# Patient Record
Sex: FEMALE | Race: WHITE | NOT HISPANIC OR LATINO | Employment: OTHER | URBAN - METROPOLITAN AREA
[De-identification: names, ages, dates, MRNs, and addresses within clinical notes are randomized per-mention and may not be internally consistent; named-entity substitution may affect disease eponyms.]

---

## 2017-12-04 ENCOUNTER — HOSPITAL ENCOUNTER (OUTPATIENT)
Dept: RADIOLOGY | Facility: HOSPITAL | Age: 72
Discharge: HOME | End: 2017-12-04
Attending: ORTHOPAEDIC SURGERY
Payer: MEDICARE

## 2017-12-04 DIAGNOSIS — T84.038A: ICD-10-CM

## 2017-12-04 DIAGNOSIS — Z96.659: ICD-10-CM

## 2017-12-04 PROCEDURE — 78805 NM INFLAMMATORY LOCALIZATION LTD CERETEC: CPT | Mod: TC

## 2017-12-04 PROCEDURE — 78805 NM INFLAMMATORY LOCALIZATION LTD CERETEC: CPT | Mod: 26,,, | Performed by: RADIOLOGY

## 2017-12-05 ENCOUNTER — HOSPITAL ENCOUNTER (OUTPATIENT)
Dept: RADIOLOGY | Facility: HOSPITAL | Age: 72
Discharge: HOME | End: 2017-12-05
Attending: RADIOLOGY
Payer: MEDICARE

## 2017-12-05 DIAGNOSIS — M25.561 RIGHT KNEE PAIN: ICD-10-CM

## 2017-12-05 DIAGNOSIS — M25.562 LEFT KNEE PAIN: ICD-10-CM

## 2017-12-05 PROCEDURE — 73560 X-RAY EXAM OF KNEE 1 OR 2: CPT | Mod: 26,50,, | Performed by: RADIOLOGY

## 2017-12-05 PROCEDURE — 73560 X-RAY EXAM OF KNEE 1 OR 2: CPT | Mod: 50,TC

## 2018-01-08 ENCOUNTER — OFFICE VISIT (OUTPATIENT)
Dept: DERMATOLOGY | Facility: CLINIC | Age: 73
End: 2018-01-08
Payer: MEDICARE

## 2018-01-08 VITALS — BODY MASS INDEX: 33.33 KG/M2 | HEIGHT: 69 IN | WEIGHT: 225 LBS

## 2018-01-08 DIAGNOSIS — L98.9 DISEASE OF SKIN AND SUBCUTANEOUS TISSUE: Primary | ICD-10-CM

## 2018-01-08 PROCEDURE — 99201 PR OFFICE/OUTPT VISIT,NEW,LEVL I: CPT | Mod: S$GLB,,, | Performed by: DERMATOLOGY

## 2018-01-08 PROCEDURE — 99999 PR PBB SHADOW E&M-EST. PATIENT-LVL II: CPT | Mod: PBBFAC,,, | Performed by: DERMATOLOGY

## 2018-01-08 RX ORDER — LEVOTHYROXINE SODIUM 100 UG/1
100 TABLET ORAL DAILY
COMMUNITY
End: 2018-07-02 | Stop reason: SDUPTHER

## 2018-01-08 RX ORDER — ATORVASTATIN CALCIUM 20 MG/1
20 TABLET, FILM COATED ORAL DAILY
COMMUNITY
End: 2018-08-15 | Stop reason: SDUPTHER

## 2018-01-08 RX ORDER — METOPROLOL SUCCINATE 50 MG/1
50 TABLET, EXTENDED RELEASE ORAL
COMMUNITY
Start: 2017-11-24 | End: 2018-07-02 | Stop reason: SDUPTHER

## 2018-01-08 RX ORDER — DULOXETIN HYDROCHLORIDE 30 MG/1
30 CAPSULE, DELAYED RELEASE ORAL DAILY
COMMUNITY
End: 2018-08-06

## 2018-01-08 RX ORDER — GABAPENTIN 800 MG/1
800 TABLET ORAL 2 TIMES DAILY
COMMUNITY
End: 2020-10-27 | Stop reason: SDUPTHER

## 2018-01-08 RX ORDER — IRBESARTAN 300 MG/1
300 TABLET ORAL DAILY
COMMUNITY
Start: 2017-10-31 | End: 2018-01-13 | Stop reason: SDUPTHER

## 2018-01-08 NOTE — PROGRESS NOTES
Subjective:       Patient ID:  Marisela Eagle is a 72 y.o. female who presents for   Chief Complaint   Patient presents with    Spot     Scalp     Initial visit  C/o sweating on scalp and sore on head, over one year  Saw dermatologist, received steroid injections and PO steroids, told it was inflammation of hair follicle, uses clobetasol solution PRN sor  H/o hypothyroidism, on synthroid, states thyroid function stabilized, PCP Dr Badillo  Lab work Q6 months, most recent wnl    Current Outpatient Prescriptions:     atorvastatin (LIPITOR) 20 MG tablet, Take 20 mg by mouth once daily., Disp: , Rfl:     DULoxetine (CYMBALTA) 30 MG capsule, Take 30 mg by mouth once daily., Disp: , Rfl:     ergocalciferol (VITAMIN D) 50,000 unit capsule, Take 50,000 Units by mouth.  , Disp: , Rfl:     estradiol (ESTRACE) 0.5 MG tablet, Take 1 tablet (0.5 mg total) by mouth once daily., Disp: 30 tablet, Rfl: 11    fish oil-omega-3 fatty acids 300-1,000 mg capsule, Take 2 g by mouth once daily.  , Disp: , Rfl:     gabapentin (NEURONTIN) 800 MG tablet, Take 800 mg by mouth 3 (three) times daily., Disp: , Rfl:     irbesartan (AVAPRO) 300 MG tablet, Take 300 mg by mouth once daily., Disp: , Rfl:     levmefolate-B6 phos-methyl-B12 (METANX) 3-35-2 mg Tab, Every day, Disp: , Rfl:     levothyroxine (SYNTHROID) 100 MCG tablet, Take 100 mcg by mouth once daily., Disp: , Rfl:     lorazepam (ATIVAN) 1 MG tablet, Take 1 tablet (1 mg total) by mouth every 8 (eight) hours as needed., Disp: 60 tablet, Rfl: 0    magnesium oxide-Mg AA chelate (MAGNESIUM, AMINO ACID CHELATE,) 133 mg Tab, Take 250 mg by mouth.  , Disp: , Rfl:     metoprolol succinate (TOPROL-XL) 50 MG 24 hr tablet, Take 50 mg by mouth., Disp: , Rfl:     multivitamin (MULTIVITAMIN) per tablet, Take 1 tablet by mouth once daily.  , Disp: , Rfl:     vitamin A-vitamin C-vit E-min (ICAPS) TbSR, Take by mouth. 2 Tablet Sustained Release Oral Every day, Disp: , Rfl:      conjugated estrogens (PREMARIN) vaginal cream, Place vaginally every 7 days., Disp: 45 g, Rfl: 3    Current Facility-Administered Medications:     triamcinolone acetonide injection 80 mg, 80 mg, Intra-articular, 1 time in Clinic/HOD, Taiwo Gusman MD    triamcinolone acetonide injection 80 mg, 80 mg, Intra-articular, 1 time in Clinic/HOD, Taiwo Gusman MD        Spot  - Initial  Affected locations: scalp  Duration: 2 years  Signs and Symptoms: sweating.  Severity: mild  Timing: constant  Aggravated by: sweating  Treatments tried: Clobetasol scalp solution.        Review of Systems   Constitutional: Negative for fever, chills, weight loss, weight gain and fatigue.   Skin: Positive for daily sunscreen use, activity-related sunscreen use and wears hat.   Hematologic/Lymphatic: Bruises/bleeds easily.        Objective:    Physical Exam   Skin:   Areas Examined (abnormalities noted in diagram):   Scalp / Hair Palpated and Inspected              Diagram Legend     Erythematous scaling macule/papule c/w actinic keratosis       Vascular papule c/w angioma      Pigmented verrucoid papule/plaque c/w seborrheic keratosis      Yellow umbilicated papule c/w sebaceous hyperplasia      Irregularly shaped tan macule c/w lentigo     1-2 mm smooth white papules consistent with Milia      Movable subcutaneous cyst with punctum c/w epidermal inclusion cyst      Subcutaneous movable cyst c/w pilar cyst      Firm pink to brown papule c/w dermatofibroma      Pedunculated fleshy papule(s) c/w skin tag(s)      Evenly pigmented macule c/w junctional nevus     Mildly variegated pigmented, slightly irregular-bordered macule c/w mildly atypical nevus      Flesh colored to evenly pigmented papule c/w intradermal nevus       Pink pearly papule/plaque c/w basal cell carcinoma      Erythematous hyperkeratotic cursted plaque c/w SCC      Surgical scar with no sign of skin cancer recurrence      Open and closed comedones      Inflammatory  papules and pustules      Verrucoid papule consistent consistent with wart     Erythematous eczematous patches and plaques     Dystrophic onycholytic nail with subungual debris c/w onychomycosis     Umbilicated papule    Erythematous-base heme-crusted tan verrucoid plaque consistent with inflamed seborrheic keratosis     Erythematous Silvery Scaling Plaque c/w Psoriasis     See annotation      Assessment / Plan:        Disease of skin and subcutaneous tissue    no clear pathology identified, healthy appearing scalp, not hyperhidrotic today  Tenderness not explained by exam  Some telangiectasias in area of previous [place] steroid (Dr Weil)    Trial clobetasol solution QHS x 1 month    On gabapentin 800 TID           Return if symptoms worsen or fail to improve.

## 2018-06-25 ENCOUNTER — OFFICE VISIT (OUTPATIENT)
Dept: DERMATOLOGY | Facility: CLINIC | Age: 73
End: 2018-06-25
Payer: MEDICARE

## 2018-06-25 VITALS — HEIGHT: 69 IN | WEIGHT: 225 LBS | BODY MASS INDEX: 33.33 KG/M2

## 2018-06-25 DIAGNOSIS — L98.9 DISEASE OF SKIN AND SUBCUTANEOUS TISSUE: ICD-10-CM

## 2018-06-25 DIAGNOSIS — R61 HYPERHIDROSIS: Primary | ICD-10-CM

## 2018-06-25 PROCEDURE — 99214 OFFICE O/P EST MOD 30 MIN: CPT | Mod: S$GLB,,, | Performed by: DERMATOLOGY

## 2018-06-25 PROCEDURE — 99999 PR PBB SHADOW E&M-EST. PATIENT-LVL II: CPT | Mod: PBBFAC,,, | Performed by: DERMATOLOGY

## 2018-06-25 RX ORDER — GLYCOPYRROLATE 1 MG/1
1 TABLET ORAL DAILY
Qty: 30 TABLET | Refills: 0 | Status: SHIPPED | OUTPATIENT
Start: 2018-06-25 | End: 2018-07-25

## 2018-06-25 RX ORDER — CLOBETASOL PROPIONATE 0.46 MG/ML
SOLUTION TOPICAL 2 TIMES DAILY
Qty: 50 ML | Refills: 1 | Status: SHIPPED | OUTPATIENT
Start: 2018-06-25 | End: 2018-06-25 | Stop reason: SDUPTHER

## 2018-06-25 RX ORDER — CLOBETASOL PROPIONATE 0.46 MG/ML
SOLUTION TOPICAL 2 TIMES DAILY
Qty: 50 ML | Refills: 1 | Status: SHIPPED | OUTPATIENT
Start: 2018-06-25 | End: 2019-05-02

## 2018-06-25 NOTE — PROGRESS NOTES
Subjective:       Patient ID:  Marisela Eagle is a 73 y.o. female who presents for   Chief Complaint   Patient presents with    Lesion     Last seen by Dr. Elmore on 1-8-2018.   72 yo F presents for follow up visit c/o excessive sweating and bumps on scalp for 3 years.  She sweats mostly on her scalp and trunk even while indoors.  When the bumps on her scalp are present, they are described as itchy.  She does not wear a hat or bandana on her head regularly, but only occasionally while outdoors.  Today, however there are no bumps present and she seems to not be sweating excessively.  She has treated with oral steroids and ILK in the past.  She is currently using clobetasol solution PRN without much improvement.        Unsure if she started any new meds prior to sweating. On Estrace x at least 10 years; no obvious hormonal changes prior to the excess sweating      Past Medical History:   Diagnosis Date    Bilateral knee pain July 2012    left worse, right knee painful even after partial replacement.    Bruises easily     Clot 1990's?    Umbilical clot after hysterectomy    Colon polyps     adenomatous    Complication of anesthesia     very low pain tolerance    Cystocele     Degenerative disc disease     neck,back, muscle spasms    Diverticulitis     Edema of left lower extremity     ankles    GERD (gastroesophageal reflux disease)     Hypertension     Neuropathy     peripheral    Thyroid disease     hypothyroid, has nodules    Varicose veins      Review of Systems   Skin: Positive for itching, rash (not present today) and activity-related sunscreen use. Negative for daily sunscreen use and recent sunburn.   Hematologic/Lymphatic: Bruises/bleeds easily.        Objective:    Physical Exam   Constitutional: She appears well-developed and well-nourished.   Neurological: She is alert and oriented to person, place, and time.   Psychiatric: She has a normal mood and affect.   Skin:   Areas Examined  (abnormalities noted in diagram):   Scalp / Hair Palpated and Inspected  Head / Face Inspection Performed  Neck Inspection Performed  Chest / Axilla Inspection Performed  RUE Inspected  LUE Inspection Performed              Diagram Legend     Erythematous scaling macule/papule c/w actinic keratosis       Vascular papule c/w angioma      Pigmented verrucoid papule/plaque c/w seborrheic keratosis      Yellow umbilicated papule c/w sebaceous hyperplasia      Irregularly shaped tan macule c/w lentigo     1-2 mm smooth white papules consistent with Milia      Movable subcutaneous cyst with punctum c/w epidermal inclusion cyst      Subcutaneous movable cyst c/w pilar cyst      Firm pink to brown papule c/w dermatofibroma      Pedunculated fleshy papule(s) c/w skin tag(s)      Evenly pigmented macule c/w junctional nevus     Mildly variegated pigmented, slightly irregular-bordered macule c/w mildly atypical nevus      Flesh colored to evenly pigmented papule c/w intradermal nevus       Pink pearly papule/plaque c/w basal cell carcinoma      Erythematous hyperkeratotic cursted plaque c/w SCC      Surgical scar with no sign of skin cancer recurrence      Open and closed comedones      Inflammatory papules and pustules      Verrucoid papule consistent consistent with wart     Erythematous eczematous patches and plaques     Dystrophic onycholytic nail with subungual debris c/w onychomycosis     Umbilicated papule    Erythematous-base heme-crusted tan verrucoid plaque consistent with inflamed seborrheic keratosis     Erythematous Silvery Scaling Plaque c/w Psoriasis     See annotation    Last CXR in [facility] 2012 WNL  TSH 1/12/18 normal  Glucose 98; HbA1c 5.4 1/12/18  Last CBC in [facility] 2012 was WNL   Assessment / Plan:        Hyperhidrosis-x 3 years  Encouraged pt to follow up with her PCP for TSH testing as this is due in June 2018 (her PCP is not within [facility] system)  Some medications have been shown to induce hyperhidrosis  including some beta blockers (metoprolol, nadolol), physostigmine, pilocarpine, tricyclic antidepressants, and serotonin reuptake inhibitors (pt is on Cymbalta)  Complete work up includes urinary catecholamines may reveal a possible pheochromocytoma and PPD test to screen for tuberculosis.  Will have pt discuss the medications with her PCP (he is outside of [facility] system) since common things are common.  Will start with this.  In the meantime to help her control her Sx, will start with a very low dose of robinul.  Discussed the potential SEs with the patient    -     glycopyrrolate (ROBINUL) 1 mg Tab; Take 1 tablet (1 mg total) by mouth once daily.  Dispense: 30 tablet; Refill: 0  discussed side effects of dry eyes, dry mouth, dry mucosa, headaches, overheating , and abdominal pain.  Do not exercise in excessive heat.    Disease of skin and subcutaneous tissue  No obvious rash/papules on exam today  Possibly folliculitis induced by hyperhidrosis?  -     clobetasol (TEMOVATE) 0.05 % external solution; Apply topically 2 (two) times daily prn itch.  Dispense: 50 mL; Refill: 1             Follow-up in about 1 month (around 7/25/2018).

## 2018-06-27 ENCOUNTER — TELEPHONE (OUTPATIENT)
Dept: DERMATOLOGY | Facility: CLINIC | Age: 73
End: 2018-06-27

## 2018-06-27 NOTE — TELEPHONE ENCOUNTER
----- Message from Allison Cedeño MD sent at 6/25/2018  6:13 PM CDT -----  Pt was seen today for excess sweating on her scalp.  I did a little extra research and some medications have been shown to induce hyperhidrosis including beta blockers (pt on metoprolol) and serotonin reuptake inhibitors (pt is on Cymbalta). Will you ask her to talk to her PCP about possibly changing these medications to see if this helps with her sweating?  Her PCP is not within the [facility] system.  Thanks    MARYJANE

## 2018-06-28 ENCOUNTER — TELEPHONE (OUTPATIENT)
Dept: DERMATOLOGY | Facility: CLINIC | Age: 73
End: 2018-06-28

## 2018-06-28 NOTE — TELEPHONE ENCOUNTER
Left msg to contact office concerning the following .         RE: ----- Message from Allison Cedeño MD sent at 6/25/2018  6:13 PM CDT -----  Pt was seen today for excess sweating on her scalp.  I did a little extra research and some medications have been shown to induce hyperhidrosis including beta blockers (pt on metoprolol) and serotonin reuptake inhibitors (pt is on Cymbalta). Will you ask her to talk to her PCP about possibly changing these medications to see if this helps with her sweating?  Her PCP is not within the [facility] system.  Thanks    MARYJANE

## 2018-08-06 ENCOUNTER — OFFICE VISIT (OUTPATIENT)
Dept: DERMATOLOGY | Facility: CLINIC | Age: 73
End: 2018-08-06
Payer: MEDICARE

## 2018-08-06 VITALS — WEIGHT: 225 LBS | BODY MASS INDEX: 33.33 KG/M2 | HEIGHT: 69 IN

## 2018-08-06 DIAGNOSIS — L98.9 DISEASE OF SKIN AND SUBCUTANEOUS TISSUE: ICD-10-CM

## 2018-08-06 DIAGNOSIS — R61 HYPERHIDROSIS: Primary | ICD-10-CM

## 2018-08-06 PROCEDURE — 99999 PR PBB SHADOW E&M-EST. PATIENT-LVL II: CPT | Mod: PBBFAC,,, | Performed by: DERMATOLOGY

## 2018-08-06 PROCEDURE — 99213 OFFICE O/P EST LOW 20 MIN: CPT | Mod: S$GLB,,, | Performed by: DERMATOLOGY

## 2018-08-06 RX ORDER — GLYCOPYRROLATE 1 MG/1
1 TABLET ORAL 2 TIMES DAILY
Qty: 60 TABLET | Refills: 0 | Status: SHIPPED | OUTPATIENT
Start: 2018-08-06 | End: 2018-09-05

## 2018-08-06 RX ORDER — LORAZEPAM 1 MG/1
TABLET ORAL
COMMUNITY
End: 2019-09-17 | Stop reason: DRUGHIGH

## 2018-08-06 NOTE — PROGRESS NOTES
Subjective:       Patient ID:  Marisela Eagle is a 73 y.o. female who presents for   Chief Complaint   Patient presents with    Follow-up     Pt presents today for hyperhidrosis follow up. Last seen by me on 6-  Since last OV, stopped Cymbalta (duloxetine) which is known to cause hyperhidrosis.    She was started on a very low dose of Robinul (1 mg daily) but discontinued after 1 month because she did not see any improvement.     Possibly folliculitis induced by hyperhidrosis? She has only used the clobetasol (TEMOVATE) 0.05 % external solution a few times, helps little     Recent workup and labs done by her PCP Pawel Howell pt states everything is WNL  Unsure if she started any new meds prior to sweating. On Estrace x at least 10 years; no obvious hormonal changes prior to the excess sweating     Past Medical History:  Diagnosis Date   Bilateral knee pain July 2012    left worse, right knee painful even after partial replacement.   Bruises easily     Clot 1990's?    Umbilical clot after hysterectomy   Colon polyps      adenomatous   Complication of anesthesia      very low pain tolerance   Cystocele     Degenerative disc disease      neck,back, muscle spasms   Diverticulitis     Edema of left lower extremity      ankles   GERD (gastroesophageal reflux disease)     Hypertension     Neuropathy      peripheral   Thyroid disease      hypothyroid, has nodules   Varicose veins                 Review of Systems   Skin: Positive for activity-related sunscreen use. Negative for itching and recent sunburn.        Objective:    Physical Exam   Constitutional: She appears well-developed and well-nourished.   Neurological: She is alert and oriented to person, place, and time.   Psychiatric: She has a normal mood and affect.   Skin:   Areas Examined (abnormalities noted in diagram):   Scalp / Hair Palpated and Inspected  Head / Face Inspection Performed  Neck Inspection Performed  RUE  Inspected  LUE Inspection Performed              Diagram Legend     Erythematous scaling macule/papule c/w actinic keratosis       Vascular papule c/w angioma      Pigmented verrucoid papule/plaque c/w seborrheic keratosis      Yellow umbilicated papule c/w sebaceous hyperplasia      Irregularly shaped tan macule c/w lentigo     1-2 mm smooth white papules consistent with Milia      Movable subcutaneous cyst with punctum c/w epidermal inclusion cyst      Subcutaneous movable cyst c/w pilar cyst      Firm pink to brown papule c/w dermatofibroma      Pedunculated fleshy papule(s) c/w skin tag(s)      Evenly pigmented macule c/w junctional nevus     Mildly variegated pigmented, slightly irregular-bordered macule c/w mildly atypical nevus      Flesh colored to evenly pigmented papule c/w intradermal nevus       Pink pearly papule/plaque c/w basal cell carcinoma      Erythematous hyperkeratotic cursted plaque c/w SCC      Surgical scar with no sign of skin cancer recurrence      Open and closed comedones      Inflammatory papules and pustules      Verrucoid papule consistent consistent with wart     Erythematous eczematous patches and plaques     Dystrophic onycholytic nail with subungual debris c/w onychomycosis     Umbilicated papule    Erythematous-base heme-crusted tan verrucoid plaque consistent with inflamed seborrheic keratosis     Erythematous Silvery Scaling Plaque c/w Psoriasis     See annotation    Last CXR in [facility] 2012 WNL  TSH 2.85-->1.45 on 6/28/18 so synthroid increased from 50--> 100 mcg  Glucose 102; HbA1c 5.4 6/28/18  Last CBC in [facility] 2012 was WNL     Assessment / Plan:        Hyperhidrosis-x approx 3 years  Will increase dose of Robinul slightly to help with symptom relief  -     glycopyrrolate (ROBINUL) 1 mg Tab; Take 1 tablet (1 mg total) by mouth 2 (two) times daily.  Dispense: 60 tablet; Refill: 0  Pt stopped Cymbalta in late June (known to have hyperhidrotic effects)   Some medications have been  shown to induce hyperhidrosis including some beta blockers (nadolol, metoprolol), physostigmine, pilocarpine, tricyclic antidepressants, and serotonin reuptake inhibitors (pt is now off of Cymbalta)  Complete work up includes urine catecholamines may reveal a possible pheochromocytoma and PPD test to screen for tuberculosis, CBC, CXR     TSH 2.85-->1.45 on 6/28/18 so synthroid increased from 50--> 100 mcg    Disease of skin and subcutaneous tissue  No obvious rash/papules on exam today  Possibly folliculitis induced by hyperhidrosis?  Continue clobetasol (TEMOVATE) 0.05 % external solution bid prn itch           Follow-up in about 4 weeks (around 9/3/2018).

## 2018-08-13 ENCOUNTER — TELEPHONE (OUTPATIENT)
Dept: RHEUMATOLOGY | Facility: CLINIC | Age: 73
End: 2018-08-13

## 2018-08-13 NOTE — TELEPHONE ENCOUNTER
----- Message from Janene Kellogg sent at 8/13/2018  1:58 PM CDT -----  Call [phone] / asking to see a rheumatology Dr ( preferably  Before 11/22) next babs scheduled with Dr Michaels is 12/12 / has arthritis from previous surgeries     Patient asking for cancellation with [facility] as she has earlier appoint,ent with [facility]. CG

## 2018-08-20 ENCOUNTER — OFFICE VISIT (OUTPATIENT)
Dept: ENDOCRINOLOGY | Facility: CLINIC | Age: 73
End: 2018-08-20
Payer: MEDICARE

## 2018-08-20 ENCOUNTER — LAB VISIT (OUTPATIENT)
Dept: LAB | Facility: HOSPITAL | Age: 73
End: 2018-08-20
Attending: INTERNAL MEDICINE
Payer: MEDICARE

## 2018-08-20 VITALS
RESPIRATION RATE: 18 BRPM | WEIGHT: 247.38 LBS | SYSTOLIC BLOOD PRESSURE: 124 MMHG | DIASTOLIC BLOOD PRESSURE: 77 MMHG | BODY MASS INDEX: 36.64 KG/M2 | HEART RATE: 64 BPM | TEMPERATURE: 98 F | HEIGHT: 69 IN

## 2018-08-20 DIAGNOSIS — R79.89 ABNORMAL THYROID BLOOD TEST: ICD-10-CM

## 2018-08-20 DIAGNOSIS — E07.9 THYROID DISEASE: ICD-10-CM

## 2018-08-20 DIAGNOSIS — E04.9 GOITER: ICD-10-CM

## 2018-08-20 DIAGNOSIS — E78.5 HYPERLIPIDEMIA, UNSPECIFIED HYPERLIPIDEMIA TYPE: ICD-10-CM

## 2018-08-20 DIAGNOSIS — I10 HYPERTENSION, UNSPECIFIED TYPE: ICD-10-CM

## 2018-08-20 DIAGNOSIS — K21.9 GASTROESOPHAGEAL REFLUX DISEASE, ESOPHAGITIS PRESENCE NOT SPECIFIED: ICD-10-CM

## 2018-08-20 DIAGNOSIS — E04.1 NODULAR THYROID DISEASE: ICD-10-CM

## 2018-08-20 DIAGNOSIS — E06.9 THYROIDITIS: ICD-10-CM

## 2018-08-20 DIAGNOSIS — E55.9 HYPOVITAMINOSIS D: ICD-10-CM

## 2018-08-20 DIAGNOSIS — E03.9 HYPOTHYROIDISM, UNSPECIFIED TYPE: Primary | ICD-10-CM

## 2018-08-20 DIAGNOSIS — G62.9 NEUROPATHY: ICD-10-CM

## 2018-08-20 DIAGNOSIS — E78.00 HYPERCHOLESTEROLEMIA: ICD-10-CM

## 2018-08-20 DIAGNOSIS — E88.810 DYSMETABOLIC SYNDROME: ICD-10-CM

## 2018-08-20 DIAGNOSIS — E66.9 OBESITY (BMI 30-39.9): ICD-10-CM

## 2018-08-20 DIAGNOSIS — Z78.0 POSTMENOPAUSAL: ICD-10-CM

## 2018-08-20 LAB
25(OH)D3+25(OH)D2 SERPL-MCNC: 59 NG/ML
ALBUMIN SERPL BCP-MCNC: 4.2 G/DL
ALP SERPL-CCNC: 55 U/L
ALT SERPL W/O P-5'-P-CCNC: 34 U/L
ANION GAP SERPL CALC-SCNC: 10 MMOL/L
AST SERPL-CCNC: 25 U/L
BILIRUB SERPL-MCNC: 0.6 MG/DL
BUN SERPL-MCNC: 17 MG/DL
CA-I BLDV-SCNC: 1.21 MMOL/L
CALCIUM SERPL-MCNC: 9.9 MG/DL
CHLORIDE SERPL-SCNC: 101 MMOL/L
CHOLEST SERPL-MCNC: 169 MG/DL
CHOLEST/HDLC SERPL: 2.4 {RATIO}
CO2 SERPL-SCNC: 28 MMOL/L
CREAT SERPL-MCNC: 0.7 MG/DL
EST. GFR  (AFRICAN AMERICAN): >60 ML/MIN/1.73 M^2
EST. GFR  (NON AFRICAN AMERICAN): >60 ML/MIN/1.73 M^2
GLUCOSE SERPL-MCNC: 95 MG/DL
HDLC SERPL-MCNC: 69 MG/DL
HDLC SERPL: 40.8 %
LDLC SERPL CALC-MCNC: 82.6 MG/DL
NONHDLC SERPL-MCNC: 100 MG/DL
POTASSIUM SERPL-SCNC: 3.8 MMOL/L
PROT SERPL-MCNC: 7.2 G/DL
SODIUM SERPL-SCNC: 139 MMOL/L
T3 SERPL-MCNC: 86 NG/DL
T4 FREE SERPL-MCNC: 0.95 NG/DL
THYROGLOB AB SERPL IA-ACNC: <4 IU/ML
THYROPEROXIDASE IGG SERPL-ACNC: <6 IU/ML
TRIGL SERPL-MCNC: 87 MG/DL
TSH SERPL DL<=0.005 MIU/L-ACNC: 1.42 UIU/ML
URATE SERPL-MCNC: 7.7 MG/DL

## 2018-08-20 PROCEDURE — 86800 THYROGLOBULIN ANTIBODY: CPT

## 2018-08-20 PROCEDURE — 82308 ASSAY OF CALCITONIN: CPT

## 2018-08-20 PROCEDURE — 84443 ASSAY THYROID STIM HORMONE: CPT

## 2018-08-20 PROCEDURE — 82330 ASSAY OF CALCIUM: CPT

## 2018-08-20 PROCEDURE — 83018 HEAVY METAL QUAN EACH NES: CPT

## 2018-08-20 PROCEDURE — 84550 ASSAY OF BLOOD/URIC ACID: CPT

## 2018-08-20 PROCEDURE — 86316 IMMUNOASSAY TUMOR OTHER: CPT

## 2018-08-20 PROCEDURE — 3078F DIAST BP <80 MM HG: CPT | Mod: CPTII,S$GLB,, | Performed by: INTERNAL MEDICINE

## 2018-08-20 PROCEDURE — 99999 PR PBB SHADOW E&M-EST. PATIENT-LVL V: CPT | Mod: PBBFAC,,, | Performed by: INTERNAL MEDICINE

## 2018-08-20 PROCEDURE — 84432 ASSAY OF THYROGLOBULIN: CPT

## 2018-08-20 PROCEDURE — 99499 UNLISTED E&M SERVICE: CPT | Mod: S$PBB,,, | Performed by: INTERNAL MEDICINE

## 2018-08-20 PROCEDURE — 84480 ASSAY TRIIODOTHYRONINE (T3): CPT

## 2018-08-20 PROCEDURE — 82306 VITAMIN D 25 HYDROXY: CPT

## 2018-08-20 PROCEDURE — 80053 COMPREHEN METABOLIC PANEL: CPT

## 2018-08-20 PROCEDURE — 3074F SYST BP LT 130 MM HG: CPT | Mod: CPTII,S$GLB,, | Performed by: INTERNAL MEDICINE

## 2018-08-20 PROCEDURE — 84439 ASSAY OF FREE THYROXINE: CPT

## 2018-08-20 PROCEDURE — 80061 LIPID PANEL: CPT

## 2018-08-20 PROCEDURE — 86376 MICROSOMAL ANTIBODY EACH: CPT

## 2018-08-20 PROCEDURE — 99204 OFFICE O/P NEW MOD 45 MIN: CPT | Mod: S$GLB,,, | Performed by: INTERNAL MEDICINE

## 2018-08-20 NOTE — PROGRESS NOTES
Patient, Marisela Eagle (MRN #[id number]), presented with a recorded BMI of 36.53 kg/m^2 and a documented comorbidity(s):  - Hypertension  - Hyperlipidemia  to which the severe obesity is a contributing factor. This is consistent with the definition of severe obesity (BMI 35.0-35.9) with comorbidity (ICD-10 E66.01, Z68.35). The patient's severe obesity was monitored, evaluated, addressed and/or treated. This addendum to the medical record is made on 08/20/2018.

## 2018-08-20 NOTE — PROGRESS NOTES
Subjective:      Patient ID: Marisela Eagle is a 73 y.o. female.    Chief Complaint:   73 yr old postmenopausal lady seen for initial visit today on account of hypothyroidism on thyroid hormone repletion.    History of Present Illness    Patient is a 73 yr old lady with hypothyroidism and thyroid nodular disease seen for initial care visit today. She used to see Dr Bentley but last saw him in 2012. She is on LT4 100mcg QD.  Her last thyroid USS was from 07/12 and showed sub cm multinodular thyroid disease.  Her associated background comorbidities are as detailed below;    1. Hypothyroidism, unspecified type     2. Hypertension, unspecified type     3. Hyperlipidemia, unspecified hyperlipidemia type     4. Hypercholesterolemia     5. Dysmetabolic syndrome     6. Obesity (BMI 30-39.9)     7. Neuropathy     8. Gastroesophageal reflux disease, esophagitis presence not specified     9. Hypovitaminosis D     10. Thyroiditis     11. Goiter     12. Abnormal thyroid blood test     13. Thyroid disease     14. Postmenopausal     15. Nodular thyroid disease       Patients baseline epworth score is 4.  Patient has been hypothyroid since ~ 10 yrs ago.  Patients daughter has a history of thyroid cancer.  She has recently been having scalp hyperhidrosis. She has no dysphagia, no odynopahgia and has no significant dysphonia and no local  Voice changes.  Patient is s/p bilateral knee replacements and has had right hip replacement.  Her most recent DEXA was from 01/17 and was in normal range.        Review of Systems   Constitutional: Negative for activity change, appetite change, chills, diaphoresis, fatigue and unexpected weight change.   HENT: Negative for facial swelling, hearing loss, sore throat, trouble swallowing and voice change.    Eyes: Negative for photophobia and visual disturbance.   Respiratory: Negative for cough and shortness of breath.    Cardiovascular: Negative for chest pain, palpitations and leg swelling.  "  Gastrointestinal: Negative for abdominal distention, abdominal pain, constipation, diarrhea and nausea.   Endocrine: Negative for cold intolerance, heat intolerance, polydipsia, polyphagia and polyuria.   Genitourinary: Negative for difficulty urinating, dysuria, frequency, menstrual problem and urgency.   Musculoskeletal: Positive for arthralgias (Chronic especially in both knees and left hip), gait problem (on account of knee and hip arthralgias; uses rolling walker) and neck stiffness (chronic). Negative for back pain, joint swelling, myalgias and neck pain.   Skin: Negative for color change, pallor and rash.   Neurological: Negative for dizziness, tremors, seizures, syncope, weakness, light-headedness, numbness and headaches.   Hematological: Does not bruise/bleed easily.   Psychiatric/Behavioral: Negative for agitation, confusion, dysphoric mood, hallucinations and sleep disturbance. The patient is not nervous/anxious.        Objective:   /77 (BP Location: Right arm, Patient Position: Sitting, BP Method: Large (Automatic))   Pulse 64   Temp 98.3 °F (36.8 °C) (Oral)   Resp 18   Ht 5' 9" (1.753 m)   Wt 112.2 kg (247 lb 5.7 oz)   BMI 36.53 kg/m²  neck circ; 16.25" waist circ; 42.25" hip circ; 51.5" waist to hip ratio; 0.82       Physical Exam   Constitutional: She is oriented to person, place, and time. Vital signs are normal. She appears well-developed and well-nourished. She does not appear ill. No distress.   Not pale, anicteric, afebrile. Well hydrated. Not in any acute distress.  Does have chronic arthralgia of weight bearing joints and ambulates with aid of rolling walker.  Her Left ankle and foot is a weight ofloading Unna boot.   HENT:   Head: Normocephalic and atraumatic. Not macrocephalic. Head is without right periorbital erythema and without left periorbital erythema. Hair is normal.   Nose: Nose normal.   Mouth/Throat: Oropharynx is clear and moist. Mucous membranes are not pale and not " dry. No oropharyngeal exudate.   Mallampati grade 2 fauces. No nuchal AN   Eyes: Conjunctivae, EOM and lids are normal. Pupils are equal, round, and reactive to light. Right eye exhibits no discharge. Left eye exhibits no discharge. No scleral icterus.   Neck: Trachea normal and phonation normal. Normal carotid pulses and no JVD present. Carotid bruit is not present. Neck rigidity (chronic. Has limited range of neck rotation) present. Decreased range of motion present. No tracheal deviation present. No thyroid mass and no thyromegaly present.   Cardiovascular: Normal rate, regular rhythm, S1 normal, S2 normal, normal heart sounds, intact distal pulses and normal pulses. PMI is not displaced. Exam reveals no gallop.   No murmur heard.  Pulmonary/Chest: Effort normal and breath sounds normal. No respiratory distress. She has no wheezes. She has no rales.   Abdominal: Soft. Bowel sounds are normal. She exhibits no distension and no mass. There is no hepatosplenomegaly, splenomegaly or hepatomegaly. There is no tenderness. There is no rebound, no guarding and no CVA tenderness. No hernia. Hernia confirmed negative in the ventral area.   Obese anterior abdominal wall.   Musculoskeletal: She exhibits no edema or tenderness.        Right shoulder: She exhibits normal range of motion, no bony tenderness, no crepitus, no deformity, no pain, no spasm, normal pulse and normal strength.        Legs:  Bilateral knee replacement surgical scars as shown.  Left ankle and foot in Unna boot.   Lymphadenopathy:     She has no cervical adenopathy.        Right: No inguinal adenopathy present.        Left: No inguinal adenopathy present.   Neurological: She is alert and oriented to person, place, and time. She has normal strength and normal reflexes. She displays no atrophy, no tremor and normal reflexes. No cranial nerve deficit or sensory deficit. She exhibits normal muscle tone. Coordination and gait normal.   Skin: Skin is warm, dry  and intact. No bruising, no ecchymosis, no petechiae and no rash noted. She is not diaphoretic. No cyanosis or erythema. No pallor. Nails show no clubbing.   Psychiatric: She has a normal mood and affect. Her speech is normal and behavior is normal. Judgment and thought content normal. Cognition and memory are normal.   Nursing note and vitals reviewed.      Lab Review:     Results for [facility]OR[facility] (MRN [id number]) as of 8/20/2018 09:03   Ref. Range 1/12/2018 09:24 6/28/2018 10:38   Sodium Latest Ref Range: 135 - 146 mmol/L 141 144   Potassium Latest Ref Range: 3.5 - 5.3 mmol/L 4.8 4.4   Chloride Latest Ref Range: 98 - 110 mmol/L 103 101   CO2 Latest Ref Range: 20 - 31 mmol/L 33 (H) 35 (H)   BUN, Bld Latest Ref Range: 7 - 25 mg/dL 15 14   Creatinine Latest Ref Range: 0.60 - 0.93 mg/dL 0.65 0.70   BUN/Creatinine Ratio Latest Ref Range: 6 - 22 (calc) NOT APPLICABLE NOT APPLICABLE   eGFR if non African American Latest Ref Range: > OR = 60 mL/min/1.73m2 87 85   eGFR if African American Latest Ref Range: > OR = 60 mL/min/1.73m2 101 98   Glucose Latest Ref Range: 65 - 99 mg/dL 98 102 (H)   Calcium Latest Ref Range: 8.6 - 10.4 mg/dL 9.9 10.0   Alkaline Phosphatase Latest Ref Range: 33 - 130 U/L 57 55   Total Protein Latest Ref Range: 6.1 - 8.1 g/dL 6.9 6.7   Albumin Latest Ref Range: 3.6 - 5.1 g/dL 4.5 4.4   Albumin/Globulin Ratio Latest Ref Range: 1.0 - 2.5 (calc) 1.9 1.9   Total Bilirubin Latest Ref Range: 0.2 - 1.2 mg/dL 0.5 0.6   AST Latest Ref Range: 10 - 35 U/L 16 19   ALT Latest Ref Range: 6 - 29 U/L 21 23   Triglycerides Latest Ref Range: <150 mg/dL 70 121   Globulin, Total Latest Ref Range: 1.9 - 3.7 g/dL (calc) 2.4 2.3   Cholesterol Latest Ref Range: <200 mg/dL 180 185   HDL Latest Ref Range: >50 mg/dL 80 78   LDL Cholesterol Latest Units: mg/dL (calc) 84 85   Non HDL Chol. (LDL+VLDL) Latest Ref Range: <130 mg/dL (calc) 100 107   A1c Latest Ref Range: <5.7 % of total Hgb 5.4    HDL/Chol Ratio Latest Ref  Range: <5.0 (calc) 2.3 2.4   TSH w/reflex to FT4 Latest Ref Range: 0.40 - 4.50 mIU/L 2.85 1.45       Assessment:     1. Hypothyroidism, unspecified type     2. Hypertension, unspecified type  Comprehensive metabolic panel    Microalbumin/creatinine urine ratio    Urinalysis   3. Hyperlipidemia, unspecified hyperlipidemia type     4. Hypercholesterolemia     5. Dysmetabolic syndrome     6. Obesity (BMI 30-39.9)     7. Neuropathy     8. Gastroesophageal reflux disease, esophagitis presence not specified     9. Hypovitaminosis D  Vitamin D    Calcium, ionized    Comprehensive metabolic panel   10. Thyroiditis  T4, free    T3    Thyroid peroxidase antibody    Anti-thyroglobulin antibody    TSH    Iodine, Serum    Uric acid   11. Goiter  Calcitonin    Iodine, Serum    Uric acid   12. Abnormal thyroid blood test  T4, free    T3   13. Thyroid disease  T4, free    T3    Lipid panel    TSH    Uric acid   14. Postmenopausal     15. Nodular thyroid disease  Thyroglobulin    Calcitonin    Chromogranin A      Regarding hypothyroidism; appears clinically euthyroid; to recheck TFTs today and based on results may adjust dose as required.  Regarding thyroid nodular disease; to recheck thyroid USS.  Regarding hyperlipidemia ; to continue antilipidemics as before.  Regarding hypertension;  Well controlled BP today. No change to present anti hypertensives at the moment.  Regarding GERD; symptomatically stable. To continue present PPI therapy.  Regarding bone health; most recent DEXA normal; for FFup DEXA 01/19.  Regarding hypovitaminosis D; to continue Vit D repletion therapy as before.            Plan:       FFup in ~ 4mths

## 2018-08-21 LAB
CALCIT SERPL-MCNC: <5 PG/ML
THRYOGLOBULIN INTERPRETATION: ABNORMAL
THYROGLOB AB SERPL-ACNC: <1.8 IU/ML
THYROGLOB SERPL-MCNC: 3.6 NG/ML

## 2018-08-22 LAB — CGA SERPL-MCNC: 738 NG/ML (ref 0–95)

## 2018-08-23 LAB — IODINE SERPL-MCNC: 74 NG/ML (ref 40–92)

## 2018-08-24 ENCOUNTER — TELEPHONE (OUTPATIENT)
Dept: ENDOCRINOLOGY | Facility: CLINIC | Age: 73
End: 2018-08-24

## 2018-08-27 ENCOUNTER — HOSPITAL ENCOUNTER (OUTPATIENT)
Dept: RADIOLOGY | Facility: CLINIC | Age: 73
Discharge: HOME | End: 2018-08-27
Attending: INTERNAL MEDICINE
Payer: MEDICARE

## 2018-08-27 DIAGNOSIS — E04.9 GOITER: ICD-10-CM

## 2018-08-27 DIAGNOSIS — E04.1 NODULAR THYROID DISEASE: ICD-10-CM

## 2018-08-27 PROCEDURE — 76536 US EXAM OF HEAD AND NECK: CPT | Mod: TC,PO

## 2018-08-27 PROCEDURE — 76536 US EXAM OF HEAD AND NECK: CPT | Mod: 26,,, | Performed by: RADIOLOGY

## 2018-09-06 ENCOUNTER — TELEPHONE (OUTPATIENT)
Dept: DERMATOLOGY | Facility: CLINIC | Age: 73
End: 2018-09-06

## 2018-09-06 NOTE — TELEPHONE ENCOUNTER
Left msg .    ----- Message from Thelma Cruz MA sent at 9/5/2018  4:21 PM CDT -----  Contact: Self      ----- Message -----  From: Bailey Connelly  Sent: 9/4/2018  10:12 AM  To: [facility] Staff    Patient need to speak to the nurse about medication glycopyrrolate (ROBINUL) 1 mg Tab    Please call back [phone] (home)

## 2018-10-23 ENCOUNTER — OFFICE VISIT (OUTPATIENT)
Dept: DERMATOLOGY | Facility: CLINIC | Age: 73
End: 2018-10-23
Payer: MEDICARE

## 2018-10-23 VITALS — HEIGHT: 69 IN | BODY MASS INDEX: 36.64 KG/M2 | WEIGHT: 247.38 LBS

## 2018-10-23 DIAGNOSIS — L98.9 DISEASE OF SKIN AND SUBCUTANEOUS TISSUE: ICD-10-CM

## 2018-10-23 DIAGNOSIS — R61 HYPERHIDROSIS: Primary | ICD-10-CM

## 2018-10-23 PROCEDURE — 99212 OFFICE O/P EST SF 10 MIN: CPT | Mod: PBBFAC,PO | Performed by: DERMATOLOGY

## 2018-10-23 PROCEDURE — 3288F FALL RISK ASSESSMENT DOCD: CPT | Mod: CPTII,,, | Performed by: DERMATOLOGY

## 2018-10-23 PROCEDURE — 99213 OFFICE O/P EST LOW 20 MIN: CPT | Mod: S$PBB,,, | Performed by: DERMATOLOGY

## 2018-10-23 PROCEDURE — 99999 PR PBB SHADOW E&M-EST. PATIENT-LVL II: CPT | Mod: PBBFAC,,, | Performed by: DERMATOLOGY

## 2018-10-23 PROCEDURE — 1100F PTFALLS ASSESS-DOCD GE2>/YR: CPT | Mod: CPTII,,, | Performed by: DERMATOLOGY

## 2018-10-23 RX ORDER — GLYCOPYRROLATE 1 MG/1
1 TABLET ORAL DAILY
Qty: 30 TABLET | Refills: 0 | Status: SHIPPED | OUTPATIENT
Start: 2018-10-23 | End: 2018-11-19 | Stop reason: SDUPTHER

## 2018-10-23 NOTE — PROGRESS NOTES
Subjective:       Patient ID:  Marisela Eagle is a 73 y.o. female who presents for   Chief Complaint   Patient presents with    Excessive Sweating     f/u     Pt presents today for hyperhidrosis follow up. Last seen by me on 8/2018  At last OV, she had stopped Cymbalta (duloxetine) which is known to cause hyperhidrosis.    She was started on a very low dose of Robinul (1 mg daily) but discontinued after 1 month because she did not see any improvement. She increased to 2 mg for a little while but was still sweating.  At her rehab facility after hip surgery, she was on 1 mg daily and was not sweating (was also on ASA, Celebrex, Lyrica at this time).  Unsure if there were any other changes in her medications other than keeping the temperature lower on thermostat    Past Medical History:  Diagnosis Date   Bilateral knee pain July 2012    left worse, right knee painful even after partial replacement.   Bruises easily     Clot 1990's?    Umbilical clot after hysterectomy   Colon polyps      adenomatous   Complication of anesthesia      very low pain tolerance   Cystocele     Degenerative disc disease      neck,back, muscle spasms   Diverticulitis     Edema of left lower extremity      ankles   GERD (gastroesophageal reflux disease)     Hypertension     Neuropathy      peripheral   Thyroid disease      hypothyroid, has nodules   Varicose veins                 Review of Systems   Skin: Positive for rash (not present today) and activity-related sunscreen use. Negative for itching, daily sunscreen use and recent sunburn.   Hematologic/Lymphatic: Bruises/bleeds easily.        Objective:    Physical Exam   Constitutional: She appears well-developed and well-nourished.   Neurological: She is alert and oriented to person, place, and time.   Psychiatric: She has a normal mood and affect.   Skin:   Areas Examined (abnormalities noted in diagram):   Scalp / Hair Palpated and Inspected  Head / Face Inspection  Performed  Neck Inspection Performed  RUE Inspected  LUE Inspection Performed              Diagram Legend     Erythematous scaling macule/papule c/w actinic keratosis       Vascular papule c/w angioma      Pigmented verrucoid papule/plaque c/w seborrheic keratosis      Yellow umbilicated papule c/w sebaceous hyperplasia      Irregularly shaped tan macule c/w lentigo     1-2 mm smooth white papules consistent with Milia      Movable subcutaneous cyst with punctum c/w epidermal inclusion cyst      Subcutaneous movable cyst c/w pilar cyst      Firm pink to brown papule c/w dermatofibroma      Pedunculated fleshy papule(s) c/w skin tag(s)      Evenly pigmented macule c/w junctional nevus     Mildly variegated pigmented, slightly irregular-bordered macule c/w mildly atypical nevus      Flesh colored to evenly pigmented papule c/w intradermal nevus       Pink pearly papule/plaque c/w basal cell carcinoma      Erythematous hyperkeratotic cursted plaque c/w SCC      Surgical scar with no sign of skin cancer recurrence      Open and closed comedones      Inflammatory papules and pustules      Verrucoid papule consistent consistent with wart     Erythematous eczematous patches and plaques     Dystrophic onycholytic nail with subungual debris c/w onychomycosis     Umbilicated papule    Erythematous-base heme-crusted tan verrucoid plaque consistent with inflamed seborrheic keratosis     Erythematous Silvery Scaling Plaque c/w Psoriasis     See annotation    Last CXR in EPIC 2012 WNL  TSH 2.85-->1.42   Glucose 95; HbA1c 5.4   Last CBC in EPIC 2012 was WNL     Assessment / Plan:        Hyperhidrosis-x approx 3 years (scalp, inframammary)  Will increase dose of Robinul slightly to help with symptom relief  -     glycopyrrolate (ROBINUL) 1 mg Tab; Take 1 tablet (1 mg total) by mouth once daily.  Dispense: 30 tablet; Refill: 0  Pt stopped Cymbalta in late June (known to have hyperhidrotic effects)   Encouraged pt to call insurance  re: Botox coverage for hyperhidrosis if she thinks she can handle the injections    Discussed with Dr Harvey (Endocrinology): The chromogranin a level elevation is consistent with the patients thyroid nodular disease and concurrent PPI use.   Pheo as a cause of sweating is very unlikely in her especially with her normal BPs.  It is more likely that these sweating spells may be related to a second wave of vasomotor menopausal symptoms.     Disease of skin and subcutaneous tissue  No obvious rash/papules on exam today  Possibly folliculitis induced by hyperhidrosis?  Continue clobetasol (TEMOVATE) 0.05 % external solution bid prn itch             Follow-up in about 4 weeks (around 11/20/2018).

## 2018-11-19 NOTE — TELEPHONE ENCOUNTER
----- Message from Ruth Mancuso sent at 11/19/2018 10:33 AM CST -----  Type:  Pharmacy Calling to Clarify an RX    Name of Caller: pt  Pharmacy Name:   [id number] - [place] - [address]  [address] [place] [id number]  Phone: [phone] Fax: [phone]    Prescription Name:  Robinul 1 mg    Best Call Back Number: [phone]

## 2018-11-20 RX ORDER — GLYCOPYRROLATE 1 MG/1
1 TABLET ORAL DAILY
Qty: 30 TABLET | Refills: 0 | Status: SHIPPED | OUTPATIENT
Start: 2018-11-20 | End: 2018-12-20

## 2018-12-18 PROBLEM — H66.90 OTITIS MEDIA: Status: ACTIVE | Noted: 2018-12-18

## 2018-12-18 PROBLEM — R30.0 DYSURIA: Status: ACTIVE | Noted: 2018-12-18

## 2018-12-18 PROBLEM — J32.9 SINUSITIS: Status: ACTIVE | Noted: 2018-12-18

## 2019-01-28 ENCOUNTER — OFFICE VISIT (OUTPATIENT)
Dept: ENDOCRINOLOGY | Facility: CLINIC | Age: 74
End: 2019-01-28
Payer: MEDICARE

## 2019-01-28 VITALS
HEART RATE: 68 BPM | RESPIRATION RATE: 18 BRPM | HEIGHT: 69 IN | TEMPERATURE: 98 F | DIASTOLIC BLOOD PRESSURE: 67 MMHG | BODY MASS INDEX: 36.7 KG/M2 | WEIGHT: 247.81 LBS | SYSTOLIC BLOOD PRESSURE: 119 MMHG

## 2019-01-28 DIAGNOSIS — E66.9 OBESITY (BMI 30-39.9): ICD-10-CM

## 2019-01-28 DIAGNOSIS — E03.9 HYPOTHYROIDISM, UNSPECIFIED TYPE: Primary | ICD-10-CM

## 2019-01-28 DIAGNOSIS — I10 HYPERTENSION, UNSPECIFIED TYPE: ICD-10-CM

## 2019-01-28 DIAGNOSIS — Z78.0 POSTMENOPAUSAL: ICD-10-CM

## 2019-01-28 DIAGNOSIS — E78.5 HYPERLIPIDEMIA, UNSPECIFIED HYPERLIPIDEMIA TYPE: ICD-10-CM

## 2019-01-28 DIAGNOSIS — E04.9 GOITER: ICD-10-CM

## 2019-01-28 PROCEDURE — 99999 PR PBB SHADOW E&M-EST. PATIENT-LVL III: ICD-10-PCS | Mod: PBBFAC,,, | Performed by: PHYSICIAN ASSISTANT

## 2019-01-28 PROCEDURE — 3074F PR MOST RECENT SYSTOLIC BLOOD PRESSURE < 130 MM HG: ICD-10-PCS | Mod: CPTII,S$GLB,, | Performed by: PHYSICIAN ASSISTANT

## 2019-01-28 PROCEDURE — 3078F PR MOST RECENT DIASTOLIC BLOOD PRESSURE < 80 MM HG: ICD-10-PCS | Mod: CPTII,S$GLB,, | Performed by: PHYSICIAN ASSISTANT

## 2019-01-28 PROCEDURE — 99213 PR OFFICE/OUTPT VISIT, EST, LEVL III, 20-29 MIN: ICD-10-PCS | Mod: S$GLB,,, | Performed by: PHYSICIAN ASSISTANT

## 2019-01-28 PROCEDURE — 99213 OFFICE O/P EST LOW 20 MIN: CPT | Mod: S$GLB,,, | Performed by: PHYSICIAN ASSISTANT

## 2019-01-28 PROCEDURE — 3078F DIAST BP <80 MM HG: CPT | Mod: CPTII,S$GLB,, | Performed by: PHYSICIAN ASSISTANT

## 2019-01-28 PROCEDURE — 3074F SYST BP LT 130 MM HG: CPT | Mod: CPTII,S$GLB,, | Performed by: PHYSICIAN ASSISTANT

## 2019-01-28 PROCEDURE — 99999 PR PBB SHADOW E&M-EST. PATIENT-LVL III: CPT | Mod: PBBFAC,,, | Performed by: PHYSICIAN ASSISTANT

## 2019-01-28 PROCEDURE — 1101F PT FALLS ASSESS-DOCD LE1/YR: CPT | Mod: CPTII,S$GLB,, | Performed by: PHYSICIAN ASSISTANT

## 2019-01-28 PROCEDURE — 1101F PR PT FALLS ASSESS DOC 0-1 FALLS W/OUT INJ PAST YR: ICD-10-PCS | Mod: CPTII,S$GLB,, | Performed by: PHYSICIAN ASSISTANT

## 2019-01-28 NOTE — PROGRESS NOTES
"CC: Hypothyroidism    HPI: Marisela Eagle is a 73 y.o. female here for hypothyroidism along with pending conditions listed in the Visit Diagnosis. Diagnosed several years ago. Her daughter had thyroid cancer. They think this was due to radiation on her face for acne. Her cousin and grandmother had T2DM. She is taking biotin.    Taking 100 mcg of LT4 daily. She takes it ~6 am and waits an hour before eating.    +heat intolerance, wt gain, sweating    No d/c, palpitations, tremors, hair loss or changes in nails.    Sweating has improved since the weather has been cooler.    Thyroid u/s showed 2 subcentimeter nodules that have not changed in size since 2012. Repeat in 3 years. Reports SOB (seeing cardiology). No dysphagia or voice changes.     DEXA 1/17: wnl     PMHx, PSHx: reviewed in epic.  Social Hx: Occasionally has mixed 2-3 drinks. She smoked 0.5 ppd for 53 years.     ROS:   Constitutional: feels tired, wt gain  Eyes: No recent visual changes  Cardiovascular: + occasional cp and palpitations  Respiratory: + SOB, no cough  Gastrointestinal: Denies recent bowel disturbances  GenitoUrinary - No dysuria  Skin: No new skin rash  Musc: + back pain, knee pain  Neurologic: + numbness and tingling in feet  Endocrine: no polyphagia, polydipsia or polyuria  Remainder ROS negative     /67 (BP Location: Left arm, Patient Position: Sitting, BP Method: Large (Automatic))   Pulse 68   Temp 97.8 °F (36.6 °C) (Oral)   Resp 18   Ht 5' 9" (1.753 m)   Wt 112.4 kg (247 lb 12.8 oz)   BMI 36.59 kg/m²      Personally reviewed labs below:    Lab Results   Component Value Date    TSH 1.424 08/20/2018    M9SSMHN 86 08/20/2018    FREET4 0.95 08/20/2018        Chemistry        Component Value Date/Time     08/20/2018 1008    K 3.8 08/20/2018 1008     08/20/2018 1008    CO2 28 08/20/2018 1008    BUN 17 08/20/2018 1008    CREATININE 0.7 08/20/2018 1008    GLU 95 08/20/2018 1008        Component Value Date/Time    " CALCIUM 9.9 08/20/2018 1008    ALKPHOS 53 01/11/2019 1011    AST 26 01/11/2019 1011    ALT 34 08/20/2018 1008    BILITOT 0.8 01/11/2019 1011    ESTGFRAFRICA >60.0 08/20/2018 1008    EGFRNONAA >60.0 08/20/2018 1008         Lab Results   Component Value Date    HGBA1C 5.5 09/02/2011    HGBA1C 4.8 09/28/2004      PE:  GENERAL: middle aged female, well developed, well nourished  NECK: Supple neck, normal thyroid. No bruit  LYMPHATIC: No cervical or supraclavicular lymphadenopathy  CARDIOVASCULAR: Normal heart sounds, no pedal edema  RESPIRATORY: Normal effort, clear to auscultation  MUSC: ambulates with a four pronged cane  ABDOMEN: soft, non-tender, non-distended.  FEET: appropriate footwear.     Assessment/Plan:   1. Hypothyroidism, unspecified type     2. Goiter  Renal function panel    TSH    T4, free    T3    Thyroglobulin    Calcitonin   3. Hypertension, unspecified type     4. Hyperlipidemia, unspecified hyperlipidemia type     5. Postmenopausal  PTH, intact    Calcium, ionized    Magnesium    Phosphorus    DXA Bone Density Spine And Hip   6. Obesity (BMI 30-39.9)         Hypothyroidism-TFTs in one week. She will stop biotin prior to labs. Continue 100 mcg dose of LT4.  Goiter-repeat thyroid u/s in 8/21  [id number] meds  [id number]-[id number] statin  Postmenopausal-DEXA scan  Obesity-Body mass index is 36.59 kg/m². Encouraged to increase exercise. Declines MNT.    F/u in 6 mths

## 2019-02-05 ENCOUNTER — HOSPITAL ENCOUNTER (OUTPATIENT)
Dept: RADIOLOGY | Facility: CLINIC | Age: 74
Discharge: HOME | End: 2019-02-05
Attending: PHYSICIAN ASSISTANT
Payer: MEDICARE

## 2019-02-05 DIAGNOSIS — Z78.0 POSTMENOPAUSAL: ICD-10-CM

## 2019-02-05 PROCEDURE — 77080 DXA BONE DENSITY AXIAL: CPT | Mod: 26,59,, | Performed by: RADIOLOGY

## 2019-02-05 PROCEDURE — 77081 DEXA BONE DENSITY APPENDICULAR SKELETON: ICD-10-PCS | Mod: 26,,, | Performed by: RADIOLOGY

## 2019-02-05 PROCEDURE — 77080 DXA BONE DENSITY AXIAL: CPT | Mod: TC,PO,59

## 2019-02-05 PROCEDURE — 77081 DXA BONE DENSITY APPENDICULR: CPT | Mod: TC,PO

## 2019-02-05 PROCEDURE — 77081 DXA BONE DENSITY APPENDICULR: CPT | Mod: 26,,, | Performed by: RADIOLOGY

## 2019-02-05 PROCEDURE — 77080 DEXA BONE DENSITY SPINE HIP: ICD-10-PCS | Mod: 26,59,, | Performed by: RADIOLOGY

## 2019-06-18 ENCOUNTER — TELEPHONE (OUTPATIENT)
Dept: CARDIOTHORACIC SURGERY | Facility: CLINIC | Age: 74
End: 2019-06-18

## 2019-06-18 NOTE — TELEPHONE ENCOUNTER
Patient called to schedule Consultation with Dr. Franco.  Patient scheduled for 07/05/2019 per her request.  Reminder mailed.

## 2019-06-26 DIAGNOSIS — L74.9 SWEATING ABNORMALITY: ICD-10-CM

## 2019-06-26 DIAGNOSIS — E03.9 HYPOTHYROIDISM, UNSPECIFIED TYPE: Primary | ICD-10-CM

## 2019-06-26 DIAGNOSIS — E55.9 HYPOVITAMINOSIS D: ICD-10-CM

## 2019-06-26 DIAGNOSIS — R73.9 HYPERGLYCEMIA: ICD-10-CM

## 2019-06-26 DIAGNOSIS — E88.810 DYSMETABOLIC SYNDROME: ICD-10-CM

## 2019-06-26 DIAGNOSIS — E78.5 HYPERLIPIDEMIA, UNSPECIFIED HYPERLIPIDEMIA TYPE: ICD-10-CM

## 2019-07-03 ENCOUNTER — LAB VISIT (OUTPATIENT)
Dept: LAB | Facility: HOSPITAL | Age: 74
End: 2019-07-03
Attending: PHYSICIAN ASSISTANT
Payer: MEDICARE

## 2019-07-03 DIAGNOSIS — E55.9 HYPOVITAMINOSIS D: ICD-10-CM

## 2019-07-03 DIAGNOSIS — E03.9 HYPOTHYROIDISM, UNSPECIFIED TYPE: ICD-10-CM

## 2019-07-03 DIAGNOSIS — E78.5 HYPERLIPIDEMIA, UNSPECIFIED HYPERLIPIDEMIA TYPE: ICD-10-CM

## 2019-07-03 DIAGNOSIS — L74.9 SWEATING ABNORMALITY: ICD-10-CM

## 2019-07-03 DIAGNOSIS — R73.9 HYPERGLYCEMIA: ICD-10-CM

## 2019-07-03 LAB
25(OH)D3+25(OH)D2 SERPL-MCNC: 23 NG/ML (ref 30–96)
ALBUMIN SERPL BCP-MCNC: 3.9 G/DL (ref 3.5–5.2)
ALP SERPL-CCNC: 54 U/L (ref 55–135)
ALT SERPL W/O P-5'-P-CCNC: 28 U/L (ref 10–44)
ANION GAP SERPL CALC-SCNC: 10 MMOL/L (ref 8–16)
AST SERPL-CCNC: 23 U/L (ref 10–40)
BILIRUB SERPL-MCNC: 0.5 MG/DL (ref 0.1–1)
BUN SERPL-MCNC: 13 MG/DL (ref 8–23)
CALCIUM SERPL-MCNC: 9.9 MG/DL (ref 8.7–10.5)
CHLORIDE SERPL-SCNC: 102 MMOL/L (ref 95–110)
CHOLEST SERPL-MCNC: 178 MG/DL (ref 120–199)
CHOLEST/HDLC SERPL: 2.5 {RATIO} (ref 2–5)
CO2 SERPL-SCNC: 27 MMOL/L (ref 23–29)
CORTIS SERPL-MCNC: 7.1 UG/DL (ref 4.3–22.4)
CREAT SERPL-MCNC: 0.7 MG/DL (ref 0.5–1.4)
EST. GFR  (AFRICAN AMERICAN): >60 ML/MIN/1.73 M^2
EST. GFR  (NON AFRICAN AMERICAN): >60 ML/MIN/1.73 M^2
ESTIMATED AVG GLUCOSE: 108 MG/DL (ref 68–131)
GLUCOSE SERPL-MCNC: 92 MG/DL (ref 70–110)
HBA1C MFR BLD HPLC: 5.4 % (ref 4–5.6)
HDLC SERPL-MCNC: 71 MG/DL (ref 40–75)
HDLC SERPL: 39.9 % (ref 20–50)
LDLC SERPL CALC-MCNC: 87 MG/DL (ref 63–159)
NONHDLC SERPL-MCNC: 107 MG/DL
POTASSIUM SERPL-SCNC: 4.2 MMOL/L (ref 3.5–5.1)
PROT SERPL-MCNC: 6.8 G/DL (ref 6–8.4)
SODIUM SERPL-SCNC: 139 MMOL/L (ref 136–145)
T3 SERPL-MCNC: 99 NG/DL (ref 60–180)
T4 FREE SERPL-MCNC: 0.97 NG/DL (ref 0.71–1.51)
TRIGL SERPL-MCNC: 100 MG/DL (ref 30–150)
TSH SERPL DL<=0.005 MIU/L-ACNC: 1.83 UIU/ML (ref 0.4–4)

## 2019-07-03 PROCEDURE — 80061 LIPID PANEL: CPT

## 2019-07-03 PROCEDURE — 84439 ASSAY OF FREE THYROXINE: CPT

## 2019-07-03 PROCEDURE — 80053 COMPREHEN METABOLIC PANEL: CPT

## 2019-07-03 PROCEDURE — 82306 VITAMIN D 25 HYDROXY: CPT

## 2019-07-03 PROCEDURE — 82533 TOTAL CORTISOL: CPT

## 2019-07-03 PROCEDURE — 84443 ASSAY THYROID STIM HORMONE: CPT

## 2019-07-03 PROCEDURE — 82024 ASSAY OF ACTH: CPT

## 2019-07-03 PROCEDURE — 36415 COLL VENOUS BLD VENIPUNCTURE: CPT | Mod: PO

## 2019-07-03 PROCEDURE — 84480 ASSAY TRIIODOTHYRONINE (T3): CPT

## 2019-07-03 PROCEDURE — 83036 HEMOGLOBIN GLYCOSYLATED A1C: CPT

## 2019-07-05 ENCOUNTER — OFFICE VISIT (OUTPATIENT)
Dept: CARDIOTHORACIC SURGERY | Facility: CLINIC | Age: 74
End: 2019-07-05
Payer: MEDICARE

## 2019-07-05 VITALS
HEIGHT: 69 IN | OXYGEN SATURATION: 97 % | SYSTOLIC BLOOD PRESSURE: 132 MMHG | WEIGHT: 240.31 LBS | BODY MASS INDEX: 35.59 KG/M2 | TEMPERATURE: 99 F | HEART RATE: 78 BPM | RESPIRATION RATE: 18 BRPM | DIASTOLIC BLOOD PRESSURE: 81 MMHG

## 2019-07-05 DIAGNOSIS — L74.519 EXCESSIVE SWEATING, LOCAL: Primary | ICD-10-CM

## 2019-07-05 LAB — ACTH PLAS-MCNC: 10 PG/ML (ref 0–46)

## 2019-07-05 PROCEDURE — 99999 PR PBB SHADOW E&M-EST. PATIENT-LVL III: ICD-10-PCS | Mod: PBBFAC,,, | Performed by: THORACIC SURGERY (CARDIOTHORACIC VASCULAR SURGERY)

## 2019-07-05 PROCEDURE — 3075F PR MOST RECENT SYSTOLIC BLOOD PRESS GE 130-139MM HG: ICD-10-PCS | Mod: CPTII,S$GLB,, | Performed by: THORACIC SURGERY (CARDIOTHORACIC VASCULAR SURGERY)

## 2019-07-05 PROCEDURE — 1101F PT FALLS ASSESS-DOCD LE1/YR: CPT | Mod: CPTII,S$GLB,, | Performed by: THORACIC SURGERY (CARDIOTHORACIC VASCULAR SURGERY)

## 2019-07-05 PROCEDURE — 99999 PR PBB SHADOW E&M-EST. PATIENT-LVL III: CPT | Mod: PBBFAC,,, | Performed by: THORACIC SURGERY (CARDIOTHORACIC VASCULAR SURGERY)

## 2019-07-05 PROCEDURE — 3079F DIAST BP 80-89 MM HG: CPT | Mod: CPTII,S$GLB,, | Performed by: THORACIC SURGERY (CARDIOTHORACIC VASCULAR SURGERY)

## 2019-07-05 PROCEDURE — 99204 PR OFFICE/OUTPT VISIT, NEW, LEVL IV, 45-59 MIN: ICD-10-PCS | Mod: S$GLB,,, | Performed by: THORACIC SURGERY (CARDIOTHORACIC VASCULAR SURGERY)

## 2019-07-05 PROCEDURE — 3075F SYST BP GE 130 - 139MM HG: CPT | Mod: CPTII,S$GLB,, | Performed by: THORACIC SURGERY (CARDIOTHORACIC VASCULAR SURGERY)

## 2019-07-05 PROCEDURE — 1101F PR PT FALLS ASSESS DOC 0-1 FALLS W/OUT INJ PAST YR: ICD-10-PCS | Mod: CPTII,S$GLB,, | Performed by: THORACIC SURGERY (CARDIOTHORACIC VASCULAR SURGERY)

## 2019-07-05 PROCEDURE — 3079F PR MOST RECENT DIASTOLIC BLOOD PRESSURE 80-89 MM HG: ICD-10-PCS | Mod: CPTII,S$GLB,, | Performed by: THORACIC SURGERY (CARDIOTHORACIC VASCULAR SURGERY)

## 2019-07-05 PROCEDURE — 99204 OFFICE O/P NEW MOD 45 MIN: CPT | Mod: S$GLB,,, | Performed by: THORACIC SURGERY (CARDIOTHORACIC VASCULAR SURGERY)

## 2019-07-05 RX ORDER — AMITRIPTYLINE HYDROCHLORIDE 25 MG/1
25 TABLET, FILM COATED ORAL NIGHTLY PRN
COMMUNITY
End: 2019-09-17 | Stop reason: ALTCHOICE

## 2019-07-05 RX ORDER — MELOXICAM 7.5 MG/1
7.5 TABLET ORAL DAILY
COMMUNITY
End: 2019-09-17

## 2019-07-05 NOTE — PROGRESS NOTES
History & Physical    SUBJECTIVE:     History of Present Illness:  Patient is a 74 y.o. female former smoker, presents with HTN, GERD, HLD, chronic neck and back pain, peripheral neuropathy and hypothyroidism presenting for evaluation of hyperhidrosis of scalp ongoing over the last 3 years. She has followed with dermatology, neurology, endocrinology. She has a difficult time deciphering precipitating factors. However, upon further questioning she believes most of the times she notices sweating to left face, scalp is in the morning after drinking morning coffee while she is fixing her hair and makeup. Previously tried Robinul. Her medication list has been screened. Endocrine labs normal. Denies any trauma. No illness prior to onset of hyperhidrosis. Denies fever, chills, CP, SOB, nausea/vomiting, appetite or weight changes.     Former smoker. Quit 7 years ago. Apx 50 pack years.   Tonsillectomy, hysterectomy with BSO, right knee replacement   Retired .     Chief Complaint   Patient presents with    Consult     scalp sweating        Review of patient's allergies indicates:   Allergen Reactions    Morphine Other (See Comments)     Other reaction(s): Syncope  fainted       Current Outpatient Medications   Medication Sig Dispense Refill    amitriptyline (ELAVIL) 25 MG tablet Take 25 mg by mouth nightly as needed for Insomnia.      atorvastatin (LIPITOR) 20 MG tablet TAKE 1 TABLET EVERY DAY 90 tablet 1    BIOTIN ORAL Take by mouth.      ergocalciferol (VITAMIN D) 50,000 unit capsule Take 50,000 Units by mouth.        estradiol (ESTRACE) 1 MG tablet TAKE 1 TABLET EVERY DAY 90 tablet 1    gabapentin (NEURONTIN) 800 MG tablet Take 800 mg by mouth 3 (three) times daily.      levothyroxine (SYNTHROID) 100 MCG tablet TAKE 1 TABLET EVERY DAY 90 tablet 1    losartan-hydrochlorothiazide 100-12.5 mg (HYZAAR) 100-12.5 mg Tab       magnesium oxide-Mg AA chelate (MAGNESIUM, AMINO ACID CHELATE,) 133 mg Tab  Take 250 mg by mouth.        meloxicam (MOBIC) 7.5 MG tablet Take 7.5 mg by mouth once daily.      metoprolol succinate (TOPROL-XL) 50 MG 24 hr tablet TAKE 1 TABLET EVERY DAY 90 tablet 1    multivitamin (MULTIVITAMIN) per tablet Take 1 tablet by mouth once daily.        omeprazole (PRILOSEC) 40 MG capsule TAKE 1 CAPSULE EVERY DAY 90 capsule 1    TURMERIC ORAL Take by mouth.      conjugated estrogens (PREMARIN) vaginal cream Place vaginally every 7 days. 45 g 3    levmefolate-B6 phos-methyl-B12 (METANX) 3-35-2 mg Tab Every day      LORazepam (ATIVAN) 1 MG tablet lorazepam 1 mg tablet   Take 1 tablet 3 times a day by oral route.      vit C/E/Zn/coppr/lutein/zeaxan (PRESERVISION AREDS-2 ORAL) Take by mouth.      vitamin A-vitamin C-vit E-min (ICAPS) TbSR Take by mouth. 2 Tablet Sustained Release Oral Every day       Current Facility-Administered Medications   Medication Dose Route Frequency Provider Last Rate Last Dose    triamcinolone acetonide injection 80 mg  80 mg Intra-articular 1 time in Clinic/HOD Taiwo Gusman MD        triamcinolone acetonide injection 80 mg  80 mg Intra-articular 1 time in Clinic/HOD Taiwo Gusman MD           Past Medical History:   Diagnosis Date    Bilateral knee pain July 2012    left worse, right knee painful even after partial replacement.    Bruises easily     Clot 1990's?    Umbilical clot after hysterectomy    Colon polyps     adenomatous    Complication of anesthesia     very low pain tolerance    Cystocele     Degenerative disc disease     neck,back, muscle spasms    Diverticulitis     Edema of left lower extremity     ankles    GERD (gastroesophageal reflux disease)     Hypertension     Neuropathy     peripheral    Thyroid disease     hypothyroid, has nodules    Varicose veins      Past Surgical History:   Procedure Laterality Date    ADENOIDECTOMY      APPENDECTOMY      ARTHROSCOPY, KNEE Left 7/27/2012    Performed by Taiwo Gusman MD at [facility] OR  "   BILATERAL SALPINGOOPHORECTOMY       SECTION      COLONOSCOPY  12    HYSTERECTOMY      with BSO    JOINT REPLACEMENT  2012    rt unilateral knee arthroplasty. Took Coumadin x 6 weeks    KNEE ARTHROSCOPY  2010    right    TONSILLECTOMY       Family History   Problem Relation Age of Onset    Neuropathy Mother     Hypertension Mother     Stroke Mother     Neuropathy Father     Diabetes Maternal Grandmother     Cancer Daughter     Melanoma Neg Hx     Psoriasis Neg Hx     Lupus Neg Hx     Eczema Neg Hx      Social History     Tobacco Use    Smoking status: Former Smoker     Last attempt to quit: 3/23/2012     Years since quittin.2    Smokeless tobacco: Never Used   Substance Use Topics    Alcohol use: Yes     Comment: occasional    Drug use: No        Review of Systems:  Review of Systems   Constitutional: Positive for diaphoresis (left face and scalp, see HPI). Negative for activity change and appetite change.   HENT: Negative for congestion.    Respiratory: Negative for apnea, cough and shortness of breath.    Cardiovascular: Negative for chest pain and palpitations.   Gastrointestinal: Negative for abdominal pain, nausea and vomiting.   Musculoskeletal: Positive for back pain and neck pain.   Neurological: Negative for dizziness and syncope.   Psychiatric/Behavioral: Negative for agitation. The patient is not nervous/anxious.        OBJECTIVE:     Vital Signs (Most Recent)  Temp: 98.8 °F (37.1 °C) (19 1046)  Pulse: 78 (19 1046)  Resp: 18 (19 1046)  BP: 132/81 (19 1046)  SpO2: 97 % (19 1046)  5' 9" (1.753 m)  109 kg (240 lb 4.8 oz)     Physical Exam:  Physical Exam   Constitutional: She is oriented to person, place, and time. She appears well-developed and well-nourished.   HENT:   Head: Normocephalic and atraumatic.   No hyperhidrosis identified    Eyes: EOM are normal.   Neck: Neck supple.   Cardiovascular: Normal rate and regular rhythm. "   Pulmonary/Chest: Effort normal.   Abdominal: Soft.   Musculoskeletal:   Ambulating with cane    Neurological: She is alert and oriented to person, place, and time.   Skin: Skin is warm and dry.   Psychiatric: She has a normal mood and affect. Thought content normal.   Vitals reviewed.        ASSESSMENT/PLAN:     Patient is a 74 y.o. female former smoker, presents with HTN, GERD, HLD, chronic neck and back pain, peripheral neuropathy and hypothyroidism presenting for evaluation of hyperhidrosis of scalp ongoing over the last 3 years.     PLAN:Plan     Medications have been reviewed by previous providers and medications with sweating as side effect have been changed or discontinued. She has previously seen a dermatologist who offered botox injections for treatment however she seems to have misunderstood the utilization of botox for hyperhidrosis. The only consistent time she notes the scalp hidrosis is in the morning after coffee while fixing hair and makeup in the bathroom. She states that she experiences these episodes twice daily and not even every day.   I recommended that she considers behavior modifications including trial of decaffeinated coffee and different location for hair style and make up application to see if she notes improvement in sweating.  She styles her hair and applies makeup in the bathroom following a shower.  It is usually humid and warm, especially following a warm or hot shower.   If there is no improvement following behavior modifications she may benefit from repeat discussion regarding botox injections for focal hyperhidrosis. Currently the subjective hyperhidrosis does not appear to be limiting lifestyle making thoracoscopic sympathectomy risk greater than perceived benefits. RTC prn.

## 2019-07-08 ENCOUNTER — OFFICE VISIT (OUTPATIENT)
Dept: ENDOCRINOLOGY | Facility: CLINIC | Age: 74
End: 2019-07-08
Payer: MEDICARE

## 2019-07-08 VITALS
WEIGHT: 244.81 LBS | HEART RATE: 66 BPM | SYSTOLIC BLOOD PRESSURE: 122 MMHG | BODY MASS INDEX: 36.26 KG/M2 | TEMPERATURE: 98 F | HEIGHT: 69 IN | DIASTOLIC BLOOD PRESSURE: 68 MMHG

## 2019-07-08 DIAGNOSIS — L74.9 SWEATING ABNORMALITY: ICD-10-CM

## 2019-07-08 DIAGNOSIS — I10 HYPERTENSION, UNSPECIFIED TYPE: ICD-10-CM

## 2019-07-08 DIAGNOSIS — E78.5 HYPERLIPIDEMIA, UNSPECIFIED HYPERLIPIDEMIA TYPE: ICD-10-CM

## 2019-07-08 DIAGNOSIS — E04.9 GOITER: ICD-10-CM

## 2019-07-08 DIAGNOSIS — E66.9 OBESITY (BMI 30-39.9): ICD-10-CM

## 2019-07-08 DIAGNOSIS — E55.9 HYPOVITAMINOSIS D: ICD-10-CM

## 2019-07-08 DIAGNOSIS — Z78.0 POSTMENOPAUSAL: ICD-10-CM

## 2019-07-08 DIAGNOSIS — E03.9 HYPOTHYROIDISM, UNSPECIFIED TYPE: Primary | ICD-10-CM

## 2019-07-08 PROCEDURE — 1101F PR PT FALLS ASSESS DOC 0-1 FALLS W/OUT INJ PAST YR: ICD-10-PCS | Mod: CPTII,S$GLB,, | Performed by: PHYSICIAN ASSISTANT

## 2019-07-08 PROCEDURE — 3074F PR MOST RECENT SYSTOLIC BLOOD PRESSURE < 130 MM HG: ICD-10-PCS | Mod: CPTII,S$GLB,, | Performed by: PHYSICIAN ASSISTANT

## 2019-07-08 PROCEDURE — 99213 PR OFFICE/OUTPT VISIT, EST, LEVL III, 20-29 MIN: ICD-10-PCS | Mod: S$GLB,,, | Performed by: PHYSICIAN ASSISTANT

## 2019-07-08 PROCEDURE — 99999 PR PBB SHADOW E&M-EST. PATIENT-LVL IV: CPT | Mod: PBBFAC,,, | Performed by: PHYSICIAN ASSISTANT

## 2019-07-08 PROCEDURE — 1101F PT FALLS ASSESS-DOCD LE1/YR: CPT | Mod: CPTII,S$GLB,, | Performed by: PHYSICIAN ASSISTANT

## 2019-07-08 PROCEDURE — 99999 PR PBB SHADOW E&M-EST. PATIENT-LVL IV: ICD-10-PCS | Mod: PBBFAC,,, | Performed by: PHYSICIAN ASSISTANT

## 2019-07-08 PROCEDURE — 99213 OFFICE O/P EST LOW 20 MIN: CPT | Mod: S$GLB,,, | Performed by: PHYSICIAN ASSISTANT

## 2019-07-08 PROCEDURE — 3078F DIAST BP <80 MM HG: CPT | Mod: CPTII,S$GLB,, | Performed by: PHYSICIAN ASSISTANT

## 2019-07-08 PROCEDURE — 3078F PR MOST RECENT DIASTOLIC BLOOD PRESSURE < 80 MM HG: ICD-10-PCS | Mod: CPTII,S$GLB,, | Performed by: PHYSICIAN ASSISTANT

## 2019-07-08 PROCEDURE — 3074F SYST BP LT 130 MM HG: CPT | Mod: CPTII,S$GLB,, | Performed by: PHYSICIAN ASSISTANT

## 2019-07-08 RX ORDER — CLOBETASOL PROPIONATE 0.46 MG/ML
SOLUTION TOPICAL
COMMUNITY
Start: 2019-05-21 | End: 2019-09-17 | Stop reason: ALTCHOICE

## 2019-07-08 NOTE — PROGRESS NOTES
"CC: Hypothyroidism    HPI: Marisela Eagle is a 74 y.o. female here for hypothyroidism along with pending conditions listed in the Visit Diagnosis. Diagnosed several years ago. Her daughter had thyroid cancer. They think this was due to radiation on her face for acne. Her cousin and grandmother had T2DM. She is taking biotin.    Taking 100 mcg of LT4 daily. She takes it ~6 am and waits an hour before eating.    +heat intolerance, wt gain, sweating (on left side, taking elavil--states this helps)    No d/c, palpitations, tremors, hair loss or changes in nails.    Sweating has improved since the weather has been cooler.    Thyroid u/s showed 2 subcentimeter nodules that have not changed in size since 2012. Repeat in 3 years. Reports SOB (seeing cardiology). No dysphagia or voice changes.     DEXA 2/19: wnl. No falls or fractures.     PMHx, PSHx: reviewed in epic.  Social Hx: Occasionally has mixed 2-3 drinks. She smoked 0.5 ppd for 53 years.     ROS:   Constitutional: feels tired, wt gain  Eyes: No recent visual changes  Cardiovascular: + occasional cp and palpitations  Respiratory: + SOB, no cough  Gastrointestinal: Denies recent bowel disturbances  GenitoUrinary - No dysuria  Skin: No new skin rash  Musc: + back pain, knee pain  Neurologic: + numbness and tingling in feet  Endocrine: no polyphagia, polydipsia or polyuria  Remainder ROS negative     /68 (BP Location: Left arm, Patient Position: Sitting, BP Method: Medium (Manual))   Pulse 66   Temp 98.3 °F (36.8 °C) (Oral)   Ht 5' 9" (1.753 m)   Wt 111 kg (244 lb 13.1 oz)   BMI 36.15 kg/m²      Personally reviewed labs below:    Lab Results   Component Value Date    TSH 1.829 07/03/2019    R5HJADD 99 07/03/2019    FREET4 0.97 07/03/2019        Chemistry        Component Value Date/Time     07/03/2019 1200    K 4.2 07/03/2019 1200     07/03/2019 1200    CO2 27 07/03/2019 1200    BUN 13 07/03/2019 1200    CREATININE 0.7 07/03/2019 1200    " GLU 92 07/03/2019 1200        Component Value Date/Time    CALCIUM 9.9 07/03/2019 1200    ALKPHOS 54 (L) 07/03/2019 1200    AST 23 07/03/2019 1200    ALT 28 07/03/2019 1200    BILITOT 0.5 07/03/2019 1200    ESTGFRAFRICA >60.0 07/03/2019 1200    EGFRNONAA >60.0 07/03/2019 1200         Lab Results   Component Value Date    HGBA1C 5.4 07/03/2019    HGBA1C 5.5 09/02/2011    HGBA1C 4.8 09/28/2004      PE:  GENERAL: middle aged female, well developed, well nourished  NECK: Supple neck, normal thyroid. No bruit  LYMPHATIC: No cervical or supraclavicular lymphadenopathy  CARDIOVASCULAR: Normal heart sounds, no pedal edema  RESPIRATORY: Normal effort, clear to auscultation  MUSC: ambulates with a four pronged cane  ABDOMEN: soft, non-tender, non-distended.  FEET: appropriate footwear.     Assessment/Plan:   1. Hypothyroidism, unspecified type  T3    T4, free    Thyroglobulin    TSH    Renal function panel   2. Goiter     3. Hypertension, unspecified type     4. Hyperlipidemia, unspecified hyperlipidemia type     5. Postmenopausal  PTH, intact    Calcium, ionized    Magnesium   6. Obesity (BMI 30-39.9)     7. Hypovitaminosis D  Vitamin D   8. Sweating abnormality  Metanephrines, Plasma Free    Catecholamines, fractionated, plasma    Cortisol    ACTH    Aldosterone    Renin       Hypothyroidism-TFTs stable. Continue 100 mcg dose of LT4.  Goiter-repeat thyroid u/s in 8/21  FUN-upwsfq-kffvxijs meds  WON-skzrob-bznyplxb statin  Postmenopausal-DEXA scan 2/21  Obesity-Body mass index is 36.15 kg/m². Encouraged to increase exercise. Declines MNT.  Hypovitaminosis d-start taking 2000 IU of vd daily  Sweating-check metanephrines, catecholamines. Dex suppression test was negative in the past.    F/u in 6 mths

## 2019-09-17 ENCOUNTER — HOSPITAL ENCOUNTER (INPATIENT)
Facility: HOSPITAL | Age: 74
LOS: 2 days | Discharge: HOME | DRG: 690 | End: 2019-09-20
Attending: EMERGENCY MEDICINE | Admitting: FAMILY MEDICINE
Payer: MEDICARE

## 2019-09-17 DIAGNOSIS — N12 PYELONEPHRITIS: ICD-10-CM

## 2019-09-17 DIAGNOSIS — R53.81 MALAISE: ICD-10-CM

## 2019-09-17 DIAGNOSIS — R79.89 TROPONIN LEVEL ELEVATED: ICD-10-CM

## 2019-09-17 DIAGNOSIS — R79.89 ELEVATED TROPONIN: ICD-10-CM

## 2019-09-17 DIAGNOSIS — R00.0 TACHYCARDIA: ICD-10-CM

## 2019-09-17 DIAGNOSIS — N12 PYELONEPHRITIS: Primary | ICD-10-CM

## 2019-09-17 LAB
ALBUMIN SERPL BCP-MCNC: 3.8 G/DL (ref 3.5–5.2)
ALP SERPL-CCNC: 46 U/L (ref 55–135)
ALT SERPL W/O P-5'-P-CCNC: 27 U/L (ref 10–44)
ANION GAP SERPL CALC-SCNC: 9 MMOL/L (ref 8–16)
APTT PPP: 23 SEC (ref 26.2–34.7)
AST SERPL-CCNC: 21 U/L (ref 10–40)
BACTERIA #/AREA URNS HPF: ABNORMAL /HPF
BASOPHILS # BLD AUTO: 0.03 K/UL (ref 0–0.2)
BASOPHILS NFR BLD: 0.3 % (ref 0–1.9)
BILIRUB SERPL-MCNC: 1.1 MG/DL (ref 0.1–1)
BILIRUB UR QL STRIP: NEGATIVE
BUN SERPL-MCNC: 12 MG/DL (ref 8–23)
CALCIUM SERPL-MCNC: 8.3 MG/DL (ref 8.7–10.5)
CHLORIDE SERPL-SCNC: 93 MMOL/L (ref 95–110)
CK SERPL-CCNC: 146 U/L (ref 20–180)
CLARITY UR: ABNORMAL
CO2 SERPL-SCNC: 30 MMOL/L (ref 23–29)
COLOR UR: YELLOW
CREAT SERPL-MCNC: 0.8 MG/DL (ref 0.5–1.4)
DIFFERENTIAL METHOD: ABNORMAL
EOSINOPHIL # BLD AUTO: 0 K/UL (ref 0–0.5)
EOSINOPHIL NFR BLD: 0.1 % (ref 0–8)
ERYTHROCYTE [DISTWIDTH] IN BLOOD BY AUTOMATED COUNT: 13 % (ref 11.5–14.5)
EST. GFR  (AFRICAN AMERICAN): >60 ML/MIN/1.73 M^2
EST. GFR  (NON AFRICAN AMERICAN): >60 ML/MIN/1.73 M^2
GLUCOSE SERPL-MCNC: 134 MG/DL (ref 70–110)
GLUCOSE UR QL STRIP: NEGATIVE
HCT VFR BLD AUTO: 37.9 % (ref 37–48.5)
HGB BLD-MCNC: 13.2 G/DL (ref 12–16)
HGB UR QL STRIP: ABNORMAL
HYALINE CASTS #/AREA URNS LPF: 28 /LPF
IMM GRANULOCYTES # BLD AUTO: 0.05 K/UL (ref 0–0.04)
IMM GRANULOCYTES NFR BLD AUTO: 0.5 % (ref 0–0.5)
INFLUENZA A, MOLECULAR: NEGATIVE
INFLUENZA B, MOLECULAR: NEGATIVE
INR PPP: 1.1
KETONES UR QL STRIP: NEGATIVE
LACTATE SERPL-SCNC: 1.2 MMOL/L (ref 0.5–1.9)
LEUKOCYTE ESTERASE UR QL STRIP: ABNORMAL
LYMPHOCYTES # BLD AUTO: 1 K/UL (ref 1–4.8)
LYMPHOCYTES NFR BLD: 9.3 % (ref 18–48)
MAGNESIUM SERPL-MCNC: 1.8 MG/DL (ref 1.6–2.6)
MCH RBC QN AUTO: 32.3 PG (ref 27–31)
MCHC RBC AUTO-ENTMCNC: 34.8 G/DL (ref 32–36)
MCV RBC AUTO: 93 FL (ref 82–98)
MICROSCOPIC COMMENT: ABNORMAL
MONOCYTES # BLD AUTO: 1.3 K/UL (ref 0.3–1)
MONOCYTES NFR BLD: 12.1 % (ref 4–15)
NEUTROPHILS # BLD AUTO: 8.5 K/UL (ref 1.8–7.7)
NEUTROPHILS NFR BLD: 77.7 % (ref 38–73)
NITRITE UR QL STRIP: NEGATIVE
NRBC BLD-RTO: 0 /100 WBC
PH UR STRIP: 7 [PH] (ref 5–8)
PLATELET # BLD AUTO: 197 K/UL (ref 150–350)
PMV BLD AUTO: 11.6 FL (ref 9.2–12.9)
POTASSIUM SERPL-SCNC: 2.6 MMOL/L (ref 3.5–5.1)
PROT SERPL-MCNC: 6.8 G/DL (ref 6–8.4)
PROT UR QL STRIP: ABNORMAL
PROTHROMBIN TIME: 13.8 SEC (ref 11.7–14)
RBC # BLD AUTO: 4.09 M/UL (ref 4–5.4)
RBC #/AREA URNS HPF: 14 /HPF (ref 0–4)
SODIUM SERPL-SCNC: 132 MMOL/L (ref 136–145)
SP GR UR STRIP: 1.01 (ref 1–1.03)
SPECIMEN SOURCE: NORMAL
SQUAMOUS #/AREA URNS HPF: 2 /HPF
TROPONIN I SERPL DL<=0.01 NG/ML-MCNC: 0.14 NG/ML (ref 0.02–0.04)
URN SPEC COLLECT METH UR: ABNORMAL
UROBILINOGEN UR STRIP-ACNC: NEGATIVE EU/DL
WBC # BLD AUTO: 10.86 K/UL (ref 3.9–12.7)
WBC #/AREA URNS HPF: >100 /HPF (ref 0–5)

## 2019-09-17 PROCEDURE — 82550 ASSAY OF CK (CPK): CPT

## 2019-09-17 PROCEDURE — 85025 COMPLETE CBC W/AUTO DIFF WBC: CPT

## 2019-09-17 PROCEDURE — 99285 EMERGENCY DEPT VISIT HI MDM: CPT | Mod: 25

## 2019-09-17 PROCEDURE — 51701 INSERT BLADDER CATHETER: CPT

## 2019-09-17 PROCEDURE — 81001 URINALYSIS AUTO W/SCOPE: CPT

## 2019-09-17 PROCEDURE — 25000003 PHARM REV CODE 250: Performed by: PHYSICIAN ASSISTANT

## 2019-09-17 PROCEDURE — 93005 ELECTROCARDIOGRAM TRACING: CPT

## 2019-09-17 PROCEDURE — 84484 ASSAY OF TROPONIN QUANT: CPT

## 2019-09-17 PROCEDURE — 87040 BLOOD CULTURE FOR BACTERIA: CPT | Mod: 59

## 2019-09-17 PROCEDURE — 87077 CULTURE AEROBIC IDENTIFY: CPT

## 2019-09-17 PROCEDURE — 96365 THER/PROPH/DIAG IV INF INIT: CPT

## 2019-09-17 PROCEDURE — 80053 COMPREHEN METABOLIC PANEL: CPT

## 2019-09-17 PROCEDURE — 83735 ASSAY OF MAGNESIUM: CPT

## 2019-09-17 PROCEDURE — 85610 PROTHROMBIN TIME: CPT

## 2019-09-17 PROCEDURE — 36415 COLL VENOUS BLD VENIPUNCTURE: CPT

## 2019-09-17 PROCEDURE — 87502 INFLUENZA DNA AMP PROBE: CPT

## 2019-09-17 PROCEDURE — 63600175 PHARM REV CODE 636 W HCPCS: Performed by: PHYSICIAN ASSISTANT

## 2019-09-17 PROCEDURE — 63600175 PHARM REV CODE 636 W HCPCS: Performed by: EMERGENCY MEDICINE

## 2019-09-17 PROCEDURE — 96361 HYDRATE IV INFUSION ADD-ON: CPT

## 2019-09-17 PROCEDURE — 83605 ASSAY OF LACTIC ACID: CPT

## 2019-09-17 PROCEDURE — 85730 THROMBOPLASTIN TIME PARTIAL: CPT

## 2019-09-17 PROCEDURE — 87086 URINE CULTURE/COLONY COUNT: CPT

## 2019-09-17 PROCEDURE — 87186 SC STD MICRODIL/AGAR DIL: CPT

## 2019-09-17 RX ORDER — LEVOTHYROXINE SODIUM 100 UG/1
100 TABLET ORAL DAILY
COMMUNITY
End: 2020-02-10

## 2019-09-17 RX ORDER — LORAZEPAM 1 MG/1
1 TABLET ORAL NIGHTLY PRN
COMMUNITY
End: 2019-10-22

## 2019-09-17 RX ORDER — CHOLECALCIFEROL (VITAMIN D3) 125 MCG
5000 CAPSULE ORAL DAILY
COMMUNITY

## 2019-09-17 RX ORDER — DICLOFENAC SODIUM 10 MG/G
2 GEL TOPICAL DAILY PRN
COMMUNITY
End: 2022-07-21

## 2019-09-17 RX ORDER — SULFAMETHOXAZOLE AND TRIMETHOPRIM 800; 160 MG/1; MG/1
1 TABLET ORAL 2 TIMES DAILY
Status: ON HOLD | COMMUNITY
Start: 2019-09-17 | End: 2019-09-20 | Stop reason: HOSPADM

## 2019-09-17 RX ORDER — TURMERIC 400 MG
400 CAPSULE ORAL DAILY
COMMUNITY

## 2019-09-17 RX ORDER — LANOLIN ALCOHOL/MO/W.PET/CERES
400 CREAM (GRAM) TOPICAL 2 TIMES DAILY
COMMUNITY
End: 2022-07-21

## 2019-09-17 RX ORDER — METOPROLOL SUCCINATE 50 MG/1
50 TABLET, EXTENDED RELEASE ORAL NIGHTLY
COMMUNITY
End: 2020-10-27 | Stop reason: SDUPTHER

## 2019-09-17 RX ORDER — OMEPRAZOLE 40 MG/1
40 CAPSULE, DELAYED RELEASE ORAL DAILY
COMMUNITY
End: 2020-10-27 | Stop reason: SDUPTHER

## 2019-09-17 RX ORDER — ATORVASTATIN CALCIUM 20 MG/1
20 TABLET, FILM COATED ORAL NIGHTLY
COMMUNITY
End: 2021-08-23 | Stop reason: SDUPTHER

## 2019-09-17 RX ORDER — LANOLIN ALCOHOL/MO/W.PET/CERES
100 CREAM (GRAM) TOPICAL DAILY
COMMUNITY
End: 2022-07-21

## 2019-09-17 RX ORDER — ACETAMINOPHEN 500 MG
1000 TABLET ORAL
Status: COMPLETED | OUTPATIENT
Start: 2019-09-17 | End: 2019-09-17

## 2019-09-17 RX ORDER — ESTRADIOL 1 MG/1
1 TABLET ORAL DAILY
COMMUNITY
End: 2019-11-06 | Stop reason: SDUPTHER

## 2019-09-17 RX ADMIN — CEFTRIAXONE SODIUM 2 G: 2 INJECTION, SOLUTION INTRAVENOUS at 09:09

## 2019-09-17 RX ADMIN — SODIUM CHLORIDE 3333 ML: 0.9 INJECTION, SOLUTION INTRAVENOUS at 09:09

## 2019-09-17 RX ADMIN — ACETAMINOPHEN 1000 MG: 500 TABLET, FILM COATED ORAL at 07:09

## 2019-09-18 PROBLEM — E87.6 HYPOKALEMIA: Status: ACTIVE | Noted: 2019-09-18

## 2019-09-18 PROBLEM — R79.89 ELEVATED TROPONIN I LEVEL: Status: ACTIVE | Noted: 2019-09-18

## 2019-09-18 PROBLEM — N12 PYELONEPHRITIS: Status: ACTIVE | Noted: 2019-09-18

## 2019-09-18 LAB
ALBUMIN SERPL BCP-MCNC: 4 G/DL (ref 3.5–5.2)
ALP SERPL-CCNC: 50 U/L (ref 55–135)
ALT SERPL W/O P-5'-P-CCNC: 26 U/L (ref 10–44)
ANION GAP SERPL CALC-SCNC: 12 MMOL/L (ref 8–16)
AST SERPL-CCNC: 23 U/L (ref 10–40)
BASOPHILS # BLD AUTO: 0.03 K/UL (ref 0–0.2)
BASOPHILS NFR BLD: 0.3 % (ref 0–1.9)
BILIRUB SERPL-MCNC: 1 MG/DL (ref 0.1–1)
BUN SERPL-MCNC: 8 MG/DL (ref 8–23)
CALCIUM SERPL-MCNC: 8.4 MG/DL (ref 8.7–10.5)
CHLORIDE SERPL-SCNC: 97 MMOL/L (ref 95–110)
CO2 SERPL-SCNC: 31 MMOL/L (ref 23–29)
CREAT SERPL-MCNC: 0.7 MG/DL (ref 0.5–1.4)
DIFFERENTIAL METHOD: ABNORMAL
EOSINOPHIL # BLD AUTO: 0 K/UL (ref 0–0.5)
EOSINOPHIL NFR BLD: 0.2 % (ref 0–8)
ERYTHROCYTE [DISTWIDTH] IN BLOOD BY AUTOMATED COUNT: 13.2 % (ref 11.5–14.5)
EST. GFR  (AFRICAN AMERICAN): >60 ML/MIN/1.73 M^2
EST. GFR  (NON AFRICAN AMERICAN): >60 ML/MIN/1.73 M^2
GLUCOSE SERPL-MCNC: 133 MG/DL (ref 70–110)
HCT VFR BLD AUTO: 40.4 % (ref 37–48.5)
HGB BLD-MCNC: 13.7 G/DL (ref 12–16)
IMM GRANULOCYTES # BLD AUTO: 0.06 K/UL (ref 0–0.04)
IMM GRANULOCYTES NFR BLD AUTO: 0.5 % (ref 0–0.5)
LACTATE SERPL-SCNC: 0.8 MMOL/L (ref 0.5–1.9)
LYMPHOCYTES # BLD AUTO: 1.7 K/UL (ref 1–4.8)
LYMPHOCYTES NFR BLD: 14.8 % (ref 18–48)
MCH RBC QN AUTO: 32.2 PG (ref 27–31)
MCHC RBC AUTO-ENTMCNC: 33.9 G/DL (ref 32–36)
MCV RBC AUTO: 95 FL (ref 82–98)
MONOCYTES # BLD AUTO: 1.4 K/UL (ref 0.3–1)
MONOCYTES NFR BLD: 12.7 % (ref 4–15)
NEUTROPHILS # BLD AUTO: 8.1 K/UL (ref 1.8–7.7)
NEUTROPHILS NFR BLD: 71.5 % (ref 38–73)
NRBC BLD-RTO: 0 /100 WBC
PLATELET # BLD AUTO: 222 K/UL (ref 150–350)
PMV BLD AUTO: 11.3 FL (ref 9.2–12.9)
POTASSIUM SERPL-SCNC: 3.1 MMOL/L (ref 3.5–5.1)
PROT SERPL-MCNC: 7.4 G/DL (ref 6–8.4)
RBC # BLD AUTO: 4.26 M/UL (ref 4–5.4)
SODIUM SERPL-SCNC: 140 MMOL/L (ref 136–145)
TROPONIN I SERPL DL<=0.01 NG/ML-MCNC: 0.08 NG/ML (ref 0.02–0.04)
TROPONIN I SERPL DL<=0.01 NG/ML-MCNC: 0.09 NG/ML (ref 0.02–0.04)
WBC # BLD AUTO: 11.29 K/UL (ref 3.9–12.7)

## 2019-09-18 PROCEDURE — 85025 COMPLETE CBC W/AUTO DIFF WBC: CPT

## 2019-09-18 PROCEDURE — 96361 HYDRATE IV INFUSION ADD-ON: CPT

## 2019-09-18 PROCEDURE — 94761 N-INVAS EAR/PLS OXIMETRY MLT: CPT

## 2019-09-18 PROCEDURE — 63600175 PHARM REV CODE 636 W HCPCS: Performed by: NURSE PRACTITIONER

## 2019-09-18 PROCEDURE — 25000003 PHARM REV CODE 250: Performed by: NURSE PRACTITIONER

## 2019-09-18 PROCEDURE — 25000003 PHARM REV CODE 250: Performed by: EMERGENCY MEDICINE

## 2019-09-18 PROCEDURE — 83605 ASSAY OF LACTIC ACID: CPT

## 2019-09-18 PROCEDURE — 80053 COMPREHEN METABOLIC PANEL: CPT

## 2019-09-18 PROCEDURE — 36415 COLL VENOUS BLD VENIPUNCTURE: CPT

## 2019-09-18 PROCEDURE — 84484 ASSAY OF TROPONIN QUANT: CPT | Mod: 91

## 2019-09-18 PROCEDURE — 21400001 HC TELEMETRY ROOM

## 2019-09-18 RX ORDER — HYDROCHLOROTHIAZIDE 12.5 MG/1
12.5 TABLET ORAL DAILY
Status: DISCONTINUED | OUTPATIENT
Start: 2019-09-18 | End: 2019-09-20 | Stop reason: HOSPADM

## 2019-09-18 RX ORDER — LANOLIN ALCOHOL/MO/W.PET/CERES
400 CREAM (GRAM) TOPICAL 2 TIMES DAILY
Status: DISCONTINUED | OUTPATIENT
Start: 2019-09-18 | End: 2019-09-20 | Stop reason: HOSPADM

## 2019-09-18 RX ORDER — LANOLIN ALCOHOL/MO/W.PET/CERES
400 CREAM (GRAM) TOPICAL EVERY 4 HOURS PRN
Status: DISCONTINUED | OUTPATIENT
Start: 2019-09-18 | End: 2019-09-20 | Stop reason: HOSPADM

## 2019-09-18 RX ORDER — BISACODYL 10 MG
10 SUPPOSITORY, RECTAL RECTAL DAILY PRN
Status: DISCONTINUED | OUTPATIENT
Start: 2019-09-18 | End: 2019-09-20 | Stop reason: HOSPADM

## 2019-09-18 RX ORDER — SODIUM CHLORIDE AND POTASSIUM CHLORIDE 150; 900 MG/100ML; MG/100ML
INJECTION, SOLUTION INTRAVENOUS CONTINUOUS
Status: DISCONTINUED | OUTPATIENT
Start: 2019-09-18 | End: 2019-09-20 | Stop reason: HOSPADM

## 2019-09-18 RX ORDER — ONDANSETRON 2 MG/ML
4 INJECTION INTRAMUSCULAR; INTRAVENOUS EVERY 8 HOURS PRN
Status: DISCONTINUED | OUTPATIENT
Start: 2019-09-18 | End: 2019-09-20 | Stop reason: HOSPADM

## 2019-09-18 RX ORDER — POTASSIUM CHLORIDE 20 MEQ/1
20 TABLET, EXTENDED RELEASE ORAL
Status: DISCONTINUED | OUTPATIENT
Start: 2019-09-18 | End: 2019-09-20 | Stop reason: HOSPADM

## 2019-09-18 RX ORDER — METOPROLOL SUCCINATE 25 MG/1
50 TABLET, EXTENDED RELEASE ORAL NIGHTLY
Status: DISCONTINUED | OUTPATIENT
Start: 2019-09-18 | End: 2019-09-20 | Stop reason: HOSPADM

## 2019-09-18 RX ORDER — LANOLIN ALCOHOL/MO/W.PET/CERES
800 CREAM (GRAM) TOPICAL EVERY 4 HOURS PRN
Status: DISCONTINUED | OUTPATIENT
Start: 2019-09-18 | End: 2019-09-20 | Stop reason: HOSPADM

## 2019-09-18 RX ORDER — SODIUM CHLORIDE 0.9 % (FLUSH) 0.9 %
10 SYRINGE (ML) INJECTION
Status: DISCONTINUED | OUTPATIENT
Start: 2019-09-18 | End: 2019-09-20 | Stop reason: HOSPADM

## 2019-09-18 RX ORDER — LORAZEPAM 1 MG/1
1 TABLET ORAL NIGHTLY PRN
Status: DISCONTINUED | OUTPATIENT
Start: 2019-09-18 | End: 2019-09-20 | Stop reason: HOSPADM

## 2019-09-18 RX ORDER — SODIUM,POTASSIUM PHOSPHATES 280-250MG
1 POWDER IN PACKET (EA) ORAL EVERY 4 HOURS PRN
Status: DISCONTINUED | OUTPATIENT
Start: 2019-09-18 | End: 2019-09-20 | Stop reason: HOSPADM

## 2019-09-18 RX ORDER — LOSARTAN POTASSIUM AND HYDROCHLOROTHIAZIDE 12.5; 1 MG/1; MG/1
1 TABLET ORAL DAILY
Status: DISCONTINUED | OUTPATIENT
Start: 2019-09-18 | End: 2019-09-18

## 2019-09-18 RX ORDER — SODIUM,POTASSIUM PHOSPHATES 280-250MG
2 POWDER IN PACKET (EA) ORAL EVERY 4 HOURS PRN
Status: DISCONTINUED | OUTPATIENT
Start: 2019-09-18 | End: 2019-09-20 | Stop reason: HOSPADM

## 2019-09-18 RX ORDER — LEVOTHYROXINE SODIUM 100 UG/1
100 TABLET ORAL DAILY
Status: DISCONTINUED | OUTPATIENT
Start: 2019-09-18 | End: 2019-09-20 | Stop reason: HOSPADM

## 2019-09-18 RX ORDER — ESTRADIOL 1 MG/1
1 TABLET ORAL DAILY
Status: DISCONTINUED | OUTPATIENT
Start: 2019-09-18 | End: 2019-09-20 | Stop reason: HOSPADM

## 2019-09-18 RX ORDER — ENOXAPARIN SODIUM 100 MG/ML
40 INJECTION SUBCUTANEOUS EVERY 24 HOURS
Status: DISCONTINUED | OUTPATIENT
Start: 2019-09-18 | End: 2019-09-20 | Stop reason: HOSPADM

## 2019-09-18 RX ORDER — FAMOTIDINE 20 MG/1
20 TABLET, FILM COATED ORAL 2 TIMES DAILY
Status: DISCONTINUED | OUTPATIENT
Start: 2019-09-18 | End: 2019-09-20 | Stop reason: HOSPADM

## 2019-09-18 RX ORDER — LOSARTAN POTASSIUM 50 MG/1
100 TABLET ORAL DAILY
Status: DISCONTINUED | OUTPATIENT
Start: 2019-09-18 | End: 2019-09-20 | Stop reason: HOSPADM

## 2019-09-18 RX ORDER — ACETAMINOPHEN 325 MG/1
650 TABLET ORAL EVERY 8 HOURS PRN
Status: DISCONTINUED | OUTPATIENT
Start: 2019-09-18 | End: 2019-09-20 | Stop reason: HOSPADM

## 2019-09-18 RX ADMIN — LOSARTAN POTASSIUM 100 MG: 50 TABLET, FILM COATED ORAL at 10:09

## 2019-09-18 RX ADMIN — ENOXAPARIN SODIUM 40 MG: 100 INJECTION SUBCUTANEOUS at 05:09

## 2019-09-18 RX ADMIN — ESTRADIOL 1 MG: 1 TABLET ORAL at 10:09

## 2019-09-18 RX ADMIN — ACETAMINOPHEN 650 MG: 325 TABLET ORAL at 08:09

## 2019-09-18 RX ADMIN — ACETAMINOPHEN 650 MG: 325 TABLET ORAL at 11:09

## 2019-09-18 RX ADMIN — LORAZEPAM 1 MG: 1 TABLET ORAL at 09:09

## 2019-09-18 RX ADMIN — SODIUM CHLORIDE AND POTASSIUM CHLORIDE: 9; 1.49 INJECTION, SOLUTION INTRAVENOUS at 05:09

## 2019-09-18 RX ADMIN — POTASSIUM BICARBONATE 50 MEQ: 25 TABLET, EFFERVESCENT ORAL at 01:09

## 2019-09-18 RX ADMIN — FAMOTIDINE 20 MG: 20 TABLET ORAL at 10:09

## 2019-09-18 RX ADMIN — SODIUM CHLORIDE AND POTASSIUM CHLORIDE: 9; 1.49 INJECTION, SOLUTION INTRAVENOUS at 08:09

## 2019-09-18 RX ADMIN — CEFTRIAXONE 2 G: 2 INJECTION, SOLUTION INTRAVENOUS at 08:09

## 2019-09-18 RX ADMIN — MAGNESIUM OXIDE 400 MG: 400 TABLET ORAL at 08:09

## 2019-09-18 RX ADMIN — METOPROLOL SUCCINATE 50 MG: 25 TABLET, FILM COATED, EXTENDED RELEASE ORAL at 08:09

## 2019-09-18 RX ADMIN — HYDROCHLOROTHIAZIDE 12.5 MG: 12.5 TABLET ORAL at 10:09

## 2019-09-18 RX ADMIN — FAMOTIDINE 20 MG: 20 TABLET ORAL at 08:09

## 2019-09-18 RX ADMIN — MAGNESIUM OXIDE 400 MG: 400 TABLET ORAL at 10:09

## 2019-09-18 RX ADMIN — LEVOTHYROXINE SODIUM 100 MCG: 100 TABLET ORAL at 06:09

## 2019-09-18 RX ADMIN — METOPROLOL SUCCINATE 50 MG: 25 TABLET, FILM COATED, EXTENDED RELEASE ORAL at 06:09

## 2019-09-18 NOTE — SUBJECTIVE & OBJECTIVE
Past Medical History:   Diagnosis Date    Bilateral knee pain 2012    left worse, right knee painful even after partial replacement.    Bruises easily     Clot ?    Umbilical clot after hysterectomy    Colon polyps     adenomatous    Complication of anesthesia     very low pain tolerance    Cystocele     Degenerative disc disease     neck,back, muscle spasms    Diverticulitis     Edema of left lower extremity     ankles    GERD (gastroesophageal reflux disease)     Hypertension     Neuropathy     peripheral    Thyroid disease     hypothyroid, has nodules    Varicose veins        Past Surgical History:   Procedure Laterality Date    ADENOIDECTOMY      APPENDECTOMY      ARTHROSCOPY, KNEE Left 2012    [facility]    BILATERAL SALPINGOOPHORECTOMY       SECTION      COLONOSCOPY  12    HYSTERECTOMY      with BSO    JOINT REPLACEMENT  2012    rt unilateral knee arthroplasty. Took Coumadin x 6 weeks    KNEE ARTHROSCOPY  2010    right    TONSILLECTOMY         Review of patient's allergies indicates:   Allergen Reactions    Duloxetine      Does not remember    Morphine Other (See Comments)     Other reaction(s): Syncope  fainted       Current Facility-Administered Medications on File Prior to Encounter   Medication    triamcinolone acetonide injection 80 mg    triamcinolone acetonide injection 80 mg     Current Outpatient Medications on File Prior to Encounter   Medication Sig    atorvastatin (LIPITOR) 20 MG tablet Take 20 mg by mouth nightly.    cholecalciferol, vitamin D3, 5,000 unit capsule Take 5,000 Units by mouth once daily.    cyanocobalamin (VITAMIN B-12) 1000 MCG tablet Take 100 mcg by mouth once daily.    estradiol (ESTRACE) 1 MG tablet Take 1 mg by mouth once daily.    gabapentin (NEURONTIN) 800 MG tablet Take 800 mg by mouth 2 (two) times daily.     levothyroxine (SYNTHROID) 100 MCG tablet Take 100 mcg by mouth  once daily.    losartan-hydrochlorothiazide 100-12.5 mg (HYZAAR) 100-12.5 mg Tab Take 1 tablet by mouth 2 (two) times daily.     magnesium oxide (MAG-OX) 400 mg (241.3 mg magnesium) tablet Take 400 mg by mouth 2 (two) times daily.    mecobal/levomefolat Ca/B6 phos (METANX ORAL) Take 1 capsule by mouth 2 (two) times daily.    metoprolol succinate (TOPROL-XL) 50 MG 24 hr tablet Take 50 mg by mouth nightly.    omeprazole (PRILOSEC) 40 MG capsule Take 40 mg by mouth once daily.    peg 400-propylene glycol (SYSTANE, PROPYLENE GLYCOL,) 0.4-0.3 % Drop Place 1 drop into both eyes once daily.    sulfamethoxazole-trimethoprim 800-160mg (BACTRIM DS) 800-160 mg Tab Take 1 tablet by mouth 2 (two) times daily.    turmeric 400 mg Cap Take 400 mg by mouth 2 (two) times daily.    vit C/E/Zn/coppr/lutein/zeaxan (PRESERVISION AREDS-2 ORAL) Take 1 capsule by mouth 2 (two) times daily.     diclofenac sodium (VOLTAREN) 1 % Gel Apply 2 g topically daily as needed.    LORazepam (ATIVAN) 1 MG tablet Take 1 mg by mouth nightly as needed for Anxiety.     Family History     Problem Relation (Age of Onset)    Cancer Daughter    Diabetes Maternal Grandmother    Hypertension Mother    Neuropathy Mother, Father    Stroke Mother        Tobacco Use    Smoking status: Former Smoker     Last attempt to quit: 3/23/2012     Years since quittin.4    Smokeless tobacco: Never Used   Substance and Sexual Activity    Alcohol use: Yes     Comment: occasional    Drug use: No    Sexual activity: Not Currently     Review of Systems   Constitutional: Positive for chills, fatigue and fever. Negative for activity change.   HENT: Negative for congestion, drooling, ear pain, hearing loss, nosebleeds, rhinorrhea, sinus pressure, sore throat and trouble swallowing.    Eyes: Negative for pain and visual disturbance.   Respiratory: Negative for apnea, cough, choking, chest tightness, shortness of breath and wheezing.    Cardiovascular: Negative for  chest pain, palpitations and leg swelling.   Gastrointestinal: Positive for nausea. Negative for abdominal distention, abdominal pain, blood in stool, constipation, diarrhea and vomiting.   Endocrine: Negative for polydipsia, polyphagia and polyuria.   Genitourinary: Positive for difficulty urinating, dysuria and flank pain. Negative for decreased urine volume, frequency and hematuria.   Musculoskeletal: Negative for back pain, gait problem, joint swelling, myalgias, neck pain and neck stiffness.   Skin: Negative for rash and wound.   Neurological: Positive for weakness. Negative for dizziness, tremors, seizures, syncope, facial asymmetry, speech difficulty, light-headedness, numbness and headaches.   Psychiatric/Behavioral: Negative for behavioral problems, confusion, hallucinations and sleep disturbance. The patient is not nervous/anxious.      Objective:     Vital Signs (Most Recent):  Temp: 99.8 °F (37.7 °C) (09/17/19 2133)  Pulse: 78 (09/18/19 0231)  Resp: (!) 22 (09/18/19 0231)  BP: (!) 123/57 (09/18/19 0231)  SpO2: 96 % (09/18/19 0231) Vital Signs (24h Range):  Temp:  [99.8 °F (37.7 °C)-103.6 °F (39.8 °C)] 99.8 °F (37.7 °C)  Pulse:  [] 78  Resp:  [16-28] 22  SpO2:  [90 %-96 %] 96 %  BP: (113-147)/(55-80) 123/57     Weight: 111.1 kg (245 lb)  Body mass index is 36.18 kg/m².    Physical Exam   Constitutional: She is oriented to person, place, and time. She appears well-developed.   HENT:   Head: Normocephalic and atraumatic.   Eyes: Pupils are equal, round, and reactive to light. EOM are normal.   Neck: Normal range of motion. Neck supple.   Cardiovascular: Normal rate, regular rhythm, normal heart sounds and intact distal pulses.   Pulmonary/Chest: Effort normal and breath sounds normal.   Abdominal: Soft. Bowel sounds are normal.   Musculoskeletal: Normal range of motion.   Neurological: She is alert and oriented to person, place, and time.   Skin: Skin is warm and dry. Capillary refill takes less than  2 seconds.   Psychiatric: She has a normal mood and affect. Her behavior is normal.         CRANIAL NERVES     CN III, IV, VI   Pupils are equal, round, and reactive to light.  Extraocular motions are normal.     CN V   Facial sensation intact.   Right facial sensation deficit: none  Left facial sensation deficit: none    CN VII   Facial expression full, symmetric.     CN VIII   CN VIII normal.   Hearing: intact    CN IX, X   CN IX normal.   CN X normal.   Palate: symmetric  Right gag reflex: normal  Left gag reflex: normal    CN XI   CN XI normal.   Right sternocleidomastoid strength: normal  Left sternocleidomastoid strength: normal    CN XII   CN XII normal.   Tongue: not atrophic       Significant Labs:   CBC:   Recent Labs   Lab 09/17/19 2105   WBC 10.86   HGB 13.2   HCT 37.9        CMP:   Recent Labs   Lab 09/17/19 2105   *   K 2.6*   CL 93*   CO2 30*   *   BUN 12   CREATININE 0.8   CALCIUM 8.3*   PROT 6.8   ALBUMIN 3.8   BILITOT 1.1*   ALKPHOS 46*   AST 21   ALT 27   ANIONGAP 9   EGFRNONAA >60.0     Cardiac Markers: No results for input(s): CKMB, MYOGLOBIN, BNP, TROPISTAT in the last 48 hours.  Coagulation:   Recent Labs   Lab 09/17/19 2105   INR 1.1   APTT 23.0*     Lipid Panel: No results for input(s): CHOL, HDL, LDLCALC, TRIG, CHOLHDL in the last 48 hours.  Magnesium:   Recent Labs   Lab 09/17/19 2105   MG 1.8     Troponin:   Recent Labs   Lab 09/17/19 2105   TROPONINI 0.142*     TSH:   Recent Labs   Lab 07/03/19  1200   TSH 1.829     Urine Culture: No results for input(s): LABURIN in the last 48 hours.  Urine Studies:   Recent Labs   Lab 09/17/19 2105   COLORU Yellow   APPEARANCEUA Cloudy*   PHUR 7.0   SPECGRAV 1.010   PROTEINUA 2+*   GLUCUA Negative   KETONESU Negative   BILIRUBINUA Negative   OCCULTUA 2+*   NITRITE Negative   UROBILINOGEN Negative   LEUKOCYTESUR 3+*   RBCUA 14*   WBCUA >100*   BACTERIA Rare   SQUAMEPITHEL 2   HYALINECASTS 28*     All pertinent labs within the  past 24 hours have been reviewed.    Significant Imaging: I have reviewed all pertinent imaging results/findings within the past 24 hours.   No results found.

## 2019-09-18 NOTE — PROGRESS NOTES
Pharmacist Renal Dose Adjustment Note    Marisela Eagle is a 74 y.o. female being treated with the medication enoxaparin    Patient Data:    Vital Signs (Most Recent):  Temp: 98.5 °F (36.9 °C) (09/18/19 0451)  Pulse: 105 (09/18/19 0451)  Resp: 20 (09/18/19 0451)  BP: (!) 149/80 (09/18/19 0451)  SpO2: (!) 93 % (09/18/19 0451)   Vital Signs (72h Range):  Temp:  [98.5 °F (36.9 °C)-103.6 °F (39.8 °C)]   Pulse:  []   Resp:  [16-28]   BP: (113-149)/(55-80)   SpO2:  [90 %-97 %]      Recent Labs   Lab 09/17/19 2105   CREATININE 0.8     Serum creatinine: 0.8 mg/dL 09/17/19 2105  Estimated creatinine clearance: 79 mL/min    Medication:enoxaparin dose: 40mg frequency q12h will be changed to medication:enoxaparin dose:40mg frequency:q24h per BMI < 40 (36.18) and CrCl > 30 (79.5 ml/min)    Pharmacist's Name: Chin Perdomo  Pharmacist's Extension: [phone]

## 2019-09-18 NOTE — CARE UPDATE
09/18/19 0745   Patient Assessment/Suction   Level of Consciousness (AVPU) alert   Respiratory Effort Normal;Unlabored   Expansion/Accessory Muscles/Retractions no use of accessory muscles   PRE-TX-O2   O2 Device (Oxygen Therapy) room air   SpO2 (!) 93 %   $ Pulse Oximetry - Multiple Charge Pulse Oximetry - Multiple   Pulse 88   Resp 18

## 2019-09-18 NOTE — CONSULTS
[facility]  Cardiology  Consult Note    Patient Name: Marisela Eagle  MRN: [id number]  Admission Date: 9/17/2019  Hospital Length of Stay: 0 days  Code Status: Full Code   Attending Provider: Rambo Reese MD   Consulting Provider: Sabrina Mccauley NP  Primary Care Physician: Pawel Badillo III, MD  Principal Problem:Pyelonephritis    Patient information was obtained from patient, past medical records and ER records.     Inpatient consult to Cardiology  Consult performed by: Kate Maguire MD  Consult ordered by: Emi Swann NP        Subjective:     REASON FOR CONSULT:  Elevated troponin     HPI:    Ms. Eagle is a 74 year old female with past medical history significant for HTN, hyperlipidemia, hypothyroidism, and arthritis. She states that on Monday she started with symptoms of dysuria. Tuesday morning she woke up with a temperature of 102 degrees F. Tuesday evening she states that the temperature went up to 104 degrees and her daughter persuaded her to seek emergency care. She reports poor oral intake over the last 3 days. Denies cough, nausea, vomiting, or diarrhea. Workup revealed a UTI. Lactic acid is normal. Q waves in the septal leads, other wise unchanged from previous. Her potassium was very low at 2.6. Troponin is elevated to 0.14. ECG with She was treated with IV fluids, IV antibiotics and potassium replacement. She denies any history of chest pain, arm, neck, or jaw pain. She does report history of shortness of breath with exertion which has been ongoing for the past 6-8 months. Her activity is mostly limited by her arthritis as she has had bilateral knee replacements and hip replacement. She denies dizziness, lightheadedness, syncope, palpitations, leg swelling. Last echo in January of 2019 revealed EF of >55% and diastolic dysfunction.     Past Medical History:   Diagnosis Date    Bilateral knee pain July 2012    left worse, right knee painful even after partial  replacement.    Bruises easily     Clot ?    Umbilical clot after hysterectomy    Colon polyps     adenomatous    Complication of anesthesia     very low pain tolerance    Cystocele     Degenerative disc disease     neck,back, muscle spasms    Diverticulitis     Edema of left lower extremity     ankles    GERD (gastroesophageal reflux disease)     Hypertension     Neuropathy     peripheral    Thyroid disease     hypothyroid, has nodules    Varicose veins        Past Surgical History:   Procedure Laterality Date    ADENOIDECTOMY      APPENDECTOMY      ARTHROSCOPY, KNEE Left 2012    [facility]    BILATERAL SALPINGOOPHORECTOMY       SECTION      COLONOSCOPY  12    HYSTERECTOMY      with BSO    JOINT REPLACEMENT  2012    rt unilateral knee arthroplasty. Took Coumadin x 6 weeks    KNEE ARTHROSCOPY  2010    right    TONSILLECTOMY         Review of patient's allergies indicates:   Allergen Reactions    Duloxetine      Does not remember    Morphine Other (See Comments)     Other reaction(s): Syncope  fainted       No current facility-administered medications on file prior to encounter.      Current Outpatient Medications on File Prior to Encounter   Medication Sig    atorvastatin (LIPITOR) 20 MG tablet Take 20 mg by mouth nightly.    cholecalciferol, vitamin D3, 5,000 unit capsule Take 5,000 Units by mouth once daily.    cyanocobalamin (VITAMIN B-12) 1000 MCG tablet Take 100 mcg by mouth once daily.    estradiol (ESTRACE) 1 MG tablet Take 1 mg by mouth once daily.    gabapentin (NEURONTIN) 800 MG tablet Take 800 mg by mouth 2 (two) times daily.     levothyroxine (SYNTHROID) 100 MCG tablet Take 100 mcg by mouth once daily.    losartan-hydrochlorothiazide 100-12.5 mg (HYZAAR) 100-12.5 mg Tab Take 1 tablet by mouth 2 (two) times daily.     magnesium oxide (MAG-OX) 400 mg (241.3 mg magnesium) tablet Take 400 mg by mouth 2 (two) times  daily.    mecobal/levomefolat Ca/B6 phos (METANX ORAL) Take 1 capsule by mouth 2 (two) times daily.    metoprolol succinate (TOPROL-XL) 50 MG 24 hr tablet Take 50 mg by mouth nightly.    omeprazole (PRILOSEC) 40 MG capsule Take 40 mg by mouth once daily.    peg 400-propylene glycol (SYSTANE, PROPYLENE GLYCOL,) 0.4-0.3 % Drop Place 1 drop into both eyes once daily.    sulfamethoxazole-trimethoprim 800-160mg (BACTRIM DS) 800-160 mg Tab Take 1 tablet by mouth 2 (two) times daily.    turmeric 400 mg Cap Take 400 mg by mouth 2 (two) times daily.    vit C/E/Zn/coppr/lutein/zeaxan (PRESERVISION AREDS-2 ORAL) Take 1 capsule by mouth 2 (two) times daily.     diclofenac sodium (VOLTAREN) 1 % Gel Apply 2 g topically daily as needed.    LORazepam (ATIVAN) 1 MG tablet Take 1 mg by mouth nightly as needed for Anxiety.       Scheduled Meds:   cefTRIAXone (ROCEPHIN) IVPB  2 g Intravenous Q24H    enoxparin  40 mg Subcutaneous Daily    estradiol  1 mg Oral Daily    famotidine  20 mg Oral BID    losartan  100 mg Oral Daily    And    hydroCHLOROthiazide  12.5 mg Oral Daily    levothyroxine  100 mcg Oral Daily    magnesium oxide  400 mg Oral BID    metoprolol succinate  50 mg Oral Nightly     Continuous Infusions:   0/9% NACL & POTASSIUM CHLORIDE 20 MEQ/L 75 mL/hr at 19 0518     PRN Meds:.acetaminophen, bisacodyl, ibuprofen, influenza, LORazepam, ondansetron, sodium chloride 0.9%    Family History     Problem Relation (Age of Onset)    Cancer Daughter    Diabetes Maternal Grandmother    Hypertension Mother    Neuropathy Mother, Father    Stroke Mother          Tobacco Use    Smoking status: Former Smoker     Last attempt to quit: 3/23/2012     Years since quittin.4    Smokeless tobacco: Never Used   Substance and Sexual Activity    Alcohol use: Yes     Comment: occasional    Drug use: No    Sexual activity: Not Currently       ROS     Denies any significant headaches, sore throat, or runny nose.    Denies history of any recent changes in vision.  Denies any dysphagia or odynophagia.  Denies any cough or hemoptysis. denies any wheezing.  Denies any chest pain, palpitations, lightheadedness, dizziness or syncopal episodes. Reports shortness of breath on exertion.   Denies any abdominal pain, nausea, vomiting, diarrhea, constipation  Reports dysuria, polyuria.  Denies any focal weakness, numbness or paresthesias.  Denies any recent significant weight changes. Reports fevers.           Objective:     Vital Signs (Most Recent):  Temp: 100.3 °F (37.9 °C) (09/18/19 0705)  Pulse: 88 (09/18/19 0745)  Resp: 18 (09/18/19 0745)  BP: (!) 150/70 (09/18/19 0705)  SpO2: (!) 93 % (09/18/19 0745) Vital Signs (24h Range):  Temp:  [98.5 °F (36.9 °C)-103.6 °F (39.8 °C)] 100.3 °F (37.9 °C)  Pulse:  [] 88  Resp:  [16-28] 18  SpO2:  [90 %-97 %] 93 %  BP: (113-150)/(55-80) 150/70     Weight: 109.8 kg (242 lb 1 oz)  Body mass index is 35.75 kg/m².    SpO2: (!) 93 %  O2 Device (Oxygen Therapy): room air      Intake/Output Summary (Last 24 hours) at 9/18/2019 0911  Last data filed at 9/18/2019 0518  Gross per 24 hour   Intake 3503 ml   Output --   Net 3503 ml       Lines/Drains/Airways     Airway                 Oral Airway Guerrero 90 2608 days          Peripheral Intravenous Line                 Peripheral IV - Single Lumen 09/18/19 0042 20 G Right Antecubital less than 1 day                Physical Exam  HEENT: Normocephalic, atraumatic, PERRL, Conjunctiva pink, no scleral icterus.   CVS: S1S2+, RRR, no murmurs, rubs or gallops, JVP: Normal.  LUNGS: Clear  ABDOMEN: Soft, NT, BS+  EXTREMITIES: No cyanosis, clubbing or edema  NEURO: AAO X 3.       Significant Labs:   Blood Culture:   Recent Labs   Lab 09/17/19 2050 09/17/19 2134   LABBLOO No Growth to date No Growth to date   , BMP:   Recent Labs   Lab 09/17/19 2105 09/18/19  0515   * 133*   * 140   K 2.6* 3.1*   CL 93* 97   CO2 30* 31*   BUN 12 8   CREATININE 0.8  0.7   CALCIUM 8.3* 8.4*   MG 1.8  --    , CMP   Recent Labs   Lab 09/17/19 2105 09/18/19  0515   * 140   K 2.6* 3.1*   CL 93* 97   CO2 30* 31*   * 133*   BUN 12 8   CREATININE 0.8 0.7   CALCIUM 8.3* 8.4*   PROT 6.8 7.4   ALBUMIN 3.8 4.0   BILITOT 1.1* 1.0   ALKPHOS 46* 50*   AST 21 23   ALT 27 26   ANIONGAP 9 12   ESTGFRAFRICA >60.0 >60.0   EGFRNONAA >60.0 >60.0   , CBC   Recent Labs   Lab 09/17/19 2105 09/18/19  0515   WBC 10.86 11.29   HGB 13.2 13.7   HCT 37.9 40.4    222   , INR   Recent Labs   Lab 09/17/19 2105   INR 1.1   , Lipid Panel No results for input(s): CHOL, HDL, LDLCALC, TRIG, CHOLHDL in the last 48 hours.,   Pathology Results  (Last 10 years)    None      , Troponin   Recent Labs   Lab 09/17/19 2105 09/18/19  0515   TROPONINI 0.142* 0.084*    and All pertinent lab results from the last 24 hours have been reviewed.    Significant Imaging: X-Ray: CXR: X-Ray Chest 1 View (CXR): No results found for this visit on 09/17/19.    FINDINGS:    Cardiac and mediastinal contours are normal. There is no infiltrate. Mild scattered scarring/atelectasis of the lower lungs is stable. There is no pleural effusion or pneumothorax. Bones are unremarkable.      IMPRESSION:    No acute findings. Stable.    Electronically Signed by Donna Livingston on 9/18/2019 6:37 AM  Assessment and Plan:     IMPRESSION:    UTI. Febrile. Cultures pending.  Elevated troponin. Chest pain free. Likely demand from sepsis.   Shortness of breath on exertion. Chronic.   Hypokalemia.   Hypertension  Hyperlipidemia   Peripheral neuropathy  Hypothyroidism    RECOMMENDATIONS:    1. Okay to release NPO.  2. Repeat troponin in AM.   3. ECG in AM.  4. Further decisions to be based upon the hospital course.   5. Check echo.     Thank you for your consult. I will follow-up with patient. Please contact us if you have any additional questions.    Sabrina Mccauley NP  Cardiology   [facility]    I have personally seen and  examined the patient. I reviewed the notes, assessments, and/or procedures performed by Ms Sabrina Mccauley, I concur with her documentation of Marisela Eagle.

## 2019-09-18 NOTE — ASSESSMENT & PLAN NOTE
- pt with H/O E.Coli bacteriamia  - now started on Rocephin (2 grams)  - Blood and urine culture sent in  ED  - + fever

## 2019-09-18 NOTE — ED PROVIDER NOTES
Encounter Date: 9/17/2019       History     Chief Complaint   Patient presents with    Fever     This is a 74-year-old female who presents with body aches fever and chills up to 102.  Last week the patient started having urinary frequency urgency and suprapubic pain.  She started taking a friend's amoxicillin--1 amoxicillin daily.  She thought that her symptoms improved.  She was also taking azo which also helped her symptoms. Today however and yesterday she started feeling worse with body aches and chills with continued urinary frequency urgency and dysuria.  She denies flank pain.  She has had nausea without vomiting. She denies diarrhea.  She denies headache neck pain or stiffness. She denies chest pain shortness of breath coughing or upper respiratory symptoms. She denies abdominal pain.  She denies gastrointestinal bleeding.  She reports fatigue nonfocal generalized weakness. She denies syncope or near syncope.  Symptoms have been moderate in nature.  There are no exacerbating or alleviating factors.  She denies any other problems or complaints.        Review of patient's allergies indicates:   Allergen Reactions    Duloxetine      Does not remember    Morphine Other (See Comments)     Other reaction(s): Syncope  fainted     Past Medical History:   Diagnosis Date    Bilateral knee pain July 2012    left worse, right knee painful even after partial replacement.    Bruises easily     Clot 1990's?    Umbilical clot after hysterectomy    Colon polyps     adenomatous    Complication of anesthesia     very low pain tolerance    Cystocele     Degenerative disc disease     neck,back, muscle spasms    Diverticulitis     Edema of left lower extremity     ankles    GERD (gastroesophageal reflux disease)     Hypertension     Neuropathy     peripheral    Thyroid disease     hypothyroid, has nodules    Varicose veins      Past Surgical History:   Procedure Laterality Date    ADENOIDECTOMY      APPENDECTOMY       ARTHROSCOPY, KNEE Left 2012    [facility]    BILATERAL SALPINGOOPHORECTOMY       SECTION      COLONOSCOPY  12    HYSTERECTOMY      with BSO    JOINT REPLACEMENT  2012    rt unilateral knee arthroplasty. Took Coumadin x 6 weeks    KNEE ARTHROSCOPY  2010    right    TONSILLECTOMY       Family History   Problem Relation Age of Onset    Neuropathy Mother     Hypertension Mother     Stroke Mother     Neuropathy Father     Diabetes Maternal Grandmother     Cancer Daughter     Melanoma Neg Hx     Psoriasis Neg Hx     Lupus Neg Hx     Eczema Neg Hx      Social History     Tobacco Use    Smoking status: Former Smoker     Last attempt to quit: 3/23/2012     Years since quittin.4    Smokeless tobacco: Never Used   Substance Use Topics    Alcohol use: Yes     Comment: occasional    Drug use: No     Review of Systems   Constitutional: Positive for activity change, appetite change, chills, fatigue and fever.   HENT: Negative.  Negative for congestion, dental problem, ear pain, rhinorrhea, sinus pressure, sinus pain, sore throat and trouble swallowing.    Eyes: Negative.  Negative for photophobia, pain, redness and visual disturbance.   Respiratory: Negative.  Negative for cough, chest tightness, shortness of breath and wheezing.    Cardiovascular: Negative.  Negative for chest pain, palpitations and leg swelling.   Gastrointestinal: Positive for nausea. Negative for abdominal distention, abdominal pain, anal bleeding, blood in stool, constipation, diarrhea and vomiting.   Genitourinary: Positive for dysuria, frequency, pelvic pain and urgency. Negative for decreased urine volume, difficulty urinating, flank pain and hematuria.   Musculoskeletal: Positive for myalgias. Negative for arthralgias, back pain, gait problem, joint swelling, neck pain and neck stiffness.   Skin: Negative.  Negative for pallor and rash.   Neurological: Negative.   Negative for dizziness, tremors, seizures, speech difficulty, weakness, light-headedness, numbness and headaches.   Hematological: Negative.  Does not bruise/bleed easily.   Psychiatric/Behavioral: Negative.  Negative for confusion.       Physical Exam     Initial Vitals [09/17/19 1919]   BP Pulse Resp Temp SpO2   (!) 147/74 108 16 (!) 103.6 °F (39.8 °C) (!) 94 %      MAP       --         Physical Exam    Nursing note and vitals reviewed.  Constitutional: She is cooperative.  Non-toxic appearance. She does not appear ill.   HENT:   Head: Normocephalic.   Nose: Nose normal.   Mouth/Throat: Oropharynx is clear and moist.   Eyes: Conjunctivae, EOM and lids are normal. Pupils are equal, round, and reactive to light. No scleral icterus.   Neck: Trachea normal, normal range of motion, full passive range of motion without pain and phonation normal. Neck supple. No thyroid mass present. No spinous process tenderness present. Normal range of motion present. No neck rigidity. No JVD present.   Cardiovascular: Normal rate, regular rhythm, normal heart sounds, intact distal pulses and normal pulses.   No murmur heard.  Pulmonary/Chest: Effort normal and breath sounds normal. No accessory muscle usage. No tachypnea. No respiratory distress.   Abdominal: Soft. Normal appearance and bowel sounds are normal. She exhibits no distension, no pulsatile midline mass and no mass. There is no tenderness. There is no rigidity, no guarding and no CVA tenderness.   Musculoskeletal:        Thoracic back: Normal. She exhibits normal range of motion, no tenderness and no bony tenderness.        Lumbar back: Normal. She exhibits normal range of motion, no tenderness, no bony tenderness, no swelling and no pain.   Extremities are nontender throughout with full range of motion.  Pulses are 2+ throughout, cap refill is less than 2 sec throughout.   Neurological: She is alert and oriented to person, place, and time. She has normal strength. No  cranial nerve deficit or sensory deficit.   Skin: Skin is warm, dry and intact. Capillary refill takes less than 2 seconds. No ecchymosis and no rash noted. No erythema. No pallor.   Psychiatric: She has a normal mood and affect. Her speech is normal and behavior is normal. Thought content normal. Cognition and memory are normal.         ED Course   Procedures  Labs Reviewed   CBC W/ AUTO DIFFERENTIAL - Abnormal; Notable for the following components:       Result Value    Mean Corpuscular Hemoglobin 32.3 (*)     Gran # (ANC) 8.5 (*)     Immature Grans (Abs) 0.05 (*)     Mono # 1.3 (*)     Gran% 77.7 (*)     Lymph% 9.3 (*)     All other components within normal limits   COMPREHENSIVE METABOLIC PANEL - Abnormal; Notable for the following components:    Sodium 132 (*)     Potassium 2.6 (*)     Chloride 93 (*)     CO2 30 (*)     Glucose 134 (*)     Calcium 8.3 (*)     Total Bilirubin 1.1 (*)     Alkaline Phosphatase 46 (*)     All other components within normal limits    Narrative:       Potassium and Troponin critical result(s) called and verbal   readback obtained from Pedro Kellogg Rn/ER, 09/17/2019 22:13   URINALYSIS, REFLEX TO URINE CULTURE - Abnormal; Notable for the following components:    Appearance, UA Cloudy (*)     Protein, UA 2+ (*)     Occult Blood UA 2+ (*)     Leukocytes, UA 3+ (*)     All other components within normal limits    Narrative:     Preferred Collection Type->Urine, Catheterized  Specimen Source->Urine   APTT - Abnormal; Notable for the following components:    aPTT 23.0 (*)     All other components within normal limits   TROPONIN I - Abnormal; Notable for the following components:    Troponin I 0.142 (*)     All other components within normal limits    Narrative:       Potassium and Troponin critical result(s) called and verbal   readback obtained from Pedro Kellogg Rn/ER, 09/17/2019 22:13   URINALYSIS MICROSCOPIC - Abnormal; Notable for the following components:    RBC, UA 14 (*)     WBC, UA  >100 (*)     Hyaline Casts, UA 28 (*)     All other components within normal limits    Narrative:     Preferred Collection Type->Urine, Catheterized  Specimen Source->Urine   CULTURE, BLOOD   CULTURE, BLOOD   CULTURE, URINE   LACTIC ACID, PLASMA   PROTIME-INR   MAGNESIUM   INFLUENZA A AND B ANTIGEN    Narrative:     Specimen Source->Nasopharyngeal Swab   CK   CK   LACTIC ACID, PLASMA          Imaging Results          X-Ray Chest AP Portable (In process)                  Medical Decision Making:   ED Management:  The patient has improved.  She feels much better.  Lactic acid is within normal limits.  IV fluids have been given.  Blood cultures have been obtained.  IV Rocephin has been given.  I have discussed the case with the hospitalist provider and the patient will be admitted for further care.  Abdominal exam is completely benign.  She has no CVA tenderness. As patient did have a significantly high fever, there is a concern for bacteremia.  Patient will be admitted for comprehensive care/ treatment /evaluation.  She is current hemodynamically stable and in no acute distress.                      Clinical Impression:       ICD-10-CM ICD-9-CM   1. Pyelonephritis N12 590.80   2. Tachycardia R00.0 785.0   3. Malaise R53.81 780.79                                Hattie Clarke MD  09/18/19 5074

## 2019-09-18 NOTE — ED NOTES
Pt to ED for fever with nausea. Pt was seen by home PCP and started on Bactrim DS for UTI. Fever has continued and came to ED for evaluation.

## 2019-09-18 NOTE — PLAN OF CARE
Problem: Infection  Goal: Infection Symptom Resolution    Intervention: Prevent or Manage Infection  Ongoing       Comments: Ongoing

## 2019-09-18 NOTE — ASSESSMENT & PLAN NOTE
- Pt chest pain free  -will trend  troponins And repeat EKG  - Consult Cards  - 2 D echo complete in not current

## 2019-09-18 NOTE — H&P
[facility] hospital Medicine  History & Physical    Patient Name: Marisela Eagle  MRN: [id number]  Admission Date: 9/17/2019  Attending Physician: Rambo Reese MD   Primary Care Provider: Pawel Badillo III, MD         Patient information was obtained from patient and ER records.     Subjective:     Principal Problem:Pyelonephritis    Chief Complaint:   Chief Complaint   Patient presents with    Fever        HPI: This is a 74-year-old female who presents with body aches fever and chills up to 102.  Last week the patient started having urinary frequency urgency and suprapubic pain.  She started taking a friend's amoxicillin--1 amoxicillin daily.  She thought that her symptoms improved.  She was also taking azo which also helped her symptoms. Today however and yesterday she started feeling worse with body aches and chills with continued urinary frequency urgency and dysuria.  She denies flank pain.  She has had nausea without vomiting. She denies diarrhea.  She denies headache neck pain or stiffness. She denies chest pain shortness of breath coughing or upper respiratory symptoms. She denies abdominal pain.  She denies gastrointestinal bleeding.  She reports fatigue nonfocal generalized weakness. She denies syncope or near syncope.  Symptoms have been moderate in nature.  There are no exacerbating or alleviating factors.  She denies any other problems or complaints.    Past Medical History:   Diagnosis Date    Bilateral knee pain July 2012    left worse, right knee painful even after partial replacement.    Bruises easily     Clot 1990's?    Umbilical clot after hysterectomy    Colon polyps     adenomatous    Complication of anesthesia     very low pain tolerance    Cystocele     Degenerative disc disease     neck,back, muscle spasms    Diverticulitis     Edema of left lower extremity     ankles    GERD (gastroesophageal reflux disease)     Hypertension     Neuropathy      peripheral    Thyroid disease     hypothyroid, has nodules    Varicose veins        Past Surgical History:   Procedure Laterality Date    ADENOIDECTOMY      APPENDECTOMY      ARTHROSCOPY, KNEE Left 2012    Performed by Taiwo Gusman MD at [facility] OR    BILATERAL SALPINGOOPHORECTOMY       SECTION      COLONOSCOPY  12    HYSTERECTOMY      with BSO    JOINT REPLACEMENT  2012    rt unilateral knee arthroplasty. Took Coumadin x 6 weeks    KNEE ARTHROSCOPY  2010    right    TONSILLECTOMY         Review of patient's allergies indicates:   Allergen Reactions    Duloxetine      Does not remember    Morphine Other (See Comments)     Other reaction(s): Syncope  fainted       Current Facility-Administered Medications on File Prior to Encounter   Medication    triamcinolone acetonide injection 80 mg    triamcinolone acetonide injection 80 mg     Current Outpatient Medications on File Prior to Encounter   Medication Sig    atorvastatin (LIPITOR) 20 MG tablet Take 20 mg by mouth nightly.    cholecalciferol, vitamin D3, 5,000 unit capsule Take 5,000 Units by mouth once daily.    cyanocobalamin (VITAMIN B-12) 1000 MCG tablet Take 100 mcg by mouth once daily.    estradiol (ESTRACE) 1 MG tablet Take 1 mg by mouth once daily.    gabapentin (NEURONTIN) 800 MG tablet Take 800 mg by mouth 2 (two) times daily.     levothyroxine (SYNTHROID) 100 MCG tablet Take 100 mcg by mouth once daily.    losartan-hydrochlorothiazide 100-12.5 mg (HYZAAR) 100-12.5 mg Tab Take 1 tablet by mouth 2 (two) times daily.     magnesium oxide (MAG-OX) 400 mg (241.3 mg magnesium) tablet Take 400 mg by mouth 2 (two) times daily.    mecobal/levomefolat Ca/B6 phos (METANX ORAL) Take 1 capsule by mouth 2 (two) times daily.    metoprolol succinate (TOPROL-XL) 50 MG 24 hr tablet Take 50 mg by mouth nightly.    omeprazole (PRILOSEC) 40 MG capsule Take 40 mg by mouth once daily.    peg 400-propylene glycol (SYSTANE,  PROPYLENE GLYCOL,) 0.4-0.3 % Drop Place 1 drop into both eyes once daily.    sulfamethoxazole-trimethoprim 800-160mg (BACTRIM DS) 800-160 mg Tab Take 1 tablet by mouth 2 (two) times daily.    turmeric 400 mg Cap Take 400 mg by mouth 2 (two) times daily.    vit C/E/Zn/coppr/lutein/zeaxan (PRESERVISION AREDS-2 ORAL) Take 1 capsule by mouth 2 (two) times daily.     diclofenac sodium (VOLTAREN) 1 % Gel Apply 2 g topically daily as needed.    LORazepam (ATIVAN) 1 MG tablet Take 1 mg by mouth nightly as needed for Anxiety.     Family History     Problem Relation (Age of Onset)    Cancer Daughter    Diabetes Maternal Grandmother    Hypertension Mother    Neuropathy Mother, Father    Stroke Mother        Tobacco Use    Smoking status: Former Smoker     Last attempt to quit: 3/23/2012     Years since quittin.4    Smokeless tobacco: Never Used   Substance and Sexual Activity    Alcohol use: Yes     Comment: occasional    Drug use: No    Sexual activity: Not Currently     Review of Systems   Constitutional: Positive for chills, fatigue and fever. Negative for activity change.   HENT: Negative for congestion, drooling, ear pain, hearing loss, nosebleeds, rhinorrhea, sinus pressure, sore throat and trouble swallowing.    Eyes: Negative for pain and visual disturbance.   Respiratory: Negative for apnea, cough, choking, chest tightness, shortness of breath and wheezing.    Cardiovascular: Negative for chest pain, palpitations and leg swelling.   Gastrointestinal: Positive for nausea. Negative for abdominal distention, abdominal pain, blood in stool, constipation, diarrhea and vomiting.   Endocrine: Negative for polydipsia, polyphagia and polyuria.   Genitourinary: Positive for difficulty urinating, dysuria and flank pain. Negative for decreased urine volume, frequency and hematuria.   Musculoskeletal: Negative for back pain, gait problem, joint swelling, myalgias, neck pain and neck stiffness.   Skin: Negative for  rash and wound.   Neurological: Positive for weakness. Negative for dizziness, tremors, seizures, syncope, facial asymmetry, speech difficulty, light-headedness, numbness and headaches.   Psychiatric/Behavioral: Negative for behavioral problems, confusion, hallucinations and sleep disturbance. The patient is not nervous/anxious.      Objective:     Vital Signs (Most Recent):  Temp: 99.8 °F (37.7 °C) (09/17/19 2133)  Pulse: 78 (09/18/19 0231)  Resp: (!) 22 (09/18/19 0231)  BP: (!) 123/57 (09/18/19 0231)  SpO2: 96 % (09/18/19 0231) Vital Signs (24h Range):  Temp:  [99.8 °F (37.7 °C)-103.6 °F (39.8 °C)] 99.8 °F (37.7 °C)  Pulse:  [] 78  Resp:  [16-28] 22  SpO2:  [90 %-96 %] 96 %  BP: (113-147)/(55-80) 123/57     Weight: 111.1 kg (245 lb)  Body mass index is 36.18 kg/m².    Physical Exam   Constitutional: She is oriented to person, place, and time. She appears well-developed.   HENT:   Head: Normocephalic and atraumatic.   Eyes: Pupils are equal, round, and reactive to light. EOM are normal.   Neck: Normal range of motion. Neck supple.   Cardiovascular: Normal rate, regular rhythm, normal heart sounds and intact distal pulses.   Pulmonary/Chest: Effort normal and breath sounds normal.   Abdominal: Soft. Bowel sounds are normal.   Musculoskeletal: Normal range of motion.   Neurological: She is alert and oriented to person, place, and time.   Skin: Skin is warm and dry. Capillary refill takes less than 2 seconds.   Psychiatric: She has a normal mood and affect. Her behavior is normal.         CRANIAL NERVES     CN III, IV, VI   Pupils are equal, round, and reactive to light.  Extraocular motions are normal.     CN V   Facial sensation intact.   Right facial sensation deficit: none  Left facial sensation deficit: none    CN VII   Facial expression full, symmetric.     CN VIII   CN VIII normal.   Hearing: intact    CN IX, X   CN IX normal.   CN X normal.   Palate: symmetric  Right gag reflex: normal  Left gag reflex:  normal    CN XI   CN XI normal.   Right sternocleidomastoid strength: normal  Left sternocleidomastoid strength: normal    CN XII   CN XII normal.   Tongue: not atrophic       Significant Labs:   CBC:   Recent Labs   Lab 09/17/19 2105   WBC 10.86   HGB 13.2   HCT 37.9        CMP:   Recent Labs   Lab 09/17/19 2105   *   K 2.6*   CL 93*   CO2 30*   *   BUN 12   CREATININE 0.8   CALCIUM 8.3*   PROT 6.8   ALBUMIN 3.8   BILITOT 1.1*   ALKPHOS 46*   AST 21   ALT 27   ANIONGAP 9   EGFRNONAA >60.0     Cardiac Markers: No results for input(s): CKMB, MYOGLOBIN, BNP, TROPISTAT in the last 48 hours.  Coagulation:   Recent Labs   Lab 09/17/19 2105   INR 1.1   APTT 23.0*     Lipid Panel: No results for input(s): CHOL, HDL, LDLCALC, TRIG, CHOLHDL in the last 48 hours.  Magnesium:   Recent Labs   Lab 09/17/19 2105   MG 1.8     Troponin:   Recent Labs   Lab 09/17/19 2105   TROPONINI 0.142*     TSH:   Recent Labs   Lab 07/03/19  1200   TSH 1.829     Urine Culture: No results for input(s): LABURIN in the last 48 hours.  Urine Studies:   Recent Labs   Lab 09/17/19 2105   COLORU Yellow   APPEARANCEUA Cloudy*   PHUR 7.0   SPECGRAV 1.010   PROTEINUA 2+*   GLUCUA Negative   KETONESU Negative   BILIRUBINUA Negative   OCCULTUA 2+*   NITRITE Negative   UROBILINOGEN Negative   LEUKOCYTESUR 3+*   RBCUA 14*   WBCUA >100*   BACTERIA Rare   SQUAMEPITHEL 2   HYALINECASTS 28*     All pertinent labs within the past 24 hours have been reviewed.    Significant Imaging: I have reviewed all pertinent imaging results/findings within the past 24 hours.   No results found.      Assessment/Plan:     * Pyelonephritis  - pt with H/O E.Coli bacteriamia  - now started on Rocephin (2 grams)  - Blood and urine culture sent in  ED  - + fever      Hypokalemia  - correction given in ED  - will recheck in am   - continue cardiac monitor      Elevated troponin I level  - Pt chest pain free  -will trend  troponins And repeat EKG  - Consult  Cards  - 2 D echo complete in not current      Hyperlipidemia  - continue statin   - LFT Stable      Hypertension  - continue current medication      Hypothyroid  Continues current Synthroid dosing        VTE Risk Mitigation (From admission, onward)        Ordered     enoxaparin injection 40 mg  Daily      09/18/19 0321     IP VTE HIGH RISK PATIENT  Once      09/18/19 0321             Emi Swann, NP  Department of Hospital Medicine   [facility]

## 2019-09-18 NOTE — PROGRESS NOTES
Cardiac Rehab     Marisela Eagle   [id number]   9/18/2019         Cardiac Rehab Phase Taught: Phase 1    Teaching Method: Verbal    Handouts: None    Educational Videos: None    Understanding:  Learning indicated by feedback and Verbalize understanding    Comments: pt in bed.     Total time spent: 30mins            Ignacia Mcdaniels RN

## 2019-09-18 NOTE — HPI
This is a 74-year-old female who presents with body aches fever and chills up to 102.  Last week the patient started having urinary frequency urgency and suprapubic pain.  She started taking a friend's amoxicillin--1 amoxicillin daily.  She thought that her symptoms improved.  She was also taking azo which also helped her symptoms. Today however and yesterday she started feeling worse with body aches and chills with continued urinary frequency urgency and dysuria.  She denies flank pain.  She has had nausea without vomiting. She denies diarrhea.  She denies headache neck pain or stiffness. She denies chest pain shortness of breath coughing or upper respiratory symptoms. She denies abdominal pain.  She denies gastrointestinal bleeding.  She reports fatigue nonfocal generalized weakness. She denies syncope or near syncope.  Symptoms have been moderate in nature.  There are no exacerbating or alleviating factors.  She denies any other problems or complaints.

## 2019-09-19 ENCOUNTER — CLINICAL SUPPORT (OUTPATIENT)
Dept: CARDIOLOGY | Facility: HOSPITAL | Age: 74
DRG: 690 | End: 2019-09-19
Attending: INTERNAL MEDICINE
Payer: MEDICARE

## 2019-09-19 VITALS — WEIGHT: 243 LBS | BODY MASS INDEX: 35.99 KG/M2 | HEIGHT: 69 IN

## 2019-09-19 PROBLEM — R05.8 DRY COUGH: Status: ACTIVE | Noted: 2019-09-19

## 2019-09-19 LAB
ALBUMIN SERPL BCP-MCNC: 3.2 G/DL (ref 3.5–5.2)
ALP SERPL-CCNC: 45 U/L (ref 55–135)
ALT SERPL W/O P-5'-P-CCNC: 24 U/L (ref 10–44)
ANION GAP SERPL CALC-SCNC: 9 MMOL/L (ref 8–16)
AORTIC ROOT ANNULUS: 3.32 CM
AORTIC VALVE CUSP SEPERATION: 2 CM
AST SERPL-CCNC: 21 U/L (ref 10–40)
AV INDEX (PROSTH): 0.76
AV MEAN GRADIENT: 4 MMHG
AV PEAK GRADIENT: 7 MMHG
AV VALVE AREA: 2.4 CM2
AV VELOCITY RATIO: 62.8
BASOPHILS # BLD AUTO: 0.04 K/UL (ref 0–0.2)
BASOPHILS NFR BLD: 0.5 % (ref 0–1.9)
BILIRUB SERPL-MCNC: 0.9 MG/DL (ref 0.1–1)
BSA FOR ECHO PROCEDURE: 2.32 M2
BUN SERPL-MCNC: 7 MG/DL (ref 8–23)
CALCIUM SERPL-MCNC: 8.3 MG/DL (ref 8.7–10.5)
CHLORIDE SERPL-SCNC: 98 MMOL/L (ref 95–110)
CK MB SERPL-MCNC: 1.9 NG/ML (ref 0.1–6.5)
CK SERPL-CCNC: 170 U/L (ref 20–180)
CO2 SERPL-SCNC: 31 MMOL/L (ref 23–29)
CREAT SERPL-MCNC: 0.6 MG/DL (ref 0.5–1.4)
CV ECHO LV RWT: 0.41 CM
DIFFERENTIAL METHOD: ABNORMAL
DOP CALC AO PEAK VEL: 1.3 M/S
DOP CALC AO VTI: 24.03 CM
DOP CALC LVOT AREA: 3.2 CM2
DOP CALC LVOT DIAMETER: 2.01 CM
DOP CALC LVOT PEAK VEL: 81.64 M/S
DOP CALC LVOT STROKE VOLUME: 57.63 CM3
DOP CALCLVOT PEAK VEL VTI: 18.17 CM
E WAVE DECELERATION TIME: 251.97 MSEC
E/A RATIO: 1.28
E/E' RATIO: 12.8 M/S
ECHO LV POSTERIOR WALL: 1.03 CM (ref 0.6–1.1)
EOSINOPHIL # BLD AUTO: 0.1 K/UL (ref 0–0.5)
EOSINOPHIL NFR BLD: 0.6 % (ref 0–8)
ERYTHROCYTE [DISTWIDTH] IN BLOOD BY AUTOMATED COUNT: 13.2 % (ref 11.5–14.5)
EST. GFR  (AFRICAN AMERICAN): >60 ML/MIN/1.73 M^2
EST. GFR  (NON AFRICAN AMERICAN): >60 ML/MIN/1.73 M^2
FRACTIONAL SHORTENING: 39 % (ref 28–44)
GLUCOSE SERPL-MCNC: 114 MG/DL (ref 70–110)
HCT VFR BLD AUTO: 36.4 % (ref 37–48.5)
HGB BLD-MCNC: 12.1 G/DL (ref 12–16)
IMM GRANULOCYTES # BLD AUTO: 0.04 K/UL (ref 0–0.04)
IMM GRANULOCYTES NFR BLD AUTO: 0.5 % (ref 0–0.5)
INTERVENTRICULAR SEPTUM: 1.13 CM (ref 0.6–1.1)
IVRT: 96.69 MSEC
LEFT ATRIUM SIZE: 3.16 CM
LEFT INTERNAL DIMENSION IN SYSTOLE: 3.04 CM (ref 2.1–4)
LEFT VENTRICLE DIASTOLIC VOLUME INDEX: 24.91 ML/M2
LEFT VENTRICLE DIASTOLIC VOLUME: 55.9 ML
LEFT VENTRICLE MASS INDEX: 90 G/M2
LEFT VENTRICLE SYSTOLIC VOLUME INDEX: 8.7 ML/M2
LEFT VENTRICLE SYSTOLIC VOLUME: 19.6 ML
LEFT VENTRICULAR INTERNAL DIMENSION IN DIASTOLE: 4.99 CM (ref 3.5–6)
LEFT VENTRICULAR MASS: 201.34 G
LV LATERAL E/E' RATIO: 13.71 M/S
LV SEPTAL E/E' RATIO: 12 M/S
LYMPHOCYTES # BLD AUTO: 1.5 K/UL (ref 1–4.8)
LYMPHOCYTES NFR BLD: 18.5 % (ref 18–48)
MAGNESIUM SERPL-MCNC: 2 MG/DL (ref 1.6–2.6)
MCH RBC QN AUTO: 31.6 PG (ref 27–31)
MCHC RBC AUTO-ENTMCNC: 33.2 G/DL (ref 32–36)
MCV RBC AUTO: 95 FL (ref 82–98)
MONOCYTES # BLD AUTO: 1.1 K/UL (ref 0.3–1)
MONOCYTES NFR BLD: 14 % (ref 4–15)
MV PEAK A VEL: 0.75 M/S
MV PEAK E VEL: 0.96 M/S
NEUTROPHILS # BLD AUTO: 5.2 K/UL (ref 1.8–7.7)
NEUTROPHILS NFR BLD: 65.9 % (ref 38–73)
NRBC BLD-RTO: 0 /100 WBC
PISA TR MAX VEL: 2.37 M/S
PLATELET # BLD AUTO: 208 K/UL (ref 150–350)
PMV BLD AUTO: 11.4 FL (ref 9.2–12.9)
POTASSIUM SERPL-SCNC: 2.8 MMOL/L (ref 3.5–5.1)
POTASSIUM SERPL-SCNC: 3.4 MMOL/L (ref 3.5–5.1)
PROT SERPL-MCNC: 6.5 G/DL (ref 6–8.4)
PULM VEIN S/D RATIO: 0.83
PV PEAK D VEL: 44.59 M/S
PV PEAK S VEL: 37.16 M/S
PV PEAK VELOCITY: 67.05 CM/S
RA PRESSURE: 3 MMHG
RBC # BLD AUTO: 3.83 M/UL (ref 4–5.4)
RIGHT VENTRICULAR END-DIASTOLIC DIMENSION: 394 CM
SODIUM SERPL-SCNC: 138 MMOL/L (ref 136–145)
TDI LATERAL: 0.07 M/S
TDI SEPTAL: 0.08 M/S
TDI: 0.08 M/S
TR MAX PG: 22 MMHG
TROPONIN I SERPL DL<=0.01 NG/ML-MCNC: 0.05 NG/ML (ref 0.02–0.04)
TV REST PULMONARY ARTERY PRESSURE: 25 MMHG
WBC # BLD AUTO: 7.85 K/UL (ref 3.9–12.7)

## 2019-09-19 PROCEDURE — 83735 ASSAY OF MAGNESIUM: CPT

## 2019-09-19 PROCEDURE — 84132 ASSAY OF SERUM POTASSIUM: CPT

## 2019-09-19 PROCEDURE — 82553 CREATINE MB FRACTION: CPT

## 2019-09-19 PROCEDURE — 93306 TTE W/DOPPLER COMPLETE: CPT

## 2019-09-19 PROCEDURE — 36415 COLL VENOUS BLD VENIPUNCTURE: CPT

## 2019-09-19 PROCEDURE — 21400001 HC TELEMETRY ROOM

## 2019-09-19 PROCEDURE — 94640 AIRWAY INHALATION TREATMENT: CPT

## 2019-09-19 PROCEDURE — 94761 N-INVAS EAR/PLS OXIMETRY MLT: CPT

## 2019-09-19 PROCEDURE — 93005 ELECTROCARDIOGRAM TRACING: CPT

## 2019-09-19 PROCEDURE — 25000003 PHARM REV CODE 250: Performed by: INTERNAL MEDICINE

## 2019-09-19 PROCEDURE — 82550 ASSAY OF CK (CPK): CPT

## 2019-09-19 PROCEDURE — 25000003 PHARM REV CODE 250: Performed by: FAMILY MEDICINE

## 2019-09-19 PROCEDURE — 25000003 PHARM REV CODE 250: Performed by: NURSE PRACTITIONER

## 2019-09-19 PROCEDURE — 80053 COMPREHEN METABOLIC PANEL: CPT

## 2019-09-19 PROCEDURE — 84484 ASSAY OF TROPONIN QUANT: CPT

## 2019-09-19 PROCEDURE — 25000242 PHARM REV CODE 250 ALT 637 W/ HCPCS: Performed by: NURSE PRACTITIONER

## 2019-09-19 PROCEDURE — 63600175 PHARM REV CODE 636 W HCPCS: Performed by: NURSE PRACTITIONER

## 2019-09-19 PROCEDURE — 85025 COMPLETE CBC W/AUTO DIFF WBC: CPT

## 2019-09-19 RX ORDER — GUAIFENESIN/DEXTROMETHORPHAN 100-10MG/5
5 SYRUP ORAL EVERY 4 HOURS PRN
Status: DISCONTINUED | OUTPATIENT
Start: 2019-09-19 | End: 2019-09-20 | Stop reason: HOSPADM

## 2019-09-19 RX ORDER — BENZONATATE 100 MG/1
100 CAPSULE ORAL 3 TIMES DAILY PRN
Status: DISCONTINUED | OUTPATIENT
Start: 2019-09-19 | End: 2019-09-20 | Stop reason: HOSPADM

## 2019-09-19 RX ORDER — LEVALBUTEROL INHALATION SOLUTION 0.63 MG/3ML
0.63 SOLUTION RESPIRATORY (INHALATION)
Status: DISCONTINUED | OUTPATIENT
Start: 2019-09-19 | End: 2019-09-20 | Stop reason: HOSPADM

## 2019-09-19 RX ORDER — POTASSIUM CHLORIDE 20 MEQ/15ML
40 SOLUTION ORAL ONCE
Status: COMPLETED | OUTPATIENT
Start: 2019-09-19 | End: 2019-09-19

## 2019-09-19 RX ORDER — HYDROCODONE POLISTIREX AND CHLORPHENIRAMINE POLISTIREX 10; 8 MG/5ML; MG/5ML
5 SUSPENSION, EXTENDED RELEASE ORAL EVERY 12 HOURS PRN
Status: DISCONTINUED | OUTPATIENT
Start: 2019-09-19 | End: 2019-09-20 | Stop reason: HOSPADM

## 2019-09-19 RX ADMIN — FAMOTIDINE 20 MG: 20 TABLET ORAL at 10:09

## 2019-09-19 RX ADMIN — ACETAMINOPHEN 650 MG: 325 TABLET ORAL at 11:09

## 2019-09-19 RX ADMIN — POTASSIUM CHLORIDE 20 MEQ: 20 TABLET, EXTENDED RELEASE ORAL at 09:09

## 2019-09-19 RX ADMIN — MAGNESIUM OXIDE 400 MG: 400 TABLET ORAL at 10:09

## 2019-09-19 RX ADMIN — ACETAMINOPHEN 650 MG: 325 TABLET ORAL at 04:09

## 2019-09-19 RX ADMIN — GUAIFENESIN AND DEXTROMETHORPHAN 5 ML: 100; 10 SYRUP ORAL at 06:09

## 2019-09-19 RX ADMIN — HYDROCHLOROTHIAZIDE 12.5 MG: 12.5 TABLET ORAL at 10:09

## 2019-09-19 RX ADMIN — LEVOTHYROXINE SODIUM 100 MCG: 100 TABLET ORAL at 05:09

## 2019-09-19 RX ADMIN — GABAPENTIN 800 MG: 300 CAPSULE ORAL at 09:09

## 2019-09-19 RX ADMIN — SODIUM CHLORIDE AND POTASSIUM CHLORIDE: 9; 1.49 INJECTION, SOLUTION INTRAVENOUS at 11:09

## 2019-09-19 RX ADMIN — ENOXAPARIN SODIUM 40 MG: 100 INJECTION SUBCUTANEOUS at 05:09

## 2019-09-19 RX ADMIN — SODIUM CHLORIDE AND POTASSIUM CHLORIDE: 9; 1.49 INJECTION, SOLUTION INTRAVENOUS at 06:09

## 2019-09-19 RX ADMIN — ACETAMINOPHEN 650 MG: 325 TABLET ORAL at 03:09

## 2019-09-19 RX ADMIN — CEFTRIAXONE 2 G: 2 INJECTION, SOLUTION INTRAVENOUS at 09:09

## 2019-09-19 RX ADMIN — LORAZEPAM 1 MG: 1 TABLET ORAL at 09:09

## 2019-09-19 RX ADMIN — GUAIFENESIN AND DEXTROMETHORPHAN 5 ML: 100; 10 SYRUP ORAL at 01:09

## 2019-09-19 RX ADMIN — LOSARTAN POTASSIUM 100 MG: 50 TABLET, FILM COATED ORAL at 10:09

## 2019-09-19 RX ADMIN — POTASSIUM CHLORIDE 40 MEQ: 20 SOLUTION ORAL at 06:09

## 2019-09-19 RX ADMIN — FAMOTIDINE 20 MG: 20 TABLET ORAL at 09:09

## 2019-09-19 RX ADMIN — METOPROLOL SUCCINATE 50 MG: 25 TABLET, FILM COATED, EXTENDED RELEASE ORAL at 09:09

## 2019-09-19 RX ADMIN — LEVALBUTEROL HYDROCHLORIDE 0.63 MG: 0.63 SOLUTION RESPIRATORY (INHALATION) at 01:09

## 2019-09-19 RX ADMIN — ESTRADIOL 1 MG: 1 TABLET ORAL at 10:09

## 2019-09-19 RX ADMIN — GABAPENTIN 800 MG: 300 CAPSULE ORAL at 11:09

## 2019-09-19 RX ADMIN — SODIUM CHLORIDE AND POTASSIUM CHLORIDE: 9; 1.49 INJECTION, SOLUTION INTRAVENOUS at 01:09

## 2019-09-19 RX ADMIN — MAGNESIUM OXIDE 400 MG: 400 TABLET ORAL at 09:09

## 2019-09-19 RX ADMIN — LEVALBUTEROL HYDROCHLORIDE 0.63 MG: 0.63 SOLUTION RESPIRATORY (INHALATION) at 08:09

## 2019-09-19 NOTE — ASSESSMENT & PLAN NOTE
pt with H/O E.Coli bacteriamia  On Rocephin (2 grams) IV  24 hrs  Blood and urine culture sent in  ED, blood cultures negative urine culture showing gram-negative stacey lactose  greater than 100,000 CFU mL, identification and susceptibility pending  Patient again had + fever spike of 101.9 today, ordered repeat blood cultures   Urinary symptoms improving

## 2019-09-19 NOTE — PROGRESS NOTES
[facility] Medicine  Progress Note    Patient Name: Marisela Eagle  MRN: [id number]  Patient Class: IP- Inpatient   Admission Date: 9/17/2019  Length of Stay: 0 days  Attending Physician: Jackson Gupta MD  Primary Care Provider: Pawel Baidllo III, MD        Subjective:     Principal Problem:Pyelonephritis        HPI:  This is a 74-year-old female who presents with body aches fever and chills up to 102.  Last week the patient started having urinary frequency urgency and suprapubic pain.  She started taking a friend's amoxicillin--1 amoxicillin daily.  She thought that her symptoms improved.  She was also taking azo which also helped her symptoms. Today however and yesterday she started feeling worse with body aches and chills with continued urinary frequency urgency and dysuria.  She denies flank pain.  She has had nausea without vomiting. She denies diarrhea.  She denies headache neck pain or stiffness. She denies chest pain shortness of breath coughing or upper respiratory symptoms. She denies abdominal pain.  She denies gastrointestinal bleeding.  She reports fatigue nonfocal generalized weakness. She denies syncope or near syncope.  Symptoms have been moderate in nature.  There are no exacerbating or alleviating factors.  She denies any other problems or complaints.    Overview/Hospital Course:  No notes on file    Interval History: Pt seen and examined, sitting in bed, reports feeling much better today, Reports 1 spike of 100.6 fever which resolved with tylenol. Reports her nausea has resolved and has been tolerating food as well. Inquired when can she go home  Discussed her elevated troponin level and eval by card.  Pt denies CP, SOB, Abdominal pain     Review of Systems   Constitutional: Positive for fatigue and fever (one spike of 100.6 today).   HENT: Negative for congestion and ear discharge.    Eyes: Negative.    Respiratory: Negative for shortness of breath.    Cardiovascular:  Negative for chest pain and leg swelling.   Gastrointestinal: Negative for diarrhea, nausea and vomiting.   Endocrine: Negative.    Genitourinary: Positive for difficulty urinating (now improving) and urgency (improving ). Negative for flank pain.   Musculoskeletal: Positive for arthralgias (chronic).   Neurological: Negative.    Psychiatric/Behavioral: Negative.      Objective:     Vital Signs (Most Recent):  Temp: (!) 100.6 °F (38.1 °C) (09/18/19 1901)  Pulse: 89 (09/18/19 1901)  Resp: 18 (09/18/19 1901)  BP: (!) 156/67 (09/18/19 1901)  SpO2: 96 % (09/18/19 1901) Vital Signs (24h Range):  Temp:  [98.5 °F (36.9 °C)-100.6 °F (38.1 °C)] 100.6 °F (38.1 °C)  Pulse:  [] 89  Resp:  [16-24] 18  SpO2:  [90 %-98 %] 96 %  BP: (108-156)/(55-80) 156/67     Weight: 109.8 kg (242 lb 1 oz)  Body mass index is 35.75 kg/m².    Intake/Output Summary (Last 24 hours) at 9/18/2019 2139  Last data filed at 9/18/2019 1800  Gross per 24 hour   Intake 4343 ml   Output --   Net 4343 ml      Physical Exam   Constitutional: She is oriented to person, place, and time. She appears well-developed and well-nourished. No distress.   HENT:   Head: Normocephalic and atraumatic.   Eyes: Pupils are equal, round, and reactive to light. EOM are normal. No scleral icterus.   Neck: Normal range of motion. Neck supple.   Cardiovascular: Normal rate, regular rhythm, normal heart sounds and intact distal pulses.   No murmur heard.  Pulmonary/Chest: Effort normal and breath sounds normal.   Abdominal: Soft. Bowel sounds are normal.   No CVA tenderness   Musculoskeletal: Normal range of motion. She exhibits no edema.   Neurological: She is alert and oriented to person, place, and time.   Skin: Skin is warm and dry. No rash noted. She is not diaphoretic.   Psychiatric: She has a normal mood and affect. Her behavior is normal. Judgment and thought content normal.   Nursing note and vitals reviewed.      Significant Labs:   Blood Culture:   Recent Labs    Lab 09/17/19 2050 09/17/19  2134   LABBLOO No Growth to date No Growth to date     CBC:   Recent Labs   Lab 09/17/19 2105 09/18/19  0515   WBC 10.86 11.29   HGB 13.2 13.7   HCT 37.9 40.4    222     CMP:   Recent Labs   Lab 09/17/19 2105 09/18/19  0515   * 140   K 2.6* 3.1*   CL 93* 97   CO2 30* 31*   * 133*   BUN 12 8   CREATININE 0.8 0.7   CALCIUM 8.3* 8.4*   PROT 6.8 7.4   ALBUMIN 3.8 4.0   BILITOT 1.1* 1.0   ALKPHOS 46* 50*   AST 21 23   ALT 27 26   ANIONGAP 9 12   EGFRNONAA >60.0 >60.0     Troponin:   Recent Labs   Lab 09/17/19 2105 09/18/19  0515 09/18/19  0909   TROPONINI 0.142* 0.084* 0.085*     Urine Culture:   Recent Labs   Lab 09/17/19 2105   LABURIN No growth to date       Significant Imaging: I have reviewed all pertinent imaging results/findings within the past 24 hours.      Assessment/Plan:      * Pyelonephritis/ UTI  pt with H/O E.Coli bacteriamia  On Rocephin (2 grams) IV  24 hrs  Blood and urine culture sent in  ED, so far negative   + fever of 100.6  Urinary symptoms improving       Hypokalemia  correction given in ED  will recheck in am   Started electrolyte replacement by sliding scale       Elevated troponin I level  Pt chest pain free  troponins trended and Cards consulted , appreciate the input  2 D echo complete ordered, report pending       Hyperlipidemia  Chronic medical condition   Continue statin   LFT Stable      Hypothyroid  Chronic medical condition   Continues current Synthroid dosing      Hypertension  Chronic medical condition   continue current medication        Patient Active Problem List   Diagnosis    Hypertension    Hypothyroid    Neuropathy    De Quervain's tenosynovitis    S/P R uni knee replacement-4/4/12    Degenerative arthritis of knee    Medial meniscus tear    Plica syndrome    Derangement of posterior horn of medial meniscus    Asymptomatic varicose veins    GERD (gastroesophageal reflux disease)    Hyperthyroidism    S/P  arthroscopy of knee    Status post arthroscopy of left knee    Cystocele    Hyperlipidemia    Hypercholesterolemia    Dysmetabolic syndrome    Obesity (BMI 30-39.9)    Hypovitaminosis D    Thyroiditis    Goiter    Abnormal thyroid blood test    Thyroid disease    Postmenopausal    Nodular thyroid disease    Sinusitis    Otitis media    Dysuria    Pyelonephritis/ UTI    Elevated troponin I level    Hypokalemia       VTE Risk Mitigation (From admission, onward)        Ordered     enoxaparin injection 40 mg  Daily      09/18/19 0321     IP VTE HIGH RISK PATIENT  Once      09/18/19 0321                Jackson Gupta MD  Department of Hospital Medicine   [facility]

## 2019-09-19 NOTE — ASSESSMENT & PLAN NOTE
pt with H/O E.Coli bacteriamia  On Rocephin (2 grams) IV  24 hrs  Blood and urine culture sent in  ED, so far negative   + fever of 100.6  Urinary symptoms improving

## 2019-09-19 NOTE — ASSESSMENT & PLAN NOTE
Pt chest pain free  troponins trended and Cards consulted , appreciate the input  2 D echo complete ordered, report pending

## 2019-09-19 NOTE — PLAN OF CARE
09/19/19 3991   Patient Assessment/Suction   Level of Consciousness (AVPU) alert   Respiratory Effort Unlabored;Normal   Expansion/Accessory Muscles/Retractions no use of accessory muscles;no retractions   All Lung Fields Breath Sounds clear   Rhythm/Pattern, Respiratory unlabored   Cough Frequency infrequent   Cough Type good   PRE-TX-O2   O2 Device (Oxygen Therapy) room air   SpO2 97 %   Pulse Oximetry Type Intermittent   $ Pulse Oximetry - Multiple Charge Pulse Oximetry - Multiple   Pulse 78   Resp 18   Aerosol Therapy   $ Aerosol Therapy Charges Aerosol Treatment   Daily Review of Necessity (SVN) completed   Respiratory Treatment Status (SVN) given   Treatment Route (SVN) mask   Patient Position (SVN) semi-Pickett's   Post Treatment Assessment (SVN) increased aeration   Signs of Intolerance (SVN) none   Breath Sounds Post-Respiratory Treatment   Throughout All Fields Post-Treatment All Fields   Throughout All Fields Post-Treatment aeration increased   Post-treatment Heart Rate (beats/min) 78   Post-treatment Resp Rate (breaths/min) 17

## 2019-09-19 NOTE — PROGRESS NOTES
Replaced by [facility]  Progress Note    Patient Name: Marisela Eagle  MRN: [id number]  Patient Class: IP- Inpatient   Admission Date: 9/17/2019  Length of Stay: 1 days  Attending Physician: Jackson Gupta MD  Primary Care Provider: Pawel Badillo III, MD        Subjective:     Principal Problem:Pyelonephritis        HPI:  This is a 74-year-old female who presents with body aches fever and chills up to 102.  Last week the patient started having urinary frequency urgency and suprapubic pain.  She started taking a friend's amoxicillin--1 amoxicillin daily.  She thought that her symptoms improved.  She was also taking azo which also helped her symptoms. Today however and yesterday she started feeling worse with body aches and chills with continued urinary frequency urgency and dysuria.  She denies flank pain.  She has had nausea without vomiting. She denies diarrhea.  She denies headache neck pain or stiffness. She denies chest pain shortness of breath coughing or upper respiratory symptoms. She denies abdominal pain.  She denies gastrointestinal bleeding.  She reports fatigue nonfocal generalized weakness. She denies syncope or near syncope.  Symptoms have been moderate in nature.  There are no exacerbating or alleviating factors.  She denies any other problems or complaints.    Overview/Hospital Course:  No notes on file    Interval History: Pt seen and examined, sitting in bed, reports she is okay, much bothered by the dry cough she has.  Patient recently had another fever spike of 101.9 at around 3:00 p.m. did discussed with the patient that urine culture is showing infection but susceptibility and identification will be reported tomorrow.    Discussed cardiac evaluation and that echo is pending reporting.  Did discuss that Cardiology to think that her troponin bump is due to her infection causing demand ischemia as there was no EKG changes and most probable patient will have to follow up  with Dr. Carbajal as outpatient after hospital discharge. Patient verbalized understanding  Pt denies CP, SOB, Abdominal pain, reports she was wheezing earlier and got the breathing treatment which helped.  Patient did mention that she does get short of breath upon exertion.  Did discuss weight loss    Review of Systems   Constitutional: Positive for fatigue and fever (one spike of 101.9).   HENT: Negative for congestion and ear discharge.    Eyes: Negative.    Respiratory: Negative for shortness of breath.    Cardiovascular: Negative for chest pain and leg swelling.   Gastrointestinal: Negative for diarrhea, nausea and vomiting.   Endocrine: Negative.    Genitourinary: Positive for difficulty urinating (now improving) and urgency (improving ). Negative for flank pain.   Musculoskeletal: Positive for arthralgias (chronic).   Neurological: Negative.    Psychiatric/Behavioral: Negative.      Objective:     Vital Signs (Most Recent):  Temp: (!) 101.9 °F (38.8 °C) (09/19/19 1515)  Pulse: 88 (09/19/19 1515)  Resp: 19 (09/19/19 1515)  BP: (!) 158/72 (09/19/19 1515)  SpO2: 95 % (09/19/19 1515) Vital Signs (24h Range):  Temp:  [99.1 °F (37.3 °C)-101.9 °F (38.8 °C)] 101.9 °F (38.8 °C)  Pulse:  [73-89] 88  Resp:  [18-20] 19  SpO2:  [91 %-97 %] 95 %  BP: (128-158)/(60-73) 158/72     Weight: 110.5 kg (243 lb 9.7 oz)  Body mass index is 35.97 kg/m².    Intake/Output Summary (Last 24 hours) at 9/19/2019 1648  Last data filed at 9/19/2019 0800  Gross per 24 hour   Intake 1430 ml   Output 800 ml   Net 630 ml      Physical Exam   Constitutional: She is oriented to person, place, and time. She appears well-developed and well-nourished. No distress.   HENT:   Head: Normocephalic and atraumatic.   Eyes: Pupils are equal, round, and reactive to light. EOM are normal. No scleral icterus.   Neck: Normal range of motion. Neck supple.   Cardiovascular: Normal rate, regular rhythm, normal heart sounds and intact distal pulses.   No murmur  heard.  Pulmonary/Chest: Effort normal and breath sounds normal.   Abdominal: Soft. Bowel sounds are normal.   No CVA tenderness   Musculoskeletal: Normal range of motion. She exhibits no edema.   Neurological: She is alert and oriented to person, place, and time.   Skin: Skin is warm and dry. No rash noted. She is not diaphoretic.   Psychiatric: She has a normal mood and affect. Her behavior is normal. Judgment and thought content normal.   Nursing note and vitals reviewed.      Significant Labs:   Blood Culture:   Recent Labs   Lab 09/17/19 2050 09/17/19 2134   LABBLOO No Growth to date  No Growth to date No Growth to date  No Growth to date     CBC:   Recent Labs   Lab 09/17/19 2105 09/18/19 0515 09/19/19 0442   WBC 10.86 11.29 7.85   HGB 13.2 13.7 12.1   HCT 37.9 40.4 36.4*    222 208     CMP:   Recent Labs   Lab 09/17/19 2105 09/18/19 0515 09/19/19 0441 09/19/19  1427   * 140 138  --    K 2.6* 3.1* 2.8* 3.4*   CL 93* 97 98  --    CO2 30* 31* 31*  --    * 133* 114*  --    BUN 12 8 7*  --    CREATININE 0.8 0.7 0.6  --    CALCIUM 8.3* 8.4* 8.3*  --    PROT 6.8 7.4 6.5  --    ALBUMIN 3.8 4.0 3.2*  --    BILITOT 1.1* 1.0 0.9  --    ALKPHOS 46* 50* 45*  --    AST 21 23 21  --    ALT 27 26 24  --    ANIONGAP 9 12 9  --    EGFRNONAA >60.0 >60.0 >60.0  --      Troponin:   Recent Labs   Lab 09/18/19 0515 09/18/19  0909 09/19/19 0441   TROPONINI 0.084* 0.085* 0.053*     Urine Culture:   Recent Labs   Lab 09/17/19 2105   LABURIN GRAM NEGATIVE NORBERTO, LACTOSE   >100,000 cfu/ml  Identification and susceptibility pending  *       Significant Imaging: I have reviewed all pertinent imaging results/findings within the past 24 hours.      Assessment/Plan:      * Pyelonephritis/ UTI  pt with H/O E.Coli bacteriamia  On Rocephin (2 grams) IV  24 hrs  Blood and urine culture sent in  ED, blood cultures negative urine culture showing gram-negative norberto lactose  greater than 100,000 CFU  mL, identification and susceptibility pending  Patient again had + fever spike of 101.9 today, ordered repeat blood cultures   Urinary symptoms improving       Dry cough  tussionex 5 mL Q 12 hourly      Hypokalemia  correction given in ED  Started electrolyte replacement by sliding scale       Elevated troponin I level  Pt chest pain free  troponins trended and Cards consulted , appreciate the input  2 D echo complete ordered, report pending         Hyperlipidemia  Chronic medical condition   Continue statin   LFT Stable      Hypothyroid  Chronic medical condition   Continues current Synthroid dosing      Hypertension  Chronic medical condition   Slightly elevated today  continue current medication        Patient Active Problem List   Diagnosis    Hypertension    Hypothyroid    Neuropathy    De Quervain's tenosynovitis    S/P R uni knee replacement-4/4/12    Degenerative arthritis of knee    Medial meniscus tear    Plica syndrome    Derangement of posterior horn of medial meniscus    Asymptomatic varicose veins    GERD (gastroesophageal reflux disease)    Hyperthyroidism    S/P arthroscopy of knee    Status post arthroscopy of left knee    Cystocele    Hyperlipidemia    Hypercholesterolemia    Dysmetabolic syndrome    Obesity (BMI 30-39.9)    Hypovitaminosis D    Thyroiditis    Goiter    Abnormal thyroid blood test    Thyroid disease    Postmenopausal    Nodular thyroid disease    Sinusitis    Otitis media    Dysuria    Pyelonephritis/ UTI    Elevated troponin I level    Hypokalemia    Dry cough     VTE Risk Mitigation (From admission, onward)        Ordered     enoxaparin injection 40 mg  Daily      09/18/19 0321     IP VTE HIGH RISK PATIENT  Once      09/18/19 0321                Jackson Gupta MD  Department of Hospital Medicine   [facility]

## 2019-09-19 NOTE — PROGRESS NOTES
Cardiac Rehab     Marisela Eagle   [id number]   9/19/2019         Cardiac Rehab Phase Taught: Phase 1    Teaching Method: Verbal    Handouts: None    Educational Videos: None    Understanding:  Verbalize understanding    Comments: late entry from 09/182019. Oral temp 101.5, informed bedside RN            Ignacia Mcdaniels RN

## 2019-09-19 NOTE — SUBJECTIVE & OBJECTIVE
Interval History: Pt seen and examined, sitting in bed, reports she is okay, much bothered by the dry cough she has.  Patient recently had another fever spike of 101.9 at around 3:00 p.m. did discussed with the patient that urine culture is showing infection but susceptibility and identification will be reported tomorrow.    Discussed cardiac evaluation and that echo is pending reporting.  Did discuss that Cardiology to think that her troponin bump is due to her infection causing demand ischemia as there was no EKG changes and most probable patient will have to follow up with Dr. Carbajal as outpatient after hospital discharge. Patient verbalized understanding  Pt denies CP, SOB, Abdominal pain, reports she was wheezing earlier and got the breathing treatment which helped.  Patient did mention that she does get short of breath upon exertion.  Did discuss weight loss    Review of Systems   Constitutional: Positive for fatigue and fever (one spike of 101.9).   HENT: Negative for congestion and ear discharge.    Eyes: Negative.    Respiratory: Negative for shortness of breath.    Cardiovascular: Negative for chest pain and leg swelling.   Gastrointestinal: Negative for diarrhea, nausea and vomiting.   Endocrine: Negative.    Genitourinary: Positive for difficulty urinating (now improving) and urgency (improving ). Negative for flank pain.   Musculoskeletal: Positive for arthralgias (chronic).   Neurological: Negative.    Psychiatric/Behavioral: Negative.      Objective:     Vital Signs (Most Recent):  Temp: (!) 101.9 °F (38.8 °C) (09/19/19 1515)  Pulse: 88 (09/19/19 1515)  Resp: 19 (09/19/19 1515)  BP: (!) 158/72 (09/19/19 1515)  SpO2: 95 % (09/19/19 1515) Vital Signs (24h Range):  Temp:  [99.1 °F (37.3 °C)-101.9 °F (38.8 °C)] 101.9 °F (38.8 °C)  Pulse:  [73-89] 88  Resp:  [18-20] 19  SpO2:  [91 %-97 %] 95 %  BP: (128-158)/(60-73) 158/72     Weight: 110.5 kg (243 lb 9.7 oz)  Body mass index is 35.97  kg/m².    Intake/Output Summary (Last 24 hours) at 9/19/2019 1648  Last data filed at 9/19/2019 0800  Gross per 24 hour   Intake 1430 ml   Output 800 ml   Net 630 ml      Physical Exam   Constitutional: She is oriented to person, place, and time. She appears well-developed and well-nourished. No distress.   HENT:   Head: Normocephalic and atraumatic.   Eyes: Pupils are equal, round, and reactive to light. EOM are normal. No scleral icterus.   Neck: Normal range of motion. Neck supple.   Cardiovascular: Normal rate, regular rhythm, normal heart sounds and intact distal pulses.   No murmur heard.  Pulmonary/Chest: Effort normal and breath sounds normal.   Abdominal: Soft. Bowel sounds are normal.   No CVA tenderness   Musculoskeletal: Normal range of motion. She exhibits no edema.   Neurological: She is alert and oriented to person, place, and time.   Skin: Skin is warm and dry. No rash noted. She is not diaphoretic.   Psychiatric: She has a normal mood and affect. Her behavior is normal. Judgment and thought content normal.   Nursing note and vitals reviewed.      Significant Labs:   Blood Culture:   Recent Labs   Lab 09/17/19 2050 09/17/19  2134   LABBLOO No Growth to date  No Growth to date No Growth to date  No Growth to date     CBC:   Recent Labs   Lab 09/17/19 2105 09/18/19  0515 09/19/19  0442   WBC 10.86 11.29 7.85   HGB 13.2 13.7 12.1   HCT 37.9 40.4 36.4*    222 208     CMP:   Recent Labs   Lab 09/17/19 2105 09/18/19  0515 09/19/19  0441 09/19/19  1427   * 140 138  --    K 2.6* 3.1* 2.8* 3.4*   CL 93* 97 98  --    CO2 30* 31* 31*  --    * 133* 114*  --    BUN 12 8 7*  --    CREATININE 0.8 0.7 0.6  --    CALCIUM 8.3* 8.4* 8.3*  --    PROT 6.8 7.4 6.5  --    ALBUMIN 3.8 4.0 3.2*  --    BILITOT 1.1* 1.0 0.9  --    ALKPHOS 46* 50* 45*  --    AST 21 23 21  --    ALT 27 26 24  --    ANIONGAP 9 12 9  --    EGFRNONAA >60.0 >60.0 >60.0  --      Troponin:   Recent Labs   Lab 09/18/19  0515  09/18/19  0909 09/19/19  0441   TROPONINI 0.084* 0.085* 0.053*     Urine Culture:   Recent Labs   Lab 09/17/19  2105   LABURIN GRAM NEGATIVE NORBERTO, LACTOSE   >100,000 cfu/ml  Identification and susceptibility pending  *       Significant Imaging: I have reviewed all pertinent imaging results/findings within the past 24 hours.

## 2019-09-19 NOTE — SUBJECTIVE & OBJECTIVE
Interval History: Pt seen and examined, sitting in bed, reports feeling much better today, Reports 1 spike of 100.6 fever which resolved with tylenol. Reports her nausea has resolved and has been tolerating food as well. Inquired when can she go home  Discussed her elevated troponin level and eval by card.  Pt denies CP, SOB, Abdominal pain     Review of Systems   Constitutional: Positive for fatigue and fever (one spike of 100.6 today).   HENT: Negative for congestion and ear discharge.    Eyes: Negative.    Respiratory: Negative for shortness of breath.    Cardiovascular: Negative for chest pain and leg swelling.   Gastrointestinal: Negative for diarrhea, nausea and vomiting.   Endocrine: Negative.    Genitourinary: Positive for difficulty urinating (now improving) and urgency (improving ). Negative for flank pain.   Musculoskeletal: Positive for arthralgias (chronic).   Neurological: Negative.    Psychiatric/Behavioral: Negative.      Objective:     Vital Signs (Most Recent):  Temp: (!) 100.6 °F (38.1 °C) (09/18/19 1901)  Pulse: 89 (09/18/19 1901)  Resp: 18 (09/18/19 1901)  BP: (!) 156/67 (09/18/19 1901)  SpO2: 96 % (09/18/19 1901) Vital Signs (24h Range):  Temp:  [98.5 °F (36.9 °C)-100.6 °F (38.1 °C)] 100.6 °F (38.1 °C)  Pulse:  [] 89  Resp:  [16-24] 18  SpO2:  [90 %-98 %] 96 %  BP: (108-156)/(55-80) 156/67     Weight: 109.8 kg (242 lb 1 oz)  Body mass index is 35.75 kg/m².    Intake/Output Summary (Last 24 hours) at 9/18/2019 2139  Last data filed at 9/18/2019 1800  Gross per 24 hour   Intake 4343 ml   Output --   Net 4343 ml      Physical Exam   Constitutional: She is oriented to person, place, and time. She appears well-developed and well-nourished. No distress.   HENT:   Head: Normocephalic and atraumatic.   Eyes: Pupils are equal, round, and reactive to light. EOM are normal. No scleral icterus.   Neck: Normal range of motion. Neck supple.   Cardiovascular: Normal rate, regular rhythm, normal heart  sounds and intact distal pulses.   No murmur heard.  Pulmonary/Chest: Effort normal and breath sounds normal.   Abdominal: Soft. Bowel sounds are normal.   No CVA tenderness   Musculoskeletal: Normal range of motion. She exhibits no edema.   Neurological: She is alert and oriented to person, place, and time.   Skin: Skin is warm and dry. No rash noted. She is not diaphoretic.   Psychiatric: She has a normal mood and affect. Her behavior is normal. Judgment and thought content normal.   Nursing note and vitals reviewed.      Significant Labs:   Blood Culture:   Recent Labs   Lab 09/17/19 2050 09/17/19 2134   LABBLOO No Growth to date No Growth to date     CBC:   Recent Labs   Lab 09/17/19 2105 09/18/19  0515   WBC 10.86 11.29   HGB 13.2 13.7   HCT 37.9 40.4    222     CMP:   Recent Labs   Lab 09/17/19 2105 09/18/19  0515   * 140   K 2.6* 3.1*   CL 93* 97   CO2 30* 31*   * 133*   BUN 12 8   CREATININE 0.8 0.7   CALCIUM 8.3* 8.4*   PROT 6.8 7.4   ALBUMIN 3.8 4.0   BILITOT 1.1* 1.0   ALKPHOS 46* 50*   AST 21 23   ALT 27 26   ANIONGAP 9 12   EGFRNONAA >60.0 >60.0     Troponin:   Recent Labs   Lab 09/17/19 2105 09/18/19  0515 09/18/19  0909   TROPONINI 0.142* 0.084* 0.085*     Urine Culture:   Recent Labs   Lab 09/17/19 2105   LABURIN No growth to date       Significant Imaging: I have reviewed all pertinent imaging results/findings within the past 24 hours.

## 2019-09-19 NOTE — PROGRESS NOTES
[facility]  Cardiology  Progress Note    Patient Name: Marisela Eagle  MRN: [id number]  Admission Date: 9/17/2019  Hospital Length of Stay: 1 days  Code Status: Full Code   Attending Physician: Jackson Gupta MD   Primary Care Physician: Pawel Badillo III, MD  Expected Discharge Date:   Principal Problem:Pyelonephritis    Subjective:       Interval History:    Patient denies chest pain. She does complain of cough which is nonproductive. She is wheezing. Continues to run low grade temps.    ROS     No significant headaches or sore throat or runny nose.   No recent changes in vision.   No recent changes in hearing.  No dysphagia or odynophagia.  Reports shortness of breath at baseline.   Denies any hemoptysis.   Denies any abdominal pain, nausea, vomiting, diarrhea or constipation.   Denies any dysuria or polyuria.   Denies any fevers or chills.   Denies any recent significant weight changes.   Denies bleeding diathesis    Objective:     Vital Signs (Most Recent):  Temp: 99.9 °F (37.7 °C) (09/19/19 1215)  Pulse: 82 (09/19/19 1143)  Resp: 18 (09/19/19 1143)  BP: (!) 146/73 (09/19/19 1143)  SpO2: 96 % (09/19/19 1143) Vital Signs (24h Range):  Temp:  [99.1 °F (37.3 °C)-100.6 °F (38.1 °C)] 99.9 °F (37.7 °C)  Pulse:  [73-89] 82  Resp:  [18-20] 18  SpO2:  [91 %-96 %] 96 %  BP: (108-156)/(55-73) 146/73     Weight: 110.5 kg (243 lb 9.7 oz)  Body mass index is 35.97 kg/m².    SpO2: 96 %  O2 Device (Oxygen Therapy): room air      Intake/Output Summary (Last 24 hours) at 9/19/2019 1227  Last data filed at 9/19/2019 0800  Gross per 24 hour   Intake 1670 ml   Output 800 ml   Net 870 ml       Lines/Drains/Airways     Airway                 Oral Airway Guerrero 90 2610 days          Peripheral Intravenous Line                 Peripheral IV - Single Lumen 09/19/19 0600 20 G Anterior;Left Forearm less than 1 day                Scheduled Meds:   cefTRIAXone (ROCEPHIN) IVPB  2 g Intravenous Q24H    enoxparin  40 mg  Subcutaneous Daily    estradiol  1 mg Oral Daily    famotidine  20 mg Oral BID    gabapentin  800 mg Oral BID    losartan  100 mg Oral Daily    And    hydroCHLOROthiazide  12.5 mg Oral Daily    levalbuterol  0.63 mg Nebulization TID WAKE    levothyroxine  100 mcg Oral Daily    magnesium oxide  400 mg Oral BID    metoprolol succinate  50 mg Oral Nightly     Continuous Infusions:   0/9% NACL & POTASSIUM CHLORIDE 20 MEQ/L 75 mL/hr at 09/19/19 0610     PRN Meds:.acetaminophen, benzonatate, bisacodyl, dextromethorphan-guaifenesin  mg/5 ml, ibuprofen, influenza, LORazepam, magnesium oxide, magnesium oxide, magnesium oxide, ondansetron, potassium chloride, potassium chloride, potassium chloride, potassium, sodium phosphates, potassium, sodium phosphates, sodium chloride 0.9%     Physical Exam     HEENT: Normocephalic, atraumatic, PERRL, Conjunctiva pink, no scleral icterus.   CVS: S1S2+, RRR, no murmurs, rubs or gallops, JVP: Normal.  LUNGS: Expiratory wheezes in the bases.   ABDOMEN: Soft, NT, BS+  EXTREMITIES: No cyanosis, clubbing or edema  NEURO: AAO X 3.         Significant Labs:   BMP:   Recent Labs   Lab 09/17/19 2105 09/18/19  0515 09/19/19  0441   * 133* 114*   * 140 138   K 2.6* 3.1* 2.8*   CL 93* 97 98   CO2 30* 31* 31*   BUN 12 8 7*   CREATININE 0.8 0.7 0.6   CALCIUM 8.3* 8.4* 8.3*   MG 1.8  --   --    , CMP   Recent Labs   Lab 09/17/19 2105 09/18/19  0515 09/19/19  0441   * 140 138   K 2.6* 3.1* 2.8*   CL 93* 97 98   CO2 30* 31* 31*   * 133* 114*   BUN 12 8 7*   CREATININE 0.8 0.7 0.6   CALCIUM 8.3* 8.4* 8.3*   PROT 6.8 7.4 6.5   ALBUMIN 3.8 4.0 3.2*   BILITOT 1.1* 1.0 0.9   ALKPHOS 46* 50* 45*   AST 21 23 21   ALT 27 26 24   ANIONGAP 9 12 9   ESTGFRAFRICA >60.0 >60.0 >60.0   EGFRNONAA >60.0 >60.0 >60.0   , CBC   Recent Labs   Lab 09/17/19  2105 09/18/19  0515 09/19/19  0442   WBC 10.86 11.29 7.85   HGB 13.2 13.7 12.1   HCT 37.9 40.4 36.4*    222 208   , INR    Recent Labs   Lab 09/17/19  2105   INR 1.1   , Lipid Panel No results for input(s): CHOL, HDL, LDLCALC, TRIG, CHOLHDL in the last 48 hours.,   Pathology Results  (Last 10 years)    None      , Troponin   Recent Labs   Lab 09/18/19  0515 09/18/19  0909 09/19/19  0441   TROPONINI 0.084* 0.085* 0.053*    and All pertinent lab results from the last 24 hours have been reviewed.    Significant Imaging: X-Ray: CXR: X-Ray Chest 1 View (CXR): No results found for this visit on 09/17/19.  I have reviewed the imaging all significant imaging for the last 24 hours.    Assessment and Plan:     IMPRESSION:       UTI. Febrile. Cultures pending.  Elevated troponin. Chest pain free. Likely demand from sepsis. Troponin trended down.   Cough. Non productive.   Shortness of breath on exertion. Chronic.   Hypokalemia.   Hypertension  Hyperlipidemia   Peripheral neuropathy  Hypothyroidism    PLAN:      1. Repeat potassium, magnesium today at 1400.  2. CXR in AM.  3. Tessalon pearls as needed for cough.     Sabrina Mccauley, ASHLIE  Cardiology  [facility]    I have personally seen and examined the patient. I reviewed the notes, assessments, and/or procedures performed by Ms Sabrina Mccauley, I concur with her documentation of Marisela Eagle.

## 2019-09-20 VITALS
HEART RATE: 76 BPM | BODY MASS INDEX: 36.44 KG/M2 | OXYGEN SATURATION: 100 % | SYSTOLIC BLOOD PRESSURE: 143 MMHG | WEIGHT: 246.06 LBS | TEMPERATURE: 98 F | DIASTOLIC BLOOD PRESSURE: 67 MMHG | RESPIRATION RATE: 16 BRPM | HEIGHT: 69 IN

## 2019-09-20 PROBLEM — R79.89 ELEVATED TROPONIN I LEVEL: Status: RESOLVED | Noted: 2019-09-18 | Resolved: 2019-09-20

## 2019-09-20 LAB
ALBUMIN SERPL BCP-MCNC: 3.2 G/DL (ref 3.5–5.2)
ALP SERPL-CCNC: 42 U/L (ref 55–135)
ALT SERPL W/O P-5'-P-CCNC: 30 U/L (ref 10–44)
ANION GAP SERPL CALC-SCNC: 9 MMOL/L (ref 8–16)
AST SERPL-CCNC: 21 U/L (ref 10–40)
BACTERIA UR CULT: ABNORMAL
BASOPHILS # BLD AUTO: 0.03 K/UL (ref 0–0.2)
BASOPHILS NFR BLD: 0.5 % (ref 0–1.9)
BILIRUB SERPL-MCNC: 0.6 MG/DL (ref 0.1–1)
BUN SERPL-MCNC: 9 MG/DL (ref 8–23)
CALCIUM SERPL-MCNC: 8.1 MG/DL (ref 8.7–10.5)
CHLORIDE SERPL-SCNC: 99 MMOL/L (ref 95–110)
CO2 SERPL-SCNC: 29 MMOL/L (ref 23–29)
CREAT SERPL-MCNC: 0.6 MG/DL (ref 0.5–1.4)
DIFFERENTIAL METHOD: ABNORMAL
EOSINOPHIL # BLD AUTO: 0.1 K/UL (ref 0–0.5)
EOSINOPHIL NFR BLD: 2.1 % (ref 0–8)
ERYTHROCYTE [DISTWIDTH] IN BLOOD BY AUTOMATED COUNT: 13 % (ref 11.5–14.5)
EST. GFR  (AFRICAN AMERICAN): >60 ML/MIN/1.73 M^2
EST. GFR  (NON AFRICAN AMERICAN): >60 ML/MIN/1.73 M^2
GLUCOSE SERPL-MCNC: 107 MG/DL (ref 70–110)
HCT VFR BLD AUTO: 34.6 % (ref 37–48.5)
HGB BLD-MCNC: 11.7 G/DL (ref 12–16)
IMM GRANULOCYTES # BLD AUTO: 0.03 K/UL (ref 0–0.04)
IMM GRANULOCYTES NFR BLD AUTO: 0.5 % (ref 0–0.5)
LYMPHOCYTES # BLD AUTO: 1.7 K/UL (ref 1–4.8)
LYMPHOCYTES NFR BLD: 27.4 % (ref 18–48)
MCH RBC QN AUTO: 32.1 PG (ref 27–31)
MCHC RBC AUTO-ENTMCNC: 33.8 G/DL (ref 32–36)
MCV RBC AUTO: 95 FL (ref 82–98)
MONOCYTES # BLD AUTO: 1 K/UL (ref 0.3–1)
MONOCYTES NFR BLD: 15.3 % (ref 4–15)
NEUTROPHILS # BLD AUTO: 3.4 K/UL (ref 1.8–7.7)
NEUTROPHILS NFR BLD: 54.2 % (ref 38–73)
NRBC BLD-RTO: 0 /100 WBC
PLATELET # BLD AUTO: 223 K/UL (ref 150–350)
PMV BLD AUTO: 11.1 FL (ref 9.2–12.9)
POTASSIUM SERPL-SCNC: 3 MMOL/L (ref 3.5–5.1)
PROT SERPL-MCNC: 6.3 G/DL (ref 6–8.4)
RBC # BLD AUTO: 3.64 M/UL (ref 4–5.4)
SODIUM SERPL-SCNC: 137 MMOL/L (ref 136–145)
WBC # BLD AUTO: 6.21 K/UL (ref 3.9–12.7)

## 2019-09-20 PROCEDURE — 87040 BLOOD CULTURE FOR BACTERIA: CPT

## 2019-09-20 PROCEDURE — 25000003 PHARM REV CODE 250: Performed by: NURSE PRACTITIONER

## 2019-09-20 PROCEDURE — 85025 COMPLETE CBC W/AUTO DIFF WBC: CPT

## 2019-09-20 PROCEDURE — 99900035 HC TECH TIME PER 15 MIN (STAT)

## 2019-09-20 PROCEDURE — 80053 COMPREHEN METABOLIC PANEL: CPT

## 2019-09-20 PROCEDURE — 63600175 PHARM REV CODE 636 W HCPCS: Performed by: INTERNAL MEDICINE

## 2019-09-20 PROCEDURE — 25000242 PHARM REV CODE 250 ALT 637 W/ HCPCS: Performed by: NURSE PRACTITIONER

## 2019-09-20 PROCEDURE — 36415 COLL VENOUS BLD VENIPUNCTURE: CPT

## 2019-09-20 PROCEDURE — 25000003 PHARM REV CODE 250: Performed by: FAMILY MEDICINE

## 2019-09-20 PROCEDURE — 25000003 PHARM REV CODE 250: Performed by: INTERNAL MEDICINE

## 2019-09-20 PROCEDURE — 94761 N-INVAS EAR/PLS OXIMETRY MLT: CPT

## 2019-09-20 PROCEDURE — 63600175 PHARM REV CODE 636 W HCPCS: Performed by: NURSE PRACTITIONER

## 2019-09-20 PROCEDURE — 94640 AIRWAY INHALATION TREATMENT: CPT

## 2019-09-20 RX ORDER — LEVOFLOXACIN 5 MG/ML
500 INJECTION, SOLUTION INTRAVENOUS
Status: DISCONTINUED | OUTPATIENT
Start: 2019-09-20 | End: 2019-09-20

## 2019-09-20 RX ORDER — LEVOFLOXACIN 500 MG/1
500 TABLET, FILM COATED ORAL DAILY
Status: DISCONTINUED | OUTPATIENT
Start: 2019-09-21 | End: 2019-09-20

## 2019-09-20 RX ORDER — CIPROFLOXACIN 500 MG/1
500 TABLET ORAL EVERY 12 HOURS
Qty: 14 TABLET | Refills: 0 | Status: SHIPPED | OUTPATIENT
Start: 2019-09-20 | End: 2019-09-27

## 2019-09-20 RX ORDER — LEVOFLOXACIN 500 MG/1
500 TABLET, FILM COATED ORAL DAILY
Status: DISCONTINUED | OUTPATIENT
Start: 2019-09-21 | End: 2019-09-20 | Stop reason: HOSPADM

## 2019-09-20 RX ORDER — CIPROFLOXACIN 500 MG/1
500 TABLET ORAL EVERY 12 HOURS
Status: DISCONTINUED | OUTPATIENT
Start: 2019-09-20 | End: 2019-09-20

## 2019-09-20 RX ORDER — BENZONATATE 100 MG/1
100 CAPSULE ORAL 3 TIMES DAILY PRN
Qty: 30 CAPSULE | Refills: 0 | Status: SHIPPED | OUTPATIENT
Start: 2019-09-20 | End: 2019-09-30

## 2019-09-20 RX ORDER — CIPROFLOXACIN 2 MG/ML
400 INJECTION, SOLUTION INTRAVENOUS
Status: DISCONTINUED | OUTPATIENT
Start: 2019-09-20 | End: 2019-09-20

## 2019-09-20 RX ORDER — POTASSIUM CHLORIDE 20 MEQ/1
20 TABLET, EXTENDED RELEASE ORAL DAILY
Qty: 5 TABLET | Refills: 0 | Status: SHIPPED | OUTPATIENT
Start: 2019-09-20 | End: 2019-09-25

## 2019-09-20 RX ADMIN — POTASSIUM CHLORIDE 20 MEQ: 20 TABLET, EXTENDED RELEASE ORAL at 06:09

## 2019-09-20 RX ADMIN — HYDROCHLOROTHIAZIDE 12.5 MG: 12.5 TABLET ORAL at 09:09

## 2019-09-20 RX ADMIN — POTASSIUM CHLORIDE 20 MEQ: 20 TABLET, EXTENDED RELEASE ORAL at 09:09

## 2019-09-20 RX ADMIN — ACETAMINOPHEN 650 MG: 325 TABLET ORAL at 11:09

## 2019-09-20 RX ADMIN — LEVALBUTEROL HYDROCHLORIDE 0.63 MG: 0.63 SOLUTION RESPIRATORY (INHALATION) at 07:09

## 2019-09-20 RX ADMIN — LEVOFLOXACIN 500 MG: 500 INJECTION, SOLUTION INTRAVENOUS at 02:09

## 2019-09-20 RX ADMIN — GUAIFENESIN AND DEXTROMETHORPHAN 5 ML: 100; 10 SYRUP ORAL at 11:09

## 2019-09-20 RX ADMIN — FAMOTIDINE 20 MG: 20 TABLET ORAL at 09:09

## 2019-09-20 RX ADMIN — LOSARTAN POTASSIUM 100 MG: 50 TABLET, FILM COATED ORAL at 09:09

## 2019-09-20 RX ADMIN — ESTRADIOL 1 MG: 1 TABLET ORAL at 09:09

## 2019-09-20 RX ADMIN — GABAPENTIN 800 MG: 300 CAPSULE ORAL at 09:09

## 2019-09-20 RX ADMIN — SODIUM CHLORIDE AND POTASSIUM CHLORIDE: 9; 1.49 INJECTION, SOLUTION INTRAVENOUS at 11:09

## 2019-09-20 RX ADMIN — MAGNESIUM OXIDE 400 MG: 400 TABLET ORAL at 09:09

## 2019-09-20 RX ADMIN — HYDROCODONE POLISTIREX AND CHLORPHENIRAMINE POLISITREX 5 ML: 10; 8 SUSPENSION, EXTENDED RELEASE ORAL at 09:09

## 2019-09-20 RX ADMIN — LEVOTHYROXINE SODIUM 100 MCG: 100 TABLET ORAL at 05:09

## 2019-09-20 NOTE — PLAN OF CARE
Problem: Adult Inpatient Plan of Care  Goal: Plan of Care Review  Outcome: Ongoing (interventions implemented as appropriate)     09/20/19 0100   Plan of Care Review   Plan of Care Reviewed With patient   Progress improving   Outcome Summary cardiac, labs, vital signs monitoring, fall precautions in place, IV NS 20 KCL, no complaints of pain, nausea or vomitting

## 2019-09-20 NOTE — DISCHARGE SUMMARY
[facility] Medicine  Discharge Summary      Patient Name: Marisela Eagle  MRN: [id number]  Admission Date: 9/17/2019  Hospital Length of Stay: 2 days  Discharge Date and Time:  09/20/2019 5:11 PM  Attending Physician: Jackson Gupta MD   Discharging Provider: Jackson Gupta MD  Primary Care Provider: Pawel Badillo III, MD      HPI:   This is a 74-year-old female who presents with body aches fever and chills up to 102.  Last week the patient started having urinary frequency urgency and suprapubic pain.  She started taking a friend's amoxicillin--1 amoxicillin daily.  She thought that her symptoms improved.  She was also taking azo which also helped her symptoms. Today however and yesterday she started feeling worse with body aches and chills with continued urinary frequency urgency and dysuria.  She denies flank pain.  She has had nausea without vomiting. She denies diarrhea.  She denies headache neck pain or stiffness. She denies chest pain shortness of breath coughing or upper respiratory symptoms. She denies abdominal pain.  She denies gastrointestinal bleeding.  She reports fatigue nonfocal generalized weakness. She denies syncope or near syncope.  Symptoms have been moderate in nature.  There are no exacerbating or alleviating factors.  She denies any other problems or complaints.    * No surgery found *      Hospital Course:   74-year-old white female admitted 09/17/2019 with fever, body ache and chills patient was diagnosed with UTI with suspected pyelonephritis.  Patient was started on ceftriaxone and levofloxacin.  Patient was also found to have slightly elevated troponin so Cardiology was consulted, no EKG changes were and patient was chest pain-free, so probably the troponin leak was from early sepsis/infection  Patient spiked fever the 1st 2 days of her admission, on 09/19/2019 her urine culture shows lactose  and on 09/20/2019 diagnosed with E coli UTI which was  sensitive to Cipro  Patient has been afebrile for the last 24 hr  Patient feels much better wants to go home  Will discharge her home on oral Cipro 500 mg b.i.d. for 7 days  Discussed personal hygiene and wiping front to back  Patient to follow up with her primary care physician  Patient to follow up with her cardiologist Dr. Carbajal  Patient was also counseled in depth regarding low calorie diet as patient reports she does not exercise much.  Discuss need to cut back on calories if she is not going to burn them, need to lose weight.  Patient verbalized understanding     Consults:   Consults (From admission, onward)        Status Ordering Provider     Inpatient consult to Cardiology  Once     Provider:  Kate Maguire MD    Completed PANDA LAND     Inpatient consult to Hospitalist  Once     Provider:  (Not yet assigned)    FEMI Bangura          * Pyelonephritis/ UTI  pt with H/O E.Coli bacteriamia  On Rocephin (2 grams) IV  24 hrs  Blood and urine culture sent in  ED, blood cultures negative urine culture showing gram-negative stacey lactose  greater than 100,000 CFU mL, identification and susceptibility pending  Patient again had + fever spike of 101.9 today, ordered repeat blood cultures   Urinary symptoms improving   09/20/2019:  Urine culture shows E coli greater than 100,000 CFU per mL sensitive to Cipro.  Will change to oral Cipro 500 mg p.o. b.i.d. for 7 days and will be discharged home on medications        Final Active Diagnoses:    Diagnosis Date Noted POA    PRINCIPAL PROBLEM:  Pyelonephritis/ UTI [N12] 09/18/2019 Yes    Dry cough [R05] 09/19/2019 Yes    Hypokalemia [E87.6] 09/18/2019 Yes    Hyperlipidemia [E78.5] 08/20/2018 Yes    Hypertension [I10] 03/27/2012 Yes    Hypothyroid [E03.9] 03/27/2012 Yes      Problems Resolved During this Admission:    Diagnosis Date Noted Date Resolved POA    Elevated troponin I level [R74.8] 09/18/2019 09/20/2019 Yes        Discharged Condition: good    Disposition: Home or Self Care    Follow Up:  Follow-up Information     Pawel Badillo III, MD In 2 weeks.    Specialty:  Family Medicine  Why:  post hospital D/C for uti  Contact information:  [address]  [address]  [place] [address]  [phone]             Montana Carbajal MD In 1 week.    Specialty:  Cardiology  Why:  Elevated Troponins  Contact information:  [address]  [address]  [facility]  [place] [address]  [phone]                 Patient Instructions:      Diet Cardiac   Order Comments: And  Low carb diet     Notify your health care provider if you experience any of the following:  temperature >100.4     Notify your health care provider if you experience any of the following:  persistent dizziness, light-headedness, or visual disturbances     Notify your health care provider if you experience any of the following:  increased confusion or weakness     Activity as tolerated       Significant Diagnostic Studies: Labs:   BMP:   Recent Labs   Lab 09/19/19 0441 09/19/19 1427 09/20/19 0447   *  --  107     --  137   K 2.8* 3.4* 3.0*   CL 98  --  99   CO2 31*  --  29   BUN 7*  --  9   CREATININE 0.6  --  0.6   CALCIUM 8.3*  --  8.1*   MG  --  2.0  --    , CMP   Recent Labs   Lab 09/19/19 0441 09/19/19 1427 09/20/19 0447     --  137   K 2.8* 3.4* 3.0*   CL 98  --  99   CO2 31*  --  29   *  --  107   BUN 7*  --  9   CREATININE 0.6  --  0.6   CALCIUM 8.3*  --  8.1*   PROT 6.5  --  6.3   ALBUMIN 3.2*  --  3.2*   BILITOT 0.9  --  0.6   ALKPHOS 45*  --  42*   AST 21  --  21   ALT 24  --  30   ANIONGAP 9  --  9   ESTGFRAFRICA >60.0  --  >60.0   EGFRNONAA >60.0  --  >60.0    and Troponin   Recent Labs   Lab 09/19/19 0441   TROPONINI 0.053*     Microbiology:   Urine Culture    Lab Results   Component Value Date    LABURIN ESCHERICHIA COLI  >100,000 cfu/ml   (A) 09/17/2019       Pending Diagnostic Studies:     None          Medications:  Reconciled Home Medications:      Medication List      START taking these medications    benzonatate 100 MG capsule  Commonly known as:  TESSALON  Take 1 capsule (100 mg total) by mouth 3 (three) times daily as needed for Cough.     potassium chloride SA 20 MEQ tablet  Commonly known as:  K-DUR,KLOR-CON  Take 1 tablet (20 mEq total) by mouth once daily. for 5 days        CONTINUE taking these medications    atorvastatin 20 MG tablet  Commonly known as:  LIPITOR  Take 20 mg by mouth nightly.     cholecalciferol (vitamin D3) 5,000 unit capsule  Take 5,000 Units by mouth once daily.     cyanocobalamin 1000 MCG tablet  Commonly known as:  VITAMIN B-12  Take 100 mcg by mouth once daily.     estradiol 1 MG tablet  Commonly known as:  ESTRACE  Take 1 mg by mouth once daily.     gabapentin 800 MG tablet  Commonly known as:  NEURONTIN  Take 800 mg by mouth 2 (two) times daily.     levothyroxine 100 MCG tablet  Commonly known as:  SYNTHROID  Take 100 mcg by mouth once daily.     LORazepam 1 MG tablet  Commonly known as:  ATIVAN  Take 1 mg by mouth nightly as needed for Anxiety.     losartan-hydrochlorothiazide 100-12.5 mg 100-12.5 mg Tab  Commonly known as:  HYZAAR  Take 1 tablet by mouth 2 (two) times daily.     magnesium oxide 400 mg (241.3 mg magnesium) tablet  Commonly known as:  MAG-OX  Take 400 mg by mouth 2 (two) times daily.     METANX ORAL  Take 1 capsule by mouth 2 (two) times daily.     metoprolol succinate 50 MG 24 hr tablet  Commonly known as:  TOPROL-XL  Take 50 mg by mouth nightly.     omeprazole 40 MG capsule  Commonly known as:  PRILOSEC  Take 40 mg by mouth once daily.     PRESERVISION AREDS-2 ORAL  Take 1 capsule by mouth 2 (two) times daily.     SYSTANE (PROPYLENE GLYCOL) 0.4-0.3 % Drop  Generic drug:  peg 400-propylene glycol  Place 1 drop into both eyes once daily.     turmeric 400 mg Cap  Take 400 mg by mouth 2 (two) times daily.     VOLTAREN 1 % Gel  Generic drug:  diclofenac  sodium  Apply 2 g topically daily as needed.        STOP taking these medications    sulfamethoxazole-trimethoprim 800-160mg 800-160 mg Tab  Commonly known as:  BACTRIM DS            Indwelling Lines/Drains at time of discharge:   Lines/Drains/Airways          None          Time spent on the discharge of patient: 34 minutes  Patient was seen and examined on the date of discharge and determined to be suitable for discharge.         Jackson Gupta MD  Department of Hospital Medicine  [facility]

## 2019-09-20 NOTE — DISCHARGE SUMMARY
[facility] Medicine  Discharge Summary      Patient Name: Marisela Eagle  MRN: [id number]  Admission Date: 9/17/2019  Hospital Length of Stay: 2 days  Discharge Date and Time:  09/20/2019 5:14 PM  Attending Physician: Jackson Gupta MD   Discharging Provider: Jackson Gupta MD  Primary Care Provider: Pawel Badillo III, MD      HPI:   This is a 74-year-old female who presents with body aches fever and chills up to 102.  Last week the patient started having urinary frequency urgency and suprapubic pain.  She started taking a friend's amoxicillin--1 amoxicillin daily.  She thought that her symptoms improved.  She was also taking azo which also helped her symptoms. Today however and yesterday she started feeling worse with body aches and chills with continued urinary frequency urgency and dysuria.  She denies flank pain.  She has had nausea without vomiting. She denies diarrhea.  She denies headache neck pain or stiffness. She denies chest pain shortness of breath coughing or upper respiratory symptoms. She denies abdominal pain.  She denies gastrointestinal bleeding.  She reports fatigue nonfocal generalized weakness. She denies syncope or near syncope.  Symptoms have been moderate in nature.  There are no exacerbating or alleviating factors.  She denies any other problems or complaints.    * No surgery found *      Hospital Course:   74-year-old white female admitted 09/17/2019 with fever, body ache and chills patient was diagnosed with UTI with suspected pyelonephritis.  Patient was started on ceftriaxone and levofloxacin.  Patient was also found to have slightly elevated troponin so Cardiology was consulted, no EKG changes were and patient was chest pain-free, so probably the troponin leak was from early sepsis/infection  Patient spiked fever the 1st 2 days of her admission, on 09/19/2019 her urine culture shows lactose  and on 09/20/2019 diagnosed with E coli UTI which was  sensitive to Cipro  Patient has been afebrile for the last 24 hr  Patient feels much better wants to go home  Will discharge her home on oral Cipro 500 mg b.i.d. for 7 days  Discussed personal hygiene and wiping front to back  Patient to follow up with her primary care physician  Patient to follow up with her cardiologist Dr. Carbajal  Patient was also counseled in depth regarding low calorie diet as patient reports she does not exercise much.  Discuss need to cut back on calories if she is not going to burn them, need to lose weight.  Patient verbalized understanding     Consults:   Consults (From admission, onward)        Status Ordering Provider     Inpatient consult to Cardiology  Once     Provider:  Kate Maguire MD    Completed PANDA LAND     Inpatient consult to Hospitalist  Once     Provider:  (Not yet assigned)    FEMI Bangura          No new Assessment & Plan notes have been filed under this hospital service since the last note was generated.  Service: Hospital Medicine    Final Active Diagnoses:    Diagnosis Date Noted POA    PRINCIPAL PROBLEM:  Pyelonephritis/ UTI [N12] 09/18/2019 Yes    Dry cough [R05] 09/19/2019 Yes    Hypokalemia [E87.6] 09/18/2019 Yes    Hyperlipidemia [E78.5] 08/20/2018 Yes    Hypertension [I10] 03/27/2012 Yes    Hypothyroid [E03.9] 03/27/2012 Yes      Problems Resolved During this Admission:    Diagnosis Date Noted Date Resolved POA    Elevated troponin I level [R74.8] 09/18/2019 09/20/2019 Yes       Discharged Condition: good    Disposition: Home or Self Care    Follow Up:  Follow-up Information     Pawel Badillo III, MD In 2 weeks.    Specialty:  Family Medicine  Why:  post hospital D/C for uti  Contact information:  [address] [place] [address]  [address]  [facility] [address]  [phone]             Montana Carbajal MD In 1 week.    Specialty:  Cardiology  Why:  Elevated Troponins  Contact information:  [address] [place] [address]  [address]  [facility]ARDIOLOGY  [facility] [address]  [phone]                 Patient Instructions:      Diet Cardiac   Order Comments: And  Low carb diet     Notify your health care provider if you experience any of the following:  temperature >100.4     Notify your health care provider if you experience any of the following:  persistent dizziness, light-headedness, or visual disturbances     Notify your health care provider if you experience any of the following:  increased confusion or weakness     Activity as tolerated       Significant Diagnostic Studies: Labs:   CMP   Recent Labs   Lab 09/19/19  0441 09/19/19  1427 09/20/19  0447     --  137   K 2.8* 3.4* 3.0*   CL 98  --  99   CO2 31*  --  29   *  --  107   BUN 7*  --  9   CREATININE 0.6  --  0.6   CALCIUM 8.3*  --  8.1*   PROT 6.5  --  6.3   ALBUMIN 3.2*  --  3.2*   BILITOT 0.9  --  0.6   ALKPHOS 45*  --  42*   AST 21  --  21   ALT 24  --  30   ANIONGAP 9  --  9   ESTGFRAFRICA >60.0  --  >60.0   EGFRNONAA >60.0  --  >60.0   , CBC   Recent Labs   Lab 09/19/19  0442 09/20/19  0448   WBC 7.85 6.21   HGB 12.1 11.7*   HCT 36.4* 34.6*    223    and Troponin   Recent Labs   Lab 09/19/19  0441   TROPONINI 0.053*     Microbiology:   Blood Culture   Lab Results   Component Value Date    LABBLOO No Growth to date 09/20/2019    and Urine Culture    Lab Results   Component Value Date    LABURIN ESCHERICHIA COLI  >100,000 cfu/ml   (A) 09/17/2019       Pending Diagnostic Studies:     None         Medications:  Reconciled Home Medications:      Medication List      START taking these medications    benzonatate 100 MG capsule  Commonly known as:  TESSALON  Take 1 capsule (100 mg total) by mouth 3 (three) times daily as needed for Cough.     ciprofloxacin HCl 500 MG tablet  Commonly known as:  CIPRO  Take 1 tablet (500 mg total) by mouth every 12 (twelve) hours. for 7 days     potassium chloride SA 20 MEQ tablet  Commonly known as:  K-DUR,KLOR-CON  Take 1 tablet (20 mEq  total) by mouth once daily. for 5 days        CONTINUE taking these medications    atorvastatin 20 MG tablet  Commonly known as:  LIPITOR  Take 20 mg by mouth nightly.     cholecalciferol (vitamin D3) 5,000 unit capsule  Take 5,000 Units by mouth once daily.     cyanocobalamin 1000 MCG tablet  Commonly known as:  VITAMIN B-12  Take 100 mcg by mouth once daily.     estradiol 1 MG tablet  Commonly known as:  ESTRACE  Take 1 mg by mouth once daily.     gabapentin 800 MG tablet  Commonly known as:  NEURONTIN  Take 800 mg by mouth 2 (two) times daily.     levothyroxine 100 MCG tablet  Commonly known as:  SYNTHROID  Take 100 mcg by mouth once daily.     LORazepam 1 MG tablet  Commonly known as:  ATIVAN  Take 1 mg by mouth nightly as needed for Anxiety.     losartan-hydrochlorothiazide 100-12.5 mg 100-12.5 mg Tab  Commonly known as:  HYZAAR  Take 1 tablet by mouth 2 (two) times daily.     magnesium oxide 400 mg (241.3 mg magnesium) tablet  Commonly known as:  MAG-OX  Take 400 mg by mouth 2 (two) times daily.     METANX ORAL  Take 1 capsule by mouth 2 (two) times daily.     metoprolol succinate 50 MG 24 hr tablet  Commonly known as:  TOPROL-XL  Take 50 mg by mouth nightly.     omeprazole 40 MG capsule  Commonly known as:  PRILOSEC  Take 40 mg by mouth once daily.     PRESERVISION AREDS-2 ORAL  Take 1 capsule by mouth 2 (two) times daily.     SYSTANE (PROPYLENE GLYCOL) 0.4-0.3 % Drop  Generic drug:  peg 400-propylene glycol  Place 1 drop into both eyes once daily.     turmeric 400 mg Cap  Take 400 mg by mouth 2 (two) times daily.     VOLTAREN 1 % Gel  Generic drug:  diclofenac sodium  Apply 2 g topically daily as needed.        STOP taking these medications    sulfamethoxazole-trimethoprim 800-160mg 800-160 mg Tab  Commonly known as:  BACTRIM DS            Indwelling Lines/Drains at time of discharge:   Lines/Drains/Airways          None          Time spent on the discharge of patient: 35 minutes  Patient was seen and  examined on the date of discharge and determined to be suitable for discharge.         Jackson Gupta MD  Department of Hospital Medicine  [facility]

## 2019-09-20 NOTE — NURSING
D/c instructions provided to pt. Pt verbalized understanding of information provided. Pt transported downstairs via wheelchair to private vehicle without incident

## 2019-09-20 NOTE — HOSPITAL COURSE
74-year-old white female admitted 09/17/2019 with fever, body ache and chills patient was diagnosed with UTI with suspected pyelonephritis.  Patient was started on ceftriaxone and levofloxacin.  Patient was also found to have slightly elevated troponin so Cardiology was consulted, no EKG changes were and patient was chest pain-free, so probably the troponin leak was from early sepsis/infection  Patient spiked fever the 1st 2 days of her admission, on 09/19/2019 her urine culture shows lactose  and on 09/20/2019 diagnosed with E coli UTI which was sensitive to Cipro  Patient has been afebrile for the last 24 hr  Patient feels much better wants to go home  Will discharge her home on oral Cipro 500 mg b.i.d. for 7 days  Discussed personal hygiene and wiping front to back  Patient to follow up with her primary care physician  Patient to follow up with her cardiologist Dr. Carbajal  Patient was also counseled in depth regarding low calorie diet as patient reports she does not exercise much.  Discuss need to cut back on calories if she is not going to burn them, need to lose weight.  Patient verbalized understanding

## 2019-09-20 NOTE — ASSESSMENT & PLAN NOTE
pt with H/O E.Coli bacteriamia  On Rocephin (2 grams) IV  24 hrs  Blood and urine culture sent in  ED, blood cultures negative urine culture showing gram-negative stacey lactose  greater than 100,000 CFU mL, identification and susceptibility pending  Patient again had + fever spike of 101.9 today, ordered repeat blood cultures   Urinary symptoms improving   09/20/2019:  Urine culture shows E coli greater than 100,000 CFU per mL sensitive to Cipro.  Will change to oral Cipro 500 mg p.o. b.i.d. for 7 days and will be discharged home on medications

## 2019-09-20 NOTE — CARE UPDATE
09/19/19 2017   Patient Assessment/Suction   Level of Consciousness (AVPU) alert   All Lung Fields Breath Sounds clear   PRE-TX-O2   O2 Device (Oxygen Therapy) room air   SpO2 98 %   Pulse Oximetry Type Intermittent   $ Pulse Oximetry - Multiple Charge Pulse Oximetry - Multiple   Pulse 81   Resp 16   Aerosol Therapy   $ Aerosol Therapy Charges Aerosol Treatment   Daily Review of Necessity (SVN) completed   Respiratory Treatment Status (SVN) given   Treatment Route (SVN) mask   Patient Position (SVN) semi-Pickett's   Post Treatment Assessment (SVN) increased aeration   Signs of Intolerance (SVN) none   Breath Sounds Post-Respiratory Treatment   Throughout All Fields Post-Treatment All Fields   Throughout All Fields Post-Treatment aeration increased   Post-treatment Heart Rate (beats/min) 68   Post-treatment Resp Rate (breaths/min) 16

## 2019-09-20 NOTE — CARE UPDATE
09/20/19 0732   Patient Assessment/Suction   Level of Consciousness (AVPU) alert   Respiratory Effort Normal   All Lung Fields Breath Sounds clear   Rhythm/Pattern, Respiratory depth regular;pattern regular;unlabored   PRE-TX-O2   O2 Device (Oxygen Therapy) room air   SpO2 100 %   Pulse Oximetry Type Intermittent   $ Pulse Oximetry - Multiple Charge Pulse Oximetry - Multiple   Pulse 78   Resp 16   Aerosol Therapy   $ Aerosol Therapy Charges Aerosol Treatment   Daily Review of Necessity (SVN) completed   Respiratory Treatment Status (SVN) given   Treatment Route (SVN) mask;oxygen   Patient Position (SVN) HOB elevated   Post Treatment Assessment (SVN) breath sounds unchanged   Signs of Intolerance (SVN) none   Breath Sounds Post-Respiratory Treatment   Throughout All Fields Post-Treatment All Fields   Post-treatment Heart Rate (beats/min) 76   Post-treatment Resp Rate (breaths/min) 16

## 2019-09-22 LAB
BACTERIA BLD CULT: NORMAL
BACTERIA BLD CULT: NORMAL

## 2019-09-25 LAB — BACTERIA BLD CULT: NORMAL

## 2019-10-05 PROBLEM — M51.9 LUMBAR DISC DISEASE: Status: ACTIVE | Noted: 2019-10-05

## 2019-10-21 RX ORDER — HYDROCODONE BITARTRATE AND ACETAMINOPHEN 5; 325 MG/1; MG/1
1 TABLET ORAL EVERY 6 HOURS PRN
COMMUNITY
Start: 2019-10-06 | End: 2021-07-20

## 2019-10-21 RX ORDER — MUPIROCIN 20 MG/G
OINTMENT TOPICAL
COMMUNITY
Start: 2019-10-04 | End: 2021-01-31

## 2019-10-22 ENCOUNTER — HOSPITAL ENCOUNTER (OUTPATIENT)
Dept: RADIOLOGY | Facility: CLINIC | Age: 74
Discharge: HOME | End: 2019-10-22
Attending: PODIATRIST
Payer: MEDICARE

## 2019-10-22 ENCOUNTER — OFFICE VISIT (OUTPATIENT)
Dept: PODIATRY | Facility: CLINIC | Age: 74
End: 2019-10-22
Payer: MEDICARE

## 2019-10-22 VITALS
BODY MASS INDEX: 35.99 KG/M2 | DIASTOLIC BLOOD PRESSURE: 73 MMHG | HEIGHT: 69 IN | WEIGHT: 243 LBS | HEART RATE: 78 BPM | SYSTOLIC BLOOD PRESSURE: 109 MMHG

## 2019-10-22 DIAGNOSIS — M20.41 HAMMER TOES OF BOTH FEET: ICD-10-CM

## 2019-10-22 DIAGNOSIS — M79.671 FOOT PAIN, BILATERAL: ICD-10-CM

## 2019-10-22 DIAGNOSIS — M79.672 FOOT PAIN, BILATERAL: ICD-10-CM

## 2019-10-22 DIAGNOSIS — M20.42 HAMMER TOES OF BOTH FEET: ICD-10-CM

## 2019-10-22 DIAGNOSIS — M21.619 BUNION: Primary | ICD-10-CM

## 2019-10-22 DIAGNOSIS — G62.9 NEUROPATHY: ICD-10-CM

## 2019-10-22 PROCEDURE — 1101F PR PT FALLS ASSESS DOC 0-1 FALLS W/OUT INJ PAST YR: ICD-10-PCS | Mod: S$GLB,,, | Performed by: PODIATRIST

## 2019-10-22 PROCEDURE — 99213 OFFICE O/P EST LOW 20 MIN: CPT | Mod: 25,S$GLB,, | Performed by: PODIATRIST

## 2019-10-22 PROCEDURE — 73630 X-RAY EXAM OF FOOT: CPT | Mod: 50,S$GLB,, | Performed by: PODIATRIST

## 2019-10-22 PROCEDURE — 73630 XR FOOT COMPLETE 3 VIEW BILATERAL: ICD-10-PCS | Mod: 50,S$GLB,, | Performed by: PODIATRIST

## 2019-10-22 PROCEDURE — 3074F PR MOST RECENT SYSTOLIC BLOOD PRESSURE < 130 MM HG: ICD-10-PCS | Mod: S$GLB,,, | Performed by: PODIATRIST

## 2019-10-22 PROCEDURE — 99213 PR OFFICE/OUTPT VISIT, EST, LEVL III, 20-29 MIN: ICD-10-PCS | Mod: 25,S$GLB,, | Performed by: PODIATRIST

## 2019-10-22 PROCEDURE — 1101F PT FALLS ASSESS-DOCD LE1/YR: CPT | Mod: S$GLB,,, | Performed by: PODIATRIST

## 2019-10-22 PROCEDURE — 3078F PR MOST RECENT DIASTOLIC BLOOD PRESSURE < 80 MM HG: ICD-10-PCS | Mod: S$GLB,,, | Performed by: PODIATRIST

## 2019-10-22 PROCEDURE — 3074F SYST BP LT 130 MM HG: CPT | Mod: S$GLB,,, | Performed by: PODIATRIST

## 2019-10-22 PROCEDURE — 3078F DIAST BP <80 MM HG: CPT | Mod: S$GLB,,, | Performed by: PODIATRIST

## 2019-10-22 RX ORDER — AMITRIPTYLINE HYDROCHLORIDE 25 MG/1
25 TABLET, FILM COATED ORAL NIGHTLY PRN
COMMUNITY
End: 2020-10-27

## 2019-10-22 NOTE — PROGRESS NOTES
[address] [facility] [address]  [place] [address]  Phone: [phone]   [phone]    Patient's PCP:Kenneth Maurice DO  Referring Provider: No ref. provider found    Subjective:      Chief Complaint:: Hammer Toe (bilateral) and Toe Pain (1st toe turning inward)    HPI  Marisela Eagle is a 74 y.o. female who presents with a complaint of hammertoes and great toes turning inward. Current symptoms include painful when pressure applied.  Aggravating factors are closed toe shoes. Treatment to date have included closed toe shoes. Patients rates pain 10/10 on pain scale.    Vitals:    10/22/19 1435   BP: 109/73   Pulse: 78     Shoe Size:  10 / 10.5    Past Surgical History:   Procedure Laterality Date    ADENOIDECTOMY      APPENDECTOMY      BILATERAL SALPINGOOPHORECTOMY       SECTION      COLONOSCOPY  12    HYSTERECTOMY      with BSO    JOINT REPLACEMENT  2012    rt unilateral knee arthroplasty. Took Coumadin x 6 weeks    KNEE ARTHROSCOPY  2010    right    TONSILLECTOMY       Past Medical History:   Diagnosis Date    Bilateral knee pain 2012    left worse, right knee painful even after partial replacement.    Bruises easily     Clot ?    Umbilical clot after hysterectomy    Colon polyps     adenomatous    Complication of anesthesia     very low pain tolerance    Cystocele     Degenerative disc disease     neck,back, muscle spasms    Diverticulitis     Edema of left lower extremity     ankles    GERD (gastroesophageal reflux disease)     Hypertension     Neuropathy     peripheral    Thyroid disease     hypothyroid, has nodules    Varicose veins      Family History   Problem Relation Age of Onset    Neuropathy Mother     Hypertension Mother     Stroke Mother     Neuropathy Father     Diabetes Maternal Grandmother     Cancer Daughter     Melanoma Neg Hx     Psoriasis Neg Hx     Lupus Neg Hx     Eczema Neg Hx         Social History:   Marital Status:    Alcohol History:  reports that she drinks alcohol.  Tobacco History:  reports that she quit smoking about 7 years ago. She has never used smokeless tobacco.  Drug History:  reports that she does not use drugs.    Review of patient's allergies indicates:   Allergen Reactions    Duloxetine      Does not remember    Morphine Other (See Comments)     Other reaction(s): Syncope  fainted    Tramadol        Current Outpatient Medications   Medication Sig Dispense Refill    amitriptyline (ELAVIL) 25 MG tablet Take 25 mg by mouth nightly as needed for Insomnia.      atorvastatin (LIPITOR) 20 MG tablet Take 20 mg by mouth nightly.      cholecalciferol, vitamin D3, 5,000 unit capsule Take 5,000 Units by mouth once daily.      cyanocobalamin (VITAMIN B-12) 1000 MCG tablet Take 100 mcg by mouth once daily.      diclofenac sodium (VOLTAREN) 1 % Gel Apply 2 g topically daily as needed.      estradiol (ESTRACE) 1 MG tablet Take 1 mg by mouth once daily.      gabapentin (NEURONTIN) 800 MG tablet Take 800 mg by mouth 2 (two) times daily.       levothyroxine (SYNTHROID) 100 MCG tablet Take 100 mcg by mouth once daily.      losartan-hydrochlorothiazide 100-12.5 mg (HYZAAR) 100-12.5 mg Tab Take 1 tablet by mouth 2 (two) times daily.       magnesium oxide (MAG-OX) 400 mg (241.3 mg magnesium) tablet Take 400 mg by mouth 2 (two) times daily.      mecobal/levomefolat Ca/B6 phos (METANX ORAL) Take 1 capsule by mouth 2 (two) times daily.      mupirocin (BACTROBAN) 2 % ointment       omeprazole (PRILOSEC) 40 MG capsule Take 40 mg by mouth once daily.      peg 400-propylene glycol (SYSTANE, PROPYLENE GLYCOL,) 0.4-0.3 % Drop Place 1 drop into both eyes once daily.      turmeric 400 mg Cap Take 400 mg by mouth 2 (two) times daily.      vit C/E/Zn/coppr/lutein/zeaxan (PRESERVISION AREDS-2 ORAL) Take 1 capsule by mouth 2 (two) times daily.       HYDROcodone-acetaminophen (NORCO) 5-325 mg per tablet       metoprolol  succinate (TOPROL-XL) 50 MG 24 hr tablet Take 50 mg by mouth nightly.       No current facility-administered medications for this visit.        Review of Systems      Objective:        Physical Exam:   Foot Exam    General  General Appearance: appears stated age and healthy   Orientation: alert and oriented to person, place, and time   Affect: appropriate   Gait: antalgic   Assistance: cane use       Right Foot/Ankle     Inspection and Palpation  Ecchymosis: none  Tenderness: great toe metatarsophalangeal joint   Swelling: none   Hammertoes: second toe, third toe, fourth toe and fifth toe  Hallux valgus: yes  Skin Exam: callus;     Neurovascular  Dorsalis pedis: 2+  Posterior tibial: 2+  Saphenous nerve sensation: diminished  Tibial nerve sensation: diminished  Superficial peroneal nerve sensation: diminished  Deep peroneal nerve sensation: diminished  Sural nerve sensation: diminished    Muscle Strength  Ankle dorsiflexion: 5  Ankle plantar flexion: 5  Ankle inversion: 5  Ankle eversion: 5  Great toe extension: 5  Great toe flexion: 5    Range of Motion    Normal right ankle ROM      Left Foot/Ankle      Inspection and Palpation  Ecchymosis: none  Tenderness: great toe metatarsophalangeal joint   Swelling: none   Hammertoes: second toe, third toe, fourth toe and fifth toe  Hallux valgus: yes  Skin Exam: callus;     Neurovascular  Dorsalis pedis: 2+  Posterior tibial: 2+  Saphenous nerve sensation: diminished  Tibial nerve sensation: diminished  Superficial peroneal nerve sensation: diminished  Deep peroneal nerve sensation: diminished  Sural nerve sensation: diminished    Muscle Strength  Ankle dorsiflexion: 5  Ankle plantar flexion: 5  Ankle inversion: 5  Ankle eversion: 5  Great toe extension: 5  Great toe flexion: 5    Range of Motion    Normal left ankle ROM          Physical Exam   Cardiovascular:   Pulses:       Dorsalis pedis pulses are 2+ on the right side, and 2+ on the left side.        Posterior tibial  pulses are 2+ on the right side, and 2+ on the left side.   Feet:   Right Foot:   Skin Integrity: Positive for callus.   Left Foot:   Skin Integrity: Positive for callus.       Imaging: X-ray 3 views of the left foot were taken AP, lateral, and oblique, weight bearing showing:  No fractures or dislocations noted. Mild bunion deformity is present.  Very mild hammering of the lesser digits. No bone tumors or soft tissue masses.      Electronically Signed by: Jose Martin DPM    X-ray 3 views of the right foot were taken AP, lateral, and oblique, weight bearing showing:  No fractures or dislocations noted. Mild bunion deformity is present.  Very mild hammering of the lesser digits. No bone tumors or soft tissue masses.      Electronically Signed by: Jose Martin DPM             Assessment:       1. Bunion    2. Hammer toes of both feet    3. Neuropathy    4. Foot pain, bilateral      Plan:   Bunion    Hammer toes of both feet  -     X-Ray Foot Complete Bilateral    Neuropathy    Foot pain, bilateral  -     X-Ray Foot Complete Bilateral      Follow up if symptoms worsen or fail to improve.    Procedures - None    Discussed with the patient that there are no conservative measures to stop formation of bunions and hammertoes.  I explained that the hammertoe formation is likely due to the neuropathy in her feet.  I explained the correlation between neuropathy and hammertoe formation.  I did explain that if her neuropathy can be addressed that she may see a slowing or potential stopping progression of the hammertoe deformity.    For the calluses on her bilateral 1st metatarsal heads are recommend pumice stone and skin softening creams daily    Counseling:     I provided patient education verbally regarding:   Patient diagnosis, treatment options, as well as alternatives, risks, and benefits.     I provided patient education regarding: Bunion deformity and causes. I discussed conservative treatment with shoe modification,  pads, treating symptoms with OTC NSAIDs, pads between toes, inserts and pads over the bunion. I explained that conservative treatment is non-curative but may help with pain and slow down the progression of the deformity.     I discussed hammertoe deformity and conservative treatment of deep, wider shoes, padding of the sore areas, OTC NSAID, skin softners, palliative care.  I discussed surgical procedures of fusion of the PIPJ of the toes with wires or implants, the follow up and the possible complications.    Patient  was made aware of inspecting their feet.  Patient was told to be aware of any breaks in the skin or redness.  With neuropathy, these areas are not recognized early due to the numbness.  I discussed the lab test and NCV EMG test and also the role of the neurologist in evaluating the patient.  I discussed Diabetes, lower back issues, metabolic disorders, systemic causes, chemotherapy, viatmain defiencey, heavy metal exposure, as some of the causes.  I also explained that as much as 40% of the time we can not find a cause.  I discussed different treatments available to control the symptoms but whcih may not cure the problem.      This note was created using Dragon voice recognition software that occasionally misinterpreted phrases or words.

## 2019-10-22 NOTE — PATIENT INSTRUCTIONS
Treating Mallet, Hammer, and Claw Toes  Definitions  A hammer toe has an abnormal bend in the middle joint of your toe (toe is bent upward at joint).  Mallet toe affects the joint nearest your toenail (toe is bent downward at joint).  Claw toe affects the joint at the ball of your foot (toe is bent upward at joint), as well as both toe joints (toe is bent downward at both joints).  Hammer toe and mallet toe are most likely to occur in the toe next to your big toe.  Causes  The most common cause for all 3 deformities is poorly fitting shoes and tight shoes, especially high heels for women. Trauma and nerve damage from various diseases like diabetes may also cause these deformities.  Treatment  Buying shoes with more room in the toes, filing down corns and calluses, and padding, taping, or strapping the toe most often relieve the pain. Toe stretching and exercises may also be helpful. If these steps dont work, you may need surgery to straighten your toes.  Shoes  Buy low-heeled shoes with plenty of room in the front. This keeps your toes from being jammed against the end of your shoe. It also keeps your shoe from rubbing the tops of your toes.  Corns and calluses    To file down a corn or callus, soak your foot in warm water. This softens the hard skin. Dry your foot. Then gently rub the corn or callus with a pumice stone or nail file.  Pads and splints  If you still have pain, you may need to put a pad or splint on your toe. This helps take pressure off the painful corn or callus.  · For a mallet toe, you can put a gel pad on the toe. This keeps the tip of the toe from rubbing against the bottom of the shoe.  · For a hammer or claw toe, you can put a felt or foam pad over the bent joint. This keeps the toe from rubbing on the top of the shoe.  · For a hammer or claw toe that is still flexible, you can put a splint on the toe. This keeps it straight so it doesn't rub on the top of the shoe.    Date Last Reviewed:  9/29/2015 © 2000-2017 [employer]. [address] [place] [address], [place] [address]. All rights reserved. This information is not intended as a substitute for professional medical care. Always follow your healthcare professional's instructions.          Treating Peripheral Neuropathy  Peripheral neuropathy is a disease of the nerves. It most often starts in your feet and may also eventually affect the arms. It may cause pain or may make you unable to sense pain. Sometimes, weakness occurs as well. Lack of pain and weakness makes you more likely to injure yourself without knowing it.  Learn ways to protect your feet. Check your feet daily for wounds you may not have felt. Avoid burns by testing bath water with your elbow before stepping in. Also, always wear shoes to prevent injury.    Regular foot care  If you have foot numbness, you may not notice cutting yourself while trimming your nails. To prevent problems, your doctor may ask you to visit for nail and callus trimming. See your doctor for foot care as often as suggested.  Check your feet daily  Catch problems early by checking your feet every day for changes. Look at the top and bottom of your feet, your heels, and between your toes. It may help to use a mirror. If this is hard, ask someone to check for you. Call your doctor if you notice a wound, ulceration, ingrown nail, or any changes in your feet. This includes increased heat, swelling, and redness.  Wear proper footwear  Always wear shoes and socks, even indoors. Ask your doctor how to choose the right shoe. After buying shoes, bring them to your doctor to be checked for fit. Take new shoes off every hour or so to check for red pressure areas on your feet. Each time you put on your shoes, use your fingers first to feel inside for foreign objects.  Common causes of peripheral neuropathy  Some common causes of peripheral neuropathy include:  · Diabetes or other endocrine disorders  · Toxins (such as  alcohol)  · Nutritional deficiencies (such as Vitamin B-12)  · Kidney disease  · Injury  · Repetitive stress (such as carpal tunnel syndrome)  · Autoimmune disease  · Cancer and tumors  · Infection  Diagnosis and treatment  Diagnosis of peripheral neuropathy includes a complete history and physical exam.  Lab tests including blood work and imaging often help determine the cause.  Special nerve tests are often helpful including nerve conduction velocity studies (NCV), and electromyelography (EMG).  Treatment focuses on treating the underlying disorder and treating symptoms through the use of medicines, injections, TENS (transcutaneous electrical nerve stimulation), acupuncture, massage, and other methods.  Date Last Reviewed: 10/19/2015  © 0690-8915 [employer]. [address] [place] [address], [place] [address]. All rights reserved. This information is not intended as a substitute for professional medical care. Always follow your healthcare professional's instructions.           spouse

## 2019-11-12 DIAGNOSIS — M54.17 RADICULOPATHY, LUMBOSACRAL REGION: Primary | ICD-10-CM

## 2019-11-26 ENCOUNTER — HOSPITAL ENCOUNTER (OUTPATIENT)
Dept: RADIOLOGY | Facility: HOSPITAL | Age: 74
Discharge: HOME | End: 2019-11-26
Attending: NURSE PRACTITIONER
Payer: MEDICARE

## 2019-11-26 DIAGNOSIS — M54.17 RADICULOPATHY, LUMBOSACRAL REGION: ICD-10-CM

## 2019-11-26 PROCEDURE — 72148 MRI LUMBAR SPINE W/O DYE: CPT | Mod: TC,PO

## 2020-01-08 ENCOUNTER — OFFICE VISIT (OUTPATIENT)
Dept: ENDOCRINOLOGY | Facility: CLINIC | Age: 75
End: 2020-01-08
Payer: MEDICARE

## 2020-01-08 VITALS
WEIGHT: 261.94 LBS | DIASTOLIC BLOOD PRESSURE: 78 MMHG | SYSTOLIC BLOOD PRESSURE: 110 MMHG | TEMPERATURE: 98 F | BODY MASS INDEX: 38.8 KG/M2 | HEIGHT: 69 IN | HEART RATE: 78 BPM

## 2020-01-08 DIAGNOSIS — Z78.0 POSTMENOPAUSAL: ICD-10-CM

## 2020-01-08 DIAGNOSIS — E55.9 HYPOVITAMINOSIS D: ICD-10-CM

## 2020-01-08 DIAGNOSIS — E04.9 GOITER: ICD-10-CM

## 2020-01-08 DIAGNOSIS — E03.9 HYPOTHYROIDISM, UNSPECIFIED TYPE: Primary | ICD-10-CM

## 2020-01-08 DIAGNOSIS — E78.5 HYPERLIPIDEMIA, UNSPECIFIED HYPERLIPIDEMIA TYPE: ICD-10-CM

## 2020-01-08 DIAGNOSIS — I10 HYPERTENSION, UNSPECIFIED TYPE: ICD-10-CM

## 2020-01-08 DIAGNOSIS — E66.9 OBESITY (BMI 30-39.9): ICD-10-CM

## 2020-01-08 PROCEDURE — 3078F DIAST BP <80 MM HG: CPT | Mod: CPTII,S$GLB,, | Performed by: PHYSICIAN ASSISTANT

## 2020-01-08 PROCEDURE — 99999 PR PBB SHADOW E&M-EST. PATIENT-LVL III: ICD-10-PCS | Mod: PBBFAC,,, | Performed by: PHYSICIAN ASSISTANT

## 2020-01-08 PROCEDURE — 1101F PR PT FALLS ASSESS DOC 0-1 FALLS W/OUT INJ PAST YR: ICD-10-PCS | Mod: CPTII,S$GLB,, | Performed by: PHYSICIAN ASSISTANT

## 2020-01-08 PROCEDURE — 3074F PR MOST RECENT SYSTOLIC BLOOD PRESSURE < 130 MM HG: ICD-10-PCS | Mod: CPTII,S$GLB,, | Performed by: PHYSICIAN ASSISTANT

## 2020-01-08 PROCEDURE — 1125F PR PAIN SEVERITY QUANTIFIED, PAIN PRESENT: ICD-10-PCS | Mod: S$GLB,,, | Performed by: PHYSICIAN ASSISTANT

## 2020-01-08 PROCEDURE — 1159F PR MEDICATION LIST DOCUMENTED IN MEDICAL RECORD: ICD-10-PCS | Mod: S$GLB,,, | Performed by: PHYSICIAN ASSISTANT

## 2020-01-08 PROCEDURE — 1159F MED LIST DOCD IN RCRD: CPT | Mod: S$GLB,,, | Performed by: PHYSICIAN ASSISTANT

## 2020-01-08 PROCEDURE — 3078F PR MOST RECENT DIASTOLIC BLOOD PRESSURE < 80 MM HG: ICD-10-PCS | Mod: CPTII,S$GLB,, | Performed by: PHYSICIAN ASSISTANT

## 2020-01-08 PROCEDURE — 99214 OFFICE O/P EST MOD 30 MIN: CPT | Mod: S$GLB,,, | Performed by: PHYSICIAN ASSISTANT

## 2020-01-08 PROCEDURE — 99214 PR OFFICE/OUTPT VISIT, EST, LEVL IV, 30-39 MIN: ICD-10-PCS | Mod: S$GLB,,, | Performed by: PHYSICIAN ASSISTANT

## 2020-01-08 PROCEDURE — 99999 PR PBB SHADOW E&M-EST. PATIENT-LVL III: CPT | Mod: PBBFAC,,, | Performed by: PHYSICIAN ASSISTANT

## 2020-01-08 PROCEDURE — 1125F AMNT PAIN NOTED PAIN PRSNT: CPT | Mod: S$GLB,,, | Performed by: PHYSICIAN ASSISTANT

## 2020-01-08 PROCEDURE — 1101F PT FALLS ASSESS-DOCD LE1/YR: CPT | Mod: CPTII,S$GLB,, | Performed by: PHYSICIAN ASSISTANT

## 2020-01-08 PROCEDURE — 3074F SYST BP LT 130 MM HG: CPT | Mod: CPTII,S$GLB,, | Performed by: PHYSICIAN ASSISTANT

## 2020-01-08 NOTE — PROGRESS NOTES
"CC: Hypothyroidism    HPI: Marisela Eagle is a 74 y.o. female here for hypothyroidism along with pending conditions listed in the Visit Diagnosis. Diagnosed several years ago. Her daughter had thyroid cancer. They think this was due to radiation on her face for acne. Her cousin and grandmother had T2DM. She is taking biotin.    Taking 100 mcg of LT4 daily. She takes it ~6 am and waits an hour before eating.    +heat intolerance, wt gain, sweating (on left side, taking elavil--states this helps)    Sweating improved since she changed HRT to a compound cream.     No d/c, palpitations, tremors, hair loss or changes in nails.    Thyroid u/s showed 2 subcentimeter nodules that have not changed in size since 2012. Repeat in 3 years. Reports SOB (seeing cardiology). No dysphagia or voice changes.     DEXA 2/19: wnl. No falls or fractures.     PMHx, PSHx: reviewed in epic.  Social Hx: Occasionally has mixed 2-3 drinks. She smoked 0.5 ppd for 53 years.     ROS:   Constitutional: feels tired, wt gain  Eyes: No recent visual changes  Cardiovascular: + occasional cp and palpitations  Respiratory: + SOB, no cough  Gastrointestinal: Denies recent bowel disturbances  GenitoUrinary - No dysuria  Skin: No new skin rash  Musc: + back pain, knee pain  Neurologic: + numbness and tingling in feet  Endocrine: no polyphagia, polydipsia or polyuria  Remainder ROS negative     /78 (BP Location: Right arm, Patient Position: Sitting, BP Method: Small (Manual))   Pulse 78   Temp 97.7 °F (36.5 °C) (Oral)   Ht 5' 9" (1.753 m)   Wt 118.8 kg (261 lb 14.5 oz)   BMI 38.68 kg/m²      Personally reviewed labs below:    1/6/20  BMP wnl  A1c 5.5%  TSH 3.9      FT4 1.1  FT3 2.9  Vitamin D 39  Cholesterol 148  HDL 53  Trig 109  LDL 76    Lab Results   Component Value Date    TSH 1.829 07/03/2019    R4SJOZA 99 07/03/2019    FREET4 0.97 07/03/2019        Chemistry        Component Value Date/Time     10/03/2019 1244    K 4.1 10/03/2019 " 1244     10/03/2019 1244    CO2 31 10/03/2019 1244    BUN 14 10/03/2019 1244    CREATININE 0.67 10/03/2019 1244    GLU 84 10/03/2019 1244        Component Value Date/Time    CALCIUM 9.7 10/03/2019 1244    ALKPHOS 54 10/03/2019 1244    AST 17 10/03/2019 1244    ALT 18 10/03/2019 1244    BILITOT 0.4 10/03/2019 1244    ESTGFRAFRICA 99 10/03/2019 1244    EGFRNONAA 85 10/03/2019 1244         Lab Results   Component Value Date    HGBA1C 5.4 07/03/2019    HGBA1C 5.5 09/02/2011    HGBA1C 4.8 09/28/2004      PE:  GENERAL: middle aged female, well developed, well nourished  NECK: Supple neck, normal thyroid. No bruit  LYMPHATIC: No cervical or supraclavicular lymphadenopathy  CARDIOVASCULAR: Normal heart sounds, no pedal edema  RESPIRATORY: Normal effort, clear to auscultation bl  MUSC: ambulates with a four pronged cane  ABDOMEN: soft, non-tender, non-distended.  PSYCH: no anxiety or depression  FEET: appropriate footwear.     Assessment/Plan:   1. Hypothyroidism, unspecified type  T4, free    TSH   2. Goiter  T4, free    TSH    Comprehensive metabolic panel   3. Hypertension, unspecified type     4. Hyperlipidemia, unspecified hyperlipidemia type     5. Postmenopausal     6. Obesity (BMI 30-39.9)     7. Hypovitaminosis D  Vitamin D       Hypothyroidism-TSH is on the high side of normal. Start taking levothyroxine 100 mcg Monday-Saturday and 150 mcg on Sundays. Repeat thyroid labs in 4 weeks.   Goiter-repeat thyroid u/s in 8/21  RJP-gqgkjq-djimhhou meds  JER-eetjcw-txqdycje statin  Postmenopausal-DEXA scan 2/21  Obesity-Body mass index is 38.68 kg/m². Encouraged to increase exercise. Declines MNT.  Hypovitaminosis e-usffzn-izgmduyy vd intake  Sweating-check metanephrines. Dex suppression test was negative in the past.     F/u in 6 mths

## 2020-01-28 ENCOUNTER — TELEPHONE (OUTPATIENT)
Dept: ENDOCRINOLOGY | Facility: CLINIC | Age: 75
End: 2020-01-28

## 2020-01-28 NOTE — TELEPHONE ENCOUNTER
----- Message from Princess ALEXEI Ball sent at 1/28/2020 10:39 AM CST -----  Contact: Patient  Type: Needs Medical Advice    Who Called: Patient  Best Call Back Number:   Additional Information: Requesting a call in regards t patient want to know if she needs to be seen after her has her labs on 02/06/20. Please tamiko to advise

## 2020-02-07 ENCOUNTER — LAB VISIT (OUTPATIENT)
Dept: LAB | Facility: HOSPITAL | Age: 75
End: 2020-02-07
Attending: PHYSICIAN ASSISTANT
Payer: MEDICARE

## 2020-02-07 DIAGNOSIS — L74.9 SWEATING ABNORMALITY: ICD-10-CM

## 2020-02-07 DIAGNOSIS — E04.9 GOITER: ICD-10-CM

## 2020-02-07 LAB
T4 FREE SERPL-MCNC: 0.95 NG/DL (ref 0.71–1.51)
TSH SERPL DL<=0.005 MIU/L-ACNC: 5.61 UIU/ML (ref 0.4–4)

## 2020-02-07 PROCEDURE — 36415 COLL VENOUS BLD VENIPUNCTURE: CPT | Mod: PO

## 2020-02-07 PROCEDURE — 84439 ASSAY OF FREE THYROXINE: CPT

## 2020-02-07 PROCEDURE — 84443 ASSAY THYROID STIM HORMONE: CPT

## 2020-02-07 PROCEDURE — 83835 ASSAY OF METANEPHRINES: CPT

## 2020-02-10 DIAGNOSIS — E03.9 HYPOTHYROIDISM, UNSPECIFIED TYPE: Primary | ICD-10-CM

## 2020-02-10 RX ORDER — LEVOTHYROXINE SODIUM 112 UG/1
112 TABLET ORAL
Qty: 30 TABLET | Refills: 11 | Status: SHIPPED | OUTPATIENT
Start: 2020-02-10 | End: 2020-06-25

## 2020-02-13 ENCOUNTER — OFFICE VISIT (OUTPATIENT)
Dept: DERMATOLOGY | Facility: CLINIC | Age: 75
End: 2020-02-13
Payer: MEDICARE

## 2020-02-13 VITALS — WEIGHT: 261 LBS | BODY MASS INDEX: 38.66 KG/M2 | HEIGHT: 69 IN

## 2020-02-13 DIAGNOSIS — L73.9 FOLLICULITIS: Primary | ICD-10-CM

## 2020-02-13 DIAGNOSIS — L29.9 ITCH: ICD-10-CM

## 2020-02-13 LAB
METANEPH FREE SERPL-MCNC: <25 PG/ML
METANEPHS SERPL-MCNC: 82 PG/ML
NORMETANEPH FREE SERPL-MCNC: 82 PG/ML

## 2020-02-13 PROCEDURE — 99999 PR PBB SHADOW E&M-EST. PATIENT-LVL IV: CPT | Mod: PBBFAC,,, | Performed by: DERMATOLOGY

## 2020-02-13 PROCEDURE — 1101F PR PT FALLS ASSESS DOC 0-1 FALLS W/OUT INJ PAST YR: ICD-10-PCS | Mod: CPTII,S$GLB,, | Performed by: DERMATOLOGY

## 2020-02-13 PROCEDURE — 1101F PT FALLS ASSESS-DOCD LE1/YR: CPT | Mod: CPTII,S$GLB,, | Performed by: DERMATOLOGY

## 2020-02-13 PROCEDURE — 1126F AMNT PAIN NOTED NONE PRSNT: CPT | Mod: S$GLB,,, | Performed by: DERMATOLOGY

## 2020-02-13 PROCEDURE — 99999 PR PBB SHADOW E&M-EST. PATIENT-LVL IV: ICD-10-PCS | Mod: PBBFAC,,, | Performed by: DERMATOLOGY

## 2020-02-13 PROCEDURE — 99213 PR OFFICE/OUTPT VISIT, EST, LEVL III, 20-29 MIN: ICD-10-PCS | Mod: S$GLB,,, | Performed by: DERMATOLOGY

## 2020-02-13 PROCEDURE — 99213 OFFICE O/P EST LOW 20 MIN: CPT | Mod: S$GLB,,, | Performed by: DERMATOLOGY

## 2020-02-13 PROCEDURE — 1126F PR PAIN SEVERITY QUANTIFIED, NO PAIN PRESENT: ICD-10-PCS | Mod: S$GLB,,, | Performed by: DERMATOLOGY

## 2020-02-13 PROCEDURE — 1159F MED LIST DOCD IN RCRD: CPT | Mod: S$GLB,,, | Performed by: DERMATOLOGY

## 2020-02-13 PROCEDURE — 1159F PR MEDICATION LIST DOCUMENTED IN MEDICAL RECORD: ICD-10-PCS | Mod: S$GLB,,, | Performed by: DERMATOLOGY

## 2020-02-13 NOTE — PATIENT INSTRUCTIONS
1. OTC Sulfur bar soap ([employer] or [employer])   Lather on the scalp 3-5 minutes then rinse off. Ongoing maintenance for control. Use every other day then 2 times weekly.    2. Can continue clobetasol solution for itching.          Call us after 10 days if not better with soap usage.

## 2020-02-13 NOTE — PROGRESS NOTES
Subjective:       Patient ID:  Marisela Eagle is a 74 y.o. female who presents for   Chief Complaint   Patient presents with    Rash     scalp    Spot     scalp     HPI    New patient   Last o/v with Dr Cedeño 10/2018    Patient presents today for rashy red spots on the scalp, x 1 week, slight itching with some tenderness, using rx clobetasol solution.    Review of Systems   Constitutional: Negative for fever, chills and fatigue.   Gastrointestinal: Negative for nausea, vomiting and diarrhea.   Musculoskeletal: Negative for joint swelling and arthralgias.   Skin: Positive for itching and rash.   Hematologic/Lymphatic: Does not bruise/bleed easily.        Objective:    Physical Exam   Constitutional: She appears well-developed and well-nourished. No distress.   HENT:   Mouth/Throat: Lips normal.    Eyes: No conjunctival no injection.   Neurological: She is alert and oriented to person, place, and time. She is not disoriented.   Psychiatric: She has a normal mood and affect.   Skin:   Areas Examined (abnormalities noted in diagram):   Scalp / Hair Palpated and Inspected  Head / Face Inspection Performed  Neck Inspection Performed              Diagram Legend     Erythematous scaling macule/papule c/w actinic keratosis       Vascular papule c/w angioma      Pigmented verrucoid papule/plaque c/w seborrheic keratosis      Yellow umbilicated papule c/w sebaceous hyperplasia      Irregularly shaped tan macule c/w lentigo     1-2 mm smooth white papules consistent with Milia      Movable subcutaneous cyst with punctum c/w epidermal inclusion cyst      Subcutaneous movable cyst c/w pilar cyst      Firm pink to brown papule c/w dermatofibroma      Pedunculated fleshy papule(s) c/w skin tag(s)      Evenly pigmented macule c/w junctional nevus     Mildly variegated pigmented, slightly irregular-bordered macule c/w mildly atypical nevus      Flesh colored to evenly pigmented papule c/w intradermal nevus       Pink pearly  papule/plaque c/w basal cell carcinoma      Erythematous hyperkeratotic cursted plaque c/w SCC      Surgical scar with no sign of skin cancer recurrence      Open and closed comedones      Inflammatory papules and pustules      Verrucoid papule consistent consistent with wart     Erythematous eczematous patches and plaques     Dystrophic onycholytic nail with subungual debris c/w onychomycosis     Umbilicated papule    Erythematous-base heme-crusted tan verrucoid plaque consistent with inflamed seborrheic keratosis     Erythematous Silvery Scaling Plaque c/w Psoriasis     See annotation      Assessment / Plan:        Folliculitis  Discussed with patient the etiology and pathogenesis of the disease or skin lesion(s) and possible treatments and aggravators.    Reviewed with patient different treatment options and associated risks.  Proper application of medications and or care for affected area(s) and condition(s) reviewed.  Chronic nature of this condition discussed with patient.  Related to increased sweating that is related to menopause.  Patient to start using sulfur bar soap for the face 1-2 x day.  For scalp usage lather from the soap to be applied to the roots of the scalp and allow to sit for 3-5 minutes regularly.  Patient and or guardian to monitor this area/lesion or these areas/lesions for changes or worsening.  Patient and or guardian to contact us if any changes are noted for such.    Itch   Discussed with patient the etiology and pathogenesis of the disease or skin lesion(s) and possible treatments and aggravators.    Reviewed with patient different treatment options and associated risks.  Pt's steroid sol bid prn focally.  Proper application of medications and or care for affected area(s) and condition(s) reviewed.             Follow up if symptoms worsen or fail to improve.

## 2020-03-11 ENCOUNTER — OFFICE VISIT (OUTPATIENT)
Dept: DERMATOLOGY | Facility: CLINIC | Age: 75
End: 2020-03-11
Payer: MEDICARE

## 2020-03-11 VITALS — WEIGHT: 261 LBS | HEIGHT: 69 IN | BODY MASS INDEX: 38.66 KG/M2

## 2020-03-11 DIAGNOSIS — T36.95XA ANTIBIOTIC-INDUCED YEAST INFECTION: ICD-10-CM

## 2020-03-11 DIAGNOSIS — L25.9 CONTACT DERMATITIS, UNSPECIFIED CONTACT DERMATITIS TYPE, UNSPECIFIED TRIGGER: Primary | ICD-10-CM

## 2020-03-11 DIAGNOSIS — B37.9 ANTIBIOTIC-INDUCED YEAST INFECTION: ICD-10-CM

## 2020-03-11 DIAGNOSIS — L72.0 EIC (EPIDERMAL INCLUSION CYST): ICD-10-CM

## 2020-03-11 DIAGNOSIS — L73.9 FOLLICULITIS: ICD-10-CM

## 2020-03-11 PROCEDURE — 99999 PR PBB SHADOW E&M-EST. PATIENT-LVL III: CPT | Mod: PBBFAC,,, | Performed by: DERMATOLOGY

## 2020-03-11 PROCEDURE — 99999 PR PBB SHADOW E&M-EST. PATIENT-LVL III: ICD-10-PCS | Mod: PBBFAC,,, | Performed by: DERMATOLOGY

## 2020-03-11 PROCEDURE — 1159F PR MEDICATION LIST DOCUMENTED IN MEDICAL RECORD: ICD-10-PCS | Mod: S$GLB,,, | Performed by: DERMATOLOGY

## 2020-03-11 PROCEDURE — 1126F PR PAIN SEVERITY QUANTIFIED, NO PAIN PRESENT: ICD-10-PCS | Mod: S$GLB,,, | Performed by: DERMATOLOGY

## 2020-03-11 PROCEDURE — 1101F PR PT FALLS ASSESS DOC 0-1 FALLS W/OUT INJ PAST YR: ICD-10-PCS | Mod: CPTII,S$GLB,, | Performed by: DERMATOLOGY

## 2020-03-11 PROCEDURE — 1101F PT FALLS ASSESS-DOCD LE1/YR: CPT | Mod: CPTII,S$GLB,, | Performed by: DERMATOLOGY

## 2020-03-11 PROCEDURE — 99214 PR OFFICE/OUTPT VISIT, EST, LEVL IV, 30-39 MIN: ICD-10-PCS | Mod: S$GLB,,, | Performed by: DERMATOLOGY

## 2020-03-11 PROCEDURE — 1159F MED LIST DOCD IN RCRD: CPT | Mod: S$GLB,,, | Performed by: DERMATOLOGY

## 2020-03-11 PROCEDURE — 99214 OFFICE O/P EST MOD 30 MIN: CPT | Mod: S$GLB,,, | Performed by: DERMATOLOGY

## 2020-03-11 PROCEDURE — 1126F AMNT PAIN NOTED NONE PRSNT: CPT | Mod: S$GLB,,, | Performed by: DERMATOLOGY

## 2020-03-11 RX ORDER — DOXYCYCLINE 100 MG/1
100 CAPSULE ORAL 2 TIMES DAILY
Qty: 60 CAPSULE | Refills: 0 | Status: SHIPPED | OUTPATIENT
Start: 2020-03-11 | End: 2020-05-26 | Stop reason: SDUPTHER

## 2020-03-11 RX ORDER — FLUCONAZOLE 150 MG/1
150 TABLET ORAL DAILY
Qty: 5 TABLET | Refills: 0 | Status: SHIPPED | OUTPATIENT
Start: 2020-03-11 | End: 2020-03-12

## 2020-03-11 NOTE — PATIENT INSTRUCTIONS
1. Rx oral antibiotics: Doxycyline 2 times daily with food. No dairy, calcium, or magnesium an hour before or an hour after taking.     2. Try to avoid hair coloring and no other products in the scalp.     3. Continue sulfur bar soap and clobetasol solution ONLY when itching        Follow up 3 weeks.

## 2020-04-23 ENCOUNTER — PATIENT MESSAGE (OUTPATIENT)
Dept: DERMATOLOGY | Facility: CLINIC | Age: 75
End: 2020-04-23

## 2020-05-14 ENCOUNTER — TELEPHONE (OUTPATIENT)
Dept: DERMATOLOGY | Facility: CLINIC | Age: 75
End: 2020-05-14

## 2020-05-14 NOTE — TELEPHONE ENCOUNTER
----- Message from Truong Watts MA sent at 5/14/2020 10:38 AM CDT -----  Contact: Patient   Type: Needs Medical Advice    Who Called:  Patient   Patient would like a sooner appt due to the sores in her head returning. Patient is scheduled for 6-, but would like to be seen as soon as there is an opening.   Best Call Back Number: [phone]

## 2020-05-26 ENCOUNTER — OFFICE VISIT (OUTPATIENT)
Dept: DERMATOLOGY | Facility: CLINIC | Age: 75
End: 2020-05-26
Payer: MEDICARE

## 2020-05-26 VITALS — HEIGHT: 69 IN | BODY MASS INDEX: 38.66 KG/M2 | WEIGHT: 261 LBS

## 2020-05-26 DIAGNOSIS — L73.9 FOLLICULITIS: Primary | ICD-10-CM

## 2020-05-26 DIAGNOSIS — L25.9 CONTACT DERMATITIS, UNSPECIFIED CONTACT DERMATITIS TYPE, UNSPECIFIED TRIGGER: ICD-10-CM

## 2020-05-26 DIAGNOSIS — L29.9 ITCH: ICD-10-CM

## 2020-05-26 PROCEDURE — 99213 OFFICE O/P EST LOW 20 MIN: CPT | Mod: 25,S$GLB,, | Performed by: DERMATOLOGY

## 2020-05-26 PROCEDURE — 1126F AMNT PAIN NOTED NONE PRSNT: CPT | Mod: S$GLB,,, | Performed by: DERMATOLOGY

## 2020-05-26 PROCEDURE — 1126F PR PAIN SEVERITY QUANTIFIED, NO PAIN PRESENT: ICD-10-PCS | Mod: S$GLB,,, | Performed by: DERMATOLOGY

## 2020-05-26 PROCEDURE — 99999 PR PBB SHADOW E&M-EST. PATIENT-LVL IV: CPT | Mod: PBBFAC,,, | Performed by: DERMATOLOGY

## 2020-05-26 PROCEDURE — 95044 PATCH/APPLICATION TESTS: CPT | Mod: S$GLB,,, | Performed by: DERMATOLOGY

## 2020-05-26 PROCEDURE — 1101F PR PT FALLS ASSESS DOC 0-1 FALLS W/OUT INJ PAST YR: ICD-10-PCS | Mod: CPTII,S$GLB,, | Performed by: DERMATOLOGY

## 2020-05-26 PROCEDURE — 1159F MED LIST DOCD IN RCRD: CPT | Mod: S$GLB,,, | Performed by: DERMATOLOGY

## 2020-05-26 PROCEDURE — 95044 PR ALLERGY PATCH TESTS: ICD-10-PCS | Mod: S$GLB,,, | Performed by: DERMATOLOGY

## 2020-05-26 PROCEDURE — 1101F PT FALLS ASSESS-DOCD LE1/YR: CPT | Mod: CPTII,S$GLB,, | Performed by: DERMATOLOGY

## 2020-05-26 PROCEDURE — 1159F PR MEDICATION LIST DOCUMENTED IN MEDICAL RECORD: ICD-10-PCS | Mod: S$GLB,,, | Performed by: DERMATOLOGY

## 2020-05-26 PROCEDURE — 99213 PR OFFICE/OUTPT VISIT, EST, LEVL III, 20-29 MIN: ICD-10-PCS | Mod: 25,S$GLB,, | Performed by: DERMATOLOGY

## 2020-05-26 PROCEDURE — 99999 PR PBB SHADOW E&M-EST. PATIENT-LVL IV: ICD-10-PCS | Mod: PBBFAC,,, | Performed by: DERMATOLOGY

## 2020-05-26 RX ORDER — DOXYCYCLINE 100 MG/1
100 CAPSULE ORAL DAILY
Qty: 30 CAPSULE | Refills: 0 | Status: SHIPPED | OUTPATIENT
Start: 2020-05-26 | End: 2020-10-27

## 2020-05-26 NOTE — PROGRESS NOTES
Subjective:       Patient ID:  Marisela Eagle is a 75 y.o. female who presents for   Chief Complaint   Patient presents with    Dermatitis     HPI    Last o/v 3/11/2020  Contact dermatitis, unspecified contact dermatitis type, unspecified trigger  Told patient to stop all products and make up.  Discussed that less is more right now.  Patient to wash with glycerin bar soap and avoid hot water.  Avoid fragrances.  Change laundry detergent to free and clear.  Patient may need patch testing if index of suspicion is high and patient continues to flare with rashes.  Shampoo and conditioner okay.  Reviewed with patient different treatment options and associated risks.  Discussed with patient the etiology and pathogenesis of the disease or skin lesion(s) and possible treatments and aggravators.       Folliculitis  -     doxycycline (MONODOX) 100 MG capsule; Take 1 capsule (100 mg total) by mouth 2 (two) times daily.  Dispense: 60 capsule; Refill: 0  Still breaking out.  Discussed with patient the etiology and pathogenesis of the disease or skin lesion(s) and possible treatments and aggravators.    Reviewed with patient different treatment options and associated risks.  Discussed benefits and risks of doxycyline therapy including but not limited to GI discomfort, esophageal irritation/ulceration, and increased sun sensitivity. Patient was counseled to take medicine with meals and at least 1 hour before lying down.   Patient to start using sulfur bar soap for the face 1-2 x day.  For scalp usage lather from the soap to be applied to the roots of the scalp and allow to sit for 3-5 minutes regularly.  Same instructions for other affected areas.  Proper application of medications and or care for affected area(s) and condition(s) reviewed.     EIC (epidermal inclusion cyst)  Discussed with patient the likelihood that this lesion is an epidermal cyst caused by an involuted lining of skin with dead skin trapped inside.  Cysts  do not have a malignant potential but can become infected and turn into abscesses with possible cellulitis.  Discussed the option of warm compresses if infected with oral antibiotics.  Discussed the option of surgical excision with scar, possible recurrence, hematoma, and infection.  Patient to consider these options.  Discussed with patient the importance of not picking at the skin due to risks of infection, non healing, and scarring.       Antibiotic-induced yeast infection  -     fluconazole (DIFLUCAN) 150 MG Tab; Take 1 tablet (150 mg total) by mouth once daily. for 1 day  Dispense: 5 tablet; Refill: 0  Reviewed with patient different treatment options and associated risks.     Itch  Cont pt's steroid sol focally bid prn.  Reviewed with patient different treatment options and associated risks.       Patient presents today for follow up.  Finished antibiotics, using OTC shampoo, tired sulfa bar soap didn't notice improvement.  Bumpy with some itch.    Review of Systems   Constitutional: Negative for fever, chills and fatigue.   HENT: Negative for congestion, sore throat, mouth sores and headaches.    Gastrointestinal: Negative for nausea, vomiting, abdominal pain, diarrhea and indigestion.   Musculoskeletal: Negative for joint swelling and arthralgias.   Skin: Positive for itching and dry skin.   Neurological: Negative for headaches.   Hematologic/Lymphatic: Does not bruise/bleed easily.        Objective:    Physical Exam   Constitutional: She appears well-developed and well-nourished. No distress.   Eyes: No conjunctival no injection.   Neurological: She is alert and oriented to person, place, and time. She is not disoriented.   Psychiatric: She has a normal mood and affect.   Skin:   Areas Examined (abnormalities noted in diagram):   Scalp / Hair Palpated and Inspected              Diagram Legend     Erythematous scaling macule/papule c/w actinic keratosis       Vascular papule c/w angioma      Pigmented  verrucoid papule/plaque c/w seborrheic keratosis      Yellow umbilicated papule c/w sebaceous hyperplasia      Irregularly shaped tan macule c/w lentigo     1-2 mm smooth white papules consistent with Milia      Movable subcutaneous cyst with punctum c/w epidermal inclusion cyst      Subcutaneous movable cyst c/w pilar cyst      Firm pink to brown papule c/w dermatofibroma      Pedunculated fleshy papule(s) c/w skin tag(s)      Evenly pigmented macule c/w junctional nevus     Mildly variegated pigmented, slightly irregular-bordered macule c/w mildly atypical nevus      Flesh colored to evenly pigmented papule c/w intradermal nevus       Pink pearly papule/plaque c/w basal cell carcinoma      Erythematous hyperkeratotic cursted plaque c/w SCC      Surgical scar with no sign of skin cancer recurrence      Open and closed comedones      Inflammatory papules and pustules      Verrucoid papule consistent consistent with wart     Erythematous eczematous patches and plaques     Dystrophic onycholytic nail with subungual debris c/w onychomycosis     Umbilicated papule    Erythematous-base heme-crusted tan verrucoid plaque consistent with inflamed seborrheic keratosis     Erythematous Silvery Scaling Plaque c/w Psoriasis     See annotation      Assessment / Plan:        Folliculitis  -     doxycycline (MONODOX) 100 MG capsule; Take 1 capsule (100 mg total) by mouth once daily.  Dispense: 30 capsule; Refill: 0  Discussed with patient the etiology and pathogenesis of the disease or skin lesion(s) and possible treatments and aggravators.    Chronic nature of this condition discussed with patient.  Pt didn't think sulfur soap helped, so stop.  hibiclens qd x 3-5 min soaks.  Discussed benefits and risks of doxycyline therapy including but not limited to GI discomfort, esophageal irritation/ulceration, and increased sun sensitivity. Patient was counseled to take medicine with meals and at least 1 hour before lying down.      Itch  Discussed with patient the etiology and pathogenesis of the disease or skin lesion(s) and possible treatments and aggravators.    Hold clob sol for now.    Contact dermatitis, unspecified contact dermatitis type, unspecified trigger  Suspected.  Discussed with patient the etiology and pathogenesis of the disease or skin lesion(s) and possible treatments and aggravators.    Told patient to stop all products and make up.  Discussed that less is more right now.  Patient to wash with glycerin bar soap and avoid hot water.  Avoid fragrances.  Change laundry detergent to free and clear.  Patient may need patch testing if index of suspicion is high and patient continues to flare with rashes.  Hold hair coloring!  Pt only waited 6 weeks before redoing.  Discussed today with the patient the option of patch testing to alleviate the risk of allergens.  36 allergens applied to the back today.  Patient instructed to avoid getting the back wet or sweaty and to avoid antihistamines.  Also advised that the testing may not divulge any useful information.             Follow up in about 2 days (around 5/28/2020).

## 2020-05-26 NOTE — PATIENT INSTRUCTIONS
1. Stop using rx solution.    2. OTC Hibiclens, let sit on the scalp for a few minutes then rinse off. (5 minutes) (sold at [facility] or [facility])    3. Restart antibiotics, once daily.    4. Consider stopping hair colorings. (for about 6 months)

## 2020-05-27 ENCOUNTER — TELEPHONE (OUTPATIENT)
Dept: RHEUMATOLOGY | Facility: CLINIC | Age: 75
End: 2020-05-27

## 2020-05-27 NOTE — TELEPHONE ENCOUNTER
----- Message from Robert Hogan sent at 5/27/2020 10:44 AM CDT -----  Contact: pt  Type: Needs Medical Advice    Who Called:  pt    Best Call Back Number: [phone]  Additional Information: pt would like to schedule a new pt appointment. Please call to advise.

## 2020-05-28 ENCOUNTER — CLINICAL SUPPORT (OUTPATIENT)
Dept: DERMATOLOGY | Facility: CLINIC | Age: 75
End: 2020-05-28
Payer: MEDICARE

## 2020-05-28 DIAGNOSIS — L25.9 CONTACT DERMATITIS, UNSPECIFIED CONTACT DERMATITIS TYPE, UNSPECIFIED TRIGGER: Primary | ICD-10-CM

## 2020-05-28 NOTE — PROGRESS NOTES
Patient here to have patch test removed and first reading.  I re-marked the edges and notches prior to removing the patches.  No reaction was seen in any of the testing areas.  Advised patient that she can shower but do not scrub her back.  Set home with patch test reading papers and advised to have a friend or family member apply the readers and write down the name under any area that has a reactionif there are any.  Continue with recommended treatment and avoid hair color per Dr Thornton.

## 2020-06-01 ENCOUNTER — TELEPHONE (OUTPATIENT)
Dept: DERMATOLOGY | Facility: CLINIC | Age: 75
End: 2020-06-01

## 2020-06-01 NOTE — TELEPHONE ENCOUNTER
Patient states that nickel and #33 showed up with a reaction.  She states she knows she has a problem with nickel because she cannot wear costume jewlry and nickel free earrings are ok on her.

## 2020-06-01 NOTE — TELEPHONE ENCOUNTER
----- Message from Maria Luisa Edmond sent at 6/1/2020 10:08 AM CDT -----  Contact: self  Type:  Test Results    Who Called:  patient  Name of Test (Lab/Mammo/Etc):  Allergy test Shyanne did the patch on 5/28  Date of Test:  5/26  Ordering Provider:  Dr Thornton  Where the test was performed:  [facility]  Best Call Back Number:  [phone] (home)   Additional Information:  na

## 2020-06-17 ENCOUNTER — LAB VISIT (OUTPATIENT)
Dept: LAB | Facility: HOSPITAL | Age: 75
End: 2020-06-17
Attending: PHYSICIAN ASSISTANT
Payer: MEDICARE

## 2020-06-17 DIAGNOSIS — E04.9 GOITER: ICD-10-CM

## 2020-06-17 DIAGNOSIS — E03.9 HYPOTHYROIDISM, UNSPECIFIED TYPE: ICD-10-CM

## 2020-06-17 DIAGNOSIS — E55.9 HYPOVITAMINOSIS D: ICD-10-CM

## 2020-06-17 PROCEDURE — 82306 VITAMIN D 25 HYDROXY: CPT

## 2020-06-17 PROCEDURE — 36415 COLL VENOUS BLD VENIPUNCTURE: CPT | Mod: PO

## 2020-06-17 PROCEDURE — 84443 ASSAY THYROID STIM HORMONE: CPT

## 2020-06-17 PROCEDURE — 80053 COMPREHEN METABOLIC PANEL: CPT

## 2020-06-17 PROCEDURE — 84439 ASSAY OF FREE THYROXINE: CPT

## 2020-06-18 LAB
25(OH)D3+25(OH)D2 SERPL-MCNC: 32 NG/ML (ref 30–96)
ALBUMIN SERPL BCP-MCNC: 4.1 G/DL (ref 3.5–5.2)
ALP SERPL-CCNC: 55 U/L (ref 55–135)
ALT SERPL W/O P-5'-P-CCNC: 21 U/L (ref 10–44)
ANION GAP SERPL CALC-SCNC: 11 MMOL/L (ref 8–16)
AST SERPL-CCNC: 19 U/L (ref 10–40)
BILIRUB SERPL-MCNC: 0.4 MG/DL (ref 0.1–1)
BUN SERPL-MCNC: 13 MG/DL (ref 8–23)
CALCIUM SERPL-MCNC: 9.4 MG/DL (ref 8.7–10.5)
CHLORIDE SERPL-SCNC: 98 MMOL/L (ref 95–110)
CO2 SERPL-SCNC: 30 MMOL/L (ref 23–29)
CREAT SERPL-MCNC: 0.8 MG/DL (ref 0.5–1.4)
EST. GFR  (AFRICAN AMERICAN): >60 ML/MIN/1.73 M^2
EST. GFR  (NON AFRICAN AMERICAN): >60 ML/MIN/1.73 M^2
GLUCOSE SERPL-MCNC: 86 MG/DL (ref 70–110)
POTASSIUM SERPL-SCNC: 4.3 MMOL/L (ref 3.5–5.1)
PROT SERPL-MCNC: 7 G/DL (ref 6–8.4)
SODIUM SERPL-SCNC: 139 MMOL/L (ref 136–145)
T4 FREE SERPL-MCNC: 1.06 NG/DL (ref 0.71–1.51)
TSH SERPL DL<=0.005 MIU/L-ACNC: 3.11 UIU/ML (ref 0.4–4)

## 2020-06-25 ENCOUNTER — OFFICE VISIT (OUTPATIENT)
Dept: ENDOCRINOLOGY | Facility: CLINIC | Age: 75
End: 2020-06-25
Payer: MEDICARE

## 2020-06-25 VITALS
BODY MASS INDEX: 35.33 KG/M2 | HEART RATE: 76 BPM | DIASTOLIC BLOOD PRESSURE: 70 MMHG | TEMPERATURE: 98 F | SYSTOLIC BLOOD PRESSURE: 108 MMHG | WEIGHT: 238.56 LBS | HEIGHT: 69 IN

## 2020-06-25 DIAGNOSIS — I10 HYPERTENSION, UNSPECIFIED TYPE: ICD-10-CM

## 2020-06-25 DIAGNOSIS — E66.9 OBESITY (BMI 30-39.9): ICD-10-CM

## 2020-06-25 DIAGNOSIS — E03.9 HYPOTHYROIDISM, UNSPECIFIED TYPE: Primary | ICD-10-CM

## 2020-06-25 DIAGNOSIS — E78.5 HYPERLIPIDEMIA, UNSPECIFIED HYPERLIPIDEMIA TYPE: ICD-10-CM

## 2020-06-25 DIAGNOSIS — E04.9 GOITER: ICD-10-CM

## 2020-06-25 DIAGNOSIS — Z78.0 POSTMENOPAUSAL: ICD-10-CM

## 2020-06-25 DIAGNOSIS — E55.9 HYPOVITAMINOSIS D: ICD-10-CM

## 2020-06-25 PROCEDURE — 3074F SYST BP LT 130 MM HG: CPT | Mod: CPTII,S$GLB,, | Performed by: PHYSICIAN ASSISTANT

## 2020-06-25 PROCEDURE — 1159F PR MEDICATION LIST DOCUMENTED IN MEDICAL RECORD: ICD-10-PCS | Mod: S$GLB,,, | Performed by: PHYSICIAN ASSISTANT

## 2020-06-25 PROCEDURE — 1159F MED LIST DOCD IN RCRD: CPT | Mod: S$GLB,,, | Performed by: PHYSICIAN ASSISTANT

## 2020-06-25 PROCEDURE — 1101F PR PT FALLS ASSESS DOC 0-1 FALLS W/OUT INJ PAST YR: ICD-10-PCS | Mod: CPTII,S$GLB,, | Performed by: PHYSICIAN ASSISTANT

## 2020-06-25 PROCEDURE — 99999 PR PBB SHADOW E&M-EST. PATIENT-LVL IV: ICD-10-PCS | Mod: PBBFAC,,, | Performed by: PHYSICIAN ASSISTANT

## 2020-06-25 PROCEDURE — 3078F DIAST BP <80 MM HG: CPT | Mod: CPTII,S$GLB,, | Performed by: PHYSICIAN ASSISTANT

## 2020-06-25 PROCEDURE — 3074F PR MOST RECENT SYSTOLIC BLOOD PRESSURE < 130 MM HG: ICD-10-PCS | Mod: CPTII,S$GLB,, | Performed by: PHYSICIAN ASSISTANT

## 2020-06-25 PROCEDURE — 99214 PR OFFICE/OUTPT VISIT, EST, LEVL IV, 30-39 MIN: ICD-10-PCS | Mod: S$GLB,,, | Performed by: PHYSICIAN ASSISTANT

## 2020-06-25 PROCEDURE — 99999 PR PBB SHADOW E&M-EST. PATIENT-LVL IV: CPT | Mod: PBBFAC,,, | Performed by: PHYSICIAN ASSISTANT

## 2020-06-25 PROCEDURE — 1126F PR PAIN SEVERITY QUANTIFIED, NO PAIN PRESENT: ICD-10-PCS | Mod: S$GLB,,, | Performed by: PHYSICIAN ASSISTANT

## 2020-06-25 PROCEDURE — 3078F PR MOST RECENT DIASTOLIC BLOOD PRESSURE < 80 MM HG: ICD-10-PCS | Mod: CPTII,S$GLB,, | Performed by: PHYSICIAN ASSISTANT

## 2020-06-25 PROCEDURE — 99214 OFFICE O/P EST MOD 30 MIN: CPT | Mod: S$GLB,,, | Performed by: PHYSICIAN ASSISTANT

## 2020-06-25 PROCEDURE — 1126F AMNT PAIN NOTED NONE PRSNT: CPT | Mod: S$GLB,,, | Performed by: PHYSICIAN ASSISTANT

## 2020-06-25 PROCEDURE — 1101F PT FALLS ASSESS-DOCD LE1/YR: CPT | Mod: CPTII,S$GLB,, | Performed by: PHYSICIAN ASSISTANT

## 2020-06-25 RX ORDER — LEVOTHYROXINE SODIUM 125 UG/1
125 TABLET ORAL
Qty: 30 TABLET | Refills: 11 | Status: SHIPPED | OUTPATIENT
Start: 2020-06-25 | End: 2020-08-11 | Stop reason: SDUPTHER

## 2020-06-25 NOTE — PROGRESS NOTES
"CC: Hypothyroidism    HPI: Marisela Eagle is a 75 y.o. female here for hypothyroidism along with pending conditions listed in the Visit Diagnosis. Diagnosed several years ago. Her daughter had thyroid cancer. They think this was due to radiation on her face for acne. Her cousin and grandmother had T2DM. She is taking biotin.    Taking 112 mcg daily. She takes it ~6 am and waits an hour before eating.    +heat intolerance, wt gain, sweating (on left side, taking elavil--states this helps)    Sweating improved since she changed HRT to a compound cream.     No d/c, palpitations, tremors, hair loss or changes in nails.    Thyroid u/s 8/18 showed 2 subcentimeter nodules that have not changed in size since 2012. Repeat in 3 years. Reports SOB (seeing cardiology). No dysphagia or voice changes.     DEXA 2/19: wnl. No falls or fractures. Taking 10,000 IU daily.    PMHx, PSHx: reviewed in epic.  Social Hx: Occasionally has mixed 2-3 drinks. She smoked 0.5 ppd for 53 years.     ROS:   Constitutional: feels tired, wt stable  Eyes: No recent visual changes  Cardiovascular: + occasional cp and palpitations  Respiratory: + SOB, no cough  Gastrointestinal: Denies recent bowel disturbances  GenitoUrinary - No dysuria  Skin: No new skin rash  Musc: + back pain, knee pain  Neurologic: + numbness and tingling in feet  Endocrine: no polyphagia, polydipsia or polyuria  Remainder ROS negative     /70 (BP Location: Right arm, Patient Position: Sitting, BP Method: Medium (Manual))   Pulse 76   Temp 97.6 °F (36.4 °C) (Temporal)   Ht 5' 9" (1.753 m)   Wt 108.2 kg (238 lb 8.6 oz)   BMI 35.23 kg/m²      Personally reviewed labs below:    5/20  CMP wnl  A1c 5.5%  TSH 5.83  FT4 1.2  CBC wnl  Cholesterol 232  HDL 66  Trig 109    Large LDL p 2153 H  (<935)  Small LDL P 535 H (<467)    1/6/20  BMP wnl  A1c 5.5%  TSH 3.9      FT4 1.1  FT3 2.9  Vitamin D 39  Cholesterol 148  HDL 53  Trig 109  LDL 76    Lab Results   Component " Value Date    TSH 3.109 06/17/2020    P4YQKHL 99 07/03/2019    FREET4 1.06 06/17/2020        Chemistry        Component Value Date/Time     06/17/2020 1407    K 4.3 06/17/2020 1407    CL 98 06/17/2020 1407    CO2 30 (H) 06/17/2020 1407    BUN 13 06/17/2020 1407    CREATININE 0.8 06/17/2020 1407    GLU 86 06/17/2020 1407        Component Value Date/Time    CALCIUM 9.4 06/17/2020 1407    ALKPHOS 55 06/17/2020 1407    AST 19 06/17/2020 1407    ALT 21 06/17/2020 1407    BILITOT 0.4 06/17/2020 1407    ESTGFRAFRICA >60.0 06/17/2020 1407    EGFRNONAA >60.0 06/17/2020 1407         Lab Results   Component Value Date    HGBA1C 5.4 07/03/2019    HGBA1C 5.5 09/02/2011    HGBA1C 4.8 09/28/2004      PE:  GENERAL: middle aged female, well developed, well nourished  NECK: Supple neck, normal thyroid. No bruit  LYMPHATIC: No cervical or supraclavicular lymphadenopathy  CARDIOVASCULAR: Normal heart sounds, no pedal edema  RESPIRATORY: Normal effort, clear to auscultation bl  MUSC: ambulates with a four pronged cane  ABDOMEN: soft, non-tender, non-distended.  PSYCH: no anxiety or depression  FEET: appropriate footwear.     Assessment/Plan:   1. Hypothyroidism, unspecified type  TSH    T4, free    Renal function panel    Lipid Panel    CBC auto differential    T4, free    TSH    levothyroxine (SYNTHROID) 125 MCG tablet   2. Goiter     3. Hypertension, unspecified type     4. Hyperlipidemia, unspecified hyperlipidemia type     5. Postmenopausal     6. Obesity (BMI 30-39.9)     7. Hypovitaminosis D  Vitamin D       Hypothyroidism-TSH is on the high side of normal. Increase Levothyroxine to 125 mcg daily. Repeat thyroid labs in 4 weeks.   Goiter-repeat thyroid u/s in 8/21  ROE-dshxkn-lfbcowub meds  PFW-ptpquy-ulvyypgx statin  Postmenopausal-DEXA scan 2/21  Obesity-Body mass index is 35.23 kg/m². Encouraged to increase exercise. Declines MNT.  Hypovitaminosis r-ajbpan-hujunsgi vd intake    F/u in 6 mths

## 2020-06-29 ENCOUNTER — TELEPHONE (OUTPATIENT)
Dept: RHEUMATOLOGY | Facility: CLINIC | Age: 75
End: 2020-06-29

## 2020-06-29 NOTE — TELEPHONE ENCOUNTER
Returned patient call regarding new patient appointment. Patient was being referred by Dr Gusman. Scheduled patient with Dr Cedeño. Patient aware of date and time of appointment.

## 2020-06-29 NOTE — TELEPHONE ENCOUNTER
----- Message from Amanda SOTO Sánchez sent at 6/29/2020 12:12 PM CDT -----  Regarding: appointment  Type:  Sooner Apoointment Request    Caller is requesting a sooner appointment.  Caller declined first available appointment listed below.  Caller will not accept being placed on the waitlist and is requesting a message be sent to doctor.    Name of Caller:  self  When is the first available appointment?    Symptoms:  hands are swollen  Best Call Back Number:  [phone] (home)   Additional Information:  Patient states she sent a message in 3 weeks ago. Patient would like to get an appointment as soon as. Patient states Dr Love referred her. Please call patient. Thanks!

## 2020-07-15 ENCOUNTER — TELEPHONE (OUTPATIENT)
Dept: DERMATOLOGY | Facility: CLINIC | Age: 75
End: 2020-07-15

## 2020-08-05 NOTE — PROGRESS NOTES
RHEUMATOLOGY CLINIC INITIAL VISIT    Reason for referral:-  Referred for evaluation of +ANEUDY/RF     Chief complaints:- multiple joint pain       HPI:-  Marisela Hayes a 75 y.o. pleasant female with PMH of hypothyroidism (following endocrine), DJD (with multiple joint replacement), GERD, and chronic LE neuropathy (no hx of DM) comes in for an initial visit with above chief complaints.     Patient had bilateral hand, knee, feet, and lower back pain for a while. Patient had seen multiple specialists and still continues to have pain.  Patient states that she had multiple joint replacement in the past all for her arthralgia.  Multiple trigger fingers in the past - all alleviated with joint injections (refused surgical intervention).      Bilateral LE (feet) neuropathy that has been ongoing for a while as well after vein graft/bypass?  Pain is alleviated with gabapentin and Elavil     Outside lab +ANEUDY 1:1280 centromere.  +RF with negative CCP     Fhx: Parents both have arthritis     Tobacco (1ppd x 53 years) Quit 5 years ago, EtOH - social, denies recreational/illicit drugs    Occupation: retired ()     Hx of clot (1 incisional clot after hysterectomy)/miscarriages  (1 miscarriage - 6 weeks gestation)    ROS: +reflux, neuropathy of the bilateral feet , joint swelling,  Denies any alopecia, Raynaud's, sclerodactyly, skin tightening, vision changes, mouth ulcers, rash, abdominal pain, N/V, fever/chills, sicca symptoms     Review of Systems   Constitutional: Negative for chills, diaphoresis, fever, malaise/fatigue and weight loss.   HENT: Negative for congestion, ear discharge, ear pain, hearing loss, nosebleeds, sinus pain and tinnitus.    Eyes: Negative for photophobia, pain, discharge and redness.   Respiratory: Negative for cough, hemoptysis, sputum production, shortness of breath, wheezing and stridor.    Cardiovascular: Negative for chest pain, palpitations, orthopnea, claudication, leg swelling  and PND.   Gastrointestinal: Negative for abdominal pain, constipation, diarrhea, heartburn, nausea and vomiting.   Genitourinary: Negative for dysuria, frequency, hematuria and urgency.   Musculoskeletal: Positive for back pain and joint pain. Negative for myalgias and neck pain.   Skin: Negative for rash.   Neurological: Positive for tingling and weakness. Negative for dizziness, tremors and headaches.   Endo/Heme/Allergies: Does not bruise/bleed easily.   Psychiatric/Behavioral: Negative for depression, hallucinations and suicidal ideas. The patient is not nervous/anxious and does not have insomnia.        Past Medical History:   Diagnosis Date    Bilateral knee pain 2012    left worse, right knee painful even after partial replacement.    Bruises easily     Clot ?    Umbilical clot after hysterectomy    Colon polyps     adenomatous    Complication of anesthesia     very low pain tolerance    Cystocele     Degenerative disc disease     neck,back, muscle spasms    Diverticulitis     Edema of left lower extremity     ankles    GERD (gastroesophageal reflux disease)     Hypertension     Neuropathy     peripheral    Thyroid disease     hypothyroid, has nodules    Varicose veins        Past Surgical History:   Procedure Laterality Date    ADENOIDECTOMY      APPENDECTOMY      BILATERAL SALPINGOOPHORECTOMY       SECTION      COLONOSCOPY  12    HYSTERECTOMY      with BSO    JOINT REPLACEMENT  2012    rt unilateral knee arthroplasty. Took Coumadin x 6 weeks    KNEE ARTHROSCOPY      right    TONSILLECTOMY          Social History     Tobacco Use    Smoking status: Former Smoker     Quit date: 3/23/2012     Years since quittin.3    Smokeless tobacco: Never Used   Substance Use Topics    Alcohol use: Yes     Comment: occasional    Drug use: No       Family History   Problem Relation Age of Onset    Neuropathy Mother     Hypertension Mother     Stroke Mother   "   Neuropathy Father     Diabetes Maternal Grandmother     Cancer Daughter     Melanoma Neg Hx     Psoriasis Neg Hx     Lupus Neg Hx     Eczema Neg Hx        Review of patient's allergies indicates:   Allergen Reactions    Duloxetine      Does not remember    Morphine Other (See Comments)     Other reaction(s): Syncope  fainted    Tramadol        Vitals:    08/06/20 1109   Weight: 107.9 kg (237 lb 14 oz)   Height: 5' 9" (1.753 m)   PainSc:   7       Physical Exam   Constitutional: She is oriented to person, place, and time and well-developed, well-nourished, and in no distress.   obese   HENT:   Head: Normocephalic and atraumatic.   Eyes: Pupils are equal, round, and reactive to light. EOM are normal.   Neck: Normal range of motion. Neck supple.   Cardiovascular: Normal rate, regular rhythm and normal heart sounds.   Pulmonary/Chest: Effort normal and breath sounds normal.   Abdominal: Soft. Bowel sounds are normal.   Musculoskeletal: Normal range of motion.      Comments: Limited ROM of bilateral knees - secondary to pain   No signs of synovitis   Inability to form tight  secondary to pain  Mild ulnar deviation bilaterally   R 3rd MCP trigger finger    Neurological: She is alert and oriented to person, place, and time. Gait normal. GCS score is 15.   Skin: Skin is warm and dry. No rash noted. No erythema.   No rash   No skin tightening  + telangiectasia of the upper back   Bluish/purplish discoloration of both feet (to the ankle) - cold extremities  No digital ulcer   Psychiatric: Mood, memory, affect and judgment normal.       Labs:-  Results for TU DORANTES (MRN [id number]) as of 8/5/2020 13:03   Ref. Range 9/28/2004 11:01 8/5/2005 11:38 1/16/2006 09:49 8/10/2011 08:15 9/2/2011 13:20   ANEUDY Latest Ref Range: Neg <1:160  Negative  Negative Negative Negative   Protein, Serum Latest Ref Range: 6.0 - 8.4 g/dL     7.6   Albumin grams/dl Latest Ref Range: 3.50 - 5.5 gm/dl     4.79   Alpha-1 grams/dl " Latest Ref Range: 0.11 - 0.32 gm/dl     0.21   Alpha-2 grams/dl Latest Ref Range: 0.50 - 0.95 gm/dl     0.87   Beta grams/dl Latest Ref Range: 0.58 - 1.1 gm/dl     0.95   Gamma grams/dl Latest Ref Range: 0.50 - 1.50 gm/dl     0.78   Immunofix Interp. Unknown See comment for interpretation See comment for interpretation      LUCINA 24, Ur Unknown     No monoclonal peaks detected.   CCP Antibodies Latest Ref Range: <5.0 U/mL    <1.0    Rheumatoid Factor Latest Ref Range: 0 - 15 IU/ml    Negative    Sensory Neuropathy Profile Unknown  text        Results for TU DORANTES (MRN [id number]) as of 8/5/2020 13:03   Ref. Range 9/20/2019 04:47 9/20/2019 04:48 10/3/2019 12:44 2/7/2020 11:00 6/17/2020 14:07   WBC Latest Ref Range: 3.8 - 10.8 Thousand/uL  6.21 7.7     RBC Latest Ref Range: 3.80 - 5.10 Million/uL  3.64 (L) 4.18     Hemoglobin Latest Ref Range: 11.7 - 15.5 g/dL  11.7 (L) 13.3     Hematocrit Latest Ref Range: 35.0 - 45.0 %  34.6 (L) 39.7     MCV Latest Ref Range: 80.0 - 100.0 fL  95 95.0     MCH Latest Ref Range: 27.0 - 33.0 pg  32.1 (H) 31.8     MCHC Latest Ref Range: 32.0 - 36.0 g/dL  33.8 33.5     RDW Latest Ref Range: 11.0 - 15.0 %  13.0 12.7     Platelets Latest Ref Range: 140 - 400 Thousand/uL  223 326     MPV Latest Ref Range: 7.5 - 12.5 fL  11.1 11.5     Gran% Latest Ref Range: 38.0 - 73.0 %  54.2      Gran # (ANC) Latest Ref Range: 1.8 - 7.7 K/uL  3.4      Lymph% Latest Units: %  27.4 25.4     Lymph # Latest Ref Range: 850 - 3,900 cells/uL  1.7 1,956     Mono% Latest Units: %  15.3 (H) 7.9     Mono # Latest Ref Range: 200 - 950 cells/uL  1.0 608     Eosinophil% Latest Units: %  2.1 0.3     Eos # Latest Ref Range: 15 - 500 cells/uL  0.1 23     Basophil% Latest Units: %  0.5 0.4     Baso # Latest Ref Range: 0 - 200 cells/uL  0.03 31     nRBC Latest Ref Range: 0 /100 WBC  0      Neutrophils Relative Latest Units: %   66     Neutrophils Absolute Latest Ref Range: 1,500 - 7,800 cells/uL   5,082     Differential  Method Unknown  Automated      Immature Grans (Abs) Latest Ref Range: 0.00 - 0.04 K/uL  0.03      Immature Granulocytes Latest Ref Range: 0.0 - 0.5 %  0.5      Sodium Latest Ref Range: 136 - 145 mmol/L 137  141  139   Potassium Latest Ref Range: 3.5 - 5.1 mmol/L 3.0 (L)  4.1  4.3   Chloride Latest Ref Range: 95 - 110 mmol/L 99  100  98   CO2 Latest Ref Range: 23 - 29 mmol/L 29  31  30 (H)   Anion Gap Latest Ref Range: 8 - 16 mmol/L 9    11   BUN, Bld Latest Ref Range: 8 - 23 mg/dL 9  14  13   Creatinine Latest Ref Range: 0.5 - 1.4 mg/dL 0.6  0.67  0.8   BUN/Creatinine Ratio Latest Ref Range: 6 - 22 (calc)   NOT APPLICABLE     eGFR if non African American Latest Ref Range: >60 mL/min/1.73 m^2 >60.0  85  >60.0   eGFR if African American Latest Ref Range: >60 mL/min/1.73 m^2 >60.0  99  >60.0   Glucose Latest Ref Range: 70 - 110 mg/dL 107  84  86   Calcium Latest Ref Range: 8.7 - 10.5 mg/dL 8.1 (L)  9.7  9.4   Alkaline Phosphatase Latest Ref Range: 55 - 135 U/L 42 (L)  54  55   PROTEIN TOTAL Latest Ref Range: 6.0 - 8.4 g/dL 6.3  7.0  7.0   Albumin Latest Ref Range: 3.5 - 5.2 g/dL 3.2 (L)  4.3  4.1   Albumin/Globulin Ratio Latest Ref Range: 1.0 - 2.5 (calc)   1.6     BILIRUBIN TOTAL Latest Ref Range: 0.1 - 1.0 mg/dL 0.6  0.4  0.4   AST Latest Ref Range: 10 - 40 U/L 21  17  19   ALT Latest Ref Range: 10 - 44 U/L 30  18  21   Globulin, Total Latest Ref Range: 1.9 - 3.7 g/dL (calc)   2.7     Vit D, 25-Hydroxy Latest Ref Range: 30 - 96 ng/mL     32   TSH Latest Ref Range: 0.400 - 4.000 uIU/mL    5.612 (H) 3.109   Free T4 Latest Ref Range: 0.71 - 1.51 ng/dL    0.95 1.06   Metanephrine, Free Latest Ref Range: < OR = 57 pg/mL    <25    Normetanephrine, Free Latest Ref Range: < OR = 148 pg/mL    82    Metanephrine, Total, Plasma Latest Ref Range: < OR = 205 pg/mL    82    CULTURE, BLOOD Unknown Rpt       CULTURE, URINE Unknown   Rpt       Radiographs:-  Independent visualization of images done.  Nov 2019 - MRI lumbar spine -  multifacet DJD.  Foraminal narrowing in multiple levels.   Feb 2019 - DEXA scan - normal BMD    Old and Outside medical records :-  Reviewed old and all outside medical records available in Care Everywhere.  May 2020 - R knee Xray.  Lateral joint space narrowing  S/p recent joint injection      Medication List with Changes/Refills   Current Medications    AMITRIPTYLINE (ELAVIL) 25 MG TABLET    Take 25 mg by mouth nightly as needed for Insomnia.    ATORVASTATIN (LIPITOR) 20 MG TABLET    Take 20 mg by mouth nightly.    CHOLECALCIFEROL, VITAMIN D3, 5,000 UNIT CAPSULE    Take 5,000 Units by mouth once daily.    CYANOCOBALAMIN (VITAMIN B-12) 1000 MCG TABLET    Take 100 mcg by mouth once daily.    DICLOFENAC SODIUM (VOLTAREN) 1 % GEL    Apply 2 g topically daily as needed.    DOXYCYCLINE (MONODOX) 100 MG CAPSULE    Take 1 capsule (100 mg total) by mouth once daily.    FUROSEMIDE (LASIX) 40 MG TABLET        GABAPENTIN (NEURONTIN) 800 MG TABLET    Take 800 mg by mouth 2 (two) times daily.     HYDROCODONE-ACETAMINOPHEN (NORCO) 5-325 MG PER TABLET        LEVOTHYROXINE (SYNTHROID) 125 MCG TABLET    Take 1 tablet (125 mcg total) by mouth before breakfast.    LOSARTAN-HYDROCHLOROTHIAZIDE 100-12.5 MG (HYZAAR) 100-12.5 MG TAB    Take 1 tablet by mouth 2 (two) times daily.     MAGNESIUM OXIDE (MAG-OX) 400 MG (241.3 MG MAGNESIUM) TABLET    Take 400 mg by mouth 2 (two) times daily.    MECOBAL/LEVOMEFOLAT CA/B6 PHOS (METANX ORAL)    Take 1 capsule by mouth 2 (two) times daily.    METOPROLOL SUCCINATE (TOPROL-XL) 50 MG 24 HR TABLET    Take 50 mg by mouth nightly.    MUPIROCIN (BACTROBAN) 2 % OINTMENT        OMEPRAZOLE (PRILOSEC) 40 MG CAPSULE    Take 40 mg by mouth once daily.    OXYCODONE-ACETAMINOPHEN (PERCOCET)  MG PER TABLET        -PROPYLENE GLYCOL (SYSTANE, PROPYLENE GLYCOL,) 0.4-0.3 % DROP    Place 1 drop into both eyes once daily.    TURMERIC 400 MG CAP    Take 400 mg by mouth 2 (two) times daily.    VIT  C/E/ZN/COPPR/LUTEIN/ZEAXAN (PRESERVISION AREDS-2 ORAL)    Take 1 capsule by mouth 2 (two) times daily.        Assessment/Plans:-  75-year-old female with past medical history of hypothyroidism, degenerative joint disease of a comp all (E with multiple joint replacements, GERD and chronic neuropathy was referred to the rheumatology clinic for evaluation of positive ANEUDY and positive rheumatoid factor.    Primary care physician has order labs which shows a positive ANEUDY [id number] centromere pattern and a positive rheumatoid factor with negative CCP.    Patient has been having chronic arthralgia in multiple joints resulting in many joint replacement over the years.  States that she has some improvement with joint injections.  Still continues to have diffuse arthralgias.  Patient complained that she is also having chronic bilateral lower extremity neuropathy that has been ongoing for many years.  Has multiple other specialists evaluate and was unable to find out the causation.  Per patient workup included EMG - uncertain of the results.     Arthralgia - most likely secondary to OA (had multiple joint replacements in the past).  Will rule out inflammatory arthritis    Neuropathy with Raynaud's - occurred after vein grafts/bypass?  Concern about autoimmune process.    Give the +ANEUDY 1:1280 centromere pattern with Raynaud's and GERD symptoms - Concern about SSc, CREST, MCTD, vs Overlap.  Will need more labs for further investigation.     Plan  - Arthritis survey  - ANEUDY, SSA, Scleroderma panel  - ESR, CRP, UA, Up/c  - ANCA, pr3, mpo  - CBC and CMP  - continue current management of arthralgia as previously prescribed  - recommendation to keep extremities and core body temperature warm to prevent digital ischemia and ulcer formation       Follow up in about 2 weeks (around 8/20/2020).    Thank you for allowing me to participate in the care Ty PITTMAN Eagle.    Disclaimer: This note was prepared using voice recognition system and  is likely to have sound alike errors and is not proof read.  Please call me with any questions.

## 2020-08-06 ENCOUNTER — OFFICE VISIT (OUTPATIENT)
Dept: RHEUMATOLOGY | Facility: CLINIC | Age: 75
End: 2020-08-06
Payer: MEDICARE

## 2020-08-06 ENCOUNTER — HOSPITAL ENCOUNTER (OUTPATIENT)
Dept: RADIOLOGY | Facility: HOSPITAL | Age: 75
Discharge: HOME | End: 2020-08-06
Attending: STUDENT IN AN ORGANIZED HEALTH CARE EDUCATION/TRAINING PROGRAM
Payer: MEDICARE

## 2020-08-06 VITALS — WEIGHT: 237.88 LBS | HEIGHT: 69 IN | BODY MASS INDEX: 35.23 KG/M2

## 2020-08-06 DIAGNOSIS — M25.50 ARTHRALGIA, UNSPECIFIED JOINT: Primary | ICD-10-CM

## 2020-08-06 DIAGNOSIS — G62.9 NEUROPATHY: ICD-10-CM

## 2020-08-06 DIAGNOSIS — M25.50 ARTHRALGIA, UNSPECIFIED JOINT: ICD-10-CM

## 2020-08-06 PROCEDURE — 77077 JOINT SURVEY SINGLE VIEW: CPT | Mod: 26,,, | Performed by: RADIOLOGY

## 2020-08-06 PROCEDURE — 1101F PT FALLS ASSESS-DOCD LE1/YR: CPT | Mod: CPTII,S$GLB,, | Performed by: STUDENT IN AN ORGANIZED HEALTH CARE EDUCATION/TRAINING PROGRAM

## 2020-08-06 PROCEDURE — 1125F PR PAIN SEVERITY QUANTIFIED, PAIN PRESENT: ICD-10-PCS | Mod: S$GLB,,, | Performed by: STUDENT IN AN ORGANIZED HEALTH CARE EDUCATION/TRAINING PROGRAM

## 2020-08-06 PROCEDURE — 99999 PR PBB SHADOW E&M-EST. PATIENT-LVL IV: CPT | Mod: PBBFAC,,, | Performed by: STUDENT IN AN ORGANIZED HEALTH CARE EDUCATION/TRAINING PROGRAM

## 2020-08-06 PROCEDURE — 99204 PR OFFICE/OUTPT VISIT, NEW, LEVL IV, 45-59 MIN: ICD-10-PCS | Mod: S$GLB,,, | Performed by: STUDENT IN AN ORGANIZED HEALTH CARE EDUCATION/TRAINING PROGRAM

## 2020-08-06 PROCEDURE — 99999 PR PBB SHADOW E&M-EST. PATIENT-LVL IV: ICD-10-PCS | Mod: PBBFAC,,, | Performed by: STUDENT IN AN ORGANIZED HEALTH CARE EDUCATION/TRAINING PROGRAM

## 2020-08-06 PROCEDURE — 99204 OFFICE O/P NEW MOD 45 MIN: CPT | Mod: S$GLB,,, | Performed by: STUDENT IN AN ORGANIZED HEALTH CARE EDUCATION/TRAINING PROGRAM

## 2020-08-06 PROCEDURE — 1101F PR PT FALLS ASSESS DOC 0-1 FALLS W/OUT INJ PAST YR: ICD-10-PCS | Mod: CPTII,S$GLB,, | Performed by: STUDENT IN AN ORGANIZED HEALTH CARE EDUCATION/TRAINING PROGRAM

## 2020-08-06 PROCEDURE — 77077 JOINT SURVEY SINGLE VIEW: CPT | Mod: TC

## 2020-08-06 PROCEDURE — 1159F MED LIST DOCD IN RCRD: CPT | Mod: S$GLB,,, | Performed by: STUDENT IN AN ORGANIZED HEALTH CARE EDUCATION/TRAINING PROGRAM

## 2020-08-06 PROCEDURE — 77077 XR ARTHRITIS SURVEY: ICD-10-PCS | Mod: 26,,, | Performed by: RADIOLOGY

## 2020-08-06 PROCEDURE — 1125F AMNT PAIN NOTED PAIN PRSNT: CPT | Mod: S$GLB,,, | Performed by: STUDENT IN AN ORGANIZED HEALTH CARE EDUCATION/TRAINING PROGRAM

## 2020-08-06 PROCEDURE — 1159F PR MEDICATION LIST DOCUMENTED IN MEDICAL RECORD: ICD-10-PCS | Mod: S$GLB,,, | Performed by: STUDENT IN AN ORGANIZED HEALTH CARE EDUCATION/TRAINING PROGRAM

## 2020-08-06 RX ORDER — OXYCODONE AND ACETAMINOPHEN 10; 325 MG/1; MG/1
1 TABLET ORAL EVERY 6 HOURS PRN
COMMUNITY
Start: 2020-07-30

## 2020-08-06 RX ORDER — FUROSEMIDE 40 MG/1
40 TABLET ORAL DAILY
COMMUNITY
Start: 2020-06-03 | End: 2020-10-27 | Stop reason: SDUPTHER

## 2020-08-06 NOTE — LETTER
August 6, 2020      Taiwo Gusman MD  [address]  [facility] [address]           [facility]  [address]  [facility] [address]  Phone: [phone]  Fax: [phone]          Patient: Marisela Eagle   MR Number: [id number]   YOB: 1945   Date of Visit: 8/6/2020       Dear Dr. Taiwo Gusman:    Thank you for referring Marisela Eagle to me for evaluation. Attached you will find relevant portions of my assessment and plan of care.    If you have questions, please do not hesitate to call me. I look forward to following Marisela Eagle along with you.    Sincerely,    Amisha Maki MD    Enclosure  CC:  No Recipients    If you would like to receive this communication electronically, please contact [email or web address] or [phone] to request more information on [employer] access.    For providers and/or their staff who would like to refer a patient to [facility], please contact us through our one-stop-shop provider referral line, [facility], at [phone].    If you feel you have received this communication in error or would no longer like to receive these types of communications, please e-mail [email or web address]

## 2020-08-11 DIAGNOSIS — E03.9 HYPOTHYROIDISM, UNSPECIFIED TYPE: ICD-10-CM

## 2020-08-11 RX ORDER — LEVOTHYROXINE SODIUM 125 UG/1
125 TABLET ORAL
Qty: 30 TABLET | Refills: 11 | Status: SHIPPED | OUTPATIENT
Start: 2020-08-11 | End: 2020-08-20 | Stop reason: SDUPTHER

## 2020-08-20 DIAGNOSIS — E03.9 HYPOTHYROIDISM, UNSPECIFIED TYPE: ICD-10-CM

## 2020-08-20 RX ORDER — LEVOTHYROXINE SODIUM 125 UG/1
125 TABLET ORAL
Qty: 30 TABLET | Refills: 11 | Status: SHIPPED | OUTPATIENT
Start: 2020-08-20 | End: 2020-10-27 | Stop reason: SDUPTHER

## 2020-09-08 NOTE — PROGRESS NOTES
RHEUMATOLOGY CLINIC FOLLOW UP VISIT      Chief complaints:- multiple joint pain       HPI:-  Marisela Hayes a 75 y.o. pleasant female with PMH of hypothyroidism (following endocrine), DJD (with multiple joint replacement), GERD, and chronic LE neuropathy (no hx of DM) comes in for an initial visit with above chief complaints.     Patient had bilateral hand, knee, feet, and lower back pain for a while. Patient had seen multiple specialists and still continues to have pain.  Patient states that she had multiple joint replacement in the past all for her arthralgia.  Multiple trigger fingers in the past - all alleviated with joint injections (refused surgical intervention).      Bilateral LE (feet) neuropathy that has been ongoing for a while as well after vein graft/bypass?  Pain is alleviated with gabapentin and Elavil     Outside lab +ANEUDY 1:1280 centromere.  +RF with negative CCP     Fhx: Parents both have arthritis     Tobacco (1ppd x 53 years) Quit 5 years ago, EtOH - social, denies recreational/illicit drugs    Occupation: retired ()     Hx of clot (1 incisional clot after hysterectomy)/miscarriages  (1 miscarriage - 6 weeks gestation)    ROS: +reflux, neuropathy of the bilateral feet , joint swelling,  Denies any alopecia, Raynaud's, sclerodactyly, skin tightening, vision changes, mouth ulcers, rash, abdominal pain, N/V, fever/chills, sicca symptoms     Interval History   Patient had steroid injection of her hands and knee which alleviated her pain symptoms.  Still have some joint swelling and pain.  Neuropathy of bilateral LE had been unchanged.    Review of Systems   Constitutional: Negative for chills, diaphoresis, fever, malaise/fatigue and weight loss.   HENT: Negative for congestion, ear discharge, ear pain, hearing loss, nosebleeds, sinus pain and tinnitus.    Eyes: Negative for photophobia, pain, discharge and redness.   Respiratory: Negative for cough, hemoptysis, sputum  production, shortness of breath, wheezing and stridor.    Cardiovascular: Negative for chest pain, palpitations, orthopnea, claudication, leg swelling and PND.   Gastrointestinal: Negative for abdominal pain, constipation, diarrhea, heartburn, nausea and vomiting.   Genitourinary: Negative for dysuria, frequency, hematuria and urgency.   Musculoskeletal: Positive for back pain and joint pain. Negative for myalgias and neck pain.   Skin: Negative for rash.   Neurological: Positive for tingling and weakness. Negative for dizziness, tremors and headaches.   Endo/Heme/Allergies: Does not bruise/bleed easily.   Psychiatric/Behavioral: Negative for depression, hallucinations and suicidal ideas. The patient is not nervous/anxious and does not have insomnia.        Past Medical History:   Diagnosis Date    Bilateral knee pain 2012    left worse, right knee painful even after partial replacement.    Bruises easily     Clot ?    Umbilical clot after hysterectomy    Colon polyps     adenomatous    Complication of anesthesia     very low pain tolerance    Cystocele     Degenerative disc disease     neck,back, muscle spasms    Diverticulitis     Edema of left lower extremity     ankles    GERD (gastroesophageal reflux disease)     Hypertension     Neuropathy     peripheral    Thyroid disease     hypothyroid, has nodules    Varicose veins        Past Surgical History:   Procedure Laterality Date    ADENOIDECTOMY      APPENDECTOMY      BILATERAL SALPINGOOPHORECTOMY       SECTION      COLONOSCOPY  12    HYSTERECTOMY      with BSO    JOINT REPLACEMENT  2012    rt unilateral knee arthroplasty. Took Coumadin x 6 weeks    KNEE ARTHROSCOPY  2010    right    TONSILLECTOMY          Social History     Tobacco Use    Smoking status: Former Smoker     Quit date: 3/23/2012     Years since quittin.4    Smokeless tobacco: Never Used   Substance Use Topics    Alcohol use: Yes      "Comment: occasional    Drug use: No       Family History   Problem Relation Age of Onset    Neuropathy Mother     Hypertension Mother     Stroke Mother     Neuropathy Father     Diabetes Maternal Grandmother     Cancer Daughter     Melanoma Neg Hx     Psoriasis Neg Hx     Lupus Neg Hx     Eczema Neg Hx        Review of patient's allergies indicates:   Allergen Reactions    Duloxetine      Does not remember    Morphine Other (See Comments)     Other reaction(s): Syncope  fainted    Tramadol        Vitals:    09/10/20 1124   BP: 120/73   Pulse: 73   Weight: 107.6 kg (237 lb 5.2 oz)   Height: 5' 9" (1.753 m)   PainSc:   5       Physical Exam   Constitutional: She is oriented to person, place, and time and well-developed, well-nourished, and in no distress.   obese   HENT:   Head: Normocephalic and atraumatic.   Eyes: Pupils are equal, round, and reactive to light. EOM are normal.   Neck: Normal range of motion. Neck supple.   Cardiovascular: Normal rate, regular rhythm and normal heart sounds.   Pulmonary/Chest: Effort normal and breath sounds normal.   Abdominal: Soft. Bowel sounds are normal.   Musculoskeletal: Normal range of motion.      Comments: Limited ROM of bilateral knees - secondary to pain   No signs of synovitis   Inability to form tight  secondary to pain  Mild ulnar deviation bilaterally   R 3rd MCP trigger finger    Neurological: She is alert and oriented to person, place, and time. Gait normal. GCS score is 15.   Skin: Skin is warm and dry. No rash noted. No erythema.   No rash   No skin tightening  + telangiectasia of the upper back   Bluish/purplish discoloration of both feet (to the ankle) - cold extremities  No digital ulcer   Psychiatric: Mood, memory, affect and judgment normal.       Labs:-  Results for TU DORANTES (MRN [id number]) as of 9/8/2020 09:06   Ref. Range 6/17/2020 14:07 8/6/2020 12:13 8/6/2020 12:15 8/6/2020 12:15 8/6/2020 12:45   WBC Latest Ref Range: 3.90 - " 12.70 K/uL   5.83     RBC Latest Ref Range: 4.00 - 5.40 M/uL   4.69     Hemoglobin Latest Ref Range: 12.0 - 16.0 g/dL   14.1     Hematocrit Latest Ref Range: 37.0 - 48.5 %   44.1     MCV Latest Ref Range: 82 - 98 fL   94     MCH Latest Ref Range: 27.0 - 31.0 pg   30.1     MCHC Latest Ref Range: 32.0 - 36.0 g/dL   32.0     RDW Latest Ref Range: 11.5 - 14.5 %   14.8 (H)     Platelets Latest Ref Range: 150 - 350 K/uL   248     MPV Latest Ref Range: 9.2 - 12.9 fL   11.8     Gran% Latest Ref Range: 38.0 - 73.0 %   41.1     Gran # (ANC) Latest Ref Range: 1.8 - 7.7 K/uL   2.4     Lymph% Latest Ref Range: 18.0 - 48.0 %   41.9     Lymph # Latest Ref Range: 1.0 - 4.8 K/uL   2.4     Mono% Latest Ref Range: 4.0 - 15.0 %   13.0     Mono # Latest Ref Range: 0.3 - 1.0 K/uL   0.8     Eosinophil% Latest Ref Range: 0.0 - 8.0 %   3.3     Eos # Latest Ref Range: 0.0 - 0.5 K/uL   0.2     Basophil% Latest Ref Range: 0.0 - 1.9 %   0.5     Baso # Latest Ref Range: 0.00 - 0.20 K/uL   0.03     nRBC Latest Ref Range: 0 /100 WBC   0     Differential Method Unknown   Automated     Immature Grans (Abs) Latest Ref Range: 0.00 - 0.04 K/uL   0.01     Immature Granulocytes Latest Ref Range: 0.0 - 0.5 %   0.2     Sed Rate Latest Ref Range: 0 - 20 mm/Hr   1     Sodium Latest Ref Range: 136 - 145 mmol/L 139  139     Potassium Latest Ref Range: 3.5 - 5.1 mmol/L 4.3  4.2     Chloride Latest Ref Range: 95 - 110 mmol/L 98  101     CO2 Latest Ref Range: 23 - 29 mmol/L 30 (H)  32 (H)     Anion Gap Latest Ref Range: 8 - 16 mmol/L 11  6 (L)     BUN, Bld Latest Ref Range: 8 - 23 mg/dL 13  12     Creatinine Latest Ref Range: 0.5 - 1.4 mg/dL 0.8  0.7     eGFR if non African American Latest Ref Range: >60 mL/min/1.73 m^2 >60.0  >60     eGFR if African American Latest Ref Range: >60 mL/min/1.73 m^2 >60.0  >60     Glucose Latest Ref Range: 70 - 110 mg/dL 86  78     Calcium Latest Ref Range: 8.7 - 10.5 mg/dL 9.4  9.7     Alkaline Phosphatase Latest Ref Range: 55 -  135 U/L 55  58     PROTEIN TOTAL Latest Ref Range: 6.0 - 8.4 g/dL 7.0  7.0     Albumin Latest Ref Range: 3.5 - 5.2 g/dL 4.1  4.1     BILIRUBIN TOTAL Latest Ref Range: 0.1 - 1.0 mg/dL 0.4  0.3     AST Latest Ref Range: 10 - 40 U/L 19  21     ALT Latest Ref Range: 10 - 44 U/L 21  31     CRP Latest Ref Range: 0.0 - 8.2 mg/L   2.3     Vit D, 25-Hydroxy Latest Ref Range: 30 - 96 ng/mL 32       TSH Latest Ref Range: 0.400 - 4.000 uIU/mL 3.109  1.902     Free T4 Latest Ref Range: 0.71 - 1.51 ng/dL 1.06  1.01     ANEUDY Screen Latest Ref Range: Negative <1:80    Positive (A)     ANEUDY Titer 1 Unknown   >2560     ANEUDY PATTERN 1 Unknown   Centromere     ds DNA Ab Latest Ref Range: Negative 1:10     Negative 1:10    Anti-SSA Antibody Latest Ref Range: 0.00 - 0.99 Ratio   0.05 0.05    Anti-SSA Interpretation Latest Ref Range: Negative    Negative Negative    Anti-SSB Antibody Latest Ref Range: 0.00 - 0.99 Ratio    0.07    Anti-SSB Interpretation Latest Ref Range: Negative     Negative    Anti Sm Antibody Latest Ref Range: 0.00 - 0.99 Ratio    0.06    Anti-Sm Interpretation Latest Ref Range: Negative     Negative    Anti Sm/RNP Antibody Latest Ref Range: 0.00 - 0.99 Ratio   0.08 0.08    Anti-Sm/RNP Interpretation Latest Ref Range: Negative    Negative Negative    ANCA Proteinase 3 Latest Ref Range: <0.4 (Negative) U   <0.2     Cytoplasmic Neutrophilic Ab Latest Ref Range: <1:20 Titer   <1:20     MPO Latest Ref Range: <=20 UNITS   2     Perinuclear (P-ANCA) Latest Ref Range: <1:20 Titer   <1:20     SCL-70 Antibody Latest Ref Range: <1.0 (Negative) U   <0.2     Scleroderma SCL- Latest Ref Range: <20 UNITS   2     Anti-Anna-1 Antibody Latest Ref Range: <20 Units   <20     Anti-PM/Scl Ab Latest Ref Range: <20 Units   <20     Anti-SS-A 52 kD Ab, IgG Latest Ref Range: <20 Units   146 (H)     Anti-U1-RNP  Ab Latest Ref Range: <20 Units   <20     EJ Latest Ref Range: Negative    Negative     Fibrillarin (U3 RNP) Latest Ref Range: Negative     Negative     Ku Latest Ref Range: Negative    Negative     MDA-5 (P140) (CADM-140) Latest Ref Range: <20 Units   <20     MI-2 Latest Ref Range: Negative    Negative     NXP-2 (P140) Latest Ref Range: <20 Units   <20     OJ Latest Ref Range: Negative    Negative     PL-12 Latest Ref Range: Negative    Negative     PL-7 Latest Ref Range: Negative    Negative     SRP Latest Ref Range: Negative    Negative     Th/To Latest Ref Range: Negative    Negative     TIF1 GAMMA (P155/140) Latest Ref Range: <20 Units   <20     U2 snRNP Latest Ref Range: Negative    Negative     Specimen UA Unknown  Urine, Clean Catch      Color, UA Latest Ref Range: Yellow, Straw, Isabella   Yellow      Appearance, UA Latest Ref Range: Clear   Clear      Specific [place], UA Latest Ref Range: 1.005 - 1.030   1.010      pH, UA Latest Ref Range: 5.0 - 8.0   7.0      Protein, UA Latest Ref Range: Negative   Negative      Glucose, UA Latest Ref Range: Negative   Negative      Ketones, UA Latest Ref Range: Negative   Negative      Occult Blood UA Latest Ref Range: Negative   Negative      NITRITE UA Latest Ref Range: Negative   Negative      UROBILINOGEN UA Latest Ref Range: <2.0 EU/dL  Negative      Bilirubin (UA) Latest Ref Range: Negative   Negative      Leukocytes, UA Latest Ref Range: Negative   Negative      Prot/Creat Ratio, Ur Latest Ref Range: 0.00 - 0.20   0.14      Protein, Urine Random Latest Ref Range: 0 - 15 mg/dL  11      Creatinine, Random Ur Latest Ref Range: 15.0 - 325.0 mg/dL  76.3      RNA Polymerase III Antibodies, IgG, Serum Latest Ref Range: <20 Units   <20     XR ARTHRITIS SURVEY Unknown     Rpt     Radiographs:-  Independent visualization of images done.  Nov 2019 - MRI lumbar spine - multifacet DJD.  Foraminal narrowing in multiple levels.   Feb 2019 - DEXA scan - normal BMD  August 2020 - arthritis survey - degenerative changes in the cervical spine.  Status post bilateral knee arthroplasty.  Osteoarthritis changes of hands and  feet    Old and Outside medical records :-  Reviewed old and all outside medical records available in Care Everywhere.  May 2020 - R knee Xray.  Lateral joint space narrowing  S/p recent joint injection      Medication List with Changes/Refills   Current Medications    AMITRIPTYLINE (ELAVIL) 25 MG TABLET    Take 25 mg by mouth nightly as needed for Insomnia.    ATORVASTATIN (LIPITOR) 20 MG TABLET    Take 20 mg by mouth nightly.    CHOLECALCIFEROL, VITAMIN D3, 5,000 UNIT CAPSULE    Take 5,000 Units by mouth once daily.    CYANOCOBALAMIN (VITAMIN B-12) 1000 MCG TABLET    Take 100 mcg by mouth once daily.    DICLOFENAC SODIUM (VOLTAREN) 1 % GEL    Apply 2 g topically daily as needed.    DOXYCYCLINE (MONODOX) 100 MG CAPSULE    Take 1 capsule (100 mg total) by mouth once daily.    FUROSEMIDE (LASIX) 40 MG TABLET        GABAPENTIN (NEURONTIN) 800 MG TABLET    Take 800 mg by mouth 2 (two) times daily.     HYDROCODONE-ACETAMINOPHEN (NORCO) 5-325 MG PER TABLET        LEVOTHYROXINE (SYNTHROID) 125 MCG TABLET    Take 1 tablet (125 mcg total) by mouth before breakfast.    LOSARTAN-HYDROCHLOROTHIAZIDE 100-12.5 MG (HYZAAR) 100-12.5 MG TAB    Take 1 tablet by mouth 2 (two) times daily.     MAGNESIUM OXIDE (MAG-OX) 400 MG (241.3 MG MAGNESIUM) TABLET    Take 400 mg by mouth 2 (two) times daily.    MECOBAL/LEVOMEFOLAT CA/B6 PHOS (METANX ORAL)    Take 1 capsule by mouth 2 (two) times daily.    METOPROLOL SUCCINATE (TOPROL-XL) 50 MG 24 HR TABLET    Take 50 mg by mouth nightly.    MUPIROCIN (BACTROBAN) 2 % OINTMENT        OMEPRAZOLE (PRILOSEC) 40 MG CAPSULE    Take 40 mg by mouth once daily.    OXYCODONE-ACETAMINOPHEN (PERCOCET)  MG PER TABLET        -PROPYLENE GLYCOL (SYSTANE, PROPYLENE GLYCOL,) 0.4-0.3 % DROP    Place 1 drop into both eyes once daily.    TURMERIC 400 MG CAP    Take 400 mg by mouth 2 (two) times daily.    VIT C/E/ZN/COPPR/LUTEIN/ZEAXAN (PRESERVISION AREDS-2 ORAL)    Take 1 capsule by mouth 2 (two) times  daily.        Assessment/Plans:-  75 y.o. pleasant female with PMH of hypothyroidism (following endocrine), DJD (with multiple joint replacement), GERD, and chronic LE neuropathy (no hx of DM) was referred to the rheumatology clinic for evaluation of positive ANEUDY and positive rheumatoid factor.    Primary care physician has order labs which shows a positive ANEUDY [id number] centromere pattern and a positive rheumatoid factor with negative CCP.    Patient has been having chronic arthralgia in multiple joints resulting in many joint replacement over the years.  States that she has some improvement with joint injections.  Still continues to have diffuse arthralgias.  Patient complained that she is also having chronic bilateral lower extremity neuropathy that has been ongoing for many years.  Has multiple other specialists evaluate and was unable to find out the causation.  Per patient workup included EMG - uncertain of the results.     Arthralgia - most likely secondary to OA (had multiple joint replacements in the past). Arthritis survey show OA in cervical spine, hands and feet.     Neuropathy with Raynaud's in bilateral LE - occurred after vein grafts/bypass?      Give the +ANEUDY 1:1280 centromere pattern with Raynaud's and GERD symptoms -  + SSA (52kDa).  +dried mouth (secondary to medication usage)    Concern about Overlap.  ANCA associated vasculitis labs are normal    Plan  - ECHO (last echo done 09/2019) - normal  Per patient - had another ECHO performed this year from cardiology - will obtain records  - CT chest (high res) for evaluation of ILD   - C3/C4  - PreDMARDs   - continue to keep peripheral digits warm - prevent ulcer formation   - follow up with cardiology as scheduled  - Recommendation for tumeric for anti inflammatory  - NSAIDs/Acetaminophen alternating for pain alleviation   - recommendation for paraffin for hand pain     Follow up in about 4 weeks (around 10/8/2020).      Disclaimer: This note was  prepared using voice recognition system and is likely to have sound alike errors and is not proof read.  Please call me with any questions.

## 2020-09-10 ENCOUNTER — HOSPITAL ENCOUNTER (OUTPATIENT)
Dept: RADIOLOGY | Facility: HOSPITAL | Age: 75
Discharge: HOME | End: 2020-09-10
Attending: STUDENT IN AN ORGANIZED HEALTH CARE EDUCATION/TRAINING PROGRAM
Payer: MEDICARE

## 2020-09-10 ENCOUNTER — OFFICE VISIT (OUTPATIENT)
Dept: RHEUMATOLOGY | Facility: CLINIC | Age: 75
End: 2020-09-10
Payer: MEDICARE

## 2020-09-10 VITALS
WEIGHT: 237.31 LBS | BODY MASS INDEX: 35.15 KG/M2 | SYSTOLIC BLOOD PRESSURE: 120 MMHG | HEIGHT: 69 IN | HEART RATE: 73 BPM | DIASTOLIC BLOOD PRESSURE: 73 MMHG

## 2020-09-10 DIAGNOSIS — M19.90 OSTEOARTHRITIS, UNSPECIFIED OSTEOARTHRITIS TYPE, UNSPECIFIED SITE: ICD-10-CM

## 2020-09-10 DIAGNOSIS — M34.9 SYSTEMIC SCLEROSIS, UNSPECIFIED: ICD-10-CM

## 2020-09-10 DIAGNOSIS — M17.0 OSTEOARTHRITIS OF BOTH KNEES, UNSPECIFIED OSTEOARTHRITIS TYPE: ICD-10-CM

## 2020-09-10 DIAGNOSIS — G62.9 NEUROPATHY: ICD-10-CM

## 2020-09-10 DIAGNOSIS — E66.9 OBESITY (BMI 30-39.9): ICD-10-CM

## 2020-09-10 DIAGNOSIS — K21.9 GASTROESOPHAGEAL REFLUX DISEASE, ESOPHAGITIS PRESENCE NOT SPECIFIED: Primary | ICD-10-CM

## 2020-09-10 PROCEDURE — 1125F AMNT PAIN NOTED PAIN PRSNT: CPT | Mod: S$GLB,,, | Performed by: STUDENT IN AN ORGANIZED HEALTH CARE EDUCATION/TRAINING PROGRAM

## 2020-09-10 PROCEDURE — 71260 CT THORAX DX C+: CPT | Mod: 26,,, | Performed by: RADIOLOGY

## 2020-09-10 PROCEDURE — 3074F PR MOST RECENT SYSTOLIC BLOOD PRESSURE < 130 MM HG: ICD-10-PCS | Mod: CPTII,S$GLB,, | Performed by: STUDENT IN AN ORGANIZED HEALTH CARE EDUCATION/TRAINING PROGRAM

## 2020-09-10 PROCEDURE — 25500020 PHARM REV CODE 255

## 2020-09-10 PROCEDURE — 3078F DIAST BP <80 MM HG: CPT | Mod: CPTII,S$GLB,, | Performed by: STUDENT IN AN ORGANIZED HEALTH CARE EDUCATION/TRAINING PROGRAM

## 2020-09-10 PROCEDURE — 1159F MED LIST DOCD IN RCRD: CPT | Mod: S$GLB,,, | Performed by: STUDENT IN AN ORGANIZED HEALTH CARE EDUCATION/TRAINING PROGRAM

## 2020-09-10 PROCEDURE — 99214 PR OFFICE/OUTPT VISIT, EST, LEVL IV, 30-39 MIN: ICD-10-PCS | Mod: S$GLB,,, | Performed by: STUDENT IN AN ORGANIZED HEALTH CARE EDUCATION/TRAINING PROGRAM

## 2020-09-10 PROCEDURE — 99999 PR PBB SHADOW E&M-EST. PATIENT-LVL IV: ICD-10-PCS | Mod: PBBFAC,,, | Performed by: STUDENT IN AN ORGANIZED HEALTH CARE EDUCATION/TRAINING PROGRAM

## 2020-09-10 PROCEDURE — 99999 PR PBB SHADOW E&M-EST. PATIENT-LVL IV: CPT | Mod: PBBFAC,,, | Performed by: STUDENT IN AN ORGANIZED HEALTH CARE EDUCATION/TRAINING PROGRAM

## 2020-09-10 PROCEDURE — 71260 CT CHEST WITH CONTRAST: ICD-10-PCS | Mod: 26,,, | Performed by: RADIOLOGY

## 2020-09-10 PROCEDURE — 1101F PR PT FALLS ASSESS DOC 0-1 FALLS W/OUT INJ PAST YR: ICD-10-PCS | Mod: CPTII,S$GLB,, | Performed by: STUDENT IN AN ORGANIZED HEALTH CARE EDUCATION/TRAINING PROGRAM

## 2020-09-10 PROCEDURE — 1125F PR PAIN SEVERITY QUANTIFIED, PAIN PRESENT: ICD-10-PCS | Mod: S$GLB,,, | Performed by: STUDENT IN AN ORGANIZED HEALTH CARE EDUCATION/TRAINING PROGRAM

## 2020-09-10 PROCEDURE — 3078F PR MOST RECENT DIASTOLIC BLOOD PRESSURE < 80 MM HG: ICD-10-PCS | Mod: CPTII,S$GLB,, | Performed by: STUDENT IN AN ORGANIZED HEALTH CARE EDUCATION/TRAINING PROGRAM

## 2020-09-10 PROCEDURE — 3074F SYST BP LT 130 MM HG: CPT | Mod: CPTII,S$GLB,, | Performed by: STUDENT IN AN ORGANIZED HEALTH CARE EDUCATION/TRAINING PROGRAM

## 2020-09-10 PROCEDURE — 71260 CT THORAX DX C+: CPT | Mod: TC

## 2020-09-10 PROCEDURE — 1159F PR MEDICATION LIST DOCUMENTED IN MEDICAL RECORD: ICD-10-PCS | Mod: S$GLB,,, | Performed by: STUDENT IN AN ORGANIZED HEALTH CARE EDUCATION/TRAINING PROGRAM

## 2020-09-10 PROCEDURE — 1101F PT FALLS ASSESS-DOCD LE1/YR: CPT | Mod: CPTII,S$GLB,, | Performed by: STUDENT IN AN ORGANIZED HEALTH CARE EDUCATION/TRAINING PROGRAM

## 2020-09-10 PROCEDURE — 99214 OFFICE O/P EST MOD 30 MIN: CPT | Mod: S$GLB,,, | Performed by: STUDENT IN AN ORGANIZED HEALTH CARE EDUCATION/TRAINING PROGRAM

## 2020-09-10 RX ADMIN — IOHEXOL 75 ML: 350 INJECTION, SOLUTION INTRAVENOUS at 03:09

## 2020-09-10 ASSESSMENT — ROUTINE ASSESSMENT OF PATIENT INDEX DATA (RAPID3)
AM STIFFNESS SCORE: 1, YES
PATIENT GLOBAL ASSESSMENT SCORE: 3
WHEN YOU AWAKENED IN THE MORNING OVER THE LAST WEEK, PLEASE INDICATE THE AMOUNT OF TIME IT TAKES UNTIL YOU ARE AS LIMBER AS YOU WILL BE FOR THE DAY: 1 HOUR
PSYCHOLOGICAL DISTRESS SCORE: 3.3
MDHAQ FUNCTION SCORE: 0.8
TOTAL RAPID3 SCORE: 4.05
PAIN SCORE: 6.5
FATIGUE SCORE: 4

## 2020-09-11 ENCOUNTER — TELEPHONE (OUTPATIENT)
Dept: RHEUMATOLOGY | Facility: CLINIC | Age: 75
End: 2020-09-11

## 2020-09-11 DIAGNOSIS — B19.20 HEPATITIS C VIRUS INFECTION WITHOUT HEPATIC COMA, UNSPECIFIED CHRONICITY: Primary | ICD-10-CM

## 2020-09-11 NOTE — TELEPHONE ENCOUNTER
----- Message from Keyonna Davies sent at 9/11/2020 12:39 PM CDT -----  Type:  Patient Returning Call    Who Called: pt   Who Left Message for Patient: pt   Does the patient know what this is regarding?: pt had a missed call   Would the patient rather a call back or a response via [facility]?  Call   Best Call Back Number: [phone]  Additional Information:  missed call

## 2020-09-11 NOTE — TELEPHONE ENCOUNTER
Attempted to reach patient regarding need for hep c panel and referral to hepatology but got no answer.

## 2020-09-14 ENCOUNTER — LAB VISIT (OUTPATIENT)
Dept: LAB | Facility: HOSPITAL | Age: 75
End: 2020-09-14
Attending: STUDENT IN AN ORGANIZED HEALTH CARE EDUCATION/TRAINING PROGRAM
Payer: MEDICARE

## 2020-09-14 DIAGNOSIS — B19.20 HEPATITIS C VIRUS INFECTION WITHOUT HEPATIC COMA, UNSPECIFIED CHRONICITY: ICD-10-CM

## 2020-09-14 DIAGNOSIS — M34.9 SYSTEMIC SCLEROSIS, UNSPECIFIED: ICD-10-CM

## 2020-09-14 LAB
C3 SERPL-MCNC: 127 MG/DL (ref 50–180)
C4 SERPL-MCNC: 25 MG/DL (ref 11–44)

## 2020-09-14 PROCEDURE — 86160 COMPLEMENT ANTIGEN: CPT

## 2020-09-14 PROCEDURE — 86160 COMPLEMENT ANTIGEN: CPT | Mod: 59

## 2020-09-14 PROCEDURE — 87522 HEPATITIS C REVRS TRNSCRPJ: CPT

## 2020-09-18 LAB
HCV RNA SERPL NAA+PROBE-LOG IU: <1.08 LOG (10) IU/ML
HCV RNA SERPL QL NAA+PROBE: NOT DETECTED IU/ML
HCV RNA SPEC NAA+PROBE-ACNC: <12 IU/ML

## 2020-09-23 ENCOUNTER — CLINICAL SUPPORT (OUTPATIENT)
Dept: CARDIOLOGY | Facility: HOSPITAL | Age: 75
End: 2020-09-23
Attending: STUDENT IN AN ORGANIZED HEALTH CARE EDUCATION/TRAINING PROGRAM
Payer: MEDICARE

## 2020-09-23 DIAGNOSIS — M34.9 SYSTEMIC SCLEROSIS, UNSPECIFIED: ICD-10-CM

## 2020-09-23 PROCEDURE — 93306 TTE W/DOPPLER COMPLETE: CPT | Mod: 26,,, | Performed by: INTERNAL MEDICINE

## 2020-09-23 PROCEDURE — 93306 ECHO (CUPID ONLY): ICD-10-PCS | Mod: 26,,, | Performed by: INTERNAL MEDICINE

## 2020-09-23 PROCEDURE — 93306 TTE W/DOPPLER COMPLETE: CPT

## 2020-09-25 LAB
AORTIC ROOT ANNULUS: 2.98 CM
AORTIC VALVE CUSP SEPERATION: 1.59 CM
AV INDEX (PROSTH): 0.82
AV MEAN GRADIENT: 3 MMHG
AV PEAK GRADIENT: 5 MMHG
AV VALVE AREA: 2.63 CM2
AV VELOCITY RATIO: 67.75
CV ECHO LV RWT: 0.33 CM
DOP CALC AO PEAK VEL: 1.14 M/S
DOP CALC AO VTI: 21.59 CM
DOP CALC LVOT AREA: 3.2 CM2
DOP CALC LVOT DIAMETER: 2.02 CM
DOP CALC LVOT PEAK VEL: 77.24 M/S
DOP CALC LVOT STROKE VOLUME: 56.7 CM3
DOP CALCLVOT PEAK VEL VTI: 17.7 CM
E WAVE DECELERATION TIME: 216.4 MSEC
E/A RATIO: 0.68
E/E' RATIO: 10.22 M/S
ECHO LV POSTERIOR WALL: 0.87 CM (ref 0.6–1.1)
FRACTIONAL SHORTENING: 31 % (ref 28–44)
INTERVENTRICULAR SEPTUM: 0.87 CM (ref 0.6–1.1)
LEFT ATRIUM SIZE: 2.87 CM
LEFT INTERNAL DIMENSION IN SYSTOLE: 3.66 CM (ref 2.1–4)
LEFT VENTRICLE DIASTOLIC VOLUME: 55.41 ML
LEFT VENTRICLE SYSTOLIC VOLUME: 21.62 ML
LEFT VENTRICULAR INTERNAL DIMENSION IN DIASTOLE: 5.29 CM (ref 3.5–6)
LEFT VENTRICULAR MASS: 166.5 G
LV LATERAL E/E' RATIO: 11.5 M/S
LV SEPTAL E/E' RATIO: 9.2 M/S
MV PEAK A VEL: 0.68 M/S
MV PEAK E VEL: 0.46 M/S
PISA TR MAX VEL: 2.33 M/S
RA PRESSURE: 3 MMHG
TDI LATERAL: 0.04 M/S
TDI SEPTAL: 0.05 M/S
TDI: 0.05 M/S
TR MAX PG: 22 MMHG
TV REST PULMONARY ARTERY PRESSURE: 25 MMHG

## 2020-09-27 ENCOUNTER — DOCUMENTATION ONLY (OUTPATIENT)
Dept: TRANSPLANT | Facility: CLINIC | Age: 75
End: 2020-09-27

## 2020-09-27 NOTE — LETTER
September 27, 2020    Marisela Eagle  [address] [place] [id number]      Dear Marisela Eagle:    Your doctor has referred you to the [facility]. We are sending this letter to remind you to make an appointment with us to complete the referral process.     Please call us at [phone] to schedule an appointment. We look forward to seeing you soon.     If you received a call and have been scheduled, please disregard this letter.       Sincerely,        [facility]   [address] [place] [address] [phone]

## 2020-09-28 NOTE — NURSING
Pt records reviewed.   Pt will be referred to Hepatitis C clinic.  Pt referred for Hep C Ab+ with HCV RNA neg. Labs normal.     Initial referral received  from the [facility].   Referring Provider/diagnosis    Amisha Maki MD     Referral letter sent to  patient.

## 2020-09-29 ENCOUNTER — TELEPHONE (OUTPATIENT)
Dept: DERMATOLOGY | Facility: CLINIC | Age: 75
End: 2020-09-29

## 2020-09-29 ENCOUNTER — OFFICE VISIT (OUTPATIENT)
Dept: CARDIOLOGY | Facility: CLINIC | Age: 75
End: 2020-09-29
Payer: MEDICARE

## 2020-09-29 ENCOUNTER — TELEPHONE (OUTPATIENT)
Dept: HEPATOLOGY | Facility: CLINIC | Age: 75
End: 2020-09-29

## 2020-09-29 VITALS
WEIGHT: 237 LBS | OXYGEN SATURATION: 99 % | SYSTOLIC BLOOD PRESSURE: 126 MMHG | HEIGHT: 69 IN | DIASTOLIC BLOOD PRESSURE: 74 MMHG | HEART RATE: 71 BPM | BODY MASS INDEX: 35.1 KG/M2 | RESPIRATION RATE: 16 BRPM

## 2020-09-29 DIAGNOSIS — R60.0 BILATERAL LEG EDEMA: ICD-10-CM

## 2020-09-29 DIAGNOSIS — E78.2 MIXED HYPERLIPIDEMIA: ICD-10-CM

## 2020-09-29 DIAGNOSIS — R76.8 HCV ANTIBODY POSITIVE: ICD-10-CM

## 2020-09-29 DIAGNOSIS — R76.8 HEPATITIS C ANTIBODY TEST POSITIVE: Primary | ICD-10-CM

## 2020-09-29 DIAGNOSIS — I10 ESSENTIAL HYPERTENSION: Primary | ICD-10-CM

## 2020-09-29 DIAGNOSIS — I51.9 ASYMPTOMATIC LV DYSFUNCTION: ICD-10-CM

## 2020-09-29 PROCEDURE — 1101F PT FALLS ASSESS-DOCD LE1/YR: CPT | Mod: CPTII,S$GLB,, | Performed by: INTERNAL MEDICINE

## 2020-09-29 PROCEDURE — 3078F PR MOST RECENT DIASTOLIC BLOOD PRESSURE < 80 MM HG: ICD-10-PCS | Mod: CPTII,S$GLB,, | Performed by: INTERNAL MEDICINE

## 2020-09-29 PROCEDURE — 99213 OFFICE O/P EST LOW 20 MIN: CPT | Mod: S$GLB,,, | Performed by: INTERNAL MEDICINE

## 2020-09-29 PROCEDURE — 99213 PR OFFICE/OUTPT VISIT, EST, LEVL III, 20-29 MIN: ICD-10-PCS | Mod: S$GLB,,, | Performed by: INTERNAL MEDICINE

## 2020-09-29 PROCEDURE — 3074F PR MOST RECENT SYSTOLIC BLOOD PRESSURE < 130 MM HG: ICD-10-PCS | Mod: CPTII,S$GLB,, | Performed by: INTERNAL MEDICINE

## 2020-09-29 PROCEDURE — 3078F DIAST BP <80 MM HG: CPT | Mod: CPTII,S$GLB,, | Performed by: INTERNAL MEDICINE

## 2020-09-29 PROCEDURE — 1159F MED LIST DOCD IN RCRD: CPT | Mod: S$GLB,,, | Performed by: INTERNAL MEDICINE

## 2020-09-29 PROCEDURE — 1101F PR PT FALLS ASSESS DOC 0-1 FALLS W/OUT INJ PAST YR: ICD-10-PCS | Mod: CPTII,S$GLB,, | Performed by: INTERNAL MEDICINE

## 2020-09-29 PROCEDURE — 3074F SYST BP LT 130 MM HG: CPT | Mod: CPTII,S$GLB,, | Performed by: INTERNAL MEDICINE

## 2020-09-29 PROCEDURE — 1159F PR MEDICATION LIST DOCUMENTED IN MEDICAL RECORD: ICD-10-PCS | Mod: S$GLB,,, | Performed by: INTERNAL MEDICINE

## 2020-09-29 RX ORDER — FAMOTIDINE 20 MG/1
20 TABLET, FILM COATED ORAL 2 TIMES DAILY
COMMUNITY
End: 2020-10-27 | Stop reason: SDUPTHER

## 2020-09-29 NOTE — TELEPHONE ENCOUNTER
----- Message from Vivi Cisneros sent at 9/29/2020  2:26 PM CDT -----  Contact: pt  Type: Needs Medical Advice    Who Called:  PT  Best Call Back Number: [phone]  Additional Information: Requesting a call back regarding  if pt will need a follow up   Please Advise ---Thank you

## 2020-09-29 NOTE — TELEPHONE ENCOUNTER
CHART REVIEWED  HCV referral received  Normal LFT      Please call patient to review the following:  (Please mail as well)  Labs show she has previously been exposed to Hepatitis C (HCV). HCV is an infection usually spread through the blood (blood transfusion before 1992, tattoos, IV drugs, snorting drugs) and occasionally spread through sex.    Approximately 15-40% of people who are infected will then get rid of the virus on their own and do not need treatment.  Right now there is NO evidence of the virus in the blood, so I suspect this is what happened.  Right now an appointment in the HCV clinic is NOT needed, but we will repeat the blood test to check for the virus one more time in a few months.  This will not protect against a repeat infection. If there is exposure again, a new infection could occur.    Schedule repeat HCV RNA in 3 months.   If that is negative, no further eval would be needed.

## 2020-09-29 NOTE — TELEPHONE ENCOUNTER
----- Message from Della Gamez RN sent at 9/28/2020  1:36 PM CDT -----  Quant negative.  Please advise.  ----- Message -----  From: Rody Meyer  Sent: 9/27/2020   7:41 PM CDT  To: [facility] Hepatitis C Staff    Pt records reviewed.   Pt will be referred to Hepatitis C clinic.  Pt referred for Hep C Ab+ with HCV RNA neg. Labs normal.     [facility]   Referring Provider/diagnosis    Amisha Maki MD     Referral letter sent to  patient.

## 2020-09-30 NOTE — TELEPHONE ENCOUNTER
I spoke with patient and msg from PA Scheuermann relayed and mailed to her.  Lab draw scheduled 12/28/20; appt notice mailed.

## 2020-09-30 NOTE — PROGRESS NOTES
Subjective:    Patient ID:  Marisela Eagle is a 75 y.o. female who presents for   Follow-up (labs in chart by arthritis provider whom also ordered ct scan of chest ,and a echo that showed ejection fraction went from 60 to 40 )    HPI her complains mainly consisted of her joint pains and pain in the right foot which was diagnosed was secondary to Mcrae's neuroma.  She specifically denies orthopnea PND cough wheezing and leg edema.  There is no exertional chest pain.    Review of patient's allergies indicates:   Allergen Reactions    Duloxetine      Does not remember    Morphine Other (See Comments)     Other reaction(s): Syncope  fainted    Tramadol        Past Medical History:   Diagnosis Date    Bilateral knee pain 2012    left worse, right knee painful even after partial replacement.    Bruises easily     Clot ?    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 Took Coumadin x 6 weeks    KNEE ARTHROSCOPY  2010    right    TONSILLECTOMY       Social History     Tobacco Use    Smoking status: Former Smoker     Quit date: 3/23/2012     Years since quittin.5    Smokeless tobacco: Never Used   Substance Use Topics    Alcohol use: Yes     Comment:  occasional    Drug use: No        Review of Systems     Review of Systems   Constitution: Negative for weight gain and weight loss.   HENT: Negative for congestion, nosebleeds and sore throat.    Eyes: Negative for visual disturbance.   Cardiovascular: Negative for chest pain, dyspnea on exertion, palpitations and syncope.   Respiratory: Negative for cough, shortness of breath and wheezing.    Skin: Negative for rash.   Musculoskeletal: Negative for back pain, gout, joint pain and myalgias.   Gastrointestinal: Negative for heartburn, hematochezia and nausea.   Genitourinary: Negative for frequency, hematuria and nocturia.   Neurological: Negative for dizziness and tremors.   Psychiatric/Behavioral: Negative for memory loss.   Allergic/Immunologic: Negative for environmental allergies (Seasonal Allergies).           Objective:        Vitals:    09/29/20 1211   BP: 126/74   Pulse: 71   Resp: 16       Lab Results   Component Value Date    WBC 5.83 08/06/2020    HGB 14.1 08/06/2020    HCT 44.1 08/06/2020     08/06/2020    CHOL 178 07/03/2019    TRIG 100 07/03/2019    HDL 71 07/03/2019    ALT 31 08/06/2020    AST 21 08/06/2020     08/06/2020    K 4.2 08/06/2020     08/06/2020    CREATININE 0.8 09/10/2020    BUN 12 08/06/2020    CO2 32 (H) 08/06/2020    TSH 1.902 08/06/2020    INR 1.1 09/17/2019    HGBA1C 5.4 07/03/2019        ECHOCARDIOGRAM RESULTS  Results for orders placed in visit on 09/23/20   Echo Color Flow Doppler? Yes    Narrative · There is left ventricular concentric remodeling.  · The estimated PA systolic pressure is 25 mmHg.  · The left ventricle is normal in size with mildly decreased systolic   function. The estimated ejection fraction is 45%.  · Grade I diastolic dysfunction.  · Mild tricuspid regurgitation.  · There is left ventricular global hypokinesis.  · Normal right ventricular systolic function.  · Mild left atrial enlargement.  · Mild right atrial enlargement.  · Mild aortic  regurgitation.  · Normal central venous pressure (3 mmHg).          CURRENT/PREVIOUS VISIT EKG  Results for orders placed or performed during the hospital encounter of 09/17/19   EKG 12-lead    Collection Time: 09/19/19  5:40 AM    Narrative    Test Reason : R74.8,    Vent. Rate : 080 BPM     Atrial Rate : 080 BPM     P-R Int : 202 ms          QRS Dur : 102 ms      QT Int : 422 ms       P-R-T Axes : 037 -12 012 degrees     QTc Int : 486 ms    Normal sinus rhythm  Leftward axis  When compared with ECG of 17-SEP-2019 22:33,  Incomplete right bundle branch block Present  Criteria for Septal infarct are no longer Present  Confirmed by Avni Patton MD ([id number]) on 9/21/2019 5:42:33 PM    Referred By: MIRELLA   SELF           Confirmed By:Avni Patton MD     Results for orders placed in visit on 09/23/20   Echo Color Flow Doppler? Yes    Narrative · There is left ventricular concentric remodeling.  · The estimated PA systolic pressure is 25 mmHg.  · The left ventricle is normal in size with mildly decreased systolic   function. The estimated ejection fraction is 45%.  · Grade I diastolic dysfunction.  · Mild tricuspid regurgitation.  · There is left ventricular global hypokinesis.  · Normal right ventricular systolic function.  · Mild left atrial enlargement.  · Mild right atrial enlargement.  · Mild aortic regurgitation.  · Normal central venous pressure (3 mmHg).        No results found for this or any previous visit.    PHYSICAL EXAM    Physical Exam   Constitutional: She appears well-developed and well-nourished.   Cardiovascular: Normal rate, regular rhythm and normal heart sounds. Exam reveals no gallop and no S3.   Her toes have a bluish discoloration but she has normal dorsalis pedis and posterior tibial pulses   Pulmonary/Chest: Effort normal and breath sounds normal.        Medication List with Changes/Refills   Current Medications    AMITRIPTYLINE (ELAVIL) 25 MG TABLET    Take 25 mg by mouth nightly as needed  for Insomnia.    ATORVASTATIN (LIPITOR) 20 MG TABLET    Take 20 mg by mouth nightly.    CHOLECALCIFEROL, VITAMIN D3, 5,000 UNIT CAPSULE    Take 5,000 Units by mouth once daily.    CYANOCOBALAMIN (VITAMIN B-12) 1000 MCG TABLET    Take 100 mcg by mouth once daily.    DICLOFENAC SODIUM (VOLTAREN) 1 % GEL    Apply 2 g topically daily as needed.    DOXYCYCLINE (MONODOX) 100 MG CAPSULE    Take 1 capsule (100 mg total) by mouth once daily.    FAMOTIDINE (PEPCID) 20 MG TABLET    Take 20 mg by mouth 2 (two) times daily.    FUROSEMIDE (LASIX) 40 MG TABLET        GABAPENTIN (NEURONTIN) 800 MG TABLET    Take 800 mg by mouth 2 (two) times daily.     HYDROCODONE-ACETAMINOPHEN (NORCO) 5-325 MG PER TABLET        LEVOTHYROXINE (SYNTHROID) 125 MCG TABLET    Take 1 tablet (125 mcg total) by mouth before breakfast.    LOSARTAN-HYDROCHLOROTHIAZIDE 100-12.5 MG (HYZAAR) 100-12.5 MG TAB    Take 1 tablet by mouth 2 (two) times daily.     MAGNESIUM OXIDE (MAG-OX) 400 MG (241.3 MG MAGNESIUM) TABLET    Take 400 mg by mouth 2 (two) times daily.    MECOBAL/LEVOMEFOLAT CA/B6 PHOS (METANX ORAL)    Take 1 capsule by mouth 2 (two) times daily.    METOPROLOL SUCCINATE (TOPROL-XL) 50 MG 24 HR TABLET    Take 50 mg by mouth nightly.    MUPIROCIN (BACTROBAN) 2 % OINTMENT        OMEPRAZOLE (PRILOSEC) 40 MG CAPSULE    Take 40 mg by mouth once daily.    OXYCODONE-ACETAMINOPHEN (PERCOCET)  MG PER TABLET        -PROPYLENE GLYCOL (SYSTANE, PROPYLENE GLYCOL,) 0.4-0.3 % DROP    Place 1 drop into both eyes once daily.    TURMERIC 400 MG CAP    Take 400 mg by mouth 2 (two) times daily.    VIT C/E/ZN/COPPR/LUTEIN/ZEAXAN (PRESERVISION AREDS-2 ORAL)    Take 1 capsule by mouth 2 (two) times daily.            Assessment:       1. Essential hypertension    2. Bilateral leg edema    3. Mixed hyperlipidemia    4. Asymptomatic LV dysfunction         Plan:  I told her clinically again I do not see any signs an symptoms of congestive heart failure.  We will  check a BNP.  After personally reviewing echocardiographic images if the ejection fraction has decreased I will go ahead and obtain a nuclear stress test in view of findings of significant coronary artery calcium on CT scan of her chest.  I also note she does not have exertional angina.       Problem List Items Addressed This Visit        Cardiac/Vascular    Hypertension - Primary    Hyperlipidemia      Other Visit Diagnoses     Bilateral leg edema        Asymptomatic LV dysfunction        Relevant Orders    Brain Natriuretic Peptide           Follow up in about 4 weeks (around 10/27/2020).

## 2020-10-01 ENCOUNTER — HOSPITAL ENCOUNTER (OUTPATIENT)
Dept: RADIOLOGY | Facility: CLINIC | Age: 75
Discharge: HOME | End: 2020-10-01
Attending: PODIATRIST
Payer: MEDICARE

## 2020-10-01 ENCOUNTER — OFFICE VISIT (OUTPATIENT)
Dept: PODIATRY | Facility: CLINIC | Age: 75
End: 2020-10-01
Payer: MEDICARE

## 2020-10-01 VITALS
WEIGHT: 237 LBS | HEART RATE: 71 BPM | SYSTOLIC BLOOD PRESSURE: 126 MMHG | BODY MASS INDEX: 35.1 KG/M2 | HEIGHT: 69 IN | DIASTOLIC BLOOD PRESSURE: 74 MMHG | TEMPERATURE: 99 F | RESPIRATION RATE: 16 BRPM

## 2020-10-01 DIAGNOSIS — I73.00 RAYNAUD'S DISEASE WITHOUT GANGRENE: ICD-10-CM

## 2020-10-01 DIAGNOSIS — M79.671 RIGHT FOOT PAIN: ICD-10-CM

## 2020-10-01 DIAGNOSIS — G62.9 NEUROPATHY: Primary | ICD-10-CM

## 2020-10-01 PROCEDURE — 1125F AMNT PAIN NOTED PAIN PRSNT: CPT | Mod: S$GLB,,, | Performed by: PODIATRIST

## 2020-10-01 PROCEDURE — 3288F PR FALLS RISK ASSESSMENT DOCUMENTED: ICD-10-PCS | Mod: S$GLB,,, | Performed by: PODIATRIST

## 2020-10-01 PROCEDURE — 3078F PR MOST RECENT DIASTOLIC BLOOD PRESSURE < 80 MM HG: ICD-10-PCS | Mod: S$GLB,,, | Performed by: PODIATRIST

## 2020-10-01 PROCEDURE — 1159F PR MEDICATION LIST DOCUMENTED IN MEDICAL RECORD: ICD-10-PCS | Mod: S$GLB,,, | Performed by: PODIATRIST

## 2020-10-01 PROCEDURE — 99213 OFFICE O/P EST LOW 20 MIN: CPT | Mod: S$GLB,,, | Performed by: PODIATRIST

## 2020-10-01 PROCEDURE — 1125F PR PAIN SEVERITY QUANTIFIED, PAIN PRESENT: ICD-10-PCS | Mod: S$GLB,,, | Performed by: PODIATRIST

## 2020-10-01 PROCEDURE — 99213 PR OFFICE/OUTPT VISIT, EST, LEVL III, 20-29 MIN: ICD-10-PCS | Mod: S$GLB,,, | Performed by: PODIATRIST

## 2020-10-01 PROCEDURE — 3074F SYST BP LT 130 MM HG: CPT | Mod: S$GLB,,, | Performed by: PODIATRIST

## 2020-10-01 PROCEDURE — 1100F PTFALLS ASSESS-DOCD GE2>/YR: CPT | Mod: S$GLB,,, | Performed by: PODIATRIST

## 2020-10-01 PROCEDURE — 1159F MED LIST DOCD IN RCRD: CPT | Mod: S$GLB,,, | Performed by: PODIATRIST

## 2020-10-01 PROCEDURE — 3078F DIAST BP <80 MM HG: CPT | Mod: S$GLB,,, | Performed by: PODIATRIST

## 2020-10-01 PROCEDURE — 73630 XR FOOT COMPLETE 3 VIEW RIGHT: ICD-10-PCS | Mod: RT,S$GLB,, | Performed by: PODIATRIST

## 2020-10-01 PROCEDURE — 73630 X-RAY EXAM OF FOOT: CPT | Mod: RT,S$GLB,, | Performed by: PODIATRIST

## 2020-10-01 PROCEDURE — 3074F PR MOST RECENT SYSTOLIC BLOOD PRESSURE < 130 MM HG: ICD-10-PCS | Mod: S$GLB,,, | Performed by: PODIATRIST

## 2020-10-01 PROCEDURE — 3288F FALL RISK ASSESSMENT DOCD: CPT | Mod: S$GLB,,, | Performed by: PODIATRIST

## 2020-10-01 PROCEDURE — 1100F PR PT FALLS ASSESS DOC 2+ FALLS/FALL W/INJURY/YR: ICD-10-PCS | Mod: S$GLB,,, | Performed by: PODIATRIST

## 2020-10-01 RX ORDER — EZETIMIBE 10 MG/1
10 TABLET ORAL DAILY
COMMUNITY
Start: 2020-09-17 | End: 2021-06-18

## 2020-10-01 NOTE — PROGRESS NOTES
[address]  [place] [address]  Phone: [phone]   [phone]    Patient's PCP:Kenneth Maurice DO  Referring Provider: No ref. provider found    Subjective:      Chief Complaint:: Foot Pain (right foot neuroma)    HPI  Marisela Eagle is a 75 y.o. female who presents with a complaint of right foot pain lasting for sometime. Onset of the symptoms was sharp and burning pain.  Current symptoms include sharp to burning pain, swelling round first toe.  Aggravating factors are none. Symptoms have waxed and weaned.Treatment to date have included diagnosis of right foot neuroma and left foot plantar fibroma, and injections Patients rates pain 8/10 on pain scale.    Vitals:    10/01/20 1436   BP: 126/74   Pulse: 71   Resp: 16   Temp: 99.2 °F (37.3 °C)     Shoe Size:     Past Surgical History:   Procedure Laterality Date    ADENOIDECTOMY      APPENDECTOMY      BILATERAL SALPINGOOPHORECTOMY       SECTION      COLONOSCOPY  12    HYSTERECTOMY      with BSO    JOINT REPLACEMENT  2012    rt unilateral knee arthroplasty. Took Coumadin x 6 weeks    KNEE ARTHROSCOPY  2010    right    TONSILLECTOMY       Past Medical History:   Diagnosis Date    Bilateral knee pain 2012    left worse, right knee painful even after partial replacement.    Bruises easily     Clot ?    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  Tobacco History:  reports that she quit smoking about 8 years ago. She has never used smokeless tobacco.  Drug History:  reports no history of drug use.    Review of patient's allergies indicates:   Allergen Reactions    Duloxetine      Does not remember    Morphine Other (See Comments)     Other reaction(s): Syncope  fainted    Tramadol        Current Outpatient Medications   Medication Sig Dispense Refill    amitriptyline (ELAVIL) 25 MG tablet Take 25 mg by mouth nightly as needed for Insomnia.      atorvastatin (LIPITOR) 20 MG tablet Take 20 mg by mouth nightly.      cholecalciferol, vitamin D3, 5,000 unit capsule Take 5,000 Units by mouth once daily.      cyanocobalamin (VITAMIN B-12) 1000 MCG tablet Take 100 mcg by mouth once daily.      diclofenac sodium (VOLTAREN) 1 % Gel Apply 2 g topically daily as needed.      doxycycline (MONODOX) 100 MG capsule Take 1 capsule (100 mg total) by mouth once daily. 30 capsule 0    ezetimibe (ZETIA) 10 mg tablet       famotidine (PEPCID) 20 MG tablet Take 20 mg by mouth 2 (two) times daily.      furosemide (LASIX) 40 MG tablet       gabapentin (NEURONTIN) 800 MG tablet Take 800 mg by mouth 2 (two) times daily.       HYDROcodone-acetaminophen (NORCO) 5-325 mg per tablet       levothyroxine (SYNTHROID) 125 MCG tablet Take 1 tablet (125 mcg total) by mouth before breakfast. 30 tablet 11    losartan-hydrochlorothiazide 100-12.5 mg (HYZAAR) 100-12.5 mg Tab Take 1 tablet by mouth 2 (two) times daily.       magnesium oxide (MAG-OX) 400 mg (241.3 mg magnesium) tablet Take 400 mg by mouth 2 (two) times daily.      mecobal/levomefolat Ca/B6 phos (METANX ORAL) Take 1 capsule by  mouth 2 (two) times daily.      metoprolol succinate (TOPROL-XL) 50 MG 24 hr tablet Take 50 mg by mouth nightly.      mupirocin (BACTROBAN) 2 % ointment       omeprazole (PRILOSEC) 40 MG capsule Take 40 mg by mouth once daily.      oxyCODONE-acetaminophen (PERCOCET)  mg per tablet       peg 400-propylene glycol (SYSTANE, PROPYLENE GLYCOL,) 0.4-0.3 % Drop Place 1 drop into both eyes once daily.      turmeric 400 mg Cap Take 400 mg by mouth 2 (two) times daily.      vit C/E/Zn/coppr/lutein/zeaxan (PRESERVISION AREDS-2 ORAL) Take 1 capsule by mouth 2 (two) times daily.        No current facility-administered medications for this visit.        Review of Systems      Objective:        Physical Exam:   Foot Exam    General  General Appearance: appears stated age and healthy   Orientation: alert and oriented to person, place, and time   Affect: appropriate   Gait: antalgic   Assistance: cane use       Right Foot/Ankle     Inspection and Palpation  Ecchymosis: none  Tenderness: great toe metatarsophalangeal joint   Swelling: none   Hammertoes: second toe, third toe, fourth toe and fifth toe  Hallux valgus: yes  Skin Exam: callus and abnormal color; no ulcer     Neurovascular  Dorsalis pedis: 2+  Posterior tibial: 2+  Saphenous nerve sensation: diminished  Tibial nerve sensation: diminished  Superficial peroneal nerve sensation: diminished  Deep peroneal nerve sensation: diminished  Sural nerve sensation: diminished    Muscle Strength  Ankle dorsiflexion: 5  Ankle plantar flexion: 5  Ankle inversion: 5  Ankle eversion: 5  Great toe extension: 5  Great toe flexion: 5    Range of Motion    Normal right ankle ROM      Left Foot/Ankle      Inspection and Palpation  Ecchymosis: none  Tenderness: great toe metatarsophalangeal joint   Swelling: none   Hammertoes: second toe, third toe, fourth toe and fifth toe  Hallux valgus: yes  Skin Exam: callus;     Neurovascular  Dorsalis pedis: 2+  Posterior tibial: 2+  Saphenous  nerve sensation: diminished  Tibial nerve sensation: diminished  Superficial peroneal nerve sensation: diminished  Deep peroneal nerve sensation: diminished  Sural nerve sensation: diminished    Muscle Strength  Ankle dorsiflexion: 5  Ankle plantar flexion: 5  Ankle inversion: 5  Ankle eversion: 5  Great toe extension: 5  Great toe flexion: 5    Range of Motion    Normal left ankle ROM    Comments  No soft tissue mass is palpable on the plantar arch    Physical Exam   Cardiovascular:   Pulses:       Dorsalis pedis pulses are 2+ on the right side and 2+ on the left side.        Posterior tibial pulses are 2+ on the right side and 2+ on the left side.   Musculoskeletal:      Right foot: Bunion present.      Left foot: Bunion present.   Feet:   Right Foot:   Skin Integrity: Positive for callus. Negative for ulcer.   Left Foot:   Skin Integrity: Positive for callus.       Imaging: X-ray 3 views of the right foot were taken AP, lateral, and oblique, weight bearing showing:  No fractures or dislocations noted. Mild bunion deformity is present.  Very mild hammering of the lesser digits. No bone tumors or soft tissue masses.        Electronically Signed by: Jose Martin DPM           Assessment:       1. Neuropathy    2. Raynaud's disease without gangrene    3. Right foot pain      Plan:   Neuropathy    Raynaud's disease without gangrene    Right foot pain  -     X-Ray Foot Complete Right      Follow up if symptoms worsen or fail to improve.    Procedures - None    I discussed with the patient that I believe her symptoms are due to a combination of her previously diagnosed neuropathy as well as her Raynaud's disease.  I explained that neuromas can happen in the 1st intermetatarsal space however this is very uncommon.  I recommend she discuss renal disease with her rheumatologist to she is scheduled to follow up with soon.    Counseling:     I provided patient education verbally regarding:   Patient diagnosis, treatment  options, as well as alternatives, risks, and benefits.     This note was created using Dragon voice recognition software that occasionally misinterpreted phrases or words.

## 2020-10-01 NOTE — PATIENT INSTRUCTIONS
Raynaud Disease  Your healthcare provider has told you that you have Raynaud disease. It is also called Raynaud phenomenon or Raynaud syndrome. There is no cure for Raynaud disease, but you can manage it to help prevent attacks.    What are the symptoms of Raynaud disease?  A Raynaud disease attack is often triggered by cold or stress. During an attack, blood vessels suddenly narrow (called vasospasm).  This most often happens in fingers and toes. In rare cases, the nose, ears, or even tongue are affected. Narrowed blood vessels reduce the blood supply to the area. The area then turns white, then blue. The area may feel numb or painful. As the attack passes, the blood vessels open. The affected area may turn bright red as it warms up, then returns to normal color.  What is the cause of Raynaud disease?  With Raynaud disease, it is believed that blood vessels in the affected areas overrespond to certain triggers, such as cold. This makes them narrow (called vasospasm) much more than in people without the disease. What causes the blood vessels to react so strongly to certain triggers is unknown. In between attacks, the blood vessels are normal and healthy. Attacks dont permanently damage the blood vessels, but may thicken the artery walls.   In some cases, Raynaud disease happens along with another disease or condition. This is often a connective tissue disorder, such as lupus, scleroderma, or rheumatoid arthritis. This is called secondary Raynaud disease (as opposed to primary Raynaud disease discussed above) and may be more severe. If this is the case for you, you and your healthcare provider can discuss treatment for the underlying condition.  What are the risk factors?  Risk factors for Raynaud disease include:  · Women are more likely to get Raynaud disease than men.  · Younger individuals are at higher risk, usually ages 15 to 30.  · Living in colder climates increases risk.  · Having a family member with  Raynaud disease increases one's risk.  · Underlying rheumatoid conditions may increase one's risk.   What are possible triggers?  Triggers for Raynaud disease include:   · Cold  · Stress  · Caffeine  · Smoking  · Repetitive movements  · Certain medicines, such as beta-blockers, migraine medicine, birth control pills and others  · Injury  How is Raynaud disease diagnosed?  Your description of your symptoms, a health history, and a physical exam are often enough for a diagnosis. Blood tests and other tests may be done to see if any underlying conditions are present and rule out other problems.  How is Raynaud disease treated?  There is no cure for Raynaud disease. But you can control symptoms and reduce the number and severity of attacks. For most people, avoiding triggers is enough to limit attacks. Your healthcare provider may suggest the following:  · Take precautions to help prevent your hands and feet from losing circulation. This includes:  ¨ Dressing warmly in cold weather.  ¨ Wear gloves or mittens when your hands may become cold, such as when you use the refrigerator or freezer.  ¨ Avoid stress and caffeine.  ¨ Exercise regularly. This may reduce the number and severity of attacks.   ¨ If you smoke, quitting may improve the condition. This is because smoking causes your blood vessels to narrow and reduces blood flow.  · Soak your hands or feet in warm (not hot) water. Do this at the first sign of attack. Keep soaking until your skin color returns to normal.  In some people, symptoms are persistent or troubling. For these cases, other treatments are a choice. Your healthcare provider can tell you more about the following:  · Prescription medicines that relax and widen blood vessels, such as calcium channel blockers. These may help relieve symptoms.  · Nerve surgery for severe cases that dont respond to other treatments. Surgery removes the nerves that surround the blood vessels in the hands and feet. Without  nerve stimulation, the blood vessels stay more relaxed. They are less likely to become very narrow due to stimulus. Nerves may be blocked using injections in some cases.  Most cases of Raynaud disease are not cause for concern. The disease doesnt get worse and isnt likely to cause any permanent damage. If attacks are severe, very prolonged, or very often, skin damage may result. Controlling attacks can help prevent this.  When to seek medical care  The following problems happen rarely, but they can be serious. Call your healthcare provider right away if you notice any of the following:  · Infection or sores on the skin  · A finger or toe turns black  · The skin breaks open on its own  · A rash develops  · A finger or toe joint becomes painful or swollen   Date Last Reviewed: 1/27/2016  © 2466-8506 [employer]. [address] [place] [address], [place] [address]. All rights reserved. This information is not intended as a substitute for professional medical care. Always follow your healthcare professional's instructions.

## 2020-10-06 ENCOUNTER — TELEPHONE (OUTPATIENT)
Dept: CARDIOLOGY | Facility: CLINIC | Age: 75
End: 2020-10-06

## 2020-10-06 NOTE — TELEPHONE ENCOUNTER
----- Message from Barrington Santoyo sent at 10/5/2020  2:03 PM CDT -----  Regarding: results  Pt said she was suppose to get a call about results to see if heart is deteriorating      Test and Labs last Thursday     Results     [phone]

## 2020-10-07 ENCOUNTER — OFFICE VISIT (OUTPATIENT)
Dept: RHEUMATOLOGY | Facility: CLINIC | Age: 75
End: 2020-10-07
Payer: MEDICARE

## 2020-10-07 ENCOUNTER — TELEPHONE (OUTPATIENT)
Dept: CARDIOLOGY | Facility: CLINIC | Age: 75
End: 2020-10-07

## 2020-10-07 VITALS
BODY MASS INDEX: 34.94 KG/M2 | WEIGHT: 235.88 LBS | HEIGHT: 69 IN | HEART RATE: 75 BPM | SYSTOLIC BLOOD PRESSURE: 118 MMHG | DIASTOLIC BLOOD PRESSURE: 69 MMHG

## 2020-10-07 DIAGNOSIS — M17.0 OSTEOARTHRITIS OF BOTH KNEES, UNSPECIFIED OSTEOARTHRITIS TYPE: ICD-10-CM

## 2020-10-07 DIAGNOSIS — E55.9 HYPOVITAMINOSIS D: ICD-10-CM

## 2020-10-07 DIAGNOSIS — R94.30 EJECTION FRACTION < 50%: ICD-10-CM

## 2020-10-07 DIAGNOSIS — I10 HYPERTENSION, UNSPECIFIED TYPE: ICD-10-CM

## 2020-10-07 DIAGNOSIS — E66.9 OBESITY (BMI 30-39.9): ICD-10-CM

## 2020-10-07 DIAGNOSIS — M35.9 UNDIFFERENTIATED CONNECTIVE TISSUE DISEASE: ICD-10-CM

## 2020-10-07 DIAGNOSIS — R76.8 HCV ANTIBODY POSITIVE: Primary | ICD-10-CM

## 2020-10-07 DIAGNOSIS — R91.8 PULMONARY NODULES: ICD-10-CM

## 2020-10-07 PROCEDURE — 1100F PTFALLS ASSESS-DOCD GE2>/YR: CPT | Mod: CPTII,S$GLB,, | Performed by: STUDENT IN AN ORGANIZED HEALTH CARE EDUCATION/TRAINING PROGRAM

## 2020-10-07 PROCEDURE — 99214 PR OFFICE/OUTPT VISIT, EST, LEVL IV, 30-39 MIN: ICD-10-PCS | Mod: S$GLB,,, | Performed by: STUDENT IN AN ORGANIZED HEALTH CARE EDUCATION/TRAINING PROGRAM

## 2020-10-07 PROCEDURE — 1100F PR PT FALLS ASSESS DOC 2+ FALLS/FALL W/INJURY/YR: ICD-10-PCS | Mod: CPTII,S$GLB,, | Performed by: STUDENT IN AN ORGANIZED HEALTH CARE EDUCATION/TRAINING PROGRAM

## 2020-10-07 PROCEDURE — 99999 PR PBB SHADOW E&M-EST. PATIENT-LVL IV: ICD-10-PCS | Mod: PBBFAC,,, | Performed by: STUDENT IN AN ORGANIZED HEALTH CARE EDUCATION/TRAINING PROGRAM

## 2020-10-07 PROCEDURE — 3288F PR FALLS RISK ASSESSMENT DOCUMENTED: ICD-10-PCS | Mod: CPTII,S$GLB,, | Performed by: STUDENT IN AN ORGANIZED HEALTH CARE EDUCATION/TRAINING PROGRAM

## 2020-10-07 PROCEDURE — 3074F PR MOST RECENT SYSTOLIC BLOOD PRESSURE < 130 MM HG: ICD-10-PCS | Mod: CPTII,S$GLB,, | Performed by: STUDENT IN AN ORGANIZED HEALTH CARE EDUCATION/TRAINING PROGRAM

## 2020-10-07 PROCEDURE — 1159F PR MEDICATION LIST DOCUMENTED IN MEDICAL RECORD: ICD-10-PCS | Mod: S$GLB,,, | Performed by: STUDENT IN AN ORGANIZED HEALTH CARE EDUCATION/TRAINING PROGRAM

## 2020-10-07 PROCEDURE — 3074F SYST BP LT 130 MM HG: CPT | Mod: CPTII,S$GLB,, | Performed by: STUDENT IN AN ORGANIZED HEALTH CARE EDUCATION/TRAINING PROGRAM

## 2020-10-07 PROCEDURE — 1159F MED LIST DOCD IN RCRD: CPT | Mod: S$GLB,,, | Performed by: STUDENT IN AN ORGANIZED HEALTH CARE EDUCATION/TRAINING PROGRAM

## 2020-10-07 PROCEDURE — 3078F PR MOST RECENT DIASTOLIC BLOOD PRESSURE < 80 MM HG: ICD-10-PCS | Mod: CPTII,S$GLB,, | Performed by: STUDENT IN AN ORGANIZED HEALTH CARE EDUCATION/TRAINING PROGRAM

## 2020-10-07 PROCEDURE — 99214 OFFICE O/P EST MOD 30 MIN: CPT | Mod: S$GLB,,, | Performed by: STUDENT IN AN ORGANIZED HEALTH CARE EDUCATION/TRAINING PROGRAM

## 2020-10-07 PROCEDURE — 3288F FALL RISK ASSESSMENT DOCD: CPT | Mod: CPTII,S$GLB,, | Performed by: STUDENT IN AN ORGANIZED HEALTH CARE EDUCATION/TRAINING PROGRAM

## 2020-10-07 PROCEDURE — 3078F DIAST BP <80 MM HG: CPT | Mod: CPTII,S$GLB,, | Performed by: STUDENT IN AN ORGANIZED HEALTH CARE EDUCATION/TRAINING PROGRAM

## 2020-10-07 PROCEDURE — 99999 PR PBB SHADOW E&M-EST. PATIENT-LVL IV: CPT | Mod: PBBFAC,,, | Performed by: STUDENT IN AN ORGANIZED HEALTH CARE EDUCATION/TRAINING PROGRAM

## 2020-10-07 RX ORDER — HYDROMORPHONE HYDROCHLORIDE 4 MG/1
TABLET ORAL
COMMUNITY
Start: 2020-10-05 | End: 2021-01-31

## 2020-10-07 ASSESSMENT — ROUTINE ASSESSMENT OF PATIENT INDEX DATA (RAPID3)
AM STIFFNESS SCORE: 1, YES
PAIN SCORE: 9
FATIGUE SCORE: 6
PATIENT GLOBAL ASSESSMENT SCORE: 7
MDHAQ FUNCTION SCORE: 0.9
PSYCHOLOGICAL DISTRESS SCORE: 1.1
TOTAL RAPID3 SCORE: 6.33

## 2020-10-07 NOTE — PROGRESS NOTES
RHEUMATOLOGY CLINIC FOLLOW UP VISIT      Chief complaints:- multiple joint pain       HPI:-  Marisela Hayes a 75 y.o. pleasant female with PMH of hypothyroidism (following endocrine), DJD (with multiple joint replacement), GERD, and chronic LE neuropathy (no hx of DM) comes in for an initial visit with above chief complaints.     Patient had bilateral hand, knee, feet, and lower back pain for a while. Patient had seen multiple specialists and still continues to have pain.  Patient states that she had multiple joint replacement in the past all for her arthralgia.  Multiple trigger fingers in the past - all alleviated with joint injections (refused surgical intervention).      Bilateral LE (feet) neuropathy that has been ongoing for a while as well after vein graft/bypass?  Pain is alleviated with gabapentin and Elavil     Outside lab +ANEUDY 1:1280 centromere.  +RF with negative CCP     Fhx: Parents both have arthritis     Tobacco (1ppd x 53 years) Quit 5 years ago, EtOH - social, denies recreational/illicit drugs    Occupation: retired ()     Hx of clot (1 incisional clot after hysterectomy)/miscarriages  (1 miscarriage - 6 weeks gestation)    ROS: +reflux, neuropathy of the bilateral feet , joint swelling,  Denies any alopecia, Raynaud's, sclerodactyly, skin tightening, vision changes, mouth ulcers, rash, abdominal pain, N/V, fever/chills, sicca symptoms     Interval History   Patient had steroid injection of her hands and knee which alleviated her pain symptoms from the last visit.  Had steroid injection in the back just yesterday.  States that pain is controlled with injection and opioid medication.  Continues to have neuropathy of bilateral LE that had been unchanged.     Recent ECHO showed decreased in EF from 60% to 45%.  Following with cardiology for further evaluation .     Had discontinued usage of NSAID due to gastritis.  Not taking Tylenol.      Review of Systems   Constitutional:  Negative for chills, diaphoresis, fever, malaise/fatigue and weight loss.   HENT: Negative for congestion, ear discharge, ear pain, hearing loss, nosebleeds, sinus pain and tinnitus.    Eyes: Negative for photophobia, pain, discharge and redness.   Respiratory: Negative for cough, hemoptysis, sputum production, shortness of breath, wheezing and stridor.    Cardiovascular: Negative for chest pain, palpitations, orthopnea, claudication, leg swelling and PND.   Gastrointestinal: Negative for abdominal pain, constipation, diarrhea, heartburn, nausea and vomiting.   Genitourinary: Negative for dysuria, frequency, hematuria and urgency.   Musculoskeletal: Positive for back pain and joint pain. Negative for myalgias and neck pain.   Skin: Negative for rash.   Neurological: Positive for tingling and weakness. Negative for dizziness, tremors and headaches.   Endo/Heme/Allergies: Does not bruise/bleed easily.   Psychiatric/Behavioral: Negative for depression, hallucinations and suicidal ideas. The patient is not nervous/anxious and does not have insomnia.        Past Medical History:   Diagnosis Date    Bilateral knee pain 2012    left worse, right knee painful even after partial replacement.    Bruises easily     Clot ?    Umbilical clot after hysterectomy    Colon polyps     adenomatous    Complication of anesthesia     very low pain tolerance    Cystocele     Degenerative disc disease     neck,back, muscle spasms    Diverticulitis     Edema of left lower extremity     ankles    GERD (gastroesophageal reflux disease)     HCV antibody (+) but neg HCV RNA (likely cleared virus on her own)     no treatment needed    Hypertension     Neuropathy     peripheral    Thyroid disease     hypothyroid, has nodules    Varicose veins        Past Surgical History:   Procedure Laterality Date    ADENOIDECTOMY      APPENDECTOMY      BILATERAL SALPINGOOPHORECTOMY       SECTION      COLONOSCOPY  12  "   HYSTERECTOMY      with BSO    JOINT REPLACEMENT  2012    rt unilateral knee arthroplasty. Took Coumadin x 6 weeks    KNEE ARTHROSCOPY  2010    right    TONSILLECTOMY          Social History     Tobacco Use    Smoking status: Former Smoker     Quit date: 3/23/2012     Years since quittin.5    Smokeless tobacco: Never Used   Substance Use Topics    Alcohol use: Yes     Comment: occasional    Drug use: No       Family History   Problem Relation Age of Onset    Neuropathy Mother     Hypertension Mother     Stroke Mother     Neuropathy Father     Diabetes Maternal Grandmother     Cancer Daughter     Melanoma Neg Hx     Psoriasis Neg Hx     Lupus Neg Hx     Eczema Neg Hx        Review of patient's allergies indicates:   Allergen Reactions    Duloxetine      Does not remember    Morphine Other (See Comments)     Other reaction(s): Syncope  fainted    Tramadol        Vitals:    10/07/20 1127   BP: 118/69   Pulse: 75   Weight: 107 kg (235 lb 14.3 oz)   Height: 5' 9" (1.753 m)       Physical Exam   Constitutional: She is oriented to person, place, and time and well-developed, well-nourished, and in no distress.   obese   HENT:   Head: Normocephalic and atraumatic.   Eyes: Pupils are equal, round, and reactive to light. EOM are normal.   Neck: Normal range of motion. Neck supple.   Cardiovascular: Normal rate, regular rhythm and normal heart sounds.   Pulmonary/Chest: Effort normal and breath sounds normal.   Abdominal: Soft. Bowel sounds are normal.   Musculoskeletal: Normal range of motion.      Comments: Limited ROM of bilateral knees - secondary to pain   No signs of synovitis   Inability to form tight  secondary to pain  Mild ulnar deviation bilaterally   R 3rd MCP trigger finger    Neurological: She is alert and oriented to person, place, and time. Gait normal. GCS score is 15.   Skin: Skin is warm and dry. No rash noted. No erythema.   No rash   No skin tightening  + telangiectasia " of the upper back   Bluish/purplish discoloration of both feet (to the ankle) - cold extremities  No digital ulcer   Psychiatric: Mood, memory, affect and judgment normal.       Labs:-  Results for TU DORANTES (MRN [id number]) as of 10/7/2020 13:53   Ref. Range 9/10/2020 15:53 9/14/2020 10:42 9/23/2020 13:27 10/1/2020 13:23 10/1/2020 15:29   BNP Latest Ref Range: 0 - 99 pg/mL    262 (H)    Complement (C-3) Latest Ref Range: 50 - 180 mg/dL  127      Complement (C-4) Latest Ref Range: 11 - 44 mg/dL  25      HCV Log Latest Ref Range: <1.08 Log (10) IU/mL  <1.08      HCV RNA Quant PCR LOG Latest Ref Range: <12 IU/mL  <12      HCV, Qualitative Latest Ref Range: Not detected IU/mL  Not detected      Mitogen - Nil Latest Ref Range: See text IU/mL  >10.000      NIL Latest Ref Range: See text IU/mL  0.030      TB Gold Plus Unknown  Negative      TB1 - Nil Latest Ref Range: See text IU/mL  0.170      TB2 - Nil Latest Ref Range: See text IU/mL  0.210      CT CHEST WITH CONTRAST Unknown Rpt       XR FOOT COMPLETE 3 VIEW RIGHT Unknown     Rpt   ECHO (CUPID ONLY) Unknown   Rpt     AV index (prosthetic) Unknown   0.82     Left Ventricle Relative Wall Thickness Latest Units: cm   0.33     E/E' ratio Latest Units: m/s   10.22     Right Atrial Pressure (from IVC) Latest Units: mmHg   3       Radiographs:-  Independent visualization of images done.  Sept 2020 - CT chest - no patterns of ILD.  Several L lung pulmonary nodules.  Fatty liver.    Aug 2020 - Arthritis survey - DJD of the cervical spine.  OA of bilateral hands and feets   Nov 2019 - MRI lumbar spine - multifacet DJD.  Foraminal narrowing in multiple levels.   Feb 2019 - DEXA scan - normal BMD  August 2020 - arthritis survey - degenerative changes in the cervical spine.  Status post bilateral knee arthroplasty.  Osteoarthritis changes of hands and feet    Sept 2020 - ECHO  · There is left ventricular concentric remodeling.  · The estimated PA systolic pressure is 25  mmHg.  · The left ventricle is normal in size with mildly decreased systolic function. The estimated ejection fraction is 45%.  · Grade I diastolic dysfunction.  · Mild tricuspid regurgitation.  · There is left ventricular global hypokinesis.  · Normal right ventricular systolic function.  · Mild left atrial enlargement.  · Mild right atrial enlargement.  · Mild aortic regurgitation.  · Normal central venous pressure (3 mmHg).    Old and Outside medical records :-  Reviewed old and all outside medical records available in Care Everywhere.  May 2020 - R knee Xray.  Lateral joint space narrowing  S/p recent joint injection      Medication List with Changes/Refills   Current Medications    AMITRIPTYLINE (ELAVIL) 25 MG TABLET    Take 25 mg by mouth nightly as needed for Insomnia.    ATORVASTATIN (LIPITOR) 20 MG TABLET    Take 20 mg by mouth nightly.    CHOLECALCIFEROL, VITAMIN D3, 5,000 UNIT CAPSULE    Take 5,000 Units by mouth once daily.    CYANOCOBALAMIN (VITAMIN B-12) 1000 MCG TABLET    Take 100 mcg by mouth once daily.    DICLOFENAC SODIUM (VOLTAREN) 1 % GEL    Apply 2 g topically daily as needed.    DOXYCYCLINE (MONODOX) 100 MG CAPSULE    Take 1 capsule (100 mg total) by mouth once daily.    EZETIMIBE (ZETIA) 10 MG TABLET        FAMOTIDINE (PEPCID) 20 MG TABLET    Take 20 mg by mouth 2 (two) times daily.    FUROSEMIDE (LASIX) 40 MG TABLET        GABAPENTIN (NEURONTIN) 800 MG TABLET    Take 800 mg by mouth 2 (two) times daily.     HYDROCODONE-ACETAMINOPHEN (NORCO) 5-325 MG PER TABLET        HYDROMORPHONE (DILAUDID) 4 MG TABLET    TK 1 T PO TID PRF SEVER PAIN    LEVOTHYROXINE (SYNTHROID) 125 MCG TABLET    Take 1 tablet (125 mcg total) by mouth before breakfast.    LOSARTAN-HYDROCHLOROTHIAZIDE 100-12.5 MG (HYZAAR) 100-12.5 MG TAB    Take 1 tablet by mouth 2 (two) times daily.     MAGNESIUM OXIDE (MAG-OX) 400 MG (241.3 MG MAGNESIUM) TABLET    Take 400 mg by mouth 2 (two) times daily.    MECOBAL/LEVOMEFOLAT CA/B6 PHOS  (METANX ORAL)    Take 1 capsule by mouth 2 (two) times daily.    METOPROLOL SUCCINATE (TOPROL-XL) 50 MG 24 HR TABLET    Take 50 mg by mouth nightly.    MUPIROCIN (BACTROBAN) 2 % OINTMENT        OMEPRAZOLE (PRILOSEC) 40 MG CAPSULE    Take 40 mg by mouth once daily.    OXYCODONE-ACETAMINOPHEN (PERCOCET)  MG PER TABLET        -PROPYLENE GLYCOL (SYSTANE, PROPYLENE GLYCOL,) 0.4-0.3 % DROP    Place 1 drop into both eyes once daily.    TURMERIC 400 MG CAP    Take 400 mg by mouth 2 (two) times daily.    VIT C/E/ZN/COPPR/LUTEIN/ZEAXAN (PRESERVISION AREDS-2 ORAL)    Take 1 capsule by mouth 2 (two) times daily.        Assessment/Plans:-  75 y.o. pleasant female with PMH of hypothyroidism (following endocrine), DJD (with multiple joint replacement), GERD, and chronic LE neuropathy (no hx of DM) was referred to the rheumatology clinic for evaluation of positive ANEUDY and positive rheumatoid factor.    Primary care physician has order labs which shows a positive ANEUDY 1-19548 centromere pattern and a positive rheumatoid factor with negative CCP. ANCA associated vasculitis labs are normal     Patient has been having chronic arthralgia in multiple joints resulting in many joint replacement over the years.  States that she has some improvement with joint injections.  Still continues to have diffuse arthralgias.  Patient complained that she is also having chronic bilateral lower extremity neuropathy that has been ongoing for many years.  Has multiple other specialists evaluate and was unable to find out the causation.  Per patient workup included EMG - uncertain of the results.     Arthralgia - most likely secondary to OA (had multiple joint replacements in the past). Arthritis survey show OA in cervical spine, hands and feet.  Likely a component of inflammatory arthritis given +ANEUDY serology with neuropathy and Raynaud's.      Neuropathy with Raynaud's in bilateral LE - occurred after vein grafts/bypass?      Give the +ANEUDY  1:1280 centromere pattern with Raynaud's and GERD symptoms -  + SSA (52kDa).  +dried mouth (secondary to medication usage)    Dx: UCTD at this time   Would consider using HCQ for treatment of inflammatory arthritis - will hold on starting at this time due to recent decrease in EF.  Awaiting cardiology workup and recommendation.  If unable to start HCQ, consideration for MTX.       Plan  - cataract surgery scheduled for Nov 2 - follow up as scheduled.  Will need baseline eye exam.   - continue to keep peripheral digits warm - prevent ulcer formation   - follow up with cardiology as scheduled  - Recommendation for tumeric for anti inflammatory  - Acetaminophen for pain alleviation   - Avoid NSAIDs at this time due to gastritis.  If Tylenol is ineffective, consideration to use protonix and NSAIDs  - Follow up with CT chest in 1 year for lung nodules found on L lung.  Patient had extensive tobacco abuse in the past.   - recommendation for paraffin for hand pain   - +HCV with negative Hep C PCR.  Hepatology - aware.  Repeat blood test in 3 months per hepatology.     Follow up in about 6 weeks (around 11/18/2020).      Disclaimer: This note was prepared using voice recognition system and is likely to have sound alike errors and is not proof read.  Please call me with any questions.

## 2020-10-12 ENCOUNTER — PATIENT MESSAGE (OUTPATIENT)
Dept: CARDIOLOGY | Facility: CLINIC | Age: 75
End: 2020-10-12

## 2020-10-14 ENCOUNTER — OFFICE VISIT (OUTPATIENT)
Dept: DERMATOLOGY | Facility: CLINIC | Age: 75
End: 2020-10-14
Payer: MEDICARE

## 2020-10-14 VITALS — HEIGHT: 69 IN | BODY MASS INDEX: 34.94 KG/M2 | WEIGHT: 235.88 LBS

## 2020-10-14 DIAGNOSIS — L73.9 FOLLICULITIS: Primary | ICD-10-CM

## 2020-10-14 PROCEDURE — 1100F PTFALLS ASSESS-DOCD GE2>/YR: CPT | Mod: CPTII,S$GLB,, | Performed by: DERMATOLOGY

## 2020-10-14 PROCEDURE — 99213 PR OFFICE/OUTPT VISIT, EST, LEVL III, 20-29 MIN: ICD-10-PCS | Mod: S$GLB,,, | Performed by: DERMATOLOGY

## 2020-10-14 PROCEDURE — 99213 OFFICE O/P EST LOW 20 MIN: CPT | Mod: S$GLB,,, | Performed by: DERMATOLOGY

## 2020-10-14 PROCEDURE — 1126F AMNT PAIN NOTED NONE PRSNT: CPT | Mod: S$GLB,,, | Performed by: DERMATOLOGY

## 2020-10-14 PROCEDURE — 3288F FALL RISK ASSESSMENT DOCD: CPT | Mod: CPTII,S$GLB,, | Performed by: DERMATOLOGY

## 2020-10-14 PROCEDURE — 1159F MED LIST DOCD IN RCRD: CPT | Mod: S$GLB,,, | Performed by: DERMATOLOGY

## 2020-10-14 PROCEDURE — 87070 CULTURE OTHR SPECIMN AEROBIC: CPT

## 2020-10-14 PROCEDURE — 3288F PR FALLS RISK ASSESSMENT DOCUMENTED: ICD-10-PCS | Mod: CPTII,S$GLB,, | Performed by: DERMATOLOGY

## 2020-10-14 PROCEDURE — 99999 PR PBB SHADOW E&M-EST. PATIENT-LVL II: ICD-10-PCS | Mod: PBBFAC,,, | Performed by: DERMATOLOGY

## 2020-10-14 PROCEDURE — 1159F PR MEDICATION LIST DOCUMENTED IN MEDICAL RECORD: ICD-10-PCS | Mod: S$GLB,,, | Performed by: DERMATOLOGY

## 2020-10-14 PROCEDURE — 99999 PR PBB SHADOW E&M-EST. PATIENT-LVL II: CPT | Mod: PBBFAC,,, | Performed by: DERMATOLOGY

## 2020-10-14 PROCEDURE — 1126F PR PAIN SEVERITY QUANTIFIED, NO PAIN PRESENT: ICD-10-PCS | Mod: S$GLB,,, | Performed by: DERMATOLOGY

## 2020-10-14 PROCEDURE — 1100F PR PT FALLS ASSESS DOC 2+ FALLS/FALL W/INJURY/YR: ICD-10-PCS | Mod: CPTII,S$GLB,, | Performed by: DERMATOLOGY

## 2020-10-14 RX ORDER — A/SINGAPORE/GP1908/2015 IVR-180 (AN A/MICHIGAN/45/2015 (H1N1)PDM09-LIKE VIRUS, A/HONG KONG/4801/2014, NYMC X-263B (H3N2) (AN A/HONG KONG/4801/2014-LIKE VIRUS), AND B/BRISBANE/60/2008, WILD TYPE (A B/BRISBANE/60/2008-LIKE VIRUS) 15; 15; 15 UG/.5ML; UG/.5ML; UG/.5ML
INJECTION, SUSPENSION INTRAMUSCULAR
COMMUNITY
Start: 2020-10-07 | End: 2021-08-23

## 2020-10-14 RX ORDER — CLINDAMYCIN PHOSPHATE 11.9 MG/ML
SOLUTION TOPICAL
Qty: 60 ML | Refills: 3 | Status: SHIPPED | OUTPATIENT
Start: 2020-10-14 | End: 2021-01-31

## 2020-10-14 RX ORDER — KETOCONAZOLE 20 MG/ML
SHAMPOO, SUSPENSION TOPICAL
Qty: 120 ML | Refills: 5 | Status: SHIPPED | OUTPATIENT
Start: 2020-10-14 | End: 2020-11-30

## 2020-10-14 NOTE — PROGRESS NOTES
Subjective:       Patient ID:  Marisela Eagle is a 75 y.o. female who presents for   Chief Complaint   Patient presents with    Hair/Scalp Problem     Last OV 5-      patient here today with c/o bumps in scalp for at least 5 years, c/o it being very itchy and painful, used Hibiclens and Clobetasol solution with no relief  Treated doxy. Pain and tenderness currently, itching. Just started this week.     thinks she had a skin cancer on her arm but not sure  denies FHX MM      Review of Systems   Constitutional: Negative for fever, chills and fatigue.   HENT: Negative for congestion, sore throat, mouth sores and headaches.    Gastrointestinal: Negative for nausea, vomiting, abdominal pain, diarrhea and indigestion.   Musculoskeletal: Negative for joint swelling and arthralgias.   Skin: Positive for itching and dry skin.   Neurological: Negative for headaches.   Hematologic/Lymphatic: Does not bruise/bleed easily.        Objective:    Physical Exam   Constitutional: She appears well-developed and well-nourished. No distress.   Eyes: Lids are normal.  No conjunctival no injection.   Neurological: She is alert and oriented to person, place, and time. She is not disoriented.   Psychiatric: She has a normal mood and affect.   Skin:   Areas Examined (abnormalities noted in diagram):   Scalp / Hair Palpated and Inspected  Head / Face Inspection Performed  Neck Inspection Performed              Diagram Legend     Erythematous scaling macule/papule c/w actinic keratosis       Vascular papule c/w angioma      Pigmented verrucoid papule/plaque c/w seborrheic keratosis      Yellow umbilicated papule c/w sebaceous hyperplasia      Irregularly shaped tan macule c/w lentigo     1-2 mm smooth white papules consistent with Milia      Movable subcutaneous cyst with punctum c/w epidermal inclusion cyst      Subcutaneous movable cyst c/w pilar cyst      Firm pink to brown papule c/w dermatofibroma      Pedunculated fleshy  papule(s) c/w skin tag(s)      Evenly pigmented macule c/w junctional nevus     Mildly variegated pigmented, slightly irregular-bordered macule c/w mildly atypical nevus      Flesh colored to evenly pigmented papule c/w intradermal nevus       Pink pearly papule/plaque c/w basal cell carcinoma      Erythematous hyperkeratotic cursted plaque c/w SCC      Surgical scar with no sign of skin cancer recurrence      Open and closed comedones      Inflammatory papules and pustules      Verrucoid papule consistent consistent with wart     Erythematous eczematous patches and plaques     Dystrophic onycholytic nail with subungual debris c/w onychomycosis     Umbilicated papule    Erythematous-base heme-crusted tan verrucoid plaque consistent with inflamed seborrheic keratosis     Erythematous Silvery Scaling Plaque c/w Psoriasis     See annotation      Assessment / Plan:        Folliculitis  Failed hibiclens, doxy  Discussed often treatment resistant  Consider accutane vs spironolactone in future  Discussed benefits and risks of therapy including but not limited to breakthrough bleeding, breast tenderness, and elevated potassium levels which may give symptoms of fatigue, palpitations, and nausea. Patient should limit potassium intake - avoid potassium supplements or salt substitutes, limit bananas and citrus fruits. Pregnancy must be avoided while taking spironolactone.  Discussed risks and benefits of Isotretinoin including but not limited to dry eyes, dry skin, and dry lips; headaches; nosebleeds; muscle aches; joint aches; elevated liver functions; elevated cholesterol or triglycerides; depression; diarrhea/stomach cramping (inflammatory bowel disease); increased sun sensitivity; birth defects. No laser or chemical peels while on Isotretinoin or for six months after completion of Isotretinoin course. No waxing and no donating blood while on Isotretinoin or for one month after completion of Isotretinoin course.            -      Aerobic culture  -     ketoconazole (NIZORAL) 2 % shampoo; Wash hair with medicated shampoo at least 2x/week - let sit on scalp at least 5 minutes prior to rinsing  Dispense: 120 mL; Refill: 5  -     clindamycin (CLEOCIN T) 1 % external solution; aaa bid prn  Dispense: 60 mL; Refill: 3             Follow up in about 4 weeks (around 11/11/2020).

## 2020-10-17 LAB — BACTERIA SPEC AEROBE CULT: NORMAL

## 2020-10-20 DIAGNOSIS — M54.17 LUMBOSACRAL RADICULOPATHY: Primary | ICD-10-CM

## 2020-10-20 DIAGNOSIS — M54.9 DORSALGIA, UNSPECIFIED: ICD-10-CM

## 2020-10-21 DIAGNOSIS — R79.89 ELEVATED TROPONIN: ICD-10-CM

## 2020-10-21 DIAGNOSIS — I51.9 ASYMPTOMATIC LV DYSFUNCTION: Primary | ICD-10-CM

## 2020-10-21 DIAGNOSIS — I50.31 ACUTE DIASTOLIC CHF (CONGESTIVE HEART FAILURE), NYHA CLASS 1: ICD-10-CM

## 2020-10-22 ENCOUNTER — HOSPITAL ENCOUNTER (OUTPATIENT)
Dept: RADIOLOGY | Facility: HOSPITAL | Age: 75
Discharge: HOME | End: 2020-10-22
Attending: NURSE PRACTITIONER
Payer: MEDICARE

## 2020-10-22 DIAGNOSIS — M54.17 LUMBOSACRAL RADICULOPATHY: ICD-10-CM

## 2020-10-22 DIAGNOSIS — M54.9 DORSALGIA, UNSPECIFIED: ICD-10-CM

## 2020-10-22 PROCEDURE — 72148 MRI LUMBAR SPINE W/O DYE: CPT | Mod: TC

## 2020-10-27 ENCOUNTER — OFFICE VISIT (OUTPATIENT)
Dept: FAMILY MEDICINE | Facility: CLINIC | Age: 75
End: 2020-10-27
Payer: MEDICARE

## 2020-10-27 ENCOUNTER — TELEPHONE (OUTPATIENT)
Dept: FAMILY MEDICINE | Facility: CLINIC | Age: 75
End: 2020-10-27

## 2020-10-27 VITALS
HEART RATE: 78 BPM | WEIGHT: 239 LBS | BODY MASS INDEX: 35.4 KG/M2 | HEIGHT: 69 IN | OXYGEN SATURATION: 99 % | DIASTOLIC BLOOD PRESSURE: 88 MMHG | SYSTOLIC BLOOD PRESSURE: 136 MMHG | TEMPERATURE: 98 F

## 2020-10-27 DIAGNOSIS — I50.20 HFREF (HEART FAILURE WITH REDUCED EJECTION FRACTION): ICD-10-CM

## 2020-10-27 DIAGNOSIS — E03.9 HYPOTHYROIDISM, UNSPECIFIED TYPE: ICD-10-CM

## 2020-10-27 DIAGNOSIS — M17.0 OSTEOARTHRITIS OF BOTH KNEES, UNSPECIFIED OSTEOARTHRITIS TYPE: Primary | ICD-10-CM

## 2020-10-27 DIAGNOSIS — R91.1 PULMONARY NODULE: ICD-10-CM

## 2020-10-27 DIAGNOSIS — Z99.89 WALKER AS AMBULATION AID: ICD-10-CM

## 2020-10-27 PROCEDURE — 3075F SYST BP GE 130 - 139MM HG: CPT | Mod: CPTII,S$GLB,, | Performed by: FAMILY MEDICINE

## 2020-10-27 PROCEDURE — 1125F AMNT PAIN NOTED PAIN PRSNT: CPT | Mod: S$GLB,,, | Performed by: FAMILY MEDICINE

## 2020-10-27 PROCEDURE — 1101F PR PT FALLS ASSESS DOC 0-1 FALLS W/OUT INJ PAST YR: ICD-10-PCS | Mod: CPTII,S$GLB,, | Performed by: FAMILY MEDICINE

## 2020-10-27 PROCEDURE — 1101F PT FALLS ASSESS-DOCD LE1/YR: CPT | Mod: CPTII,S$GLB,, | Performed by: FAMILY MEDICINE

## 2020-10-27 PROCEDURE — 1125F PR PAIN SEVERITY QUANTIFIED, PAIN PRESENT: ICD-10-PCS | Mod: S$GLB,,, | Performed by: FAMILY MEDICINE

## 2020-10-27 PROCEDURE — 3079F PR MOST RECENT DIASTOLIC BLOOD PRESSURE 80-89 MM HG: ICD-10-PCS | Mod: CPTII,S$GLB,, | Performed by: FAMILY MEDICINE

## 2020-10-27 PROCEDURE — 3079F DIAST BP 80-89 MM HG: CPT | Mod: CPTII,S$GLB,, | Performed by: FAMILY MEDICINE

## 2020-10-27 PROCEDURE — 99214 OFFICE O/P EST MOD 30 MIN: CPT | Mod: S$GLB,,, | Performed by: FAMILY MEDICINE

## 2020-10-27 PROCEDURE — 99214 PR OFFICE/OUTPT VISIT, EST, LEVL IV, 30-39 MIN: ICD-10-PCS | Mod: S$GLB,,, | Performed by: FAMILY MEDICINE

## 2020-10-27 PROCEDURE — 1159F PR MEDICATION LIST DOCUMENTED IN MEDICAL RECORD: ICD-10-PCS | Mod: S$GLB,,, | Performed by: FAMILY MEDICINE

## 2020-10-27 PROCEDURE — 1159F MED LIST DOCD IN RCRD: CPT | Mod: S$GLB,,, | Performed by: FAMILY MEDICINE

## 2020-10-27 PROCEDURE — 99499 UNLISTED E&M SERVICE: CPT | Mod: S$GLB,,, | Performed by: FAMILY MEDICINE

## 2020-10-27 PROCEDURE — 99499 RISK ADDL DX/OHS AUDIT: ICD-10-PCS | Mod: S$GLB,,, | Performed by: FAMILY MEDICINE

## 2020-10-27 PROCEDURE — 3075F PR MOST RECENT SYSTOLIC BLOOD PRESS GE 130-139MM HG: ICD-10-PCS | Mod: CPTII,S$GLB,, | Performed by: FAMILY MEDICINE

## 2020-10-27 RX ORDER — KETOROLAC TROMETHAMINE 5 MG/ML
SOLUTION OPHTHALMIC
COMMUNITY
Start: 2020-10-16 | End: 2021-01-31

## 2020-10-27 RX ORDER — OFLOXACIN 3 MG/ML
SOLUTION/ DROPS OPHTHALMIC
COMMUNITY
Start: 2020-10-16 | End: 2021-01-31

## 2020-10-27 RX ORDER — ASPIRIN 81 MG/1
81 TABLET ORAL DAILY
Qty: 365 TABLET | Refills: 5 | Status: SHIPPED | OUTPATIENT
Start: 2020-10-27 | End: 2021-07-23

## 2020-10-27 RX ORDER — PREDNISOLONE ACETATE 10 MG/ML
SUSPENSION/ DROPS OPHTHALMIC
COMMUNITY
Start: 2020-10-16 | End: 2020-10-27

## 2020-10-27 RX ORDER — DUREZOL 0.5 MG/ML
1 EMULSION OPHTHALMIC DAILY
COMMUNITY
Start: 2020-10-16 | End: 2023-02-09

## 2020-10-27 NOTE — TELEPHONE ENCOUNTER
----- Message from Ingrid Gautam, Patient Care Assistant sent at 10/27/2020 12:18 PM CDT -----  Regarding: speak to roz  Contact: patient  Type: Needs Medical Advice  Who Called:  patient  Symptoms (please be specific):  na  How long has patient had these symptoms:  na  Pharmacy name and phone #:  na  Best Call Back Number: [phone]    Additional Information: patient states she just left office , and would like to know when her appointment is for her and her  to give blood , please call to advise , thanks       LEFT MESSAGE ORDER FOR BLOOD WORK IN COMPUTER FOR [facility]. THEY CAN GO WHENEVER FASTING

## 2020-10-28 PROBLEM — R91.1 PULMONARY NODULE: Status: ACTIVE | Noted: 2020-10-28

## 2020-10-28 PROBLEM — Z99.89 WALKER AS AMBULATION AID: Status: ACTIVE | Noted: 2020-10-28

## 2020-10-28 PROBLEM — I50.20 HFREF (HEART FAILURE WITH REDUCED EJECTION FRACTION): Status: ACTIVE | Noted: 2020-10-28

## 2020-10-28 RX ORDER — LOSARTAN POTASSIUM AND HYDROCHLOROTHIAZIDE 12.5; 1 MG/1; MG/1
1 TABLET ORAL DAILY
Qty: 90 TABLET | Refills: 1 | Status: ON HOLD | OUTPATIENT
Start: 2020-10-28 | End: 2021-02-02 | Stop reason: HOSPADM

## 2020-10-28 RX ORDER — GABAPENTIN 800 MG/1
800 TABLET ORAL 3 TIMES DAILY
Qty: 270 TABLET | Refills: 1 | Status: SHIPPED | OUTPATIENT
Start: 2020-10-28 | End: 2021-02-09

## 2020-10-28 RX ORDER — FAMOTIDINE 20 MG/1
20 TABLET, FILM COATED ORAL NIGHTLY
Qty: 90 TABLET | Refills: 1 | Status: SHIPPED | OUTPATIENT
Start: 2020-10-28 | End: 2021-03-27

## 2020-10-28 RX ORDER — METOPROLOL SUCCINATE 50 MG/1
50 TABLET, EXTENDED RELEASE ORAL NIGHTLY
Qty: 90 TABLET | Refills: 1 | Status: SHIPPED | OUTPATIENT
Start: 2020-10-28 | End: 2021-05-13

## 2020-10-28 RX ORDER — LEVOTHYROXINE SODIUM 125 UG/1
125 TABLET ORAL
Qty: 90 TABLET | Refills: 1 | Status: SHIPPED | OUTPATIENT
Start: 2020-10-28 | End: 2021-07-23

## 2020-10-28 RX ORDER — FUROSEMIDE 40 MG/1
40 TABLET ORAL DAILY
Qty: 90 TABLET | Refills: 1 | Status: SHIPPED | OUTPATIENT
Start: 2020-10-28 | End: 2021-03-27

## 2020-10-28 RX ORDER — ASPIRIN 81 MG/1
81 TABLET ORAL DAILY
Qty: 365 TABLET | Refills: 5 | Status: SHIPPED | OUTPATIENT
Start: 2020-10-28 | End: 2021-05-26 | Stop reason: SDUPTHER

## 2020-10-28 RX ORDER — OMEPRAZOLE 40 MG/1
40 CAPSULE, DELAYED RELEASE ORAL DAILY
Qty: 90 CAPSULE | Refills: 1 | Status: SHIPPED | OUTPATIENT
Start: 2020-10-28 | End: 2021-05-13

## 2020-10-28 NOTE — PROGRESS NOTES
Has been seeing another physician over the past year here to reestablish care.  Seeing Rheumatology.  Ejection fraction is reduced 45%.  Seeing Dr. per nickel.  She is to have a stress test.  Global hypokinesis on echo.  No chest pain or palpitations.  Saw Dr. Briceño regarding her back.  Radical surgery would be needed so she chose not to pursue it.  Has had injections.  Her BNP is increased to 262.  Hypertension is been under good control.  No palpitations hypothyroidism on levothyroxine 125.  She is on gabapentin twice a day she is also on Zetia for her cholesterol.  CT of chest September 10th showed a nodule.  Repeat in 1 year recommended.  She is using Tylenol and Percocet for pain.  Her GFR is greater than 60 currently.    Physical examination vital signs are noted.  Elderly female no acute distress.  Neck is without bruit no adenopathy no thyromegaly.  Chest clear to auscultation no wheezes or crackles heart regular rate rhythm without murmur gallop or rub.  Abdomen bowel sounds positive soft nontender extremities are without edema positive pulses.    Impression heart failure reduced ejection fraction.  Pulmonary nodule.  Use of walker.  Degenerative joint disease.  Hypothyroidism.    Plan CMP CBC lipids uric acid free T4 TSH do these all at the same time that she does her labs for her other physicians.  Flu shot documented.  Nonsteroidal anti-inflammatory most likely after she has her cardiac evaluation.  Aspirin 81 mg daily added.

## 2020-10-29 NOTE — PROGRESS NOTES
Patient, Marisela Eagle (MRN #[id number]), presented with a recorded BMI of 35.29 kg/m^2 and a documented comorbidity(s):  - Atrial Fibrillation  to which the severe obesity is a contributing factor. This is consistent with the definition of severe obesity (BMI 35.0-39.9) with comorbidity (ICD-10 E66.01, Z68.35). The patient's severe obesity was monitored, evaluated, addressed and/or treated. This addendum to the medical record is made on 10/29/2020.

## 2020-11-03 ENCOUNTER — HOSPITAL ENCOUNTER (OUTPATIENT)
Dept: RADIOLOGY | Facility: CLINIC | Age: 75
Discharge: HOME | End: 2020-11-03
Attending: INTERNAL MEDICINE
Payer: MEDICARE

## 2020-11-03 ENCOUNTER — HOSPITAL ENCOUNTER (OUTPATIENT)
Dept: CARDIOLOGY | Facility: CLINIC | Age: 75
Discharge: HOME | End: 2020-11-03
Attending: INTERNAL MEDICINE
Payer: MEDICARE

## 2020-11-03 DIAGNOSIS — I50.31 ACUTE DIASTOLIC CHF (CONGESTIVE HEART FAILURE), NYHA CLASS 1: ICD-10-CM

## 2020-11-03 PROCEDURE — 78452 HT MUSCLE IMAGE SPECT MULT: CPT | Mod: S$GLB,,, | Performed by: INTERNAL MEDICINE

## 2020-11-03 PROCEDURE — 78452 STRESS TEST WITH MYOCARDIAL PERFUSION (CUPID ONLY): ICD-10-PCS | Mod: S$GLB,,, | Performed by: INTERNAL MEDICINE

## 2020-11-03 PROCEDURE — A9502 STRESS TEST WITH MYOCARDIAL PERFUSION (CUPID ONLY): ICD-10-PCS | Mod: S$GLB,,, | Performed by: INTERNAL MEDICINE

## 2020-11-03 PROCEDURE — 93015 CV STRESS TEST SUPVJ I&R: CPT | Mod: S$GLB,,, | Performed by: INTERNAL MEDICINE

## 2020-11-03 PROCEDURE — A9502 TC99M TETROFOSMIN: HCPCS | Mod: S$GLB,,, | Performed by: INTERNAL MEDICINE

## 2020-11-03 PROCEDURE — 93015 STRESS TEST WITH MYOCARDIAL PERFUSION (CUPID ONLY): ICD-10-PCS | Mod: S$GLB,,, | Performed by: INTERNAL MEDICINE

## 2020-11-03 RX ORDER — REGADENOSON 0.08 MG/ML
0.4 INJECTION, SOLUTION INTRAVENOUS ONCE
Status: COMPLETED | OUTPATIENT
Start: 2020-11-03 | End: 2020-11-03

## 2020-11-03 RX ADMIN — REGADENOSON 0.4 MG: 0.08 INJECTION, SOLUTION INTRAVENOUS at 08:11

## 2020-11-05 LAB
ALBUMIN SERPL-MCNC: 4.4 G/DL (ref 3.6–5.1)
ALBUMIN/GLOB SERPL: 2 (CALC) (ref 1–2.5)
ALP SERPL-CCNC: 58 U/L (ref 37–153)
ALT SERPL-CCNC: 18 U/L (ref 6–29)
AST SERPL-CCNC: 14 U/L (ref 10–35)
BASOPHILS # BLD AUTO: 21 CELLS/UL (ref 0–200)
BASOPHILS NFR BLD AUTO: 0.5 %
BILIRUB SERPL-MCNC: 0.5 MG/DL (ref 0.2–1.2)
BUN SERPL-MCNC: 16 MG/DL (ref 7–25)
BUN/CREAT SERPL: ABNORMAL (CALC) (ref 6–22)
CALCIUM SERPL-MCNC: 9.7 MG/DL (ref 8.6–10.4)
CHLORIDE SERPL-SCNC: 101 MMOL/L (ref 98–110)
CHOLEST SERPL-MCNC: 184 MG/DL
CHOLEST/HDLC SERPL: 3 (CALC)
CO2 SERPL-SCNC: 34 MMOL/L (ref 20–32)
CREAT SERPL-MCNC: 0.62 MG/DL (ref 0.6–0.93)
EOSINOPHIL # BLD AUTO: 71 CELLS/UL (ref 15–500)
EOSINOPHIL NFR BLD AUTO: 1.7 %
ERYTHROCYTE [DISTWIDTH] IN BLOOD BY AUTOMATED COUNT: 13.7 % (ref 11–15)
GFRSERPLBLD MDRD-ARVRAT: 87 ML/MIN/1.73M2
GLOBULIN SER CALC-MCNC: 2.2 G/DL (CALC) (ref 1.9–3.7)
GLUCOSE SERPL-MCNC: 95 MG/DL (ref 65–99)
HCT VFR BLD AUTO: 41.8 % (ref 35–45)
HDLC SERPL-MCNC: 62 MG/DL
HGB BLD-MCNC: 13.5 G/DL (ref 11.7–15.5)
LDLC SERPL CALC-MCNC: 101 MG/DL (CALC)
LYMPHOCYTES # BLD AUTO: 1630 CELLS/UL (ref 850–3900)
LYMPHOCYTES NFR BLD AUTO: 38.8 %
MCH RBC QN AUTO: 29.1 PG (ref 27–33)
MCHC RBC AUTO-ENTMCNC: 32.3 G/DL (ref 32–36)
MCV RBC AUTO: 90.1 FL (ref 80–100)
MONOCYTES # BLD AUTO: 412 CELLS/UL (ref 200–950)
MONOCYTES NFR BLD AUTO: 9.8 %
NEUTROPHILS # BLD AUTO: 2066 CELLS/UL (ref 1500–7800)
NEUTROPHILS NFR BLD AUTO: 49.2 %
NONHDLC SERPL-MCNC: 122 MG/DL (CALC)
PLATELET # BLD AUTO: 279 THOUSAND/UL (ref 140–400)
PMV BLD REES-ECKER: 12.3 FL (ref 7.5–12.5)
POTASSIUM SERPL-SCNC: 3.8 MMOL/L (ref 3.5–5.3)
PROT SERPL-MCNC: 6.6 G/DL (ref 6.1–8.1)
RBC # BLD AUTO: 4.64 MILLION/UL (ref 3.8–5.1)
SODIUM SERPL-SCNC: 142 MMOL/L (ref 135–146)
T4 FREE SERPL-MCNC: 1.3 NG/DL (ref 0.8–1.8)
TRIGL SERPL-MCNC: 114 MG/DL
TSH SERPL-ACNC: 1.23 MIU/L (ref 0.4–4.5)
URATE SERPL-MCNC: 7.7 MG/DL (ref 2.5–7)
WBC # BLD AUTO: 4.2 THOUSAND/UL (ref 3.8–10.8)

## 2020-11-08 LAB
CV PHARM DOSE: 0.4 MG
CV STRESS BASE HR: 63 BPM
DIASTOLIC BLOOD PRESSURE: 78 MMHG
EJECTION FRACTION- HIGH: 65 %
END DIASTOLIC INDEX-HIGH: 158 ML/M2
END DIASTOLIC INDEX-LOW: 94 ML/M2
END SYSTOLIC INDEX-HIGH: 71 ML/M2
END SYSTOLIC INDEX-LOW: 33 ML/M2
NUC STRESS DIASTOLIC VOLUME INDEX: 104
NUC STRESS EJECTION FRACTION: 66 %
NUC STRESS SYSTOLIC VOLUME INDEX: 35
OHS CV CPX 1 MINUTE RECOVERY HEART RATE: 84 BPM
OHS CV CPX 85 PERCENT MAX PREDICTED HEART RATE MALE: 118
OHS CV CPX MAX PREDICTED HEART RATE: 138
OHS CV CPX PATIENT IS FEMALE: 1
OHS CV CPX PATIENT IS MALE: 0
OHS CV CPX PEAK DIASTOLIC BLOOD PRESSURE: 80 MMHG
OHS CV CPX PEAK HEAR RATE: 90 BPM
OHS CV CPX PEAK RATE PRESSURE PRODUCT: NORMAL
OHS CV CPX PEAK SYSTOLIC BLOOD PRESSURE: 132 MMHG
OHS CV CPX PERCENT MAX PREDICTED HEART RATE ACHIEVED: 65
OHS CV CPX RATE PRESSURE PRODUCT PRESENTING: 8316
RETIRED EF AND QEF - SEE NOTES: 53 %
STRESS ST DEPRESSION: 0.5 MM
SYSTOLIC BLOOD PRESSURE: 132 MMHG

## 2020-11-09 ENCOUNTER — TELEPHONE (OUTPATIENT)
Dept: FAMILY MEDICINE | Facility: CLINIC | Age: 75
End: 2020-11-09

## 2020-11-09 NOTE — TELEPHONE ENCOUNTER
Pt advised normal results.  ----- Message from Pawel Badillo III, MD sent at 11/5/2020  7:55 PM CST -----  UNCHANGED/STABLE

## 2020-11-11 ENCOUNTER — OFFICE VISIT (OUTPATIENT)
Dept: DERMATOLOGY | Facility: CLINIC | Age: 75
End: 2020-11-11
Payer: MEDICARE

## 2020-11-11 DIAGNOSIS — L98.8 RHYTIDES: Primary | ICD-10-CM

## 2020-11-11 DIAGNOSIS — L73.9 FOLLICULITIS: ICD-10-CM

## 2020-11-11 DIAGNOSIS — R23.4 OILY SKIN: ICD-10-CM

## 2020-11-11 PROCEDURE — 1159F PR MEDICATION LIST DOCUMENTED IN MEDICAL RECORD: ICD-10-PCS | Mod: S$GLB,,, | Performed by: DERMATOLOGY

## 2020-11-11 PROCEDURE — 1101F PR PT FALLS ASSESS DOC 0-1 FALLS W/OUT INJ PAST YR: ICD-10-PCS | Mod: CPTII,S$GLB,, | Performed by: DERMATOLOGY

## 2020-11-11 PROCEDURE — 99213 PR OFFICE/OUTPT VISIT, EST, LEVL III, 20-29 MIN: ICD-10-PCS | Mod: S$GLB,,, | Performed by: DERMATOLOGY

## 2020-11-11 PROCEDURE — 1159F MED LIST DOCD IN RCRD: CPT | Mod: S$GLB,,, | Performed by: DERMATOLOGY

## 2020-11-11 PROCEDURE — 1101F PT FALLS ASSESS-DOCD LE1/YR: CPT | Mod: CPTII,S$GLB,, | Performed by: DERMATOLOGY

## 2020-11-11 PROCEDURE — 99213 OFFICE O/P EST LOW 20 MIN: CPT | Mod: S$GLB,,, | Performed by: DERMATOLOGY

## 2020-11-11 NOTE — PROGRESS NOTES
Subjective:       Patient ID:  Marisela Eagle is a 75 y.o. female who presents for No chief complaint on file.    Last OV 10/14/20  Folliculitis - culture  Specimen Information: Scalp; Skin   Component 4wk ago  Aerobic Bacterial Culture Skin maria esther,  no predominant organism     Here today for follow up  Used clindamycin solution on scalp, improved back of head, frontal scalp still itchy  Using keto shampoo only if absolutely necessary  C/o hair being extremely oily  C/o rough spot on chest        Folliculitis  Specimen Information: Scalp; Skin      Component 4wk ago  Aerobic Bacterial Culture Skin maria esther,  no predominant organism   Resulting Agency [facility]      Specimen Collected: 10/14/20 10:43                      Review of Systems   Constitutional: Negative for fever, chills and fatigue.   HENT: Negative for congestion, sore throat, mouth sores and headaches.    Gastrointestinal: Negative for nausea, vomiting, abdominal pain, diarrhea and indigestion.   Musculoskeletal: Negative for joint swelling and arthralgias.   Skin: Positive for itching and dry skin.   Neurological: Negative for headaches.   Hematologic/Lymphatic: Does not bruise/bleed easily.        Objective:    Physical Exam   Constitutional: She appears well-developed and well-nourished. No distress.   Eyes: Lids are normal.  No conjunctival no injection.   Neurological: She is alert and oriented to person, place, and time. She is not disoriented.   Psychiatric: She has a normal mood and affect.   Skin:   Areas Examined (abnormalities noted in diagram):   Scalp / Hair Palpated and Inspected  Head / Face Inspection Performed  Neck Inspection Performed  Chest / Axilla Inspection Performed                   Diagram Legend     Erythematous scaling macule/papule c/w actinic keratosis       Vascular papule c/w angioma      Pigmented verrucoid papule/plaque c/w seborrheic keratosis      Yellow umbilicated papule c/w sebaceous hyperplasia      Irregularly shaped  tan macule c/w lentigo     1-2 mm smooth white papules consistent with Milia      Movable subcutaneous cyst with punctum c/w epidermal inclusion cyst      Subcutaneous movable cyst c/w pilar cyst      Firm pink to brown papule c/w dermatofibroma      Pedunculated fleshy papule(s) c/w skin tag(s)      Evenly pigmented macule c/w junctional nevus     Mildly variegated pigmented, slightly irregular-bordered macule c/w mildly atypical nevus      Flesh colored to evenly pigmented papule c/w intradermal nevus       Pink pearly papule/plaque c/w basal cell carcinoma      Erythematous hyperkeratotic cursted plaque c/w SCC      Surgical scar with no sign of skin cancer recurrence      Open and closed comedones      Inflammatory papules and pustules      Verrucoid papule consistent consistent with wart     Erythematous eczematous patches and plaques     Dystrophic onycholytic nail with subungual debris c/w onychomycosis     Umbilicated papule    Erythematous-base heme-crusted tan verrucoid plaque consistent with inflamed seborrheic keratosis     Erythematous Silvery Scaling Plaque c/w Psoriasis     See annotation      Assessment / Plan:        Rhytides, perioral, deep  Refer to [facility] for laser consideration    Folliculitis  Scalp  Culture negative  Improved with keto shampoo and clinda    Oily skin  Scalp predominantly   neutrogena T sal           Follow up if symptoms worsen or fail to improve.

## 2020-11-17 ENCOUNTER — OFFICE VISIT (OUTPATIENT)
Dept: CARDIOLOGY | Facility: CLINIC | Age: 75
End: 2020-11-17
Payer: MEDICARE

## 2020-11-17 VITALS
BODY MASS INDEX: 34.8 KG/M2 | WEIGHT: 235 LBS | SYSTOLIC BLOOD PRESSURE: 126 MMHG | HEIGHT: 69 IN | RESPIRATION RATE: 18 BRPM | OXYGEN SATURATION: 98 % | HEART RATE: 78 BPM | DIASTOLIC BLOOD PRESSURE: 84 MMHG

## 2020-11-17 DIAGNOSIS — I50.31 CHF (CONGESTIVE HEART FAILURE), NYHA CLASS I, ACUTE, DIASTOLIC: Primary | ICD-10-CM

## 2020-11-17 DIAGNOSIS — E78.2 MIXED HYPERLIPIDEMIA: ICD-10-CM

## 2020-11-17 DIAGNOSIS — I10 ESSENTIAL HYPERTENSION: ICD-10-CM

## 2020-11-17 PROCEDURE — 99213 OFFICE O/P EST LOW 20 MIN: CPT | Mod: S$GLB,,, | Performed by: INTERNAL MEDICINE

## 2020-11-17 PROCEDURE — 1101F PR PT FALLS ASSESS DOC 0-1 FALLS W/OUT INJ PAST YR: ICD-10-PCS | Mod: CPTII,S$GLB,, | Performed by: INTERNAL MEDICINE

## 2020-11-17 PROCEDURE — 3288F PR FALLS RISK ASSESSMENT DOCUMENTED: ICD-10-PCS | Mod: CPTII,S$GLB,, | Performed by: INTERNAL MEDICINE

## 2020-11-17 PROCEDURE — 1101F PT FALLS ASSESS-DOCD LE1/YR: CPT | Mod: CPTII,S$GLB,, | Performed by: INTERNAL MEDICINE

## 2020-11-17 PROCEDURE — 3074F PR MOST RECENT SYSTOLIC BLOOD PRESSURE < 130 MM HG: ICD-10-PCS | Mod: CPTII,S$GLB,, | Performed by: INTERNAL MEDICINE

## 2020-11-17 PROCEDURE — 1159F MED LIST DOCD IN RCRD: CPT | Mod: S$GLB,,, | Performed by: INTERNAL MEDICINE

## 2020-11-17 PROCEDURE — 3288F FALL RISK ASSESSMENT DOCD: CPT | Mod: CPTII,S$GLB,, | Performed by: INTERNAL MEDICINE

## 2020-11-17 PROCEDURE — 3074F SYST BP LT 130 MM HG: CPT | Mod: CPTII,S$GLB,, | Performed by: INTERNAL MEDICINE

## 2020-11-17 PROCEDURE — 99213 PR OFFICE/OUTPT VISIT, EST, LEVL III, 20-29 MIN: ICD-10-PCS | Mod: S$GLB,,, | Performed by: INTERNAL MEDICINE

## 2020-11-17 PROCEDURE — 3079F PR MOST RECENT DIASTOLIC BLOOD PRESSURE 80-89 MM HG: ICD-10-PCS | Mod: CPTII,S$GLB,, | Performed by: INTERNAL MEDICINE

## 2020-11-17 PROCEDURE — 3079F DIAST BP 80-89 MM HG: CPT | Mod: CPTII,S$GLB,, | Performed by: INTERNAL MEDICINE

## 2020-11-17 PROCEDURE — 1159F PR MEDICATION LIST DOCUMENTED IN MEDICAL RECORD: ICD-10-PCS | Mod: S$GLB,,, | Performed by: INTERNAL MEDICINE

## 2020-11-17 NOTE — PROGRESS NOTES
Subjective:    Patient ID:  Marisela Eagle is a 75 y.o. female who presents for   Follow-up (LABS IN CHART FROM , NO TEST, MEDS UTD AND REVIEWED, PT ADDED SHE HAVING KNEE SURGERY IN DECEMBER )    HPI main complaint is right knee pain she is unable to do anything she is desperate to have surgery.  She denies orthopnea and PND.  There has not been much swelling in the ankles recently.    Review of patient's allergies indicates:   Allergen Reactions    Duloxetine      Does not remember    Morphine Other (See Comments)     Other reaction(s): Syncope  fainted    Tramadol        Past Medical History:   Diagnosis Date    Bilateral knee pain 2012    left worse, right knee painful even after partial replacement.    Bruises easily     Clot ?    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 Took Coumadin x 6 weeks    KNEE ARTHROSCOPY  2010    right    TONSILLECTOMY       Social History     Tobacco Use    Smoking status: Former Smoker     Quit date: 3/23/2012     Years since quittin.6    Smokeless tobacco: Never Used   Substance Use Topics    Alcohol use: Yes     Comment: occasional    Drug use: No        Review of Systems     Review of Systems    Constitution: Positive for weight gain. Negative for weight loss.   HENT: Negative for congestion, nosebleeds and sore throat.    Eyes: Negative for visual disturbance.   Cardiovascular: Negative for chest pain, dyspnea on exertion, leg swelling, orthopnea, palpitations, paroxysmal nocturnal dyspnea and syncope.   Respiratory: Positive for shortness of breath. Negative for cough and wheezing.    Skin: Negative for rash.   Musculoskeletal: Positive for joint pain (Mostly of the right knee) and joint swelling. Negative for back pain, gout and myalgias.   Gastrointestinal: Negative for heartburn, hematochezia and nausea.   Genitourinary: Positive for frequency. Negative for hematuria and nocturia.   Neurological: Negative for dizziness and tremors.   Psychiatric/Behavioral: Negative for memory loss.   Allergic/Immunologic: Negative for environmental allergies (Seasonal Allergies).           Objective:        Vitals:    11/17/20 1112   BP: 126/84   Pulse: 78   Resp: 18       Lab Results   Component Value Date    WBC 4.2 11/04/2020    HGB 13.5 11/04/2020    HCT 41.8 11/04/2020     11/04/2020    CHOL 184 11/04/2020    TRIG 114 11/04/2020    HDL 62 11/04/2020    ALT 18 11/04/2020    AST 14 11/04/2020     11/04/2020    K 3.8 11/04/2020     11/04/2020    CREATININE 0.62 11/04/2020    BUN 16 11/04/2020    CO2 34 (H) 11/04/2020    TSH 1.23 11/04/2020    INR 1.1 09/17/2019    HGBA1C 5.4 07/03/2019        ECHOCARDIOGRAM RESULTS  Results for orders placed in visit on 09/23/20   Echo Color Flow Doppler? Yes    Narrative · There is left ventricular concentric remodeling.  · The estimated PA systolic pressure is 25 mmHg.  · The left ventricle is normal in size with mildly decreased systolic   function. The estimated ejection fraction is 45%.  · Grade I diastolic dysfunction.  · Mild tricuspid regurgitation.  · There is left ventricular global hypokinesis.  · Normal right ventricular systolic function.  · Mild  left atrial enlargement.  · Mild right atrial enlargement.  · Mild aortic regurgitation.  · Normal central venous pressure (3 mmHg).          CURRENT/PREVIOUS VISIT EKG  Results for orders placed or performed during the hospital encounter of 09/17/19   EKG 12-lead    Collection Time: 09/19/19  5:40 AM    Narrative    Test Reason : R74.8,    Vent. Rate : 080 BPM     Atrial Rate : 080 BPM     P-R Int : 202 ms          QRS Dur : 102 ms      QT Int : 422 ms       P-R-T Axes : 037 -12 012 degrees     QTc Int : 486 ms    Normal sinus rhythm  Leftward axis  When compared with ECG of 17-SEP-2019 22:33,  Incomplete right bundle branch block Present  Criteria for Septal infarct are no longer Present  Confirmed by Avni Patton MD ([id number]) on 9/21/2019 5:42:33 PM    Referred By: MIRELLA   SELF           Confirmed By:Avni Patton MD     Results for orders placed in visit on 09/23/20   Echo Color Flow Doppler? Yes    Narrative · There is left ventricular concentric remodeling.  · The estimated PA systolic pressure is 25 mmHg.  · The left ventricle is normal in size with mildly decreased systolic   function. The estimated ejection fraction is 45%.  · Grade I diastolic dysfunction.  · Mild tricuspid regurgitation.  · There is left ventricular global hypokinesis.  · Normal right ventricular systolic function.  · Mild left atrial enlargement.  · Mild right atrial enlargement.  · Mild aortic regurgitation.  · Normal central venous pressure (3 mmHg).        Results for orders placed during the hospital encounter of 11/03/20   Nuclear Stress - Cardiology Interpreted    Narrative   Normal myocardial perfusion scan    Gated perfusion images showed an ejection fraction o 66% post stress.   Normal ejection fraction is greater than 53%.    There is normal wall motion at rest and post stress.    The EKG portion of this study is abnormal but not diagnostic.    The patient reported no chest pain during the stress test.    There were no  arrhythmias during stress.    Extracardiac distribution is abnormal demonstrating loops of bowl in   the left hemithorax.         PHYSICAL EXAM    Physical Exam   Constitutional:   BMI of 34.7 qualifies for class 1 obesity   Cardiovascular: Normal rate, regular rhythm and normal heart sounds.  No extrasystoles are present. Exam reveals no gallop.   No murmur heard.  Pulmonary/Chest: Effort normal. She has no decreased breath sounds. She has no wheezes. She has no rhonchi. She has no rales.    no pitting edema in the ankles or legs    Medication List with Changes/Refills   Current Medications    ASPIRIN (ECOTRIN) 81 MG EC TABLET    Take 1 tablet (81 mg total) by mouth once daily.    ASPIRIN (ECOTRIN) 81 MG EC TABLET    Take 1 tablet (81 mg total) by mouth once daily.    ATORVASTATIN (LIPITOR) 20 MG TABLET    Take 20 mg by mouth nightly.    CHOLECALCIFEROL, VITAMIN D3, 5,000 UNIT CAPSULE    Take 5,000 Units by mouth once daily.    CLINDAMYCIN (CLEOCIN T) 1 % EXTERNAL SOLUTION    aaa bid prn    CYANOCOBALAMIN (VITAMIN B-12) 1000 MCG TABLET    Take 100 mcg by mouth once daily.    DICLOFENAC SODIUM (VOLTAREN) 1 % GEL    Apply 2 g topically daily as needed.    DUREZOL 0.05 % DROP OPHTHALMIC SOLUTION        EZETIMIBE (ZETIA) 10 MG TABLET        FAMOTIDINE (PEPCID) 20 MG TABLET    Take 1 tablet (20 mg total) by mouth nightly.    FLUAD QUAD 2020-21,65Y UP,,PF, 60 MCG (15 MCG X 4)/0.5 ML SYRG    ADM 0.5ML IM UTD    FUROSEMIDE (LASIX) 40 MG TABLET    Take 1 tablet (40 mg total) by mouth once daily.    GABAPENTIN (NEURONTIN) 800 MG TABLET    Take 1 tablet (800 mg total) by mouth 3 (three) times daily.    HYDROCODONE-ACETAMINOPHEN (NORCO) 5-325 MG PER TABLET        HYDROMORPHONE (DILAUDID) 4 MG TABLET    TK 1 T PO TID PRF SEVER PAIN    KETOCONAZOLE (NIZORAL) 2 % SHAMPOO    Wash hair with medicated shampoo at least 2x/week - let sit on scalp at least 5 minutes prior to rinsing    KETOROLAC 0.5% (ACULAR) 0.5 % DROP         LEVOTHYROXINE (SYNTHROID) 125 MCG TABLET    Take 1 tablet (125 mcg total) by mouth before breakfast.    LOSARTAN-HYDROCHLOROTHIAZIDE 100-12.5 MG (HYZAAR) 100-12.5 MG TAB    Take 1 tablet by mouth once daily.    MAGNESIUM OXIDE (MAG-OX) 400 MG (241.3 MG MAGNESIUM) TABLET    Take 400 mg by mouth 2 (two) times daily.    MECOBAL/LEVOMEFOLAT CA/B6 PHOS (METANX ORAL)    Take 1 capsule by mouth 2 (two) times daily.    METOPROLOL SUCCINATE (TOPROL-XL) 50 MG 24 HR TABLET    Take 1 tablet (50 mg total) by mouth nightly.    MUPIROCIN (BACTROBAN) 2 % OINTMENT        OFLOXACIN (OCUFLOX) 0.3 % OPHTHALMIC SOLUTION        OMEPRAZOLE (PRILOSEC) 40 MG CAPSULE    Take 1 capsule (40 mg total) by mouth once daily.    OXYCODONE-ACETAMINOPHEN (PERCOCET)  MG PER TABLET        -PROPYLENE GLYCOL (SYSTANE, PROPYLENE GLYCOL,) 0.4-0.3 % DROP    Place 1 drop into both eyes once daily.    TURMERIC 400 MG CAP    Take 400 mg by mouth 2 (two) times daily.    VIT C/E/ZN/COPPR/LUTEIN/ZEAXAN (PRESERVISION AREDS-2 ORAL)    Take 1 capsule by mouth 2 (two) times daily.            Assessment:       1. CHF (congestive heart failure), NYHA class I, acute, diastolic    2. Mixed hyperlipidemia    3. Essential hypertension         Plan:  A nuclear stress test demonstrates normal systolic function and no evidence of reversible ischemia.  Her BNP was very mildly elevated I believe she does have a mild form of diastolic CHF which is well controlled on present dose of furosemide 40 mg daily.  We should be able to clear her for the right knee replacement she requires she will come back to see me for follow-up after her knee replacement surgery.       Problem List Items Addressed This Visit        Cardiac/Vascular    Hypertension    Hyperlipidemia      Other Visit Diagnoses     CHF (congestive heart failure), NYHA class I, acute, diastolic    -  Primary           Follow up in about 3 months (around 2/17/2021) for Routine follow up.

## 2020-11-24 ENCOUNTER — TELEPHONE (OUTPATIENT)
Dept: ENDOCRINOLOGY | Facility: CLINIC | Age: 75
End: 2020-11-24

## 2020-11-24 NOTE — TELEPHONE ENCOUNTER
----- Message from Alysa Lawler RN sent at 11/24/2020  9:48 AM CST -----  Contact: self    ----- Message -----  From: Jazmyn Lovell  Sent: 11/24/2020   8:48 AM CST  To: Casimiro Vincent Staff    Type: Needs Medical Advice  Who Called:  patient   Best Call Back Number: [phone]  Additional Information: patient canceled appointment for December, due to having knee and eye surgery, she states she had labs order by Dr. Badillo, she is requesting Carlton Kirkpatrick to review her labs and contact her with results regarding medication

## 2020-11-30 ENCOUNTER — OFFICE VISIT (OUTPATIENT)
Dept: FAMILY MEDICINE | Facility: CLINIC | Age: 75
End: 2020-11-30
Payer: MEDICARE

## 2020-11-30 VITALS
TEMPERATURE: 97 F | OXYGEN SATURATION: 97 % | SYSTOLIC BLOOD PRESSURE: 138 MMHG | WEIGHT: 235 LBS | DIASTOLIC BLOOD PRESSURE: 77 MMHG | BODY MASS INDEX: 34.7 KG/M2 | HEART RATE: 78 BPM

## 2020-11-30 DIAGNOSIS — I10 HYPERTENSION, ESSENTIAL: Primary | ICD-10-CM

## 2020-11-30 DIAGNOSIS — E03.9 HYPOTHYROIDISM, UNSPECIFIED TYPE: ICD-10-CM

## 2020-11-30 DIAGNOSIS — M17.11 OSTEOARTHRITIS OF RIGHT KNEE, UNSPECIFIED OSTEOARTHRITIS TYPE: ICD-10-CM

## 2020-11-30 DIAGNOSIS — G62.9 NEUROPATHY: ICD-10-CM

## 2020-11-30 DIAGNOSIS — E78.5 HYPERLIPIDEMIA, UNSPECIFIED HYPERLIPIDEMIA TYPE: ICD-10-CM

## 2020-11-30 PROCEDURE — 3078F PR MOST RECENT DIASTOLIC BLOOD PRESSURE < 80 MM HG: ICD-10-PCS | Mod: CPTII,S$GLB,, | Performed by: FAMILY MEDICINE

## 2020-11-30 PROCEDURE — 1159F MED LIST DOCD IN RCRD: CPT | Mod: S$GLB,,, | Performed by: FAMILY MEDICINE

## 2020-11-30 PROCEDURE — 1125F PR PAIN SEVERITY QUANTIFIED, PAIN PRESENT: ICD-10-PCS | Mod: S$GLB,,, | Performed by: FAMILY MEDICINE

## 2020-11-30 PROCEDURE — 1125F AMNT PAIN NOTED PAIN PRSNT: CPT | Mod: S$GLB,,, | Performed by: FAMILY MEDICINE

## 2020-11-30 PROCEDURE — 99214 OFFICE O/P EST MOD 30 MIN: CPT | Mod: S$GLB,,, | Performed by: FAMILY MEDICINE

## 2020-11-30 PROCEDURE — 1101F PT FALLS ASSESS-DOCD LE1/YR: CPT | Mod: CPTII,S$GLB,, | Performed by: FAMILY MEDICINE

## 2020-11-30 PROCEDURE — 1159F PR MEDICATION LIST DOCUMENTED IN MEDICAL RECORD: ICD-10-PCS | Mod: S$GLB,,, | Performed by: FAMILY MEDICINE

## 2020-11-30 PROCEDURE — 3075F SYST BP GE 130 - 139MM HG: CPT | Mod: CPTII,S$GLB,, | Performed by: FAMILY MEDICINE

## 2020-11-30 PROCEDURE — 3288F FALL RISK ASSESSMENT DOCD: CPT | Mod: CPTII,S$GLB,, | Performed by: FAMILY MEDICINE

## 2020-11-30 PROCEDURE — 3075F PR MOST RECENT SYSTOLIC BLOOD PRESS GE 130-139MM HG: ICD-10-PCS | Mod: CPTII,S$GLB,, | Performed by: FAMILY MEDICINE

## 2020-11-30 PROCEDURE — 3288F PR FALLS RISK ASSESSMENT DOCUMENTED: ICD-10-PCS | Mod: CPTII,S$GLB,, | Performed by: FAMILY MEDICINE

## 2020-11-30 PROCEDURE — 3078F DIAST BP <80 MM HG: CPT | Mod: CPTII,S$GLB,, | Performed by: FAMILY MEDICINE

## 2020-11-30 PROCEDURE — 99214 PR OFFICE/OUTPT VISIT, EST, LEVL IV, 30-39 MIN: ICD-10-PCS | Mod: S$GLB,,, | Performed by: FAMILY MEDICINE

## 2020-11-30 PROCEDURE — 1101F PR PT FALLS ASSESS DOC 0-1 FALLS W/OUT INJ PAST YR: ICD-10-PCS | Mod: CPTII,S$GLB,, | Performed by: FAMILY MEDICINE

## 2020-11-30 RX ORDER — PREDNISOLONE ACETATE 10 MG/ML
SUSPENSION/ DROPS OPHTHALMIC
COMMUNITY
Start: 2020-11-25 | End: 2021-01-31

## 2020-12-01 NOTE — PROGRESS NOTES
Postponed her cataract surgery because of knee is getting worse and would like to get it over with before that.  DrBrenda have her eyes doing right total knee.  She has had a prior partial back in 2012.  Good bit of knee pain.  Hematologically okay no bleeding or bruising no blood clots.  She is on aspirin she will need to get off of it for 7 days prior to the surgery.  Surgery has actually not been scheduled yet.  Past anesthesia she had both of her hip replaced previously back in 2017.  Had no problems in.  Pulmonary no cough or sputum no dyspnea on exertion no wheezing.  ENT no nose congestion postnasal drip sore throat or congestion.  Cardiovascular no chest pain palpitations PND orthopnea pedal edema.  GI no nausea vomiting diarrhea melena hematemesis.   no dysuria frequency urgency or hematuria.  She did have a stress Myoview done because of the decrease her ejection fraction from 60-40 over the past year.  The Myoview showed some EKG abnormalities on the Lexiscan portion but the Myoview was completely normal an ejection fraction was 66% the echo was in error.  Some neuropathy discomfort in her left lower leg.  Use med neck is from [facility] before for it.  Has not been on it for a while needs new prescription.  [facility] due to [facility].    Physical examination vital signs are noted.  Elderly female no acute distress.  Alert oriented x3.  Pupils are equal round regular active to light accommodation tympanic membranes without erythema.  Pharynx without erythema exudate.  Neck is without bruit no adenopathy supple.  No thyromegaly.  Chest clear to auscultation no wheezes or crackles no dyspnea.  Heart regular rate rhythm without murmur gallop or rub.  Abdomen bowel sounds are positive soft nontender no hepatosplenomegaly no guarding or rebound.  Extremities no clubbing cyanosis or edema.  Positive pedal pulses.  Right knee is swollen and somewhat tender.    Impression degenerative  joint disease of the right knee.  Hypertension controlled.  Hyperlipidemia.  Hypothyroidism.  Neuropathy.    Plan reviewed her TSH which was okay CMP and CBC were both okay.  Refill the med neck is b.i.d. 1.  One hundred eighty with a refill.  Gave her written prescription for [facility].  She is okay for the surgery moderate risk due to the nature of the procedure.  Should stop her aspirin a week ahead of time.

## 2020-12-02 ENCOUNTER — TELEPHONE (OUTPATIENT)
Dept: CARDIOLOGY | Facility: CLINIC | Age: 75
End: 2020-12-02

## 2020-12-02 NOTE — TELEPHONE ENCOUNTER
----- Message from Barrington Santoyo sent at 12/2/2020 10:08 AM CST -----  Regarding: clearance  Avhero conley faaprilg over a clearance request for knee procedure

## 2020-12-04 ENCOUNTER — OFFICE VISIT (OUTPATIENT)
Dept: FAMILY MEDICINE | Facility: CLINIC | Age: 75
End: 2020-12-04
Payer: MEDICARE

## 2020-12-04 VITALS
HEART RATE: 71 BPM | OXYGEN SATURATION: 95 % | DIASTOLIC BLOOD PRESSURE: 60 MMHG | TEMPERATURE: 97 F | BODY MASS INDEX: 34.7 KG/M2 | SYSTOLIC BLOOD PRESSURE: 122 MMHG | WEIGHT: 235 LBS

## 2020-12-04 DIAGNOSIS — M17.12 PRIMARY OSTEOARTHRITIS OF LEFT KNEE: ICD-10-CM

## 2020-12-04 DIAGNOSIS — N39.0 URINARY TRACT INFECTION WITHOUT HEMATURIA, SITE UNSPECIFIED: Primary | ICD-10-CM

## 2020-12-04 LAB
BILIRUB SERPL-MCNC: ABNORMAL MG/DL
BLOOD URINE, POC: ABNORMAL
COLOR, POC UA: ABNORMAL
GLUCOSE UR QL STRIP: ABNORMAL
KETONES UR QL STRIP: ABNORMAL
LEUKOCYTE ESTERASE URINE, POC: ABNORMAL
NITRITE, POC UA: ABNORMAL
PH, POC UA: 5.5
PROTEIN, POC: ABNORMAL
SPECIFIC GRAVITY, POC UA: 1.02
UROBILINOGEN, POC UA: 0.2

## 2020-12-04 PROCEDURE — 1126F PR PAIN SEVERITY QUANTIFIED, NO PAIN PRESENT: ICD-10-PCS | Mod: S$GLB,,, | Performed by: FAMILY MEDICINE

## 2020-12-04 PROCEDURE — 3074F SYST BP LT 130 MM HG: CPT | Mod: CPTII,S$GLB,, | Performed by: FAMILY MEDICINE

## 2020-12-04 PROCEDURE — 99213 PR OFFICE/OUTPT VISIT, EST, LEVL III, 20-29 MIN: ICD-10-PCS | Mod: 25,S$GLB,, | Performed by: FAMILY MEDICINE

## 2020-12-04 PROCEDURE — 1159F PR MEDICATION LIST DOCUMENTED IN MEDICAL RECORD: ICD-10-PCS | Mod: S$GLB,,, | Performed by: FAMILY MEDICINE

## 2020-12-04 PROCEDURE — 1101F PR PT FALLS ASSESS DOC 0-1 FALLS W/OUT INJ PAST YR: ICD-10-PCS | Mod: CPTII,S$GLB,, | Performed by: FAMILY MEDICINE

## 2020-12-04 PROCEDURE — 99213 OFFICE O/P EST LOW 20 MIN: CPT | Mod: 25,S$GLB,, | Performed by: FAMILY MEDICINE

## 2020-12-04 PROCEDURE — 3074F PR MOST RECENT SYSTOLIC BLOOD PRESSURE < 130 MM HG: ICD-10-PCS | Mod: CPTII,S$GLB,, | Performed by: FAMILY MEDICINE

## 2020-12-04 PROCEDURE — 3078F PR MOST RECENT DIASTOLIC BLOOD PRESSURE < 80 MM HG: ICD-10-PCS | Mod: CPTII,S$GLB,, | Performed by: FAMILY MEDICINE

## 2020-12-04 PROCEDURE — 81003 URINALYSIS AUTO W/O SCOPE: CPT | Mod: QW,S$GLB,, | Performed by: FAMILY MEDICINE

## 2020-12-04 PROCEDURE — 3078F DIAST BP <80 MM HG: CPT | Mod: CPTII,S$GLB,, | Performed by: FAMILY MEDICINE

## 2020-12-04 PROCEDURE — 1126F AMNT PAIN NOTED NONE PRSNT: CPT | Mod: S$GLB,,, | Performed by: FAMILY MEDICINE

## 2020-12-04 PROCEDURE — 3288F PR FALLS RISK ASSESSMENT DOCUMENTED: ICD-10-PCS | Mod: CPTII,S$GLB,, | Performed by: FAMILY MEDICINE

## 2020-12-04 PROCEDURE — 1101F PT FALLS ASSESS-DOCD LE1/YR: CPT | Mod: CPTII,S$GLB,, | Performed by: FAMILY MEDICINE

## 2020-12-04 PROCEDURE — 3288F FALL RISK ASSESSMENT DOCD: CPT | Mod: CPTII,S$GLB,, | Performed by: FAMILY MEDICINE

## 2020-12-04 PROCEDURE — 81003 POCT URINALYSIS W/O SCOPE: ICD-10-PCS | Mod: QW,S$GLB,, | Performed by: FAMILY MEDICINE

## 2020-12-04 PROCEDURE — 1159F MED LIST DOCD IN RCRD: CPT | Mod: S$GLB,,, | Performed by: FAMILY MEDICINE

## 2020-12-04 NOTE — PROGRESS NOTES
Subjective:       Patient ID: Marisela Eagle is a 75 y.o. female.    Chief Complaint: Urinary Tract Infection    Here today for UTI. Taking Azo for a few days. Reports dysuria, urinary frequency, and urinary incontience. No fever or chills. She had a UA and possible urine culture done at [facility] at her pre-op appointment. She is scheduled for knee surgery next Wednesday.    Review of Systems   Constitutional: Negative.  Negative for appetite change, chills, fatigue and fever.   HENT: Negative.  Negative for ear pain, rhinorrhea, sinus pressure/congestion and sore throat.    Respiratory: Negative.  Negative for cough, chest tightness, shortness of breath and wheezing.    Cardiovascular: Negative.  Negative for chest pain, palpitations and leg swelling.   Gastrointestinal: Negative.  Negative for abdominal pain, diarrhea, nausea and vomiting.   Genitourinary: Positive for bladder incontinence, dysuria and frequency. Negative for hematuria and urgency.   Musculoskeletal: Negative.    Integumentary:  Negative.   Neurological: Negative.  Negative for dizziness, weakness, numbness and headaches.   All other systems reviewed and are negative.        Objective:      Physical Exam  Vitals signs reviewed.   Constitutional:       General: She is not in acute distress.     Appearance: Normal appearance.   HENT:      Head: Normocephalic and atraumatic.      Right Ear: External ear normal.      Left Ear: External ear normal.      Nose: Nose normal.      Mouth/Throat:      Mouth: Mucous membranes are moist.   Eyes:      Pupils: Pupils are equal, round, and reactive to light.   Neck:      Musculoskeletal: Normal range of motion and neck supple.      Vascular: No carotid bruit.   Cardiovascular:      Rate and Rhythm: Normal rate and regular rhythm.      Pulses: Normal pulses.      Heart sounds: Normal heart sounds. No murmur. No friction rub. No gallop.    Pulmonary:      Effort: Pulmonary effort is normal.      Breath sounds:  Normal breath sounds. No wheezing, rhonchi or rales.   Abdominal:      Palpations: Abdomen is soft.      Tenderness: There is no abdominal tenderness. There is no right CVA tenderness or left CVA tenderness.   Musculoskeletal: Normal range of motion.         General: No tenderness.   Skin:     General: Skin is warm and dry.      Capillary Refill: Capillary refill takes less than 2 seconds.   Neurological:      General: No focal deficit present.      Mental Status: She is alert and oriented to person, place, and time.   Psychiatric:         Mood and Affect: Mood normal.         Behavior: Behavior normal.         Assessment:       1. Urinary tract infection without hematuria, site unspecified        Plan:       **UA and urine culture. Bactrim DS bid #20 proceed with her  knee replacement as planned..*

## 2021-01-09 ENCOUNTER — IMMUNIZATION (OUTPATIENT)
Dept: INTERNAL MEDICINE | Facility: CLINIC | Age: 76
End: 2021-01-09
Payer: MEDICARE

## 2021-01-09 DIAGNOSIS — Z23 NEED FOR VACCINATION: ICD-10-CM

## 2021-01-09 PROCEDURE — 91300 COVID-19, MRNA, LNP-S, PF, 30 MCG/0.3 ML DOSE VACCINE: CPT | Mod: PBBFAC | Performed by: INTERNAL MEDICINE

## 2021-01-18 ENCOUNTER — NURSE TRIAGE (OUTPATIENT)
Dept: ADMINISTRATIVE | Facility: CLINIC | Age: 76
End: 2021-01-18

## 2021-01-21 ENCOUNTER — OFFICE VISIT (OUTPATIENT)
Dept: FAMILY MEDICINE | Facility: CLINIC | Age: 76
End: 2021-01-21
Payer: MEDICARE

## 2021-01-21 VITALS
DIASTOLIC BLOOD PRESSURE: 52 MMHG | BODY MASS INDEX: 33.67 KG/M2 | OXYGEN SATURATION: 95 % | HEART RATE: 86 BPM | SYSTOLIC BLOOD PRESSURE: 83 MMHG | TEMPERATURE: 98 F | WEIGHT: 228 LBS

## 2021-01-21 DIAGNOSIS — Z20.822 EXPOSURE TO COVID-19 VIRUS: ICD-10-CM

## 2021-01-21 DIAGNOSIS — R50.9 FEVER, UNSPECIFIED FEVER CAUSE: Primary | ICD-10-CM

## 2021-01-21 DIAGNOSIS — N39.0 URINARY TRACT INFECTION WITHOUT HEMATURIA, SITE UNSPECIFIED: ICD-10-CM

## 2021-01-21 LAB
BILIRUB SERPL-MCNC: ABNORMAL MG/DL
BLOOD URINE, POC: ABNORMAL
COLOR, POC UA: YELLOW
GLUCOSE UR QL STRIP: ABNORMAL
KETONES UR QL STRIP: ABNORMAL
LEUKOCYTE ESTERASE URINE, POC: ABNORMAL
NITRITE, POC UA: POSITIVE
PH, POC UA: 6.5
PROTEIN, POC: ABNORMAL
SPECIFIC GRAVITY, POC UA: 1.01
UROBILINOGEN, POC UA: ABNORMAL

## 2021-01-21 PROCEDURE — 3078F PR MOST RECENT DIASTOLIC BLOOD PRESSURE < 80 MM HG: ICD-10-PCS | Mod: CPTII,S$GLB,, | Performed by: FAMILY MEDICINE

## 2021-01-21 PROCEDURE — 3078F DIAST BP <80 MM HG: CPT | Mod: CPTII,S$GLB,, | Performed by: FAMILY MEDICINE

## 2021-01-21 PROCEDURE — 1101F PR PT FALLS ASSESS DOC 0-1 FALLS W/OUT INJ PAST YR: ICD-10-PCS | Mod: CPTII,S$GLB,, | Performed by: FAMILY MEDICINE

## 2021-01-21 PROCEDURE — 1159F MED LIST DOCD IN RCRD: CPT | Mod: S$GLB,,, | Performed by: FAMILY MEDICINE

## 2021-01-21 PROCEDURE — 81003 URINALYSIS AUTO W/O SCOPE: CPT | Mod: QW,S$GLB,, | Performed by: FAMILY MEDICINE

## 2021-01-21 PROCEDURE — 81003 POCT URINALYSIS W/O SCOPE: ICD-10-PCS | Mod: QW,S$GLB,, | Performed by: FAMILY MEDICINE

## 2021-01-21 PROCEDURE — 1159F PR MEDICATION LIST DOCUMENTED IN MEDICAL RECORD: ICD-10-PCS | Mod: S$GLB,,, | Performed by: FAMILY MEDICINE

## 2021-01-21 PROCEDURE — 3074F PR MOST RECENT SYSTOLIC BLOOD PRESSURE < 130 MM HG: ICD-10-PCS | Mod: CPTII,S$GLB,, | Performed by: FAMILY MEDICINE

## 2021-01-21 PROCEDURE — 99213 OFFICE O/P EST LOW 20 MIN: CPT | Mod: 25,S$GLB,, | Performed by: FAMILY MEDICINE

## 2021-01-21 PROCEDURE — 3074F SYST BP LT 130 MM HG: CPT | Mod: CPTII,S$GLB,, | Performed by: FAMILY MEDICINE

## 2021-01-21 PROCEDURE — 3288F FALL RISK ASSESSMENT DOCD: CPT | Mod: CPTII,S$GLB,, | Performed by: FAMILY MEDICINE

## 2021-01-21 PROCEDURE — 1125F AMNT PAIN NOTED PAIN PRSNT: CPT | Mod: S$GLB,,, | Performed by: FAMILY MEDICINE

## 2021-01-21 PROCEDURE — 1125F PR PAIN SEVERITY QUANTIFIED, PAIN PRESENT: ICD-10-PCS | Mod: S$GLB,,, | Performed by: FAMILY MEDICINE

## 2021-01-21 PROCEDURE — 99213 PR OFFICE/OUTPT VISIT, EST, LEVL III, 20-29 MIN: ICD-10-PCS | Mod: 25,S$GLB,, | Performed by: FAMILY MEDICINE

## 2021-01-21 PROCEDURE — 3288F PR FALLS RISK ASSESSMENT DOCUMENTED: ICD-10-PCS | Mod: CPTII,S$GLB,, | Performed by: FAMILY MEDICINE

## 2021-01-21 PROCEDURE — 1101F PT FALLS ASSESS-DOCD LE1/YR: CPT | Mod: CPTII,S$GLB,, | Performed by: FAMILY MEDICINE

## 2021-01-21 RX ORDER — AZITHROMYCIN 250 MG/1
TABLET, FILM COATED ORAL
COMMUNITY
Start: 2021-01-17 | End: 2021-01-31

## 2021-01-21 RX ORDER — CEFUROXIME AXETIL 250 MG/1
250 TABLET ORAL 2 TIMES DAILY
Qty: 20 TABLET | Refills: 0 | Status: SHIPPED | OUTPATIENT
Start: 2021-01-21 | End: 2021-01-31

## 2021-01-22 ENCOUNTER — TELEPHONE (OUTPATIENT)
Dept: FAMILY MEDICINE | Facility: CLINIC | Age: 76
End: 2021-01-22

## 2021-01-23 LAB — BACTERIA UR CULT: ABNORMAL

## 2021-01-29 ENCOUNTER — TELEPHONE (OUTPATIENT)
Dept: FAMILY MEDICINE | Facility: CLINIC | Age: 76
End: 2021-01-29
Payer: MEDICARE

## 2021-01-29 NOTE — TELEPHONE ENCOUNTER
----- Message from Carline Carnes sent at 1/29/2021  1:08 PM CST -----  Type:  RX Refill Request    Who Called:  Patient  Refill or New Rx:  Refill  RX Name and Strength:  lorazepam (ATIVAN) 1 MG tablet  How is the patient currently taking it? (ex. 1XDay):  lorazepam (ATIVAN) 1 MG tablet  Is this a 30 day or 90 day RX:  60 tablet  Preferred Pharmacy with phone number:    [id number] - [place] - [address]  [address] [place] [address]  Phone: [phone] Fax: [phone]  Local or Mail Order:  local  Ordering Provider:  Kenneth Nelson MD  Best Call Back Number:  [phone]

## 2021-01-30 ENCOUNTER — IMMUNIZATION (OUTPATIENT)
Dept: INTERNAL MEDICINE | Facility: CLINIC | Age: 76
End: 2021-01-30
Payer: MEDICARE

## 2021-01-30 DIAGNOSIS — Z23 NEED FOR VACCINATION: Primary | ICD-10-CM

## 2021-01-30 PROCEDURE — 91300 PR SARS-COV- 2 COVID-19 VACCINE, NO PRSV, 30MCG/0.3ML, IM: CPT | Mod: PBBFAC | Performed by: INTERNAL MEDICINE

## 2021-01-30 PROCEDURE — 0002A PR IMMUNIZ ADMIN, SARS-COV-2 COVID-19 VACC, 30MCG/0.3ML, 2ND DOSE: CPT | Mod: PBBFAC | Performed by: INTERNAL MEDICINE

## 2021-01-30 RX ADMIN — RNA INGREDIENT BNT-162B2 0.3 ML: 0.23 INJECTION, SUSPENSION INTRAMUSCULAR at 09:01

## 2021-01-31 ENCOUNTER — HOSPITAL ENCOUNTER (INPATIENT)
Facility: HOSPITAL | Age: 76
LOS: 1 days | Discharge: HOME | DRG: 690 | End: 2021-02-02
Attending: EMERGENCY MEDICINE | Admitting: INTERNAL MEDICINE
Payer: MEDICARE

## 2021-01-31 DIAGNOSIS — R07.9 CHEST PAIN: ICD-10-CM

## 2021-01-31 DIAGNOSIS — I50.20 HFREF (HEART FAILURE WITH REDUCED EJECTION FRACTION): ICD-10-CM

## 2021-01-31 DIAGNOSIS — E78.2 MIXED HYPERLIPIDEMIA: ICD-10-CM

## 2021-01-31 DIAGNOSIS — R50.9 FEVER: ICD-10-CM

## 2021-01-31 DIAGNOSIS — E66.9 OBESITY (BMI 30-39.9): ICD-10-CM

## 2021-01-31 DIAGNOSIS — N12 PYELONEPHRITIS: Primary | ICD-10-CM

## 2021-01-31 DIAGNOSIS — N30.01 ACUTE CYSTITIS WITH HEMATURIA: ICD-10-CM

## 2021-01-31 DIAGNOSIS — I10 ESSENTIAL HYPERTENSION: ICD-10-CM

## 2021-01-31 DIAGNOSIS — E87.6 HYPOKALEMIA: ICD-10-CM

## 2021-01-31 LAB
ALBUMIN SERPL BCP-MCNC: 3.5 G/DL (ref 3.5–5.2)
ALP SERPL-CCNC: 40 U/L (ref 55–135)
ALT SERPL W/O P-5'-P-CCNC: 26 U/L (ref 10–44)
ANION GAP SERPL CALC-SCNC: 11 MMOL/L (ref 8–16)
ANION GAP SERPL CALC-SCNC: 11 MMOL/L (ref 8–16)
AST SERPL-CCNC: 24 U/L (ref 10–40)
BACTERIA #/AREA URNS HPF: ABNORMAL /HPF
BASOPHILS # BLD AUTO: 0.02 K/UL (ref 0–0.2)
BASOPHILS NFR BLD: 0.4 % (ref 0–1.9)
BILIRUB SERPL-MCNC: 0.7 MG/DL (ref 0.1–1)
BILIRUB UR QL STRIP: NEGATIVE
BNP SERPL-MCNC: 215 PG/ML (ref 0–99)
BUN SERPL-MCNC: 7 MG/DL (ref 8–23)
BUN SERPL-MCNC: 8 MG/DL (ref 8–23)
CALCIUM SERPL-MCNC: 8.5 MG/DL (ref 8.7–10.5)
CALCIUM SERPL-MCNC: 8.5 MG/DL (ref 8.7–10.5)
CHLORIDE SERPL-SCNC: 96 MMOL/L (ref 95–110)
CHLORIDE SERPL-SCNC: 98 MMOL/L (ref 95–110)
CLARITY UR: ABNORMAL
CO2 SERPL-SCNC: 27 MMOL/L (ref 23–29)
CO2 SERPL-SCNC: 28 MMOL/L (ref 23–29)
COLOR UR: YELLOW
CREAT SERPL-MCNC: 0.5 MG/DL (ref 0.5–1.4)
CREAT SERPL-MCNC: 0.5 MG/DL (ref 0.5–1.4)
DIFFERENTIAL METHOD: ABNORMAL
EOSINOPHIL # BLD AUTO: 0 K/UL (ref 0–0.5)
EOSINOPHIL NFR BLD: 0 % (ref 0–8)
ERYTHROCYTE [DISTWIDTH] IN BLOOD BY AUTOMATED COUNT: 14.6 % (ref 11.5–14.5)
EST. GFR  (AFRICAN AMERICAN): >60 ML/MIN/1.73 M^2
EST. GFR  (AFRICAN AMERICAN): >60 ML/MIN/1.73 M^2
EST. GFR  (NON AFRICAN AMERICAN): >60 ML/MIN/1.73 M^2
EST. GFR  (NON AFRICAN AMERICAN): >60 ML/MIN/1.73 M^2
GLUCOSE SERPL-MCNC: 102 MG/DL (ref 70–110)
GLUCOSE SERPL-MCNC: 114 MG/DL (ref 70–110)
GLUCOSE SERPL-MCNC: 127 MG/DL (ref 70–110)
GLUCOSE UR QL STRIP: NEGATIVE
HCT VFR BLD AUTO: 31.9 % (ref 37–48.5)
HGB BLD-MCNC: 10.2 G/DL (ref 12–16)
HGB UR QL STRIP: ABNORMAL
HYALINE CASTS #/AREA URNS LPF: 16 /LPF
IMM GRANULOCYTES # BLD AUTO: 0.03 K/UL (ref 0–0.04)
IMM GRANULOCYTES NFR BLD AUTO: 0.6 % (ref 0–0.5)
INFLUENZA A, MOLECULAR: NEGATIVE
INFLUENZA B, MOLECULAR: NEGATIVE
KETONES UR QL STRIP: NEGATIVE
LACTATE SERPL-SCNC: 1.3 MMOL/L (ref 0.5–1.9)
LEUKOCYTE ESTERASE UR QL STRIP: ABNORMAL
LYMPHOCYTES # BLD AUTO: 0.7 K/UL (ref 1–4.8)
LYMPHOCYTES NFR BLD: 12.8 % (ref 18–48)
MAGNESIUM SERPL-MCNC: 1.3 MG/DL (ref 1.6–2.6)
MCH RBC QN AUTO: 27.2 PG (ref 27–31)
MCHC RBC AUTO-ENTMCNC: 32 G/DL (ref 32–36)
MCV RBC AUTO: 85 FL (ref 82–98)
MICROSCOPIC COMMENT: ABNORMAL
MONOCYTES # BLD AUTO: 0.4 K/UL (ref 0.3–1)
MONOCYTES NFR BLD: 7.8 % (ref 4–15)
NEUTROPHILS # BLD AUTO: 4.2 K/UL (ref 1.8–7.7)
NEUTROPHILS NFR BLD: 78.4 % (ref 38–73)
NITRITE UR QL STRIP: NEGATIVE
NRBC BLD-RTO: 0 /100 WBC
PH UR STRIP: 8 [PH] (ref 5–8)
PLATELET # BLD AUTO: 256 K/UL (ref 150–350)
PMV BLD AUTO: 11.5 FL (ref 9.2–12.9)
POTASSIUM SERPL-SCNC: 2.4 MMOL/L (ref 3.5–5.1)
POTASSIUM SERPL-SCNC: 2.5 MMOL/L (ref 3.5–5.1)
PROCALCITONIN SERPL IA-MCNC: 0.05 NG/ML (ref 0–0.5)
PROT SERPL-MCNC: 6.6 G/DL (ref 6–8.4)
PROT UR QL STRIP: ABNORMAL
RBC # BLD AUTO: 3.75 M/UL (ref 4–5.4)
RBC #/AREA URNS HPF: 3 /HPF (ref 0–4)
SARS-COV-2 RDRP RESP QL NAA+PROBE: NEGATIVE
SODIUM SERPL-SCNC: 134 MMOL/L (ref 136–145)
SODIUM SERPL-SCNC: 137 MMOL/L (ref 136–145)
SP GR UR STRIP: 1.01 (ref 1–1.03)
SPECIMEN SOURCE: NORMAL
SQUAMOUS #/AREA URNS HPF: 2 /HPF
TROPONIN I SERPL DL<=0.01 NG/ML-MCNC: 0.04 NG/ML
URN SPEC COLLECT METH UR: ABNORMAL
UROBILINOGEN UR STRIP-ACNC: NEGATIVE EU/DL
WBC # BLD AUTO: 5.39 K/UL (ref 3.9–12.7)
WBC #/AREA URNS HPF: >100 /HPF (ref 0–5)

## 2021-01-31 PROCEDURE — 96365 THER/PROPH/DIAG IV INF INIT: CPT

## 2021-01-31 PROCEDURE — 87502 INFLUENZA DNA AMP PROBE: CPT

## 2021-01-31 PROCEDURE — G0378 HOSPITAL OBSERVATION PER HR: HCPCS

## 2021-01-31 PROCEDURE — 83605 ASSAY OF LACTIC ACID: CPT

## 2021-01-31 PROCEDURE — 25000003 PHARM REV CODE 250: Performed by: EMERGENCY MEDICINE

## 2021-01-31 PROCEDURE — U0002 COVID-19 LAB TEST NON-CDC: HCPCS

## 2021-01-31 PROCEDURE — 81001 URINALYSIS AUTO W/SCOPE: CPT

## 2021-01-31 PROCEDURE — 93010 ELECTROCARDIOGRAM REPORT: CPT | Mod: ,,, | Performed by: INTERNAL MEDICINE

## 2021-01-31 PROCEDURE — 63600175 PHARM REV CODE 636 W HCPCS: Performed by: INTERNAL MEDICINE

## 2021-01-31 PROCEDURE — 83735 ASSAY OF MAGNESIUM: CPT

## 2021-01-31 PROCEDURE — 25000003 PHARM REV CODE 250: Performed by: INTERNAL MEDICINE

## 2021-01-31 PROCEDURE — 93010 EKG 12-LEAD: ICD-10-PCS | Mod: ,,, | Performed by: INTERNAL MEDICINE

## 2021-01-31 PROCEDURE — 80053 COMPREHEN METABOLIC PANEL: CPT

## 2021-01-31 PROCEDURE — 85025 COMPLETE CBC W/AUTO DIFF WBC: CPT

## 2021-01-31 PROCEDURE — 63600175 PHARM REV CODE 636 W HCPCS: Performed by: EMERGENCY MEDICINE

## 2021-01-31 PROCEDURE — 87077 CULTURE AEROBIC IDENTIFY: CPT

## 2021-01-31 PROCEDURE — 84484 ASSAY OF TROPONIN QUANT: CPT

## 2021-01-31 PROCEDURE — 63600175 PHARM REV CODE 636 W HCPCS

## 2021-01-31 PROCEDURE — 36415 COLL VENOUS BLD VENIPUNCTURE: CPT

## 2021-01-31 PROCEDURE — 99291 CRITICAL CARE FIRST HOUR: CPT

## 2021-01-31 PROCEDURE — 87086 URINE CULTURE/COLONY COUNT: CPT

## 2021-01-31 PROCEDURE — 93005 ELECTROCARDIOGRAM TRACING: CPT | Performed by: INTERNAL MEDICINE

## 2021-01-31 PROCEDURE — 87186 SC STD MICRODIL/AGAR DIL: CPT

## 2021-01-31 PROCEDURE — 83880 ASSAY OF NATRIURETIC PEPTIDE: CPT

## 2021-01-31 PROCEDURE — 80048 BASIC METABOLIC PNL TOTAL CA: CPT

## 2021-01-31 PROCEDURE — 84145 PROCALCITONIN (PCT): CPT

## 2021-01-31 PROCEDURE — 87040 BLOOD CULTURE FOR BACTERIA: CPT | Mod: 59

## 2021-01-31 PROCEDURE — 51798 US URINE CAPACITY MEASURE: CPT

## 2021-01-31 RX ORDER — POTASSIUM CHLORIDE 20 MEQ/1
20 TABLET, EXTENDED RELEASE ORAL
Status: DISCONTINUED | OUTPATIENT
Start: 2021-01-31 | End: 2021-02-02 | Stop reason: HOSPADM

## 2021-01-31 RX ORDER — MAGNESIUM SULFATE HEPTAHYDRATE 40 MG/ML
4 INJECTION, SOLUTION INTRAVENOUS
Status: DISCONTINUED | OUTPATIENT
Start: 2021-01-31 | End: 2021-02-02 | Stop reason: HOSPADM

## 2021-01-31 RX ORDER — POTASSIUM CHLORIDE 7.45 MG/ML
20 INJECTION INTRAVENOUS
Status: DISCONTINUED | OUTPATIENT
Start: 2021-01-31 | End: 2021-02-02 | Stop reason: HOSPADM

## 2021-01-31 RX ORDER — LANOLIN ALCOHOL/MO/W.PET/CERES
400 CREAM (GRAM) TOPICAL 2 TIMES DAILY
Status: DISCONTINUED | OUTPATIENT
Start: 2021-01-31 | End: 2021-02-02 | Stop reason: HOSPADM

## 2021-01-31 RX ORDER — IBUPROFEN 200 MG
16 TABLET ORAL
Status: DISCONTINUED | OUTPATIENT
Start: 2021-01-31 | End: 2021-02-02 | Stop reason: HOSPADM

## 2021-01-31 RX ORDER — MEROPENEM AND SODIUM CHLORIDE 1 G/50ML
1 INJECTION, SOLUTION INTRAVENOUS
Status: DISCONTINUED | OUTPATIENT
Start: 2021-01-31 | End: 2021-01-31

## 2021-01-31 RX ORDER — HYDROCODONE BITARTRATE AND ACETAMINOPHEN 5; 325 MG/1; MG/1
1 TABLET ORAL EVERY 6 HOURS PRN
Status: DISCONTINUED | OUTPATIENT
Start: 2021-01-31 | End: 2021-02-02 | Stop reason: HOSPADM

## 2021-01-31 RX ORDER — IBUPROFEN 200 MG
24 TABLET ORAL
Status: DISCONTINUED | OUTPATIENT
Start: 2021-01-31 | End: 2021-02-02 | Stop reason: HOSPADM

## 2021-01-31 RX ORDER — ACETAMINOPHEN 325 MG/1
650 TABLET ORAL ONCE
Status: COMPLETED | OUTPATIENT
Start: 2021-01-31 | End: 2021-01-31

## 2021-01-31 RX ORDER — POTASSIUM CHLORIDE 20 MEQ/1
40 TABLET, EXTENDED RELEASE ORAL
Status: DISCONTINUED | OUTPATIENT
Start: 2021-01-31 | End: 2021-02-02 | Stop reason: HOSPADM

## 2021-01-31 RX ORDER — CALCIUM CHLORIDE IN 0.9 % NACL 1 G/100 ML
1 INTRAVENOUS SOLUTION, PIGGYBACK (ML) INTRAVENOUS
Status: DISCONTINUED | OUTPATIENT
Start: 2021-01-31 | End: 2021-02-02 | Stop reason: HOSPADM

## 2021-01-31 RX ORDER — HYDROCHLOROTHIAZIDE 12.5 MG/1
12.5 TABLET ORAL DAILY
Status: DISCONTINUED | OUTPATIENT
Start: 2021-01-31 | End: 2021-02-01

## 2021-01-31 RX ORDER — ACETAMINOPHEN 500 MG
1000 TABLET ORAL EVERY 6 HOURS PRN
Status: DISCONTINUED | OUTPATIENT
Start: 2021-01-31 | End: 2021-02-02 | Stop reason: HOSPADM

## 2021-01-31 RX ORDER — GABAPENTIN 800 MG/1
800 TABLET ORAL 3 TIMES DAILY
Status: DISCONTINUED | OUTPATIENT
Start: 2021-01-31 | End: 2021-01-31

## 2021-01-31 RX ORDER — MEROPENEM AND SODIUM CHLORIDE 1 G/50ML
1 INJECTION, SOLUTION INTRAVENOUS
Status: DISCONTINUED | OUTPATIENT
Start: 2021-01-31 | End: 2021-02-02 | Stop reason: HOSPADM

## 2021-01-31 RX ORDER — LOSARTAN POTASSIUM 50 MG/1
100 TABLET ORAL DAILY
Status: DISCONTINUED | OUTPATIENT
Start: 2021-01-31 | End: 2021-02-02 | Stop reason: HOSPADM

## 2021-01-31 RX ORDER — GABAPENTIN 300 MG/1
600 CAPSULE ORAL 3 TIMES DAILY
Status: DISCONTINUED | OUTPATIENT
Start: 2021-01-31 | End: 2021-02-02 | Stop reason: HOSPADM

## 2021-01-31 RX ORDER — METOPROLOL SUCCINATE 50 MG/1
50 TABLET, EXTENDED RELEASE ORAL NIGHTLY
Status: DISCONTINUED | OUTPATIENT
Start: 2021-01-31 | End: 2021-02-02 | Stop reason: HOSPADM

## 2021-01-31 RX ORDER — ASPIRIN 81 MG/1
81 TABLET ORAL DAILY
Status: DISCONTINUED | OUTPATIENT
Start: 2021-01-31 | End: 2021-02-02 | Stop reason: HOSPADM

## 2021-01-31 RX ORDER — LOSARTAN POTASSIUM AND HYDROCHLOROTHIAZIDE 12.5; 1 MG/1; MG/1
1 TABLET ORAL DAILY
Status: DISCONTINUED | OUTPATIENT
Start: 2021-01-31 | End: 2021-01-31

## 2021-01-31 RX ORDER — ATORVASTATIN CALCIUM 20 MG/1
20 TABLET, FILM COATED ORAL NIGHTLY
Status: DISCONTINUED | OUTPATIENT
Start: 2021-01-31 | End: 2021-02-02 | Stop reason: HOSPADM

## 2021-01-31 RX ORDER — POTASSIUM CHLORIDE 7.45 MG/ML
40 INJECTION INTRAVENOUS
Status: DISCONTINUED | OUTPATIENT
Start: 2021-01-31 | End: 2021-02-02 | Stop reason: HOSPADM

## 2021-01-31 RX ORDER — POTASSIUM CHLORIDE 20 MEQ/1
20 TABLET, EXTENDED RELEASE ORAL ONCE
Status: COMPLETED | OUTPATIENT
Start: 2021-01-31 | End: 2021-01-31

## 2021-01-31 RX ORDER — POTASSIUM CHLORIDE 20 MEQ/1
40 TABLET, EXTENDED RELEASE ORAL ONCE
Status: COMPLETED | OUTPATIENT
Start: 2021-01-31 | End: 2021-01-31

## 2021-01-31 RX ORDER — GABAPENTIN 100 MG/1
200 CAPSULE ORAL 3 TIMES DAILY
Status: DISCONTINUED | OUTPATIENT
Start: 2021-01-31 | End: 2021-02-02 | Stop reason: HOSPADM

## 2021-01-31 RX ORDER — ACETAMINOPHEN 500 MG
1000 TABLET ORAL
Status: COMPLETED | OUTPATIENT
Start: 2021-01-31 | End: 2021-01-31

## 2021-01-31 RX ORDER — SODIUM CHLORIDE 0.9 % (FLUSH) 0.9 %
10 SYRINGE (ML) INJECTION
Status: DISCONTINUED | OUTPATIENT
Start: 2021-01-31 | End: 2021-02-02 | Stop reason: HOSPADM

## 2021-01-31 RX ORDER — LANOLIN ALCOHOL/MO/W.PET/CERES
800 CREAM (GRAM) TOPICAL
Status: DISCONTINUED | OUTPATIENT
Start: 2021-01-31 | End: 2021-02-02 | Stop reason: HOSPADM

## 2021-01-31 RX ORDER — MAGNESIUM SULFATE HEPTAHYDRATE 40 MG/ML
2 INJECTION, SOLUTION INTRAVENOUS
Status: DISCONTINUED | OUTPATIENT
Start: 2021-01-31 | End: 2021-02-02 | Stop reason: HOSPADM

## 2021-01-31 RX ORDER — DIFLUPREDNATE OPHTHALMIC 0.5 MG/ML
1 EMULSION OPHTHALMIC DAILY
Status: DISCONTINUED | OUTPATIENT
Start: 2021-01-31 | End: 2021-02-01

## 2021-01-31 RX ORDER — FUROSEMIDE 40 MG/1
40 TABLET ORAL DAILY
Status: DISCONTINUED | OUTPATIENT
Start: 2021-01-31 | End: 2021-02-01

## 2021-01-31 RX ORDER — POTASSIUM CHLORIDE 7.45 MG/ML
10 INJECTION INTRAVENOUS ONCE
Status: COMPLETED | OUTPATIENT
Start: 2021-01-31 | End: 2021-01-31

## 2021-01-31 RX ORDER — MAGNESIUM SULFATE 1 G/100ML
1 INJECTION INTRAVENOUS
Status: DISCONTINUED | OUTPATIENT
Start: 2021-01-31 | End: 2021-02-02 | Stop reason: HOSPADM

## 2021-01-31 RX ORDER — GLUCAGON 1 MG
1 KIT INJECTION
Status: DISCONTINUED | OUTPATIENT
Start: 2021-01-31 | End: 2021-02-02 | Stop reason: HOSPADM

## 2021-01-31 RX ADMIN — MEROPENEM AND SODIUM CHLORIDE 1 G: 1 INJECTION, SOLUTION INTRAVENOUS at 11:01

## 2021-01-31 RX ADMIN — ACETAMINOPHEN 1000 MG: 500 TABLET, FILM COATED ORAL at 06:01

## 2021-01-31 RX ADMIN — GABAPENTIN 600 MG: 300 CAPSULE ORAL at 08:01

## 2021-01-31 RX ADMIN — VANCOMYCIN HYDROCHLORIDE 2000 MG: 500 INJECTION, POWDER, LYOPHILIZED, FOR SOLUTION INTRAVENOUS at 06:01

## 2021-01-31 RX ADMIN — MAGNESIUM OXIDE 400 MG: 400 TABLET ORAL at 08:01

## 2021-01-31 RX ADMIN — LEVOTHYROXINE SODIUM 125 MCG: 25 TABLET ORAL at 11:01

## 2021-01-31 RX ADMIN — MAGNESIUM OXIDE 400 MG: 400 TABLET ORAL at 01:01

## 2021-01-31 RX ADMIN — PIPERACILLIN AND TAZOBACTAM 3.38 G: 3; .375 INJECTION, POWDER, LYOPHILIZED, FOR SOLUTION INTRAVENOUS; PARENTERAL at 06:01

## 2021-01-31 RX ADMIN — GABAPENTIN 200 MG: 100 CAPSULE ORAL at 02:01

## 2021-01-31 RX ADMIN — ATORVASTATIN CALCIUM 20 MG: 20 TABLET, FILM COATED ORAL at 08:01

## 2021-01-31 RX ADMIN — HYDROCODONE BITARTRATE AND ACETAMINOPHEN 1 TABLET: 5; 325 TABLET ORAL at 08:01

## 2021-01-31 RX ADMIN — METOPROLOL SUCCINATE 50 MG: 50 TABLET, FILM COATED, EXTENDED RELEASE ORAL at 08:01

## 2021-01-31 RX ADMIN — POTASSIUM CHLORIDE 10 MEQ: 7.46 INJECTION, SOLUTION INTRAVENOUS at 08:01

## 2021-01-31 RX ADMIN — ACETAMINOPHEN 1000 MG: 500 TABLET, FILM COATED ORAL at 07:01

## 2021-01-31 RX ADMIN — LOSARTAN POTASSIUM 100 MG: 50 TABLET, FILM COATED ORAL at 11:01

## 2021-01-31 RX ADMIN — ACETAMINOPHEN 650 MG: 325 TABLET, FILM COATED ORAL at 02:01

## 2021-01-31 RX ADMIN — POTASSIUM CHLORIDE 40 MEQ: 20 TABLET, EXTENDED RELEASE ORAL at 08:01

## 2021-01-31 RX ADMIN — MEROPENEM AND SODIUM CHLORIDE 1 G: 1 INJECTION, SOLUTION INTRAVENOUS at 08:01

## 2021-01-31 RX ADMIN — GABAPENTIN 600 MG: 300 CAPSULE ORAL at 02:01

## 2021-01-31 RX ADMIN — POTASSIUM CHLORIDE 20 MEQ: 20 TABLET, EXTENDED RELEASE ORAL at 08:01

## 2021-01-31 RX ADMIN — ASPIRIN 81 MG: 81 TABLET, DELAYED RELEASE ORAL at 11:01

## 2021-01-31 RX ADMIN — POTASSIUM CHLORIDE 20 MEQ: 20 TABLET, EXTENDED RELEASE ORAL at 05:01

## 2021-01-31 RX ADMIN — HYDROCHLOROTHIAZIDE 12.5 MG: 12.5 TABLET ORAL at 11:01

## 2021-01-31 RX ADMIN — GABAPENTIN 200 MG: 100 CAPSULE ORAL at 08:01

## 2021-02-01 LAB
ANION GAP SERPL CALC-SCNC: 10 MMOL/L (ref 8–16)
BUN SERPL-MCNC: 7 MG/DL (ref 8–23)
CALCIUM SERPL-MCNC: 8.3 MG/DL (ref 8.7–10.5)
CHLORIDE SERPL-SCNC: 98 MMOL/L (ref 95–110)
CO2 SERPL-SCNC: 27 MMOL/L (ref 23–29)
CREAT SERPL-MCNC: 0.4 MG/DL (ref 0.5–1.4)
ERYTHROCYTE [DISTWIDTH] IN BLOOD BY AUTOMATED COUNT: 15.1 % (ref 11.5–14.5)
EST. GFR  (AFRICAN AMERICAN): >60 ML/MIN/1.73 M^2
EST. GFR  (NON AFRICAN AMERICAN): >60 ML/MIN/1.73 M^2
GLUCOSE SERPL-MCNC: 101 MG/DL (ref 70–110)
HCT VFR BLD AUTO: 33.7 % (ref 37–48.5)
HGB BLD-MCNC: 10.6 G/DL (ref 12–16)
MCH RBC QN AUTO: 27.1 PG (ref 27–31)
MCHC RBC AUTO-ENTMCNC: 31.5 G/DL (ref 32–36)
MCV RBC AUTO: 86 FL (ref 82–98)
PLATELET # BLD AUTO: 243 K/UL (ref 150–350)
PMV BLD AUTO: 12.2 FL (ref 9.2–12.9)
POTASSIUM SERPL-SCNC: 2.9 MMOL/L (ref 3.5–5.1)
RBC # BLD AUTO: 3.91 M/UL (ref 4–5.4)
SODIUM SERPL-SCNC: 135 MMOL/L (ref 136–145)
WBC # BLD AUTO: 4.11 K/UL (ref 3.9–12.7)

## 2021-02-01 PROCEDURE — 63600175 PHARM REV CODE 636 W HCPCS

## 2021-02-01 PROCEDURE — 80048 BASIC METABOLIC PNL TOTAL CA: CPT

## 2021-02-01 PROCEDURE — 85027 COMPLETE CBC AUTOMATED: CPT

## 2021-02-01 PROCEDURE — 12000002 HC ACUTE/MED SURGE SEMI-PRIVATE ROOM

## 2021-02-01 PROCEDURE — 63600175 PHARM REV CODE 636 W HCPCS: Performed by: INTERNAL MEDICINE

## 2021-02-01 PROCEDURE — 36415 COLL VENOUS BLD VENIPUNCTURE: CPT

## 2021-02-01 PROCEDURE — 25000003 PHARM REV CODE 250: Performed by: INTERNAL MEDICINE

## 2021-02-01 RX ORDER — POTASSIUM CHLORIDE 7.45 MG/ML
20 INJECTION INTRAVENOUS ONCE
Status: COMPLETED | OUTPATIENT
Start: 2021-02-01 | End: 2021-02-01

## 2021-02-01 RX ORDER — MAGNESIUM SULFATE 1 G/100ML
1 INJECTION INTRAVENOUS ONCE
Status: COMPLETED | OUTPATIENT
Start: 2021-02-01 | End: 2021-02-01

## 2021-02-01 RX ORDER — SODIUM CHLORIDE AND POTASSIUM CHLORIDE 150; 900 MG/100ML; MG/100ML
INJECTION, SOLUTION INTRAVENOUS CONTINUOUS
Status: DISPENSED | OUTPATIENT
Start: 2021-02-01 | End: 2021-02-02

## 2021-02-01 RX ORDER — PANTOPRAZOLE SODIUM 40 MG/1
40 TABLET, DELAYED RELEASE ORAL DAILY
Status: DISCONTINUED | OUTPATIENT
Start: 2021-02-01 | End: 2021-02-02 | Stop reason: HOSPADM

## 2021-02-01 RX ORDER — SODIUM CHLORIDE AND POTASSIUM CHLORIDE 150; 900 MG/100ML; MG/100ML
INJECTION, SOLUTION INTRAVENOUS CONTINUOUS
Status: DISCONTINUED | OUTPATIENT
Start: 2021-02-01 | End: 2021-02-01

## 2021-02-01 RX ADMIN — METOPROLOL SUCCINATE 50 MG: 50 TABLET, FILM COATED, EXTENDED RELEASE ORAL at 09:02

## 2021-02-01 RX ADMIN — GABAPENTIN 600 MG: 300 CAPSULE ORAL at 09:02

## 2021-02-01 RX ADMIN — MAGNESIUM SULFATE 1 G: 1 INJECTION INTRAVENOUS at 09:02

## 2021-02-01 RX ADMIN — PANTOPRAZOLE SODIUM 40 MG: 40 TABLET, DELAYED RELEASE ORAL at 09:02

## 2021-02-01 RX ADMIN — LEVOTHYROXINE SODIUM 125 MCG: 25 TABLET ORAL at 05:02

## 2021-02-01 RX ADMIN — GABAPENTIN 600 MG: 300 CAPSULE ORAL at 02:02

## 2021-02-01 RX ADMIN — VANCOMYCIN HYDROCHLORIDE 1750 MG: 500 INJECTION, POWDER, LYOPHILIZED, FOR SOLUTION INTRAVENOUS at 06:02

## 2021-02-01 RX ADMIN — LOSARTAN POTASSIUM 100 MG: 50 TABLET, FILM COATED ORAL at 07:02

## 2021-02-01 RX ADMIN — ATORVASTATIN CALCIUM 20 MG: 20 TABLET, FILM COATED ORAL at 09:02

## 2021-02-01 RX ADMIN — MAGNESIUM OXIDE 400 MG: 400 TABLET ORAL at 07:02

## 2021-02-01 RX ADMIN — MEROPENEM AND SODIUM CHLORIDE 1 G: 1 INJECTION, SOLUTION INTRAVENOUS at 12:02

## 2021-02-01 RX ADMIN — GABAPENTIN 600 MG: 300 CAPSULE ORAL at 07:02

## 2021-02-01 RX ADMIN — POTASSIUM CHLORIDE 20 MEQ: 7.46 INJECTION, SOLUTION INTRAVENOUS at 09:02

## 2021-02-01 RX ADMIN — ASPIRIN 81 MG: 81 TABLET, DELAYED RELEASE ORAL at 07:02

## 2021-02-01 RX ADMIN — MEROPENEM AND SODIUM CHLORIDE 1 G: 1 INJECTION, SOLUTION INTRAVENOUS at 07:02

## 2021-02-01 RX ADMIN — MAGNESIUM OXIDE 400 MG: 400 TABLET ORAL at 09:02

## 2021-02-01 RX ADMIN — GABAPENTIN 200 MG: 100 CAPSULE ORAL at 09:02

## 2021-02-01 RX ADMIN — ACETAMINOPHEN 1000 MG: 500 TABLET, FILM COATED ORAL at 05:02

## 2021-02-01 RX ADMIN — MEROPENEM AND SODIUM CHLORIDE 1 G: 1 INJECTION, SOLUTION INTRAVENOUS at 03:02

## 2021-02-01 RX ADMIN — ACETAMINOPHEN 1000 MG: 500 TABLET, FILM COATED ORAL at 07:02

## 2021-02-01 RX ADMIN — POTASSIUM CHLORIDE AND SODIUM CHLORIDE: 900; 150 INJECTION, SOLUTION INTRAVENOUS at 02:02

## 2021-02-01 RX ADMIN — GABAPENTIN 200 MG: 100 CAPSULE ORAL at 02:02

## 2021-02-01 RX ADMIN — GABAPENTIN 200 MG: 100 CAPSULE ORAL at 07:02

## 2021-02-01 RX ADMIN — HYDROCODONE BITARTRATE AND ACETAMINOPHEN 1 TABLET: 5; 325 TABLET ORAL at 09:02

## 2021-02-02 VITALS
OXYGEN SATURATION: 97 % | HEART RATE: 74 BPM | SYSTOLIC BLOOD PRESSURE: 124 MMHG | BODY MASS INDEX: 34.09 KG/M2 | RESPIRATION RATE: 18 BRPM | TEMPERATURE: 98 F | DIASTOLIC BLOOD PRESSURE: 63 MMHG | HEIGHT: 69 IN | WEIGHT: 230.19 LBS

## 2021-02-02 PROBLEM — E87.6 HYPOKALEMIA: Status: RESOLVED | Noted: 2019-09-18 | Resolved: 2021-02-02

## 2021-02-02 PROBLEM — N39.0 UTI (URINARY TRACT INFECTION): Status: ACTIVE | Noted: 2019-09-18

## 2021-02-02 LAB
ANION GAP SERPL CALC-SCNC: 11 MMOL/L (ref 8–16)
BACTERIA UR CULT: ABNORMAL
BUN SERPL-MCNC: 6 MG/DL (ref 8–23)
CALCIUM SERPL-MCNC: 9.1 MG/DL (ref 8.7–10.5)
CHLORIDE SERPL-SCNC: 101 MMOL/L (ref 95–110)
CO2 SERPL-SCNC: 28 MMOL/L (ref 23–29)
CREAT SERPL-MCNC: 0.5 MG/DL (ref 0.5–1.4)
EST. GFR  (AFRICAN AMERICAN): >60 ML/MIN/1.73 M^2
EST. GFR  (NON AFRICAN AMERICAN): >60 ML/MIN/1.73 M^2
GLUCOSE SERPL-MCNC: 137 MG/DL (ref 70–110)
MAGNESIUM SERPL-MCNC: 1.9 MG/DL (ref 1.6–2.6)
POTASSIUM SERPL-SCNC: 3.6 MMOL/L (ref 3.5–5.1)
SODIUM SERPL-SCNC: 140 MMOL/L (ref 136–145)

## 2021-02-02 PROCEDURE — 25000003 PHARM REV CODE 250: Performed by: INTERNAL MEDICINE

## 2021-02-02 PROCEDURE — 83735 ASSAY OF MAGNESIUM: CPT

## 2021-02-02 PROCEDURE — 36415 COLL VENOUS BLD VENIPUNCTURE: CPT

## 2021-02-02 PROCEDURE — 63600175 PHARM REV CODE 636 W HCPCS

## 2021-02-02 PROCEDURE — 80048 BASIC METABOLIC PNL TOTAL CA: CPT

## 2021-02-02 RX ORDER — LEVOFLOXACIN 500 MG/1
500 TABLET, FILM COATED ORAL DAILY
Qty: 7 TABLET | Refills: 0 | Status: SHIPPED | OUTPATIENT
Start: 2021-02-02 | End: 2021-02-09

## 2021-02-02 RX ORDER — FLUCONAZOLE 150 MG/1
150 TABLET ORAL DAILY
Qty: 1 TABLET | Refills: 0 | Status: SHIPPED | OUTPATIENT
Start: 2021-02-02 | End: 2021-02-03

## 2021-02-02 RX ORDER — LOSARTAN POTASSIUM 100 MG/1
100 TABLET ORAL DAILY
Qty: 30 TABLET | Refills: 1 | Status: SHIPPED | OUTPATIENT
Start: 2021-02-03 | End: 2021-04-27

## 2021-02-02 RX ORDER — LACTOBACILLUS COMBINATION NO.4 3B CELL
1 CAPSULE ORAL DAILY
COMMUNITY
Start: 2021-02-02 | End: 2021-07-20

## 2021-02-02 RX ADMIN — MAGNESIUM OXIDE 400 MG: 400 TABLET ORAL at 10:02

## 2021-02-02 RX ADMIN — ASPIRIN 81 MG: 81 TABLET, DELAYED RELEASE ORAL at 10:02

## 2021-02-02 RX ADMIN — GABAPENTIN 600 MG: 300 CAPSULE ORAL at 02:02

## 2021-02-02 RX ADMIN — HYDROCODONE BITARTRATE AND ACETAMINOPHEN 1 TABLET: 5; 325 TABLET ORAL at 10:02

## 2021-02-02 RX ADMIN — GABAPENTIN 600 MG: 300 CAPSULE ORAL at 10:02

## 2021-02-02 RX ADMIN — GABAPENTIN 200 MG: 100 CAPSULE ORAL at 02:02

## 2021-02-02 RX ADMIN — MEROPENEM AND SODIUM CHLORIDE 1 G: 1 INJECTION, SOLUTION INTRAVENOUS at 12:02

## 2021-02-02 RX ADMIN — LOSARTAN POTASSIUM 100 MG: 50 TABLET, FILM COATED ORAL at 10:02

## 2021-02-02 RX ADMIN — PANTOPRAZOLE SODIUM 40 MG: 40 TABLET, DELAYED RELEASE ORAL at 10:02

## 2021-02-02 RX ADMIN — MEROPENEM AND SODIUM CHLORIDE 1 G: 1 INJECTION, SOLUTION INTRAVENOUS at 05:02

## 2021-02-02 RX ADMIN — LEVOTHYROXINE SODIUM 125 MCG: 25 TABLET ORAL at 05:02

## 2021-02-02 RX ADMIN — GABAPENTIN 200 MG: 100 CAPSULE ORAL at 10:02

## 2021-02-04 ENCOUNTER — TELEPHONE (OUTPATIENT)
Dept: FAMILY MEDICINE | Facility: CLINIC | Age: 76
End: 2021-02-04

## 2021-02-05 LAB
BACTERIA BLD CULT: NORMAL
BACTERIA BLD CULT: NORMAL

## 2021-02-09 ENCOUNTER — OFFICE VISIT (OUTPATIENT)
Dept: FAMILY MEDICINE | Facility: CLINIC | Age: 76
End: 2021-02-09
Payer: MEDICARE

## 2021-02-09 VITALS
OXYGEN SATURATION: 96 % | HEART RATE: 82 BPM | TEMPERATURE: 98 F | WEIGHT: 229 LBS | SYSTOLIC BLOOD PRESSURE: 134 MMHG | BODY MASS INDEX: 33.82 KG/M2 | DIASTOLIC BLOOD PRESSURE: 72 MMHG

## 2021-02-09 DIAGNOSIS — I10 HYPERTENSION, ESSENTIAL: ICD-10-CM

## 2021-02-09 DIAGNOSIS — Z20.822 EXPOSURE TO COVID-19 VIRUS: ICD-10-CM

## 2021-02-09 DIAGNOSIS — G62.9 NEUROPATHY: ICD-10-CM

## 2021-02-09 DIAGNOSIS — A49.9 ESBL (EXTENDED SPECTRUM BETA-LACTAMASE) PRODUCING BACTERIA INFECTION: ICD-10-CM

## 2021-02-09 DIAGNOSIS — Z16.12 ESBL (EXTENDED SPECTRUM BETA-LACTAMASE) PRODUCING BACTERIA INFECTION: ICD-10-CM

## 2021-02-09 DIAGNOSIS — R91.1 SOLITARY PULMONARY NODULE: ICD-10-CM

## 2021-02-09 DIAGNOSIS — N39.0 URINARY TRACT INFECTION WITHOUT HEMATURIA, SITE UNSPECIFIED: Primary | ICD-10-CM

## 2021-02-09 DIAGNOSIS — R91.1 PULMONARY NODULE: ICD-10-CM

## 2021-02-09 DIAGNOSIS — N12 PYELONEPHRITIS: ICD-10-CM

## 2021-02-09 LAB
BILIRUB SERPL-MCNC: NORMAL MG/DL
BLOOD URINE, POC: NORMAL
COLOR, POC UA: YELLOW
GLUCOSE UR QL STRIP: NORMAL
KETONES UR QL STRIP: NORMAL
LEUKOCYTE ESTERASE URINE, POC: NORMAL
NITRITE, POC UA: NORMAL
PH, POC UA: 7
PROTEIN, POC: NORMAL
SPECIFIC GRAVITY, POC UA: 1.01
UROBILINOGEN, POC UA: NORMAL

## 2021-02-09 PROCEDURE — 3288F FALL RISK ASSESSMENT DOCD: CPT | Mod: CPTII,S$GLB,, | Performed by: FAMILY MEDICINE

## 2021-02-09 PROCEDURE — 1101F PR PT FALLS ASSESS DOC 0-1 FALLS W/OUT INJ PAST YR: ICD-10-PCS | Mod: CPTII,S$GLB,, | Performed by: FAMILY MEDICINE

## 2021-02-09 PROCEDURE — 1126F PR PAIN SEVERITY QUANTIFIED, NO PAIN PRESENT: ICD-10-PCS | Mod: S$GLB,,, | Performed by: FAMILY MEDICINE

## 2021-02-09 PROCEDURE — 3288F PR FALLS RISK ASSESSMENT DOCUMENTED: ICD-10-PCS | Mod: CPTII,S$GLB,, | Performed by: FAMILY MEDICINE

## 2021-02-09 PROCEDURE — 81003 URINALYSIS AUTO W/O SCOPE: CPT | Mod: QW,S$GLB,, | Performed by: FAMILY MEDICINE

## 2021-02-09 PROCEDURE — 81003 POCT URINALYSIS W/O SCOPE: ICD-10-PCS | Mod: QW,S$GLB,, | Performed by: FAMILY MEDICINE

## 2021-02-09 PROCEDURE — 1101F PT FALLS ASSESS-DOCD LE1/YR: CPT | Mod: CPTII,S$GLB,, | Performed by: FAMILY MEDICINE

## 2021-02-09 PROCEDURE — 99213 OFFICE O/P EST LOW 20 MIN: CPT | Mod: 25,S$GLB,, | Performed by: FAMILY MEDICINE

## 2021-02-09 PROCEDURE — 99213 PR OFFICE/OUTPT VISIT, EST, LEVL III, 20-29 MIN: ICD-10-PCS | Mod: 25,S$GLB,, | Performed by: FAMILY MEDICINE

## 2021-02-09 PROCEDURE — 1126F AMNT PAIN NOTED NONE PRSNT: CPT | Mod: S$GLB,,, | Performed by: FAMILY MEDICINE

## 2021-02-09 RX ORDER — PREGABALIN 50 MG/1
50 CAPSULE ORAL 2 TIMES DAILY
Qty: 60 CAPSULE | Refills: 0 | Status: SHIPPED | OUTPATIENT
Start: 2021-02-09 | End: 2021-05-26 | Stop reason: ALTCHOICE

## 2021-02-09 RX ORDER — PREGABALIN 50 MG/1
50 CAPSULE ORAL 3 TIMES DAILY
COMMUNITY
End: 2021-02-09 | Stop reason: SDUPTHER

## 2021-02-24 ENCOUNTER — OFFICE VISIT (OUTPATIENT)
Dept: ENDOCRINOLOGY | Facility: CLINIC | Age: 76
End: 2021-02-24
Payer: MEDICARE

## 2021-02-24 ENCOUNTER — PATIENT OUTREACH (OUTPATIENT)
Dept: ADMINISTRATIVE | Facility: OTHER | Age: 76
End: 2021-02-24

## 2021-02-24 ENCOUNTER — HOSPITAL ENCOUNTER (OUTPATIENT)
Dept: RADIOLOGY | Facility: CLINIC | Age: 76
Discharge: HOME | End: 2021-02-24
Attending: PHYSICIAN ASSISTANT
Payer: MEDICARE

## 2021-02-24 VITALS
OXYGEN SATURATION: 94 % | TEMPERATURE: 98 F | DIASTOLIC BLOOD PRESSURE: 68 MMHG | SYSTOLIC BLOOD PRESSURE: 110 MMHG | BODY MASS INDEX: 33.66 KG/M2 | HEART RATE: 81 BPM | WEIGHT: 227.94 LBS

## 2021-02-24 DIAGNOSIS — R73.9 HYPERGLYCEMIA: ICD-10-CM

## 2021-02-24 DIAGNOSIS — E04.9 GOITER: ICD-10-CM

## 2021-02-24 DIAGNOSIS — E03.9 HYPOTHYROIDISM, UNSPECIFIED TYPE: Primary | ICD-10-CM

## 2021-02-24 DIAGNOSIS — I10 HYPERTENSION, UNSPECIFIED TYPE: ICD-10-CM

## 2021-02-24 DIAGNOSIS — E66.9 OBESITY (BMI 30-39.9): ICD-10-CM

## 2021-02-24 DIAGNOSIS — E55.9 HYPOVITAMINOSIS D: ICD-10-CM

## 2021-02-24 DIAGNOSIS — Z78.0 POSTMENOPAUSAL: ICD-10-CM

## 2021-02-24 DIAGNOSIS — E78.5 HYPERLIPIDEMIA, UNSPECIFIED HYPERLIPIDEMIA TYPE: ICD-10-CM

## 2021-02-24 PROCEDURE — 99999 PR PBB SHADOW E&M-EST. PATIENT-LVL V: CPT | Mod: PBBFAC,,, | Performed by: PHYSICIAN ASSISTANT

## 2021-02-24 PROCEDURE — 3288F FALL RISK ASSESSMENT DOCD: CPT | Mod: CPTII,S$GLB,, | Performed by: PHYSICIAN ASSISTANT

## 2021-02-24 PROCEDURE — 1159F PR MEDICATION LIST DOCUMENTED IN MEDICAL RECORD: ICD-10-PCS | Mod: S$GLB,,, | Performed by: PHYSICIAN ASSISTANT

## 2021-02-24 PROCEDURE — 3078F DIAST BP <80 MM HG: CPT | Mod: CPTII,S$GLB,, | Performed by: PHYSICIAN ASSISTANT

## 2021-02-24 PROCEDURE — 99213 OFFICE O/P EST LOW 20 MIN: CPT | Mod: S$GLB,,, | Performed by: PHYSICIAN ASSISTANT

## 2021-02-24 PROCEDURE — 1159F MED LIST DOCD IN RCRD: CPT | Mod: S$GLB,,, | Performed by: PHYSICIAN ASSISTANT

## 2021-02-24 PROCEDURE — 1101F PR PT FALLS ASSESS DOC 0-1 FALLS W/OUT INJ PAST YR: ICD-10-PCS | Mod: CPTII,S$GLB,, | Performed by: PHYSICIAN ASSISTANT

## 2021-02-24 PROCEDURE — 77081 DXA BONE DENSITY APPENDICULR: CPT | Mod: TC,PO

## 2021-02-24 PROCEDURE — 3074F SYST BP LT 130 MM HG: CPT | Mod: CPTII,S$GLB,, | Performed by: PHYSICIAN ASSISTANT

## 2021-02-24 PROCEDURE — 3074F PR MOST RECENT SYSTOLIC BLOOD PRESSURE < 130 MM HG: ICD-10-PCS | Mod: CPTII,S$GLB,, | Performed by: PHYSICIAN ASSISTANT

## 2021-02-24 PROCEDURE — 99999 PR PBB SHADOW E&M-EST. PATIENT-LVL V: ICD-10-PCS | Mod: PBBFAC,,, | Performed by: PHYSICIAN ASSISTANT

## 2021-02-24 PROCEDURE — 77081 DEXA BONE DENSITY APPENDICULAR SKELETON: ICD-10-PCS | Mod: 26,,, | Performed by: RADIOLOGY

## 2021-02-24 PROCEDURE — 99213 PR OFFICE/OUTPT VISIT, EST, LEVL III, 20-29 MIN: ICD-10-PCS | Mod: S$GLB,,, | Performed by: PHYSICIAN ASSISTANT

## 2021-02-24 PROCEDURE — 1101F PT FALLS ASSESS-DOCD LE1/YR: CPT | Mod: CPTII,S$GLB,, | Performed by: PHYSICIAN ASSISTANT

## 2021-02-24 PROCEDURE — 1125F PR PAIN SEVERITY QUANTIFIED, PAIN PRESENT: ICD-10-PCS | Mod: S$GLB,,, | Performed by: PHYSICIAN ASSISTANT

## 2021-02-24 PROCEDURE — 3078F PR MOST RECENT DIASTOLIC BLOOD PRESSURE < 80 MM HG: ICD-10-PCS | Mod: CPTII,S$GLB,, | Performed by: PHYSICIAN ASSISTANT

## 2021-02-24 PROCEDURE — 77081 DXA BONE DENSITY APPENDICULR: CPT | Mod: 26,,, | Performed by: RADIOLOGY

## 2021-02-24 PROCEDURE — 1125F AMNT PAIN NOTED PAIN PRSNT: CPT | Mod: S$GLB,,, | Performed by: PHYSICIAN ASSISTANT

## 2021-02-24 PROCEDURE — 3288F PR FALLS RISK ASSESSMENT DOCUMENTED: ICD-10-PCS | Mod: CPTII,S$GLB,, | Performed by: PHYSICIAN ASSISTANT

## 2021-02-26 ENCOUNTER — TELEPHONE (OUTPATIENT)
Dept: CARDIOLOGY | Facility: CLINIC | Age: 76
End: 2021-02-26

## 2021-03-01 ENCOUNTER — TELEPHONE (OUTPATIENT)
Dept: FAMILY MEDICINE | Facility: CLINIC | Age: 76
End: 2021-03-01

## 2021-03-03 ENCOUNTER — LAB VISIT (OUTPATIENT)
Dept: LAB | Facility: HOSPITAL | Age: 76
End: 2021-03-03
Attending: NURSE PRACTITIONER
Payer: MEDICARE

## 2021-03-03 ENCOUNTER — OFFICE VISIT (OUTPATIENT)
Dept: FAMILY MEDICINE | Facility: CLINIC | Age: 76
End: 2021-03-03
Payer: MEDICARE

## 2021-03-03 ENCOUNTER — TELEPHONE (OUTPATIENT)
Dept: FAMILY MEDICINE | Facility: CLINIC | Age: 76
End: 2021-03-03

## 2021-03-03 VITALS
DIASTOLIC BLOOD PRESSURE: 76 MMHG | OXYGEN SATURATION: 99 % | SYSTOLIC BLOOD PRESSURE: 122 MMHG | BODY MASS INDEX: 33.18 KG/M2 | WEIGHT: 224 LBS | HEIGHT: 69 IN | TEMPERATURE: 98 F | HEART RATE: 98 BPM

## 2021-03-03 DIAGNOSIS — M10.9 GOUT, UNSPECIFIED CAUSE, UNSPECIFIED CHRONICITY, UNSPECIFIED SITE: ICD-10-CM

## 2021-03-03 DIAGNOSIS — N39.0 URINARY TRACT INFECTION WITHOUT HEMATURIA, SITE UNSPECIFIED: Primary | ICD-10-CM

## 2021-03-03 DIAGNOSIS — I10 HYPERTENSION, ESSENTIAL: ICD-10-CM

## 2021-03-03 LAB
ALBUMIN SERPL BCP-MCNC: 4 G/DL (ref 3.5–5.2)
ALP SERPL-CCNC: 52 U/L (ref 55–135)
ALT SERPL W/O P-5'-P-CCNC: 24 U/L (ref 10–44)
ANION GAP SERPL CALC-SCNC: 13 MMOL/L (ref 8–16)
AST SERPL-CCNC: 22 U/L (ref 10–40)
BASOPHILS # BLD AUTO: 0.01 K/UL (ref 0–0.2)
BASOPHILS NFR BLD: 0.2 % (ref 0–1.9)
BILIRUB SERPL-MCNC: 0.9 MG/DL (ref 0.1–1)
BILIRUB SERPL-MCNC: NORMAL MG/DL
BLOOD URINE, POC: NORMAL
BUN SERPL-MCNC: 13 MG/DL (ref 8–23)
CALCIUM SERPL-MCNC: 9.4 MG/DL (ref 8.7–10.5)
CHLORIDE SERPL-SCNC: 101 MMOL/L (ref 95–110)
CO2 SERPL-SCNC: 26 MMOL/L (ref 23–29)
COLOR, POC UA: NORMAL
CREAT SERPL-MCNC: 0.6 MG/DL (ref 0.5–1.4)
DIFFERENTIAL METHOD: ABNORMAL
EOSINOPHIL # BLD AUTO: 0.2 K/UL (ref 0–0.5)
EOSINOPHIL NFR BLD: 3.6 % (ref 0–8)
ERYTHROCYTE [DISTWIDTH] IN BLOOD BY AUTOMATED COUNT: 15.7 % (ref 11.5–14.5)
EST. GFR  (AFRICAN AMERICAN): >60 ML/MIN/1.73 M^2
EST. GFR  (NON AFRICAN AMERICAN): >60 ML/MIN/1.73 M^2
GLUCOSE SERPL-MCNC: 91 MG/DL (ref 70–110)
GLUCOSE UR QL STRIP: NORMAL
HCT VFR BLD AUTO: 33.2 % (ref 37–48.5)
HGB BLD-MCNC: 10.2 G/DL (ref 12–16)
IMM GRANULOCYTES # BLD AUTO: 0.01 K/UL (ref 0–0.04)
IMM GRANULOCYTES NFR BLD AUTO: 0.2 % (ref 0–0.5)
KETONES UR QL STRIP: NORMAL
LEUKOCYTE ESTERASE URINE, POC: NORMAL
LYMPHOCYTES # BLD AUTO: 1.9 K/UL (ref 1–4.8)
LYMPHOCYTES NFR BLD: 33 % (ref 18–48)
MCH RBC QN AUTO: 26 PG (ref 27–31)
MCHC RBC AUTO-ENTMCNC: 30.7 G/DL (ref 32–36)
MCV RBC AUTO: 85 FL (ref 82–98)
MONOCYTES # BLD AUTO: 0.8 K/UL (ref 0.3–1)
MONOCYTES NFR BLD: 13.7 % (ref 4–15)
NEUTROPHILS # BLD AUTO: 2.8 K/UL (ref 1.8–7.7)
NEUTROPHILS NFR BLD: 49.3 % (ref 38–73)
NITRITE, POC UA: NORMAL
NRBC BLD-RTO: 0 /100 WBC
PH, POC UA: 6
PLATELET # BLD AUTO: 262 K/UL (ref 150–350)
PMV BLD AUTO: 12.5 FL (ref 9.2–12.9)
POTASSIUM SERPL-SCNC: 4.4 MMOL/L (ref 3.5–5.1)
PROT SERPL-MCNC: 6.8 G/DL (ref 6–8.4)
PROTEIN, POC: NORMAL
RBC # BLD AUTO: 3.92 M/UL (ref 4–5.4)
SODIUM SERPL-SCNC: 140 MMOL/L (ref 136–145)
SPECIFIC GRAVITY, POC UA: 1.01
URATE SERPL-MCNC: 6.6 MG/DL (ref 2.4–5.7)
UROBILINOGEN, POC UA: 0.2
WBC # BLD AUTO: 5.61 K/UL (ref 3.9–12.7)

## 2021-03-03 PROCEDURE — 3074F PR MOST RECENT SYSTOLIC BLOOD PRESSURE < 130 MM HG: ICD-10-PCS | Mod: CPTII,S$GLB,, | Performed by: NURSE PRACTITIONER

## 2021-03-03 PROCEDURE — 3074F SYST BP LT 130 MM HG: CPT | Mod: CPTII,S$GLB,, | Performed by: NURSE PRACTITIONER

## 2021-03-03 PROCEDURE — 1101F PR PT FALLS ASSESS DOC 0-1 FALLS W/OUT INJ PAST YR: ICD-10-PCS | Mod: CPTII,S$GLB,, | Performed by: NURSE PRACTITIONER

## 2021-03-03 PROCEDURE — 81003 POCT URINALYSIS W/O SCOPE: ICD-10-PCS | Mod: QW,S$GLB,, | Performed by: NURSE PRACTITIONER

## 2021-03-03 PROCEDURE — 3288F PR FALLS RISK ASSESSMENT DOCUMENTED: ICD-10-PCS | Mod: CPTII,S$GLB,, | Performed by: NURSE PRACTITIONER

## 2021-03-03 PROCEDURE — 1126F PR PAIN SEVERITY QUANTIFIED, NO PAIN PRESENT: ICD-10-PCS | Mod: S$GLB,,, | Performed by: NURSE PRACTITIONER

## 2021-03-03 PROCEDURE — 1126F AMNT PAIN NOTED NONE PRSNT: CPT | Mod: S$GLB,,, | Performed by: NURSE PRACTITIONER

## 2021-03-03 PROCEDURE — 3078F PR MOST RECENT DIASTOLIC BLOOD PRESSURE < 80 MM HG: ICD-10-PCS | Mod: CPTII,S$GLB,, | Performed by: NURSE PRACTITIONER

## 2021-03-03 PROCEDURE — 1159F PR MEDICATION LIST DOCUMENTED IN MEDICAL RECORD: ICD-10-PCS | Mod: S$GLB,,, | Performed by: NURSE PRACTITIONER

## 2021-03-03 PROCEDURE — 99213 OFFICE O/P EST LOW 20 MIN: CPT | Mod: 25,S$GLB,, | Performed by: NURSE PRACTITIONER

## 2021-03-03 PROCEDURE — 3078F DIAST BP <80 MM HG: CPT | Mod: CPTII,S$GLB,, | Performed by: NURSE PRACTITIONER

## 2021-03-03 PROCEDURE — 99213 PR OFFICE/OUTPT VISIT, EST, LEVL III, 20-29 MIN: ICD-10-PCS | Mod: 25,S$GLB,, | Performed by: NURSE PRACTITIONER

## 2021-03-03 PROCEDURE — 36415 COLL VENOUS BLD VENIPUNCTURE: CPT | Performed by: NURSE PRACTITIONER

## 2021-03-03 PROCEDURE — 84550 ASSAY OF BLOOD/URIC ACID: CPT | Performed by: NURSE PRACTITIONER

## 2021-03-03 PROCEDURE — 3288F FALL RISK ASSESSMENT DOCD: CPT | Mod: CPTII,S$GLB,, | Performed by: NURSE PRACTITIONER

## 2021-03-03 PROCEDURE — 81003 URINALYSIS AUTO W/O SCOPE: CPT | Mod: QW,S$GLB,, | Performed by: NURSE PRACTITIONER

## 2021-03-03 PROCEDURE — 1101F PT FALLS ASSESS-DOCD LE1/YR: CPT | Mod: CPTII,S$GLB,, | Performed by: NURSE PRACTITIONER

## 2021-03-03 PROCEDURE — 1159F MED LIST DOCD IN RCRD: CPT | Mod: S$GLB,,, | Performed by: NURSE PRACTITIONER

## 2021-03-03 PROCEDURE — 85025 COMPLETE CBC W/AUTO DIFF WBC: CPT | Performed by: NURSE PRACTITIONER

## 2021-03-03 PROCEDURE — 80053 COMPREHEN METABOLIC PANEL: CPT | Performed by: NURSE PRACTITIONER

## 2021-03-03 RX ORDER — COLCHICINE 0.6 MG/1
0.6 TABLET ORAL 2 TIMES DAILY
Qty: 30 TABLET | Refills: 0 | Status: SHIPPED | OUTPATIENT
Start: 2021-03-03 | End: 2021-07-20

## 2021-03-03 RX ORDER — ALBUTEROL SULFATE 90 UG/1
2 AEROSOL, METERED RESPIRATORY (INHALATION) EVERY 6 HOURS PRN
Qty: 8 G | Refills: 1 | Status: SHIPPED | OUTPATIENT
Start: 2021-03-03 | End: 2021-07-23

## 2021-03-15 ENCOUNTER — OFFICE VISIT (OUTPATIENT)
Dept: CARDIOLOGY | Facility: CLINIC | Age: 76
End: 2021-03-15
Payer: MEDICARE

## 2021-03-15 VITALS
HEART RATE: 79 BPM | SYSTOLIC BLOOD PRESSURE: 124 MMHG | BODY MASS INDEX: 32.44 KG/M2 | HEIGHT: 69 IN | OXYGEN SATURATION: 97 % | DIASTOLIC BLOOD PRESSURE: 72 MMHG | RESPIRATION RATE: 18 BRPM | WEIGHT: 219 LBS

## 2021-03-15 DIAGNOSIS — G62.9 NEUROPATHY: ICD-10-CM

## 2021-03-15 DIAGNOSIS — E53.8 VITAMIN B12 DEFICIENCY: ICD-10-CM

## 2021-03-15 DIAGNOSIS — G89.29 OTHER CHRONIC PAIN: ICD-10-CM

## 2021-03-15 DIAGNOSIS — D64.9 ANEMIA, UNSPECIFIED TYPE: ICD-10-CM

## 2021-03-15 DIAGNOSIS — R06.02 SHORTNESS OF BREATH: Primary | ICD-10-CM

## 2021-03-15 DIAGNOSIS — I10 HYPERTENSION, UNSPECIFIED TYPE: ICD-10-CM

## 2021-03-15 DIAGNOSIS — I50.30 HEART FAILURE WITH PRESERVED EJECTION FRACTION, UNSPECIFIED HF CHRONICITY: ICD-10-CM

## 2021-03-15 PROCEDURE — 1159F MED LIST DOCD IN RCRD: CPT | Mod: S$GLB,,, | Performed by: SPECIALIST

## 2021-03-15 PROCEDURE — 99215 OFFICE O/P EST HI 40 MIN: CPT | Mod: S$GLB,,, | Performed by: SPECIALIST

## 2021-03-15 PROCEDURE — 3074F SYST BP LT 130 MM HG: CPT | Mod: CPTII,S$GLB,, | Performed by: SPECIALIST

## 2021-03-15 PROCEDURE — 3078F DIAST BP <80 MM HG: CPT | Mod: CPTII,S$GLB,, | Performed by: SPECIALIST

## 2021-03-15 PROCEDURE — 3288F FALL RISK ASSESSMENT DOCD: CPT | Mod: CPTII,S$GLB,, | Performed by: SPECIALIST

## 2021-03-15 PROCEDURE — 3074F PR MOST RECENT SYSTOLIC BLOOD PRESSURE < 130 MM HG: ICD-10-PCS | Mod: CPTII,S$GLB,, | Performed by: SPECIALIST

## 2021-03-15 PROCEDURE — 99215 PR OFFICE/OUTPT VISIT, EST, LEVL V, 40-54 MIN: ICD-10-PCS | Mod: S$GLB,,, | Performed by: SPECIALIST

## 2021-03-15 PROCEDURE — 1101F PR PT FALLS ASSESS DOC 0-1 FALLS W/OUT INJ PAST YR: ICD-10-PCS | Mod: CPTII,S$GLB,, | Performed by: SPECIALIST

## 2021-03-15 PROCEDURE — 3078F PR MOST RECENT DIASTOLIC BLOOD PRESSURE < 80 MM HG: ICD-10-PCS | Mod: CPTII,S$GLB,, | Performed by: SPECIALIST

## 2021-03-15 PROCEDURE — 1101F PT FALLS ASSESS-DOCD LE1/YR: CPT | Mod: CPTII,S$GLB,, | Performed by: SPECIALIST

## 2021-03-15 PROCEDURE — 3288F PR FALLS RISK ASSESSMENT DOCUMENTED: ICD-10-PCS | Mod: CPTII,S$GLB,, | Performed by: SPECIALIST

## 2021-03-15 PROCEDURE — 1159F PR MEDICATION LIST DOCUMENTED IN MEDICAL RECORD: ICD-10-PCS | Mod: S$GLB,,, | Performed by: SPECIALIST

## 2021-03-15 RX ORDER — POTASSIUM CHLORIDE 20 MEQ/1
20 TABLET, EXTENDED RELEASE ORAL 2 TIMES DAILY
Qty: 90 TABLET | Refills: 3 | Status: SHIPPED | OUTPATIENT
Start: 2021-03-15 | End: 2021-08-23 | Stop reason: SDUPTHER

## 2021-03-30 ENCOUNTER — HOSPITAL ENCOUNTER (OUTPATIENT)
Dept: RADIOLOGY | Facility: HOSPITAL | Age: 76
Discharge: HOME | End: 2021-03-30
Attending: FAMILY MEDICINE
Payer: MEDICARE

## 2021-03-30 ENCOUNTER — TELEPHONE (OUTPATIENT)
Dept: FAMILY MEDICINE | Facility: CLINIC | Age: 76
End: 2021-03-30

## 2021-03-30 DIAGNOSIS — R91.1 SOLITARY PULMONARY NODULE: ICD-10-CM

## 2021-03-30 PROCEDURE — 71250 CT THORAX DX C-: CPT | Mod: TC,PO

## 2021-04-01 ENCOUNTER — OFFICE VISIT (OUTPATIENT)
Dept: PODIATRY | Facility: CLINIC | Age: 76
End: 2021-04-01
Payer: MEDICARE

## 2021-04-01 ENCOUNTER — HOSPITAL ENCOUNTER (OUTPATIENT)
Dept: RADIOLOGY | Facility: CLINIC | Age: 76
Discharge: HOME | End: 2021-04-01
Attending: PODIATRIST
Payer: MEDICARE

## 2021-04-01 VITALS — WEIGHT: 224 LBS | BODY MASS INDEX: 33.18 KG/M2 | RESPIRATION RATE: 16 BRPM | HEIGHT: 69 IN

## 2021-04-01 DIAGNOSIS — M79.672 FOOT PAIN, BILATERAL: ICD-10-CM

## 2021-04-01 DIAGNOSIS — G62.9 NEUROPATHY: ICD-10-CM

## 2021-04-01 DIAGNOSIS — M79.671 FOOT PAIN, BILATERAL: ICD-10-CM

## 2021-04-01 DIAGNOSIS — M79.672 ARCH PAIN OF LEFT FOOT: ICD-10-CM

## 2021-04-01 DIAGNOSIS — M76.829 POSTERIOR TIBIAL TENDON DYSFUNCTION: Primary | ICD-10-CM

## 2021-04-01 PROCEDURE — 99213 PR OFFICE/OUTPT VISIT, EST, LEVL III, 20-29 MIN: ICD-10-PCS | Mod: S$GLB,,, | Performed by: PODIATRIST

## 2021-04-01 PROCEDURE — 73630 XR FOOT COMPLETE 3 VIEW BILATERAL: ICD-10-PCS | Mod: 50,S$GLB,, | Performed by: RADIOLOGY

## 2021-04-01 PROCEDURE — 99213 OFFICE O/P EST LOW 20 MIN: CPT | Mod: S$GLB,,, | Performed by: PODIATRIST

## 2021-04-01 PROCEDURE — 1125F AMNT PAIN NOTED PAIN PRSNT: CPT | Mod: S$GLB,,, | Performed by: PODIATRIST

## 2021-04-01 PROCEDURE — 73630 X-RAY EXAM OF FOOT: CPT | Mod: 50,S$GLB,, | Performed by: RADIOLOGY

## 2021-04-01 PROCEDURE — 1159F PR MEDICATION LIST DOCUMENTED IN MEDICAL RECORD: ICD-10-PCS | Mod: S$GLB,,, | Performed by: PODIATRIST

## 2021-04-01 PROCEDURE — 1159F MED LIST DOCD IN RCRD: CPT | Mod: S$GLB,,, | Performed by: PODIATRIST

## 2021-04-01 PROCEDURE — 1125F PR PAIN SEVERITY QUANTIFIED, PAIN PRESENT: ICD-10-PCS | Mod: S$GLB,,, | Performed by: PODIATRIST

## 2021-04-05 RX ORDER — METHYLPREDNISOLONE 4 MG/1
TABLET ORAL
Qty: 1 PACKAGE | Refills: 0 | Status: SHIPPED | OUTPATIENT
Start: 2021-04-05 | End: 2021-04-23

## 2021-04-06 ENCOUNTER — TELEPHONE (OUTPATIENT)
Dept: FAMILY MEDICINE | Facility: CLINIC | Age: 76
End: 2021-04-06

## 2021-04-08 ENCOUNTER — TELEPHONE (OUTPATIENT)
Dept: FAMILY MEDICINE | Facility: CLINIC | Age: 76
End: 2021-04-08

## 2021-04-22 ENCOUNTER — TELEPHONE (OUTPATIENT)
Dept: FAMILY MEDICINE | Facility: CLINIC | Age: 76
End: 2021-04-22

## 2021-04-23 ENCOUNTER — OFFICE VISIT (OUTPATIENT)
Dept: FAMILY MEDICINE | Facility: CLINIC | Age: 76
End: 2021-04-23
Payer: MEDICARE

## 2021-04-23 VITALS
OXYGEN SATURATION: 98 % | DIASTOLIC BLOOD PRESSURE: 80 MMHG | HEIGHT: 69 IN | BODY MASS INDEX: 32.58 KG/M2 | HEART RATE: 73 BPM | SYSTOLIC BLOOD PRESSURE: 128 MMHG | TEMPERATURE: 98 F | WEIGHT: 220 LBS

## 2021-04-23 DIAGNOSIS — R21 SKIN RASH: Primary | ICD-10-CM

## 2021-04-23 DIAGNOSIS — I10 HYPERTENSION, ESSENTIAL: ICD-10-CM

## 2021-04-23 PROCEDURE — 3288F PR FALLS RISK ASSESSMENT DOCUMENTED: ICD-10-PCS | Mod: CPTII,S$GLB,, | Performed by: NURSE PRACTITIONER

## 2021-04-23 PROCEDURE — 1125F PR PAIN SEVERITY QUANTIFIED, PAIN PRESENT: ICD-10-PCS | Mod: S$GLB,,, | Performed by: NURSE PRACTITIONER

## 2021-04-23 PROCEDURE — 99213 OFFICE O/P EST LOW 20 MIN: CPT | Mod: S$GLB,,, | Performed by: NURSE PRACTITIONER

## 2021-04-23 PROCEDURE — 3288F FALL RISK ASSESSMENT DOCD: CPT | Mod: CPTII,S$GLB,, | Performed by: NURSE PRACTITIONER

## 2021-04-23 PROCEDURE — 1125F AMNT PAIN NOTED PAIN PRSNT: CPT | Mod: S$GLB,,, | Performed by: NURSE PRACTITIONER

## 2021-04-23 PROCEDURE — 1101F PR PT FALLS ASSESS DOC 0-1 FALLS W/OUT INJ PAST YR: ICD-10-PCS | Mod: CPTII,S$GLB,, | Performed by: NURSE PRACTITIONER

## 2021-04-23 PROCEDURE — 1159F PR MEDICATION LIST DOCUMENTED IN MEDICAL RECORD: ICD-10-PCS | Mod: S$GLB,,, | Performed by: NURSE PRACTITIONER

## 2021-04-23 PROCEDURE — 1101F PT FALLS ASSESS-DOCD LE1/YR: CPT | Mod: CPTII,S$GLB,, | Performed by: NURSE PRACTITIONER

## 2021-04-23 PROCEDURE — 99213 PR OFFICE/OUTPT VISIT, EST, LEVL III, 20-29 MIN: ICD-10-PCS | Mod: S$GLB,,, | Performed by: NURSE PRACTITIONER

## 2021-04-23 PROCEDURE — 1159F MED LIST DOCD IN RCRD: CPT | Mod: S$GLB,,, | Performed by: NURSE PRACTITIONER

## 2021-04-23 RX ORDER — PREDNISONE 20 MG/1
TABLET ORAL
Qty: 30 TABLET | Refills: 0 | Status: SHIPPED | OUTPATIENT
Start: 2021-04-23 | End: 2021-05-26 | Stop reason: ALTCHOICE

## 2021-04-23 RX ORDER — GABAPENTIN 800 MG/1
800 TABLET ORAL 3 TIMES DAILY
COMMUNITY
End: 2021-08-23 | Stop reason: SDUPTHER

## 2021-04-23 RX ORDER — ERYTHROMYCIN 5 MG/G
OINTMENT OPHTHALMIC
COMMUNITY
Start: 2021-04-16 | End: 2022-07-21

## 2021-04-29 ENCOUNTER — TELEPHONE (OUTPATIENT)
Dept: FAMILY MEDICINE | Facility: CLINIC | Age: 76
End: 2021-04-29

## 2021-05-19 ENCOUNTER — LAB VISIT (OUTPATIENT)
Dept: LAB | Facility: HOSPITAL | Age: 76
End: 2021-05-19
Attending: SPECIALIST
Payer: MEDICARE

## 2021-05-19 DIAGNOSIS — G62.9 NEUROPATHY: ICD-10-CM

## 2021-05-19 DIAGNOSIS — R06.02 SHORTNESS OF BREATH: ICD-10-CM

## 2021-05-19 DIAGNOSIS — D64.9 ANEMIA, UNSPECIFIED TYPE: ICD-10-CM

## 2021-05-19 LAB
IRON SERPL-MCNC: 47 UG/DL (ref 30–160)
SATURATED IRON: 11 % (ref 20–50)
TOTAL IRON BINDING CAPACITY: 445 UG/DL (ref 250–450)
TRANSFERRIN SERPL-MCNC: 318 MG/DL (ref 200–375)

## 2021-05-19 PROCEDURE — 83540 ASSAY OF IRON: CPT | Performed by: SPECIALIST

## 2021-05-19 PROCEDURE — 85014 HEMATOCRIT: CPT | Performed by: SPECIALIST

## 2021-05-21 LAB
FOLATE BLD-MCNC: 526 NG/ML
FOLATE RBC-MCNC: 1370 NG/ML
HCT VFR BLD AUTO: 38.4 % (ref 34–46.6)

## 2021-05-26 ENCOUNTER — OFFICE VISIT (OUTPATIENT)
Dept: CARDIOLOGY | Facility: CLINIC | Age: 76
End: 2021-05-26
Payer: MEDICARE

## 2021-05-26 VITALS
OXYGEN SATURATION: 97 % | BODY MASS INDEX: 34.21 KG/M2 | WEIGHT: 231 LBS | DIASTOLIC BLOOD PRESSURE: 60 MMHG | HEART RATE: 87 BPM | HEIGHT: 69 IN | SYSTOLIC BLOOD PRESSURE: 120 MMHG

## 2021-05-26 DIAGNOSIS — I50.20 HFREF (HEART FAILURE WITH REDUCED EJECTION FRACTION): Primary | ICD-10-CM

## 2021-05-26 DIAGNOSIS — D51.8 VITAMIN B12 DEFICIENCY (DIETARY) ANEMIA: ICD-10-CM

## 2021-05-26 DIAGNOSIS — E78.00 HYPERCHOLESTEROLEMIA: ICD-10-CM

## 2021-05-26 DIAGNOSIS — D64.9 ANEMIA, UNSPECIFIED TYPE: ICD-10-CM

## 2021-05-26 DIAGNOSIS — I95.9 HYPOTENSION, UNSPECIFIED HYPOTENSION TYPE: ICD-10-CM

## 2021-05-26 DIAGNOSIS — K63.5 POLYP OF COLON, UNSPECIFIED PART OF COLON, UNSPECIFIED TYPE: ICD-10-CM

## 2021-05-26 DIAGNOSIS — R79.89 ABNORMAL THYROID BLOOD TEST: ICD-10-CM

## 2021-05-26 PROCEDURE — 3288F PR FALLS RISK ASSESSMENT DOCUMENTED: ICD-10-PCS | Mod: CPTII,S$GLB,, | Performed by: SPECIALIST

## 2021-05-26 PROCEDURE — 3078F DIAST BP <80 MM HG: CPT | Mod: CPTII,S$GLB,, | Performed by: SPECIALIST

## 2021-05-26 PROCEDURE — 1159F MED LIST DOCD IN RCRD: CPT | Mod: S$GLB,,, | Performed by: SPECIALIST

## 2021-05-26 PROCEDURE — 99215 OFFICE O/P EST HI 40 MIN: CPT | Mod: S$GLB,,, | Performed by: SPECIALIST

## 2021-05-26 PROCEDURE — 99215 PR OFFICE/OUTPT VISIT, EST, LEVL V, 40-54 MIN: ICD-10-PCS | Mod: S$GLB,,, | Performed by: SPECIALIST

## 2021-05-26 PROCEDURE — 1159F PR MEDICATION LIST DOCUMENTED IN MEDICAL RECORD: ICD-10-PCS | Mod: S$GLB,,, | Performed by: SPECIALIST

## 2021-05-26 PROCEDURE — 3288F FALL RISK ASSESSMENT DOCD: CPT | Mod: CPTII,S$GLB,, | Performed by: SPECIALIST

## 2021-05-26 PROCEDURE — 1101F PT FALLS ASSESS-DOCD LE1/YR: CPT | Mod: CPTII,S$GLB,, | Performed by: SPECIALIST

## 2021-05-26 PROCEDURE — 3074F SYST BP LT 130 MM HG: CPT | Mod: CPTII,S$GLB,, | Performed by: SPECIALIST

## 2021-05-26 PROCEDURE — 3074F PR MOST RECENT SYSTOLIC BLOOD PRESSURE < 130 MM HG: ICD-10-PCS | Mod: CPTII,S$GLB,, | Performed by: SPECIALIST

## 2021-05-26 PROCEDURE — 3078F PR MOST RECENT DIASTOLIC BLOOD PRESSURE < 80 MM HG: ICD-10-PCS | Mod: CPTII,S$GLB,, | Performed by: SPECIALIST

## 2021-05-26 PROCEDURE — 1101F PR PT FALLS ASSESS DOC 0-1 FALLS W/OUT INJ PAST YR: ICD-10-PCS | Mod: CPTII,S$GLB,, | Performed by: SPECIALIST

## 2021-05-26 RX ORDER — FUROSEMIDE 40 MG/1
20 TABLET ORAL DAILY
Qty: 90 TABLET | Refills: 1 | Status: SHIPPED | OUTPATIENT
Start: 2021-05-26 | End: 2021-08-23 | Stop reason: SDUPTHER

## 2021-05-26 RX ORDER — LOSARTAN POTASSIUM 100 MG/1
50 TABLET ORAL DAILY
Qty: 30 TABLET | Refills: 1 | Status: SHIPPED | OUTPATIENT
Start: 2021-05-26 | End: 2021-08-23 | Stop reason: SDUPTHER

## 2021-06-11 ENCOUNTER — TELEPHONE (OUTPATIENT)
Dept: FAMILY MEDICINE | Facility: CLINIC | Age: 76
End: 2021-06-11

## 2021-07-01 ENCOUNTER — OFFICE VISIT (OUTPATIENT)
Dept: PODIATRY | Facility: CLINIC | Age: 76
End: 2021-07-01
Payer: MEDICARE

## 2021-07-01 VITALS
HEART RATE: 88 BPM | SYSTOLIC BLOOD PRESSURE: 128 MMHG | OXYGEN SATURATION: 97 % | RESPIRATION RATE: 20 BRPM | DIASTOLIC BLOOD PRESSURE: 64 MMHG | WEIGHT: 231 LBS | BODY MASS INDEX: 34.21 KG/M2 | HEIGHT: 69 IN

## 2021-07-01 DIAGNOSIS — M79.89 PALPABLE MASS OF SOFT TISSUE OF FOOT: ICD-10-CM

## 2021-07-01 DIAGNOSIS — M79.672 FOOT PAIN, BILATERAL: ICD-10-CM

## 2021-07-01 DIAGNOSIS — M79.671 RIGHT FOOT PAIN: ICD-10-CM

## 2021-07-01 DIAGNOSIS — D36.10 NEUROMA: Primary | ICD-10-CM

## 2021-07-01 DIAGNOSIS — M79.671 FOOT PAIN, BILATERAL: ICD-10-CM

## 2021-07-01 DIAGNOSIS — M79.672 ARCH PAIN OF LEFT FOOT: ICD-10-CM

## 2021-07-01 DIAGNOSIS — G62.9 NEUROPATHY: ICD-10-CM

## 2021-07-01 DIAGNOSIS — I87.2 VENOUS INSUFFICIENCY OF BOTH LOWER EXTREMITIES: ICD-10-CM

## 2021-07-01 PROCEDURE — 99214 PR OFFICE/OUTPT VISIT, EST, LEVL IV, 30-39 MIN: ICD-10-PCS | Mod: 25,S$GLB,, | Performed by: PODIATRIST

## 2021-07-01 PROCEDURE — 64455 PR INJECT ANES/STEROID PLANTAR COMMON DIGITAL NERVE: ICD-10-PCS | Mod: RT,S$GLB,, | Performed by: PODIATRIST

## 2021-07-01 PROCEDURE — 1125F PR PAIN SEVERITY QUANTIFIED, PAIN PRESENT: ICD-10-PCS | Mod: S$GLB,,, | Performed by: PODIATRIST

## 2021-07-01 PROCEDURE — 1125F AMNT PAIN NOTED PAIN PRSNT: CPT | Mod: S$GLB,,, | Performed by: PODIATRIST

## 2021-07-01 PROCEDURE — 99214 OFFICE O/P EST MOD 30 MIN: CPT | Mod: 25,S$GLB,, | Performed by: PODIATRIST

## 2021-07-01 PROCEDURE — 3288F PR FALLS RISK ASSESSMENT DOCUMENTED: ICD-10-PCS | Mod: CPTII,S$GLB,, | Performed by: PODIATRIST

## 2021-07-01 PROCEDURE — 1159F MED LIST DOCD IN RCRD: CPT | Mod: S$GLB,,, | Performed by: PODIATRIST

## 2021-07-01 PROCEDURE — 64455 NJX AA&/STRD PLTR COM DG NRV: CPT | Mod: RT,S$GLB,, | Performed by: PODIATRIST

## 2021-07-01 PROCEDURE — 3288F FALL RISK ASSESSMENT DOCD: CPT | Mod: CPTII,S$GLB,, | Performed by: PODIATRIST

## 2021-07-01 PROCEDURE — 1159F PR MEDICATION LIST DOCUMENTED IN MEDICAL RECORD: ICD-10-PCS | Mod: S$GLB,,, | Performed by: PODIATRIST

## 2021-07-01 PROCEDURE — 1101F PR PT FALLS ASSESS DOC 0-1 FALLS W/OUT INJ PAST YR: ICD-10-PCS | Mod: CPTII,S$GLB,, | Performed by: PODIATRIST

## 2021-07-01 PROCEDURE — 1101F PT FALLS ASSESS-DOCD LE1/YR: CPT | Mod: CPTII,S$GLB,, | Performed by: PODIATRIST

## 2021-07-01 RX ORDER — METHYLPREDNISOLONE ACETATE 40 MG/ML
20 INJECTION, SUSPENSION INTRA-ARTICULAR; INTRALESIONAL; INTRAMUSCULAR; SOFT TISSUE
Status: COMPLETED | OUTPATIENT
Start: 2021-07-01 | End: 2021-07-01

## 2021-07-01 RX ORDER — DEXAMETHASONE SODIUM PHOSPHATE 4 MG/ML
4 INJECTION, SOLUTION INTRA-ARTICULAR; INTRALESIONAL; INTRAMUSCULAR; INTRAVENOUS; SOFT TISSUE
Status: COMPLETED | OUTPATIENT
Start: 2021-07-01 | End: 2021-07-01

## 2021-07-01 RX ORDER — BUPIVACAINE HYDROCHLORIDE 5 MG/ML
1.5 INJECTION, SOLUTION PERINEURAL
Status: COMPLETED | OUTPATIENT
Start: 2021-07-01 | End: 2021-07-01

## 2021-07-01 RX ADMIN — BUPIVACAINE HYDROCHLORIDE 7.5 MG: 5 INJECTION, SOLUTION PERINEURAL at 02:07

## 2021-07-01 RX ADMIN — DEXAMETHASONE SODIUM PHOSPHATE 4 MG: 4 INJECTION, SOLUTION INTRA-ARTICULAR; INTRALESIONAL; INTRAMUSCULAR; INTRAVENOUS; SOFT TISSUE at 02:07

## 2021-07-01 RX ADMIN — METHYLPREDNISOLONE ACETATE 20 MG: 40 INJECTION, SUSPENSION INTRA-ARTICULAR; INTRALESIONAL; INTRAMUSCULAR; SOFT TISSUE at 02:07

## 2021-07-06 ENCOUNTER — HOSPITAL ENCOUNTER (OUTPATIENT)
Dept: RADIOLOGY | Facility: HOSPITAL | Age: 76
Discharge: HOME | End: 2021-07-06
Attending: PODIATRIST
Payer: MEDICARE

## 2021-07-06 PROCEDURE — 76882 US LMTD JT/FCL EVL NVASC XTR: CPT | Mod: TC,LT

## 2021-07-07 ENCOUNTER — PATIENT MESSAGE (OUTPATIENT)
Dept: PODIATRY | Facility: CLINIC | Age: 76
End: 2021-07-07

## 2021-07-07 ENCOUNTER — TELEPHONE (OUTPATIENT)
Dept: PODIATRY | Facility: CLINIC | Age: 76
End: 2021-07-07

## 2021-07-14 ENCOUNTER — OFFICE VISIT (OUTPATIENT)
Dept: PODIATRY | Facility: CLINIC | Age: 76
End: 2021-07-14
Payer: MEDICARE

## 2021-07-14 VITALS — BODY MASS INDEX: 34.11 KG/M2 | OXYGEN SATURATION: 97 % | HEART RATE: 65 BPM | HEIGHT: 69 IN

## 2021-07-14 DIAGNOSIS — M67.479 GANGLION CYST OF FOOT: ICD-10-CM

## 2021-07-14 DIAGNOSIS — M79.89 PALPABLE MASS OF SOFT TISSUE OF FOOT: Primary | ICD-10-CM

## 2021-07-14 DIAGNOSIS — G62.9 NEUROPATHY: ICD-10-CM

## 2021-07-14 DIAGNOSIS — M79.672 ARCH PAIN OF LEFT FOOT: ICD-10-CM

## 2021-07-14 PROCEDURE — 3288F PR FALLS RISK ASSESSMENT DOCUMENTED: ICD-10-PCS | Mod: CPTII,S$GLB,, | Performed by: PODIATRIST

## 2021-07-14 PROCEDURE — 3288F FALL RISK ASSESSMENT DOCD: CPT | Mod: CPTII,S$GLB,, | Performed by: PODIATRIST

## 2021-07-14 PROCEDURE — 1125F PR PAIN SEVERITY QUANTIFIED, PAIN PRESENT: ICD-10-PCS | Mod: S$GLB,,, | Performed by: PODIATRIST

## 2021-07-14 PROCEDURE — 1159F MED LIST DOCD IN RCRD: CPT | Mod: S$GLB,,, | Performed by: PODIATRIST

## 2021-07-14 PROCEDURE — 1159F PR MEDICATION LIST DOCUMENTED IN MEDICAL RECORD: ICD-10-PCS | Mod: S$GLB,,, | Performed by: PODIATRIST

## 2021-07-14 PROCEDURE — 1101F PT FALLS ASSESS-DOCD LE1/YR: CPT | Mod: CPTII,S$GLB,, | Performed by: PODIATRIST

## 2021-07-14 PROCEDURE — 99213 OFFICE O/P EST LOW 20 MIN: CPT | Mod: S$GLB,,, | Performed by: PODIATRIST

## 2021-07-14 PROCEDURE — 1125F AMNT PAIN NOTED PAIN PRSNT: CPT | Mod: S$GLB,,, | Performed by: PODIATRIST

## 2021-07-14 PROCEDURE — 99213 PR OFFICE/OUTPT VISIT, EST, LEVL III, 20-29 MIN: ICD-10-PCS | Mod: S$GLB,,, | Performed by: PODIATRIST

## 2021-07-14 PROCEDURE — 1101F PR PT FALLS ASSESS DOC 0-1 FALLS W/OUT INJ PAST YR: ICD-10-PCS | Mod: CPTII,S$GLB,, | Performed by: PODIATRIST

## 2021-07-16 ENCOUNTER — HOSPITAL ENCOUNTER (OUTPATIENT)
Dept: RADIOLOGY | Facility: HOSPITAL | Age: 76
Discharge: HOME | End: 2021-07-16
Attending: FAMILY MEDICINE
Payer: MEDICARE

## 2021-07-16 ENCOUNTER — OFFICE VISIT (OUTPATIENT)
Dept: FAMILY MEDICINE | Facility: CLINIC | Age: 76
End: 2021-07-16
Payer: MEDICARE

## 2021-07-16 VITALS
WEIGHT: 224 LBS | SYSTOLIC BLOOD PRESSURE: 97 MMHG | TEMPERATURE: 97 F | DIASTOLIC BLOOD PRESSURE: 49 MMHG | HEIGHT: 69 IN | BODY MASS INDEX: 33.18 KG/M2 | HEART RATE: 74 BPM | OXYGEN SATURATION: 97 %

## 2021-07-16 DIAGNOSIS — Z99.89 WALKER AS AMBULATION AID: ICD-10-CM

## 2021-07-16 DIAGNOSIS — M54.50 LUMBAR BACK PAIN: Primary | ICD-10-CM

## 2021-07-16 DIAGNOSIS — M54.9 DORSALGIA, UNSPECIFIED: ICD-10-CM

## 2021-07-16 DIAGNOSIS — F41.9 ANXIETY: ICD-10-CM

## 2021-07-16 PROCEDURE — 1101F PR PT FALLS ASSESS DOC 0-1 FALLS W/OUT INJ PAST YR: ICD-10-PCS | Mod: CPTII,S$GLB,, | Performed by: FAMILY MEDICINE

## 2021-07-16 PROCEDURE — 99213 OFFICE O/P EST LOW 20 MIN: CPT | Mod: S$GLB,,, | Performed by: FAMILY MEDICINE

## 2021-07-16 PROCEDURE — 1125F AMNT PAIN NOTED PAIN PRSNT: CPT | Mod: CPTII,S$GLB,, | Performed by: FAMILY MEDICINE

## 2021-07-16 PROCEDURE — 1159F PR MEDICATION LIST DOCUMENTED IN MEDICAL RECORD: ICD-10-PCS | Mod: CPTII,S$GLB,, | Performed by: FAMILY MEDICINE

## 2021-07-16 PROCEDURE — 72110 X-RAY EXAM L-2 SPINE 4/>VWS: CPT | Mod: TC

## 2021-07-16 PROCEDURE — 1159F MED LIST DOCD IN RCRD: CPT | Mod: CPTII,S$GLB,, | Performed by: FAMILY MEDICINE

## 2021-07-16 PROCEDURE — 3288F FALL RISK ASSESSMENT DOCD: CPT | Mod: CPTII,S$GLB,, | Performed by: FAMILY MEDICINE

## 2021-07-16 PROCEDURE — 3288F PR FALLS RISK ASSESSMENT DOCUMENTED: ICD-10-PCS | Mod: CPTII,S$GLB,, | Performed by: FAMILY MEDICINE

## 2021-07-16 PROCEDURE — 99213 PR OFFICE/OUTPT VISIT, EST, LEVL III, 20-29 MIN: ICD-10-PCS | Mod: S$GLB,,, | Performed by: FAMILY MEDICINE

## 2021-07-16 PROCEDURE — 1101F PT FALLS ASSESS-DOCD LE1/YR: CPT | Mod: CPTII,S$GLB,, | Performed by: FAMILY MEDICINE

## 2021-07-16 PROCEDURE — 1125F PR PAIN SEVERITY QUANTIFIED, PAIN PRESENT: ICD-10-PCS | Mod: CPTII,S$GLB,, | Performed by: FAMILY MEDICINE

## 2021-07-16 RX ORDER — LORAZEPAM 1 MG/1
1 TABLET ORAL NIGHTLY PRN
COMMUNITY
End: 2021-07-16 | Stop reason: SDUPTHER

## 2021-07-16 RX ORDER — PREDNISONE 20 MG/1
20 TABLET ORAL 2 TIMES DAILY
Qty: 10 TABLET | Refills: 0 | Status: SHIPPED | OUTPATIENT
Start: 2021-07-16 | End: 2021-07-23

## 2021-07-16 RX ORDER — LORAZEPAM 1 MG/1
1 TABLET ORAL NIGHTLY PRN
Qty: 30 TABLET | Refills: 2 | Status: SHIPPED | OUTPATIENT
Start: 2021-07-16 | End: 2021-08-23 | Stop reason: SDUPTHER

## 2021-07-20 PROBLEM — R79.89 ABNORMAL THYROID BLOOD TEST: Status: RESOLVED | Noted: 2018-08-20 | Resolved: 2021-07-20

## 2021-07-20 PROBLEM — E66.9 OBESITY (BMI 30-39.9): Status: RESOLVED | Noted: 2018-08-20 | Resolved: 2021-07-20

## 2021-07-20 PROBLEM — M54.9 DORSALGIA, UNSPECIFIED: Status: ACTIVE | Noted: 2021-07-20

## 2021-07-20 PROBLEM — R30.0 DYSURIA: Status: RESOLVED | Noted: 2018-12-18 | Resolved: 2021-07-20

## 2021-07-20 PROBLEM — J32.9 SINUSITIS: Status: RESOLVED | Noted: 2018-12-18 | Resolved: 2021-07-20

## 2021-07-20 PROBLEM — F41.9 ANXIETY: Status: ACTIVE | Noted: 2021-07-20

## 2021-07-20 PROBLEM — H66.90 OTITIS MEDIA: Status: RESOLVED | Noted: 2018-12-18 | Resolved: 2021-07-20

## 2021-07-20 PROBLEM — N39.0 UTI (URINARY TRACT INFECTION): Status: RESOLVED | Noted: 2019-09-18 | Resolved: 2021-07-20

## 2021-07-20 PROBLEM — R05.8 DRY COUGH: Status: RESOLVED | Noted: 2019-09-19 | Resolved: 2021-07-20

## 2021-07-20 PROBLEM — E78.00 HYPERCHOLESTEROLEMIA: Status: RESOLVED | Noted: 2018-08-20 | Resolved: 2021-07-20

## 2021-07-23 ENCOUNTER — HOSPITAL ENCOUNTER (OUTPATIENT)
Dept: RADIOLOGY | Facility: HOSPITAL | Age: 76
Discharge: HOME | End: 2021-07-23
Attending: NURSE PRACTITIONER
Payer: MEDICARE

## 2021-07-23 ENCOUNTER — OFFICE VISIT (OUTPATIENT)
Dept: FAMILY MEDICINE | Facility: CLINIC | Age: 76
End: 2021-07-23
Payer: MEDICARE

## 2021-07-23 VITALS
OXYGEN SATURATION: 98 % | HEART RATE: 66 BPM | HEIGHT: 69 IN | DIASTOLIC BLOOD PRESSURE: 68 MMHG | SYSTOLIC BLOOD PRESSURE: 112 MMHG | TEMPERATURE: 98 F | WEIGHT: 221 LBS | BODY MASS INDEX: 32.73 KG/M2

## 2021-07-23 DIAGNOSIS — M54.2 NECK PAIN ON LEFT SIDE: ICD-10-CM

## 2021-07-23 DIAGNOSIS — M25.512 ACUTE PAIN OF LEFT SHOULDER: ICD-10-CM

## 2021-07-23 DIAGNOSIS — S16.1XXA STRAIN OF CERVICAL PORTION OF LEFT TRAPEZIUS MUSCLE: ICD-10-CM
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
## 2021-07-23 DIAGNOSIS — M54.2 NECK PAIN ON LEFT SIDE: Primary | ICD-10-CM

## 2021-07-23 DIAGNOSIS — S46.812A TRAPEZIUS STRAIN, LEFT, INITIAL ENCOUNTER: ICD-10-CM
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
## 2021-07-23 PROCEDURE — 1101F PT FALLS ASSESS-DOCD LE1/YR: CPT | Mod: CPTII,S$GLB,, | Performed by: NURSE PRACTITIONER

## 2021-07-23 PROCEDURE — 1101F PR PT FALLS ASSESS DOC 0-1 FALLS W/OUT INJ PAST YR: ICD-10-PCS | Mod: CPTII,S$GLB,, | Performed by: NURSE PRACTITIONER

## 2021-07-23 PROCEDURE — 99213 PR OFFICE/OUTPT VISIT, EST, LEVL III, 20-29 MIN: ICD-10-PCS | Mod: S$GLB,,, | Performed by: NURSE PRACTITIONER

## 2021-07-23 PROCEDURE — 3078F PR MOST RECENT DIASTOLIC BLOOD PRESSURE < 80 MM HG: ICD-10-PCS | Mod: CPTII,S$GLB,, | Performed by: NURSE PRACTITIONER

## 2021-07-23 PROCEDURE — 72070 X-RAY EXAM THORAC SPINE 2VWS: CPT | Mod: TC

## 2021-07-23 PROCEDURE — 99213 OFFICE O/P EST LOW 20 MIN: CPT | Mod: S$GLB,,, | Performed by: NURSE PRACTITIONER

## 2021-07-23 PROCEDURE — 3074F PR MOST RECENT SYSTOLIC BLOOD PRESSURE < 130 MM HG: ICD-10-PCS | Mod: CPTII,S$GLB,, | Performed by: NURSE PRACTITIONER

## 2021-07-23 PROCEDURE — 1125F PR PAIN SEVERITY QUANTIFIED, PAIN PRESENT: ICD-10-PCS | Mod: CPTII,S$GLB,, | Performed by: NURSE PRACTITIONER

## 2021-07-23 PROCEDURE — 3078F DIAST BP <80 MM HG: CPT | Mod: CPTII,S$GLB,, | Performed by: NURSE PRACTITIONER

## 2021-07-23 PROCEDURE — 3074F SYST BP LT 130 MM HG: CPT | Mod: CPTII,S$GLB,, | Performed by: NURSE PRACTITIONER

## 2021-07-23 PROCEDURE — 1125F AMNT PAIN NOTED PAIN PRSNT: CPT | Mod: CPTII,S$GLB,, | Performed by: NURSE PRACTITIONER

## 2021-07-23 PROCEDURE — 3288F FALL RISK ASSESSMENT DOCD: CPT | Mod: CPTII,S$GLB,, | Performed by: NURSE PRACTITIONER

## 2021-07-23 PROCEDURE — 1159F PR MEDICATION LIST DOCUMENTED IN MEDICAL RECORD: ICD-10-PCS | Mod: CPTII,S$GLB,, | Performed by: NURSE PRACTITIONER

## 2021-07-23 PROCEDURE — 72040 X-RAY EXAM NECK SPINE 2-3 VW: CPT | Mod: TC

## 2021-07-23 PROCEDURE — 73030 X-RAY EXAM OF SHOULDER: CPT | Mod: TC,LT

## 2021-07-23 PROCEDURE — 1159F MED LIST DOCD IN RCRD: CPT | Mod: CPTII,S$GLB,, | Performed by: NURSE PRACTITIONER

## 2021-07-23 PROCEDURE — 3288F PR FALLS RISK ASSESSMENT DOCUMENTED: ICD-10-PCS | Mod: CPTII,S$GLB,, | Performed by: NURSE PRACTITIONER

## 2021-07-23 RX ORDER — CYCLOBENZAPRINE HCL 10 MG
TABLET ORAL
COMMUNITY
Start: 2021-07-19 | End: 2021-07-23

## 2021-07-23 RX ORDER — PREDNISONE 20 MG/1
20 TABLET ORAL 2 TIMES DAILY
Qty: 10 TABLET | Refills: 0 | Status: SHIPPED | OUTPATIENT
Start: 2021-07-23 | End: 2021-08-24

## 2021-07-26 ENCOUNTER — TELEPHONE (OUTPATIENT)
Dept: FAMILY MEDICINE | Facility: CLINIC | Age: 76
End: 2021-07-26

## 2021-08-17 ENCOUNTER — TELEPHONE (OUTPATIENT)
Dept: FAMILY MEDICINE | Facility: CLINIC | Age: 76
End: 2021-08-17

## 2021-08-20 ENCOUNTER — LAB VISIT (OUTPATIENT)
Dept: LAB | Facility: HOSPITAL | Age: 76
End: 2021-08-20
Attending: PHYSICIAN ASSISTANT
Payer: MEDICARE

## 2021-08-20 DIAGNOSIS — E55.9 HYPOVITAMINOSIS D: ICD-10-CM

## 2021-08-20 DIAGNOSIS — E04.9 GOITER: ICD-10-CM

## 2021-08-20 LAB
25(OH)D3+25(OH)D2 SERPL-MCNC: 34 NG/ML (ref 30–96)
T4 FREE SERPL-MCNC: 0.96 NG/DL (ref 0.71–1.51)
TSH SERPL DL<=0.005 MIU/L-ACNC: 1.91 UIU/ML (ref 0.4–4)

## 2021-08-20 PROCEDURE — 84439 ASSAY OF FREE THYROXINE: CPT | Performed by: PHYSICIAN ASSISTANT

## 2021-08-20 PROCEDURE — 36415 COLL VENOUS BLD VENIPUNCTURE: CPT | Mod: PO | Performed by: PHYSICIAN ASSISTANT

## 2021-08-20 PROCEDURE — 84443 ASSAY THYROID STIM HORMONE: CPT | Performed by: PHYSICIAN ASSISTANT

## 2021-08-20 PROCEDURE — 82306 VITAMIN D 25 HYDROXY: CPT | Performed by: PHYSICIAN ASSISTANT

## 2021-08-23 ENCOUNTER — LAB VISIT (OUTPATIENT)
Dept: LAB | Facility: HOSPITAL | Age: 76
End: 2021-08-23
Attending: NURSE PRACTITIONER
Payer: MEDICARE

## 2021-08-23 ENCOUNTER — OFFICE VISIT (OUTPATIENT)
Dept: FAMILY MEDICINE | Facility: CLINIC | Age: 76
End: 2021-08-23
Payer: MEDICARE

## 2021-08-23 VITALS
WEIGHT: 230 LBS | HEIGHT: 69 IN | HEART RATE: 71 BPM | TEMPERATURE: 98 F | SYSTOLIC BLOOD PRESSURE: 128 MMHG | OXYGEN SATURATION: 97 % | BODY MASS INDEX: 34.07 KG/M2 | DIASTOLIC BLOOD PRESSURE: 69 MMHG

## 2021-08-23 DIAGNOSIS — S16.1XXA STRAIN OF CERVICAL PORTION OF LEFT TRAPEZIUS MUSCLE: ICD-10-CM

## 2021-08-23 DIAGNOSIS — M25.512 ACUTE PAIN OF LEFT SHOULDER: ICD-10-CM

## 2021-08-23 DIAGNOSIS — F41.9 ANXIETY: Primary | ICD-10-CM

## 2021-08-23 DIAGNOSIS — I10 HYPERTENSION, ESSENTIAL: ICD-10-CM

## 2021-08-23 DIAGNOSIS — E88.810 DYSMETABOLIC SYNDROME: ICD-10-CM

## 2021-08-23 DIAGNOSIS — E78.2 MIXED HYPERLIPIDEMIA: ICD-10-CM
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
## 2021-08-23 DIAGNOSIS — S46.812A TRAPEZIUS STRAIN, LEFT, INITIAL ENCOUNTER: ICD-10-CM

## 2021-08-23 DIAGNOSIS — M54.2 NECK PAIN ON LEFT SIDE: ICD-10-CM

## 2021-08-23 LAB
ALBUMIN SERPL BCP-MCNC: 4.3 G/DL (ref 3.5–5.2)
ALP SERPL-CCNC: 43 U/L (ref 55–135)
ALT SERPL W/O P-5'-P-CCNC: 26 U/L (ref 10–44)
ANION GAP SERPL CALC-SCNC: 10 MMOL/L (ref 8–16)
AST SERPL-CCNC: 24 U/L (ref 10–40)
BASOPHILS # BLD AUTO: 0.02 K/UL (ref 0–0.2)
BASOPHILS NFR BLD: 0.3 % (ref 0–1.9)
BILIRUB SERPL-MCNC: 0.7 MG/DL (ref 0.1–1)
BUN SERPL-MCNC: 20 MG/DL (ref 8–23)
CALCIUM SERPL-MCNC: 9.3 MG/DL (ref 8.7–10.5)
CHLORIDE SERPL-SCNC: 101 MMOL/L (ref 95–110)
CHOLEST SERPL-MCNC: 212 MG/DL (ref 120–199)
CHOLEST/HDLC SERPL: 2.1 {RATIO} (ref 2–5)
CO2 SERPL-SCNC: 29 MMOL/L (ref 23–29)
CREAT SERPL-MCNC: 0.8 MG/DL (ref 0.5–1.4)
DIFFERENTIAL METHOD: ABNORMAL
EOSINOPHIL # BLD AUTO: 0.1 K/UL (ref 0–0.5)
EOSINOPHIL NFR BLD: 1.5 % (ref 0–8)
ERYTHROCYTE [DISTWIDTH] IN BLOOD BY AUTOMATED COUNT: 21.5 % (ref 11.5–14.5)
EST. GFR  (AFRICAN AMERICAN): >60 ML/MIN/1.73 M^2
EST. GFR  (NON AFRICAN AMERICAN): >60 ML/MIN/1.73 M^2
GLUCOSE SERPL-MCNC: 95 MG/DL (ref 70–110)
HCT VFR BLD AUTO: 42.4 % (ref 37–48.5)
HDLC SERPL-MCNC: 99 MG/DL (ref 40–75)
HDLC SERPL: 46.7 % (ref 20–50)
HGB BLD-MCNC: 13.6 G/DL (ref 12–16)
IMM GRANULOCYTES # BLD AUTO: 0.01 K/UL (ref 0–0.04)
IMM GRANULOCYTES NFR BLD AUTO: 0.2 % (ref 0–0.5)
LDLC SERPL CALC-MCNC: 103.8 MG/DL (ref 63–159)
LYMPHOCYTES # BLD AUTO: 2.3 K/UL (ref 1–4.8)
LYMPHOCYTES NFR BLD: 36.9 % (ref 18–48)
MCH RBC QN AUTO: 27.6 PG (ref 27–31)
MCHC RBC AUTO-ENTMCNC: 32.1 G/DL (ref 32–36)
MCV RBC AUTO: 86 FL (ref 82–98)
MONOCYTES # BLD AUTO: 0.7 K/UL (ref 0.3–1)
MONOCYTES NFR BLD: 10.6 % (ref 4–15)
NEUTROPHILS # BLD AUTO: 3.1 K/UL (ref 1.8–7.7)
NEUTROPHILS NFR BLD: 50.5 % (ref 38–73)
NONHDLC SERPL-MCNC: 113 MG/DL
NRBC BLD-RTO: 0 /100 WBC
PLATELET # BLD AUTO: 254 K/UL (ref 150–450)
PMV BLD AUTO: 11.4 FL (ref 9.2–12.9)
POTASSIUM SERPL-SCNC: 3.7 MMOL/L (ref 3.5–5.1)
PROT SERPL-MCNC: 7.2 G/DL (ref 6–8.4)
RBC # BLD AUTO: 4.92 M/UL (ref 4–5.4)
SARS-COV-2 IGG SERPL IA-ACNC: 4323.2 AU/ML
SARS-COV-2 IGG SERPL QL IA: POSITIVE
SODIUM SERPL-SCNC: 140 MMOL/L (ref 136–145)
TRIGL SERPL-MCNC: 46 MG/DL (ref 30–150)
WBC # BLD AUTO: 6.16 K/UL (ref 3.9–12.7)

## 2021-08-23 PROCEDURE — 99214 OFFICE O/P EST MOD 30 MIN: CPT | Mod: S$GLB,,, | Performed by: NURSE PRACTITIONER

## 2021-08-23 PROCEDURE — 1101F PT FALLS ASSESS-DOCD LE1/YR: CPT | Mod: CPTII,S$GLB,, | Performed by: NURSE PRACTITIONER

## 2021-08-23 PROCEDURE — 86769 SARS-COV-2 COVID-19 ANTIBODY: CPT | Performed by: NURSE PRACTITIONER

## 2021-08-23 PROCEDURE — 3078F DIAST BP <80 MM HG: CPT | Mod: CPTII,S$GLB,, | Performed by: NURSE PRACTITIONER

## 2021-08-23 PROCEDURE — 3074F PR MOST RECENT SYSTOLIC BLOOD PRESSURE < 130 MM HG: ICD-10-PCS | Mod: CPTII,S$GLB,, | Performed by: NURSE PRACTITIONER

## 2021-08-23 PROCEDURE — 1101F PR PT FALLS ASSESS DOC 0-1 FALLS W/OUT INJ PAST YR: ICD-10-PCS | Mod: CPTII,S$GLB,, | Performed by: NURSE PRACTITIONER

## 2021-08-23 PROCEDURE — 1159F MED LIST DOCD IN RCRD: CPT | Mod: CPTII,S$GLB,, | Performed by: NURSE PRACTITIONER

## 2021-08-23 PROCEDURE — 1159F PR MEDICATION LIST DOCUMENTED IN MEDICAL RECORD: ICD-10-PCS | Mod: CPTII,S$GLB,, | Performed by: NURSE PRACTITIONER

## 2021-08-23 PROCEDURE — 1126F PR PAIN SEVERITY QUANTIFIED, NO PAIN PRESENT: ICD-10-PCS | Mod: CPTII,S$GLB,, | Performed by: NURSE PRACTITIONER

## 2021-08-23 PROCEDURE — 36415 COLL VENOUS BLD VENIPUNCTURE: CPT | Performed by: NURSE PRACTITIONER

## 2021-08-23 PROCEDURE — 80061 LIPID PANEL: CPT | Performed by: NURSE PRACTITIONER

## 2021-08-23 PROCEDURE — 3078F PR MOST RECENT DIASTOLIC BLOOD PRESSURE < 80 MM HG: ICD-10-PCS | Mod: CPTII,S$GLB,, | Performed by: NURSE PRACTITIONER

## 2021-08-23 PROCEDURE — 3074F SYST BP LT 130 MM HG: CPT | Mod: CPTII,S$GLB,, | Performed by: NURSE PRACTITIONER

## 2021-08-23 PROCEDURE — 1126F AMNT PAIN NOTED NONE PRSNT: CPT | Mod: CPTII,S$GLB,, | Performed by: NURSE PRACTITIONER

## 2021-08-23 PROCEDURE — 3288F PR FALLS RISK ASSESSMENT DOCUMENTED: ICD-10-PCS | Mod: CPTII,S$GLB,, | Performed by: NURSE PRACTITIONER

## 2021-08-23 PROCEDURE — 3288F FALL RISK ASSESSMENT DOCD: CPT | Mod: CPTII,S$GLB,, | Performed by: NURSE PRACTITIONER

## 2021-08-23 PROCEDURE — 99214 PR OFFICE/OUTPT VISIT, EST, LEVL IV, 30-39 MIN: ICD-10-PCS | Mod: S$GLB,,, | Performed by: NURSE PRACTITIONER

## 2021-08-23 PROCEDURE — 80053 COMPREHEN METABOLIC PANEL: CPT | Performed by: NURSE PRACTITIONER

## 2021-08-23 PROCEDURE — 85025 COMPLETE CBC W/AUTO DIFF WBC: CPT | Performed by: NURSE PRACTITIONER

## 2021-08-23 RX ORDER — EZETIMIBE 10 MG/1
10 TABLET ORAL DAILY
Qty: 90 TABLET | Refills: 1 | Status: SHIPPED | OUTPATIENT
Start: 2021-08-23 | End: 2021-10-28

## 2021-08-23 RX ORDER — POTASSIUM CHLORIDE 20 MEQ/1
20 TABLET, EXTENDED RELEASE ORAL 2 TIMES DAILY
Qty: 90 TABLET | Refills: 3 | Status: SHIPPED | OUTPATIENT
Start: 2021-08-23 | End: 2021-12-13

## 2021-08-23 RX ORDER — LOSARTAN POTASSIUM 100 MG/1
50 TABLET ORAL DAILY
Qty: 45 TABLET | Refills: 1 | Status: SHIPPED | OUTPATIENT
Start: 2021-08-23 | End: 2021-12-13

## 2021-08-23 RX ORDER — FUROSEMIDE 40 MG/1
20 TABLET ORAL DAILY
Qty: 90 TABLET | Refills: 1 | Status: SHIPPED | OUTPATIENT
Start: 2021-08-23 | End: 2021-10-28

## 2021-08-23 RX ORDER — ATORVASTATIN CALCIUM 20 MG/1
20 TABLET, FILM COATED ORAL NIGHTLY
Qty: 90 TABLET | Refills: 1 | Status: SHIPPED | OUTPATIENT
Start: 2021-08-23 | End: 2021-10-28

## 2021-08-23 RX ORDER — PREDNISONE 20 MG/1
20 TABLET ORAL 2 TIMES DAILY
Qty: 10 TABLET | Refills: 0 | Status: CANCELLED | OUTPATIENT
Start: 2021-08-23

## 2021-08-23 RX ORDER — METOPROLOL SUCCINATE 50 MG/1
50 TABLET, EXTENDED RELEASE ORAL NIGHTLY
Qty: 90 TABLET | Refills: 1 | Status: SHIPPED | OUTPATIENT
Start: 2021-08-23 | End: 2022-02-23

## 2021-08-23 RX ORDER — OXYBUTYNIN CHLORIDE 10 MG/1
10 TABLET, EXTENDED RELEASE ORAL DAILY
Qty: 30 TABLET | Refills: 11 | Status: SHIPPED | OUTPATIENT
Start: 2021-08-23 | End: 2021-12-13

## 2021-08-23 RX ORDER — LEVOTHYROXINE SODIUM 125 UG/1
125 TABLET ORAL
Qty: 90 TABLET | Refills: 1 | Status: SHIPPED | OUTPATIENT
Start: 2021-08-23 | End: 2021-12-13

## 2021-08-23 RX ORDER — GABAPENTIN 800 MG/1
800 TABLET ORAL 3 TIMES DAILY
Qty: 300 TABLET | Refills: 1 | Status: SHIPPED | OUTPATIENT
Start: 2021-08-23 | End: 2022-03-07

## 2021-08-23 RX ORDER — FAMOTIDINE 20 MG/1
20 TABLET, FILM COATED ORAL NIGHTLY
Qty: 90 TABLET | Refills: 1 | Status: SHIPPED | OUTPATIENT
Start: 2021-08-23 | End: 2021-08-25

## 2021-08-23 RX ORDER — LORAZEPAM 1 MG/1
1 TABLET ORAL NIGHTLY PRN
Qty: 30 TABLET | Refills: 2 | Status: SHIPPED | OUTPATIENT
Start: 2021-08-23 | End: 2022-02-22 | Stop reason: SDUPTHER

## 2021-08-23 RX ORDER — OMEPRAZOLE 40 MG/1
40 CAPSULE, DELAYED RELEASE ORAL DAILY
Qty: 90 CAPSULE | Refills: 1 | Status: SHIPPED | OUTPATIENT
Start: 2021-08-23 | End: 2022-02-23

## 2021-08-24 ENCOUNTER — OFFICE VISIT (OUTPATIENT)
Dept: ENDOCRINOLOGY | Facility: CLINIC | Age: 76
End: 2021-08-24
Payer: MEDICARE

## 2021-08-24 VITALS
HEIGHT: 69 IN | DIASTOLIC BLOOD PRESSURE: 60 MMHG | OXYGEN SATURATION: 96 % | WEIGHT: 231.06 LBS | HEART RATE: 66 BPM | TEMPERATURE: 98 F | BODY MASS INDEX: 34.22 KG/M2 | SYSTOLIC BLOOD PRESSURE: 110 MMHG

## 2021-08-24 DIAGNOSIS — E66.9 OBESITY (BMI 30-39.9): ICD-10-CM

## 2021-08-24 DIAGNOSIS — E78.5 HYPERLIPIDEMIA, UNSPECIFIED HYPERLIPIDEMIA TYPE: ICD-10-CM

## 2021-08-24 DIAGNOSIS — I10 HYPERTENSION, UNSPECIFIED TYPE: ICD-10-CM

## 2021-08-24 DIAGNOSIS — E55.9 HYPOVITAMINOSIS D: ICD-10-CM

## 2021-08-24 DIAGNOSIS — E04.9 GOITER: ICD-10-CM

## 2021-08-24 DIAGNOSIS — E03.9 HYPOTHYROIDISM, UNSPECIFIED TYPE: Primary | ICD-10-CM

## 2021-08-24 DIAGNOSIS — Z78.0 POSTMENOPAUSAL: ICD-10-CM

## 2021-08-24 PROCEDURE — 1160F PR REVIEW ALL MEDS BY PRESCRIBER/CLIN PHARMACIST DOCUMENTED: ICD-10-PCS | Mod: CPTII,S$GLB,, | Performed by: PHYSICIAN ASSISTANT

## 2021-08-24 PROCEDURE — 1125F AMNT PAIN NOTED PAIN PRSNT: CPT | Mod: CPTII,S$GLB,, | Performed by: PHYSICIAN ASSISTANT

## 2021-08-24 PROCEDURE — 3288F FALL RISK ASSESSMENT DOCD: CPT | Mod: CPTII,S$GLB,, | Performed by: PHYSICIAN ASSISTANT

## 2021-08-24 PROCEDURE — 99214 PR OFFICE/OUTPT VISIT, EST, LEVL IV, 30-39 MIN: ICD-10-PCS | Mod: S$GLB,,, | Performed by: PHYSICIAN ASSISTANT

## 2021-08-24 PROCEDURE — 1159F PR MEDICATION LIST DOCUMENTED IN MEDICAL RECORD: ICD-10-PCS | Mod: CPTII,S$GLB,, | Performed by: PHYSICIAN ASSISTANT

## 2021-08-24 PROCEDURE — 3288F PR FALLS RISK ASSESSMENT DOCUMENTED: ICD-10-PCS | Mod: CPTII,S$GLB,, | Performed by: PHYSICIAN ASSISTANT

## 2021-08-24 PROCEDURE — 3074F PR MOST RECENT SYSTOLIC BLOOD PRESSURE < 130 MM HG: ICD-10-PCS | Mod: CPTII,S$GLB,, | Performed by: PHYSICIAN ASSISTANT

## 2021-08-24 PROCEDURE — 99999 PR PBB SHADOW E&M-EST. PATIENT-LVL V: CPT | Mod: PBBFAC,,, | Performed by: PHYSICIAN ASSISTANT

## 2021-08-24 PROCEDURE — 3078F PR MOST RECENT DIASTOLIC BLOOD PRESSURE < 80 MM HG: ICD-10-PCS | Mod: CPTII,S$GLB,, | Performed by: PHYSICIAN ASSISTANT

## 2021-08-24 PROCEDURE — 99214 OFFICE O/P EST MOD 30 MIN: CPT | Mod: S$GLB,,, | Performed by: PHYSICIAN ASSISTANT

## 2021-08-24 PROCEDURE — 1159F MED LIST DOCD IN RCRD: CPT | Mod: CPTII,S$GLB,, | Performed by: PHYSICIAN ASSISTANT

## 2021-08-24 PROCEDURE — 99999 PR PBB SHADOW E&M-EST. PATIENT-LVL V: ICD-10-PCS | Mod: PBBFAC,,, | Performed by: PHYSICIAN ASSISTANT

## 2021-08-24 PROCEDURE — 1101F PT FALLS ASSESS-DOCD LE1/YR: CPT | Mod: CPTII,S$GLB,, | Performed by: PHYSICIAN ASSISTANT

## 2021-08-24 PROCEDURE — 1160F RVW MEDS BY RX/DR IN RCRD: CPT | Mod: CPTII,S$GLB,, | Performed by: PHYSICIAN ASSISTANT

## 2021-08-24 PROCEDURE — 1125F PR PAIN SEVERITY QUANTIFIED, PAIN PRESENT: ICD-10-PCS | Mod: CPTII,S$GLB,, | Performed by: PHYSICIAN ASSISTANT

## 2021-08-24 PROCEDURE — 1101F PR PT FALLS ASSESS DOC 0-1 FALLS W/OUT INJ PAST YR: ICD-10-PCS | Mod: CPTII,S$GLB,, | Performed by: PHYSICIAN ASSISTANT

## 2021-08-24 PROCEDURE — 3074F SYST BP LT 130 MM HG: CPT | Mod: CPTII,S$GLB,, | Performed by: PHYSICIAN ASSISTANT

## 2021-08-24 PROCEDURE — 3078F DIAST BP <80 MM HG: CPT | Mod: CPTII,S$GLB,, | Performed by: PHYSICIAN ASSISTANT

## 2021-09-01 ENCOUNTER — TELEPHONE (OUTPATIENT)
Dept: FAMILY MEDICINE | Facility: CLINIC | Age: 76
End: 2021-09-01

## 2021-10-20 ENCOUNTER — OFFICE VISIT (OUTPATIENT)
Dept: FAMILY MEDICINE | Facility: CLINIC | Age: 76
End: 2021-10-20
Payer: MEDICARE

## 2021-10-20 VITALS
WEIGHT: 226 LBS | OXYGEN SATURATION: 96 % | SYSTOLIC BLOOD PRESSURE: 126 MMHG | HEIGHT: 69 IN | TEMPERATURE: 97 F | BODY MASS INDEX: 33.47 KG/M2 | DIASTOLIC BLOOD PRESSURE: 72 MMHG | HEART RATE: 66 BPM

## 2021-10-20 DIAGNOSIS — R35.0 URINE FREQUENCY: Primary | ICD-10-CM

## 2021-10-20 DIAGNOSIS — R10.2 PELVIC AND PERINEAL PAIN: ICD-10-CM

## 2021-10-20 DIAGNOSIS — M25.551 ACUTE HIP PAIN, RIGHT: ICD-10-CM
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
## 2021-10-20 DIAGNOSIS — R93.7 ABNORMAL X-RAY OF PELVIS: ICD-10-CM

## 2021-10-20 DIAGNOSIS — E78.2 MIXED HYPERLIPIDEMIA: ICD-10-CM

## 2021-10-20 DIAGNOSIS — I10 HYPERTENSION, ESSENTIAL: ICD-10-CM

## 2021-10-20 DIAGNOSIS — M54.40 BACK PAIN OF LUMBAR REGION WITH SCIATICA: ICD-10-CM

## 2021-10-20 DIAGNOSIS — Z99.89 WALKER AS AMBULATION AID: ICD-10-CM

## 2021-10-20 DIAGNOSIS — M54.9 DORSALGIA, UNSPECIFIED: ICD-10-CM

## 2021-10-20 DIAGNOSIS — E07.9 THYROID DISEASE: ICD-10-CM

## 2021-10-20 DIAGNOSIS — E03.9 HYPOTHYROIDISM, UNSPECIFIED TYPE: ICD-10-CM

## 2021-10-20 DIAGNOSIS — E55.9 HYPOVITAMINOSIS D: ICD-10-CM

## 2021-10-20 DIAGNOSIS — N39.0 URINARY TRACT INFECTION WITHOUT HEMATURIA, SITE UNSPECIFIED: ICD-10-CM

## 2021-10-20 LAB
BILIRUB SERPL-MCNC: ABNORMAL MG/DL
BLOOD URINE, POC: ABNORMAL
CLARITY, POC UA: ABNORMAL
COLOR, POC UA: YELLOW
GLUCOSE UR QL STRIP: ABNORMAL
KETONES UR QL STRIP: ABNORMAL
LEUKOCYTE ESTERASE URINE, POC: ABNORMAL
NITRITE, POC UA: ABNORMAL
PH, POC UA: 7
PROTEIN, POC: ABNORMAL
SPECIFIC GRAVITY, POC UA: 1
UROBILINOGEN, POC UA: ABNORMAL

## 2021-10-20 PROCEDURE — 3288F FALL RISK ASSESSMENT DOCD: CPT | Mod: CPTII,S$GLB,, | Performed by: NURSE PRACTITIONER

## 2021-10-20 PROCEDURE — 1159F MED LIST DOCD IN RCRD: CPT | Mod: CPTII,S$GLB,, | Performed by: NURSE PRACTITIONER

## 2021-10-20 PROCEDURE — 3078F PR MOST RECENT DIASTOLIC BLOOD PRESSURE < 80 MM HG: ICD-10-PCS | Mod: CPTII,S$GLB,, | Performed by: NURSE PRACTITIONER

## 2021-10-20 PROCEDURE — 1160F RVW MEDS BY RX/DR IN RCRD: CPT | Mod: CPTII,S$GLB,, | Performed by: NURSE PRACTITIONER

## 2021-10-20 PROCEDURE — 1126F AMNT PAIN NOTED NONE PRSNT: CPT | Mod: CPTII,S$GLB,, | Performed by: NURSE PRACTITIONER

## 2021-10-20 PROCEDURE — 1101F PR PT FALLS ASSESS DOC 0-1 FALLS W/OUT INJ PAST YR: ICD-10-PCS | Mod: CPTII,S$GLB,, | Performed by: NURSE PRACTITIONER

## 2021-10-20 PROCEDURE — 3288F PR FALLS RISK ASSESSMENT DOCUMENTED: ICD-10-PCS | Mod: CPTII,S$GLB,, | Performed by: NURSE PRACTITIONER

## 2021-10-20 PROCEDURE — 99499 UNLISTED E&M SERVICE: CPT | Mod: S$GLB,,, | Performed by: NURSE PRACTITIONER

## 2021-10-20 PROCEDURE — 1101F PT FALLS ASSESS-DOCD LE1/YR: CPT | Mod: CPTII,S$GLB,, | Performed by: NURSE PRACTITIONER

## 2021-10-20 PROCEDURE — 1126F PR PAIN SEVERITY QUANTIFIED, NO PAIN PRESENT: ICD-10-PCS | Mod: CPTII,S$GLB,, | Performed by: NURSE PRACTITIONER

## 2021-10-20 PROCEDURE — 1160F PR REVIEW ALL MEDS BY PRESCRIBER/CLIN PHARMACIST DOCUMENTED: ICD-10-PCS | Mod: CPTII,S$GLB,, | Performed by: NURSE PRACTITIONER

## 2021-10-20 PROCEDURE — 90694 VACC AIIV4 NO PRSRV 0.5ML IM: CPT | Mod: S$GLB,,, | Performed by: NURSE PRACTITIONER

## 2021-10-20 PROCEDURE — 81002 POCT URINE DIPSTICK WITHOUT MICROSCOPE: ICD-10-PCS | Mod: S$GLB,,, | Performed by: NURSE PRACTITIONER

## 2021-10-20 PROCEDURE — 81002 URINALYSIS NONAUTO W/O SCOPE: CPT | Mod: S$GLB,,, | Performed by: NURSE PRACTITIONER

## 2021-10-20 PROCEDURE — G0008 FLU VACCINE - QUADRIVALENT - ADJUVANTED: ICD-10-PCS | Mod: S$GLB,,, | Performed by: NURSE PRACTITIONER

## 2021-10-20 PROCEDURE — 3074F PR MOST RECENT SYSTOLIC BLOOD PRESSURE < 130 MM HG: ICD-10-PCS | Mod: CPTII,S$GLB,, | Performed by: NURSE PRACTITIONER

## 2021-10-20 PROCEDURE — 3074F SYST BP LT 130 MM HG: CPT | Mod: CPTII,S$GLB,, | Performed by: NURSE PRACTITIONER

## 2021-10-20 PROCEDURE — 1159F PR MEDICATION LIST DOCUMENTED IN MEDICAL RECORD: ICD-10-PCS | Mod: CPTII,S$GLB,, | Performed by: NURSE PRACTITIONER

## 2021-10-20 PROCEDURE — 3078F DIAST BP <80 MM HG: CPT | Mod: CPTII,S$GLB,, | Performed by: NURSE PRACTITIONER

## 2021-10-20 PROCEDURE — 90694 FLU VACCINE - QUADRIVALENT - ADJUVANTED: ICD-10-PCS | Mod: S$GLB,,, | Performed by: NURSE PRACTITIONER

## 2021-10-20 PROCEDURE — 99214 OFFICE O/P EST MOD 30 MIN: CPT | Mod: 25,S$GLB,, | Performed by: NURSE PRACTITIONER

## 2021-10-20 PROCEDURE — 99214 PR OFFICE/OUTPT VISIT, EST, LEVL IV, 30-39 MIN: ICD-10-PCS | Mod: 25,S$GLB,, | Performed by: NURSE PRACTITIONER

## 2021-10-20 PROCEDURE — 99499 RISK ADDL DX/OHS AUDIT: ICD-10-PCS | Mod: S$GLB,,, | Performed by: NURSE PRACTITIONER

## 2021-10-20 PROCEDURE — G0008 ADMIN INFLUENZA VIRUS VAC: HCPCS | Mod: S$GLB,,, | Performed by: NURSE PRACTITIONER

## 2021-10-20 RX ORDER — CEFUROXIME AXETIL 500 MG/1
500 TABLET ORAL EVERY 12 HOURS
Qty: 20 TABLET | Refills: 0 | Status: SHIPPED | OUTPATIENT
Start: 2021-10-20 | End: 2022-01-04

## 2021-10-28 ENCOUNTER — OFFICE VISIT (OUTPATIENT)
Dept: CARDIOLOGY | Facility: CLINIC | Age: 76
End: 2021-10-28
Payer: MEDICARE

## 2021-10-28 VITALS
OXYGEN SATURATION: 97 % | SYSTOLIC BLOOD PRESSURE: 140 MMHG | HEART RATE: 68 BPM | BODY MASS INDEX: 33.92 KG/M2 | WEIGHT: 229 LBS | HEIGHT: 69 IN | DIASTOLIC BLOOD PRESSURE: 70 MMHG

## 2021-10-28 DIAGNOSIS — M51.9 LUMBAR DISC DISEASE: Primary | ICD-10-CM

## 2021-10-28 DIAGNOSIS — E78.2 MIXED HYPERLIPIDEMIA: ICD-10-CM

## 2021-10-28 DIAGNOSIS — E07.9 THYROID DISEASE: ICD-10-CM

## 2021-10-28 PROCEDURE — 99499 RISK ADDL DX/OHS AUDIT: ICD-10-PCS | Mod: S$GLB,,, | Performed by: SPECIALIST

## 2021-10-28 PROCEDURE — 3288F PR FALLS RISK ASSESSMENT DOCUMENTED: ICD-10-PCS | Mod: CPTII,S$GLB,, | Performed by: SPECIALIST

## 2021-10-28 PROCEDURE — 1126F AMNT PAIN NOTED NONE PRSNT: CPT | Mod: CPTII,S$GLB,, | Performed by: SPECIALIST

## 2021-10-28 PROCEDURE — 3077F PR MOST RECENT SYSTOLIC BLOOD PRESSURE >= 140 MM HG: ICD-10-PCS | Mod: CPTII,S$GLB,, | Performed by: SPECIALIST

## 2021-10-28 PROCEDURE — 3077F SYST BP >= 140 MM HG: CPT | Mod: CPTII,S$GLB,, | Performed by: SPECIALIST

## 2021-10-28 PROCEDURE — 1126F PR PAIN SEVERITY QUANTIFIED, NO PAIN PRESENT: ICD-10-PCS | Mod: CPTII,S$GLB,, | Performed by: SPECIALIST

## 2021-10-28 PROCEDURE — 99499 UNLISTED E&M SERVICE: CPT | Mod: S$GLB,,, | Performed by: SPECIALIST

## 2021-10-28 PROCEDURE — 3078F PR MOST RECENT DIASTOLIC BLOOD PRESSURE < 80 MM HG: ICD-10-PCS | Mod: CPTII,S$GLB,, | Performed by: SPECIALIST

## 2021-10-28 PROCEDURE — 1160F PR REVIEW ALL MEDS BY PRESCRIBER/CLIN PHARMACIST DOCUMENTED: ICD-10-PCS | Mod: CPTII,S$GLB,, | Performed by: SPECIALIST

## 2021-10-28 PROCEDURE — 3078F DIAST BP <80 MM HG: CPT | Mod: CPTII,S$GLB,, | Performed by: SPECIALIST

## 2021-10-28 PROCEDURE — 1159F PR MEDICATION LIST DOCUMENTED IN MEDICAL RECORD: ICD-10-PCS | Mod: CPTII,S$GLB,, | Performed by: SPECIALIST

## 2021-10-28 PROCEDURE — 1101F PR PT FALLS ASSESS DOC 0-1 FALLS W/OUT INJ PAST YR: ICD-10-PCS | Mod: CPTII,S$GLB,, | Performed by: SPECIALIST

## 2021-10-28 PROCEDURE — 3288F FALL RISK ASSESSMENT DOCD: CPT | Mod: CPTII,S$GLB,, | Performed by: SPECIALIST

## 2021-10-28 PROCEDURE — 99214 OFFICE O/P EST MOD 30 MIN: CPT | Mod: S$GLB,,, | Performed by: SPECIALIST

## 2021-10-28 PROCEDURE — 99214 PR OFFICE/OUTPT VISIT, EST, LEVL IV, 30-39 MIN: ICD-10-PCS | Mod: S$GLB,,, | Performed by: SPECIALIST

## 2021-10-28 PROCEDURE — 1160F RVW MEDS BY RX/DR IN RCRD: CPT | Mod: CPTII,S$GLB,, | Performed by: SPECIALIST

## 2021-10-28 PROCEDURE — 1159F MED LIST DOCD IN RCRD: CPT | Mod: CPTII,S$GLB,, | Performed by: SPECIALIST

## 2021-10-28 PROCEDURE — 1101F PT FALLS ASSESS-DOCD LE1/YR: CPT | Mod: CPTII,S$GLB,, | Performed by: SPECIALIST

## 2021-10-29 ENCOUNTER — HOSPITAL ENCOUNTER (OUTPATIENT)
Dept: RADIOLOGY | Facility: HOSPITAL | Age: 76
Discharge: HOME | End: 2021-10-29
Attending: NURSE PRACTITIONER
Payer: MEDICARE

## 2021-10-29 DIAGNOSIS — R93.7 ABNORMAL X-RAY OF PELVIS: ICD-10-CM

## 2021-10-29 DIAGNOSIS — M54.40 BACK PAIN OF LUMBAR REGION WITH SCIATICA: ICD-10-CM

## 2021-10-29 DIAGNOSIS — R10.2 PELVIC AND PERINEAL PAIN: ICD-10-CM

## 2021-10-29 DIAGNOSIS — M25.551 ACUTE HIP PAIN, RIGHT: ICD-10-CM

## 2021-10-29 DIAGNOSIS — M54.9 DORSALGIA, UNSPECIFIED: ICD-10-CM

## 2021-10-29 PROCEDURE — 72195 MRI PELVIS W/O DYE: CPT | Mod: TC,PO

## 2021-10-29 PROCEDURE — 72148 MRI LUMBAR SPINE W/O DYE: CPT | Mod: TC,PO

## 2021-11-01 RX ORDER — NITROFURANTOIN 25; 75 MG/1; MG/1
100 CAPSULE ORAL 2 TIMES DAILY
COMMUNITY
End: 2021-11-01 | Stop reason: SDUPTHER

## 2021-11-01 RX ORDER — NITROFURANTOIN 25; 75 MG/1; MG/1
100 CAPSULE ORAL 2 TIMES DAILY
Qty: 20 CAPSULE | Refills: 0 | Status: SHIPPED | OUTPATIENT
Start: 2021-11-01 | End: 2022-01-04

## 2021-11-02 ENCOUNTER — TELEPHONE (OUTPATIENT)
Dept: FAMILY MEDICINE | Facility: CLINIC | Age: 76
End: 2021-11-02
Payer: MEDICARE

## 2021-11-02 RX ORDER — FLUCONAZOLE 150 MG/1
150 TABLET ORAL DAILY
COMMUNITY
End: 2021-11-02 | Stop reason: SDUPTHER

## 2021-11-02 RX ORDER — FLUCONAZOLE 150 MG/1
150 TABLET ORAL DAILY
Qty: 2 TABLET | Refills: 0 | Status: SHIPPED | OUTPATIENT
Start: 2021-11-02 | End: 2022-01-04

## 2021-11-06 LAB — BACTERIA UR CULT: ABNORMAL

## 2021-11-10 ENCOUNTER — TELEPHONE (OUTPATIENT)
Dept: FAMILY MEDICINE | Facility: CLINIC | Age: 76
End: 2021-11-10
Payer: MEDICARE

## 2021-12-01 DIAGNOSIS — N39.0 URINARY TRACT INFECTION, SITE NOT SPECIFIED: Primary | ICD-10-CM

## 2021-12-07 ENCOUNTER — HOSPITAL ENCOUNTER (OUTPATIENT)
Dept: RADIOLOGY | Facility: HOSPITAL | Age: 76
Discharge: HOME | End: 2021-12-07
Attending: SPECIALIST
Payer: MEDICARE

## 2021-12-07 DIAGNOSIS — N39.0 URINARY TRACT INFECTION, SITE NOT SPECIFIED: ICD-10-CM

## 2021-12-07 PROCEDURE — 76770 US EXAM ABDO BACK WALL COMP: CPT | Mod: TC

## 2021-12-15 ENCOUNTER — TELEPHONE (OUTPATIENT)
Dept: FAMILY MEDICINE | Facility: CLINIC | Age: 76
End: 2021-12-15
Payer: MEDICARE

## 2021-12-15 NOTE — TELEPHONE ENCOUNTER
----- Message from Al Orrt sent at 12/15/2021  3:46 PM CST -----  Contact: Self  Type:  Sooner Apoointment Request    Caller is requesting a sooner appointment.      Name of Caller:  Patient  When is the first available appointment?  12/17  Symptoms:  UTI  Best Call Back Number:  [phone]  Additional Information:  Pt is calling to see if she can just come in and do some urine to see if she has a UTI then see the Dr. Please call pt back at [phone] to update and advise.

## 2021-12-20 ENCOUNTER — TELEPHONE (OUTPATIENT)
Dept: FAMILY MEDICINE | Facility: CLINIC | Age: 76
End: 2021-12-20
Payer: MEDICARE

## 2022-01-04 ENCOUNTER — TELEPHONE (OUTPATIENT)
Dept: FAMILY MEDICINE | Facility: CLINIC | Age: 77
End: 2022-01-04

## 2022-01-04 ENCOUNTER — TELEPHONE (OUTPATIENT)
Dept: FAMILY MEDICINE | Facility: CLINIC | Age: 77
End: 2022-01-04
Payer: MEDICARE

## 2022-01-04 ENCOUNTER — OFFICE VISIT (OUTPATIENT)
Dept: FAMILY MEDICINE | Facility: CLINIC | Age: 77
End: 2022-01-04
Payer: MEDICARE

## 2022-01-04 ENCOUNTER — HOSPITAL ENCOUNTER (OUTPATIENT)
Dept: RADIOLOGY | Facility: HOSPITAL | Age: 77
Discharge: HOME | End: 2022-01-04
Attending: FAMILY MEDICINE
Payer: MEDICARE

## 2022-01-04 VITALS
BODY MASS INDEX: 35.55 KG/M2 | WEIGHT: 240 LBS | OXYGEN SATURATION: 96 % | TEMPERATURE: 98 F | HEART RATE: 65 BPM | HEIGHT: 69 IN

## 2022-01-04 DIAGNOSIS — R79.89 ELEVATED D-DIMER: ICD-10-CM

## 2022-01-04 DIAGNOSIS — I10 HYPERTENSION, ESSENTIAL: ICD-10-CM

## 2022-01-04 DIAGNOSIS — R06.02 SOB (SHORTNESS OF BREATH): ICD-10-CM

## 2022-01-04 DIAGNOSIS — R07.9 CHEST PAIN, UNSPECIFIED TYPE: ICD-10-CM

## 2022-01-04 DIAGNOSIS — R60.0 PEDAL EDEMA: ICD-10-CM

## 2022-01-04 DIAGNOSIS — R07.9 CHEST PAIN, UNSPECIFIED TYPE: Primary | ICD-10-CM

## 2022-01-04 PROCEDURE — 99499 RISK ADDL DX/OHS AUDIT: ICD-10-PCS | Mod: S$GLB,,, | Performed by: FAMILY MEDICINE

## 2022-01-04 PROCEDURE — 3288F FALL RISK ASSESSMENT DOCD: CPT | Mod: CPTII,S$GLB,, | Performed by: FAMILY MEDICINE

## 2022-01-04 PROCEDURE — 93005 ELECTROCARDIOGRAM TRACING: CPT | Mod: S$GLB,,, | Performed by: FAMILY MEDICINE

## 2022-01-04 PROCEDURE — 71046 X-RAY EXAM CHEST 2 VIEWS: CPT | Mod: TC

## 2022-01-04 PROCEDURE — 99214 OFFICE O/P EST MOD 30 MIN: CPT | Mod: S$GLB,,, | Performed by: FAMILY MEDICINE

## 2022-01-04 PROCEDURE — 1126F AMNT PAIN NOTED NONE PRSNT: CPT | Mod: CPTII,S$GLB,, | Performed by: FAMILY MEDICINE

## 2022-01-04 PROCEDURE — 3288F PR FALLS RISK ASSESSMENT DOCUMENTED: ICD-10-PCS | Mod: CPTII,S$GLB,, | Performed by: FAMILY MEDICINE

## 2022-01-04 PROCEDURE — 1101F PR PT FALLS ASSESS DOC 0-1 FALLS W/OUT INJ PAST YR: ICD-10-PCS | Mod: CPTII,S$GLB,, | Performed by: FAMILY MEDICINE

## 2022-01-04 PROCEDURE — 99499 UNLISTED E&M SERVICE: CPT | Mod: S$GLB,,, | Performed by: FAMILY MEDICINE

## 2022-01-04 PROCEDURE — 93010 EKG 12-LEAD: ICD-10-PCS | Mod: S$GLB,,, | Performed by: INTERNAL MEDICINE

## 2022-01-04 PROCEDURE — 1159F PR MEDICATION LIST DOCUMENTED IN MEDICAL RECORD: ICD-10-PCS | Mod: CPTII,S$GLB,, | Performed by: FAMILY MEDICINE

## 2022-01-04 PROCEDURE — 1126F PR PAIN SEVERITY QUANTIFIED, NO PAIN PRESENT: ICD-10-PCS | Mod: CPTII,S$GLB,, | Performed by: FAMILY MEDICINE

## 2022-01-04 PROCEDURE — 1159F MED LIST DOCD IN RCRD: CPT | Mod: CPTII,S$GLB,, | Performed by: FAMILY MEDICINE

## 2022-01-04 PROCEDURE — 93010 ELECTROCARDIOGRAM REPORT: CPT | Mod: S$GLB,,, | Performed by: INTERNAL MEDICINE

## 2022-01-04 PROCEDURE — 1101F PT FALLS ASSESS-DOCD LE1/YR: CPT | Mod: CPTII,S$GLB,, | Performed by: FAMILY MEDICINE

## 2022-01-04 PROCEDURE — 93005 EKG 12-LEAD: ICD-10-PCS | Mod: S$GLB,,, | Performed by: FAMILY MEDICINE

## 2022-01-04 PROCEDURE — 99214 PR OFFICE/OUTPT VISIT, EST, LEVL IV, 30-39 MIN: ICD-10-PCS | Mod: S$GLB,,, | Performed by: FAMILY MEDICINE

## 2022-01-05 ENCOUNTER — HOSPITAL ENCOUNTER (OUTPATIENT)
Dept: RADIOLOGY | Facility: HOSPITAL | Age: 77
Discharge: HOME | End: 2022-01-05
Attending: FAMILY MEDICINE
Payer: MEDICARE

## 2022-01-05 ENCOUNTER — TELEPHONE (OUTPATIENT)
Dept: FAMILY MEDICINE | Facility: CLINIC | Age: 77
End: 2022-01-05
Payer: MEDICARE

## 2022-01-05 DIAGNOSIS — R07.9 CHEST PAIN, UNSPECIFIED TYPE: ICD-10-CM

## 2022-01-05 DIAGNOSIS — R79.89 ELEVATED D-DIMER: ICD-10-CM

## 2022-01-05 PROCEDURE — 25500020 PHARM REV CODE 255: Performed by: FAMILY MEDICINE

## 2022-01-05 PROCEDURE — 71275 CT ANGIOGRAPHY CHEST: CPT | Mod: TC

## 2022-01-05 RX ADMIN — IOHEXOL 100 ML: 350 INJECTION, SOLUTION INTRAVENOUS at 12:01

## 2022-01-05 NOTE — TELEPHONE ENCOUNTER
No answer  ----- Message from Jorje Hernandez sent at 1/5/2022 10:12 AM CST -----  Contact: pt  Pt is needing a call back about ins rosa for her ct scan [phone]  Thanks

## 2022-01-05 NOTE — PROGRESS NOTES
Weight up 11 lb.  Has had a little chest discomfort.  Mostly shortness of breath.  Short of breath with exertion.   In each min did evaluation sounds like found interstitial cystitis.  Off her did Triptan.  Some pedal edema.  Short of breath walking no PND orthopnea.  The discomfort in the chest and a random.  It is not exertional.  Stress test negative November of 2020.  Using Dr. Chavez.  Ejection fraction was 60 +% on nuclear medicine study and only 45% on echocardiogram.    Physical examination vital signs are noted.  Neck without bruit.  Chest clear.  Heart regular rate rhythm.  Abdomen bowel sounds are positive soft nontender no guarding or rebound.  There is no chest wall tenderness.  Extremities 1+ pedal edema.    EKG shows sinus rhythm no acute changes.    Impression.  Hypertension uncontrolled.  Pedal edema.  Dyspnea.    Plan stat troponin CPK and CPK MB.  D-dimer.  CBC CMP TSH BNP ordered.  Chest x-ray ordered.  Increase Cozaar to 100 mg per day.  Lasix 20 mg daily and KCl 10 mEq daily.  Follow-up in 7 days.  To the emergency room if she is worsening.

## 2022-01-05 NOTE — TELEPHONE ENCOUNTER
----- Message from Moisés Resendez sent at 1/5/2022 11:34 AM CST -----  Missed call pt was in imaging center please have a nurse call back     [phone]

## 2022-01-06 RX ORDER — CLONIDINE HYDROCHLORIDE 0.1 MG/1
0.1 TABLET ORAL 2 TIMES DAILY
Qty: 60 TABLET | Refills: 0 | Status: SHIPPED | OUTPATIENT
Start: 2022-01-06 | End: 2022-02-12

## 2022-01-06 NOTE — TELEPHONE ENCOUNTER
Pt advised no clot  ----- Message from Pawel Badillo III, MD sent at 1/5/2022  7:00 PM CST -----  No clot. Better?  FOLLOW-UP AS RECOMMENDED

## 2022-01-06 NOTE — TELEPHONE ENCOUNTER
Pt stated bp top number 160s. Sending catapres 0.1 mg bid #60. 3w bp fu scheduled 1/27 11am    ----- Message from Al Chowdhury sent at 1/6/2022  9:42 AM CST -----  Contact: Self  Pt is calling and requesting to talk to [place]. Please call pt back at [phone] to update and advise please.

## 2022-01-08 PROBLEM — E06.9 THYROIDITIS: Status: RESOLVED | Noted: 2018-08-20 | Resolved: 2022-01-08

## 2022-01-08 PROBLEM — E07.9 THYROID DISEASE: Status: RESOLVED | Noted: 2018-08-20 | Resolved: 2022-01-08

## 2022-01-08 PROBLEM — R93.7 ABNORMAL X-RAY OF PELVIS: Status: RESOLVED | Noted: 2021-10-20 | Resolved: 2022-01-08
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
## 2022-01-08 PROBLEM — E04.1 NODULAR THYROID DISEASE: Status: RESOLVED | Noted: 2018-08-20 | Resolved: 2022-01-08

## 2022-01-08 PROBLEM — M54.2 NECK PAIN ON LEFT SIDE: Status: RESOLVED | Noted: 2021-07-23 | Resolved: 2022-01-08

## 2022-01-08 PROBLEM — M25.512 ACUTE PAIN OF LEFT SHOULDER: Status: RESOLVED | Noted: 2021-07-23 | Resolved: 2022-01-08

## 2022-01-09 NOTE — PROGRESS NOTES
Patient, Marisela Eagle (MRN #[id number]), presented with a recorded BMI of 35.44 kg/m^2 and a documented comorbidity(s):  - Hypertension  to which the severe obesity is a contributing factor. This is consistent with the definition of severe obesity (BMI 35.0-39.9) with comorbidity (ICD-10 E66.01, Z68.35). The patient's severe obesity was monitored, evaluated, addressed and/or treated. This addendum to the medical record is made on 01/09/2022.

## 2022-01-10 ENCOUNTER — OFFICE VISIT (OUTPATIENT)
Dept: FAMILY MEDICINE | Facility: CLINIC | Age: 77
End: 2022-01-10
Payer: MEDICARE

## 2022-01-10 VITALS
SYSTOLIC BLOOD PRESSURE: 138 MMHG | HEIGHT: 69 IN | WEIGHT: 243 LBS | TEMPERATURE: 98 F | DIASTOLIC BLOOD PRESSURE: 88 MMHG | HEART RATE: 68 BPM | OXYGEN SATURATION: 98 % | BODY MASS INDEX: 35.99 KG/M2

## 2022-01-10 DIAGNOSIS — L03.115 CELLULITIS OF RIGHT LOWER EXTREMITY: ICD-10-CM

## 2022-01-10 DIAGNOSIS — R07.9 CHEST PAIN, UNSPECIFIED TYPE: ICD-10-CM

## 2022-01-10 DIAGNOSIS — I10 HYPERTENSION, ESSENTIAL: Primary | ICD-10-CM

## 2022-01-10 DIAGNOSIS — R60.0 PEDAL EDEMA: ICD-10-CM

## 2022-01-10 PROCEDURE — 1159F PR MEDICATION LIST DOCUMENTED IN MEDICAL RECORD: ICD-10-PCS | Mod: CPTII,S$GLB,, | Performed by: FAMILY MEDICINE

## 2022-01-10 PROCEDURE — 1101F PT FALLS ASSESS-DOCD LE1/YR: CPT | Mod: CPTII,S$GLB,, | Performed by: FAMILY MEDICINE

## 2022-01-10 PROCEDURE — 1159F MED LIST DOCD IN RCRD: CPT | Mod: CPTII,S$GLB,, | Performed by: FAMILY MEDICINE

## 2022-01-10 PROCEDURE — 3075F SYST BP GE 130 - 139MM HG: CPT | Mod: CPTII,S$GLB,, | Performed by: FAMILY MEDICINE

## 2022-01-10 PROCEDURE — 3075F PR MOST RECENT SYSTOLIC BLOOD PRESS GE 130-139MM HG: ICD-10-PCS | Mod: CPTII,S$GLB,, | Performed by: FAMILY MEDICINE

## 2022-01-10 PROCEDURE — 3079F DIAST BP 80-89 MM HG: CPT | Mod: CPTII,S$GLB,, | Performed by: FAMILY MEDICINE

## 2022-01-10 PROCEDURE — 99213 OFFICE O/P EST LOW 20 MIN: CPT | Mod: S$GLB,,, | Performed by: FAMILY MEDICINE

## 2022-01-10 PROCEDURE — 1126F PR PAIN SEVERITY QUANTIFIED, NO PAIN PRESENT: ICD-10-PCS | Mod: CPTII,S$GLB,, | Performed by: FAMILY MEDICINE

## 2022-01-10 PROCEDURE — 3288F PR FALLS RISK ASSESSMENT DOCUMENTED: ICD-10-PCS | Mod: CPTII,S$GLB,, | Performed by: FAMILY MEDICINE

## 2022-01-10 PROCEDURE — 3288F FALL RISK ASSESSMENT DOCD: CPT | Mod: CPTII,S$GLB,, | Performed by: FAMILY MEDICINE

## 2022-01-10 PROCEDURE — 1101F PR PT FALLS ASSESS DOC 0-1 FALLS W/OUT INJ PAST YR: ICD-10-PCS | Mod: CPTII,S$GLB,, | Performed by: FAMILY MEDICINE

## 2022-01-10 PROCEDURE — 99213 PR OFFICE/OUTPT VISIT, EST, LEVL III, 20-29 MIN: ICD-10-PCS | Mod: S$GLB,,, | Performed by: FAMILY MEDICINE

## 2022-01-10 PROCEDURE — 1126F AMNT PAIN NOTED NONE PRSNT: CPT | Mod: CPTII,S$GLB,, | Performed by: FAMILY MEDICINE

## 2022-01-10 PROCEDURE — 3079F PR MOST RECENT DIASTOLIC BLOOD PRESSURE 80-89 MM HG: ICD-10-PCS | Mod: CPTII,S$GLB,, | Performed by: FAMILY MEDICINE

## 2022-01-10 RX ORDER — NITROFURANTOIN (MACROCRYSTALS) 100 MG/1
100 CAPSULE ORAL DAILY
COMMUNITY
End: 2022-07-21

## 2022-01-10 RX ORDER — DOXYCYCLINE 100 MG/1
100 CAPSULE ORAL EVERY 12 HOURS
Qty: 20 CAPSULE | Refills: 0 | Status: SHIPPED | OUTPATIENT
Start: 2022-01-10 | End: 2022-07-21

## 2022-01-11 NOTE — PROGRESS NOTES
Weight is up 3 lb.  Blood pressure is under control.  D-dimer was slightly elevated but CBC and CMP were okay TSH was okay at 2.57 BNP stable at 199 chest x-ray showed some increased fluid.  CTA showed no pulmonary embolus but possibly a fracture that is new.  CPK was 208 but troponin was normal.  Free T4 0.99 and TSH 2.49.  Dry mouth so and drinking extra water.  The skin of her right shin has become reddened since yesterday and is little tender.  No chest pain present.  But she says she had some when walking earlier today.  Sees Dr. Chavez now.  She is on Macrobid for her bladder right now.  Very dry mouth probably from the Ditropan.    Physical examination vital signs noted.  Chest clear.  Heart regular rate rhythm.  Extremities 1 to 2+ pedal edema.  Some erythema of the right shin and is tender.    Impression.  Hypertension controlled.  Cellulitis right lower extremity.  Pedal edema.  Chest pain.    Plan Lexiscan Myoview study.  Vibramycin 100 mg b.i.d. for 10 days.  Discontinue the did trip and due to the dry mouth.  Increase her Lasix from 20-40 mg daily.  Follow-up with Dr. Chavez.  Aspirin 81 mg daily.  Follow-up here

## 2022-01-27 ENCOUNTER — LAB VISIT (OUTPATIENT)
Dept: LAB | Facility: HOSPITAL | Age: 77
End: 2022-01-27
Attending: FAMILY MEDICINE
Payer: MEDICARE

## 2022-01-27 ENCOUNTER — OFFICE VISIT (OUTPATIENT)
Dept: FAMILY MEDICINE | Facility: CLINIC | Age: 77
End: 2022-01-27
Payer: MEDICARE

## 2022-01-27 VITALS
SYSTOLIC BLOOD PRESSURE: 121 MMHG | HEIGHT: 69 IN | OXYGEN SATURATION: 95 % | WEIGHT: 232 LBS | HEART RATE: 73 BPM | DIASTOLIC BLOOD PRESSURE: 57 MMHG | BODY MASS INDEX: 34.36 KG/M2 | TEMPERATURE: 98 F

## 2022-01-27 DIAGNOSIS — I10 HYPERTENSION, ESSENTIAL: Primary | ICD-10-CM

## 2022-01-27 DIAGNOSIS — L03.115 CELLULITIS OF RIGHT LOWER EXTREMITY: ICD-10-CM

## 2022-01-27 DIAGNOSIS — K11.7 XEROSTOMIA: ICD-10-CM

## 2022-01-27 DIAGNOSIS — R60.0 PEDAL EDEMA: ICD-10-CM

## 2022-01-27 DIAGNOSIS — E53.8 VITAMIN B 12 DEFICIENCY: ICD-10-CM

## 2022-01-27 DIAGNOSIS — G62.9 NEUROPATHY: ICD-10-CM

## 2022-01-27 LAB — VIT B12 SERPL-MCNC: 789 PG/ML (ref 210–950)

## 2022-01-27 PROCEDURE — 1159F MED LIST DOCD IN RCRD: CPT | Mod: CPTII,S$GLB,, | Performed by: FAMILY MEDICINE

## 2022-01-27 PROCEDURE — 3288F PR FALLS RISK ASSESSMENT DOCUMENTED: ICD-10-PCS | Mod: CPTII,S$GLB,, | Performed by: FAMILY MEDICINE

## 2022-01-27 PROCEDURE — 99213 OFFICE O/P EST LOW 20 MIN: CPT | Mod: S$GLB,,, | Performed by: FAMILY MEDICINE

## 2022-01-27 PROCEDURE — 1101F PR PT FALLS ASSESS DOC 0-1 FALLS W/OUT INJ PAST YR: ICD-10-PCS | Mod: CPTII,S$GLB,, | Performed by: FAMILY MEDICINE

## 2022-01-27 PROCEDURE — 1126F AMNT PAIN NOTED NONE PRSNT: CPT | Mod: CPTII,S$GLB,, | Performed by: FAMILY MEDICINE

## 2022-01-27 PROCEDURE — 86235 NUCLEAR ANTIGEN ANTIBODY: CPT | Mod: 59 | Performed by: FAMILY MEDICINE

## 2022-01-27 PROCEDURE — 3078F DIAST BP <80 MM HG: CPT | Mod: CPTII,S$GLB,, | Performed by: FAMILY MEDICINE

## 2022-01-27 PROCEDURE — 3074F SYST BP LT 130 MM HG: CPT | Mod: CPTII,S$GLB,, | Performed by: FAMILY MEDICINE

## 2022-01-27 PROCEDURE — 86235 NUCLEAR ANTIGEN ANTIBODY: CPT | Performed by: FAMILY MEDICINE

## 2022-01-27 PROCEDURE — 3288F FALL RISK ASSESSMENT DOCD: CPT | Mod: CPTII,S$GLB,, | Performed by: FAMILY MEDICINE

## 2022-01-27 PROCEDURE — 1101F PT FALLS ASSESS-DOCD LE1/YR: CPT | Mod: CPTII,S$GLB,, | Performed by: FAMILY MEDICINE

## 2022-01-27 PROCEDURE — 3078F PR MOST RECENT DIASTOLIC BLOOD PRESSURE < 80 MM HG: ICD-10-PCS | Mod: CPTII,S$GLB,, | Performed by: FAMILY MEDICINE

## 2022-01-27 PROCEDURE — 1126F PR PAIN SEVERITY QUANTIFIED, NO PAIN PRESENT: ICD-10-PCS | Mod: CPTII,S$GLB,, | Performed by: FAMILY MEDICINE

## 2022-01-27 PROCEDURE — 99213 PR OFFICE/OUTPT VISIT, EST, LEVL III, 20-29 MIN: ICD-10-PCS | Mod: S$GLB,,, | Performed by: FAMILY MEDICINE

## 2022-01-27 PROCEDURE — 82607 VITAMIN B-12: CPT | Performed by: FAMILY MEDICINE

## 2022-01-27 PROCEDURE — 86038 ANTINUCLEAR ANTIBODIES: CPT | Performed by: FAMILY MEDICINE

## 2022-01-27 PROCEDURE — 3074F PR MOST RECENT SYSTOLIC BLOOD PRESSURE < 130 MM HG: ICD-10-PCS | Mod: CPTII,S$GLB,, | Performed by: FAMILY MEDICINE

## 2022-01-27 PROCEDURE — 1159F PR MEDICATION LIST DOCUMENTED IN MEDICAL RECORD: ICD-10-PCS | Mod: CPTII,S$GLB,, | Performed by: FAMILY MEDICINE

## 2022-01-31 RX ORDER — EZETIMIBE 10 MG/1
TABLET ORAL
Qty: 90 TABLET | Refills: 0 | OUTPATIENT
Start: 2022-01-31

## 2022-01-31 NOTE — TELEPHONE ENCOUNTER
No new care gaps identified.  Powered by [employer]. Reference number: [id number].   1/31/2022 9:32:16 AM CST

## 2022-01-31 NOTE — TELEPHONE ENCOUNTER
[facility] Note  Quick DC. Inappropriate Request   Refill request requires further review by MD: NO   Medication Therapy Plan: Pharmacy is requesting new script(s) for the following medications without required information, (sig/ frequency/qty/etc)     [facility] action(s):  Quick Discontinue      Duplicate Pended Encounter(s)/ Last Prescribed Details:    Pharmacies have been requesting medications for patients without required information, (sig, frequency, qty, etc.). In addition, requests are sent for medication(s) pt. are currently not taking, and medications patients have never taken.    We have spoken to the pharmacies about these request types and advised their teams previously that we are unable to assess these New Script requests and require all details for these requests. This is a known issue and has been reported.        Medication related problems are not assessed for [facility].   Medication Reconciliation Completed? NO Were there pending details that required adjustment? NO     Automatic Epic Generated Protocol Data Below:   Requested Prescriptions   Pending Prescriptions Disp Refills    ezetimibe (ZETIA) 10 mg tablet [Pharmacy Med Name: EZETIMIBE 10MG TAB] 90 tablet 0              Appointments      Date Provider   Last Visit   1/27/2022 Pawel Badillo III, MD   Next Visit   Visit date not found Pawel Badillo III, MD        Note composed:11:47 AM 01/31/2022

## 2022-01-31 NOTE — PROGRESS NOTES
Feeling better.  Still has dry mouth however.  Stop the ditropan.  Lasix increased to 40 per day.  Weight is down 11 lb.  Using her aspirin.  Hypertension is controlled.  Cellulitis of the right leg is resolved.  Pedal edema is gone.  BMI is at 34. Neuropathy feet are numb.  Injections in the back.  Last 1 2 weeks ago.  Did help some.  Right leg is worse.  MRI however shows worst disc disease on the left.  She is using sublingual B12.  Stress test being done February 26th.  Earliest available.  Does not know if she will be able to do it because she can lie lie down with her arm raised over her head.  Going to see orthopedics to get an injection in it see if that will help.    Physical examination vital signs noted.  Neck without bruit.  Chest clear.  Heart regular rate rhythm.  Extremities are without edema today.  Mouth no lesions.    Impression.  Hypertension controlled.  Cellulitis right leg resolved.  Pedal edema improved.  Xerostomia.  Neuropathy.    Plan check B12 level.  SSA and SSB antibodies ANEUDY.  Decrease Lasix back to 20 per day.  Stress test as scheduled.  Okay to go ahead use the ditroan as I suspec it has nothing to do with the dry mouth.

## 2022-02-01 PROBLEM — S16.1XXA STRAIN OF CERVICAL PORTION OF LEFT TRAPEZIUS MUSCLE: Status: RESOLVED | Noted: 2021-07-23 | Resolved: 2022-02-01

## 2022-02-01 PROBLEM — S46.812A TRAPEZIUS STRAIN, LEFT, INITIAL ENCOUNTER: Status: RESOLVED | Noted: 2021-07-23 | Resolved: 2022-02-01

## 2022-02-03 LAB
ANA NOTE: ABNORMAL
ANA TITR SER IF: POSITIVE {TITER}
CENTROMERE AB TITR SER IF: ABNORMAL {TITER}
ENA SS-A AB SER-ACNC: 7.4 AI (ref 0–0.9)
ENA SS-B AB SER-ACNC: <0.2 AI (ref 0–0.9)

## 2022-02-04 ENCOUNTER — TELEPHONE (OUTPATIENT)
Dept: FAMILY MEDICINE | Facility: CLINIC | Age: 77
End: 2022-02-04
Payer: MEDICARE

## 2022-02-04 DIAGNOSIS — R76.8 SS-A ANTIBODY POSITIVE: Primary | ICD-10-CM

## 2022-02-04 NOTE — TELEPHONE ENCOUNTER
accidentaly called daughters phone. Called princess number gave fina number for [facility]. Faxed results to fina agrawal from [facility]

## 2022-02-08 ENCOUNTER — TELEPHONE (OUTPATIENT)
Dept: FAMILY MEDICINE | Facility: CLINIC | Age: 77
End: 2022-02-08
Payer: MEDICARE

## 2022-02-08 DIAGNOSIS — R76.8 SS-A ANTIBODY POSITIVE: Primary | ICD-10-CM

## 2022-02-08 NOTE — TELEPHONE ENCOUNTER
----- Message from Tia Le, Patient Care Assistant sent at 2/8/2022  1:28 PM CST -----  Regarding: advice  Contact: pt  Type: Needs Medical Advice  Who Called:  pt   Best Call Back Number: [phone] (home)     Additional Information: pt states she would like a callback regarding her referral. Thanks!      DONE

## 2022-02-10 ENCOUNTER — TELEPHONE (OUTPATIENT)
Dept: RHEUMATOLOGY | Facility: CLINIC | Age: 77
End: 2022-02-10
Payer: MEDICARE

## 2022-02-10 NOTE — TELEPHONE ENCOUNTER
Left a message for the patient letting her know the number to the [facility]

## 2022-02-10 NOTE — TELEPHONE ENCOUNTER
----- Message from Kirsty Berg sent at 2/10/2022  9:22 AM CST -----  Regarding: appointment  Contact: Pt @ [phone]  (NP) is trying to schedule an appointment with  in [facility] or [facility]. Patient would like a call back with confirmation of appt. Patient had abnormal labs.    [facility] transfer booked for August. Patient  declines going to bigger cities to possibly be seen sooner. Letter is mailed. RAMÓN

## 2022-02-10 NOTE — TELEPHONE ENCOUNTER
----- Message from Kirsty Berg sent at 2/10/2022  9:26 AM CST -----  Regarding: appointment  Contact: Pt @ [phone]  (NP) would like to make an appointment with any dr on [facility]. Pt would like a call back with confirmation.

## 2022-02-14 ENCOUNTER — TELEPHONE (OUTPATIENT)
Dept: ENDOCRINOLOGY | Facility: CLINIC | Age: 77
End: 2022-02-14
Payer: MEDICARE

## 2022-02-14 NOTE — TELEPHONE ENCOUNTER
Spoke with patient. Notified okay for thyroid labs in January. Notified she still need to do Vit D lab 2/17. She requested a Vit B level. Dr Chavez had order for Vit B-12 so linked that lab to her appt on 2/17.

## 2022-02-17 DIAGNOSIS — F41.9 ANXIETY: ICD-10-CM

## 2022-02-17 RX ORDER — LORAZEPAM 1 MG/1
TABLET ORAL
Qty: 30 TABLET | OUTPATIENT
Start: 2022-02-17

## 2022-02-18 ENCOUNTER — TELEPHONE (OUTPATIENT)
Dept: CARDIOLOGY | Facility: HOSPITAL | Age: 77
End: 2022-02-18
Payer: MEDICARE

## 2022-02-21 ENCOUNTER — HOSPITAL ENCOUNTER (OUTPATIENT)
Dept: RADIOLOGY | Facility: HOSPITAL | Age: 77
Discharge: HOME | End: 2022-02-21
Attending: PHYSICIAN ASSISTANT
Payer: MEDICARE

## 2022-02-21 ENCOUNTER — CLINICAL SUPPORT (OUTPATIENT)
Dept: CARDIOLOGY | Facility: HOSPITAL | Age: 77
End: 2022-02-21
Attending: FAMILY MEDICINE
Payer: MEDICARE

## 2022-02-21 ENCOUNTER — HOSPITAL ENCOUNTER (OUTPATIENT)
Dept: RADIOLOGY | Facility: HOSPITAL | Age: 77
Discharge: HOME | End: 2022-02-21
Attending: FAMILY MEDICINE
Payer: MEDICARE

## 2022-02-21 DIAGNOSIS — E04.9 GOITER: ICD-10-CM

## 2022-02-21 DIAGNOSIS — R07.9 CHEST PAIN, UNSPECIFIED TYPE: ICD-10-CM

## 2022-02-21 LAB
CV STRESS BASE HR: 66 BPM
DIASTOLIC BLOOD PRESSURE: 62 MMHG
OHS CV CPX 1 MINUTE RECOVERY HEART RATE: 74 BPM
OHS CV CPX 85 PERCENT MAX PREDICTED HEART RATE MALE: 117
OHS CV CPX MAX PREDICTED HEART RATE: 137
OHS CV CPX PATIENT IS FEMALE: 1
OHS CV CPX PATIENT IS MALE: 0
OHS CV CPX PEAK DIASTOLIC BLOOD PRESSURE: 65 MMHG
OHS CV CPX PEAK HEAR RATE: 80 BPM
OHS CV CPX PEAK RATE PRESSURE PRODUCT: NORMAL
OHS CV CPX PEAK SYSTOLIC BLOOD PRESSURE: 126 MMHG
OHS CV CPX PERCENT MAX PREDICTED HEART RATE ACHIEVED: 58
OHS CV CPX RATE PRESSURE PRODUCT PRESENTING: 9042
SYSTOLIC BLOOD PRESSURE: 137 MMHG

## 2022-02-21 PROCEDURE — 63600175 PHARM REV CODE 636 W HCPCS: Performed by: FAMILY MEDICINE

## 2022-02-21 PROCEDURE — A9502 TC99M TETROFOSMIN: HCPCS

## 2022-02-21 PROCEDURE — 76536 US EXAM OF HEAD AND NECK: CPT | Mod: 26,HCNC,, | Performed by: RADIOLOGY

## 2022-02-21 PROCEDURE — 93018 CV STRESS TEST I&R ONLY: CPT | Mod: ,,, | Performed by: SPECIALIST

## 2022-02-21 PROCEDURE — 93018 NUCLEAR STRESS TEST (CUPID ONLY): ICD-10-PCS | Mod: ,,, | Performed by: SPECIALIST

## 2022-02-21 PROCEDURE — 93017 CV STRESS TEST TRACING ONLY: CPT

## 2022-02-21 PROCEDURE — 76536 US EXAM OF HEAD AND NECK: CPT | Mod: TC,HCNC

## 2022-02-21 PROCEDURE — 93016 NUCLEAR STRESS TEST (CUPID ONLY): ICD-10-PCS | Mod: ,,, | Performed by: SPECIALIST

## 2022-02-21 PROCEDURE — 93016 CV STRESS TEST SUPVJ ONLY: CPT | Mod: ,,, | Performed by: SPECIALIST

## 2022-02-21 PROCEDURE — 76536 US SOFT TISSUE HEAD NECK THYROID: ICD-10-PCS | Mod: 26,HCNC,, | Performed by: RADIOLOGY

## 2022-02-21 RX ORDER — REGADENOSON 0.08 MG/ML
0.4 INJECTION, SOLUTION INTRAVENOUS
Status: COMPLETED | OUTPATIENT
Start: 2022-02-21 | End: 2022-02-21

## 2022-02-21 RX ADMIN — REGADENOSON 0.4 MG: 0.08 INJECTION, SOLUTION INTRAVENOUS at 09:02

## 2022-02-22 DIAGNOSIS — F41.9 ANXIETY: ICD-10-CM

## 2022-02-22 RX ORDER — LORAZEPAM 1 MG/1
1 TABLET ORAL NIGHTLY PRN
Qty: 30 TABLET | Refills: 2 | Status: SHIPPED | OUTPATIENT
Start: 2022-02-22 | End: 2022-07-19 | Stop reason: SDUPTHER

## 2022-03-10 ENCOUNTER — PATIENT MESSAGE (OUTPATIENT)
Dept: RHEUMATOLOGY | Facility: CLINIC | Age: 77
End: 2022-03-10
Payer: MEDICARE

## 2022-04-06 ENCOUNTER — OFFICE VISIT (OUTPATIENT)
Dept: ENDOCRINOLOGY | Facility: CLINIC | Age: 77
End: 2022-04-06
Payer: MEDICARE

## 2022-04-06 VITALS
BODY MASS INDEX: 33.75 KG/M2 | TEMPERATURE: 99 F | SYSTOLIC BLOOD PRESSURE: 124 MMHG | WEIGHT: 227.88 LBS | DIASTOLIC BLOOD PRESSURE: 82 MMHG | HEIGHT: 69 IN | HEART RATE: 63 BPM | OXYGEN SATURATION: 95 %

## 2022-04-06 DIAGNOSIS — I10 HYPERTENSION, UNSPECIFIED TYPE: ICD-10-CM

## 2022-04-06 DIAGNOSIS — E55.9 HYPOVITAMINOSIS D: ICD-10-CM

## 2022-04-06 DIAGNOSIS — E04.9 GOITER: ICD-10-CM

## 2022-04-06 DIAGNOSIS — E78.5 HYPERLIPIDEMIA, UNSPECIFIED HYPERLIPIDEMIA TYPE: ICD-10-CM

## 2022-04-06 DIAGNOSIS — E66.9 OBESITY (BMI 30-39.9): ICD-10-CM

## 2022-04-06 DIAGNOSIS — S82.91XA CLOSED FRACTURE OF RIGHT LOWER EXTREMITY, INITIAL ENCOUNTER: ICD-10-CM

## 2022-04-06 DIAGNOSIS — L74.9 SWEATING ABNORMALITY: ICD-10-CM

## 2022-04-06 DIAGNOSIS — E03.9 HYPOTHYROIDISM, UNSPECIFIED TYPE: Primary | ICD-10-CM

## 2022-04-06 DIAGNOSIS — Z78.0 POSTMENOPAUSAL: ICD-10-CM

## 2022-04-06 PROCEDURE — 3288F PR FALLS RISK ASSESSMENT DOCUMENTED: ICD-10-PCS | Mod: CPTII,S$GLB,, | Performed by: PHYSICIAN ASSISTANT

## 2022-04-06 PROCEDURE — 3079F DIAST BP 80-89 MM HG: CPT | Mod: CPTII,S$GLB,, | Performed by: PHYSICIAN ASSISTANT

## 2022-04-06 PROCEDURE — 3288F FALL RISK ASSESSMENT DOCD: CPT | Mod: CPTII,S$GLB,, | Performed by: PHYSICIAN ASSISTANT

## 2022-04-06 PROCEDURE — 1125F AMNT PAIN NOTED PAIN PRSNT: CPT | Mod: CPTII,S$GLB,, | Performed by: PHYSICIAN ASSISTANT

## 2022-04-06 PROCEDURE — 3074F PR MOST RECENT SYSTOLIC BLOOD PRESSURE < 130 MM HG: ICD-10-PCS | Mod: CPTII,S$GLB,, | Performed by: PHYSICIAN ASSISTANT

## 2022-04-06 PROCEDURE — 99999 PR PBB SHADOW E&M-EST. PATIENT-LVL V: CPT | Mod: PBBFAC,,, | Performed by: PHYSICIAN ASSISTANT

## 2022-04-06 PROCEDURE — 1101F PT FALLS ASSESS-DOCD LE1/YR: CPT | Mod: CPTII,S$GLB,, | Performed by: PHYSICIAN ASSISTANT

## 2022-04-06 PROCEDURE — 1101F PR PT FALLS ASSESS DOC 0-1 FALLS W/OUT INJ PAST YR: ICD-10-PCS | Mod: CPTII,S$GLB,, | Performed by: PHYSICIAN ASSISTANT

## 2022-04-06 PROCEDURE — 1159F PR MEDICATION LIST DOCUMENTED IN MEDICAL RECORD: ICD-10-PCS | Mod: CPTII,S$GLB,, | Performed by: PHYSICIAN ASSISTANT

## 2022-04-06 PROCEDURE — 1159F MED LIST DOCD IN RCRD: CPT | Mod: CPTII,S$GLB,, | Performed by: PHYSICIAN ASSISTANT

## 2022-04-06 PROCEDURE — 3079F PR MOST RECENT DIASTOLIC BLOOD PRESSURE 80-89 MM HG: ICD-10-PCS | Mod: CPTII,S$GLB,, | Performed by: PHYSICIAN ASSISTANT

## 2022-04-06 PROCEDURE — 99999 PR PBB SHADOW E&M-EST. PATIENT-LVL V: ICD-10-PCS | Mod: PBBFAC,,, | Performed by: PHYSICIAN ASSISTANT

## 2022-04-06 PROCEDURE — 99214 PR OFFICE/OUTPT VISIT, EST, LEVL IV, 30-39 MIN: ICD-10-PCS | Mod: S$GLB,,, | Performed by: PHYSICIAN ASSISTANT

## 2022-04-06 PROCEDURE — 3074F SYST BP LT 130 MM HG: CPT | Mod: CPTII,S$GLB,, | Performed by: PHYSICIAN ASSISTANT

## 2022-04-06 PROCEDURE — 99214 OFFICE O/P EST MOD 30 MIN: CPT | Mod: S$GLB,,, | Performed by: PHYSICIAN ASSISTANT

## 2022-04-06 PROCEDURE — 1160F PR REVIEW ALL MEDS BY PRESCRIBER/CLIN PHARMACIST DOCUMENTED: ICD-10-PCS | Mod: CPTII,S$GLB,, | Performed by: PHYSICIAN ASSISTANT

## 2022-04-06 PROCEDURE — 1160F RVW MEDS BY RX/DR IN RCRD: CPT | Mod: CPTII,S$GLB,, | Performed by: PHYSICIAN ASSISTANT

## 2022-04-06 PROCEDURE — 1125F PR PAIN SEVERITY QUANTIFIED, PAIN PRESENT: ICD-10-PCS | Mod: CPTII,S$GLB,, | Performed by: PHYSICIAN ASSISTANT

## 2022-04-06 RX ORDER — OXYBUTYNIN CHLORIDE 5 MG/1
5 TABLET ORAL DAILY
Qty: 30 TABLET | Refills: 11 | Status: SHIPPED | OUTPATIENT
Start: 2022-04-06 | End: 2022-10-06

## 2022-04-06 RX ORDER — DULOXETIN HYDROCHLORIDE 20 MG/1
20 CAPSULE, DELAYED RELEASE ORAL DAILY
COMMUNITY
End: 2022-07-21 | Stop reason: DRUGHIGH

## 2022-04-06 NOTE — PROGRESS NOTES
"CC: Hypothyroidism    HPI: Marisela Eagle is a 76 y.o. female here for hypothyroidism along with pending conditions listed in the Visit Diagnosis. Diagnosed several years ago. Her daughter had thyroid cancer. They think this was due to radiation on her face for acne. Her cousin and grandmother had T2DM. She is taking biotin. Reports taking ditropan 10 mg daily which helped w/ sweating but caused dry mouth.    Taking 125 mcg daily. She takes it ~6 am and waits an hour before eating.    + sweating (on left side, taking elavil--states this helps), fatigue, wt loss (fluid).    No d/c, palpitations, tremors, hair loss or changes in nails.    Thyroid u/s 8/18 showed 2 subcentimeter nodules that have not changed in size since 2012. Repeat in 3 years. Reports SOB (seeing cardiology). No dysphagia or voice changes.     DEXA 2/21: wnl. No falls or fractures. Taking 10,000 IU daily. She had one fall in 9/21 when she fractured her pelvis. She recently found out she fractured her femur.     PMHx, PSHx: reviewed in epic.  Social Hx: Occasionally has mixed 2-3 drinks. She smoked 0.5 ppd for 53 years.     Wt Readings from Last 6 Encounters:   04/06/22 103.4 kg (227 lb 13.5 oz)   01/27/22 105.2 kg (232 lb)   01/10/22 110.2 kg (243 lb)   01/04/22 108.9 kg (240 lb)   10/28/21 103.9 kg (229 lb)   10/20/21 102.5 kg (226 lb)      ROS:   Constitutional: feels tired, wt loss (16 lbs since 1/22).  Eyes: No recent visual changes  Cardiovascular: + occasional cp and palpitations  Respiratory: + SOB, no cough  Gastrointestinal: Denies recent bowel disturbances  GenitoUrinary - No dysuria  Skin: No new skin rash  Musc: + back pain, knee pain  Neurologic: + numbness and tingling in feet  Endocrine: no polyphagia, polydipsia or polyuria  Remainder ROS negative     /82 (BP Location: Left arm, Patient Position: Sitting, BP Method: Medium (Manual))   Pulse 63   Temp 98.5 °F (36.9 °C) (Oral)   Ht 5' 9" (1.753 m)   Wt 103.4 kg (227 lb 13.5 " oz)   SpO2 95%   BMI 33.65 kg/m²      Personally reviewed labs below:    Lab Results   Component Value Date    TSH 2.490 01/04/2022    TSH 2.570 01/04/2022    S2IXSBF 99 07/03/2019    FREET4 0.99 01/04/2022        Chemistry        Component Value Date/Time     01/04/2022 1516    K 3.7 01/04/2022 1516     01/04/2022 1516    CO2 27 01/04/2022 1516    BUN 9 01/04/2022 1516    CREATININE 0.6 01/04/2022 1516    GLU 97 01/04/2022 1516        Component Value Date/Time    CALCIUM 9.0 01/04/2022 1516    ALKPHOS 48 (L) 01/04/2022 1516    AST 27 01/04/2022 1516    ALT 29 01/04/2022 1516    BILITOT 0.9 01/04/2022 1516    ESTGFRAFRICA >60.0 01/04/2022 1516    EGFRNONAA >60.0 01/04/2022 1516         Lab Results   Component Value Date    HGBA1C 5.2 02/24/2021    HGBA1C 5.4 07/03/2019    HGBA1C 5.5 09/02/2011      PE:  GENERAL: middle aged female, well developed, well nourished  NECK: Supple neck, normal thyroid. No bruit  LYMPHATIC: No cervical or supraclavicular lymphadenopathy  CARDIOVASCULAR: Normal heart sounds, no pedal edema  RESPIRATORY: Normal effort, clear to auscultation bl  MUSC: ambulates with a four pronged cane  PSYCH: no anxiety or depression    EXAMINATION:  US SOFT TISSUE HEAD NECK THYROID     CLINICAL HISTORY:  Nontoxic goiter, unspecified     TECHNIQUE:  Ultrasound of the thyroid and cervical lymph nodes was performed.     COMPARISON:  Thyroid ultrasound of August 27, 2018     FINDINGS:  The right lobe measures 3.4 x 0.9 x 0.7 cm for a calculated volume of 1.1 cc.  The left lobe measures 3.3 x 0.9 x 0.9 cm for a calculated volume of 1.3 cc and a total thyroid volume of 2.4 cc which is quite small.     On the right there is a 6 mm solid nodule in the posterior lower pole.  There is a 2 mm cyst.  In the midpole.     On the left there is a 5 mm nodule at the upper pole and a 4 mm cyst in the upper pole.  There is a 4 mm nodule in the isthmus.     Impression:     Quite small thyroid with small nodules  on each side and in the isthmus.        Electronically signed by: Navarro Bryant MD  Date:                                            02/21/2022  Time:                                           16:03    Assessment/Plan:   1. Hypothyroidism, unspecified type  Comprehensive Metabolic Panel    TSH   2. Goiter  CANCELED: US Soft Tissue Head Neck Thyroid   3. Hypertension, unspecified type     4. Hyperlipidemia, unspecified hyperlipidemia type     5. Postmenopausal  CANCELED: DXA Bone Density Spine And Hip   6. Obesity (BMI 30-39.9)     7. Hypovitaminosis D  Vitamin D    Vitamin D   8. Sweating abnormality  oxybutynin (DITROPAN) 5 MG Tab   9. Closed fracture of right lower extremity, initial encounter         Hypothyroidism-TFTs wnl. Continue LT4 dose.  Goiter-repeat thyroid u/s 2/23.  PYN-wytven-nsmhytck meds.  MOI-xuksrj-qgdcvigu statin  Postmenopausal-DEXA scan 2/23.  Obesity-Body mass index is 33.65 kg/m². Encouraged to increase exercise. Declines MNT.  Hypovitaminosis d-low-normal-increase vitamin d intake to 2000 IU daily.   Right Leg fracture-obtain x-rays from Ortho. Will need bisphosphonate once healed.  Sweating-start ditropan 5 mg daily.    vd today  X-rays from [facility]  F/u in six months w/ labs

## 2022-04-08 ENCOUNTER — LAB VISIT (OUTPATIENT)
Dept: LAB | Facility: HOSPITAL | Age: 77
End: 2022-04-08
Attending: PHYSICIAN ASSISTANT
Payer: MEDICARE

## 2022-04-08 DIAGNOSIS — E55.9 HYPOVITAMINOSIS D: ICD-10-CM

## 2022-04-08 PROCEDURE — 82306 VITAMIN D 25 HYDROXY: CPT | Performed by: PHYSICIAN ASSISTANT

## 2022-04-08 PROCEDURE — 36415 COLL VENOUS BLD VENIPUNCTURE: CPT | Performed by: PHYSICIAN ASSISTANT

## 2022-04-09 LAB — 25(OH)D3+25(OH)D2 SERPL-MCNC: 32 NG/ML (ref 30–96)

## 2022-04-11 DIAGNOSIS — Z96.641 HISTORY OF TOTAL RIGHT HIP ARTHROPLASTY: Primary | ICD-10-CM

## 2022-04-18 ENCOUNTER — HOSPITAL ENCOUNTER (OUTPATIENT)
Dept: RADIOLOGY | Facility: HOSPITAL | Age: 77
Discharge: HOME | End: 2022-04-18
Attending: ORTHOPAEDIC SURGERY
Payer: MEDICARE

## 2022-04-18 DIAGNOSIS — Z96.641 HISTORY OF TOTAL RIGHT HIP ARTHROPLASTY: ICD-10-CM

## 2022-04-18 PROCEDURE — 73700 CT LOWER EXTREMITY W/O DYE: CPT | Mod: TC,PO,RT

## 2022-04-25 ENCOUNTER — TELEPHONE (OUTPATIENT)
Dept: FAMILY MEDICINE | Facility: CLINIC | Age: 77
End: 2022-04-25
Payer: MEDICARE

## 2022-04-25 DIAGNOSIS — M79.604 RIGHT LEG PAIN: Primary | ICD-10-CM

## 2022-04-26 ENCOUNTER — TELEPHONE (OUTPATIENT)
Dept: FAMILY MEDICINE | Facility: CLINIC | Age: 77
End: 2022-04-26
Payer: MEDICARE

## 2022-04-27 ENCOUNTER — TELEPHONE (OUTPATIENT)
Dept: PAIN MEDICINE | Facility: CLINIC | Age: 77
End: 2022-04-27
Payer: MEDICARE

## 2022-04-27 NOTE — TELEPHONE ENCOUNTER
----- Message from Geoffrey Obrien sent at 4/27/2022 12:53 PM CDT -----  Regarding: pt wants to bring her  with her to her appt for Ms Devi to check him out also, call pt   Contact: pt   pt wants to bring her  with her to her appt for Ms Devi to check him out also, call pt

## 2022-05-05 DIAGNOSIS — M54.12 CERVICAL RADICULOPATHY: Primary | ICD-10-CM

## 2022-05-09 ENCOUNTER — OFFICE VISIT (OUTPATIENT)
Dept: PHYSICAL MEDICINE AND REHAB | Facility: CLINIC | Age: 77
End: 2022-05-09
Payer: MEDICARE

## 2022-05-09 VITALS
BODY MASS INDEX: 33.62 KG/M2 | DIASTOLIC BLOOD PRESSURE: 83 MMHG | WEIGHT: 227 LBS | HEART RATE: 68 BPM | HEIGHT: 69 IN | SYSTOLIC BLOOD PRESSURE: 138 MMHG

## 2022-05-09 DIAGNOSIS — M79.604 RIGHT LEG PAIN: ICD-10-CM

## 2022-05-09 DIAGNOSIS — M54.16 LUMBAR RADICULOPATHY: Primary | ICD-10-CM

## 2022-05-09 PROCEDURE — 3075F PR MOST RECENT SYSTOLIC BLOOD PRESS GE 130-139MM HG: ICD-10-PCS | Mod: CPTII,S$GLB,, | Performed by: PHYSICAL MEDICINE & REHABILITATION

## 2022-05-09 PROCEDURE — 99499 RISK ADDL DX/OHS AUDIT: ICD-10-PCS | Mod: S$GLB,,, | Performed by: PHYSICAL MEDICINE & REHABILITATION

## 2022-05-09 PROCEDURE — 1101F PT FALLS ASSESS-DOCD LE1/YR: CPT | Mod: CPTII,S$GLB,, | Performed by: PHYSICAL MEDICINE & REHABILITATION

## 2022-05-09 PROCEDURE — 99999 PR PBB SHADOW E&M-EST. PATIENT-LVL IV: ICD-10-PCS | Mod: PBBFAC,,, | Performed by: PHYSICAL MEDICINE & REHABILITATION

## 2022-05-09 PROCEDURE — 3079F DIAST BP 80-89 MM HG: CPT | Mod: CPTII,S$GLB,, | Performed by: PHYSICAL MEDICINE & REHABILITATION

## 2022-05-09 PROCEDURE — 3079F PR MOST RECENT DIASTOLIC BLOOD PRESSURE 80-89 MM HG: ICD-10-PCS | Mod: CPTII,S$GLB,, | Performed by: PHYSICAL MEDICINE & REHABILITATION

## 2022-05-09 PROCEDURE — 3075F SYST BP GE 130 - 139MM HG: CPT | Mod: CPTII,S$GLB,, | Performed by: PHYSICAL MEDICINE & REHABILITATION

## 2022-05-09 PROCEDURE — 99499 UNLISTED E&M SERVICE: CPT | Mod: S$GLB,,, | Performed by: PHYSICAL MEDICINE & REHABILITATION

## 2022-05-09 PROCEDURE — 1125F AMNT PAIN NOTED PAIN PRSNT: CPT | Mod: CPTII,S$GLB,, | Performed by: PHYSICAL MEDICINE & REHABILITATION

## 2022-05-09 PROCEDURE — 1125F PR PAIN SEVERITY QUANTIFIED, PAIN PRESENT: ICD-10-PCS | Mod: CPTII,S$GLB,, | Performed by: PHYSICAL MEDICINE & REHABILITATION

## 2022-05-09 PROCEDURE — 99204 PR OFFICE/OUTPT VISIT, NEW, LEVL IV, 45-59 MIN: ICD-10-PCS | Mod: S$GLB,,, | Performed by: PHYSICAL MEDICINE & REHABILITATION

## 2022-05-09 PROCEDURE — 1159F MED LIST DOCD IN RCRD: CPT | Mod: CPTII,S$GLB,, | Performed by: PHYSICAL MEDICINE & REHABILITATION

## 2022-05-09 PROCEDURE — 3288F FALL RISK ASSESSMENT DOCD: CPT | Mod: CPTII,S$GLB,, | Performed by: PHYSICAL MEDICINE & REHABILITATION

## 2022-05-09 PROCEDURE — 99204 OFFICE O/P NEW MOD 45 MIN: CPT | Mod: S$GLB,,, | Performed by: PHYSICAL MEDICINE & REHABILITATION

## 2022-05-09 PROCEDURE — 99999 PR PBB SHADOW E&M-EST. PATIENT-LVL IV: CPT | Mod: PBBFAC,,, | Performed by: PHYSICAL MEDICINE & REHABILITATION

## 2022-05-09 PROCEDURE — 3288F PR FALLS RISK ASSESSMENT DOCUMENTED: ICD-10-PCS | Mod: CPTII,S$GLB,, | Performed by: PHYSICAL MEDICINE & REHABILITATION

## 2022-05-09 PROCEDURE — 1101F PR PT FALLS ASSESS DOC 0-1 FALLS W/OUT INJ PAST YR: ICD-10-PCS | Mod: CPTII,S$GLB,, | Performed by: PHYSICAL MEDICINE & REHABILITATION

## 2022-05-09 PROCEDURE — 1159F PR MEDICATION LIST DOCUMENTED IN MEDICAL RECORD: ICD-10-PCS | Mod: CPTII,S$GLB,, | Performed by: PHYSICAL MEDICINE & REHABILITATION

## 2022-05-09 RX ORDER — DULOXETIN HYDROCHLORIDE 60 MG/1
60 CAPSULE, DELAYED RELEASE ORAL DAILY
COMMUNITY
Start: 2022-04-13

## 2022-05-09 RX ORDER — IPRATROPIUM BROMIDE 42 UG/1
SPRAY, METERED NASAL
COMMUNITY
Start: 2022-04-04 | End: 2022-07-21

## 2022-05-09 NOTE — PROGRESS NOTES
HPI:  Patient is a 77 y.o. year old female w. Right leg pain. She states it has been going on for about 2 month. It wraps around her hip, groin and anterior thigh. She has a history of back and neck surgeries. She follows up w. A pain management doctor and get several LUZ MARINA's a year. She also takes cymbalta and neurontin for neuralgia. She is s/p b/l total knee replacements and b/l total hip replacements.  She has difficulty going from sitting to standing. She is unable to lay on her right hip because of pain. She also has significant weakness. She is unable to  her right leg when she is getting in and out the car. She denies any recent trauma or frequent falls.    Imaging  CT Thigh Without Contrast Right  Order: [id number]   Status: Final result     Visible to patient: Yes (seen)     Next appt: 10/03/2022 at 01:00 PM in Lab (LAB, [facility])     Dx: History of total right hip arthroplasty     0 Result Notes    Details    Reading Physician Reading Date Result Priority   Milo Vogel MD  [phone] 4/18/2022      Narrative & Impression  CMS MANDATED QUALITY DATA - CT RADIATION - 436     All CT scans at this facility utilize dose modulation, iterative reconstruction, and/or weight based dosing when appropriate to reduce radiation dose to as low as reasonably achievable.        REASON: Z96.641     TECHNIQUE: Right thigh CT without IV contrast.     COMPARISON: None     FINDINGS:     Postsurgical changes of total right hip arthroplasty and right knee total arthroplasty demonstrated. The hardware appears intact. There is no periarticular lucency. No acute fracture or dislocation. No destructive osseous lesions observed. Beam hardening artifact from orthopedic hardware limits evaluation of regional soft tissues. No gross soft tissue abnormality observed. There is mild anterior knee subcutaneous fat stranding, likely reflecting postsurgical fibrosis. A small suprapatellar knee joint effusion is  noted.     IMPRESSION:     1.  Intact postsurgical changes of total right hip and total right knee arthroplasty.  2.  No acute osseous abnormality.  3.  Small suprapatellar knee joint effusion observed.      MR LUMBAR SPINE WITHOUT IV CONTRAST. 192 images obtained. MRI of the lumbar spine was performed utilizing 1.5 T magnet.     CONTRAST:  No contrast was administered.     COMPARISON:  MRI most recently from October 22, 2020.     FINDINGS:  This report assumes that there are 5 non-rib bearing lumbar vertebral bodies with the L5 vertebral body articulating with the sacrum. Accurate numbering of the spine levels would require imaging of the thoracolumbar spine to count ribs.     The prevertebral soft tissues are normal. Straightening of the normal lordotic curvature of the lumbar spine. Individual vertebral height is maintained.  Conus is normal in caliber and signal. The conus terminates at T12-L1     Bone marrow shows linear decreased signal intensity on the right anterior superior endplate of L1, with decreased T1 and increased T2 signal intensity, new from the previous exam suggesting a small microfracture (sagittal image 6), with minimal vertebral body height loss (less than 10% on the right and no retropulsion). There is edema in the adjacent right-sided T12-L1 facet joints (sagittal image 6).     At L1-2, the disc demonstrates normal height, signal intensity and posterior contour. The spinal canal is patent. The neural foramen is patent bilaterally. There is no ligamentum flavum thickening. There is no facet arthropathy.     At L2-3, the disc shows moderate disc height loss with a moderate-sized broad-based posterior disc bulge. There is mild bilateral facet arthropathy without ligamentum flavum hypertrophy. This causes very mild spinal canal and bilateral neural foraminal stenosis.     At L3-4, the disc shows mild to moderate disc height loss with a broad-based posterior disc bulge. There is severe bilateral  facet arthropathy without ligament of flavum hypertrophy. This results in mild to moderate left neural foraminal stenosis, mild right neural foraminal stenosis and mild spinal canal stenosis.     At L4-5, the disc shows severe disc height loss with a moderate-sized broad-based posterior disc osteophyte complex. There is moderate bilateral facet arthropathy with ligament flavum hypertrophy. This results in severe left neural foraminal stenosis, and mild spinal canal/right neural foraminal stenosis.     At L5-S1, the disc shows relative normal disc height and posterior contour. There is severe bilateral facet arthropathy with ligamentum flavum hypertrophy. The spinal canal and neural foramina appear patent.     The SI joints and visualized pelvis are within normal limits.     IMPRESSION:  1. Abnormal marrow signal intensity along the right superior endplate of L1 most compatible with and endplate microfracture given the clinical history with minimal vertebral body height loss and no retropulsion.  2. Degenerative change throughout the lumbar spine as outlined in detail above most pronounced at L2-L3, L3-L4, and L4-L5, minimally changed from the previous exam.  3. Straightening of the normal lordotic curvature of the lumbar spine, likely secondary to combination of positioning, degenerative change and/or muscle spasm.      MR PELVIS WITHOUT IV CONTRAST. 192 image(s) obtained.     COMPARISON:  None available.     FINDINGS:  Osseous: Bilateral total hip arthroplasties are seen with susceptibility artifact partially obscuring MRI findings. No gross acute osseous abnormality is identified on this exam. If there is a high degree of clinical concern a follow-up CT can be performed.     Bone marrow: Marrow signal shows a diffusely heterogeneous somewhat mottled decreased T1 and T2 signal intensity. This is a somewhat nonspecific finding but one which may suggest red marrow reconversion as can be seen in chronic anemic states,  hypoxia, obesity or smoking.     Soft tissues: Focal subcutaneous soft tissue edema along the posterior margin of the left greater trochanter (coronal image 23) there may be some focal fluid in this area as well, although susceptibility artifact makes further characterization difficult.     Pelvic soft tissues: Prior hysterectomy with no focal abnormality in the female pelvis. There are scattered colonic diverticula in the visualized sigmoid colon.     IMPRESSION:  1. Bilateral total hip arthroplasties.  2. No acute osseous abnormality identified within limits of this exam.  3. Focal fluid along the posterior margin of the left hip suggesting a combination of edema and sequelae of trauma, surgery or bursitis, noting particle disease is also a consideration. Consider correlation for pain in this area.     Labs  EGFR cr lft's gluc nl    Past Medical History:   Diagnosis Date    Bilateral knee pain 2012    left worse, right knee painful even after partial replacement.    Bruises easily     Clot 's?    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 Took Coumadin x 6 weeks    KNEE ARTHROSCOPY  2010    right     TONSILLECTOMY       Family History   Problem Relation Age of Onset    Neuropathy Mother     Hypertension Mother     Stroke Mother     Neuropathy Father     Diabetes Maternal Grandmother     Cancer Daughter     Melanoma Neg Hx     Psoriasis Neg Hx     Lupus Neg Hx     Eczema Neg Hx      Social History     Socioeconomic History    Marital status:    Occupational History    Occupation: RETIRED   Tobacco Use    Smoking status: Former Smoker     Quit date: 3/23/2012     Years since quitting: 10.1    Smokeless tobacco: Never Used   Substance and Sexual Activity    Alcohol use: Yes     Comment: occasional    Drug use: No    Sexual activity: Not Currently       Review of patient's allergies indicates:   Allergen Reactions    Morphine Other (See Comments)     Other reaction(s): Syncope  *Fainted years ago due to pain, but not allergic anymore*       Current Outpatient Medications:     cholecalciferol, vitamin D3, 5,000 unit capsule, Take 5,000 Units by mouth once daily., Disp: , Rfl:     cloNIDine (CATAPRES) 0.1 MG tablet, TAKE ONE TABLET BY MOUTH TWICE DAILY (Patient not taking: Reported on 4/6/2022), Disp: 60 tablet, Rfl: 0    cyanocobalamin (VITAMIN B-12) 1000 MCG tablet, Take 100 mcg by mouth once daily., Disp: , Rfl:     diclofenac sodium (VOLTAREN) 1 % Gel, Apply 2 g topically daily as needed., Disp: , Rfl:     doxycycline (VIBRAMYCIN) 100 MG Cap, Take 1 capsule (100 mg total) by mouth every 12 (twelve) hours. (Patient not taking: Reported on 4/6/2022), Disp: 20 capsule, Rfl: 0    DULoxetine (CYMBALTA) 20 MG capsule, Take 20 mg by mouth once daily., Disp: , Rfl:     DUREZOL 0.05 % Drop ophthalmic solution, Place 1 drop into both eyes once daily. , Disp: , Rfl:     erythromycin (ROMYCIN) ophthalmic ointment, , Disp: , Rfl:     famotidine (PEPCID) 20 MG tablet, TAKE 1 TABLET EVERY NIGHT, Disp: 90 tablet, Rfl: 1    gabapentin (NEURONTIN) 800 MG tablet, TAKE 1 TABLET THREE TIMES DAILY,  Disp: 270 tablet, Rfl: 0    levothyroxine (SYNTHROID) 125 MCG tablet, TAKE 1 TABLET EVERY DAY BEFORE BREAKFAST, Disp: 90 tablet, Rfl: 1    LORazepam (ATIVAN) 1 MG tablet, Take 1 tablet (1 mg total) by mouth nightly as needed for Anxiety., Disp: 30 tablet, Rfl: 2    losartan (COZAAR) 100 MG tablet, TAKE 1/2 TABLET EVERY DAY, Disp: 45 tablet, Rfl: 1    mag hydrox/aluminum hyd/simeth (MAALOX ORAL), Take by mouth., Disp: , Rfl:     magnesium oxide (MAG-OX) 400 mg (241.3 mg magnesium) tablet, Take 400 mg by mouth 2 (two) times daily., Disp: , Rfl:     mecobal/levomefolat Ca/B6 phos (METANX ORAL), Take 1 capsule by mouth 2 (two) times daily., Disp: , Rfl:     metoprolol succinate (TOPROL-XL) 50 MG 24 hr tablet, TAKE 1 TABLET EVERY NIGHT, Disp: 90 tablet, Rfl: 1    nitrofurantoin (MACRODANTIN) 100 MG capsule, Take 100 mg by mouth once daily., Disp: , Rfl:     omeprazole (PRILOSEC) 40 MG capsule, TAKE 1 CAPSULE EVERY DAY, Disp: 90 capsule, Rfl: 1    oxybutynin (DITROPAN) 5 MG Tab, Take 1 tablet (5 mg total) by mouth once daily., Disp: 30 tablet, Rfl: 11    oxyCODONE-acetaminophen (PERCOCET)  mg per tablet, Take 1 tablet by mouth every 6 (six) hours as needed. , Disp: , Rfl:     peg 400-propylene glycol 0.4-0.3 % Drop, Place 1 drop into both eyes once daily., Disp: , Rfl:     potassium chloride SA (K-DUR,KLOR-CON) 20 MEQ tablet, TAKE 1 TABLET TWICE DAILY, Disp: 180 tablet, Rfl: 0    turmeric 400 mg Cap, Take 400 mg by mouth 2 (two) times daily., Disp: , Rfl:     vit C/E/Zn/coppr/lutein/zeaxan (PRESERVISION AREDS-2 ORAL), Take 1 capsule by mouth 2 (two) times daily. , Disp: , Rfl:       Review of Systems:  No nausea, vomiting, fevers, chills , contipation, diarrhea or sweats,no weight change, + back stiffness, no chest pain, no sob, no change of bowel or bladder habits,no coordination issues        Physical Exam:      Vitals reviewed    alert and oriented ×4 follows commands answers all questions  appropriately,affect wnl  Manual muscle test 5 out of 5 sensation to light touch grossly intact  +excruciate tenderness right greater troch   + muscular atrophy w. 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 Recurrent muscular spasm  Lumbar DDD s/p surgery    Plan:  Schedule Therapeutic/dianostic right cluneal nerve block+hydrodissection  I will also perform an EMG/NCS of her RLE to ensure that she does not have active denervation from the spine  She has excruciate tenderness along the insertion of her right gluteus medius tendon on the greater trochanter. In light of her having a total hip replacement, I recommended PRP to her right gluteus medius tendon.    Discussed prp , reviewed risks and benefits, and issued pamphlet- she is aware this procedure is not covered by insurance and is considered investigational. She will read about the procedure first.

## 2022-05-24 DIAGNOSIS — R06.89 ACUTE RESPIRATORY INSUFFICIENCY: ICD-10-CM

## 2022-05-24 DIAGNOSIS — R13.10 DIFFICULTY IN SWALLOWING: ICD-10-CM

## 2022-05-24 DIAGNOSIS — K21.9 ESOPHAGEAL REFLUX: Primary | ICD-10-CM

## 2022-05-27 DIAGNOSIS — E78.2 MIXED HYPERLIPIDEMIA: ICD-10-CM

## 2022-05-27 RX ORDER — FAMOTIDINE 20 MG/1
20 TABLET, FILM COATED ORAL NIGHTLY
Qty: 90 TABLET | Refills: 2 | Status: SHIPPED | OUTPATIENT
Start: 2022-05-27 | End: 2022-07-20

## 2022-05-27 NOTE — TELEPHONE ENCOUNTER
Refill Authorization Note   Marisela Eagle  is requesting a refill authorization.  Brief Assessment and Rationale for Refill:  Approve     Medication Therapy Plan:  Concurrent use of famotidine with PPI acceptable per [facility] protocols    Medication Reconciliation Completed: No   Comments:     No Care Gaps recommended.     Note composed:9:45 AM 05/27/2022

## 2022-05-27 NOTE — TELEPHONE ENCOUNTER
No new care gaps identified.  [facility] Embedded Care Gaps. Reference number: [id number]. 5/27/2022   2:18:47 AM ABIGAILT

## 2022-06-02 ENCOUNTER — HOSPITAL ENCOUNTER (OUTPATIENT)
Dept: RADIOLOGY | Facility: HOSPITAL | Age: 77
Discharge: HOME | End: 2022-06-02
Attending: INTERNAL MEDICINE
Payer: MEDICARE

## 2022-06-02 DIAGNOSIS — R06.89 ACUTE RESPIRATORY INSUFFICIENCY: ICD-10-CM

## 2022-06-02 DIAGNOSIS — R13.10 DIFFICULTY IN SWALLOWING: ICD-10-CM

## 2022-06-02 DIAGNOSIS — K21.9 ESOPHAGEAL REFLUX: ICD-10-CM

## 2022-06-02 PROCEDURE — 74221 X-RAY XM ESOPHAGUS 2CNTRST: CPT | Mod: TC

## 2022-06-03 ENCOUNTER — OFFICE VISIT (OUTPATIENT)
Dept: PHYSICAL MEDICINE AND REHAB | Facility: CLINIC | Age: 77
End: 2022-06-03
Payer: MEDICARE

## 2022-06-03 VITALS — SYSTOLIC BLOOD PRESSURE: 146 MMHG | DIASTOLIC BLOOD PRESSURE: 79 MMHG | HEART RATE: 72 BPM

## 2022-06-03 DIAGNOSIS — G89.29 CHRONIC RIGHT-SIDED LOW BACK PAIN WITHOUT SCIATICA: ICD-10-CM

## 2022-06-03 DIAGNOSIS — M54.50 CHRONIC RIGHT-SIDED LOW BACK PAIN WITHOUT SCIATICA: ICD-10-CM

## 2022-06-03 DIAGNOSIS — M25.559 HIP PAIN: ICD-10-CM

## 2022-06-03 PROCEDURE — 1100F PTFALLS ASSESS-DOCD GE2>/YR: CPT | Mod: CPTII,S$GLB,, | Performed by: PHYSICAL MEDICINE & REHABILITATION

## 2022-06-03 PROCEDURE — 99999 PR PBB SHADOW E&M-EST. PATIENT-LVL III: CPT | Mod: PBBFAC,,, | Performed by: PHYSICAL MEDICINE & REHABILITATION

## 2022-06-03 PROCEDURE — 3077F SYST BP >= 140 MM HG: CPT | Mod: CPTII,S$GLB,, | Performed by: PHYSICAL MEDICINE & REHABILITATION

## 2022-06-03 PROCEDURE — 3288F FALL RISK ASSESSMENT DOCD: CPT | Mod: CPTII,S$GLB,, | Performed by: PHYSICAL MEDICINE & REHABILITATION

## 2022-06-03 PROCEDURE — 64450 NJX AA&/STRD OTHER PN/BRANCH: CPT | Mod: RT,S$GLB,, | Performed by: PHYSICAL MEDICINE & REHABILITATION

## 2022-06-03 PROCEDURE — 3078F DIAST BP <80 MM HG: CPT | Mod: CPTII,S$GLB,, | Performed by: PHYSICAL MEDICINE & REHABILITATION

## 2022-06-03 PROCEDURE — 3288F PR FALLS RISK ASSESSMENT DOCUMENTED: ICD-10-PCS | Mod: CPTII,S$GLB,, | Performed by: PHYSICAL MEDICINE & REHABILITATION

## 2022-06-03 PROCEDURE — 76942 PR U/S GUIDANCE FOR NEEDLE GUIDANCE: ICD-10-PCS | Mod: S$GLB,,, | Performed by: PHYSICAL MEDICINE & REHABILITATION

## 2022-06-03 PROCEDURE — 64450 PR NERVE BLOCK INJ, ANES/STEROID, OTHER PERIPHERAL: ICD-10-PCS | Mod: RT,S$GLB,, | Performed by: PHYSICAL MEDICINE & REHABILITATION

## 2022-06-03 PROCEDURE — 3077F PR MOST RECENT SYSTOLIC BLOOD PRESSURE >= 140 MM HG: ICD-10-PCS | Mod: CPTII,S$GLB,, | Performed by: PHYSICAL MEDICINE & REHABILITATION

## 2022-06-03 PROCEDURE — 76942 ECHO GUIDE FOR BIOPSY: CPT | Mod: S$GLB,,, | Performed by: PHYSICAL MEDICINE & REHABILITATION

## 2022-06-03 PROCEDURE — 3078F PR MOST RECENT DIASTOLIC BLOOD PRESSURE < 80 MM HG: ICD-10-PCS | Mod: CPTII,S$GLB,, | Performed by: PHYSICAL MEDICINE & REHABILITATION

## 2022-06-03 PROCEDURE — 99499 NO LOS: ICD-10-PCS | Mod: S$GLB,,, | Performed by: PHYSICAL MEDICINE & REHABILITATION

## 2022-06-03 PROCEDURE — 99999 PR PBB SHADOW E&M-EST. PATIENT-LVL III: ICD-10-PCS | Mod: PBBFAC,,, | Performed by: PHYSICAL MEDICINE & REHABILITATION

## 2022-06-03 PROCEDURE — 1159F PR MEDICATION LIST DOCUMENTED IN MEDICAL RECORD: ICD-10-PCS | Mod: CPTII,S$GLB,, | Performed by: PHYSICAL MEDICINE & REHABILITATION

## 2022-06-03 PROCEDURE — 99499 UNLISTED E&M SERVICE: CPT | Mod: S$GLB,,, | Performed by: PHYSICAL MEDICINE & REHABILITATION

## 2022-06-03 PROCEDURE — 1100F PR PT FALLS ASSESS DOC 2+ FALLS/FALL W/INJURY/YR: ICD-10-PCS | Mod: CPTII,S$GLB,, | Performed by: PHYSICAL MEDICINE & REHABILITATION

## 2022-06-03 PROCEDURE — 1125F PR PAIN SEVERITY QUANTIFIED, PAIN PRESENT: ICD-10-PCS | Mod: CPTII,S$GLB,, | Performed by: PHYSICAL MEDICINE & REHABILITATION

## 2022-06-03 PROCEDURE — 1159F MED LIST DOCD IN RCRD: CPT | Mod: CPTII,S$GLB,, | Performed by: PHYSICAL MEDICINE & REHABILITATION

## 2022-06-03 PROCEDURE — 1125F AMNT PAIN NOTED PAIN PRSNT: CPT | Mod: CPTII,S$GLB,, | Performed by: PHYSICAL MEDICINE & REHABILITATION

## 2022-06-03 RX ORDER — LIDOCAINE HYDROCHLORIDE 10 MG/ML
10 INJECTION INFILTRATION; PERINEURAL ONCE
Status: COMPLETED | OUTPATIENT
Start: 2022-06-03 | End: 2022-06-03

## 2022-06-03 RX ADMIN — LIDOCAINE HYDROCHLORIDE 10 ML: 10 INJECTION INFILTRATION; PERINEURAL at 12:06

## 2022-06-03 NOTE — PROGRESS NOTES
PROCEDURE NOTE:   risk and benefit of right CLUNEAL NERVE BLOCK AND HYDRODISECTION injection reviewed with. patient. Verbal consent was obtained. Injection was performed after sterile prep w.chrolorohexedine. Short  and long axis APPROACH UTILIZED. First, VISUALIZATION OF THE PSIS  , superior gluteal artery, fascial line between GMAX and GMED was obtained, subsequently  BLOCK + HYDRODISSECTION OF CLUNEAL NERVE PERFORMED. PT. WAS LAYING PRONE. INJECTION WAS PERFORMED WITH A  22g  spinal needle  needle  After a numbing wheal w. Lidocaine performed.   Ultrasound guidance was utilized for needle visualization. A TOTAL OF 10ML OF LIDOCAINE 1% AND 10ML OF STERILE NORMAL SALINE WAS UTILIZED. No complications. iMMEDIATE IMPROVEMENT OF  PAIN POST PROCEDURE.    Ultrasound interpretation was performed prior to the procedure to identify the target and any adjacent neurovascular structures.  Subsequently, interpretation was performed during real- time needle guidance confirming placement. Post- intervention interpretation was also performed confirming appropriate injectate  flow and hemostasis.  Images indicating needle placement have been saved in [employer].

## 2022-06-10 ENCOUNTER — OFFICE VISIT (OUTPATIENT)
Dept: PHYSICAL MEDICINE AND REHAB | Facility: CLINIC | Age: 77
End: 2022-06-10
Payer: MEDICARE

## 2022-06-10 VITALS
SYSTOLIC BLOOD PRESSURE: 149 MMHG | HEART RATE: 63 BPM | HEIGHT: 69 IN | WEIGHT: 227 LBS | BODY MASS INDEX: 33.62 KG/M2 | DIASTOLIC BLOOD PRESSURE: 76 MMHG

## 2022-06-10 DIAGNOSIS — M54.16 LUMBAR RADICULOPATHY: Primary | ICD-10-CM

## 2022-06-10 DIAGNOSIS — M54.16 LUMBAR RADICULOPATHY: ICD-10-CM

## 2022-06-10 PROCEDURE — 1159F MED LIST DOCD IN RCRD: CPT | Mod: CPTII,S$GLB,, | Performed by: PHYSICAL MEDICINE & REHABILITATION

## 2022-06-10 PROCEDURE — 1125F PR PAIN SEVERITY QUANTIFIED, PAIN PRESENT: ICD-10-PCS | Mod: CPTII,S$GLB,, | Performed by: PHYSICAL MEDICINE & REHABILITATION

## 2022-06-10 PROCEDURE — 95908 PR NERVE CONDUCTION STUDY; 3-4 STUDIES: ICD-10-PCS | Mod: S$GLB,,, | Performed by: PHYSICAL MEDICINE & REHABILITATION

## 2022-06-10 PROCEDURE — 99213 PR OFFICE/OUTPT VISIT, EST, LEVL III, 20-29 MIN: ICD-10-PCS | Mod: S$GLB,,, | Performed by: PHYSICAL MEDICINE & REHABILITATION

## 2022-06-10 PROCEDURE — 3288F FALL RISK ASSESSMENT DOCD: CPT | Mod: CPTII,S$GLB,, | Performed by: PHYSICAL MEDICINE & REHABILITATION

## 2022-06-10 PROCEDURE — 95908 NRV CNDJ TST 3-4 STUDIES: CPT | Mod: S$GLB,,, | Performed by: PHYSICAL MEDICINE & REHABILITATION

## 2022-06-10 PROCEDURE — 1101F PT FALLS ASSESS-DOCD LE1/YR: CPT | Mod: CPTII,S$GLB,, | Performed by: PHYSICAL MEDICINE & REHABILITATION

## 2022-06-10 PROCEDURE — 1159F PR MEDICATION LIST DOCUMENTED IN MEDICAL RECORD: ICD-10-PCS | Mod: CPTII,S$GLB,, | Performed by: PHYSICAL MEDICINE & REHABILITATION

## 2022-06-10 PROCEDURE — 99999 PR PBB SHADOW E&M-EST. PATIENT-LVL IV: ICD-10-PCS | Mod: PBBFAC,,, | Performed by: PHYSICAL MEDICINE & REHABILITATION

## 2022-06-10 PROCEDURE — 3078F DIAST BP <80 MM HG: CPT | Mod: CPTII,S$GLB,, | Performed by: PHYSICAL MEDICINE & REHABILITATION

## 2022-06-10 PROCEDURE — 95886 PR EMG COMPLETE, W/ NERVE CONDUCTION STUDIES, 5+ MUSCLES: ICD-10-PCS | Mod: S$GLB,,, | Performed by: PHYSICAL MEDICINE & REHABILITATION

## 2022-06-10 PROCEDURE — 99499 UNLISTED E&M SERVICE: CPT | Mod: S$GLB,,, | Performed by: PHYSICAL MEDICINE & REHABILITATION

## 2022-06-10 PROCEDURE — 95886 MUSC TEST DONE W/N TEST COMP: CPT | Mod: S$GLB,,, | Performed by: PHYSICAL MEDICINE & REHABILITATION

## 2022-06-10 PROCEDURE — 99213 OFFICE O/P EST LOW 20 MIN: CPT | Mod: S$GLB,,, | Performed by: PHYSICAL MEDICINE & REHABILITATION

## 2022-06-10 PROCEDURE — 1125F AMNT PAIN NOTED PAIN PRSNT: CPT | Mod: CPTII,S$GLB,, | Performed by: PHYSICAL MEDICINE & REHABILITATION

## 2022-06-10 PROCEDURE — 99499 NO LOS: ICD-10-PCS | Mod: S$GLB,,, | Performed by: PHYSICAL MEDICINE & REHABILITATION

## 2022-06-10 PROCEDURE — 1101F PR PT FALLS ASSESS DOC 0-1 FALLS W/OUT INJ PAST YR: ICD-10-PCS | Mod: CPTII,S$GLB,, | Performed by: PHYSICAL MEDICINE & REHABILITATION

## 2022-06-10 PROCEDURE — 3077F SYST BP >= 140 MM HG: CPT | Mod: CPTII,S$GLB,, | Performed by: PHYSICAL MEDICINE & REHABILITATION

## 2022-06-10 PROCEDURE — 3077F PR MOST RECENT SYSTOLIC BLOOD PRESSURE >= 140 MM HG: ICD-10-PCS | Mod: CPTII,S$GLB,, | Performed by: PHYSICAL MEDICINE & REHABILITATION

## 2022-06-10 PROCEDURE — 3288F PR FALLS RISK ASSESSMENT DOCUMENTED: ICD-10-PCS | Mod: CPTII,S$GLB,, | Performed by: PHYSICAL MEDICINE & REHABILITATION

## 2022-06-10 PROCEDURE — 3078F PR MOST RECENT DIASTOLIC BLOOD PRESSURE < 80 MM HG: ICD-10-PCS | Mod: CPTII,S$GLB,, | Performed by: PHYSICAL MEDICINE & REHABILITATION

## 2022-06-10 PROCEDURE — 99999 PR PBB SHADOW E&M-EST. PATIENT-LVL IV: CPT | Mod: PBBFAC,,, | Performed by: PHYSICAL MEDICINE & REHABILITATION

## 2022-06-10 NOTE — PROGRESS NOTES
HPI:  Patient is a 77 y.o. year old female w. Right hip pain and leg weakness. Her EMG results are below. She takes neurontin and cymbalta, which she states does not take the quantity that she was orginally prescribed. Last eval. I performed a right cluneal nerve block+ hydrodissection which helped her back pain, however her leg pain continues unchanged. She is doing aquatherapy as she cannot tolerated regular outpt. Therapy.    Imaging  She follows up w. A pain management doctor and get several LUZ MARINA's a year. She also takes cymbalta and neurontin for neuralgia. She is s/p b/l total knee replacements and b/l total hip replacements.  She has difficulty going from sitting to standing. She is unable to lay on her right hip because of pain. She also has significant weakness. She is unable to  her right leg when she is getting in and out the car. She denies any recent trauma or frequent falls.     Imaging  EMG RESULTS  Chronic right L4/L5 radiulopathy with ACTIVE denervation as above  Superimposed sensorimotor predominantly axonal neuropathy       CT Thigh Without Contrast Right  Order: [id number]   Status: Final result     Visible to patient: Yes (seen)     Next appt: 10/03/2022 at 01:00 PM in Lab ([facility])     Dx: History of total right hip arthroplasty     0 Result Notes     Details     Reading Physician Reading Date Result Priority   Milo Vogel MD  [phone] 4/18/2022        Narrative & Impression  CMS MANDATED QUALITY DATA - CT RADIATION - 436     All CT scans at this facility utilize dose modulation, iterative reconstruction, and/or weight based dosing when appropriate to reduce radiation dose to as low as reasonably achievable.        REASON: Z96.641     TECHNIQUE: Right thigh CT without IV contrast.     COMPARISON: None     FINDINGS:     Postsurgical changes of total right hip arthroplasty and right knee total arthroplasty demonstrated. The hardware appears intact. There is no periarticular  lucency. No acute fracture or dislocation. No destructive osseous lesions observed. Beam hardening artifact from orthopedic hardware limits evaluation of regional soft tissues. No gross soft tissue abnormality observed. There is mild anterior knee subcutaneous fat stranding, likely reflecting postsurgical fibrosis. A small suprapatellar knee joint effusion is noted.     IMPRESSION:     1.  Intact postsurgical changes of total right hip and total right knee arthroplasty.  2.  No acute osseous abnormality.  3.  Small suprapatellar knee joint effusion observed.      MR LUMBAR SPINE WITHOUT IV CONTRAST. 192 images obtained. MRI of the lumbar spine was performed utilizing 1.5 T magnet.     CONTRAST:  No contrast was administered.     COMPARISON:  MRI most recently from October 22, 2020.     FINDINGS:  This report assumes that there are 5 non-rib bearing lumbar vertebral bodies with the L5 vertebral body articulating with the sacrum. Accurate numbering of the spine levels would require imaging of the thoracolumbar spine to count ribs.     The prevertebral soft tissues are normal. Straightening of the normal lordotic curvature of the lumbar spine. Individual vertebral height is maintained.  Conus is normal in caliber and signal. The conus terminates at T12-L1     Bone marrow shows linear decreased signal intensity on the right anterior superior endplate of L1, with decreased T1 and increased T2 signal intensity, new from the previous exam suggesting a small microfracture (sagittal image 6), with minimal vertebral body height loss (less than 10% on the right and no retropulsion). There is edema in the adjacent right-sided T12-L1 facet joints (sagittal image 6).     At L1-2, the disc demonstrates normal height, signal intensity and posterior contour. The spinal canal is patent. The neural foramen is patent bilaterally. There is no ligamentum flavum thickening. There is no facet arthropathy.     At L2-3, the disc shows  moderate disc height loss with a moderate-sized broad-based posterior disc bulge. There is mild bilateral facet arthropathy without ligamentum flavum hypertrophy. This causes very mild spinal canal and bilateral neural foraminal stenosis.     At L3-4, the disc shows mild to moderate disc height loss with a broad-based posterior disc bulge. There is severe bilateral facet arthropathy without ligament of flavum hypertrophy. This results in mild to moderate left neural foraminal stenosis, mild right neural foraminal stenosis and mild spinal canal stenosis.     At L4-5, the disc shows severe disc height loss with a moderate-sized broad-based posterior disc osteophyte complex. There is moderate bilateral facet arthropathy with ligament flavum hypertrophy. This results in severe left neural foraminal stenosis, and mild spinal canal/right neural foraminal stenosis.     At L5-S1, the disc shows relative normal disc height and posterior contour. There is severe bilateral facet arthropathy with ligamentum flavum hypertrophy. The spinal canal and neural foramina appear patent.     The SI joints and visualized pelvis are within normal limits.     IMPRESSION:  1. Abnormal marrow signal intensity along the right superior endplate of L1 most compatible with and endplate microfracture given the clinical history with minimal vertebral body height loss and no retropulsion.  2. Degenerative change throughout the lumbar spine as outlined in detail above most pronounced at L2-L3, L3-L4, and L4-L5, minimally changed from the previous exam.  3. Straightening of the normal lordotic curvature of the lumbar spine, likely secondary to combination of positioning, degenerative change and/or muscle spasm.      MR PELVIS WITHOUT IV CONTRAST. 192 image(s) obtained.     COMPARISON:  None available.     FINDINGS:  Osseous: Bilateral total hip arthroplasties are seen with susceptibility artifact partially obscuring MRI findings. No gross acute  osseous abnormality is identified on this exam. If there is a high degree of clinical concern a follow-up CT can be performed.     Bone marrow: Marrow signal shows a diffusely heterogeneous somewhat mottled decreased T1 and T2 signal intensity. This is a somewhat nonspecific finding but one which may suggest red marrow reconversion as can be seen in chronic anemic states, hypoxia, obesity or smoking.     Soft tissues: Focal subcutaneous soft tissue edema along the posterior margin of the left greater trochanter (coronal image 23) there may be some focal fluid in this area as well, although susceptibility artifact makes further characterization difficult.     Pelvic soft tissues: Prior hysterectomy with no focal abnormality in the female pelvis. There are scattered colonic diverticula in the visualized sigmoid colon.     IMPRESSION:  1. Bilateral total hip arthroplasties.  2. No acute osseous abnormality identified within limits of this exam.  3. Focal fluid along the posterior margin of the left hip suggesting a combination of edema and sequelae of trauma, surgery or bursitis, noting particle disease is also a consideration. Consider correlation for pain in this area.     Labs  EGFR cr lft's gluc nl          Past Medical History:   Diagnosis Date    Bilateral knee pain July 2012    left worse, right knee painful even after partial replacement.    Bruises easily     Clot 1990's?    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 Took Coumadin x 6 weeks    KNEE ARTHROSCOPY  2010    right    TONSILLECTOMY       Family History   Problem Relation Age of Onset    Neuropathy Mother     Hypertension Mother     Stroke Mother     Neuropathy Father     Diabetes Maternal Grandmother     Cancer Daughter     Melanoma Neg Hx     Psoriasis Neg Hx     Lupus Neg Hx     Eczema Neg Hx      Social History     Socioeconomic History    Marital status:    Occupational History    Occupation: RETIRED   Tobacco Use    Smoking status: Former Smoker     Quit date: 3/23/2012     Years since quitting: 10.2    Smokeless tobacco: Never Used   Substance and Sexual Activity    Alcohol use: Yes     Comment: occasional    Drug use: No    Sexual activity: Not Currently       Review of patient's allergies indicates:   Allergen Reactions    Morphine Other (See Comments)     Other reaction(s): Syncope  *Fainted years ago due to pain, but not allergic anymore*       Current Outpatient Medications:     cholecalciferol, vitamin D3, 5,000 unit capsule, Take 5,000 Units by mouth once daily., Disp: , Rfl:     cloNIDine (CATAPRES) 0.1 MG tablet, TAKE ONE TABLET BY MOUTH TWICE DAILY, Disp: 60 tablet, Rfl: 0    cyanocobalamin (VITAMIN B-12) 1000 MCG tablet, Take 100 mcg by mouth once daily., Disp: , Rfl:     diclofenac sodium (VOLTAREN) 1 % Gel, Apply 2 g topically daily as needed., Disp: , Rfl:     doxycycline (VIBRAMYCIN) 100 MG Cap, Take 1 capsule (100 mg total) by mouth every 12 (twelve) hours., Disp: 20 capsule, Rfl: 0    DULoxetine (CYMBALTA) 20 MG capsule, Take 20 mg by mouth once daily., Disp: , Rfl:     DULoxetine (CYMBALTA) 60 MG capsule,  , Disp: , Rfl:     DUREZOL 0.05 % Drop ophthalmic solution, Place 1 drop into both eyes once daily. , Disp: , Rfl:     erythromycin (ROMYCIN) ophthalmic ointment, , Disp: , Rfl:     famotidine (PEPCID) 20 MG tablet, Take 1 tablet (20 mg total) by mouth nightly., Disp: 90 tablet, Rfl: 2    gabapentin (NEURONTIN) 800 MG tablet, TAKE 1 TABLET THREE TIMES DAILY, Disp: 270 tablet, Rfl: 0    ipratropium (ATROVENT) 42 mcg (0.06 %) nasal spray, , Disp: , Rfl:     levothyroxine (SYNTHROID) 125 MCG tablet, TAKE 1 TABLET EVERY DAY BEFORE BREAKFAST, Disp: 90 tablet, Rfl: 1    LORazepam (ATIVAN) 1 MG tablet, Take 1 tablet (1 mg total) by mouth nightly as needed for Anxiety., Disp: 30 tablet, Rfl: 2    losartan (COZAAR) 100 MG tablet, TAKE 1/2 TABLET EVERY DAY, Disp: 45 tablet, Rfl: 1    mag hydrox/aluminum hyd/simeth (MAALOX ORAL), Take by mouth., Disp: , Rfl:     magnesium oxide (MAG-OX) 400 mg (241.3 mg magnesium) tablet, Take 400 mg by mouth 2 (two) times daily., Disp: , Rfl:     mecobal/levomefolat Ca/B6 phos (METANX ORAL), Take 1 capsule by mouth 2 (two) times daily., Disp: , Rfl:     metoprolol succinate (TOPROL-XL) 50 MG 24 hr tablet, TAKE 1 TABLET EVERY NIGHT, Disp: 90 tablet, Rfl: 1    nitrofurantoin (MACRODANTIN) 100 MG capsule, Take 100 mg by mouth once daily., Disp: , Rfl:     omeprazole (PRILOSEC) 40 MG capsule, TAKE 1 CAPSULE EVERY DAY, Disp: 90 capsule, Rfl: 1    oxybutynin (DITROPAN) 5 MG Tab, Take 1 tablet (5 mg total) by mouth once daily., Disp: 30 tablet, Rfl: 11    oxyCODONE-acetaminophen (PERCOCET)  mg per tablet, Take 1 tablet by mouth every 6 (six) hours as needed. , Disp: , Rfl:     peg 400-propylene glycol 0.4-0.3 % Drop, Place 1 drop into both eyes once daily., Disp: , Rfl:     potassium chloride SA (K-DUR,KLOR-CON) 20 MEQ tablet, TAKE 1 TABLET TWICE DAILY, Disp: 180 tablet, Rfl: 0    turmeric 400 mg Cap, Take 400 mg by mouth 2 (two) times daily., Disp: , Rfl:     vit  C/E/Zn/coppr/lutein/zeaxan (PRESERVISION AREDS-2 ORAL), Take 1 capsule by mouth 2 (two) times daily. , Disp: , Rfl:     Review of Systems  No nausea, vomiting, fevers, Chills , contipation, diarrhea or sweats    Physical Exam:      Vitals:    06/10/22 1039   BP: (!) 149/76   Pulse: 63       alert and oriented ×4 follows commands answers all questions appropriately,affect wnl  Manual muscle test 5 out of 5 EXCEPT RIGHT HIP FLEXORS 3, KE 3 sensation to light touch grossly intact  +excruciate tenderness right greater troch   + muscular atrophy w. An indentation on anterior right thigh  antalgic gait  -slR  -JUSTICE  Dec hip and knee ROM  babinsky down  No clonus  No C/C/E       Assessment:  Chronic right L4/L5 radiulopathy with ACTIVE denervation as above  Superimposed sensorimotor predominantly axonal neuropathy  Superimposed gluteus medius tendinopathy  S/p B/L hip and knee replacements  S/p cervical fusion w. Recurrent muscular spasm  Lumbar DDD s/p surgery    Plan:  I recommended that she continue w. Therapy for strengthenining.  I have also asked her to start taking her cymbalta and neurontin as prescribed, as it may help her neuralgia  She has excruciate tenderness along the insertion of her right gluteus medius tendon on the greater trochanter. In light of her having a total hip replacement, I recommended PRP to her right gluteus medius tendon. However, pt. declined    RTC 6 wks

## 2022-06-10 NOTE — PROGRESS NOTES
[facility]  Physical Medicine and Rehabilitation   [address] [place] [address], [address]  [place] [address]             Patient: Marisela Eagle   Patient ID: [id number]   Sex:     YOB: 1945   Age: 77 Years 1 Months   Notes:     Last visit date: 6/10/2022         Visit date and time: 6/10/2022 10:48   Patient Age on Visit Date: 77 Years 1 Months   Referring Physician:     Diagnoses:       Right leg weakness      Sensory NCS      Nerve / Sites Rec. Site Onset Lat Peak Lat Ref. NP Amp Ref. PP Amp Ref. Segments Distance Velocity     ms ms ms µV µV µV µV  cm m/s   R Sural - Ankle (Calf)      Calf Ankle 1.88 3.33 ?4.20 11.5 ?5.0 1.2 ?5.0 Calf - Ankle 14 75   R Superficial peroneal - Ankle      Lat leg Ankle 2.03 2.92 ?4.40 15.9 ?5.0 12.2 ?5.0 Lat leg - Ankle 14 69       Motor NCS      Nerve / Sites Muscle Latency Ref. Amplitude Ref. Amp % Duration Segments Distance Lat Diff Velocity Ref.     ms ms mV mV % ms  cm ms m/s m/s   R Peroneal - EDB      Ankle EDB 4.90 ?6.20 0.8 ?2.0 100 7.45 Ankle - EDB 8         Fib head EDB 14.17  0.2  20.1  Fib head - Ankle 33 9.27 36 ?39      Pop fossa EDB 12.71      Pop fossa - Fib head 10 -1.46 69 ?39   R Tibial - AH      Ankle AH 6.98 ?6.00 0.4 ?3.0 100 4.74 Ankle - AH 8         Pop fossa AH 15.94  0.2  46.8  Pop fossa - Ankle 36 8.96 40 ?39       EMG Summary Table     Spontaneous MUAP Recruitment   Muscle IA Fib PSW Fasc H.F. Amp Dur. PPP Pattern   R. Gluteus medius N None None None None N N N N   R. Iliopsoas N None None None None N N N N   R. Vastus lateralis N None 1+ None 1+ 1+ 1+ 1+ Reduced   R. Rectus femoris N None None None 1+ 1+ 1+ 1+ Reduced   R. Biceps femoris (short head) N None None None None N N N N   R. Tibialis anterior 1+ None 1+ None None 1+ 1+ 1+ Reduced   R. Gastrocnemius (Medial head) N None None None None N N N N   R. Lumbar paraspinals (mid) 1+ None None None None N N N N   R. Lumbar paraspinals (low) 1+ None None None None N N N N        Summary    The motor conduction test had results outside of the specified normal range in all 2 of the tested nerves:   In the R Peroneal - EDB study  o the peak amplitude result was reduced for Ankle stimulation  o the take off velocity result was reduced for Fib head - Ankle segment   In the R Tibial - AH study  o the take off latency result was increased for Ankle stimulation  o the peak amplitude result was reduced for Ankle stimulation    The sensory conduction test was normal in all 2 of the tested nerves: R Sural - Ankle (Calf), R Superficial peroneal - Ankle.    The F wave study was unremarkable in all 1 of the tested nerves: R Tibial - AH    The needle EMG examination was performed in 9 muscles. It was normal in 4 muscle(s): R. Gluteus medius, R. Iliopsoas, R. Biceps femoris (short head), R. Gastrocnemius (Medial head). The study was abnormal in 5 muscle(s), with the following distribution:   Abnormal spontaneous/insertional activity was found in R. Vastus lateralis, R. Rectus femoris, R. Tibialis anterior, R. Lumbar paraspinals (mid), R. Lumbar paraspinals (low).   The MUP waveform abnormality was found in R. Vastus lateralis, R. Rectus femoris, R. Tibialis anterior.   Abnormal interference pattern was found in R. Vastus lateralis, R. Rectus femoris, R. Tibialis anterior.          Conclusion:     Chronic right L4/L5 radiulopathy with ACTIVE denervation as above  Superimposed sensorimotor predominantly axonal neuropathy          ____________________________  Sandra Ramirez D.O.

## 2022-06-11 DIAGNOSIS — I10 HYPERTENSION, ESSENTIAL: ICD-10-CM

## 2022-06-12 RX ORDER — LOSARTAN POTASSIUM 100 MG/1
TABLET ORAL
Qty: 45 TABLET | Refills: 1 | OUTPATIENT
Start: 2022-06-12

## 2022-06-15 DIAGNOSIS — I10 HYPERTENSION, ESSENTIAL: ICD-10-CM

## 2022-06-15 RX ORDER — LOSARTAN POTASSIUM 100 MG/1
50 TABLET ORAL DAILY
Qty: 45 TABLET | Refills: 0 | OUTPATIENT
Start: 2022-06-15

## 2022-06-15 NOTE — TELEPHONE ENCOUNTER
Sent 90 day supply NO REFILLS  to [facility] np patient is due for appt    ----- Message from Jonathan Hutchison sent at 6/15/2022  9:17 AM CDT -----  Contact: Self  Type:  RX Refill Request    Who Called:  Patient  Refill or New Rx:  Refill  RX Name and Strength:  losartan (COZAAR) 100 MG tablet  How is the patient currently taking it? (ex. 1XDay):  0.5/Day  Is this a 30 day or 90 day RX:  90  Preferred Pharmacy with phone number:      [facility] ([facility]) - [place], [place] - [address]  [address] [address]  Phone: [phone] Fax: [phone]      Local or Mail Order:  Mail  Ordering Provider:  Justino Ellis Call Back Number:  [phone]   Additional Information:

## 2022-06-28 DIAGNOSIS — I10 HYPERTENSION, ESSENTIAL: ICD-10-CM

## 2022-06-28 RX ORDER — LOSARTAN POTASSIUM 100 MG/1
50 TABLET ORAL DAILY
Qty: 45 TABLET | Refills: 0 | Status: SHIPPED | OUTPATIENT
Start: 2022-06-28 | End: 2022-08-16

## 2022-07-19 ENCOUNTER — OFFICE VISIT (OUTPATIENT)
Dept: FAMILY MEDICINE | Facility: CLINIC | Age: 77
End: 2022-07-19
Payer: MEDICARE

## 2022-07-19 VITALS
BODY MASS INDEX: 34.71 KG/M2 | RESPIRATION RATE: 18 BRPM | HEART RATE: 71 BPM | OXYGEN SATURATION: 93 % | DIASTOLIC BLOOD PRESSURE: 80 MMHG | TEMPERATURE: 98 F | WEIGHT: 234.38 LBS | SYSTOLIC BLOOD PRESSURE: 138 MMHG | HEIGHT: 69 IN

## 2022-07-19 DIAGNOSIS — M17.11 OSTEOARTHRITIS OF RIGHT KNEE, UNSPECIFIED OSTEOARTHRITIS TYPE: ICD-10-CM

## 2022-07-19 DIAGNOSIS — I50.20 HFREF (HEART FAILURE WITH REDUCED EJECTION FRACTION): ICD-10-CM

## 2022-07-19 DIAGNOSIS — Z99.89 USE OF CANE AS AMBULATORY AID: ICD-10-CM

## 2022-07-19 DIAGNOSIS — G47.00 INSOMNIA, UNSPECIFIED TYPE: ICD-10-CM

## 2022-07-19 DIAGNOSIS — E03.9 HYPOTHYROIDISM, UNSPECIFIED TYPE: ICD-10-CM

## 2022-07-19 DIAGNOSIS — M35.9 CONNECTIVE TISSUE DISEASE: ICD-10-CM

## 2022-07-19 DIAGNOSIS — E78.5 HYPERLIPIDEMIA, UNSPECIFIED HYPERLIPIDEMIA TYPE: ICD-10-CM

## 2022-07-19 DIAGNOSIS — I10 HYPERTENSION, ESSENTIAL: Primary | ICD-10-CM

## 2022-07-19 DIAGNOSIS — Z96.651 S/P TKR (TOTAL KNEE REPLACEMENT), RIGHT: ICD-10-CM

## 2022-07-19 DIAGNOSIS — R76.8 POSITIVE ANA (ANTINUCLEAR ANTIBODY): ICD-10-CM

## 2022-07-19 PROCEDURE — 1160F RVW MEDS BY RX/DR IN RCRD: CPT | Mod: CPTII,S$GLB,, | Performed by: FAMILY MEDICINE

## 2022-07-19 PROCEDURE — 1125F AMNT PAIN NOTED PAIN PRSNT: CPT | Mod: CPTII,S$GLB,, | Performed by: FAMILY MEDICINE

## 2022-07-19 PROCEDURE — 3079F PR MOST RECENT DIASTOLIC BLOOD PRESSURE 80-89 MM HG: ICD-10-PCS | Mod: CPTII,S$GLB,, | Performed by: FAMILY MEDICINE

## 2022-07-19 PROCEDURE — 99214 PR OFFICE/OUTPT VISIT, EST, LEVL IV, 30-39 MIN: ICD-10-PCS | Mod: S$GLB,,, | Performed by: FAMILY MEDICINE

## 2022-07-19 PROCEDURE — 1125F PR PAIN SEVERITY QUANTIFIED, PAIN PRESENT: ICD-10-PCS | Mod: CPTII,S$GLB,, | Performed by: FAMILY MEDICINE

## 2022-07-19 PROCEDURE — 3075F PR MOST RECENT SYSTOLIC BLOOD PRESS GE 130-139MM HG: ICD-10-PCS | Mod: CPTII,S$GLB,, | Performed by: FAMILY MEDICINE

## 2022-07-19 PROCEDURE — 1159F MED LIST DOCD IN RCRD: CPT | Mod: CPTII,S$GLB,, | Performed by: FAMILY MEDICINE

## 2022-07-19 PROCEDURE — 1160F PR REVIEW ALL MEDS BY PRESCRIBER/CLIN PHARMACIST DOCUMENTED: ICD-10-PCS | Mod: CPTII,S$GLB,, | Performed by: FAMILY MEDICINE

## 2022-07-19 PROCEDURE — 1159F PR MEDICATION LIST DOCUMENTED IN MEDICAL RECORD: ICD-10-PCS | Mod: CPTII,S$GLB,, | Performed by: FAMILY MEDICINE

## 2022-07-19 PROCEDURE — 3288F FALL RISK ASSESSMENT DOCD: CPT | Mod: CPTII,S$GLB,, | Performed by: FAMILY MEDICINE

## 2022-07-19 PROCEDURE — 3288F PR FALLS RISK ASSESSMENT DOCUMENTED: ICD-10-PCS | Mod: CPTII,S$GLB,, | Performed by: FAMILY MEDICINE

## 2022-07-19 PROCEDURE — 1101F PR PT FALLS ASSESS DOC 0-1 FALLS W/OUT INJ PAST YR: ICD-10-PCS | Mod: CPTII,S$GLB,, | Performed by: FAMILY MEDICINE

## 2022-07-19 PROCEDURE — 99214 OFFICE O/P EST MOD 30 MIN: CPT | Mod: S$GLB,,, | Performed by: FAMILY MEDICINE

## 2022-07-19 PROCEDURE — 1101F PT FALLS ASSESS-DOCD LE1/YR: CPT | Mod: CPTII,S$GLB,, | Performed by: FAMILY MEDICINE

## 2022-07-19 PROCEDURE — 3075F SYST BP GE 130 - 139MM HG: CPT | Mod: CPTII,S$GLB,, | Performed by: FAMILY MEDICINE

## 2022-07-19 PROCEDURE — 3079F DIAST BP 80-89 MM HG: CPT | Mod: CPTII,S$GLB,, | Performed by: FAMILY MEDICINE

## 2022-07-19 RX ORDER — LORAZEPAM 1 MG/1
1 TABLET ORAL NIGHTLY PRN
Qty: 30 TABLET | Refills: 2 | Status: SHIPPED | OUTPATIENT
Start: 2022-07-19 | End: 2023-01-05 | Stop reason: SDUPTHER

## 2022-07-19 RX ORDER — OXYCODONE AND ACETAMINOPHEN 10; 325 MG/1; MG/1
1 TABLET ORAL EVERY 6 HOURS PRN
Qty: 90 TABLET | Refills: 1 | Status: CANCELLED | OUTPATIENT
Start: 2022-07-19

## 2022-07-21 ENCOUNTER — LAB VISIT (OUTPATIENT)
Dept: LAB | Facility: HOSPITAL | Age: 77
End: 2022-07-21
Attending: FAMILY MEDICINE
Payer: MEDICARE

## 2022-07-21 DIAGNOSIS — M17.11 OSTEOARTHRITIS OF RIGHT KNEE, UNSPECIFIED OSTEOARTHRITIS TYPE: ICD-10-CM

## 2022-07-21 DIAGNOSIS — R76.8 POSITIVE ANA (ANTINUCLEAR ANTIBODY): ICD-10-CM

## 2022-07-21 DIAGNOSIS — I10 HYPERTENSION, ESSENTIAL: ICD-10-CM

## 2022-07-21 PROBLEM — G47.00 INSOMNIA: Status: ACTIVE | Noted: 2022-07-21

## 2022-07-21 LAB
ALBUMIN SERPL BCP-MCNC: 4.3 G/DL (ref 3.5–5.2)
ALP SERPL-CCNC: 53 U/L (ref 55–135)
ALT SERPL W/O P-5'-P-CCNC: 19 U/L (ref 10–44)
ANION GAP SERPL CALC-SCNC: 7 MMOL/L (ref 8–16)
AST SERPL-CCNC: 18 U/L (ref 10–40)
BASOPHILS # BLD AUTO: 0.04 K/UL (ref 0–0.2)
BASOPHILS NFR BLD: 0.6 % (ref 0–1.9)
BILIRUB SERPL-MCNC: 0.6 MG/DL (ref 0.1–1)
BUN SERPL-MCNC: 13 MG/DL (ref 8–23)
CALCIUM SERPL-MCNC: 8.9 MG/DL (ref 8.7–10.5)
CHLORIDE SERPL-SCNC: 99 MMOL/L (ref 95–110)
CO2 SERPL-SCNC: 29 MMOL/L (ref 23–29)
CREAT SERPL-MCNC: 0.6 MG/DL (ref 0.5–1.4)
CRP SERPL-MCNC: 0.11 MG/DL
DIFFERENTIAL METHOD: ABNORMAL
EOSINOPHIL # BLD AUTO: 0.2 K/UL (ref 0–0.5)
EOSINOPHIL NFR BLD: 2.2 % (ref 0–8)
ERYTHROCYTE [DISTWIDTH] IN BLOOD BY AUTOMATED COUNT: 15.9 % (ref 11.5–14.5)
ERYTHROCYTE [SEDIMENTATION RATE] IN BLOOD BY WESTERGREN METHOD: 1 MM/HR (ref 0–20)
EST. GFR  (AFRICAN AMERICAN): >60 ML/MIN/1.73 M^2
EST. GFR  (NON AFRICAN AMERICAN): >60 ML/MIN/1.73 M^2
GLUCOSE SERPL-MCNC: 131 MG/DL (ref 70–110)
HCT VFR BLD AUTO: 37.4 % (ref 37–48.5)
HGB BLD-MCNC: 12.2 G/DL (ref 12–16)
IMM GRANULOCYTES # BLD AUTO: 0.02 K/UL (ref 0–0.04)
IMM GRANULOCYTES NFR BLD AUTO: 0.3 % (ref 0–0.5)
LYMPHOCYTES # BLD AUTO: 2.4 K/UL (ref 1–4.8)
LYMPHOCYTES NFR BLD: 35.4 % (ref 18–48)
MCH RBC QN AUTO: 28.1 PG (ref 27–31)
MCHC RBC AUTO-ENTMCNC: 32.6 G/DL (ref 32–36)
MCV RBC AUTO: 86 FL (ref 82–98)
MONOCYTES # BLD AUTO: 0.7 K/UL (ref 0.3–1)
MONOCYTES NFR BLD: 9.7 % (ref 4–15)
NEUTROPHILS # BLD AUTO: 3.5 K/UL (ref 1.8–7.7)
NEUTROPHILS NFR BLD: 51.8 % (ref 38–73)
NRBC BLD-RTO: 0 /100 WBC
PLATELET # BLD AUTO: 248 K/UL (ref 150–450)
PMV BLD AUTO: 11.3 FL (ref 9.2–12.9)
POTASSIUM SERPL-SCNC: 3.8 MMOL/L (ref 3.5–5.1)
PROT SERPL-MCNC: 7.1 G/DL (ref 6–8.4)
RBC # BLD AUTO: 4.34 M/UL (ref 4–5.4)
SODIUM SERPL-SCNC: 135 MMOL/L (ref 136–145)
WBC # BLD AUTO: 6.7 K/UL (ref 3.9–12.7)

## 2022-07-21 PROCEDURE — 85651 RBC SED RATE NONAUTOMATED: CPT | Performed by: FAMILY MEDICINE

## 2022-07-21 PROCEDURE — 86235 NUCLEAR ANTIGEN ANTIBODY: CPT | Mod: 59 | Performed by: FAMILY MEDICINE

## 2022-07-21 PROCEDURE — 86235 NUCLEAR ANTIGEN ANTIBODY: CPT | Performed by: FAMILY MEDICINE

## 2022-07-21 PROCEDURE — 85025 COMPLETE CBC W/AUTO DIFF WBC: CPT | Performed by: FAMILY MEDICINE

## 2022-07-21 PROCEDURE — 80053 COMPREHEN METABOLIC PANEL: CPT | Performed by: FAMILY MEDICINE

## 2022-07-21 PROCEDURE — 86431 RHEUMATOID FACTOR QUANT: CPT | Performed by: FAMILY MEDICINE

## 2022-07-21 PROCEDURE — 36415 COLL VENOUS BLD VENIPUNCTURE: CPT | Performed by: FAMILY MEDICINE

## 2022-07-21 PROCEDURE — 86038 ANTINUCLEAR ANTIBODIES: CPT | Performed by: FAMILY MEDICINE

## 2022-07-21 PROCEDURE — 86140 C-REACTIVE PROTEIN: CPT | Performed by: FAMILY MEDICINE

## 2022-07-23 LAB
ANA NOTE: ABNORMAL
ANA TITR SER IF: POSITIVE {TITER}
CENTROMERE AB TITR SER IF: ABNORMAL {TITER}
RHEUMATOID FACT SERPL-ACNC: 45.6 IU/ML

## 2022-07-24 NOTE — PROGRESS NOTES
Subjective:       Patient ID: Marisela Eagle is a 77 y.o. female.    Chief Complaint: Medication Refill    Follow-up hypertension good control today.  Hyperlipidemia check is current.  Degenerative joint disease right knee.  Hurts to flex the right thigh.  Injected but did not help.  Right anterior thigh painful.  She can extend the right knee.  But has a hard time flexing the right thigh.  Using 2 pain pills per day.  Has heart failure reduced ejection fraction.  Hypothyroidism.  Status post right knee replacement 2 years ago.  Using cane.  BMI is at 34.  Positive ANEUDY sent to rheumatology but she did not go because it took too long to get in.  If she had kept that appointment would be coming up in a couple of weeks.  Has it is will have to start over again.  Insomnia Ativan HS.    Physical examination vital signs noted.  Neck without bruit.  Chest clear.  Heart regular rate rhythm.  Extremities without edema.  Right knee full wasting above the knee.  Poor hip flexion on that side.      Objective:        Assessment:       1. Hypertension, essential    2. Hyperlipidemia, unspecified hyperlipidemia type    3. Osteoarthritis of right knee, unspecified osteoarthritis type    4. Hypothyroidism, unspecified type    5. Use of cane as ambulatory aid    6. BMI 34.0-34.9,adult    7. S/P TKR (total knee replacement), right    8. Positive ANEUDY (antinuclear antibody)    9. Insomnia, unspecified type    10. HFrEF (heart failure with reduced ejection fraction)    11. Connective tissue disease        Plan:       Hypertension, essential  -     CBC Auto Differential; Future; Expected date: 07/19/2022  -     Comprehensive Metabolic Panel; Future; Expected date: 07/19/2022    Hyperlipidemia, unspecified hyperlipidemia type    Osteoarthritis of right knee, unspecified osteoarthritis type  -     Sedimentation rate; Future; Expected date: 07/19/2022  -     C-Reactive Protein; Future; Expected date: 07/19/2022  -     ANEUDY Screen w/Reflex;  Future; Expected date: 07/19/2022  -     Rheumatoid Factor; Future; Expected date: 07/19/2022  -     CBC Auto Differential; Future; Expected date: 07/19/2022    Hypothyroidism, unspecified type    Use of cane as ambulatory aid    BMI 34.0-34.9,adult    S/P TKR (total knee replacement), right    Positive ANEUDY (antinuclear antibody)  -     ANTI -SSA ANTIBODY; Future; Expected date: 07/19/2022  -     ANTI-SSB ANTIBODY; Future; Expected date: 07/19/2022    Insomnia, unspecified type    HFrEF (heart failure with reduced ejection fraction)    Connective tissue disease    Other orders  -     LORazepam (ATIVAN) 1 MG tablet; Take 1 tablet (1 mg total) by mouth nightly as needed for Anxiety.  Dispense: 30 tablet; Refill: 2      To Rheumatology as recommended.  Ativan 0.5 HS refilled.  Do not mix it with her pain medication.  Thirty with 2 refills.  Sed rate CRP ANEUDY rheumatoid factor SSA SSB CMP CBC repeated.

## 2022-07-25 LAB
ENA SS-A AB SER-ACNC: 7.1 AI (ref 0–0.9)
ENA SS-B AB SER-ACNC: <0.2 AI (ref 0–0.9)

## 2022-08-08 NOTE — PROGRESS NOTES
Subjective:     Patient ID: Marisela Ealge is a 77 y.o. female with abnormal serology    Chief Complaint: No chief complaint on file.     HPI     77 yr old lady seen last by Dr. Maki on 10/7/20 and never returned for f/u.  Has multiple sxs and complaints & multiple morbiditiies but was originally sent by Dr. Badillo for +ANEUDY (centromere pattern) & +RF w multiple joint pain.   Has hx of generalized pain with generalized OA with multiple MSK injections & surgeries .   Also had BLE neuropathy helped by gabapentin.    Has sicca symptoms; possibly Raynaud's; GERD; upper dysphagia.   Had ECHO w mildly elevated PHT  Was a smoker in the past.  Had 1 miscarriage at 6 weeks/5 pregnancies.                                Current Outpatient Medications   Medication Sig Dispense Refill    cholecalciferol, vitamin D3, 5,000 unit capsule Take 5,000 Units by mouth once daily.      DULoxetine (CYMBALTA) 60 MG capsule       DUREZOL 0.05 % Drop ophthalmic solution Place 1 drop into both eyes once daily.       gabapentin (NEURONTIN) 800 MG tablet TAKE 1 TABLET THREE TIMES DAILY 270 tablet 0    levothyroxine (SYNTHROID) 125 MCG tablet TAKE 1 TABLET EVERY DAY BEFORE BREAKFAST 90 tablet 1    LORazepam (ATIVAN) 1 MG tablet Take 1 tablet (1 mg total) by mouth nightly as needed for Anxiety. 30 tablet 2    losartan (COZAAR) 100 MG tablet Take 0.5 tablets (50 mg total) by mouth once daily. 45 tablet 0    metoprolol succinate (TOPROL-XL) 50 MG 24 hr tablet TAKE 1 TABLET EVERY NIGHT 90 tablet 1    omeprazole (PRILOSEC) 40 MG capsule TAKE 1 CAPSULE EVERY DAY 90 capsule 1    oxybutynin (DITROPAN) 5 MG Tab Take 1 tablet (5 mg total) by mouth once daily. 30 tablet 11    oxyCODONE-acetaminophen (PERCOCET)  mg per tablet Take 1 tablet by mouth every 6 (six) hours as needed.       peg 400-propylene glycol 0.4-0.3 % Drop Place 1 drop into both eyes once daily.      potassium chloride SA (K-DUR,KLOR-CON) 20 MEQ tablet TAKE 1 TABLET  TWICE DAILY 180 tablet 0    turmeric 400 mg Cap Take 400 mg by mouth 2 (two) times daily.      vit C/E/Zn/coppr/lutein/zeaxan (PRESERVISION AREDS-2 ORAL) Take 1 capsule by mouth 2 (two) times daily.        No current facility-administered medications for this visit.       Review of patient's allergies indicates:  No Known Allergies    Review of Systems   Constitutional: Positive for fatigue. Negative for fever and unexpected weight change.   HENT: Positive for trouble swallowing.         Mild dry mouth   Eyes: Negative for redness.        Mild dry eyes   Respiratory: Positive for shortness of breath (stable). Negative for cough.    Cardiovascular: Negative for chest pain and leg swelling.   Gastrointestinal: Negative for constipation and diarrhea.   Genitourinary: Positive for urgency. Negative for difficulty urinating.        Overactive bladder   Neurological: Positive for headaches.   Psychiatric/Behavioral: Positive for dysphoric mood and sleep disturbance (needs ativan to sleep). The patient is nervous/anxious.        Past Medical History:   Diagnosis Date    Bilateral knee pain 2012    left worse, right knee painful even after partial replacement.    Bruises easily     Clot ?    Umbilical clot after hysterectomy    Colon polyps     adenomatous    Complication of anesthesia     very low pain tolerance    Cystocele     Degenerative disc disease     neck,back, muscle spasms    Depression     Diverticulitis     Edema of left lower extremity     ankles    GERD (gastroesophageal reflux disease)     HCV antibody (+) but neg HCV RNA (likely cleared virus on her own)     no treatment needed    Hyperlipidemia     Hypertension     Migraines     Neuropathy     peripheral    Thyroid disease     hypothyroid, has nodules    Varicose veins        Past Surgical History:   Procedure Laterality Date    ADENOIDECTOMY      APPENDECTOMY      BILATERAL SALPINGOOPHORECTOMY       SECTION       "COLONOSCOPY  01/12/12    HYSTERECTOMY      with BSO    JOINT REPLACEMENT  April 2012    rt unilateral knee arthroplasty. Took Coumadin x 6 weeks    KNEE ARTHROSCOPY  2010    right    TONSILLECTOMY         Family History   Problem Relation Age of Onset    Neuropathy Mother     Hypertension Mother     Stroke Mother     Neuropathy Father     Diabetes Maternal Grandmother     Cancer Daughter     Melanoma Neg Hx     Psoriasis Neg Hx     Lupus Neg Hx     Eczema Neg Hx      Daughter: thyroid cancer; had excision;  2 son & another daughter  1 son 54 has heart murmur & taking meds    Social History     Tobacco Use    Smoking status: Former Smoker     Quit date: 3/23/2012     Years since quitting: 10.3    Smokeless tobacco: Never Used   Substance Use Topics    Alcohol use: Yes     Comment: occasional    Drug use: No       Objective:     /70   Pulse 70   Ht 5' 9" (1.753 m)   Wt 107 kg (235 lb 14.3 oz)   BMI 34.84 kg/m²     Physical Exam   Constitutional: She is oriented to person, place, and time. No distress.   HENT:   Head: Normocephalic and atraumatic.   Mouth/Throat: Oropharynx is clear and moist. No oropharyngeal exudate.   No facial rashes  Parotids not enlarged  No oral ulcers   Eyes: Pupils are equal, round, and reactive to light. Conjunctivae are normal. Right eye exhibits no discharge. Left eye exhibits no discharge. No scleral icterus.   Neck: No JVD present. No tracheal deviation present. No thyromegaly present.   Cardiovascular: Normal rate, regular rhythm and normal heart sounds. Exam reveals no gallop and no friction rub.   No murmur heard.  Pulmonary/Chest: Effort normal and breath sounds normal. No respiratory distress. She has no wheezes. She has no rales. She exhibits no tenderness.   Abdominal: Soft. Bowel sounds are normal. She exhibits no distension and no mass. There is no splenomegaly or hepatomegaly. There is no abdominal tenderness. There is no rebound and no guarding. "   Musculoskeletal:         General: No tenderness. Normal range of motion.      Cervical back: Neck supple.      Comments: Cspine FROM no tenderness  Tspine FROM no tenderness  Lspine FROM no tenderness.  TMJ: unremarkable  Shoulders: FROM; no synovitis;  Elbows: FROM; no synovitis; no tophi or nodules  Wrists: FROM; no synovitis;    MCPs: FROM; no synovitis; no metacarpalgia;  ok;  PIPs:FROM; no synovitis;   DIPs: FROM; no synovitis;   HIPS: FROM  Knees: FROM; no synovitis; no instability;  Ankles: FROM: no synovitis   Toes: ok;        Lymphadenopathy:     She has no cervical adenopathy.   Neurological: She is alert and oriented to person, place, and time. She has normal reflexes. No cranial nerve deficit. Gait normal.   Proximal and distal muscle strength 5/5.   Skin: Skin is warm and dry. No rash noted. She is not diaphoretic.   Few ant chest telengiectasias;  2 on L cheek  Few on fingertips   No decreased oral aperture.  No sclerodactyly or scleroderma  No finger tip tapering  No digital ulcers.   Psychiatric: Mood, memory, affect and judgment normal.   Vitals reviewed.        7/21/22:ESR 1; CRP 0.11; CBC ok; CMP [id number], Na 135;  ANEUDY >1:80; +SSA; centromere 1:1280; RF 45.6;   9/14/20: C3 & C4 ok;    6/2/22: Esophagram: unremarkable  10/29/21: MRI Lspine: abnormal marrow signal intensity along the right superior endplate of L1 most compatible with and endplate microfracture; degenerative change throughout the lumbar spine > L2-L3, L3-L4, and L4-L5, minimally changed from the previous exam; straightening of the normal lordotic curvature of the lumbar spine, likely secondary to combination of positioning, degenerative change and/or muscle spasm.  7/23/21: Cspine: AP/Lat: personally viewed: multilevel cervical degenerative disc disease and advanced facet joint osteoarthrosis  7/23/21: L Shoulder: personally viewed: AC & GH OA  Assessment:   Overlap CTD: CREST/Sjogren's   +ANEUDY >1:80; +SSA + centromere (1:1280)  +RF    CREST:      prob Raynaud's;      few telengiectasia;      mild PHT on ECHO;      dysphagia & GERD (upper w nl eosphagram)    Sjogren's syndrome     +SSA+RF     Sicca sxs worsened by meds      Teeth in good repair      OTC eye drops    Joint pain multiple sites   Generalized OA    Lspine; Cspine; Tspine; L shoulder    S/P R unilateral (medial) knee; then RTKR; B THR   Complicated by FMS/CSS    FMS/CSS associated w anxiety/depression/OAB/TMJ/chronic pain    Hypothyroidism   w thyroid nodules    Hx of vitamin D insufficiency   corrected    +HCV; neg RNA    HTN    HLD    S/P Tobacco use    Obesity  Plan:   This is a complicated patient as she has a Central Sensitivity Disorder complicating interpretation of her symptoms.   At this point rx is supportive.   Educated on sicca sxs.  Discussed oxybutynin -- patient thought she was taking it for dry mouth--actually causes dry mouth..   Briefly discussed CREST  May need repeat ECHO for PHT if SOB worsens--currently stable.  Additional CTD labs ordered.  We will contact her if we need to see her earlier based on labs, otherwise  RTC 6 months or prn.      CC:  MD Jonah Lora III, MD

## 2022-08-10 ENCOUNTER — OFFICE VISIT (OUTPATIENT)
Dept: RHEUMATOLOGY | Facility: CLINIC | Age: 77
End: 2022-08-10
Payer: MEDICARE

## 2022-08-10 ENCOUNTER — LAB VISIT (OUTPATIENT)
Dept: LAB | Facility: HOSPITAL | Age: 77
End: 2022-08-10
Attending: INTERNAL MEDICINE
Payer: MEDICARE

## 2022-08-10 VITALS
DIASTOLIC BLOOD PRESSURE: 70 MMHG | BODY MASS INDEX: 34.94 KG/M2 | WEIGHT: 235.88 LBS | HEIGHT: 69 IN | HEART RATE: 70 BPM | SYSTOLIC BLOOD PRESSURE: 104 MMHG

## 2022-08-10 DIAGNOSIS — M15.9 GENERALIZED OSTEOARTHRITIS OF MULTIPLE SITES: ICD-10-CM

## 2022-08-10 DIAGNOSIS — R76.8 SS-A ANTIBODY POSITIVE: ICD-10-CM

## 2022-08-10 DIAGNOSIS — Z55.9 EDUCATIONAL CIRCUMSTANCE: ICD-10-CM

## 2022-08-10 DIAGNOSIS — R76.8 POSITIVE ANA (ANTINUCLEAR ANTIBODY): ICD-10-CM

## 2022-08-10 DIAGNOSIS — E66.9 OBESITY (BMI 30.0-34.9): ICD-10-CM

## 2022-08-10 DIAGNOSIS — M35.1 CONNECTIVE TISSUE DISEASE OVERLAP SYNDROME: Primary | ICD-10-CM

## 2022-08-10 DIAGNOSIS — R76.8 CENTROMERE ANTIBODY POSITIVE: ICD-10-CM

## 2022-08-10 LAB — CK SERPL-CCNC: 145 U/L (ref 20–180)

## 2022-08-10 PROCEDURE — 1125F AMNT PAIN NOTED PAIN PRSNT: CPT | Mod: CPTII,S$GLB,, | Performed by: INTERNAL MEDICINE

## 2022-08-10 PROCEDURE — 82085 ASSAY OF ALDOLASE: CPT | Performed by: INTERNAL MEDICINE

## 2022-08-10 PROCEDURE — 99999 PR PBB SHADOW E&M-EST. PATIENT-LVL V: CPT | Mod: PBBFAC,,, | Performed by: INTERNAL MEDICINE

## 2022-08-10 PROCEDURE — 1160F PR REVIEW ALL MEDS BY PRESCRIBER/CLIN PHARMACIST DOCUMENTED: ICD-10-PCS | Mod: CPTII,S$GLB,, | Performed by: INTERNAL MEDICINE

## 2022-08-10 PROCEDURE — 36415 COLL VENOUS BLD VENIPUNCTURE: CPT | Performed by: INTERNAL MEDICINE

## 2022-08-10 PROCEDURE — 3078F PR MOST RECENT DIASTOLIC BLOOD PRESSURE < 80 MM HG: ICD-10-PCS | Mod: CPTII,S$GLB,, | Performed by: INTERNAL MEDICINE

## 2022-08-10 PROCEDURE — 99215 PR OFFICE/OUTPT VISIT, EST, LEVL V, 40-54 MIN: ICD-10-PCS | Mod: S$GLB,,, | Performed by: INTERNAL MEDICINE

## 2022-08-10 PROCEDURE — 3074F SYST BP LT 130 MM HG: CPT | Mod: CPTII,S$GLB,, | Performed by: INTERNAL MEDICINE

## 2022-08-10 PROCEDURE — 82550 ASSAY OF CK (CPK): CPT | Performed by: INTERNAL MEDICINE

## 2022-08-10 PROCEDURE — 3078F DIAST BP <80 MM HG: CPT | Mod: CPTII,S$GLB,, | Performed by: INTERNAL MEDICINE

## 2022-08-10 PROCEDURE — 99999 PR PBB SHADOW E&M-EST. PATIENT-LVL V: ICD-10-PCS | Mod: PBBFAC,,, | Performed by: INTERNAL MEDICINE

## 2022-08-10 PROCEDURE — 99215 OFFICE O/P EST HI 40 MIN: CPT | Mod: S$GLB,,, | Performed by: INTERNAL MEDICINE

## 2022-08-10 PROCEDURE — 1160F RVW MEDS BY RX/DR IN RCRD: CPT | Mod: CPTII,S$GLB,, | Performed by: INTERNAL MEDICINE

## 2022-08-10 PROCEDURE — 86235 NUCLEAR ANTIGEN ANTIBODY: CPT | Performed by: INTERNAL MEDICINE

## 2022-08-10 PROCEDURE — 83520 IMMUNOASSAY QUANT NOS NONAB: CPT | Performed by: INTERNAL MEDICINE

## 2022-08-10 PROCEDURE — 1159F MED LIST DOCD IN RCRD: CPT | Mod: CPTII,S$GLB,, | Performed by: INTERNAL MEDICINE

## 2022-08-10 PROCEDURE — 3074F PR MOST RECENT SYSTOLIC BLOOD PRESSURE < 130 MM HG: ICD-10-PCS | Mod: CPTII,S$GLB,, | Performed by: INTERNAL MEDICINE

## 2022-08-10 PROCEDURE — 1125F PR PAIN SEVERITY QUANTIFIED, PAIN PRESENT: ICD-10-PCS | Mod: CPTII,S$GLB,, | Performed by: INTERNAL MEDICINE

## 2022-08-10 PROCEDURE — 1159F PR MEDICATION LIST DOCUMENTED IN MEDICAL RECORD: ICD-10-PCS | Mod: CPTII,S$GLB,, | Performed by: INTERNAL MEDICINE

## 2022-08-10 SDOH — SOCIAL DETERMINANTS OF HEALTH (SDOH): PROBLEMS RELATED TO EDUCATION AND LITERACY, UNSPECIFIED: Z55.9

## 2022-08-10 NOTE — PATIENT INSTRUCTIONS
Use biotene products for dry mouth.  Xylimelts also helpful.  Use artificial tears.    There are medications for dry mouth, but at this point, best

## 2022-08-12 LAB — ALDOLASE SERPL-CCNC: 3.9 U/L (ref 1.2–7.6)

## 2022-08-15 LAB — ENA SCL70 AB SER-ACNC: 2 UNITS

## 2022-08-23 LAB — RNAP III AB SER-ACNC: <20 UNITS

## 2022-10-03 ENCOUNTER — LAB VISIT (OUTPATIENT)
Dept: LAB | Facility: HOSPITAL | Age: 77
End: 2022-10-03
Attending: FAMILY MEDICINE
Payer: MEDICARE

## 2022-10-03 DIAGNOSIS — E03.9 HYPOTHYROIDISM, UNSPECIFIED TYPE: ICD-10-CM

## 2022-10-03 DIAGNOSIS — E55.9 HYPOVITAMINOSIS D: ICD-10-CM

## 2022-10-03 LAB
ALBUMIN SERPL BCP-MCNC: 4 G/DL (ref 3.5–5.2)
ALP SERPL-CCNC: 65 U/L (ref 55–135)
ALT SERPL W/O P-5'-P-CCNC: 16 U/L (ref 10–44)
ANION GAP SERPL CALC-SCNC: 9 MMOL/L (ref 8–16)
AST SERPL-CCNC: 15 U/L (ref 10–40)
BILIRUB SERPL-MCNC: 0.4 MG/DL (ref 0.1–1)
BUN SERPL-MCNC: 12 MG/DL (ref 8–23)
CALCIUM SERPL-MCNC: 9.9 MG/DL (ref 8.7–10.5)
CHLORIDE SERPL-SCNC: 102 MMOL/L (ref 95–110)
CO2 SERPL-SCNC: 28 MMOL/L (ref 23–29)
CREAT SERPL-MCNC: 0.7 MG/DL (ref 0.5–1.4)
EST. GFR  (NO RACE VARIABLE): >60 ML/MIN/1.73 M^2
GLUCOSE SERPL-MCNC: 71 MG/DL (ref 70–110)
POTASSIUM SERPL-SCNC: 4.8 MMOL/L (ref 3.5–5.1)
PROT SERPL-MCNC: 6.5 G/DL (ref 6–8.4)
SODIUM SERPL-SCNC: 139 MMOL/L (ref 136–145)

## 2022-10-03 PROCEDURE — 80053 COMPREHEN METABOLIC PANEL: CPT | Performed by: PHYSICIAN ASSISTANT

## 2022-10-03 PROCEDURE — 82306 VITAMIN D 25 HYDROXY: CPT | Performed by: PHYSICIAN ASSISTANT

## 2022-10-03 PROCEDURE — 84443 ASSAY THYROID STIM HORMONE: CPT | Performed by: PHYSICIAN ASSISTANT

## 2022-10-03 PROCEDURE — 36415 COLL VENOUS BLD VENIPUNCTURE: CPT | Mod: PO | Performed by: PHYSICIAN ASSISTANT

## 2022-10-04 LAB
25(OH)D3+25(OH)D2 SERPL-MCNC: 23 NG/ML (ref 30–96)
TSH SERPL DL<=0.005 MIU/L-ACNC: 2.24 UIU/ML (ref 0.4–4)

## 2022-10-06 ENCOUNTER — OFFICE VISIT (OUTPATIENT)
Dept: ENDOCRINOLOGY | Facility: CLINIC | Age: 77
End: 2022-10-06
Payer: MEDICARE

## 2022-10-06 VITALS
OXYGEN SATURATION: 94 % | WEIGHT: 229.63 LBS | SYSTOLIC BLOOD PRESSURE: 118 MMHG | HEIGHT: 69 IN | HEART RATE: 68 BPM | TEMPERATURE: 98 F | BODY MASS INDEX: 34.01 KG/M2 | DIASTOLIC BLOOD PRESSURE: 70 MMHG

## 2022-10-06 DIAGNOSIS — I10 HYPERTENSION, ESSENTIAL: ICD-10-CM

## 2022-10-06 DIAGNOSIS — I10 HYPERTENSION, UNSPECIFIED TYPE: ICD-10-CM

## 2022-10-06 DIAGNOSIS — Z78.0 POSTMENOPAUSAL: ICD-10-CM

## 2022-10-06 DIAGNOSIS — L74.9 SWEATING ABNORMALITY: ICD-10-CM

## 2022-10-06 DIAGNOSIS — E04.9 GOITER: ICD-10-CM

## 2022-10-06 DIAGNOSIS — M81.0 AGE-RELATED OSTEOPOROSIS WITHOUT CURRENT PATHOLOGICAL FRACTURE: ICD-10-CM

## 2022-10-06 DIAGNOSIS — E03.9 HYPOTHYROIDISM, UNSPECIFIED TYPE: Primary | ICD-10-CM

## 2022-10-06 DIAGNOSIS — E78.5 HYPERLIPIDEMIA, UNSPECIFIED HYPERLIPIDEMIA TYPE: ICD-10-CM

## 2022-10-06 DIAGNOSIS — S82.91XA CLOSED FRACTURE OF RIGHT LOWER EXTREMITY, INITIAL ENCOUNTER: ICD-10-CM

## 2022-10-06 DIAGNOSIS — E66.9 OBESITY (BMI 30-39.9): ICD-10-CM

## 2022-10-06 DIAGNOSIS — E55.9 HYPOVITAMINOSIS D: ICD-10-CM

## 2022-10-06 PROCEDURE — 1101F PR PT FALLS ASSESS DOC 0-1 FALLS W/OUT INJ PAST YR: ICD-10-PCS | Mod: CPTII,S$GLB,, | Performed by: PHYSICIAN ASSISTANT

## 2022-10-06 PROCEDURE — 1159F PR MEDICATION LIST DOCUMENTED IN MEDICAL RECORD: ICD-10-PCS | Mod: CPTII,S$GLB,, | Performed by: PHYSICIAN ASSISTANT

## 2022-10-06 PROCEDURE — 3288F FALL RISK ASSESSMENT DOCD: CPT | Mod: CPTII,S$GLB,, | Performed by: PHYSICIAN ASSISTANT

## 2022-10-06 PROCEDURE — 3288F PR FALLS RISK ASSESSMENT DOCUMENTED: ICD-10-PCS | Mod: CPTII,S$GLB,, | Performed by: PHYSICIAN ASSISTANT

## 2022-10-06 PROCEDURE — 3078F DIAST BP <80 MM HG: CPT | Mod: CPTII,S$GLB,, | Performed by: PHYSICIAN ASSISTANT

## 2022-10-06 PROCEDURE — 3074F PR MOST RECENT SYSTOLIC BLOOD PRESSURE < 130 MM HG: ICD-10-PCS | Mod: CPTII,S$GLB,, | Performed by: PHYSICIAN ASSISTANT

## 2022-10-06 PROCEDURE — 1160F RVW MEDS BY RX/DR IN RCRD: CPT | Mod: CPTII,S$GLB,, | Performed by: PHYSICIAN ASSISTANT

## 2022-10-06 PROCEDURE — 3074F SYST BP LT 130 MM HG: CPT | Mod: CPTII,S$GLB,, | Performed by: PHYSICIAN ASSISTANT

## 2022-10-06 PROCEDURE — 1125F AMNT PAIN NOTED PAIN PRSNT: CPT | Mod: CPTII,S$GLB,, | Performed by: PHYSICIAN ASSISTANT

## 2022-10-06 PROCEDURE — 99999 PR PBB SHADOW E&M-EST. PATIENT-LVL V: CPT | Mod: PBBFAC,,, | Performed by: PHYSICIAN ASSISTANT

## 2022-10-06 PROCEDURE — 99214 OFFICE O/P EST MOD 30 MIN: CPT | Mod: S$GLB,,, | Performed by: PHYSICIAN ASSISTANT

## 2022-10-06 PROCEDURE — 1125F PR PAIN SEVERITY QUANTIFIED, PAIN PRESENT: ICD-10-PCS | Mod: CPTII,S$GLB,, | Performed by: PHYSICIAN ASSISTANT

## 2022-10-06 PROCEDURE — 3078F PR MOST RECENT DIASTOLIC BLOOD PRESSURE < 80 MM HG: ICD-10-PCS | Mod: CPTII,S$GLB,, | Performed by: PHYSICIAN ASSISTANT

## 2022-10-06 PROCEDURE — 99999 PR PBB SHADOW E&M-EST. PATIENT-LVL V: ICD-10-PCS | Mod: PBBFAC,,, | Performed by: PHYSICIAN ASSISTANT

## 2022-10-06 PROCEDURE — 1160F PR REVIEW ALL MEDS BY PRESCRIBER/CLIN PHARMACIST DOCUMENTED: ICD-10-PCS | Mod: CPTII,S$GLB,, | Performed by: PHYSICIAN ASSISTANT

## 2022-10-06 PROCEDURE — 1159F MED LIST DOCD IN RCRD: CPT | Mod: CPTII,S$GLB,, | Performed by: PHYSICIAN ASSISTANT

## 2022-10-06 PROCEDURE — 99214 PR OFFICE/OUTPT VISIT, EST, LEVL IV, 30-39 MIN: ICD-10-PCS | Mod: S$GLB,,, | Performed by: PHYSICIAN ASSISTANT

## 2022-10-06 PROCEDURE — 1101F PT FALLS ASSESS-DOCD LE1/YR: CPT | Mod: CPTII,S$GLB,, | Performed by: PHYSICIAN ASSISTANT

## 2022-10-06 RX ORDER — LEVOTHYROXINE SODIUM 125 UG/1
TABLET ORAL
Qty: 90 TABLET | Refills: 3 | Status: SHIPPED | OUTPATIENT
Start: 2022-10-06 | End: 2022-10-06 | Stop reason: SDUPTHER

## 2022-10-06 RX ORDER — LEVOTHYROXINE SODIUM 125 UG/1
TABLET ORAL
Qty: 90 TABLET | Refills: 3 | Status: SHIPPED | OUTPATIENT
Start: 2022-10-06 | End: 2023-09-28 | Stop reason: SDUPTHER

## 2022-10-06 RX ORDER — ALENDRONATE SODIUM 70 MG/1
TABLET ORAL
Qty: 4 TABLET | Refills: 11 | Status: SHIPPED | OUTPATIENT
Start: 2022-10-06 | End: 2023-02-09

## 2022-10-06 NOTE — PROGRESS NOTES
CC: Hypothyroidism    HPI: Marisela Eagle is a 77 y.o. female here for hypothyroidism along with pending conditions listed in the Visit Diagnosis. Diagnosed several years ago. Her daughter had thyroid cancer. They think this was due to radiation on her face for acne. Her cousin and grandmother had T2DM. She is taking biotin. Reports taking ditropan 10 mg daily which helped w/ sweating but caused dry mouth.    Taking 125 mcg daily. She takes it ~6 am and waits an hour before eating.    + sweating (on left side, taking elavil--states this helps), fatigue, wt loss (fluid).    No d/c, palpitations, tremors, hair loss or changes in nails.    Thyroid u/s 8/18 showed 2 subcentimeter nodules that have not changed in size since 2012. Repeat in 3 years. Reports SOB (seeing cardiology). No dysphagia or voice changes.     DEXA 2/21: wnl. No falls or fractures. Taking 10,000 IU daily. She had one fall in 9/21 when she fractured her pelvis. She recently found out she fractured her femur. She has had two steriod injections this year in her hips. She is going to water aerobics.    PMHx, PSHx: reviewed in epic.  Social Hx: Occasionally has mixed 2-3 drinks. She smoked 0.5 ppd for 53 years.     Wt Readings from Last 6 Encounters:   10/06/22 104.2 kg (229 lb 9.8 oz)   08/10/22 107 kg (235 lb 14.3 oz)   07/19/22 106.3 kg (234 lb 6.4 oz)   06/10/22 103 kg (227 lb)   05/09/22 103 kg (227 lb)   04/06/22 103.4 kg (227 lb 13.5 oz)      ROS:   Constitutional: feels tired, wt loss (16 lbs since 1/22).  Eyes: No recent visual changes  Cardiovascular: + occasional cp and palpitations  Respiratory: + SOB, no cough  Gastrointestinal: Denies recent bowel disturbances  GenitoUrinary - No dysuria  Skin: No new skin rash  Musc: + back pain, knee pain  Neurologic: + numbness and tingling in feet  Endocrine: no polyphagia, polydipsia or polyuria  Remainder ROS negative     /70 (BP Location: Left arm, Patient Position: Sitting, BP Method: Large  "(Manual))   Pulse 68   Temp 98.1 °F (36.7 °C) (Oral)   Ht 5' 9" (1.753 m)   Wt 104.2 kg (229 lb 9.8 oz)   SpO2 (!) 94%   BMI 33.91 kg/m²      Personally reviewed labs below:    Lab Results   Component Value Date    TSH 2.244 10/03/2022    P5WWRXQ 99 07/03/2019    FREET4 0.99 01/04/2022        Chemistry        Component Value Date/Time     10/03/2022 1322    K 4.8 10/03/2022 1322     10/03/2022 1322    CO2 28 10/03/2022 1322    BUN 12 10/03/2022 1322    CREATININE 0.7 10/03/2022 1322    GLU 71 10/03/2022 1322        Component Value Date/Time    CALCIUM 9.9 10/03/2022 1322    ALKPHOS 65 10/03/2022 1322    AST 15 10/03/2022 1322    ALT 16 10/03/2022 1322    BILITOT 0.4 10/03/2022 1322    ESTGFRAFRICA >60.0 07/21/2022 1530    EGFRNONAA >60.0 07/21/2022 1530         Lab Results   Component Value Date    HGBA1C 5.2 02/24/2021    HGBA1C 5.4 07/03/2019    HGBA1C 5.5 09/02/2011      PE:  GENERAL: middle aged female, well developed, well nourished  NECK: Supple neck, normal thyroid. No bruit  LYMPHATIC: No cervical or supraclavicular lymphadenopathy  CARDIOVASCULAR: Normal heart sounds, no pedal edema  RESPIRATORY: Normal effort, clear to auscultation bl  MUSC: ambulates with a four pronged cane  PSYCH: no anxiety or depression    EXAMINATION:  US SOFT TISSUE HEAD NECK THYROID     CLINICAL HISTORY:  Nontoxic goiter, unspecified     TECHNIQUE:  Ultrasound of the thyroid and cervical lymph nodes was performed.     COMPARISON:  Thyroid ultrasound of August 27, 2018     FINDINGS:  The right lobe measures 3.4 x 0.9 x 0.7 cm for a calculated volume of 1.1 cc.  The left lobe measures 3.3 x 0.9 x 0.9 cm for a calculated volume of 1.3 cc and a total thyroid volume of 2.4 cc which is quite small.     On the right there is a 6 mm solid nodule in the posterior lower pole.  There is a 2 mm cyst.  In the midpole.     On the left there is a 5 mm nodule at the upper pole and a 4 mm cyst in the upper pole.  There is a 4 mm " nodule in the isthmus.     Impression:     Quite small thyroid with small nodules on each side and in the isthmus.        Electronically signed by: Navarro Bryant MD  Date:                                            02/21/2022  Time:                                           16:03    Assessment/Plan:   1. Hypothyroidism, unspecified type  TSH    T4, Free      2. Goiter  US Soft Tissue Head Neck Thyroid      3. Hypertension, unspecified type        4. Hyperlipidemia, unspecified hyperlipidemia type        5. Postmenopausal  DXA Bone Density Spine And Hip      6. Obesity (BMI 30-39.9)        7. Hypovitaminosis D  Vitamin D      8. Closed fracture of right lower extremity, initial encounter        9. Sweating abnormality        10. Hypertension, essential  levothyroxine (SYNTHROID) 125 MCG tablet    DISCONTINUED: levothyroxine (SYNTHROID) 125 MCG tablet          Hypothyroidism-TFTs wnl. Continue LT4 dose.  Goiter-repeat thyroid u/s 2/23.  FJT-adyizw-toswvnre meds.  YWA-uzicza-bbxwjimw statin  Postmenopausal w/ fragility fx-start fosamax 70 mg weekly-DEXA scan 2/23. Continue taking 2000 IU of vitamin D and 1500 mg of calcium. Also, participate in weight bearing and resistance exercises for 40 min 4x weekly to maintain your bone density.   Obesity-Body mass index is 33.91 kg/m². Encouraged to increase exercise. Declines MNT.  Hypovitaminosis d-low-normal-increase vitamin d intake by 2000 IU daily.   Sweating-continue ditropan.      F/u in six months w/ labs w/ dexa and thyroid u/s

## 2022-11-04 ENCOUNTER — OFFICE VISIT (OUTPATIENT)
Dept: FAMILY MEDICINE | Facility: CLINIC | Age: 77
End: 2022-11-04
Payer: MEDICARE

## 2022-11-04 VITALS
OXYGEN SATURATION: 94 % | HEART RATE: 68 BPM | SYSTOLIC BLOOD PRESSURE: 130 MMHG | BODY MASS INDEX: 34.32 KG/M2 | DIASTOLIC BLOOD PRESSURE: 72 MMHG | HEIGHT: 69 IN | TEMPERATURE: 99 F | WEIGHT: 231.69 LBS

## 2022-11-04 DIAGNOSIS — M51.9 LUMBAR DISC DISEASE: ICD-10-CM

## 2022-11-04 DIAGNOSIS — I10 HYPERTENSION, ESSENTIAL: ICD-10-CM

## 2022-11-04 DIAGNOSIS — R26.9 ABNORMALITY OF GAIT AND MOBILITY: ICD-10-CM

## 2022-11-04 DIAGNOSIS — I50.20 HFREF (HEART FAILURE WITH REDUCED EJECTION FRACTION): ICD-10-CM

## 2022-11-04 DIAGNOSIS — Z00.00 ENCOUNTER FOR PREVENTIVE HEALTH EXAMINATION: Primary | ICD-10-CM

## 2022-11-04 DIAGNOSIS — I70.0 ATHEROSCLEROSIS OF AORTA: ICD-10-CM

## 2022-11-04 PROCEDURE — 3075F SYST BP GE 130 - 139MM HG: CPT | Mod: CPTII,S$GLB,, | Performed by: NURSE PRACTITIONER

## 2022-11-04 PROCEDURE — 3288F PR FALLS RISK ASSESSMENT DOCUMENTED: ICD-10-PCS | Mod: CPTII,S$GLB,, | Performed by: NURSE PRACTITIONER

## 2022-11-04 PROCEDURE — 1159F MED LIST DOCD IN RCRD: CPT | Mod: CPTII,S$GLB,, | Performed by: NURSE PRACTITIONER

## 2022-11-04 PROCEDURE — 1125F AMNT PAIN NOTED PAIN PRSNT: CPT | Mod: CPTII,S$GLB,, | Performed by: NURSE PRACTITIONER

## 2022-11-04 PROCEDURE — 99999 PR PBB SHADOW E&M-EST. PATIENT-LVL V: ICD-10-PCS | Mod: PBBFAC,,, | Performed by: NURSE PRACTITIONER

## 2022-11-04 PROCEDURE — 3078F DIAST BP <80 MM HG: CPT | Mod: CPTII,S$GLB,, | Performed by: NURSE PRACTITIONER

## 2022-11-04 PROCEDURE — 99499 UNLISTED E&M SERVICE: CPT | Mod: HCNC,S$GLB,, | Performed by: NURSE PRACTITIONER

## 2022-11-04 PROCEDURE — 1159F PR MEDICATION LIST DOCUMENTED IN MEDICAL RECORD: ICD-10-PCS | Mod: CPTII,S$GLB,, | Performed by: NURSE PRACTITIONER

## 2022-11-04 PROCEDURE — 3288F FALL RISK ASSESSMENT DOCD: CPT | Mod: CPTII,S$GLB,, | Performed by: NURSE PRACTITIONER

## 2022-11-04 PROCEDURE — 1125F PR PAIN SEVERITY QUANTIFIED, PAIN PRESENT: ICD-10-PCS | Mod: CPTII,S$GLB,, | Performed by: NURSE PRACTITIONER

## 2022-11-04 PROCEDURE — 1101F PT FALLS ASSESS-DOCD LE1/YR: CPT | Mod: CPTII,S$GLB,, | Performed by: NURSE PRACTITIONER

## 2022-11-04 PROCEDURE — G0439 PR MEDICARE ANNUAL WELLNESS SUBSEQUENT VISIT: ICD-10-PCS | Mod: S$GLB,,, | Performed by: NURSE PRACTITIONER

## 2022-11-04 PROCEDURE — 3075F PR MOST RECENT SYSTOLIC BLOOD PRESS GE 130-139MM HG: ICD-10-PCS | Mod: CPTII,S$GLB,, | Performed by: NURSE PRACTITIONER

## 2022-11-04 PROCEDURE — 3078F PR MOST RECENT DIASTOLIC BLOOD PRESSURE < 80 MM HG: ICD-10-PCS | Mod: CPTII,S$GLB,, | Performed by: NURSE PRACTITIONER

## 2022-11-04 PROCEDURE — G0439 PPPS, SUBSEQ VISIT: HCPCS | Mod: S$GLB,,, | Performed by: NURSE PRACTITIONER

## 2022-11-04 PROCEDURE — 99999 PR PBB SHADOW E&M-EST. PATIENT-LVL V: CPT | Mod: PBBFAC,,, | Performed by: NURSE PRACTITIONER

## 2022-11-04 PROCEDURE — 1101F PR PT FALLS ASSESS DOC 0-1 FALLS W/OUT INJ PAST YR: ICD-10-PCS | Mod: CPTII,S$GLB,, | Performed by: NURSE PRACTITIONER

## 2022-11-04 RX ORDER — LANOLIN ALCOHOL/MO/W.PET/CERES
5000 CREAM (GRAM) TOPICAL DAILY
COMMUNITY

## 2022-11-04 RX ORDER — NITROFURANTOIN (MACROCRYSTALS) 100 MG/1
100 CAPSULE ORAL 4 TIMES DAILY
COMMUNITY
End: 2022-12-23

## 2022-11-04 RX ORDER — FAMOTIDINE 20 MG/1
20 TABLET, FILM COATED ORAL
COMMUNITY
Start: 2022-07-29 | End: 2023-08-21

## 2022-11-04 NOTE — PATIENT INSTRUCTIONS
Counseling and Referral of Other Preventative  (Italic type indicates deductible and co-insurance are waived)    Patient Name: Marisela Eagle  Today's Date: 11/4/2022    Health Maintenance       Date Due Completion Date    TETANUS VACCINE Never done ---    Shingles Vaccine (2 of 3) 01/18/2016 11/23/2015    COVID-19 Vaccine (4 - Booster for Pfizer series) 02/21/2022 12/27/2021    Influenza Vaccine (1) 09/01/2022 10/20/2021    DEXA Scan 02/24/2024 2/24/2021    Lipid Panel 10/29/2026 10/29/2021        No orders of the defined types were placed in this encounter.    The following information is provided to all patients.  This information is to help you find resources for any of the problems found today that may be affecting your health:                Living healthy guide: www.[facility].[place].gov      Understanding Diabetes: www.diabetes.org      Eating healthy: www.cdc.gov/healthyweight      CDC home safety checklist: www.cdc.gov/steadi/patient.html      Agency on Aging: www.goea.[place].[facility]      Alcoholics anonymous (AA): www.aa.org      Physical Activity: www.elizabeth.nih.gov/fc3bhen      Tobacco use: www.quitwithusla.org

## 2022-11-04 NOTE — PROGRESS NOTES
"  Marisela Eagle presented for a  Medicare AWV and comprehensive Health Risk Assessment today. The following components were reviewed and updated:    Medical history  Family History  Social history  Allergies and Current Medications  Health Risk Assessment  Health Maintenance  Care Team         ** See Completed Assessments for Annual Wellness Visit within the encounter summary.**         The following assessments were completed:  Living Situation  CAGE  Depression Screening  Timed Get Up and Go  Whisper Test  Cognitive Function Screening  Nutrition Screening  ADL Screening  PAQ Screening          Vitals:    11/04/22 1117   BP: 130/72   Pulse: 68   Temp: 98.6 °F (37 °C)   TempSrc: Oral   SpO2: (!) 94%   Weight: 105.1 kg (231 lb 11.3 oz)   Height: 5' 9" (1.753 m)     Body mass index is 34.22 kg/m².  Physical Exam  Constitutional:       Appearance: She is well-developed.   HENT:      Head: Normocephalic and atraumatic.      Nose: Nose normal.   Eyes:      General: Lids are normal.      Conjunctiva/sclera: Conjunctivae normal.      Pupils: Pupils are equal, round, and reactive to light.   Cardiovascular:      Rate and Rhythm: Normal rate.   Pulmonary:      Effort: Pulmonary effort is normal.   Musculoskeletal:      Cervical back: Full passive range of motion without pain.   Skin:     General: Skin is warm and dry.   Neurological:      Mental Status: She is alert and oriented to person, place, and time.   Psychiatric:         Speech: Speech normal.         Behavior: Behavior normal.             Diagnoses and health risks identified today and associated recommendations/orders:    1. Encounter for preventive health examination  Discussed health maintenance guidelines appropriate for age.    2. Atherosclerosis of aorta  Stable, continue to monitor  On statin, bp well controlled  Followed by pcp    3. HFrEF (heart failure with reduced ejection fraction)  Stable, continue to monitor  Followed by cardiology  4. Lumbar disc " disease  Review for Opioid Screening: Patient does have rx for Opioids-percocet per pain management, has tried other treatments, risk score 0. .    Review for Substance Use Disorders: Patient does not use substance.      5. Abnormality of gait and mobility  Stable, continue to monitor      6. Hypertension, essential  Controlled, continue current medication regimen  Low salt diet  Increase physical activity  Followed by pcp        Provided Marisela with a 5-10 year written screening schedule and personal prevention plan. Recommendations were developed using the USPSTF age appropriate recommendations. Education, counseling, and referrals were provided as needed. After Visit Summary printed and given to patient which includes a list of additional screenings\tests needed.    Follow up for One year for Annual Wellness Visit.    Vivi Zee NP      I offered to discuss advanced care planning, including how to pick a person who would make decisions for you if you were unable to make them for yourself, called a health care power of , and what kind of decisions you might make such as use of life sustaining treatments such as ventilators and tube feeding when faced with a life limiting illness recorded on a living will that they will need to know. (How you want to be cared for as you near the end of your natural life)     X  Patient has advanced directives written and agrees to provide copies to the institution.

## 2022-11-23 ENCOUNTER — OFFICE VISIT (OUTPATIENT)
Dept: CARDIOLOGY | Facility: CLINIC | Age: 77
End: 2022-11-23
Payer: MEDICARE

## 2022-11-23 VITALS
WEIGHT: 233 LBS | HEIGHT: 69 IN | DIASTOLIC BLOOD PRESSURE: 80 MMHG | HEART RATE: 71 BPM | OXYGEN SATURATION: 97 % | SYSTOLIC BLOOD PRESSURE: 124 MMHG | BODY MASS INDEX: 34.51 KG/M2

## 2022-11-23 DIAGNOSIS — E66.01 MORBID OBESITY: ICD-10-CM

## 2022-11-23 DIAGNOSIS — R06.02 SOB (SHORTNESS OF BREATH) ON EXERTION: Primary | ICD-10-CM

## 2022-11-23 DIAGNOSIS — M35.00 SICCA COMPLEX: ICD-10-CM

## 2022-11-23 DIAGNOSIS — E87.6 HYPOKALEMIA: ICD-10-CM

## 2022-11-23 DIAGNOSIS — I50.20 HFREF (HEART FAILURE WITH REDUCED EJECTION FRACTION): ICD-10-CM

## 2022-11-23 DIAGNOSIS — I25.10 ASCVD (ARTERIOSCLEROTIC CARDIOVASCULAR DISEASE): ICD-10-CM

## 2022-11-23 DIAGNOSIS — R06.02 SOB (SHORTNESS OF BREATH): ICD-10-CM

## 2022-11-23 DIAGNOSIS — I10 HYPERTENSION, ESSENTIAL: ICD-10-CM

## 2022-11-23 PROCEDURE — 1101F PR PT FALLS ASSESS DOC 0-1 FALLS W/OUT INJ PAST YR: ICD-10-PCS | Mod: CPTII,S$GLB,, | Performed by: SPECIALIST

## 2022-11-23 PROCEDURE — 99999 PR PBB SHADOW E&M-EST. PATIENT-LVL IV: CPT | Mod: PBBFAC,,, | Performed by: SPECIALIST

## 2022-11-23 PROCEDURE — 99215 OFFICE O/P EST HI 40 MIN: CPT | Mod: S$GLB,,, | Performed by: SPECIALIST

## 2022-11-23 PROCEDURE — 1159F PR MEDICATION LIST DOCUMENTED IN MEDICAL RECORD: ICD-10-PCS | Mod: CPTII,S$GLB,, | Performed by: SPECIALIST

## 2022-11-23 PROCEDURE — 1160F RVW MEDS BY RX/DR IN RCRD: CPT | Mod: CPTII,S$GLB,, | Performed by: SPECIALIST

## 2022-11-23 PROCEDURE — 3074F PR MOST RECENT SYSTOLIC BLOOD PRESSURE < 130 MM HG: ICD-10-PCS | Mod: CPTII,S$GLB,, | Performed by: SPECIALIST

## 2022-11-23 PROCEDURE — 1126F AMNT PAIN NOTED NONE PRSNT: CPT | Mod: CPTII,S$GLB,, | Performed by: SPECIALIST

## 2022-11-23 PROCEDURE — 1160F PR REVIEW ALL MEDS BY PRESCRIBER/CLIN PHARMACIST DOCUMENTED: ICD-10-PCS | Mod: CPTII,S$GLB,, | Performed by: SPECIALIST

## 2022-11-23 PROCEDURE — 3288F PR FALLS RISK ASSESSMENT DOCUMENTED: ICD-10-PCS | Mod: CPTII,S$GLB,, | Performed by: SPECIALIST

## 2022-11-23 PROCEDURE — 99999 PR PBB SHADOW E&M-EST. PATIENT-LVL IV: ICD-10-PCS | Mod: PBBFAC,,, | Performed by: SPECIALIST

## 2022-11-23 PROCEDURE — 3079F DIAST BP 80-89 MM HG: CPT | Mod: CPTII,S$GLB,, | Performed by: SPECIALIST

## 2022-11-23 PROCEDURE — 3079F PR MOST RECENT DIASTOLIC BLOOD PRESSURE 80-89 MM HG: ICD-10-PCS | Mod: CPTII,S$GLB,, | Performed by: SPECIALIST

## 2022-11-23 PROCEDURE — 1159F MED LIST DOCD IN RCRD: CPT | Mod: CPTII,S$GLB,, | Performed by: SPECIALIST

## 2022-11-23 PROCEDURE — 3074F SYST BP LT 130 MM HG: CPT | Mod: CPTII,S$GLB,, | Performed by: SPECIALIST

## 2022-11-23 PROCEDURE — 1126F PR PAIN SEVERITY QUANTIFIED, NO PAIN PRESENT: ICD-10-PCS | Mod: CPTII,S$GLB,, | Performed by: SPECIALIST

## 2022-11-23 PROCEDURE — 3288F FALL RISK ASSESSMENT DOCD: CPT | Mod: CPTII,S$GLB,, | Performed by: SPECIALIST

## 2022-11-23 PROCEDURE — 1101F PT FALLS ASSESS-DOCD LE1/YR: CPT | Mod: CPTII,S$GLB,, | Performed by: SPECIALIST

## 2022-11-23 PROCEDURE — 99215 PR OFFICE/OUTPT VISIT, EST, LEVL V, 40-54 MIN: ICD-10-PCS | Mod: S$GLB,,, | Performed by: SPECIALIST

## 2022-11-23 RX ORDER — FUROSEMIDE 40 MG/1
40 TABLET ORAL DAILY
Status: ON HOLD | COMMUNITY
End: 2023-05-28 | Stop reason: SDUPTHER

## 2022-11-23 RX ORDER — POTASSIUM CHLORIDE 20 MEQ/1
10 TABLET, EXTENDED RELEASE ORAL DAILY
COMMUNITY
End: 2023-05-26

## 2022-11-23 RX ORDER — METOPROLOL SUCCINATE 50 MG/1
25 TABLET, EXTENDED RELEASE ORAL NIGHTLY
Qty: 90 TABLET | Refills: 1 | Status: SHIPPED | OUTPATIENT
Start: 2022-11-23 | End: 2023-06-01 | Stop reason: SDUPTHER

## 2022-11-23 NOTE — PROGRESS NOTES
Subjective:    Patient ID:  Marisela Eagle is a 77 y.o. female who presents for   Hypertension, Hyperlipidemia, and Shortness of Breath (sometimes)    HPIcant walk and talk     No mi  no mi  no revasc procures    Wt incrase     Sob awhile  no olivia    + rheumatoid    Snores  nite sjrojens   On   potassium she is had cardiac evaluation in no revascularization procedures  She complains about shortness of breath climbing of stent    Review of patient's allergies indicates:  No Known Allergies    Past Medical History:   Diagnosis Date    Bilateral knee pain 2012    left worse, right knee painful even after partial replacement.    Bruises easily     Clot ?    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 Took Coumadin x 6 weeks    KNEE ARTHROSCOPY  2010    right    TONSILLECTOMY       Social History     Tobacco Use    Smoking status: Former     Types: Cigarettes     Quit date: 3/23/2012     Years since quitting: 10.6    Smokeless tobacco: Never   Substance Use Topics    Alcohol use: Yes     Comment: occasional    Drug use: No        Review of Systems     Review of Systems   Constitutional: Positive for weight gain. Negative for decreased  appetite, malaise/fatigue and weight loss.   HENT: Negative.  Negative for congestion, hearing loss and tinnitus.    Eyes:  Negative for blurred vision, vision loss in left eye, vision loss in right eye, visual disturbance and visual halos.   Cardiovascular:  Positive for dyspnea on exertion. Negative for chest pain, claudication, irregular heartbeat, leg swelling, near-syncope, orthopnea, palpitations, paroxysmal nocturnal dyspnea and syncope.   Respiratory:  Positive for shortness of breath, sleep disturbances due to breathing and snoring. Negative for cough, hemoptysis, sputum production and wheezing.    Endocrine: Negative.  Negative for polydipsia, polyphagia and polyuria.   Hematologic/Lymphatic: Negative for adenopathy. Does not bruise/bleed easily.   Skin: Negative.  Negative for dry skin, itching, poor wound healing, rash, skin cancer and suspicious lesions.   Musculoskeletal:  Positive for arthritis. Negative for back pain, falls, gout, joint pain, joint swelling, muscle cramps, muscle weakness, neck pain and stiffness.   Gastrointestinal:  Positive for abdominal pain. Negative for change in bowel habit, constipation, diarrhea, heartburn, hematemesis, hemorrhoids, melena, nausea and vomiting.   Genitourinary: Negative.  Negative for bladder incontinence, dysuria, flank pain, frequency, hematuria, nocturia and non-menstrual bleeding.   Neurological:  Negative for brief paralysis, difficulty with concentration, disturbances in coordination, dizziness, focal weakness, headaches, loss of balance, numbness, paresthesias and tremors.   Psychiatric/Behavioral:  Negative for altered mental status, depression, hallucinations, memory loss, substance abuse, suicidal ideas and thoughts of violence. The patient does not have insomnia and is not nervous/anxious.    Allergic/Immunologic: Negative for environmental allergies and hives.         Objective:        Vitals:    11/23/22 1534   BP: 124/80   Pulse: 71       Lab  Results   Component Value Date    WBC 6.70 07/21/2022    HGB 12.2 07/21/2022    HCT 37.4 07/21/2022     07/21/2022    CHOL 186 10/29/2021    TRIG 39 10/29/2021    HDL 77 (H) 10/29/2021    ALT 16 10/03/2022    AST 15 10/03/2022     10/03/2022    K 4.8 10/03/2022     10/03/2022    CREATININE 0.7 10/03/2022    BUN 12 10/03/2022    CO2 28 10/03/2022    TSH 2.244 10/03/2022    INR 1.1 09/17/2019    HGBA1C 5.2 02/24/2021        ECHOCARDIOGRAM RESULTS  Results for orders placed in visit on 09/23/20    Echo Color Flow Doppler? Yes    Interpretation Summary  · There is left ventricular concentric remodeling.  · The estimated PA systolic pressure is 25 mmHg.  · The left ventricle is normal in size with mildly decreased systolic function. The estimated ejection fraction is 45%.  · Grade I diastolic dysfunction.  · Mild tricuspid regurgitation.  · There is left ventricular global hypokinesis.  · Normal right ventricular systolic function.  · Mild left atrial enlargement.  · Mild right atrial enlargement.  · Mild aortic regurgitation.  · Normal central venous pressure (3 mmHg).      CURRENT/PREVIOUS VISIT EKG  Results for orders placed or performed in visit on 01/04/22   EKG 12-lead    Collection Time: 01/04/22  2:16 PM    Narrative    Test Reason : R07.9,    Vent. Rate : 066 BPM     Atrial Rate : 066 BPM     P-R Int : 214 ms          QRS Dur : 094 ms      QT Int : 448 ms       P-R-T Axes : 062 002 076 degrees     QTc Int : 469 ms    Sinus rhythm with 1st degree A-V block  Nonspecific ST and T wave abnormality  Abnormal ECG  When compared with ECG of 31-JAN-2021 06:03,  Fusion complexes are no longer Present  Confirmed by Skip Patton MD ([id number]) on 1/9/2022 8:09:46 PM    Referred By: CARMELO ESCOBAR           Confirmed By:Skip Patton MD     Results for orders placed in visit on 09/23/20    Echo Color Flow Doppler? Yes    Interpretation Summary  · There is left ventricular concentric remodeling.  · The  estimated PA systolic pressure is 25 mmHg.  · The left ventricle is normal in size with mildly decreased systolic function. The estimated ejection fraction is 45%.  · Grade I diastolic dysfunction.  · Mild tricuspid regurgitation.  · There is left ventricular global hypokinesis.  · Normal right ventricular systolic function.  · Mild left atrial enlargement.  · Mild right atrial enlargement.  · Mild aortic regurgitation.  · Normal central venous pressure (3 mmHg).    Results for orders placed in visit on 02/21/22    Nuclear Stress Test    Interpretation Summary    PET CT Findings The nuclear portion of this study will be reported separately.    The EKG portion of this study is negative for ischemia.    The patient reported no chest pain during the stress test.    There were no arrhythmias during stress.      PHYSICAL EXAM    Physical Exam blood pressure standing is 120/80 pulse rate is 65  Lungs are clear  Cardiac regular  Abdomen extremities    Medication List with Changes/Refills   Current Medications    ALENDRONATE (FOSAMAX) 70 MG TABLET    Take one tablet every 7 days.    CHOLECALCIFEROL, VITAMIN D3, 5,000 UNIT CAPSULE    Take 5,000 Units by mouth once daily.    CYANOCOBALAMIN (VITAMIN B-12) 1000 MCG TABLET    Take 5,000 mcg by mouth once daily.    DULOXETINE (CYMBALTA) 60 MG CAPSULE        DUREZOL 0.05 % DROP OPHTHALMIC SOLUTION    Place 1 drop into both eyes once daily.     FAMOTIDINE (PEPCID) 20 MG TABLET    Take 20 mg by mouth as needed.    FUROSEMIDE (LASIX) 40 MG TABLET    Take 40 mg by mouth once daily.    GABAPENTIN (NEURONTIN) 800 MG TABLET    TAKE 1 TABLET THREE TIMES DAILY    LEVOTHYROXINE (SYNTHROID) 125 MCG TABLET    TAKE 1 TABLET EVERY DAY BEFORE BREAKFAST  Strength: 125 mcg    LORAZEPAM (ATIVAN) 1 MG TABLET    Take 1 tablet (1 mg total) by mouth nightly as needed for Anxiety.    LOSARTAN (COZAAR) 100 MG TABLET    Take 0.5 tablets (50 mg total) by mouth once daily.    METOPROLOL SUCCINATE  (TOPROL-XL) 50 MG 24 HR TABLET    TAKE 1 TABLET EVERY NIGHT    NITROFURANTOIN (MACRODANTIN) 100 MG CAPSULE    Take 100 mg by mouth 4 (four) times daily.    OMEPRAZOLE (PRILOSEC) 40 MG CAPSULE    TAKE 1 CAPSULE EVERY DAY    OXYBUTYNIN (DITROPAN-XL) 10 MG 24 HR TABLET    TAKE 1 TABLET (10 MG TOTAL) BY MOUTH ONCE DAILY.    OXYCODONE-ACETAMINOPHEN (PERCOCET)  MG PER TABLET    Take 1 tablet by mouth every 6 (six) hours as needed.     -PROPYLENE GLYCOL 0.4-0.3 % DROP    Place 1 drop into both eyes once daily.    POTASSIUM CHLORIDE SA (K-DUR,KLOR-CON) 20 MEQ TABLET    TAKE 1 TABLET TWICE DAILY    TURMERIC 400 MG CAP    Take 400 mg by mouth 2 (two) times daily.    VIT C/E/ZN/COPPR/LUTEIN/ZEAXAN (PRESERVISION AREDS-2 ORAL)    Take 1 capsule by mouth 2 (two) times daily.            Assessment:     Lo k,  Hfref ,  arthritis ,     xochitl 2nd to metop-  decrease dose ,    Sob -     1) add k , echo  bnp . Echo \   Lose wt      Plan:   Will cut dose of metoprolol and get echo chest x-ray and BNP levels and see if this makes any difference  She is going to try to lose weight  Problem List Items Addressed This Visit    None       No follow-ups on file.

## 2022-12-01 ENCOUNTER — HOSPITAL ENCOUNTER (OUTPATIENT)
Dept: RADIOLOGY | Facility: HOSPITAL | Age: 77
Discharge: HOME | End: 2022-12-01
Attending: SPECIALIST
Payer: MEDICARE

## 2022-12-01 DIAGNOSIS — I10 HYPERTENSION, ESSENTIAL: ICD-10-CM

## 2022-12-01 DIAGNOSIS — R06.02 SOB (SHORTNESS OF BREATH) ON EXERTION: ICD-10-CM

## 2022-12-01 DIAGNOSIS — I50.20 HFREF (HEART FAILURE WITH REDUCED EJECTION FRACTION): ICD-10-CM

## 2022-12-01 PROCEDURE — 71046 X-RAY EXAM CHEST 2 VIEWS: CPT | Mod: TC

## 2022-12-13 ENCOUNTER — HOSPITAL ENCOUNTER (OUTPATIENT)
Dept: CARDIOLOGY | Facility: HOSPITAL | Age: 77
Discharge: HOME | End: 2022-12-13
Attending: SPECIALIST
Payer: MEDICARE

## 2022-12-13 VITALS — HEIGHT: 69 IN | BODY MASS INDEX: 34.51 KG/M2 | WEIGHT: 233 LBS

## 2022-12-13 DIAGNOSIS — I50.20 HFREF (HEART FAILURE WITH REDUCED EJECTION FRACTION): ICD-10-CM

## 2022-12-13 PROCEDURE — 93306 TTE W/DOPPLER COMPLETE: CPT

## 2022-12-13 PROCEDURE — 93306 TTE W/DOPPLER COMPLETE: CPT | Mod: 26,,, | Performed by: SPECIALIST

## 2022-12-13 PROCEDURE — 93306 ECHO (CUPID ONLY): ICD-10-PCS | Mod: 26,,, | Performed by: SPECIALIST

## 2022-12-19 LAB
AORTIC ROOT ANNULUS: 3.3 CM
AORTIC VALVE CUSP SEPERATION: 2.1 CM
AV INDEX (PROSTH): 0.74
AV MEAN GRADIENT: 4 MMHG
AV PEAK GRADIENT: 6 MMHG
AV REGURGITATION PRESSURE HALF TIME: 1108 MS
AV VALVE AREA: 2.33 CM2
AV VELOCITY RATIO: 0.79
BSA FOR ECHO PROCEDURE: 2.27 M2
CV ECHO LV RWT: 0.42 CM
DOP CALC AO PEAK VEL: 1.19 M/S
DOP CALC AO VTI: 31.7 CM
DOP CALC LVOT AREA: 3.1 CM2
DOP CALC LVOT DIAMETER: 2 CM
DOP CALC LVOT PEAK VEL: 0.94 M/S
DOP CALC LVOT STROKE VOLUME: 73.79 CM3
DOP CALCLVOT PEAK VEL VTI: 23.5 CM
E WAVE DECELERATION TIME: 180 MSEC
E/A RATIO: 1.46
E/E' RATIO: 14.14 M/S
ECHO LV POSTERIOR WALL: 1 CM (ref 0.6–1.1)
EJECTION FRACTION: 50 %
FRACTIONAL SHORTENING: -27 % (ref 28–44)
INTERVENTRICULAR SEPTUM: 1.33 CM (ref 0.6–1.1)
IVRT: 123 MSEC
LEFT ATRIUM SIZE: 4.4 CM
LEFT ATRIUM VOLUME INDEX MOD: 33.6 ML/M2
LEFT ATRIUM VOLUME MOD: 74 CM3
LEFT INTERNAL DIMENSION IN SYSTOLE: 6.04 CM (ref 2.1–4)
LEFT VENTRICLE DIASTOLIC VOLUME INDEX: 47.73 ML/M2
LEFT VENTRICLE DIASTOLIC VOLUME: 105 ML
LEFT VENTRICLE MASS INDEX: 94 G/M2
LEFT VENTRICLE SYSTOLIC VOLUME INDEX: 27.5 ML/M2
LEFT VENTRICLE SYSTOLIC VOLUME: 60.4 ML
LEFT VENTRICULAR INTERNAL DIMENSION IN DIASTOLE: 4.75 CM (ref 3.5–6)
LEFT VENTRICULAR MASS: 206.71 G
LV LATERAL E/E' RATIO: 11 M/S
LV SEPTAL E/E' RATIO: 19.8 M/S
LVOT MG: 2 MMHG
LVOT MV: 0.72 CM/S
MV PEAK A VEL: 0.68 M/S
MV PEAK E VEL: 0.99 M/S
MV STENOSIS PRESSURE HALF TIME: 60 MS
MV VALVE AREA P 1/2 METHOD: 3.67 CM2
PISA AR MAX VEL: 3.33 M/S
PISA TR MAX VEL: 2.61 M/S
PV MEAN GRADIENT: 2 MMHG
PV MV: 0.63 M/S
PV PEAK VELOCITY: 0.86 CM/S
RA PRESSURE: 3 MMHG
RIGHT VENTRICULAR END-DIASTOLIC DIMENSION: 2.23 CM
TDI LATERAL: 0.09 M/S
TDI SEPTAL: 0.05 M/S
TDI: 0.07 M/S
TR MAX PG: 27 MMHG
TV REST PULMONARY ARTERY PRESSURE: 30 MMHG

## 2022-12-23 ENCOUNTER — OFFICE VISIT (OUTPATIENT)
Dept: CARDIOLOGY | Facility: CLINIC | Age: 77
End: 2022-12-23
Payer: MEDICARE

## 2022-12-23 VITALS
HEIGHT: 69 IN | BODY MASS INDEX: 34.94 KG/M2 | SYSTOLIC BLOOD PRESSURE: 130 MMHG | HEART RATE: 81 BPM | OXYGEN SATURATION: 95 % | WEIGHT: 235.88 LBS | DIASTOLIC BLOOD PRESSURE: 82 MMHG

## 2022-12-23 DIAGNOSIS — I50.20 HFREF (HEART FAILURE WITH REDUCED EJECTION FRACTION): ICD-10-CM

## 2022-12-23 DIAGNOSIS — I10 HYPERTENSION, ESSENTIAL: ICD-10-CM

## 2022-12-23 DIAGNOSIS — M19.90 INFLAMMATORY ARTHRITIS: ICD-10-CM

## 2022-12-23 DIAGNOSIS — I83.90 ASYMPTOMATIC VARICOSE VEINS: ICD-10-CM

## 2022-12-23 DIAGNOSIS — E03.9 HYPOTHYROIDISM, UNSPECIFIED TYPE: ICD-10-CM

## 2022-12-23 DIAGNOSIS — M51.9 LUMBAR DISC DISEASE: Primary | ICD-10-CM

## 2022-12-23 DIAGNOSIS — E53.8 VITAMIN B12 DEFICIENCY: ICD-10-CM

## 2022-12-23 DIAGNOSIS — G62.9 NEUROPATHY: ICD-10-CM

## 2022-12-23 PROCEDURE — 3288F FALL RISK ASSESSMENT DOCD: CPT | Mod: HCNC,CPTII,S$GLB, | Performed by: SPECIALIST

## 2022-12-23 PROCEDURE — 3075F SYST BP GE 130 - 139MM HG: CPT | Mod: HCNC,CPTII,S$GLB, | Performed by: SPECIALIST

## 2022-12-23 PROCEDURE — 1101F PT FALLS ASSESS-DOCD LE1/YR: CPT | Mod: HCNC,CPTII,S$GLB, | Performed by: SPECIALIST

## 2022-12-23 PROCEDURE — 1101F PR PT FALLS ASSESS DOC 0-1 FALLS W/OUT INJ PAST YR: ICD-10-PCS | Mod: HCNC,CPTII,S$GLB, | Performed by: SPECIALIST

## 2022-12-23 PROCEDURE — 3288F PR FALLS RISK ASSESSMENT DOCUMENTED: ICD-10-PCS | Mod: HCNC,CPTII,S$GLB, | Performed by: SPECIALIST

## 2022-12-23 PROCEDURE — 3075F PR MOST RECENT SYSTOLIC BLOOD PRESS GE 130-139MM HG: ICD-10-PCS | Mod: HCNC,CPTII,S$GLB, | Performed by: SPECIALIST

## 2022-12-23 PROCEDURE — 3079F DIAST BP 80-89 MM HG: CPT | Mod: HCNC,CPTII,S$GLB, | Performed by: SPECIALIST

## 2022-12-23 PROCEDURE — 99999 PR PBB SHADOW E&M-EST. PATIENT-LVL V: ICD-10-PCS | Mod: PBBFAC,HCNC,, | Performed by: SPECIALIST

## 2022-12-23 PROCEDURE — 3079F PR MOST RECENT DIASTOLIC BLOOD PRESSURE 80-89 MM HG: ICD-10-PCS | Mod: HCNC,CPTII,S$GLB, | Performed by: SPECIALIST

## 2022-12-23 PROCEDURE — 1126F AMNT PAIN NOTED NONE PRSNT: CPT | Mod: HCNC,CPTII,S$GLB, | Performed by: SPECIALIST

## 2022-12-23 PROCEDURE — 1126F PR PAIN SEVERITY QUANTIFIED, NO PAIN PRESENT: ICD-10-PCS | Mod: HCNC,CPTII,S$GLB, | Performed by: SPECIALIST

## 2022-12-23 PROCEDURE — 1160F PR REVIEW ALL MEDS BY PRESCRIBER/CLIN PHARMACIST DOCUMENTED: ICD-10-PCS | Mod: HCNC,CPTII,S$GLB, | Performed by: SPECIALIST

## 2022-12-23 PROCEDURE — 99215 OFFICE O/P EST HI 40 MIN: CPT | Mod: HCNC,S$GLB,, | Performed by: SPECIALIST

## 2022-12-23 PROCEDURE — 99499 UNLISTED E&M SERVICE: CPT | Mod: HCNC,S$GLB,, | Performed by: SPECIALIST

## 2022-12-23 PROCEDURE — 99215 PR OFFICE/OUTPT VISIT, EST, LEVL V, 40-54 MIN: ICD-10-PCS | Mod: HCNC,S$GLB,, | Performed by: SPECIALIST

## 2022-12-23 PROCEDURE — 1159F PR MEDICATION LIST DOCUMENTED IN MEDICAL RECORD: ICD-10-PCS | Mod: HCNC,CPTII,S$GLB, | Performed by: SPECIALIST

## 2022-12-23 PROCEDURE — 99499 RISK ADDL DX/OHS AUDIT: ICD-10-PCS | Mod: HCNC,S$GLB,, | Performed by: SPECIALIST

## 2022-12-23 PROCEDURE — 99999 PR PBB SHADOW E&M-EST. PATIENT-LVL V: CPT | Mod: PBBFAC,HCNC,, | Performed by: SPECIALIST

## 2022-12-23 PROCEDURE — 1159F MED LIST DOCD IN RCRD: CPT | Mod: HCNC,CPTII,S$GLB, | Performed by: SPECIALIST

## 2022-12-23 PROCEDURE — 1160F RVW MEDS BY RX/DR IN RCRD: CPT | Mod: HCNC,CPTII,S$GLB, | Performed by: SPECIALIST

## 2022-12-23 NOTE — PROGRESS NOTES
Subjective:    Patient ID:  Marisela Eagle is a 77 y.o. female who presents for   Results (Echo )    HPI1)  arthritis     - will see rherod matologitis again -refer back to Dr. Loera           Takes percocet bid      2)   neuropathy    numbness    b12 ?        3) hfpef      Review of patient's allergies indicates:  No Known Allergies    Past Medical History:   Diagnosis Date    Bilateral knee pain 2012    left worse, right knee painful even after partial replacement.    Bruises easily     Clot ?    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 Took Coumadin x 6 weeks    KNEE ARTHROSCOPY  2010    right    TONSILLECTOMY       Social History     Tobacco Use    Smoking status: Former     Types: Cigarettes     Quit date: 3/23/2012     Years since quitting: 10.7    Smokeless tobacco: Never   Substance Use Topics    Alcohol use: Yes     Comment: occasional    Drug use: No        Review of Systems     Review of Systems   Constitutional: Positive for weight gain. Negative for decreased appetite, malaise/fatigue and weight loss.   HENT: Negative.  Negative for congestion, hearing loss and tinnitus.    Eyes:  Negative for blurred vision,  vision loss in left eye, vision loss in right eye, visual disturbance and visual halos.   Cardiovascular:  Positive for dyspnea on exertion and irregular heartbeat. Negative for chest pain, claudication, leg swelling, near-syncope, orthopnea, palpitations, paroxysmal nocturnal dyspnea and syncope.   Respiratory:  Positive for shortness of breath, sleep disturbances due to breathing and snoring. Negative for cough, hemoptysis, sputum production and wheezing.    Endocrine: Negative for polydipsia, polyphagia and polyuria.   Hematologic/Lymphatic: Negative for adenopathy. Does not bruise/bleed easily.   Skin: Negative.  Negative for dry skin, itching, poor wound healing, rash, skin cancer and suspicious lesions.   Musculoskeletal:  Positive for arthritis, back pain, joint pain, joint swelling and stiffness. Negative for falls, gout, muscle cramps, muscle weakness and neck pain.   Gastrointestinal: Negative.  Negative for abdominal pain, change in bowel habit, constipation, diarrhea, heartburn, hematemesis, hemorrhoids, melena, nausea and vomiting.   Genitourinary: Negative.  Negative for bladder incontinence, dysuria, flank pain, frequency, hematuria, nocturia and non-menstrual bleeding.   Neurological:  Positive for loss of balance, numbness and paresthesias. Negative for brief paralysis, difficulty with concentration, disturbances in coordination, dizziness, focal weakness, headaches and tremors.   Psychiatric/Behavioral:  Negative for altered mental status, depression, hallucinations, memory loss, substance abuse, suicidal ideas and thoughts of violence. The patient does not have insomnia and is not nervous/anxious.    Allergic/Immunologic: Negative for environmental allergies and hives.         Objective:        Vitals:    12/23/22 1349   BP: 130/82   Pulse: 81       Lab Results   Component Value Date    WBC 6.70 07/21/2022    HGB 12.2 07/21/2022    HCT 37.4 07/21/2022     07/21/2022    CHOL 186 10/29/2021     TRIG 39 10/29/2021    HDL 77 (H) 10/29/2021    ALT 16 10/03/2022    AST 15 10/03/2022     10/03/2022    K 4.8 10/03/2022     10/03/2022    CREATININE 0.7 10/03/2022    BUN 12 10/03/2022    CO2 28 10/03/2022    TSH 2.244 10/03/2022    INR 1.1 09/17/2019    HGBA1C 5.2 02/24/2021        ECHOCARDIOGRAM RESULTS  Results for orders placed during the hospital encounter of 12/13/22    Echo    Interpretation Summary  · The left ventricle is normal in size with low normal systolic function.  · The estimated ejection fraction is 50%.  · Indeterminate left ventricular diastolic function with elevated left atrial pressure.  · Atrial fibrillation not observed.  · Normal right ventricular size with normal right ventricular systolic function.  · Mild tricuspid regurgitation.  · Normal central venous pressure (3 mmHg).  · The estimated PA systolic pressure is 30 mmHg.      CURRENT/PREVIOUS VISIT EKG  Results for orders placed or performed in visit on 01/04/22   EKG 12-lead    Collection Time: 01/04/22  2:16 PM    Narrative    Test Reason : R07.9,    Vent. Rate : 066 BPM     Atrial Rate : 066 BPM     P-R Int : 214 ms          QRS Dur : 094 ms      QT Int : 448 ms       P-R-T Axes : 062 002 076 degrees     QTc Int : 469 ms    Sinus rhythm with 1st degree A-V block  Nonspecific ST and T wave abnormality  Abnormal ECG  When compared with ECG of 31-JAN-2021 06:03,  Fusion complexes are no longer Present  Confirmed by Skip Patton MD ([id number]) on 1/9/2022 8:09:46 PM    Referred By: CARMELO ESCOBAR           Confirmed By:Skip Patton MD     Results for orders placed during the hospital encounter of 12/13/22    Echo    Interpretation Summary  · The left ventricle is normal in size with low normal systolic function.  · The estimated ejection fraction is 50%.  · Indeterminate left ventricular diastolic function with elevated left atrial pressure.  · Atrial fibrillation not observed.  · Normal right ventricular size with  normal right ventricular systolic function.  · Mild tricuspid regurgitation.  · Normal central venous pressure (3 mmHg).  · The estimated PA systolic pressure is 30 mmHg.    Results for orders placed in visit on 02/21/22    Nuclear Stress Test    Interpretation Summary    PET CT Findings The nuclear portion of this study will be reported separately.    The EKG portion of this study is negative for ischemia.    The patient reported no chest pain during the stress test.    There were no arrhythmias during stress.      PHYSICAL EXAM    Physical Exam blood pressure is 120/80  Lungs are clear  Cardiac regular  Right hand at the thenar eminence has a swelling that is tender  No edema    Medication List with Changes/Refills   Current Medications    ALENDRONATE (FOSAMAX) 70 MG TABLET    Take one tablet every 7 days.    CHOLECALCIFEROL, VITAMIN D3, 5,000 UNIT CAPSULE    Take 5,000 Units by mouth once daily.    CYANOCOBALAMIN (VITAMIN B-12) 1000 MCG TABLET    Take 5,000 mcg by mouth once daily.    DULOXETINE (CYMBALTA) 60 MG CAPSULE        DUREZOL 0.05 % DROP OPHTHALMIC SOLUTION    Place 1 drop into both eyes once daily.     FAMOTIDINE (PEPCID) 20 MG TABLET    Take 20 mg by mouth as needed.    FUROSEMIDE (LASIX) 40 MG TABLET    Take 40 mg by mouth once daily.    GABAPENTIN (NEURONTIN) 800 MG TABLET    TAKE 1 TABLET THREE TIMES DAILY    LEVOTHYROXINE (SYNTHROID) 125 MCG TABLET    TAKE 1 TABLET EVERY DAY BEFORE BREAKFAST  Strength: 125 mcg    LORAZEPAM (ATIVAN) 1 MG TABLET    Take 1 tablet (1 mg total) by mouth nightly as needed for Anxiety.    LOSARTAN (COZAAR) 100 MG TABLET    Take 0.5 tablets (50 mg total) by mouth once daily.    METOPROLOL SUCCINATE (TOPROL-XL) 50 MG 24 HR TABLET    Take 0.5 tablets (25 mg total) by mouth every evening.    NITROFURANTOIN (MACRODANTIN) 100 MG CAPSULE    Take 100 mg by mouth 4 (four) times daily.    OMEPRAZOLE (PRILOSEC) 40 MG CAPSULE    TAKE 1 CAPSULE EVERY DAY    OXYCODONE-ACETAMINOPHEN  (PERCOCET)  MG PER TABLET    Take 1 tablet by mouth every 6 (six) hours as needed.     -PROPYLENE GLYCOL 0.4-0.3 % DROP    Place 1 drop into both eyes once daily.    POTASSIUM CHLORIDE SA (K-DUR,KLOR-CON) 20 MEQ TABLET    TAKE 1 TABLET TWICE DAILY    POTASSIUM CHLORIDE SA (K-DUR,KLOR-CON) 20 MEQ TABLET    Take 10 mEq by mouth once daily.    TURMERIC 400 MG CAP    Take 400 mg by mouth 2 (two) times daily.    VIT C/E/ZN/COPPR/LUTEIN/ZEAXAN (PRESERVISION AREDS-2 ORAL)    Take 1 capsule by mouth 2 (two) times daily.            Assessment:     1. Heart failure preserved ejection fraction mildly symptomatic with mild elevation of BNP she is on optimal medical therapy and wants to stay on potassium and she will start using Lasix scan 2 or 3 times a week  2. She has positive inflammatory markers-she has arthritis which she takes Percocet  She has a new nodule on her hand  Refer back to Dr. Loera to evaluate to see whether she needs DMARDs or biologic  3. She has untreated obstructive sleep apneas overweight  4. Has neuropathy in the feet-check B12 level and go on p.o. B12      I personally reviewed old and new ecg's, lab reports,, xray reports  and  other cardiovascular studies including  echo's, stress tests, angiogram reports, holters,and vascular studies .  In addition I evaluated original cardiac cath  ___echo  ____cxr ______ct ____scan on [employer] or [employer] or other viewing platforms .  I reviewed  office and hospital notes Yes __x __ of  referring providers.    Plan:     See above  Problem List Items Addressed This Visit    None       No follow-ups on file.

## 2022-12-27 ENCOUNTER — TELEPHONE (OUTPATIENT)
Dept: CARDIOLOGY | Facility: CLINIC | Age: 77
End: 2022-12-27
Payer: MEDICARE

## 2022-12-27 NOTE — TELEPHONE ENCOUNTER
"----- Message from Jonah Chavez MD sent at 12/23/2022  7:32 PM CST -----  Thelma mario pass this onto Mrs Eagle  Dear Mrs Eagle- in regard t the issues you have with your feet: You might want to consider a comfortable walking shoe as [employer] and a shoe insert- one I have had experience with and recommend highly is a metal insert made by a [employer] company called "[employer]" - contact information on the internet.I dont know if this would help or not but I thought about it after you left. Lily Chavez     "

## 2022-12-28 ENCOUNTER — TELEPHONE (OUTPATIENT)
Dept: FAMILY MEDICINE | Facility: CLINIC | Age: 77
End: 2022-12-28
Payer: MEDICARE

## 2022-12-28 NOTE — TELEPHONE ENCOUNTER
Returned call to pt , she states has cough prod yellow conj told pt since its dr sharps half day and we are overbooked told pt to go to urgent care to be seen today. Pt states she will .

## 2023-01-05 ENCOUNTER — OFFICE VISIT (OUTPATIENT)
Dept: FAMILY MEDICINE | Facility: CLINIC | Age: 78
End: 2023-01-05
Payer: MEDICARE

## 2023-01-05 VITALS
SYSTOLIC BLOOD PRESSURE: 125 MMHG | HEIGHT: 69 IN | HEART RATE: 75 BPM | RESPIRATION RATE: 18 BRPM | WEIGHT: 235 LBS | OXYGEN SATURATION: 97 % | BODY MASS INDEX: 34.8 KG/M2 | DIASTOLIC BLOOD PRESSURE: 66 MMHG | TEMPERATURE: 97 F

## 2023-01-05 DIAGNOSIS — Z86.16 HISTORY OF COVID-19: ICD-10-CM

## 2023-01-05 DIAGNOSIS — E55.9 VITAMIN D DEFICIENCY: ICD-10-CM

## 2023-01-05 DIAGNOSIS — I70.0 ATHEROSCLEROSIS OF AORTA: ICD-10-CM

## 2023-01-05 DIAGNOSIS — M54.2 ARTHRALGIA OF CERVICAL SPINE: ICD-10-CM

## 2023-01-05 DIAGNOSIS — F41.9 ANXIETY: ICD-10-CM

## 2023-01-05 DIAGNOSIS — E78.5 HYPERLIPIDEMIA, UNSPECIFIED HYPERLIPIDEMIA TYPE: ICD-10-CM

## 2023-01-05 DIAGNOSIS — E03.9 HYPOTHYROIDISM, UNSPECIFIED TYPE: ICD-10-CM

## 2023-01-05 DIAGNOSIS — I50.20 HFREF (HEART FAILURE WITH REDUCED EJECTION FRACTION): ICD-10-CM

## 2023-01-05 DIAGNOSIS — G89.29 OTHER CHRONIC PAIN: ICD-10-CM

## 2023-01-05 DIAGNOSIS — R61 HYPERHIDROSIS: ICD-10-CM

## 2023-01-05 DIAGNOSIS — I10 HYPERTENSION, ESSENTIAL: Primary | ICD-10-CM

## 2023-01-05 PROCEDURE — 1159F PR MEDICATION LIST DOCUMENTED IN MEDICAL RECORD: ICD-10-PCS | Mod: CPTII,S$GLB,, | Performed by: FAMILY MEDICINE

## 2023-01-05 PROCEDURE — 99214 OFFICE O/P EST MOD 30 MIN: CPT | Mod: S$GLB,,, | Performed by: FAMILY MEDICINE

## 2023-01-05 PROCEDURE — 99214 PR OFFICE/OUTPT VISIT, EST, LEVL IV, 30-39 MIN: ICD-10-PCS | Mod: S$GLB,,, | Performed by: FAMILY MEDICINE

## 2023-01-05 PROCEDURE — 3078F PR MOST RECENT DIASTOLIC BLOOD PRESSURE < 80 MM HG: ICD-10-PCS | Mod: CPTII,S$GLB,, | Performed by: FAMILY MEDICINE

## 2023-01-05 PROCEDURE — 99499 UNLISTED E&M SERVICE: CPT | Mod: S$GLB,,, | Performed by: FAMILY MEDICINE

## 2023-01-05 PROCEDURE — 3074F PR MOST RECENT SYSTOLIC BLOOD PRESSURE < 130 MM HG: ICD-10-PCS | Mod: CPTII,S$GLB,, | Performed by: FAMILY MEDICINE

## 2023-01-05 PROCEDURE — 99499 RISK ADDL DX/OHS AUDIT: ICD-10-PCS | Mod: S$GLB,,, | Performed by: FAMILY MEDICINE

## 2023-01-05 PROCEDURE — 3288F PR FALLS RISK ASSESSMENT DOCUMENTED: ICD-10-PCS | Mod: CPTII,S$GLB,, | Performed by: FAMILY MEDICINE

## 2023-01-05 PROCEDURE — 1101F PT FALLS ASSESS-DOCD LE1/YR: CPT | Mod: CPTII,S$GLB,, | Performed by: FAMILY MEDICINE

## 2023-01-05 PROCEDURE — 1160F PR REVIEW ALL MEDS BY PRESCRIBER/CLIN PHARMACIST DOCUMENTED: ICD-10-PCS | Mod: CPTII,S$GLB,, | Performed by: FAMILY MEDICINE

## 2023-01-05 PROCEDURE — 1125F AMNT PAIN NOTED PAIN PRSNT: CPT | Mod: CPTII,S$GLB,, | Performed by: FAMILY MEDICINE

## 2023-01-05 PROCEDURE — 1160F RVW MEDS BY RX/DR IN RCRD: CPT | Mod: CPTII,S$GLB,, | Performed by: FAMILY MEDICINE

## 2023-01-05 PROCEDURE — 3074F SYST BP LT 130 MM HG: CPT | Mod: CPTII,S$GLB,, | Performed by: FAMILY MEDICINE

## 2023-01-05 PROCEDURE — 1101F PR PT FALLS ASSESS DOC 0-1 FALLS W/OUT INJ PAST YR: ICD-10-PCS | Mod: CPTII,S$GLB,, | Performed by: FAMILY MEDICINE

## 2023-01-05 PROCEDURE — 3078F DIAST BP <80 MM HG: CPT | Mod: CPTII,S$GLB,, | Performed by: FAMILY MEDICINE

## 2023-01-05 PROCEDURE — 3288F FALL RISK ASSESSMENT DOCD: CPT | Mod: CPTII,S$GLB,, | Performed by: FAMILY MEDICINE

## 2023-01-05 PROCEDURE — 1125F PR PAIN SEVERITY QUANTIFIED, PAIN PRESENT: ICD-10-PCS | Mod: CPTII,S$GLB,, | Performed by: FAMILY MEDICINE

## 2023-01-05 PROCEDURE — 1159F MED LIST DOCD IN RCRD: CPT | Mod: CPTII,S$GLB,, | Performed by: FAMILY MEDICINE

## 2023-01-05 RX ORDER — LORAZEPAM 1 MG/1
1 TABLET ORAL NIGHTLY PRN
Qty: 30 TABLET | Refills: 2 | Status: SHIPPED | OUTPATIENT
Start: 2023-01-05 | End: 2023-05-10

## 2023-01-05 RX ORDER — NITROFURANTOIN 25; 75 MG/1; MG/1
CAPSULE ORAL
COMMUNITY
Start: 2022-10-27 | End: 2023-01-05

## 2023-01-05 NOTE — PATIENT INSTRUCTIONS
Blood work fasting at [facility]    VIT D3 5000 IU DAILY   -OVER THE COUNTER

## 2023-01-05 NOTE — PROGRESS NOTES
Subjective:       Patient ID: Marisela Eagle is a 77 y.o. female.    Chief Complaint: No chief complaint on file.    HPI  Patient presents to clinic for follow up. Covid positive last week. Very mild case. Generally feeling well. Hyperhidrosis for 5 years. Post menopausal. She has discussed this with Dr. Weil. Discussed Cymbalta may be causing sweats. Hypertension stable. HFrEF. EF 50% on echo. Cardiovascular ok. Denies chest pain or palpitations. Dr. Chavez decreased Losartan to 50 mg and Metoprolol to 25 mg. Hyperlipidemia. Hypothyroidism. TSH 2.244. Cervical arthralgia. Seeing Dr. Gusman for injections. Chronic pain. Seeing Dr. Louise. Vitamin D deficiency. Due for tetanus, second shingles, and Covid booster. Recommended Bivalent in place of booster.  Review of Systems   Constitutional:         Hyperhidrosis    Respiratory: Negative.     Cardiovascular: Negative.  Negative for chest pain and palpitations.   Musculoskeletal:  Positive for neck pain.   Psychiatric/Behavioral: Negative.         Objective:      Physical Exam  Constitutional:       Appearance: Normal appearance.   Neck:      Vascular: No carotid bruit.   Cardiovascular:      Rate and Rhythm: Normal rate and regular rhythm.      Pulses: Normal pulses.      Heart sounds: Normal heart sounds.   Pulmonary:      Effort: Pulmonary effort is normal.      Breath sounds: Normal breath sounds.   Lymphadenopathy:      Cervical: No cervical adenopathy.   Skin:     General: Skin is warm and dry.   Neurological:      Mental Status: She is alert.   Psychiatric:         Mood and Affect: Mood normal.         Behavior: Behavior normal.       Assessment/Plan:     Hypertension, essential  -     5 HIAA, quantitative, Urine [facility]; 24 Hours; Future  -     CBC Auto Differential; Future; Expected date: 01/05/2023  -     Comprehensive Metabolic Panel; Future; Expected date: 01/05/2023  -     PROTEIN ELECTROPHORESIS, SERUM; Future; Expected date: 01/05/2023    Hypothyroidism,  unspecified type  -     T4, Free; Future; Expected date: 01/05/2023  -     TSH; Future; Expected date: 01/05/2023    HFrEF (heart failure with reduced ejection fraction)    Hyperlipidemia, unspecified hyperlipidemia type  -     Lipid Panel; Future; Expected date: 01/05/2023    History of COVID-19    Other chronic pain    Arthralgia of cervical spine    Vitamin D deficiency    Hyperhidrosis    Atherosclerosis of aorta    BMI 34.0-34.9,adult    Anxiety    Other orders  -     LORazepam (ATIVAN) 1 MG tablet; Take 1 tablet (1 mg total) by mouth nightly as needed for Anxiety.  Dispense: 30 tablet; Refill: 2       Get a 24 hour urine for 5 hydroxyindoleacetic acid due to the hyperhidrosis.  CBC CMP free T4 TSH lipid serum protein electrophoresis.  For shingles vaccine recommended.  Continue her other medications.  Start vitamin-D 5000 per day.  Refill her Ativan for anxiety.

## 2023-01-06 PROBLEM — I70.0 ATHEROSCLEROSIS OF AORTA: Status: ACTIVE | Noted: 2023-01-06

## 2023-01-10 ENCOUNTER — LAB VISIT (OUTPATIENT)
Dept: LAB | Facility: HOSPITAL | Age: 78
End: 2023-01-10
Attending: FAMILY MEDICINE
Payer: MEDICARE

## 2023-01-10 DIAGNOSIS — E03.9 HYPOTHYROIDISM, UNSPECIFIED TYPE: ICD-10-CM

## 2023-01-10 DIAGNOSIS — E78.5 HYPERLIPIDEMIA, UNSPECIFIED HYPERLIPIDEMIA TYPE: ICD-10-CM

## 2023-01-10 DIAGNOSIS — I10 HYPERTENSION, ESSENTIAL: ICD-10-CM

## 2023-01-10 LAB
ALBUMIN SERPL BCP-MCNC: 4.2 G/DL (ref 3.5–5.2)
ALP SERPL-CCNC: 45 U/L (ref 55–135)
ALT SERPL W/O P-5'-P-CCNC: 20 U/L (ref 10–44)
ANION GAP SERPL CALC-SCNC: 6 MMOL/L (ref 8–16)
AST SERPL-CCNC: 20 U/L (ref 10–40)
BASOPHILS # BLD AUTO: 0.02 K/UL (ref 0–0.2)
BASOPHILS NFR BLD: 0.4 % (ref 0–1.9)
BILIRUB SERPL-MCNC: 0.6 MG/DL (ref 0.1–1)
BUN SERPL-MCNC: 11 MG/DL (ref 8–23)
CALCIUM SERPL-MCNC: 9.3 MG/DL (ref 8.7–10.5)
CHLORIDE SERPL-SCNC: 105 MMOL/L (ref 95–110)
CHOLEST SERPL-MCNC: 241 MG/DL (ref 120–199)
CHOLEST/HDLC SERPL: 3.1 {RATIO} (ref 2–5)
CO2 SERPL-SCNC: 30 MMOL/L (ref 23–29)
CREAT SERPL-MCNC: 0.6 MG/DL (ref 0.5–1.4)
DIFFERENTIAL METHOD: ABNORMAL
EOSINOPHIL # BLD AUTO: 0.1 K/UL (ref 0–0.5)
EOSINOPHIL NFR BLD: 1.7 % (ref 0–8)
ERYTHROCYTE [DISTWIDTH] IN BLOOD BY AUTOMATED COUNT: 17.3 % (ref 11.5–14.5)
EST. GFR  (NO RACE VARIABLE): >60 ML/MIN/1.73 M^2
GLUCOSE SERPL-MCNC: 100 MG/DL (ref 70–110)
HCT VFR BLD AUTO: 38.7 % (ref 37–48.5)
HDLC SERPL-MCNC: 77 MG/DL (ref 40–75)
HDLC SERPL: 32 % (ref 20–50)
HGB BLD-MCNC: 12.2 G/DL (ref 12–16)
IMM GRANULOCYTES # BLD AUTO: 0.02 K/UL (ref 0–0.04)
IMM GRANULOCYTES NFR BLD AUTO: 0.4 % (ref 0–0.5)
LDLC SERPL CALC-MCNC: 147.8 MG/DL (ref 63–159)
LYMPHOCYTES # BLD AUTO: 1.9 K/UL (ref 1–4.8)
LYMPHOCYTES NFR BLD: 36.2 % (ref 18–48)
MCH RBC QN AUTO: 27 PG (ref 27–31)
MCHC RBC AUTO-ENTMCNC: 31.5 G/DL (ref 32–36)
MCV RBC AUTO: 86 FL (ref 82–98)
MONOCYTES # BLD AUTO: 0.6 K/UL (ref 0.3–1)
MONOCYTES NFR BLD: 10.6 % (ref 4–15)
NEUTROPHILS # BLD AUTO: 2.7 K/UL (ref 1.8–7.7)
NEUTROPHILS NFR BLD: 50.7 % (ref 38–73)
NONHDLC SERPL-MCNC: 164 MG/DL
NRBC BLD-RTO: 0 /100 WBC
PLATELET # BLD AUTO: 273 K/UL (ref 150–450)
PMV BLD AUTO: 12.1 FL (ref 9.2–12.9)
POTASSIUM SERPL-SCNC: 4.3 MMOL/L (ref 3.5–5.1)
PROT SERPL-MCNC: 6.9 G/DL (ref 6–8.4)
RBC # BLD AUTO: 4.52 M/UL (ref 4–5.4)
SODIUM SERPL-SCNC: 141 MMOL/L (ref 136–145)
T4 FREE SERPL-MCNC: 1.02 NG/DL (ref 0.71–1.51)
TRIGL SERPL-MCNC: 81 MG/DL (ref 30–150)
TSH SERPL DL<=0.005 MIU/L-ACNC: 3.04 UIU/ML (ref 0.34–5.6)
WBC # BLD AUTO: 5.27 K/UL (ref 3.9–12.7)

## 2023-01-10 PROCEDURE — 84165 PROTEIN E-PHORESIS SERUM: CPT | Performed by: FAMILY MEDICINE

## 2023-01-10 PROCEDURE — 84443 ASSAY THYROID STIM HORMONE: CPT | Performed by: FAMILY MEDICINE

## 2023-01-10 PROCEDURE — 36415 COLL VENOUS BLD VENIPUNCTURE: CPT | Performed by: FAMILY MEDICINE

## 2023-01-10 PROCEDURE — 84155 ASSAY OF PROTEIN SERUM: CPT | Performed by: FAMILY MEDICINE

## 2023-01-10 PROCEDURE — 80061 LIPID PANEL: CPT | Performed by: FAMILY MEDICINE

## 2023-01-10 PROCEDURE — 84439 ASSAY OF FREE THYROXINE: CPT | Performed by: FAMILY MEDICINE

## 2023-01-10 PROCEDURE — 80053 COMPREHEN METABOLIC PANEL: CPT | Performed by: FAMILY MEDICINE

## 2023-01-10 PROCEDURE — 85025 COMPLETE CBC W/AUTO DIFF WBC: CPT | Performed by: FAMILY MEDICINE

## 2023-01-12 LAB
ALBUMIN SERPL ELPH-MCNC: 3.5 G/DL (ref 2.9–4.4)
ALBUMIN/GLOB SERPL: 1.2 {RATIO} (ref 0.7–1.7)
ALPHA1 GLOB SERPL ELPH-MCNC: 0.3 G/DL (ref 0–0.4)
ALPHA2 GLOB SERPL ELPH-MCNC: 0.8 G/DL (ref 0.4–1)
B-GLOBULIN SERPL ELPH-MCNC: 1 G/DL (ref 0.7–1.3)
GAMMA GLOB SERPL ELPH-MCNC: 0.8 G/DL (ref 0.4–1.8)
GLOBULIN SER CALC-MCNC: 2.9 G/DL (ref 2.2–3.9)
LABORATORY COMMENT REPORT: NORMAL
M PROTEIN SERPL ELPH-MCNC: NORMAL G/DL
PROT SERPL-MCNC: 6.4 G/DL (ref 6–8.5)

## 2023-01-18 ENCOUNTER — HOSPITAL ENCOUNTER (OUTPATIENT)
Dept: RADIOLOGY | Facility: HOSPITAL | Age: 78
Discharge: HOME | End: 2023-01-18
Attending: PHYSICIAN ASSISTANT
Payer: MEDICARE

## 2023-01-18 ENCOUNTER — LAB VISIT (OUTPATIENT)
Dept: LAB | Facility: HOSPITAL | Age: 78
End: 2023-01-18
Attending: FAMILY MEDICINE
Payer: MEDICARE

## 2023-01-18 DIAGNOSIS — E04.9 GOITER: ICD-10-CM

## 2023-01-18 DIAGNOSIS — I10 HYPERTENSION, ESSENTIAL: ICD-10-CM

## 2023-01-18 PROCEDURE — 76536 US EXAM OF HEAD AND NECK: CPT | Mod: TC,HCNC

## 2023-01-18 PROCEDURE — 76536 US EXAM OF HEAD AND NECK: CPT | Mod: 26,HCNC,, | Performed by: RADIOLOGY

## 2023-01-18 PROCEDURE — 83497 ASSAY OF 5-HIAA: CPT | Performed by: FAMILY MEDICINE

## 2023-01-18 PROCEDURE — 76536 US SOFT TISSUE HEAD NECK THYROID: ICD-10-PCS | Mod: 26,HCNC,, | Performed by: RADIOLOGY

## 2023-01-24 LAB
5OH-INDOLEACETATE 24H UR-MCNC: 2.4 MG/L
5OH-INDOLEACETATE 24H UR-MRATE: 6.2 MG/24 HR (ref 0–14.9)
TOTAL VOLUME: 2600

## 2023-02-07 DIAGNOSIS — Z00.00 ENCOUNTER FOR MEDICARE ANNUAL WELLNESS EXAM: ICD-10-CM

## 2023-02-09 ENCOUNTER — OFFICE VISIT (OUTPATIENT)
Dept: RHEUMATOLOGY | Facility: CLINIC | Age: 78
End: 2023-02-09
Payer: MEDICARE

## 2023-02-09 VITALS
SYSTOLIC BLOOD PRESSURE: 132 MMHG | WEIGHT: 233.44 LBS | BODY MASS INDEX: 34.58 KG/M2 | HEIGHT: 69 IN | HEART RATE: 84 BPM | DIASTOLIC BLOOD PRESSURE: 82 MMHG

## 2023-02-09 DIAGNOSIS — R76.8 POSITIVE ANA (ANTINUCLEAR ANTIBODY): ICD-10-CM

## 2023-02-09 DIAGNOSIS — M35.1 CONNECTIVE TISSUE DISEASE OVERLAP SYNDROME: Primary | ICD-10-CM

## 2023-02-09 DIAGNOSIS — R76.8 SS-A ANTIBODY POSITIVE: ICD-10-CM

## 2023-02-09 DIAGNOSIS — M15.9 GENERALIZED OSTEOARTHRITIS OF MULTIPLE SITES: ICD-10-CM

## 2023-02-09 DIAGNOSIS — E66.9 OBESITY (BMI 30.0-34.9): ICD-10-CM

## 2023-02-09 DIAGNOSIS — Z00.00 ENCOUNTER FOR MEDICARE ANNUAL WELLNESS EXAM: ICD-10-CM

## 2023-02-09 DIAGNOSIS — M19.041 PRIMARY OSTEOARTHRITIS OF BOTH HANDS: ICD-10-CM

## 2023-02-09 DIAGNOSIS — M19.042 PRIMARY OSTEOARTHRITIS OF BOTH HANDS: ICD-10-CM

## 2023-02-09 DIAGNOSIS — Z55.9 EDUCATIONAL CIRCUMSTANCE: ICD-10-CM

## 2023-02-09 DIAGNOSIS — R76.8 CENTROMERE ANTIBODY POSITIVE: ICD-10-CM

## 2023-02-09 PROCEDURE — 3079F PR MOST RECENT DIASTOLIC BLOOD PRESSURE 80-89 MM HG: ICD-10-PCS | Mod: HCNC,CPTII,S$GLB, | Performed by: INTERNAL MEDICINE

## 2023-02-09 PROCEDURE — 99214 PR OFFICE/OUTPT VISIT, EST, LEVL IV, 30-39 MIN: ICD-10-PCS | Mod: HCNC,S$GLB,, | Performed by: INTERNAL MEDICINE

## 2023-02-09 PROCEDURE — 3075F PR MOST RECENT SYSTOLIC BLOOD PRESS GE 130-139MM HG: ICD-10-PCS | Mod: HCNC,CPTII,S$GLB, | Performed by: INTERNAL MEDICINE

## 2023-02-09 PROCEDURE — 99999 PR PBB SHADOW E&M-EST. PATIENT-LVL IV: ICD-10-PCS | Mod: PBBFAC,HCNC,, | Performed by: INTERNAL MEDICINE

## 2023-02-09 PROCEDURE — 1159F MED LIST DOCD IN RCRD: CPT | Mod: HCNC,CPTII,S$GLB, | Performed by: INTERNAL MEDICINE

## 2023-02-09 PROCEDURE — 3075F SYST BP GE 130 - 139MM HG: CPT | Mod: HCNC,CPTII,S$GLB, | Performed by: INTERNAL MEDICINE

## 2023-02-09 PROCEDURE — 99214 OFFICE O/P EST MOD 30 MIN: CPT | Mod: HCNC,S$GLB,, | Performed by: INTERNAL MEDICINE

## 2023-02-09 PROCEDURE — 1160F PR REVIEW ALL MEDS BY PRESCRIBER/CLIN PHARMACIST DOCUMENTED: ICD-10-PCS | Mod: HCNC,CPTII,S$GLB, | Performed by: INTERNAL MEDICINE

## 2023-02-09 PROCEDURE — 3079F DIAST BP 80-89 MM HG: CPT | Mod: HCNC,CPTII,S$GLB, | Performed by: INTERNAL MEDICINE

## 2023-02-09 PROCEDURE — 1160F RVW MEDS BY RX/DR IN RCRD: CPT | Mod: HCNC,CPTII,S$GLB, | Performed by: INTERNAL MEDICINE

## 2023-02-09 PROCEDURE — 1125F AMNT PAIN NOTED PAIN PRSNT: CPT | Mod: HCNC,CPTII,S$GLB, | Performed by: INTERNAL MEDICINE

## 2023-02-09 PROCEDURE — 99999 PR PBB SHADOW E&M-EST. PATIENT-LVL IV: CPT | Mod: PBBFAC,HCNC,, | Performed by: INTERNAL MEDICINE

## 2023-02-09 PROCEDURE — 1159F PR MEDICATION LIST DOCUMENTED IN MEDICAL RECORD: ICD-10-PCS | Mod: HCNC,CPTII,S$GLB, | Performed by: INTERNAL MEDICINE

## 2023-02-09 PROCEDURE — 1125F PR PAIN SEVERITY QUANTIFIED, PAIN PRESENT: ICD-10-PCS | Mod: HCNC,CPTII,S$GLB, | Performed by: INTERNAL MEDICINE

## 2023-02-09 RX ORDER — FERROUS SULFATE, DRIED 160(50) MG
1 TABLET, EXTENDED RELEASE ORAL 2 TIMES DAILY WITH MEALS
COMMUNITY
End: 2023-05-27

## 2023-02-09 RX ORDER — MAGNESIUM 30 MG
1 TABLET ORAL DAILY
COMMUNITY

## 2023-02-09 SDOH — SOCIAL DETERMINANTS OF HEALTH (SDOH): PROBLEMS RELATED TO EDUCATION AND LITERACY, UNSPECIFIED: Z55.9

## 2023-02-09 NOTE — PROGRESS NOTES
Subjective:     Patient ID: Marisela Eagle is a 77 y.o. female with abnormal serology    Chief Complaint: No chief complaint on file.     HPI     77 yr old lady seen last by Dr. Maki on 10/7/20 and never returned for f/u. We saw her once on 8/10/22.   She has positive serology some sxs c/w an Overlap CTD: CREST/Sjogrens  w a +ANEUDY 1:1280 centromere; +RF; +SSA & some sxs c/w serology: sicca sxs: dysphagia & GERD; few telengiectasia & probable Raynauds. Also has mild PHT on ECHO.  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    She returns for f/u. She has no new complaints, but reports more pain on the L basilar thumb area. She states she gets injections in this area and they help. Has generalized pain with generalized OA & multiple MSK injections & surgeries .   Also has BLE neuropathy helped by gabapentin.  Still w sicca sx & infrequent Raynaud's; has had upper dysphagia in the past and underwent dilatation & feels it helped. Some GERD.  Still reporting JARA but is very sedentary. Was a smoker in the past. Used to participate in aquatics at her gym.   Had another TTE w PA systolic pressure at 30.    Had 1 miscarriage at 6 weeks/5 pregnancies.     Was prescribed alendronate but she did not start it.   She will wait until her next DXA to decide.     CSS = 46= moderate  Patient answers always or often to:   Feeling tired & unrefreshed upon awakening  Muscles feel stiff and achy.  Tires easily when physically active  Pain all over body  Not sleeping well  Low energy  Muscle tension in neck & shoulders  Legs uncomfortable & restless when trying to get to sleep  Difficulty remembering things      Current Outpatient Medications   Medication Sig Dispense Refill    alendronate (FOSAMAX) 70 MG tablet Take one tablet every 7 days. 4 tablet 11    cholecalciferol, vitamin D3, 5,000 unit capsule Take 5,000 Units by mouth once daily.      cyanocobalamin  (VITAMIN B-12) 1000 MCG tablet Take 5,000 mcg by mouth once daily.      DULoxetine (CYMBALTA) 60 MG capsule       DUREZOL 0.05 % Drop ophthalmic solution Place 1 drop into both eyes once daily.       famotidine (PEPCID) 20 MG tablet Take 20 mg by mouth as needed.      furosemide (LASIX) 40 MG tablet Take 40 mg by mouth once daily.      gabapentin (NEURONTIN) 800 MG tablet TAKE 1 TABLET THREE TIMES DAILY 270 tablet 0    levothyroxine (SYNTHROID) 125 MCG tablet TAKE 1 TABLET EVERY DAY BEFORE BREAKFAST  Strength: 125 mcg 90 tablet 3    LORazepam (ATIVAN) 1 MG tablet Take 1 tablet (1 mg total) by mouth nightly as needed for Anxiety. 30 tablet 2    losartan (COZAAR) 100 MG tablet Take 0.5 tablets (50 mg total) by mouth once daily. 90 tablet 1    metoprolol succinate (TOPROL-XL) 50 MG 24 hr tablet Take 0.5 tablets (25 mg total) by mouth every evening. 90 tablet 1    omeprazole (PRILOSEC) 40 MG capsule TAKE 1 CAPSULE EVERY DAY 90 capsule 1    oxyCODONE-acetaminophen (PERCOCET)  mg per tablet Take 1 tablet by mouth every 6 (six) hours as needed.       peg 400-propylene glycol 0.4-0.3 % Drop Place 1 drop into both eyes once daily.      potassium chloride SA (K-DUR,KLOR-CON) 20 MEQ tablet TAKE 1 TABLET TWICE DAILY 180 tablet 0    potassium chloride SA (K-DUR,KLOR-CON) 20 MEQ tablet Take 10 mEq by mouth once daily.      turmeric 400 mg Cap Take 400 mg by mouth 2 (two) times daily.      vit C/E/Zn/coppr/lutein/zeaxan (PRESERVISION AREDS-2 ORAL) Take 1 capsule by mouth 2 (two) times daily.        No current facility-administered medications for this visit.   Not taking alendronate.    Review of patient's allergies indicates:  No Known Allergies    Review of Systems   Constitutional:  Negative for fatigue, fever and unexpected weight change.   HENT:  Negative for mouth sores and trouble swallowing (esophagus was stretched once;).         Mild dry mouth   Eyes:  Negative for redness.        Mild dry eyes   Respiratory:   Positive for shortness of breath (stable). Negative for cough.    Cardiovascular:  Negative for chest pain and leg swelling.   Gastrointestinal:  Negative for constipation and diarrhea.   Genitourinary:  Positive for urgency. Negative for difficulty urinating, dysuria and genital sores.        Overactive bladder   Skin:  Negative for rash.   Neurological:  Negative for headaches.   Hematological:  Bruises/bleeds easily.   Psychiatric/Behavioral:  Positive for dysphoric mood and sleep disturbance (needs ativan to sleep). The patient is nervous/anxious.      Past Medical History:   Diagnosis Date    Bilateral knee pain 2012    left worse, right knee painful even after partial replacement.    Bruises easily     Clot ?    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 Took Coumadin x 6 weeks    KNEE ARTHROSCOPY      right    TONSILLECTOMY         Family History   Problem Relation Age of Onset    Neuropathy Mother     Hypertension Mother     Stroke Mother     Neuropathy Father     Diabetes Maternal Grandmother     Cancer Daughter     Melanoma Neg Hx     Psoriasis Neg Hx     Lupus Neg Hx     Eczema Neg Hx      Daughter: thyroid cancer;  "had excision; doing well  2 son & another daughter  1 son 54 has heart murmur & taking meds  8 grand kids & 3 great granddaughters.  Social History     Tobacco Use    Smoking status: Former     Types: Cigarettes     Quit date: 3/23/2012     Years since quitting: 10.8    Smokeless tobacco: Never   Substance Use Topics    Alcohol use: Yes     Comment: occasional    Drug use: No       Objective:     /82   Pulse 84   Ht 5' 9" (1.753 m)   Wt 105.9 kg (233 lb 7.5 oz)   BMI 34.48 kg/m²     Physical Exam   Constitutional: She is oriented to person, place, and time. No distress.   HENT:   Head: Normocephalic and atraumatic.   Mouth/Throat: Oropharynx is clear and moist. No oropharyngeal exudate.   No facial rashes  Parotids not enlarged  No oral ulcers   Eyes: Pupils are equal, round, and reactive to light. Conjunctivae are normal. Right eye exhibits no discharge. Left eye exhibits no discharge. No scleral icterus.   Neck: No JVD present. No tracheal deviation present. No thyromegaly present.   Cardiovascular: Normal rate, regular rhythm and normal heart sounds. Exam reveals no gallop and no friction rub.   No murmur heard.  Pulmonary/Chest: Effort normal and breath sounds normal. No respiratory distress. She has no wheezes. She has no rales. She exhibits no tenderness.   Abdominal: Soft. Bowel sounds are normal. She exhibits no distension and no mass. There is no splenomegaly or hepatomegaly. There is no abdominal tenderness. There is no rebound and no guarding.   Musculoskeletal:         General: Tenderness (bilateral LLE to palpation) present. Normal range of motion.      Cervical back: Neck supple.      Comments: Cspine FROM no tenderness  Tspine FROM no tenderness  Lspine FROM no tenderness.  TMJ: unremarkable  Shoulders: FROM; no synovitis;  Elbows: FROM; no synovitis; no tophi or nodules  Wrists: FROM; no synovitis;    MCPs: Adequate ROM; squaring of 1st C-MCP bilaterally; TTP on L; no synovitis; no  " ok;  PIPs:FROM; no synovitis;   DIPs: FROM; no synovitis;   HIPS: FROM  Knees: FROM; no synovitis; no instability;  Ankles: FROM: no synovitis   Toes: ok;        Lymphadenopathy:     She has no cervical adenopathy.   Neurological: She is alert and oriented to person, place, and time. She has normal reflexes. No cranial nerve deficit. Gait normal.   Proximal and distal muscle strength 5/5.   Skin: Skin is warm and dry. No rash noted. She is not diaphoretic.   Few ant chest telengiectasias;  2 on L cheek  Few on fingertips   No decreased oral aperture.  No sclerodactyly or scleroderma  No finger tip tapering  No digital ulcers.   Psychiatric: Mood, memory, affect and judgment normal.   Vitals reviewed.    1/10/23: CBC ok; CMP ok; TFT ok; SPE ok;   8/10/22: RNAP III ok;  7/21/22:ESR 1; CRP 0.11; CBC ok; CMP [id number], Na 135;  ANEUDY >1:80; +SSA; centromere 1:1280; RF 45.6;   9/14/20: C3 & C4 ok;    12/1/22: CXR: personally viewed: unremarkable w mildly elevated R hemidiaphragm  6/2/22: Esophagram: unremarkable  10/29/21: MRI Lspine: abnormal marrow signal intensity along the right superior endplate of L1 most compatible with and endplate microfracture; degenerative change throughout the lumbar spine > L2-L3, L3-L4, and L4-L5, minimally changed from the previous exam; straightening of the normal lordotic curvature of the lumbar spine, likely secondary to combination of positioning, degenerative change and/or muscle spasm.  7/23/21: Cspine: AP/Lat: personally viewed: multilevel cervical degenerative disc disease and advanced facet joint osteoarthrosis  7/23/21: L Shoulder: personally viewed: AC & GH OA  Assessment:   Overlap CTD: CREST/Sjogren's   +ANEUDY >1:80; +SSA + centromere (1:1280) +RF    CREST:      prob Raynaud's;      few telengiectasia;      mild PHT on ECHO;      dysphagia & GERD (upper-- w nl eosphagram)    Sjogren's syndrome     +SSA+RF     Sicca sxs worsened by meds      Teeth in good repair      OTC eye  drops    CPKs wnl to minimally elevated in past    Joint pain multiple sites   Generalized OA    Lspine; Cspine; Tspine; L shoulder    S/P R unilateral (medial) knee; then RTKR; B THR   Complicated by FMS/CSS    FMS/CSS associated w anxiety/depression/OAB/TMJ/chronic pain    Hypothyroidism   w thyroid nodules    Hx of vitamin D insufficiency   corrected    +HCV; neg RNA    HTN    HLD    S/P Tobacco use    Obesity  Plan:   Patient's joint pain is not related to CREST or her autoimmune serology.  Does not need a DMARD.  She has OA complicated by CSS  Probably also element of deconditioning.  Life style modifications are mainstay or rx/  Daily, aerobic low impact dedicated exercise  Advised to restart pool exercises.    RTC 1 year or prn      CC:  MD Jonah Lora III, MD

## 2023-02-09 NOTE — PROGRESS NOTES
Rapid3 Question Responses and Scores 2/8/2023   MDHAQ Score 0.9   Psychologic Score 3.3   Pain Score 6   When you awakened in the morning OVER THE LAST WEEK, did you feel stiff? Yes   Fatigue Score 1   Global Health Score 2   RAPID3 Score 3.67     Answers submitted by the patient for this visit:  Rheumatology Questionnaire (Submitted on 2/8/2023)  fever: No  eye redness: No  mouth sores: No  headaches: No  shortness of breath: Yes  chest pain: No  trouble swallowing: No  diarrhea: No  constipation: No  unexpected weight change: No  genital sore: No  dysuria: No  During the last 3 days, have you had a skin rash?: No  Bruises or bleeds easily: Yes  cough: No

## 2023-02-09 NOTE — PATIENT INSTRUCTIONS
Move more. Keep active. Helps pain.     Go back to warm pool.     Daily exercise--aerobic    Try Paraffin wax baths    Topicals for the osteoarthritis of your hands.

## 2023-03-27 ENCOUNTER — PATIENT MESSAGE (OUTPATIENT)
Dept: FAMILY MEDICINE | Facility: CLINIC | Age: 78
End: 2023-03-27
Payer: MEDICARE

## 2023-04-03 ENCOUNTER — OFFICE VISIT (OUTPATIENT)
Dept: PODIATRY | Facility: CLINIC | Age: 78
End: 2023-04-03
Payer: MEDICARE

## 2023-04-03 ENCOUNTER — LAB VISIT (OUTPATIENT)
Dept: LAB | Facility: HOSPITAL | Age: 78
End: 2023-04-03
Attending: PODIATRIST
Payer: MEDICARE

## 2023-04-03 ENCOUNTER — HOSPITAL ENCOUNTER (OUTPATIENT)
Dept: RADIOLOGY | Facility: CLINIC | Age: 78
Discharge: HOME | End: 2023-04-03
Attending: PODIATRIST
Payer: MEDICARE

## 2023-04-03 ENCOUNTER — HOSPITAL ENCOUNTER (OUTPATIENT)
Dept: RADIOLOGY | Facility: CLINIC | Age: 78
Discharge: HOME | End: 2023-04-03
Attending: PHYSICIAN ASSISTANT
Payer: MEDICARE

## 2023-04-03 VITALS
RESPIRATION RATE: 16 BRPM | WEIGHT: 233 LBS | HEIGHT: 69 IN | OXYGEN SATURATION: 98 % | HEART RATE: 80 BPM | BODY MASS INDEX: 34.51 KG/M2

## 2023-04-03 DIAGNOSIS — Z78.0 POSTMENOPAUSAL: ICD-10-CM

## 2023-04-03 DIAGNOSIS — M79.671 RIGHT FOOT PAIN: ICD-10-CM

## 2023-04-03 DIAGNOSIS — M79.89 SWELLING OF RIGHT FOOT: Primary | ICD-10-CM

## 2023-04-03 LAB — URATE SERPL-MCNC: 5.4 MG/DL (ref 2.4–5.7)

## 2023-04-03 PROCEDURE — 77080 DXA BONE DENSITY AXIAL: CPT | Mod: 26,HCNC,, | Performed by: RADIOLOGY

## 2023-04-03 PROCEDURE — 1159F PR MEDICATION LIST DOCUMENTED IN MEDICAL RECORD: ICD-10-PCS | Mod: CPTII,S$GLB,, | Performed by: PODIATRIST

## 2023-04-03 PROCEDURE — 77080 DXA BONE DENSITY AXIAL: CPT | Mod: TC,HCNC,PO

## 2023-04-03 PROCEDURE — 73630 X-RAY EXAM OF FOOT: CPT | Mod: RT,S$GLB,, | Performed by: RADIOLOGY

## 2023-04-03 PROCEDURE — 3288F FALL RISK ASSESSMENT DOCD: CPT | Mod: CPTII,S$GLB,, | Performed by: PODIATRIST

## 2023-04-03 PROCEDURE — 84550 ASSAY OF BLOOD/URIC ACID: CPT | Performed by: PODIATRIST

## 2023-04-03 PROCEDURE — 1160F PR REVIEW ALL MEDS BY PRESCRIBER/CLIN PHARMACIST DOCUMENTED: ICD-10-PCS | Mod: CPTII,S$GLB,, | Performed by: PODIATRIST

## 2023-04-03 PROCEDURE — 1125F AMNT PAIN NOTED PAIN PRSNT: CPT | Mod: CPTII,S$GLB,, | Performed by: PODIATRIST

## 2023-04-03 PROCEDURE — 99213 OFFICE O/P EST LOW 20 MIN: CPT | Mod: S$GLB,,, | Performed by: PODIATRIST

## 2023-04-03 PROCEDURE — 1101F PR PT FALLS ASSESS DOC 0-1 FALLS W/OUT INJ PAST YR: ICD-10-PCS | Mod: CPTII,S$GLB,, | Performed by: PODIATRIST

## 2023-04-03 PROCEDURE — 1125F PR PAIN SEVERITY QUANTIFIED, PAIN PRESENT: ICD-10-PCS | Mod: CPTII,S$GLB,, | Performed by: PODIATRIST

## 2023-04-03 PROCEDURE — 36415 COLL VENOUS BLD VENIPUNCTURE: CPT | Performed by: PODIATRIST

## 2023-04-03 PROCEDURE — 77080 DEXA BONE DENSITY SPINE HIP: ICD-10-PCS | Mod: 26,HCNC,, | Performed by: RADIOLOGY

## 2023-04-03 PROCEDURE — 1101F PT FALLS ASSESS-DOCD LE1/YR: CPT | Mod: CPTII,S$GLB,, | Performed by: PODIATRIST

## 2023-04-03 PROCEDURE — 99213 PR OFFICE/OUTPT VISIT, EST, LEVL III, 20-29 MIN: ICD-10-PCS | Mod: S$GLB,,, | Performed by: PODIATRIST

## 2023-04-03 PROCEDURE — 3288F PR FALLS RISK ASSESSMENT DOCUMENTED: ICD-10-PCS | Mod: CPTII,S$GLB,, | Performed by: PODIATRIST

## 2023-04-03 PROCEDURE — 1160F RVW MEDS BY RX/DR IN RCRD: CPT | Mod: CPTII,S$GLB,, | Performed by: PODIATRIST

## 2023-04-03 PROCEDURE — 73630 XR FOOT COMPLETE 3 VIEW RIGHT: ICD-10-PCS | Mod: RT,S$GLB,, | Performed by: RADIOLOGY

## 2023-04-03 PROCEDURE — 1159F MED LIST DOCD IN RCRD: CPT | Mod: CPTII,S$GLB,, | Performed by: PODIATRIST

## 2023-04-03 NOTE — PROGRESS NOTES
"  [address] [facility] [address]  [place] [address]  Phone: [phone]   [phone]    Patient's PCP:Pawel Badillo III, MD  Referring Provider: No ref. provider found    Subjective:      Chief Complaint:: Foot Pain (Right medial foot pain through arch)    MAYRA Eagle is a 77 y.o. female who presents today with a complaint of right foot medial aspect arch pain lasting for one week. Additionally notes discoloration left second toe. Onset of symptoms aching pain and reports no trauma.  Current symptoms include aching pain, swelling and pulling sensation, discoloration left second toe.  Aggravating factors are walking, weightbearing and flexing. Symptoms have remained. Treatment to date have included ice, pain medication for back pain.    Vitals:    23 1356   Pulse: 80   Resp: 16   SpO2: 98%   Weight: 105.7 kg (233 lb)   Height: 5' 9" (1.753 m)   PainSc:   7      Shoe Size: 10.5    Past Surgical History:   Procedure Laterality Date    ADENOIDECTOMY      APPENDECTOMY      BILATERAL SALPINGOOPHORECTOMY       SECTION      COLONOSCOPY  12    HYSTERECTOMY      with BSO    JOINT REPLACEMENT  2012    rt unilateral knee arthroplasty. Took Coumadin x 6 weeks    KNEE ARTHROSCOPY  2010    right    TONSILLECTOMY       Past Medical History:   Diagnosis Date    Bilateral knee pain 2012    left worse, right knee painful even after partial replacement.    Bruises easily     Clot ?    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  Tobacco History:  reports that she quit smoking about 11 years ago. Her smoking use included cigarettes. She has never used smokeless tobacco.  Drug History:  reports no history of drug use.    Review of patient's allergies indicates:  No Known Allergies    Current Outpatient Medications   Medication Sig Dispense Refill    calcium-vitamin D3 (CALCIUM 500 + D) 500 mg-5 mcg (200 unit) per tablet Take 1 tablet by mouth 2 (two) times daily with meals.      cholecalciferol, vitamin D3, 5,000 unit capsule Take 5,000 Units by mouth once daily.      cyanocobalamin (VITAMIN B-12) 1000 MCG tablet Take 5,000 mcg by mouth once daily.      DULoxetine (CYMBALTA) 60 MG capsule       famotidine (PEPCID) 20 MG tablet Take 20 mg by mouth as needed.      furosemide (LASIX) 40 MG tablet Take 40 mg by mouth once daily.      gabapentin (NEURONTIN) 800 MG tablet TAKE 1 TABLET THREE TIMES DAILY 270 tablet 0    levothyroxine (SYNTHROID) 125 MCG tablet TAKE 1 TABLET EVERY DAY BEFORE BREAKFAST  Strength: 125 mcg 90 tablet 3    LORazepam (ATIVAN) 1 MG tablet Take 1 tablet (1 mg total) by mouth nightly as needed for Anxiety. (Patient not taking: Reported on 2/9/2023) 30 tablet 2    losartan (COZAAR) 100 MG tablet Take 0.5 tablets (50 mg total) by mouth once daily. 90 tablet 1    magnesium 30 mg Tab Take by mouth once.      metoprolol succinate (TOPROL-XL) 50 MG 24 hr tablet Take 0.5 tablets (25 mg total) by mouth every evening. 90 tablet 1    omeprazole (PRILOSEC) 40 MG capsule TAKE 1 CAPSULE EVERY DAY 90 capsule 1     oxyCODONE-acetaminophen (PERCOCET)  mg per tablet Take 1 tablet by mouth every 6 (six) hours as needed.       potassium chloride SA (K-DUR,KLOR-CON) 20 MEQ tablet TAKE 1 TABLET TWICE DAILY (Patient not taking: Reported on 2/9/2023) 180 tablet 0    potassium chloride SA (K-DUR,KLOR-CON) 20 MEQ tablet Take 10 mEq by mouth once daily.      turmeric 400 mg Cap Take 400 mg by mouth 2 (two) times daily.      vit C/E/Zn/coppr/lutein/zeaxan (PRESERVISION AREDS-2 ORAL) Take 1 capsule by mouth 2 (two) times daily.        No current facility-administered medications for this visit.       Review of Systems   Constitutional:  Negative for chills, fatigue, fever and unexpected weight change.   HENT:  Negative for hearing loss and trouble swallowing.    Eyes:  Negative for photophobia and visual disturbance.   Respiratory:  Negative for cough, shortness of breath and wheezing.    Cardiovascular:  Positive for leg swelling. Negative for chest pain and palpitations.   Gastrointestinal:  Negative for abdominal pain and nausea.   Genitourinary:  Negative for dysuria and frequency.   Musculoskeletal:  Positive for arthralgias, back pain and joint swelling. Negative for gait problem.   Skin:  Negative for rash and wound.   Neurological:  Positive for numbness. Negative for tremors, seizures, weakness and headaches.   Hematological:  Does not bruise/bleed easily.       Objective:        Physical Exam:   Foot Exam    General  General Appearance: appears stated age and healthy   Orientation: alert and oriented to person, place, and time   Affect: appropriate   Gait: antalgic       Right Foot/Ankle     Inspection and Palpation  Ecchymosis: none  Tenderness: lisfranc joint   Swelling: lisfranc joint   Arch: normal  Hammertoes: second toe, third toe, fourth toe and fifth toe  Hallux valgus: yes  Skin Exam: skin intact; no erythema   Neurovascular  Dorsalis pedis: 2+  Posterior tibial: 2+  Varicose veins: present  Saphenous nerve  sensation: diminished  Tibial nerve sensation: diminished  Superficial peroneal nerve sensation: diminished  Deep peroneal nerve sensation: diminished  Sural nerve sensation: diminished    Edema  Type of edema: pitting  Edema grade: 1+     Muscle Strength  Ankle dorsiflexion: 5  Ankle plantar flexion: 5  Ankle inversion: 5  Ankle eversion: 5  Great toe extension: 5  Great toe flexion: 5    Tests  Jimbo's sign: positive(Second intermetatarsal space)  Paresthesia: positive    Left Foot/Ankle      Inspection and Palpation  Ecchymosis: second toe  Tenderness: none   Swelling: (Moderate edema of the lower extremity)  Arch: normal  Hammertoes: second toe, third toe, fourth toe and fifth toe  Hallux valgus: yes  Skin Exam: skin intact; no erythema   Neurovascular  Dorsalis pedis: 2+  Posterior tibial: 2+  Varicose veins: present  Saphenous nerve sensation: diminished  Tibial nerve sensation: diminished  Superficial peroneal nerve sensation: diminished  Deep peroneal nerve sensation: diminished  Sural nerve sensation: diminished    Edema  Type of edema: pitting  Edema grade: 1+    Muscle Strength  Ankle dorsiflexion: 5  Ankle plantar flexion: 5  Ankle inversion: 5  Ankle eversion: 5  Great toe extension: 5  Great toe flexion: 5    Tests  Paresthesia: positive    Physical Exam  Cardiovascular:      Pulses:           Dorsalis pedis pulses are 2+ on the right side and 2+ on the left side.        Posterior tibial pulses are 2+ on the right side and 2+ on the left side.   Musculoskeletal:      Right foot: Bunion present.      Left foot: Bunion present.   Feet:      Right foot:      Skin integrity: No erythema.      Left foot:      Skin integrity: No erythema.                 Muscle Strength   Right Lower Extremity   Ankle Dorsiflexion:  5   Plantar flexion:  5/5  Left Lower Extremity   Ankle Dorsiflexion:  5   Plantar flexion:  5/5     Reflexes     Left Side  Paresthesia: present    Right Side   Jimbo Sign:  present  Paresthesia: present    Vascular Exam     Right Pulses  Dorsalis Pedis:      2+  Posterior Tibial:      2+        Left Pulses  Dorsalis Pedis:      2+  Posterior Tibial:      2+         Imaging: X-Ray Foot Complete Right  EXAMINATION:  XR FOOT COMPLETE 3 VIEW RIGHT    CLINICAL HISTORY:  . Pain in right foot    TECHNIQUE:  AP, lateral, and oblique views of the right foot were performed.    COMPARISON:  None    FINDINGS:  No acute fracture.  Small bony bunion formation median eminence 1st metatarsal head.  Prominent posterior process of the talus.  Plantar calcaneal spur.  Very slight hallux valgus.  Relative preservation of joint spaces.    Electronically signed by: Jadon Corrigan  Date:    04/03/2023  Time:    15:08  DXA Bone Density Spine And Hip  Narrative: EXAMINATION:  DXA BONE DENSITY AXIAL SKELETON 1 OR MORE SITES    CLINICAL HISTORY:  Asymptomatic menopausal state    TECHNIQUE:  DXA scanning was performed over the left hip and lumbar spine.  Review of the images confirms satisfactory positioning and technique.    COMPARISON:  February 24, 2021    FINDINGS:  The L1 to L3 vertebral bone mineral density is equal to 1.399 g/cm squared with a T score of 3.5.  There has been no significant change relative to the prior study.    A prosthesis is in place in the left hip.  Impression: No evidence of significant bone density loss    Limited examination as above    Consider FDA approved medical therapies in postmenopausal women and men aged 50 years and older, based on the following:    *A hip or vertebral (clinical or morphometric) fracture  *T score less than or equal to -2.5 at the femoral neck or spine after appropriate evaluation to exclude secondary causes.  *Low bone mass -- also known as osteopenia (T score between -1.0 and -2.5 at the femoral neck or spine) and a 10 year probability of hip fracture greater than or equal to 3% or a 10 year probability of major osteoporosis-related fracture greater than  or equal to 20% based on the [place]-adapted WHO algorithm.  *Clinicians judgment and/or patient preference may indicate treatment for people with 10 year fracture probabilities is above or below these levels.    Electronically signed by: Mahesh Soto MD  Date:    04/03/2023  Time:    11:35               Assessment:       1. Swelling of right foot    2. Right foot pain      Plan:   Swelling of right foot    Right foot pain  -     X-Ray Foot Complete Right  -     Uric acid; Future; Expected date: 04/03/2023      Follow up if symptoms worsen or fail to improve.    Procedures        I discussed with the patient that based on the physical exam and her history I suspect this may be an acute gout attack.  Also discussed possibility of inflammation due to overuse.  I am going to order a uric acid level for further evaluation.  Also discussed there that if uric acid comes back normal then she may need further immobilization for a period of time to help calm this down.    Counseling:     I provided patient education verbally regarding:   Patient diagnosis, treatment options, as well as alternatives, risks, and benefits.     This note was created using Dragon voice recognition software that occasionally misinterpreted phrases or words.

## 2023-04-05 ENCOUNTER — TELEPHONE (OUTPATIENT)
Dept: PODIATRY | Facility: CLINIC | Age: 78
End: 2023-04-05
Payer: MEDICARE

## 2023-04-05 NOTE — TELEPHONE ENCOUNTER
----- Message from Liv oDbbs sent at 4/5/2023 10:16 AM CDT -----  Regarding: Pt call  Pt is asking for someone to call her about her bloodwork results.    Thank you,  Liv

## 2023-04-05 NOTE — TELEPHONE ENCOUNTER
Spoke with patient to let her know as soon as Dr. Martin gives me the results I will call and let her know what she needs to do. Patient stated understanding.

## 2023-04-10 ENCOUNTER — TELEPHONE (OUTPATIENT)
Dept: PODIATRY | Facility: CLINIC | Age: 78
End: 2023-04-10
Payer: MEDICARE

## 2023-04-10 NOTE — TELEPHONE ENCOUNTER
----- Message from Liv Dobbs sent at 4/10/2023  9:18 AM CDT -----  Regarding: Pt call  Please call pt back at [phone]. She has not heard from anyone since her blood test.    Thank you,  Liv

## 2023-04-11 NOTE — TELEPHONE ENCOUNTER
Spoke to patient to let her know that her labs were normal and that Dr. Martin recommends her wearing the boot cast to see if it helps the pain and swelling.

## 2023-04-26 ENCOUNTER — OFFICE VISIT (OUTPATIENT)
Dept: DERMATOLOGY | Facility: CLINIC | Age: 78
End: 2023-04-26
Payer: MEDICARE

## 2023-04-26 VITALS — BODY MASS INDEX: 34.51 KG/M2 | HEIGHT: 69 IN | WEIGHT: 233 LBS

## 2023-04-26 DIAGNOSIS — L82.0 LICHENOID KERATOSIS: Primary | ICD-10-CM

## 2023-04-26 DIAGNOSIS — I78.1 SPIDER ANGIOMA: ICD-10-CM

## 2023-04-26 DIAGNOSIS — L82.1 SEBORRHEIC KERATOSES: ICD-10-CM

## 2023-04-26 DIAGNOSIS — D23.9 DERMATOFIBROMA: ICD-10-CM

## 2023-04-26 DIAGNOSIS — L91.8 SKIN TAG: ICD-10-CM

## 2023-04-26 DIAGNOSIS — I87.301 VENOUS HYPERTENSION OF RIGHT LOWER EXTREMITY: ICD-10-CM

## 2023-04-26 PROCEDURE — 99213 PR OFFICE/OUTPT VISIT, EST, LEVL III, 20-29 MIN: ICD-10-PCS | Mod: S$GLB,,, | Performed by: STUDENT IN AN ORGANIZED HEALTH CARE EDUCATION/TRAINING PROGRAM

## 2023-04-26 PROCEDURE — 1160F RVW MEDS BY RX/DR IN RCRD: CPT | Mod: CPTII,S$GLB,, | Performed by: STUDENT IN AN ORGANIZED HEALTH CARE EDUCATION/TRAINING PROGRAM

## 2023-04-26 PROCEDURE — 3288F FALL RISK ASSESSMENT DOCD: CPT | Mod: CPTII,S$GLB,, | Performed by: STUDENT IN AN ORGANIZED HEALTH CARE EDUCATION/TRAINING PROGRAM

## 2023-04-26 PROCEDURE — 1159F MED LIST DOCD IN RCRD: CPT | Mod: CPTII,S$GLB,, | Performed by: STUDENT IN AN ORGANIZED HEALTH CARE EDUCATION/TRAINING PROGRAM

## 2023-04-26 PROCEDURE — 1159F PR MEDICATION LIST DOCUMENTED IN MEDICAL RECORD: ICD-10-PCS | Mod: CPTII,S$GLB,, | Performed by: STUDENT IN AN ORGANIZED HEALTH CARE EDUCATION/TRAINING PROGRAM

## 2023-04-26 PROCEDURE — 3288F PR FALLS RISK ASSESSMENT DOCUMENTED: ICD-10-PCS | Mod: CPTII,S$GLB,, | Performed by: STUDENT IN AN ORGANIZED HEALTH CARE EDUCATION/TRAINING PROGRAM

## 2023-04-26 PROCEDURE — 99999 PR PBB SHADOW E&M-EST. PATIENT-LVL III: ICD-10-PCS | Mod: PBBFAC,,, | Performed by: STUDENT IN AN ORGANIZED HEALTH CARE EDUCATION/TRAINING PROGRAM

## 2023-04-26 PROCEDURE — 1160F PR REVIEW ALL MEDS BY PRESCRIBER/CLIN PHARMACIST DOCUMENTED: ICD-10-PCS | Mod: CPTII,S$GLB,, | Performed by: STUDENT IN AN ORGANIZED HEALTH CARE EDUCATION/TRAINING PROGRAM

## 2023-04-26 PROCEDURE — 99213 OFFICE O/P EST LOW 20 MIN: CPT | Mod: S$GLB,,, | Performed by: STUDENT IN AN ORGANIZED HEALTH CARE EDUCATION/TRAINING PROGRAM

## 2023-04-26 PROCEDURE — 99999 PR PBB SHADOW E&M-EST. PATIENT-LVL III: CPT | Mod: PBBFAC,,, | Performed by: STUDENT IN AN ORGANIZED HEALTH CARE EDUCATION/TRAINING PROGRAM

## 2023-04-26 PROCEDURE — 1101F PT FALLS ASSESS-DOCD LE1/YR: CPT | Mod: CPTII,S$GLB,, | Performed by: STUDENT IN AN ORGANIZED HEALTH CARE EDUCATION/TRAINING PROGRAM

## 2023-04-26 PROCEDURE — 1101F PR PT FALLS ASSESS DOC 0-1 FALLS W/OUT INJ PAST YR: ICD-10-PCS | Mod: CPTII,S$GLB,, | Performed by: STUDENT IN AN ORGANIZED HEALTH CARE EDUCATION/TRAINING PROGRAM

## 2023-04-26 RX ORDER — TRIAMCINOLONE ACETONIDE 1 MG/G
OINTMENT TOPICAL 2 TIMES DAILY
Qty: 80 G | Refills: 0 | Status: SHIPPED | OUTPATIENT
Start: 2023-04-26

## 2023-04-26 NOTE — PROGRESS NOTES
"  Subjective:      Patient ID:  Marisela Eagle is a 77 y.o. female who presents for   Chief Complaint   Patient presents with    Spots and/or Floaters     Face, legs     LOV 11/11/20 ([facility])    Patient here today for spots to face x 2 months. No symptoms.  C/O spots to both legs x "a while". No Symptoms.        Review of Systems   Constitutional:  Negative for fever, chills and fatigue.   HENT:  Negative for congestion, sore throat, mouth sores and headaches.    Gastrointestinal:  Negative for nausea, vomiting, abdominal pain, diarrhea and indigestion.   Musculoskeletal:  Negative for joint swelling and arthralgias.   Skin:  Negative for daily sunscreen use and activity-related sunscreen use.   Neurological:  Negative for headaches.   Hematologic/Lymphatic: Does not bruise/bleed easily.     Objective:   Physical Exam   Constitutional: She appears well-developed and well-nourished. No distress.   Neurological: She is alert and oriented to person, place, and time. She is not disoriented.   Psychiatric: She has a normal mood and affect.   Skin:   Areas Examined (abnormalities noted in diagram):   Head / Face Inspection Performed  RLE Inspected  LLE Inspection Performed               Diagram Legend     Erythematous scaling macule/papule c/w actinic keratosis       Vascular papule c/w angioma      Pigmented verrucoid papule/plaque c/w seborrheic keratosis      Yellow umbilicated papule c/w sebaceous hyperplasia      Irregularly shaped tan macule c/w lentigo     1-2 mm smooth white papules consistent with Milia      Movable subcutaneous cyst with punctum c/w epidermal inclusion cyst      Subcutaneous movable cyst c/w pilar cyst      Firm pink to brown papule c/w dermatofibroma      Pedunculated fleshy papule(s) c/w skin tag(s)      Evenly pigmented macule c/w junctional nevus     Mildly variegated pigmented, slightly irregular-bordered macule c/w mildly atypical nevus      Flesh colored to evenly pigmented papule " c/w intradermal nevus       Pink pearly papule/plaque c/w basal cell carcinoma      Erythematous hyperkeratotic cursted plaque c/w SCC      Surgical scar with no sign of skin cancer recurrence      Open and closed comedones      Inflammatory papules and pustules      Verrucoid papule consistent consistent with wart     Erythematous eczematous patches and plaques     Dystrophic onycholytic nail with subungual debris c/w onychomycosis     Umbilicated papule    Erythematous-base heme-crusted tan verrucoid plaque consistent with inflamed seborrheic keratosis     Erythematous Silvery Scaling Plaque c/w Psoriasis     See annotation      Assessment / Plan:        Lichenoid keratosis  -     triamcinolone acetonide 0.1% (KENALOG) 0.1 % ointment; Apply topically 2 (two) times daily.  Dispense: 80 g; Refill: 0  - left shin, rec tac ointment BID x 2 weeks  - offered cryo today, she is going on a cruise and is worried about healing, rtc if continues to itch     Seborrheic keratoses  These are benign inherited growths without a malignant potential. Reassurance given to patient. No treatment is necessary.     Skin tag  - discussed that lesions are benign, non-cancerous. Removal is cosmetic. Price sheet provided to patient. Discussed risks of scarring and dyspigmentation after removal.     Dermatofibroma  This is a benign scar-like lesion secondary to minor trauma. No treatment required.     Spider angioma  Cheeks, nose  - reassurance  Consider treatment with laser - can refer if desired    Venous hypertension of right lower extremity  Recommend compression stockings daily   Elevate legs when sitting           No follow-ups on file.

## 2023-05-03 ENCOUNTER — HOSPITAL ENCOUNTER (OUTPATIENT)
Dept: RADIOLOGY | Facility: HOSPITAL | Age: 78
Discharge: HOME | End: 2023-05-03
Attending: ORTHOPAEDIC SURGERY
Payer: MEDICARE

## 2023-05-03 ENCOUNTER — OFFICE VISIT (OUTPATIENT)
Dept: ORTHOPEDICS | Facility: CLINIC | Age: 78
End: 2023-05-03
Payer: MEDICARE

## 2023-05-03 VITALS — WEIGHT: 233 LBS | HEIGHT: 69 IN | BODY MASS INDEX: 34.51 KG/M2

## 2023-05-03 DIAGNOSIS — M79.671 RIGHT FOOT PAIN: Primary | ICD-10-CM

## 2023-05-03 DIAGNOSIS — G57.91 NEUROPATHY OF RIGHT FOOT: ICD-10-CM

## 2023-05-03 DIAGNOSIS — M79.671 RIGHT FOOT PAIN: ICD-10-CM

## 2023-05-03 PROCEDURE — 73630 X-RAY EXAM OF FOOT: CPT | Mod: 26,RT,, | Performed by: RADIOLOGY

## 2023-05-03 PROCEDURE — 1101F PR PT FALLS ASSESS DOC 0-1 FALLS W/OUT INJ PAST YR: ICD-10-PCS | Mod: CPTII,S$GLB,, | Performed by: ORTHOPAEDIC SURGERY

## 2023-05-03 PROCEDURE — 99999 PR PBB SHADOW E&M-EST. PATIENT-LVL III: CPT | Mod: PBBFAC,,, | Performed by: ORTHOPAEDIC SURGERY

## 2023-05-03 PROCEDURE — 3288F PR FALLS RISK ASSESSMENT DOCUMENTED: ICD-10-PCS | Mod: CPTII,S$GLB,, | Performed by: ORTHOPAEDIC SURGERY

## 2023-05-03 PROCEDURE — 73630 X-RAY EXAM OF FOOT: CPT | Mod: TC,PN,RT

## 2023-05-03 PROCEDURE — 99999 PR PBB SHADOW E&M-EST. PATIENT-LVL III: ICD-10-PCS | Mod: PBBFAC,,, | Performed by: ORTHOPAEDIC SURGERY

## 2023-05-03 PROCEDURE — 1159F MED LIST DOCD IN RCRD: CPT | Mod: CPTII,S$GLB,, | Performed by: ORTHOPAEDIC SURGERY

## 2023-05-03 PROCEDURE — 99203 OFFICE O/P NEW LOW 30 MIN: CPT | Mod: S$GLB,,, | Performed by: ORTHOPAEDIC SURGERY

## 2023-05-03 PROCEDURE — 1101F PT FALLS ASSESS-DOCD LE1/YR: CPT | Mod: CPTII,S$GLB,, | Performed by: ORTHOPAEDIC SURGERY

## 2023-05-03 PROCEDURE — 1125F AMNT PAIN NOTED PAIN PRSNT: CPT | Mod: CPTII,S$GLB,, | Performed by: ORTHOPAEDIC SURGERY

## 2023-05-03 PROCEDURE — 1125F PR PAIN SEVERITY QUANTIFIED, PAIN PRESENT: ICD-10-PCS | Mod: CPTII,S$GLB,, | Performed by: ORTHOPAEDIC SURGERY

## 2023-05-03 PROCEDURE — 1159F PR MEDICATION LIST DOCUMENTED IN MEDICAL RECORD: ICD-10-PCS | Mod: CPTII,S$GLB,, | Performed by: ORTHOPAEDIC SURGERY

## 2023-05-03 PROCEDURE — 73630 XR FOOT COMPLETE 3 VIEW RIGHT: ICD-10-PCS | Mod: 26,RT,, | Performed by: RADIOLOGY

## 2023-05-03 PROCEDURE — 3288F FALL RISK ASSESSMENT DOCD: CPT | Mod: CPTII,S$GLB,, | Performed by: ORTHOPAEDIC SURGERY

## 2023-05-03 PROCEDURE — 99203 PR OFFICE/OUTPT VISIT, NEW, LEVL III, 30-44 MIN: ICD-10-PCS | Mod: S$GLB,,, | Performed by: ORTHOPAEDIC SURGERY

## 2023-05-03 RX ORDER — VALACYCLOVIR HYDROCHLORIDE 500 MG/1
TABLET, FILM COATED ORAL
COMMUNITY
Start: 2023-03-09 | End: 2023-05-27

## 2023-05-03 RX ORDER — METHYLPREDNISOLONE 4 MG/1
TABLET ORAL
Status: ON HOLD | COMMUNITY
Start: 2023-03-09 | End: 2023-05-28 | Stop reason: HOSPADM

## 2023-05-03 RX ORDER — COLCHICINE 0.6 MG/1
0.6 TABLET ORAL 2 TIMES DAILY
Qty: 14 TABLET | Refills: 0 | Status: SHIPPED | OUTPATIENT
Start: 2023-05-03 | End: 2023-05-27

## 2023-05-03 NOTE — PROGRESS NOTES
5/3/2023    Past Medical History:   Diagnosis Date    Bilateral knee pain 2012    left worse, right knee painful even after partial replacement.    Bruises easily     Clot ?    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 Took Coumadin x 6 weeks    KNEE ARTHROSCOPY      right    TONSILLECTOMY         Current Outpatient Medications   Medication Sig    calcium-vitamin D3 (OS-HALLIE 500 + D3) 500 mg-5 mcg (200 unit) per tablet Take 1 tablet by mouth 2 (two) times daily with meals.    cholecalciferol, vitamin D3, 5,000 unit capsule Take 5,000 Units by mouth once daily.    cyanocobalamin (VITAMIN B-12) 1000 MCG tablet Take 5,000 mcg by mouth once daily.    DULoxetine (CYMBALTA) 60 MG capsule     famotidine (PEPCID) 20 MG tablet Take 20 mg by mouth as needed.    furosemide (LASIX) 40 MG tablet Take 40 mg by mouth once daily.    gabapentin (NEURONTIN) 800 MG tablet TAKE 1 TABLET THREE TIMES DAILY    levothyroxine (SYNTHROID) 125 MCG tablet TAKE 1 TABLET EVERY DAY BEFORE BREAKFAST  Strength: 125 mcg    LORazepam (ATIVAN) 1 MG tablet Take 1 tablet (1 mg total) by mouth nightly as needed for Anxiety.    losartan (COZAAR) 100 MG tablet Take  0.5 tablets (50 mg total) by mouth once daily.    magnesium 30 mg Tab Take by mouth once.    methylPREDNISolone (MEDROL DOSEPACK) 4 mg tablet     metoprolol succinate (TOPROL-XL) 50 MG 24 hr tablet Take 0.5 tablets (25 mg total) by mouth every evening.    omeprazole (PRILOSEC) 40 MG capsule TAKE 1 CAPSULE EVERY DAY    oxyCODONE-acetaminophen (PERCOCET)  mg per tablet Take 1 tablet by mouth every 6 (six) hours as needed.     potassium chloride SA (K-DUR,KLOR-CON) 20 MEQ tablet TAKE 1 TABLET TWICE DAILY    potassium chloride SA (K-DUR,KLOR-CON) 20 MEQ tablet Take 10 mEq by mouth once daily.    triamcinolone acetonide 0.1% (KENALOG) 0.1 % ointment Apply topically 2 (two) times daily.    turmeric 400 mg Cap Take 400 mg by mouth 2 (two) times daily.    valACYclovir (VALTREX) 500 MG tablet     vit C/E/Zn/coppr/lutein/zeaxan (PRESERVISION AREDS-2 ORAL) Take 1 capsule by mouth 2 (two) times daily.      No current facility-administered medications for this visit.       Review of patient's allergies indicates:  No Known Allergies    Family History   Problem Relation Age of Onset    Neuropathy Mother     Hypertension Mother     Stroke Mother     Neuropathy Father     Diabetes Maternal Grandmother     Cancer Daughter     Melanoma Neg Hx     Psoriasis Neg Hx     Lupus Neg Hx     Eczema Neg Hx        Social History     Socioeconomic History    Marital status:    Occupational History    Occupation: RETIRED   Tobacco Use    Smoking status: Former     Types: Cigarettes     Quit date: 3/23/2012     Years since quittin.1    Smokeless tobacco: Never   Substance and Sexual Activity    Alcohol use: Yes     Comment: occasional    Drug use: No    Sexual activity: Not Currently     Social Determinants of Health     Financial Resource Strain: Low Risk     Difficulty of Paying Living Expenses: Not hard at all   Food Insecurity: No Food Insecurity    Worried About Running Out of Food in the Last Year: Never true    Ran Out of  Food in the Last Year: Never true   Transportation Needs: No Transportation Needs    Lack of Transportation (Medical): No    Lack of Transportation (Non-Medical): No   Physical Activity: Insufficiently Active    Days of Exercise per Week: 2 days    Minutes of Exercise per Session: 60 min   Stress: No Stress Concern Present    Feeling of Stress : Not at all   Social Connections: Moderately Isolated    Frequency of Communication with Friends and Family: More than three times a week    Frequency of Social Gatherings with Friends and Family: Three times a week    Attends Yazidism Services: Never    Active Member of Clubs or Organizations: No    Attends Club or Organization Meetings: Never    Marital Status:    Housing Stability: Low Risk     Unable to Pay for Housing in the Last Year: No    Number of Places Lived in the Last Year: 1    Unstable Housing in the Last Year: No       Chief Complaint:   Chief Complaint   Patient presents with    Right Foot - Pain, Initial Visit     NP, right foot pain, swollen & red, over a month. She has seen Dr. Martin for the pain in her foot. She had an injection to the foot about 2 weeks ago. She states that the pain is greatest in the morning. Swelling is present on top of the foot.     Other     Uric Acid was withing normal ref range 5.4 on 04/03/2023. She has had elevated uric acid levels over the past 4 years.          History of present illness:    This is a 78 y.o. year old female who complains of patient is being seen today initially for right foot pain she complains of swelling and redness for about a month she seen Dr. Cuellar in the past she had an injection of her foot about 2 weeks ago she complains of pain in the morning patient has a uric acid within normal limits 5.4 she has had an elevated uric acid level in the past for years patient states the pain and swelling started about 6 weeks ago after she came out of a off a cruise ship she says she did a lot of walking  "on a cruise    Review of Systems:    Constitution: Denies chills, fever, and sweats.  HENT: Denies headaches or blurry vision.  Cardiovascular: Denies chest pain or irregular heart beat.  Respiratory: Denies cough or shortness of breath.  Gastrointestinal: Denies abdominal pain, nausea, or vomiting.  Musculoskeletal:  Denies muscle cramps.  Neurological: Denies dizziness or focal weakness.  Psychiatric/Behavioral: Normal mental status.  Hematologic/Lymphatic: Denies bleeding problem or easy bruising/bleeding.  Skin: Denies rash or suspicious lesions.    Examination:    Vital Signs:    Vitals:    05/03/23 1257   Weight: 105.7 kg (233 lb 0.4 oz)   Height: 5' 9" (1.753 m)   PainSc:   5   PainLoc: Foot       Body mass index is 34.41 kg/m².    This a well-developed, well nourished patient in no acute distress.    Alert and oriented x 3 and cooperative to examination.       Physical Exam:  Right foot-patient has tenderness over the 1st and 2nd metatarsal area of the right foot there is some redness some mild swelling there is no warmth the cellulitis or infection    Imaging:  An x-ray done April 2, 2023 showed no evidence of any fracture I will go ahead and repeat the x-ray today to rule out the possibility of a stress fracture       Assessment: Right foot pain  -     Cancel: X-Ray Foot 2 View Right; Future; Expected date: 05/03/2023    Neuropathy of right foot        Plan:  I repeated her x-rays today of the right I repeated her x-ray of her right foot today it showed no evidence of any stress fracture there were essentially negative.  Patient is presently taking gabapentin for her neuropathy I think some of her problems could be secondary to the neuropathy in her foot.  At this time I have no surgical recommendations I would continue her on Neurontin do some warm and cold contrast baths foot and return in 4 weeks if she still having problems I will see her back in 4 weeks we will also start her on some colchicine 0.6 " milligrams twice a day we will get an x-ray of her right foot on return in 4 weeks      DISCLAIMER: This note may have been dictated using voice recognition software and may contain grammatical errors.     NOTE: Consult report sent to referring provider via [facility]MR.

## 2023-05-04 NOTE — PROGRESS NOTES
Subjective:       Patient ID:  Marisela Eagle is a 74 y.o. female who presents for   Chief Complaint   Patient presents with    Folliculitis     scalp    Spot     R neck     HPI    Last o/v 2/13/2020  Folliculitis  Discussed with patient the etiology and pathogenesis of the disease or skin lesion(s) and possible treatments and aggravators.    Reviewed with patient different treatment options and associated risks.  Proper application of medications and or care for affected area(s) and condition(s) reviewed.  Chronic nature of this condition discussed with patient.  Related to increased sweating that is related to menopause.  Patient to start using sulfur bar soap for the face 1-2 x day.  For scalp usage lather from the soap to be applied to the roots of the scalp and allow to sit for 3-5 minutes regularly.  Patient and or guardian to monitor this area/lesion or these areas/lesions for changes or worsening.  Patient and or guardian to contact us if any changes are noted for such.     Itch   Discussed with patient the etiology and pathogenesis of the disease or skin lesion(s) and possible treatments and aggravators.    Reviewed with patient different treatment options and associated risks.  Pt's steroid sol bid prn focally.  Proper application of medications and or care for affected area(s) and condition(s) reviewed.        Patient presents today for follow up with folliculitis, using sulfur bar soap every other day with no improvement. Still itching with tender sores. Doing clobetasol solution PRN daily for itching  Spot on the right side of the neck, x 2 weeks, using OTC save.      Review of Systems   Constitutional: Negative for fever, chills and fatigue.   Gastrointestinal: Negative for nausea, vomiting and diarrhea.   Musculoskeletal: Negative for joint swelling and arthralgias.   Skin: Positive for dry skin. Negative for itching and rash.   Hematologic/Lymphatic: Does not bruise/bleed easily.         Objective:    Physical Exam   Constitutional: She appears well-developed and well-nourished. No distress.   HENT:   Mouth/Throat: Lips normal.    Eyes: No conjunctival no injection.   Neurological: She is alert and oriented to person, place, and time. She is not disoriented.   Psychiatric: She has a normal mood and affect.   Skin:   Areas Examined (abnormalities noted in diagram):   Scalp / Hair Palpated and Inspected  Head / Face Inspection Performed  Neck Inspection Performed              Diagram Legend     Erythematous scaling macule/papule c/w actinic keratosis       Vascular papule c/w angioma      Pigmented verrucoid papule/plaque c/w seborrheic keratosis      Yellow umbilicated papule c/w sebaceous hyperplasia      Irregularly shaped tan macule c/w lentigo     1-2 mm smooth white papules consistent with Milia      Movable subcutaneous cyst with punctum c/w epidermal inclusion cyst      Subcutaneous movable cyst c/w pilar cyst      Firm pink to brown papule c/w dermatofibroma      Pedunculated fleshy papule(s) c/w skin tag(s)      Evenly pigmented macule c/w junctional nevus     Mildly variegated pigmented, slightly irregular-bordered macule c/w mildly atypical nevus      Flesh colored to evenly pigmented papule c/w intradermal nevus       Pink pearly papule/plaque c/w basal cell carcinoma      Erythematous hyperkeratotic cursted plaque c/w SCC      Surgical scar with no sign of skin cancer recurrence      Open and closed comedones      Inflammatory papules and pustules      Verrucoid papule consistent consistent with wart     Erythematous eczematous patches and plaques     Dystrophic onycholytic nail with subungual debris c/w onychomycosis     Umbilicated papule    Erythematous-base heme-crusted tan verrucoid plaque consistent with inflamed seborrheic keratosis     Erythematous Silvery Scaling Plaque c/w Psoriasis     See annotation      Assessment / Plan:        Contact dermatitis, unspecified contact  dermatitis type, unspecified trigger  Told patient to stop all products and make up.  Discussed that less is more right now.  Patient to wash with glycerin bar soap and avoid hot water.  Avoid fragrances.  Change laundry detergent to free and clear.  Patient may need patch testing if index of suspicion is high and patient continues to flare with rashes.  Shampoo and conditioner okay.  Reviewed with patient different treatment options and associated risks.  Discussed with patient the etiology and pathogenesis of the disease or skin lesion(s) and possible treatments and aggravators.      Folliculitis  -     doxycycline (MONODOX) 100 MG capsule; Take 1 capsule (100 mg total) by mouth 2 (two) times daily.  Dispense: 60 capsule; Refill: 0  Still breaking out.  Discussed with patient the etiology and pathogenesis of the disease or skin lesion(s) and possible treatments and aggravators.    Reviewed with patient different treatment options and associated risks.  Discussed benefits and risks of doxycyline therapy including but not limited to GI discomfort, esophageal irritation/ulceration, and increased sun sensitivity. Patient was counseled to take medicine with meals and at least 1 hour before lying down.   Patient to start using sulfur bar soap for the face 1-2 x day.  For scalp usage lather from the soap to be applied to the roots of the scalp and allow to sit for 3-5 minutes regularly.  Same instructions for other affected areas.  Proper application of medications and or care for affected area(s) and condition(s) reviewed.    EIC (epidermal inclusion cyst)  Discussed with patient the likelihood that this lesion is an epidermal cyst caused by an involuted lining of skin with dead skin trapped inside.  Cysts do not have a malignant potential but can become infected and turn into abscesses with possible cellulitis.  Discussed the option of warm compresses if infected with oral antibiotics.  Discussed the option of surgical  excision with scar, possible recurrence, hematoma, and infection.  Patient to consider these options.  Discussed with patient the importance of not picking at the skin due to risks of infection, non healing, and scarring.      Antibiotic-induced yeast infection  -     fluconazole (DIFLUCAN) 150 MG Tab; Take 1 tablet (150 mg total) by mouth once daily. for 1 day  Dispense: 5 tablet; Refill: 0  Reviewed with patient different treatment options and associated risks.    Itch  Cont pt's steroid sol focally bid prn.  Reviewed with patient different treatment options and associated risks.             Follow up in about 3 weeks (around 4/1/2020).   04-May-2023 12:35

## 2023-05-09 ENCOUNTER — TELEPHONE (OUTPATIENT)
Dept: ORTHOPEDICS | Facility: CLINIC | Age: 78
End: 2023-05-09
Payer: MEDICARE

## 2023-05-09 NOTE — TELEPHONE ENCOUNTER
----- Message from Doris Cee sent at 5/9/2023 11:40 AM CDT -----  Type:  RX Refill Request    Who Called:  pt   Refill or New Rx:  refill   RX Name and Strength:  colchicine (COLCRYS) 0.6 mg tablet  How is the patient currently taking it? (ex. 1XDay):  as directed   Is this a 30 day or 90 day RX:  90   Preferred Pharmacy with phone number:    [id number] - [place] - [address]  [address] [place] [address]  Phone: [phone] Fax: [phone]      Local or Mail Order:  local   Ordering Provider:  michael Ellis Call Back Number:  [phone] (home)     Additional Information: pt is leaving for a cruise on Saturday , pt foot is still red and swollen   please advise thank you

## 2023-05-09 NOTE — TELEPHONE ENCOUNTER
Patient is not wearing  the boot as recommended by both physicians she has seen for her foot. She has finished the colcrys with still not relief as well. She was not doing the foot baths as directed she was only using compresses of heat and cold. She will be leaving on a cruise on 05/13/2023. She will cont with the foot baths not compresses. We will see her back in 3 weeks after her cruise.

## 2023-05-10 RX ORDER — LORAZEPAM 1 MG/1
TABLET ORAL
Qty: 30 TABLET | Refills: 0 | Status: SHIPPED | OUTPATIENT
Start: 2023-05-10 | End: 2023-05-26 | Stop reason: SDUPTHER

## 2023-05-10 NOTE — TELEPHONE ENCOUNTER
No care due was identified.  Health [facility] Embedded Care Due Messages. Reference number: [id number].   5/10/2023 4:40:57 PM CDT

## 2023-05-22 DIAGNOSIS — M79.671 RIGHT FOOT PAIN: Primary | ICD-10-CM

## 2023-05-22 DIAGNOSIS — G57.91 NEUROPATHY OF RIGHT FOOT: ICD-10-CM

## 2023-05-24 ENCOUNTER — TELEPHONE (OUTPATIENT)
Dept: FAMILY MEDICINE | Facility: CLINIC | Age: 78
End: 2023-05-24
Payer: MEDICARE

## 2023-05-24 NOTE — TELEPHONE ENCOUNTER
----- Message from Enoch Stinson sent at 5/24/2023 11:24 AM CDT -----  Regarding: appointment  Contact: Patient  Type:  Sooner Apoointment Request    Caller is requesting a sooner appointment.  Caller declined first available appointment listed below.  Caller will not accept being placed on the waitlist and is requesting a message be sent to doctor.  Name of Caller:Patient  When is the first available appointment?06/01/23  Symptoms:Leg swelling  Would the patient rather a call back or a response via [facility]? call  Best Call Back Number:[phone]  Additional Information: Please call patient to schedule.

## 2023-05-25 ENCOUNTER — TELEPHONE (OUTPATIENT)
Dept: ORTHOPEDICS | Facility: CLINIC | Age: 78
End: 2023-05-25
Payer: MEDICARE

## 2023-05-25 ENCOUNTER — TELEPHONE (OUTPATIENT)
Dept: FAMILY MEDICINE | Facility: CLINIC | Age: 78
End: 2023-05-25
Payer: MEDICARE

## 2023-05-25 NOTE — TELEPHONE ENCOUNTER
----- Message from Servando Lovell MA sent at 5/25/2023  8:50 AM CDT -----  Contact: Radha/Maia  Patient in a lot of pain and insisting to be seen prior to 5/30/23.  Call back number for [phone].

## 2023-05-25 NOTE — TELEPHONE ENCOUNTER
Rt call to pt na lm on vm to keep apt tomm we are over booked today ,if symp of legs worse to urgent care or er.

## 2023-05-25 NOTE — TELEPHONE ENCOUNTER
----- Message from Alberto Noble sent at 5/25/2023 10:16 AM CDT -----  Type:  Patient Returning Call    Who Called:  pt  Who Left Message for Patient:  Kat  Does the patient know what this is regarding?:  yes  Best Call Back Number:  [phone] (home)     Additional Information:  please call and advise--thank you

## 2023-05-25 NOTE — TELEPHONE ENCOUNTER
Spoke directly to Patient and she was advised that She should do the warm bath soaks 2-3 times today, and elevate her foot until seen at her rescheduled office visit tomorrow. She just got back from a cruise where she did a lit of walking and was not able to do warm bath soaks to the foot due to not having a tub only a shower on the cruise ship. Before her cruise I did speak with her and reiterated that she sold do warm foot baths not compresses and wear her boot. She had not been doing either of those as instructed by physician. She is still not compliant with wearing the boot either. I did  move her appointment up from 5/30/ 2023 to 05/26/2023 due to pain and redness (patient states she has cellulitis in her foot).

## 2023-05-26 ENCOUNTER — TELEPHONE (OUTPATIENT)
Dept: DERMATOLOGY | Facility: CLINIC | Age: 78
End: 2023-05-26
Payer: MEDICARE

## 2023-05-26 ENCOUNTER — LAB VISIT (OUTPATIENT)
Dept: LAB | Facility: HOSPITAL | Age: 78
End: 2023-05-26
Attending: FAMILY MEDICINE
Payer: MEDICARE

## 2023-05-26 ENCOUNTER — OFFICE VISIT (OUTPATIENT)
Dept: ORTHOPEDICS | Facility: CLINIC | Age: 78
End: 2023-05-26
Payer: MEDICARE

## 2023-05-26 ENCOUNTER — HOSPITAL ENCOUNTER (OUTPATIENT)
Dept: RADIOLOGY | Facility: HOSPITAL | Age: 78
Discharge: HOME | End: 2023-05-26
Attending: ORTHOPAEDIC SURGERY
Payer: MEDICARE

## 2023-05-26 ENCOUNTER — OFFICE VISIT (OUTPATIENT)
Dept: FAMILY MEDICINE | Facility: CLINIC | Age: 78
End: 2023-05-26
Payer: MEDICARE

## 2023-05-26 ENCOUNTER — TELEPHONE (OUTPATIENT)
Dept: ENDOCRINOLOGY | Facility: CLINIC | Age: 78
End: 2023-05-26
Payer: MEDICARE

## 2023-05-26 VITALS — HEIGHT: 69 IN | WEIGHT: 240.31 LBS | BODY MASS INDEX: 35.59 KG/M2

## 2023-05-26 VITALS
OXYGEN SATURATION: 95 % | HEIGHT: 69 IN | TEMPERATURE: 97 F | WEIGHT: 240.31 LBS | BODY MASS INDEX: 35.59 KG/M2 | SYSTOLIC BLOOD PRESSURE: 132 MMHG | HEART RATE: 82 BPM | DIASTOLIC BLOOD PRESSURE: 78 MMHG

## 2023-05-26 DIAGNOSIS — G57.91 NEUROPATHY OF RIGHT FOOT: ICD-10-CM

## 2023-05-26 DIAGNOSIS — R60.0 EDEMA OF RIGHT LOWER LEG: Primary | ICD-10-CM

## 2023-05-26 DIAGNOSIS — L03.115 CELLULITIS OF RIGHT LOWER EXTREMITY: ICD-10-CM

## 2023-05-26 DIAGNOSIS — I87.8 VENOUS STASIS: ICD-10-CM

## 2023-05-26 DIAGNOSIS — I50.20 HFREF (HEART FAILURE WITH REDUCED EJECTION FRACTION): Primary | ICD-10-CM

## 2023-05-26 DIAGNOSIS — M79.671 RIGHT FOOT PAIN: ICD-10-CM

## 2023-05-26 DIAGNOSIS — R60.0 PEDAL EDEMA: ICD-10-CM

## 2023-05-26 DIAGNOSIS — I50.20 HFREF (HEART FAILURE WITH REDUCED EJECTION FRACTION): ICD-10-CM

## 2023-05-26 DIAGNOSIS — E66.01 SEVERE OBESITY (BMI 35.0-39.9) WITH COMORBIDITY: ICD-10-CM

## 2023-05-26 LAB
ANION GAP SERPL CALC-SCNC: 6 MMOL/L (ref 8–16)
BNP SERPL-MCNC: 149 PG/ML (ref 0–99)
BUN SERPL-MCNC: 15 MG/DL (ref 8–23)
CALCIUM SERPL-MCNC: 9.2 MG/DL (ref 8.7–10.5)
CHLORIDE SERPL-SCNC: 105 MMOL/L (ref 95–110)
CO2 SERPL-SCNC: 29 MMOL/L (ref 23–29)
CREAT SERPL-MCNC: 0.6 MG/DL (ref 0.5–1.4)
EST. GFR  (NO RACE VARIABLE): >60 ML/MIN/1.73 M^2
GLUCOSE SERPL-MCNC: 100 MG/DL (ref 70–110)
POTASSIUM SERPL-SCNC: 4 MMOL/L (ref 3.5–5.1)
SODIUM SERPL-SCNC: 140 MMOL/L (ref 136–145)

## 2023-05-26 PROCEDURE — 3075F PR MOST RECENT SYSTOLIC BLOOD PRESS GE 130-139MM HG: ICD-10-PCS | Mod: CPTII,S$GLB,, | Performed by: FAMILY MEDICINE

## 2023-05-26 PROCEDURE — 3075F SYST BP GE 130 - 139MM HG: CPT | Mod: CPTII,S$GLB,, | Performed by: FAMILY MEDICINE

## 2023-05-26 PROCEDURE — 1101F PT FALLS ASSESS-DOCD LE1/YR: CPT | Mod: CPTII,S$GLB,, | Performed by: ORTHOPAEDIC SURGERY

## 2023-05-26 PROCEDURE — 99214 OFFICE O/P EST MOD 30 MIN: CPT | Mod: S$GLB,,, | Performed by: FAMILY MEDICINE

## 2023-05-26 PROCEDURE — 1125F PR PAIN SEVERITY QUANTIFIED, PAIN PRESENT: ICD-10-PCS | Mod: CPTII,S$GLB,, | Performed by: FAMILY MEDICINE

## 2023-05-26 PROCEDURE — 1159F MED LIST DOCD IN RCRD: CPT | Mod: CPTII,S$GLB,, | Performed by: ORTHOPAEDIC SURGERY

## 2023-05-26 PROCEDURE — 3288F FALL RISK ASSESSMENT DOCD: CPT | Mod: CPTII,S$GLB,, | Performed by: ORTHOPAEDIC SURGERY

## 2023-05-26 PROCEDURE — 1101F PR PT FALLS ASSESS DOC 0-1 FALLS W/OUT INJ PAST YR: ICD-10-PCS | Mod: CPTII,S$GLB,, | Performed by: FAMILY MEDICINE

## 2023-05-26 PROCEDURE — 99214 PR OFFICE/OUTPT VISIT, EST, LEVL IV, 30-39 MIN: ICD-10-PCS | Mod: S$GLB,,, | Performed by: FAMILY MEDICINE

## 2023-05-26 PROCEDURE — 1125F AMNT PAIN NOTED PAIN PRSNT: CPT | Mod: CPTII,S$GLB,, | Performed by: FAMILY MEDICINE

## 2023-05-26 PROCEDURE — 36415 COLL VENOUS BLD VENIPUNCTURE: CPT | Performed by: FAMILY MEDICINE

## 2023-05-26 PROCEDURE — 73630 X-RAY EXAM OF FOOT: CPT | Mod: 26,RT,, | Performed by: RADIOLOGY

## 2023-05-26 PROCEDURE — 80048 BASIC METABOLIC PNL TOTAL CA: CPT | Performed by: FAMILY MEDICINE

## 2023-05-26 PROCEDURE — 3078F DIAST BP <80 MM HG: CPT | Mod: CPTII,S$GLB,, | Performed by: FAMILY MEDICINE

## 2023-05-26 PROCEDURE — 99999 PR PBB SHADOW E&M-EST. PATIENT-LVL III: CPT | Mod: PBBFAC,,, | Performed by: ORTHOPAEDIC SURGERY

## 2023-05-26 PROCEDURE — 99213 OFFICE O/P EST LOW 20 MIN: CPT | Mod: S$GLB,,, | Performed by: ORTHOPAEDIC SURGERY

## 2023-05-26 PROCEDURE — 1101F PT FALLS ASSESS-DOCD LE1/YR: CPT | Mod: CPTII,S$GLB,, | Performed by: FAMILY MEDICINE

## 2023-05-26 PROCEDURE — 73630 X-RAY EXAM OF FOOT: CPT | Mod: TC,PO,RT

## 2023-05-26 PROCEDURE — 3078F PR MOST RECENT DIASTOLIC BLOOD PRESSURE < 80 MM HG: ICD-10-PCS | Mod: CPTII,S$GLB,, | Performed by: FAMILY MEDICINE

## 2023-05-26 PROCEDURE — 3288F PR FALLS RISK ASSESSMENT DOCUMENTED: ICD-10-PCS | Mod: CPTII,S$GLB,, | Performed by: ORTHOPAEDIC SURGERY

## 2023-05-26 PROCEDURE — 1101F PR PT FALLS ASSESS DOC 0-1 FALLS W/OUT INJ PAST YR: ICD-10-PCS | Mod: CPTII,S$GLB,, | Performed by: ORTHOPAEDIC SURGERY

## 2023-05-26 PROCEDURE — 83880 ASSAY OF NATRIURETIC PEPTIDE: CPT | Performed by: FAMILY MEDICINE

## 2023-05-26 PROCEDURE — 1125F AMNT PAIN NOTED PAIN PRSNT: CPT | Mod: CPTII,S$GLB,, | Performed by: ORTHOPAEDIC SURGERY

## 2023-05-26 PROCEDURE — 1159F PR MEDICATION LIST DOCUMENTED IN MEDICAL RECORD: ICD-10-PCS | Mod: CPTII,S$GLB,, | Performed by: ORTHOPAEDIC SURGERY

## 2023-05-26 PROCEDURE — 73630 XR FOOT COMPLETE 3 VIEW RIGHT: ICD-10-PCS | Mod: 26,RT,, | Performed by: RADIOLOGY

## 2023-05-26 PROCEDURE — 3288F PR FALLS RISK ASSESSMENT DOCUMENTED: ICD-10-PCS | Mod: CPTII,S$GLB,, | Performed by: FAMILY MEDICINE

## 2023-05-26 PROCEDURE — 99213 PR OFFICE/OUTPT VISIT, EST, LEVL III, 20-29 MIN: ICD-10-PCS | Mod: S$GLB,,, | Performed by: ORTHOPAEDIC SURGERY

## 2023-05-26 PROCEDURE — 1125F PR PAIN SEVERITY QUANTIFIED, PAIN PRESENT: ICD-10-PCS | Mod: CPTII,S$GLB,, | Performed by: ORTHOPAEDIC SURGERY

## 2023-05-26 PROCEDURE — 3288F FALL RISK ASSESSMENT DOCD: CPT | Mod: CPTII,S$GLB,, | Performed by: FAMILY MEDICINE

## 2023-05-26 PROCEDURE — 99999 PR PBB SHADOW E&M-EST. PATIENT-LVL III: ICD-10-PCS | Mod: PBBFAC,,, | Performed by: ORTHOPAEDIC SURGERY

## 2023-05-26 RX ORDER — FLUCONAZOLE 150 MG/1
150 TABLET ORAL DAILY
Qty: 2 TABLET | Refills: 0 | Status: SHIPPED | OUTPATIENT
Start: 2023-05-26 | End: 2023-06-16

## 2023-05-26 RX ORDER — FLUCONAZOLE 150 MG/1
150 TABLET ORAL DAILY
COMMUNITY
End: 2023-05-26 | Stop reason: SDUPTHER

## 2023-05-26 RX ORDER — CLINDAMYCIN HYDROCHLORIDE 300 MG/1
300 CAPSULE ORAL 3 TIMES DAILY
COMMUNITY
End: 2023-05-26 | Stop reason: SDUPTHER

## 2023-05-26 RX ORDER — CLINDAMYCIN HYDROCHLORIDE 300 MG/1
300 CAPSULE ORAL 4 TIMES DAILY
Qty: 40 CAPSULE | Refills: 0 | Status: SHIPPED | OUTPATIENT
Start: 2023-05-26 | End: 2023-06-16

## 2023-05-26 RX ORDER — LORAZEPAM 1 MG/1
TABLET ORAL
Qty: 30 TABLET | Refills: 0 | Status: SHIPPED | OUTPATIENT
Start: 2023-05-26 | End: 2023-05-27 | Stop reason: CLARIF

## 2023-05-26 NOTE — TELEPHONE ENCOUNTER
Appt made for June 15th @ 11:00am.      ----- Message from Amanda Sánchez sent at 5/26/2023  1:55 PM CDT -----  Regarding: appointment  Contact: patient  Type:  Sooner Appointment Request    Caller is requesting a sooner appointment.  Caller declined first available appointment listed below.  Caller will not accept being placed on the waitlist and is requesting a message be sent to doctor.    Name of Caller:  patient  When is the first available appointment?  05/31/23  Symptoms:  6 month check  Best Call Back Number:  [phone] (home)   Additional Information:  Patient is not sure if the doctor canceled or if she accidentally canceled but she needs to be seen. Please call patient to advise.Thanks!

## 2023-05-26 NOTE — PROGRESS NOTES
2023    Past Medical History:   Diagnosis Date    Bilateral knee pain 2012    left worse, right knee painful even after partial replacement.    Bruises easily     Clot ?    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 Took Coumadin x 6 weeks    KNEE ARTHROSCOPY      right    TONSILLECTOMY         Current Outpatient Medications   Medication Sig    calcium-vitamin D3 (OS-HALLIE 500 + D3) 500 mg-5 mcg (200 unit) per tablet Take 1 tablet by mouth 2 (two) times daily with meals.    cholecalciferol, vitamin D3, 5,000 unit capsule Take 5,000 Units by mouth once daily.    clindamycin (CLEOCIN) 300 MG capsule Take 300 mg by mouth 3 (three) times daily.    colchicine (COLCRYS) 0.6 mg tablet Take 1 tablet (0.6 mg total) by mouth 2 (two) times daily.    cyanocobalamin (VITAMIN B-12) 1000 MCG tablet Take 5,000 mcg by mouth once daily.    DULoxetine (CYMBALTA) 60 MG capsule     famotidine (PEPCID) 20 MG tablet Take 20 mg by mouth as needed.    fluconazole (DIFLUCAN) 150 MG Tab Take 150 mg by mouth once daily.    furosemide (LASIX) 40 MG tablet Take 40 mg by mouth once daily. Takes as needed    gabapentin (NEURONTIN) 800 MG tablet TAKE 1  TABLET THREE TIMES DAILY    levothyroxine (SYNTHROID) 125 MCG tablet TAKE 1 TABLET EVERY DAY BEFORE BREAKFAST  Strength: 125 mcg    LORazepam (ATIVAN) 1 MG tablet TAKE 1 TABLET BY MOUTH ONCE DAILY AT BEDTIME AS NEEDED FOR ANXIETY    losartan (COZAAR) 100 MG tablet Take 0.5 tablets (50 mg total) by mouth once daily.    magnesium 30 mg Tab Take by mouth once.    methylPREDNISolone (MEDROL DOSEPACK) 4 mg tablet     metoprolol succinate (TOPROL-XL) 50 MG 24 hr tablet Take 0.5 tablets (25 mg total) by mouth every evening.    omeprazole (PRILOSEC) 40 MG capsule TAKE 1 CAPSULE EVERY DAY    oxyCODONE-acetaminophen (PERCOCET)  mg per tablet Take 1 tablet by mouth every 6 (six) hours as needed.     potassium chloride SA (K-DUR,KLOR-CON) 20 MEQ tablet TAKE 1 TABLET TWICE DAILY    triamcinolone acetonide 0.1% (KENALOG) 0.1 % ointment Apply topically 2 (two) times daily.    turmeric 400 mg Cap Take 400 mg by mouth 2 (two) times daily.    valACYclovir (VALTREX) 500 MG tablet     vit C/E/Zn/coppr/lutein/zeaxan (PRESERVISION AREDS-2 ORAL) Take 1 capsule by mouth 2 (two) times daily.      No current facility-administered medications for this visit.       Review of patient's allergies indicates:  No Known Allergies    Family History   Problem Relation Age of Onset    Neuropathy Mother     Hypertension Mother     Stroke Mother     Neuropathy Father     Diabetes Maternal Grandmother     Cancer Daughter     Melanoma Neg Hx     Psoriasis Neg Hx     Lupus Neg Hx     Eczema Neg Hx        Social History     Socioeconomic History    Marital status:    Occupational History    Occupation: RETIRED   Tobacco Use    Smoking status: Former     Types: Cigarettes     Quit date: 3/23/2012     Years since quittin.1    Smokeless tobacco: Never   Substance and Sexual Activity    Alcohol use: Yes     Comment: occasional    Drug use: No    Sexual activity: Not Currently     Social Determinants of Health     Financial Resource Strain: Low  Risk     Difficulty of Paying Living Expenses: Not hard at all   Food Insecurity: No Food Insecurity    Worried About Running Out of Food in the Last Year: Never true    Ran Out of Food in the Last Year: Never true   Transportation Needs: No Transportation Needs    Lack of Transportation (Medical): No    Lack of Transportation (Non-Medical): No   Physical Activity: Insufficiently Active    Days of Exercise per Week: 2 days    Minutes of Exercise per Session: 60 min   Stress: No Stress Concern Present    Feeling of Stress : Not at all   Social Connections: Moderately Isolated    Frequency of Communication with Friends and Family: More than three times a week    Frequency of Social Gatherings with Friends and Family: Three times a week    Attends [employer] Services: Never    Active Member of Clubs or Organizations: No    Attends Club or Organization Meetings: Never    Marital Status:    Housing Stability: Low Risk     Unable to Pay for Housing in the Last Year: No    Number of Places Lived in the Last Year: 1    Unstable Housing in the Last Year: No       Chief Complaint:   Chief Complaint   Patient presents with    Right Foot - Pain, Follow-up     Right Foot Pain, LOV; 05/03/2023 - showed no evidence of any stress fracture there were essentially negative. Will see her back and repeat xrays. She went on her cruise and was unable to do foot baths. She is not wearing the boot either.          History of present illness:    This is a 78 y.o. year old female who complains of patient is being seen today for follow-up of her right foot pain has office visit was 05/03/2023 she had no evidence of any stress fracture seen her x-rays were negative she is not wearing her boot at this time    Review of Systems:    Constitution: Denies chills, fever, and sweats.  HENT: Denies headaches or blurry vision.  Cardiovascular: Denies chest pain or irregular heart beat.  Respiratory: Denies cough or shortness of  "breath.  Gastrointestinal: Denies abdominal pain, nausea, or vomiting.  Musculoskeletal:  Denies muscle cramps.  Neurological: Denies dizziness or focal weakness.  Psychiatric/Behavioral: Normal mental status.  Hematologic/Lymphatic: Denies bleeding problem or easy bruising/bleeding.  Skin: Denies rash or suspicious lesions.    Examination:    Vital Signs:    Vitals:    05/26/23 1143   Weight: 109 kg (240 lb 4.8 oz)   Height: 5' 9" (1.753 m)   PainSc:   6   PainLoc: Foot       Body mass index is 35.49 kg/m².    This a well-developed, well nourished patient in no acute distress.    Alert and oriented x 3 and cooperative to examination.       Physical Exam:  Right foot-patient has some swelling in the right foot she has a negative Homans sign but does have swelling in the lower leg she has some redness in the foot but it does not appear to be cellulitic there is no warmth patient has some decreased blood flow to the right lower leg appears to have maybe some pre ischemic changes in the toes    Imaging:  X-ray of the right foot was negative       Assessment: Edema of right lower leg        Plan:  Patient is scheduled to have a Doppler study done by Dr. Badillo she is also scheduled to see Dr. Rausch for a vascular evaluation at this time I do not see any bony changes in her right foot and ankle she is return to me as needed.      DISCLAIMER: This note may have been dictated using voice recognition software and may contain grammatical errors.     NOTE: Consult report sent to referring provider via [facility] EMR.    "

## 2023-05-26 NOTE — TELEPHONE ENCOUNTER
----- Message from Jonathan Hutchison sent at 5/26/2023  9:19 AM CDT -----  Contact: Self  Type:  Patient Returning Call    Who Called:  Patient  Who Left Message for Patient:  ??  Does the patient know what this is regarding?: Yes  Best Call Back Number:  [phone]   Additional Information:    Called to speak with office again to make appt. Please call.

## 2023-05-26 NOTE — TELEPHONE ENCOUNTER
Spoke with patient who stated she was going to see her PCP for the issue. Will call back if needed.     ----- Message from Keaton Villa sent at 5/25/2023  8:57 AM CDT -----  Regarding: sameday appointment  Contact: Patient  Type:  Same Day Appointment Request    Caller is requesting a same day appointment.  Caller declined first available appointment listed below.      Name of Caller:  Patient  When is the first available appointment?  No availability   Symptoms:  rash on legs  Best Call Back Number:  [phone] (home)

## 2023-05-26 NOTE — TELEPHONE ENCOUNTER
PT contacted and stated that she was at Dr. Hurst office to be tx for cellulitis.  Would call back if she still needs an appt.

## 2023-05-27 ENCOUNTER — HOSPITAL ENCOUNTER (OUTPATIENT)
Facility: HOSPITAL | Age: 78
Discharge: HOME | End: 2023-05-28
Attending: EMERGENCY MEDICINE | Admitting: FAMILY MEDICINE
Payer: MEDICARE

## 2023-05-27 ENCOUNTER — NURSE TRIAGE (OUTPATIENT)
Dept: ADMINISTRATIVE | Facility: CLINIC | Age: 78
End: 2023-05-27
Payer: MEDICARE

## 2023-05-27 DIAGNOSIS — R07.9 CHEST PAIN: ICD-10-CM

## 2023-05-27 DIAGNOSIS — R07.9 CHEST PAIN IN ADULT: ICD-10-CM

## 2023-05-27 DIAGNOSIS — M79.89 SWELLING OF LOWER LEG: ICD-10-CM

## 2023-05-27 DIAGNOSIS — I50.9 CONGESTIVE HEART FAILURE, UNSPECIFIED HF CHRONICITY, UNSPECIFIED HEART FAILURE TYPE: Primary | ICD-10-CM

## 2023-05-27 DIAGNOSIS — R06.02 SHORTNESS OF BREATH: ICD-10-CM

## 2023-05-27 PROBLEM — L03.115 CELLULITIS OF RIGHT LOWER EXTREMITY: Status: ACTIVE | Noted: 2023-05-27

## 2023-05-27 LAB
ALBUMIN SERPL BCP-MCNC: 4 G/DL (ref 3.5–5.2)
ALP SERPL-CCNC: 45 U/L (ref 55–135)
ALT SERPL W/O P-5'-P-CCNC: 25 U/L (ref 10–44)
ANION GAP SERPL CALC-SCNC: 6 MMOL/L (ref 8–16)
AST SERPL-CCNC: 26 U/L (ref 10–40)
BASOPHILS # BLD AUTO: 0.03 K/UL (ref 0–0.2)
BASOPHILS NFR BLD: 0.6 % (ref 0–1.9)
BILIRUB SERPL-MCNC: 0.8 MG/DL (ref 0.1–1)
BILIRUB UR QL STRIP: NEGATIVE
BNP SERPL-MCNC: 133 PG/ML (ref 0–99)
BUN SERPL-MCNC: 12 MG/DL (ref 8–23)
CALCIUM SERPL-MCNC: 9.1 MG/DL (ref 8.7–10.5)
CHLORIDE SERPL-SCNC: 106 MMOL/L (ref 95–110)
CLARITY UR: CLEAR
CO2 SERPL-SCNC: 26 MMOL/L (ref 23–29)
COLOR UR: YELLOW
CREAT SERPL-MCNC: 0.6 MG/DL (ref 0.5–1.4)
DIFFERENTIAL METHOD: ABNORMAL
EOSINOPHIL # BLD AUTO: 0.1 K/UL (ref 0–0.5)
EOSINOPHIL NFR BLD: 2.8 % (ref 0–8)
ERYTHROCYTE [DISTWIDTH] IN BLOOD BY AUTOMATED COUNT: 17.1 % (ref 11.5–14.5)
EST. GFR  (NO RACE VARIABLE): >60 ML/MIN/1.73 M^2
GLUCOSE SERPL-MCNC: 100 MG/DL (ref 70–110)
GLUCOSE UR QL STRIP: NEGATIVE
HCT VFR BLD AUTO: 36 % (ref 37–48.5)
HGB BLD-MCNC: 11.5 G/DL (ref 12–16)
HGB UR QL STRIP: NEGATIVE
IMM GRANULOCYTES # BLD AUTO: 0.01 K/UL (ref 0–0.04)
IMM GRANULOCYTES NFR BLD AUTO: 0.2 % (ref 0–0.5)
KETONES UR QL STRIP: NEGATIVE
LEUKOCYTE ESTERASE UR QL STRIP: NEGATIVE
LYMPHOCYTES # BLD AUTO: 1.6 K/UL (ref 1–4.8)
LYMPHOCYTES NFR BLD: 31.8 % (ref 18–48)
MAGNESIUM SERPL-MCNC: 2 MG/DL (ref 1.6–2.6)
MCH RBC QN AUTO: 26.9 PG (ref 27–31)
MCHC RBC AUTO-ENTMCNC: 31.9 G/DL (ref 32–36)
MCV RBC AUTO: 84 FL (ref 82–98)
MONOCYTES # BLD AUTO: 0.7 K/UL (ref 0.3–1)
MONOCYTES NFR BLD: 13.2 % (ref 4–15)
NEUTROPHILS # BLD AUTO: 2.5 K/UL (ref 1.8–7.7)
NEUTROPHILS NFR BLD: 51.4 % (ref 38–73)
NITRITE UR QL STRIP: NEGATIVE
NRBC BLD-RTO: 0 /100 WBC
PH UR STRIP: 7 [PH] (ref 5–8)
PLATELET # BLD AUTO: 277 K/UL (ref 150–450)
PMV BLD AUTO: 12 FL (ref 9.2–12.9)
POTASSIUM SERPL-SCNC: 3.8 MMOL/L (ref 3.5–5.1)
PROT SERPL-MCNC: 6.8 G/DL (ref 6–8.4)
PROT UR QL STRIP: NEGATIVE
RBC # BLD AUTO: 4.28 M/UL (ref 4–5.4)
SODIUM SERPL-SCNC: 138 MMOL/L (ref 136–145)
SP GR UR STRIP: 1.01 (ref 1–1.03)
TROPONIN I SERPL HS-MCNC: 7.4 PG/ML (ref 0–14.9)
TROPONIN I SERPL HS-MCNC: 8.5 PG/ML (ref 0–14.9)
URN SPEC COLLECT METH UR: NORMAL
UROBILINOGEN UR STRIP-ACNC: NEGATIVE EU/DL
WBC # BLD AUTO: 4.94 K/UL (ref 3.9–12.7)

## 2023-05-27 PROCEDURE — 25000003 PHARM REV CODE 250: Performed by: FAMILY MEDICINE

## 2023-05-27 PROCEDURE — 94761 N-INVAS EAR/PLS OXIMETRY MLT: CPT

## 2023-05-27 PROCEDURE — 96374 THER/PROPH/DIAG INJ IV PUSH: CPT

## 2023-05-27 PROCEDURE — 83880 ASSAY OF NATRIURETIC PEPTIDE: CPT | Performed by: EMERGENCY MEDICINE

## 2023-05-27 PROCEDURE — 81003 URINALYSIS AUTO W/O SCOPE: CPT | Performed by: EMERGENCY MEDICINE

## 2023-05-27 PROCEDURE — 93005 ELECTROCARDIOGRAM TRACING: CPT | Performed by: GENERAL PRACTICE

## 2023-05-27 PROCEDURE — 96372 THER/PROPH/DIAG INJ SC/IM: CPT | Performed by: FAMILY MEDICINE

## 2023-05-27 PROCEDURE — G0378 HOSPITAL OBSERVATION PER HR: HCPCS

## 2023-05-27 PROCEDURE — 93010 ELECTROCARDIOGRAM REPORT: CPT | Mod: ,,, | Performed by: GENERAL PRACTICE

## 2023-05-27 PROCEDURE — 83735 ASSAY OF MAGNESIUM: CPT | Performed by: EMERGENCY MEDICINE

## 2023-05-27 PROCEDURE — 85025 COMPLETE CBC W/AUTO DIFF WBC: CPT | Performed by: EMERGENCY MEDICINE

## 2023-05-27 PROCEDURE — 63600175 PHARM REV CODE 636 W HCPCS: Performed by: FAMILY MEDICINE

## 2023-05-27 PROCEDURE — 63600175 PHARM REV CODE 636 W HCPCS: Performed by: EMERGENCY MEDICINE

## 2023-05-27 PROCEDURE — 27000221 HC OXYGEN, UP TO 24 HOURS

## 2023-05-27 PROCEDURE — 80053 COMPREHEN METABOLIC PANEL: CPT | Performed by: EMERGENCY MEDICINE

## 2023-05-27 PROCEDURE — 99285 EMERGENCY DEPT VISIT HI MDM: CPT | Mod: 25

## 2023-05-27 PROCEDURE — 93010 EKG 12-LEAD: ICD-10-PCS | Mod: ,,, | Performed by: GENERAL PRACTICE

## 2023-05-27 PROCEDURE — 84484 ASSAY OF TROPONIN QUANT: CPT | Mod: 91 | Performed by: EMERGENCY MEDICINE

## 2023-05-27 PROCEDURE — 25000003 PHARM REV CODE 250: Performed by: EMERGENCY MEDICINE

## 2023-05-27 RX ORDER — ENOXAPARIN SODIUM 100 MG/ML
40 INJECTION SUBCUTANEOUS EVERY 24 HOURS
Status: DISCONTINUED | OUTPATIENT
Start: 2023-05-27 | End: 2023-05-28 | Stop reason: HOSPADM

## 2023-05-27 RX ORDER — GLUCAGON 1 MG
1 KIT INJECTION
Status: DISCONTINUED | OUTPATIENT
Start: 2023-05-27 | End: 2023-05-28 | Stop reason: HOSPADM

## 2023-05-27 RX ORDER — SODIUM,POTASSIUM PHOSPHATES 280-250MG
2 POWDER IN PACKET (EA) ORAL
Status: DISCONTINUED | OUTPATIENT
Start: 2023-05-27 | End: 2023-05-28 | Stop reason: HOSPADM

## 2023-05-27 RX ORDER — CHOLECALCIFEROL (VITAMIN D3) 50 MCG
6000 TABLET ORAL DAILY
Status: DISCONTINUED | OUTPATIENT
Start: 2023-05-28 | End: 2023-05-28 | Stop reason: HOSPADM

## 2023-05-27 RX ORDER — IBUPROFEN 200 MG
24 TABLET ORAL
Status: DISCONTINUED | OUTPATIENT
Start: 2023-05-27 | End: 2023-05-28 | Stop reason: HOSPADM

## 2023-05-27 RX ORDER — NALOXONE HCL 0.4 MG/ML
0.02 VIAL (ML) INJECTION
Status: DISCONTINUED | OUTPATIENT
Start: 2023-05-27 | End: 2023-05-28 | Stop reason: HOSPADM

## 2023-05-27 RX ORDER — OXYCODONE AND ACETAMINOPHEN 10; 325 MG/1; MG/1
1 TABLET ORAL EVERY 8 HOURS PRN
COMMUNITY

## 2023-05-27 RX ORDER — LOSARTAN POTASSIUM 50 MG/1
50 TABLET ORAL DAILY
Status: DISCONTINUED | OUTPATIENT
Start: 2023-05-28 | End: 2023-05-28 | Stop reason: HOSPADM

## 2023-05-27 RX ORDER — PANTOPRAZOLE SODIUM 40 MG/1
40 TABLET, DELAYED RELEASE ORAL DAILY
Status: DISCONTINUED | OUTPATIENT
Start: 2023-05-28 | End: 2023-05-28 | Stop reason: HOSPADM

## 2023-05-27 RX ORDER — FAMOTIDINE 20 MG/1
20 TABLET, FILM COATED ORAL
Status: DISCONTINUED | OUTPATIENT
Start: 2023-05-27 | End: 2023-05-28 | Stop reason: HOSPADM

## 2023-05-27 RX ORDER — IBUPROFEN 200 MG
16 TABLET ORAL
Status: DISCONTINUED | OUTPATIENT
Start: 2023-05-27 | End: 2023-05-28 | Stop reason: HOSPADM

## 2023-05-27 RX ORDER — POLYETHYLENE GLYCOL 3350 17 G/17G
17 POWDER, FOR SOLUTION ORAL DAILY PRN
Status: DISCONTINUED | OUTPATIENT
Start: 2023-05-27 | End: 2023-05-28 | Stop reason: HOSPADM

## 2023-05-27 RX ORDER — ONDANSETRON 4 MG/1
8 TABLET, ORALLY DISINTEGRATING ORAL EVERY 8 HOURS PRN
Status: DISCONTINUED | OUTPATIENT
Start: 2023-05-27 | End: 2023-05-28 | Stop reason: HOSPADM

## 2023-05-27 RX ORDER — METOPROLOL SUCCINATE 25 MG/1
25 TABLET, EXTENDED RELEASE ORAL DAILY
Status: DISCONTINUED | OUTPATIENT
Start: 2023-05-27 | End: 2023-05-28 | Stop reason: HOSPADM

## 2023-05-27 RX ORDER — FUROSEMIDE 10 MG/ML
40 INJECTION INTRAMUSCULAR; INTRAVENOUS
Status: COMPLETED | OUTPATIENT
Start: 2023-05-27 | End: 2023-05-27

## 2023-05-27 RX ORDER — LANOLIN ALCOHOL/MO/W.PET/CERES
800 CREAM (GRAM) TOPICAL
Status: DISCONTINUED | OUTPATIENT
Start: 2023-05-27 | End: 2023-05-28 | Stop reason: HOSPADM

## 2023-05-27 RX ORDER — FUROSEMIDE 40 MG/1
40 TABLET ORAL DAILY
Status: DISCONTINUED | OUTPATIENT
Start: 2023-05-28 | End: 2023-05-28

## 2023-05-27 RX ORDER — LANOLIN ALCOHOL/MO/W.PET/CERES
5000 CREAM (GRAM) TOPICAL DAILY
Status: DISCONTINUED | OUTPATIENT
Start: 2023-05-28 | End: 2023-05-28 | Stop reason: HOSPADM

## 2023-05-27 RX ORDER — POTASSIUM CHLORIDE 20 MEQ/1
20 TABLET, EXTENDED RELEASE ORAL DAILY
Status: DISCONTINUED | OUTPATIENT
Start: 2023-05-28 | End: 2023-05-28 | Stop reason: HOSPADM

## 2023-05-27 RX ORDER — DULOXETIN HYDROCHLORIDE 30 MG/1
60 CAPSULE, DELAYED RELEASE ORAL DAILY
Status: DISCONTINUED | OUTPATIENT
Start: 2023-05-28 | End: 2023-05-28 | Stop reason: HOSPADM

## 2023-05-27 RX ORDER — CLINDAMYCIN HYDROCHLORIDE 150 MG/1
300 CAPSULE ORAL 4 TIMES DAILY
Status: DISCONTINUED | OUTPATIENT
Start: 2023-05-27 | End: 2023-05-28 | Stop reason: HOSPADM

## 2023-05-27 RX ORDER — SODIUM CHLORIDE 0.9 % (FLUSH) 0.9 %
10 SYRINGE (ML) INJECTION EVERY 12 HOURS PRN
Status: DISCONTINUED | OUTPATIENT
Start: 2023-05-27 | End: 2023-05-28 | Stop reason: HOSPADM

## 2023-05-27 RX ORDER — GABAPENTIN 800 MG/1
800 TABLET ORAL 3 TIMES DAILY
COMMUNITY
End: 2023-08-21

## 2023-05-27 RX ORDER — LORAZEPAM 1 MG/1
1 TABLET ORAL NIGHTLY PRN
Status: DISCONTINUED | OUTPATIENT
Start: 2023-05-27 | End: 2023-05-28 | Stop reason: HOSPADM

## 2023-05-27 RX ORDER — LORAZEPAM 1 MG/1
1 TABLET ORAL NIGHTLY
COMMUNITY

## 2023-05-27 RX ORDER — OXYCODONE AND ACETAMINOPHEN 10; 325 MG/1; MG/1
1 TABLET ORAL EVERY 8 HOURS PRN
Status: DISCONTINUED | OUTPATIENT
Start: 2023-05-27 | End: 2023-05-28 | Stop reason: HOSPADM

## 2023-05-27 RX ADMIN — NITROGLYCERIN 0.5 INCH: 20 OINTMENT TOPICAL at 11:05

## 2023-05-27 RX ADMIN — OXYCODONE HYDROCHLORIDE AND ACETAMINOPHEN 1 TABLET: 10; 325 TABLET ORAL at 07:05

## 2023-05-27 RX ADMIN — METOPROLOL SUCCINATE 25 MG: 25 TABLET, FILM COATED, EXTENDED RELEASE ORAL at 09:05

## 2023-05-27 RX ADMIN — FUROSEMIDE 40 MG: 10 INJECTION, SOLUTION INTRAMUSCULAR; INTRAVENOUS at 11:05

## 2023-05-27 RX ADMIN — GABAPENTIN 800 MG: 300 CAPSULE ORAL at 09:05

## 2023-05-27 RX ADMIN — LORAZEPAM 1 MG: 1 TABLET ORAL at 09:05

## 2023-05-27 RX ADMIN — ENOXAPARIN SODIUM 40 MG: 100 INJECTION SUBCUTANEOUS at 07:05

## 2023-05-27 RX ADMIN — CLINDAMYCIN HYDROCHLORIDE 300 MG: 150 CAPSULE ORAL at 09:05

## 2023-05-27 NOTE — ASSESSMENT & PLAN NOTE
Cholesterol   Date Value Ref Range Status   01/10/2023 241 (H) 120 - 199 mg/dL Final     Comment:     The National Cholesterol Education Program (NCEP) has set the  following guidelines (reference ranges) for Cholesterol:  Optimal.....................<200 mg/dL  Borderline High.............200-239 mg/dL  High........................> or = 240 mg/dL       HDL   Date Value Ref Range Status   01/10/2023 77 (H) 40 - 75 mg/dL Final     Comment:     The National Cholesterol Education Program (NCEP) has set the  following guidelines (reference values) for HDL Cholesterol:  Low...............<40 mg/dL  Optimal...........>60 mg/dL       LDL Cholesterol   Date Value Ref Range Status   01/10/2023 147.8 63.0 - 159.0 mg/dL Final     Comment:     The National Cholesterol Education Program (NCEP) has set the  following guidelines (reference values) for LDL Cholesterol:  Optimal.......................<130 mg/dL  Borderline High...............130-159 mg/dL  High..........................160-189 mg/dL  Very High.....................>190 mg/dL       Triglycerides   Date Value Ref Range Status   01/10/2023 81 30 - 150 mg/dL Final     Comment:     The National Cholesterol Education Program (NCEP) has set the  following guidelines (reference values) for triglycerides:  Normal......................<150 mg/dL  Borderline High.............150-199 mg/dL  High........................200-499 mg/dL

## 2023-05-27 NOTE — H&P
"[facility] - Emergency Dept  Hospital Medicine  History & Physical    Patient Name: Marisela Eagle  MRN: [id number]  Patient Class: OP- Observation  Admission Date: 5/27/2023  Attending Physician: Meera Stewart MD  Primary Care Provider: Pawel Badillo III, MD         Patient information was obtained from patient and ER records.     Subjective:     Principal Problem:Chest pain in adult    Chief Complaint:   Chief Complaint   Patient presents with    Shortness of Breath     Pt saw pcp yesterday for leg swelling and prescribed abx. Pt is c/o SOB and high blood pressure at home (205/137)         HPI: HPI per ED:   "Patient presents emergency department reported bilateral lower extremity swelling worsened with associated shortness of breath blood pressure elevated this morning with a systolic pressure 205 she reports she saw her primary physician and orthopedist yesterday they were concerned about the possibility of a blood clot in the right lower extremity were ordered able to obtain an ultrasound he did do blood work and a chest x-ray performed at that time she does report some or stasis dyspnea on exertion no significant chest pain is reported she does report palpitations we have noted some ectopy on our monitor patient's family reports that she is not terribly compliant with the Lasix that she is supposed to take twice a week she did take Lasix last night and this morning patient reports they did place her on antibiotics with the redness in her right lower extremity"    Saw patient in ED for further evaluation. States that she started having left-sided chest pain this morning and noted that her blood pressure was elevated. Also started to suffer from shortness of breath as well. Took 100mg of losartan (instead of th 50mg as prescribed) to alleviate this symptom. Right now symptoms have improved. Also suffering from swelling of both legs. Admits that she doesn't always take her lasix as prescribed. " Also started taking clindamycin yesterday for cellulitis of her right leg.       Past Medical History:   Diagnosis Date    Bilateral knee pain 2012    left worse, right knee painful even after partial replacement.    Bruises easily     Clot ?    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 Took Coumadin x 6 weeks    KNEE ARTHROSCOPY  2010    right    TONSILLECTOMY         Review of patient's allergies indicates:  No Known Allergies    No current facility-administered medications on file prior to encounter.     Current Outpatient Medications on File Prior to Encounter   Medication Sig    cholecalciferol, vitamin D3, 5,000 unit capsule Take 5,000 Units by mouth once daily.    clindamycin (CLEOCIN) 300 MG capsule Take 1 capsule (300 mg total) by mouth 4 (four) times daily.    cyanocobalamin (VITAMIN B-12) 1000 MCG tablet Take 5,000 mcg by mouth once daily.    DULoxetine (CYMBALTA) 60 MG capsule Take 60 mg by mouth once daily.    fluconazole (DIFLUCAN) 150 MG Tab Take 1 tablet (150 mg total) by mouth once daily. (Patient taking differently: Take 150 mg by mouth once daily. NEW Rx - NOT YET STARTED)     furosemide (LASIX) 40 MG tablet Take 40 mg by mouth once daily. Takes as needed    gabapentin (NEURONTIN) 800 MG tablet Take 800 mg by mouth 3 (three) times daily.    levothyroxine (SYNTHROID) 125 MCG tablet TAKE 1 TABLET EVERY DAY BEFORE BREAKFAST  Strength: 125 mcg (Patient taking differently: Take 125 mcg by mouth before breakfast. TAKE 1 TABLET EVERY DAY BEFORE BREAKFAST  Strength: 125 mcg)    LORazepam (ATIVAN) 1 MG tablet Take 1 mg by mouth every evening.    losartan (COZAAR) 100 MG tablet Take 0.5 tablets (50 mg total) by mouth once daily.    magnesium 30 mg Tab Take 1 tablet by mouth once daily.    metoprolol succinate (TOPROL-XL) 50 MG 24 hr tablet Take 0.5 tablets (25 mg total) by mouth every evening.    omeprazole (PRILOSEC) 40 MG capsule TAKE 1 CAPSULE EVERY DAY (Patient taking differently: Take 40 mg by mouth every morning.)    oxyCODONE-acetaminophen (PERCOCET)  mg per tablet Take 1 tablet by mouth every 8 (eight) hours as needed for Pain.    potassium chloride SA (K-DUR,KLOR-CON) 20 MEQ tablet TAKE 1 TABLET TWICE DAILY    triamcinolone acetonide 0.1% (KENALOG) 0.1 % ointment Apply topically 2 (two) times daily. (Patient taking differently: Apply 1 application topically 2 (two) times daily.)    turmeric 400 mg Cap Take 400 mg by mouth once daily.    vit C/E/Zn/coppr/lutein/zeaxan (PRESERVISION AREDS-2 ORAL) Take 1 capsule by mouth once daily.    famotidine (PEPCID) 20 MG tablet Take 20 mg by mouth as needed.    methylPREDNISolone (MEDROL DOSEPACK) 4 mg tablet     oxyCODONE-acetaminophen (PERCOCET)  mg per tablet Take 1 tablet by mouth every 6 (six) hours as needed.     [DISCONTINUED] calcium-vitamin D3 (OS-HALLIE 500 + D3) 500 mg-5 mcg (200 unit) per tablet Take 1 tablet by mouth 2 (two) times daily with meals.    [DISCONTINUED] colchicine (COLCRYS) 0.6 mg tablet Take 1 tablet (0.6 mg total) by mouth 2 (two) times daily.    [DISCONTINUED] gabapentin (NEURONTIN) 800 MG tablet  TAKE 1 TABLET THREE TIMES DAILY    [DISCONTINUED] LORazepam (ATIVAN) 1 MG tablet TAKE 1 TABLET BY MOUTH ONCE DAILY AT BEDTIME AS NEEDED FOR ANXIETY    [DISCONTINUED] valACYclovir (VALTREX) 500 MG tablet      Family History       Problem Relation (Age of Onset)    Cancer Daughter    Diabetes Maternal Grandmother    Hypertension Mother    Neuropathy Mother, Father    Stroke Mother          Tobacco Use    Smoking status: Former     Types: Cigarettes     Quit date: 3/23/2012     Years since quittin.1    Smokeless tobacco: Never   Substance and Sexual Activity    Alcohol use: Yes     Comment: occasional    Drug use: No    Sexual activity: Not Currently     Review of Systems   Constitutional:  Negative for chills and fever.   HENT:  Negative for congestion and sore throat.    Eyes:  Negative for visual disturbance.   Respiratory:  Positive for shortness of breath. Negative for cough.    Cardiovascular:  Positive for chest pain.   Gastrointestinal:  Negative for constipation, diarrhea, nausea and vomiting.   Genitourinary:  Negative for dysuria.   Musculoskeletal:  Negative for joint swelling.   Skin:  Positive for rash. Negative for wound.   Neurological:  Negative for dizziness and headaches.   Hematological:  Does not bruise/bleed easily.   Objective:     Vital Signs (Most Recent):  Temp: 98 °F (36.7 °C) (23 1045)  Pulse: 86 (23 1500)  Resp: (!) 30 (23 1500)  BP: 117/68 (23 1500)  SpO2: (!) 94 % (23 1500) Vital Signs (24h Range):  Temp:  [98 °F (36.7 °C)] 98 °F (36.7 °C)  Pulse:  [72-86] 86  Resp:  [18-33] 30  SpO2:  [93 %-97 %] 94 %  BP: (104-164)/(61-88) 117/68     Weight: 108.9 kg (240 lb)  Body mass index is 35.44 kg/m².     Physical Exam  Vitals reviewed.   Constitutional:       General: She is not in acute distress.     Appearance: Normal appearance. She is well-developed.   HENT:      Head: Normocephalic and atraumatic.   Eyes:      Conjunctiva/sclera: Conjunctivae  normal.   Cardiovascular:      Rate and Rhythm: Normal rate and regular rhythm.      Pulses: Normal pulses.      Heart sounds: Normal heart sounds.   Pulmonary:      Effort: Pulmonary effort is normal.      Breath sounds: Normal breath sounds.   Abdominal:      Palpations: Abdomen is soft.      Tenderness: There is no abdominal tenderness.   Skin:     General: Skin is warm.      Findings: Rash (redness noted on right lower leg) present.   Neurological:      General: No focal deficit present.      Mental Status: She is alert.   Psychiatric:         Mood and Affect: Mood normal.         Speech: Speech normal.         Behavior: Behavior normal. Behavior is cooperative.              Significant Labs: All pertinent labs within the past 24 hours have been reviewed.  Recent Lab Results         05/27/23  1510   05/27/23  1146   05/27/23  1130        Albumin   4.0         Alkaline Phosphatase   45         ALT   25         Anion Gap   6         Appearance, UA     Clear       AST   26         Baso #   0.03         Basophil %   0.6         Bilirubin (UA)     Negative       BILIRUBIN TOTAL   0.8  Comment: For infants and newborns, interpretation of results should be based  on gestational age, weight and in agreement with clinical  observations.    Premature Infant recommended reference ranges:  Up to 24 hours.............<8.0 mg/dL  Up to 48 hours............<12.0 mg/dL  3-5 days..................<15.0 mg/dL  6-29 days.................<15.0 mg/dL           BNP   133  Comment: Values of less than 100 pg/ml are consistent with non-CHF populations.         BUN   12         Calcium   9.1         Chloride   106         CO2   26         Color, UA     Yellow       Creatinine   0.6         Differential Method   Automated         eGFR   >60.0         Eos #   0.1         Eosinophil %   2.8         Glucose   100         Glucose, UA     Negative       Gran # (ANC)   2.5         Gran %   51.4         Hematocrit   36.0         Hemoglobin    11.5         Immature Grans (Abs)   0.01  Comment: Mild elevation in immature granulocytes is non specific and   can be seen in a variety of conditions including stress response,   acute inflammation, trauma and pregnancy. Correlation with other   laboratory and clinical findings is essential.           Immature Granulocytes   0.2         Ketones, UA     Negative       Leukocytes, UA     Negative       Lymph #   1.6         Lymph %   31.8         Magnesium   2.0         MCH   26.9         MCHC   31.9         MCV   84         Mono #   0.7         Mono %   13.2         MPV   12.0         NITRITE UA     Negative       nRBC   0         Occult Blood UA     Negative       pH, UA     7.0       Platelets   277         Potassium   3.8         PROTEIN TOTAL   6.8         Protein, UA     Negative  Comment: Recommend a 24 hour urine protein or a urine   protein/creatinine ratio if globulin induced proteinuria is  clinically suspected.         RBC   4.28         RDW   17.1         Sodium   138         Specific [place], UA     1.010       Specimen UA     Urine, Clean Catch       Troponin I High Sensitivity 7.4  Comment: Troponin results differ between methods. Do not use   results between Troponin methods interchangeably.    Alkaline Phospatase levels above 400 U/L may   cause false positive results.    Access hsTnI should not be used for patients taking   Asfotase alena (Strensiq).     8.5  Comment: Troponin results differ between methods. Do not use   results between Troponin methods interchangeably.    Alkaline Phospatase levels above 400 U/L may   cause false positive results.    Access hsTnI should not be used for patients taking   Asfotase alena (Strensiq).           UROBILINOGEN UA     Negative       WBC   4.94                 Significant Imaging:   Imaging Results              US Lower Extremity Veins Bilateral (Final result)  Result time 05/27/23 12:49:52      Final result by Tim Neely MD (05/27/23 12:49:52)                    Narrative:    US LOWER EXTREMITY VEINS BILATERAL    CLINICAL HISTORY:  78 years Female leg pain and swelling    FINDINGS: Grayscale, color and spectral Doppler analysis of the bilateral lower extremity deep venous system was performed.    There is normal compressibility, with normal flow by color and spectral Doppler analysis in the bilateral lower extremity deep venous system, with normal augmentation and no evidence of deep venous thrombosis.    IMPRESSION: Negative for DVT.    Electronically signed by:  Tim Neely MD  5/27/2023 12:49 PM CDT Workstation: [id number]                                     X-Ray Chest AP Portable (Final result)  Result time 05/27/23 11:33:20      Final result by Tim Neely MD (05/27/23 11:33:20)                   Narrative:    XR CHEST 1 VIEW    CLINICAL HISTORY:  78 years Female CHF    COMPARISON: December 1, 2022    FINDINGS: Cardiac silhouette size is stable compared to prior. Atherosclerotic calcification of the aorta. No airspace consolidation. Mild increased interstitial markings involving the lower lung zones, greater on the left. No pleural fluid or pneumothorax.    IMPRESSION:    Very mild findings of interstitial edema in this patient with history of CHF.    Electronically signed by:  Tim Neely MD  5/27/2023 11:33 AM CDT Workstation: [id number]                                      Assessment/Plan:     * Chest pain in adult  Consulted cards  Admit to tele    Cellulitis of right lower extremity  Starting taking clinda yesterday. Continue regimen while in hospital     Back pain of lumbar region with sciatica  Continue norco prn and gabapentin      Anxiety  Currently on cymbalta and ativan. Will continue this during hosptilization      HFrEF (heart failure with reduced ejection fraction)  Echo ordered  Notified patient's cardiologist  Continue toprol and losartan   Restart lasix    Hyperlipidemia  Cholesterol   Date Value Ref Range Status   01/10/2023  241 (H) 120 - 199 mg/dL Final     Comment:     The National Cholesterol Education Program (NCEP) has set the  following guidelines (reference ranges) for Cholesterol:  Optimal.....................<200 mg/dL  Borderline High.............200-239 mg/dL  High........................> or = 240 mg/dL       HDL   Date Value Ref Range Status   01/10/2023 77 (H) 40 - 75 mg/dL Final     Comment:     The National Cholesterol Education Program (NCEP) has set the  following guidelines (reference values) for HDL Cholesterol:  Low...............<40 mg/dL  Optimal...........>60 mg/dL       LDL Cholesterol   Date Value Ref Range Status   01/10/2023 147.8 63.0 - 159.0 mg/dL Final     Comment:     The National Cholesterol Education Program (NCEP) has set the  following guidelines (reference values) for LDL Cholesterol:  Optimal.......................<130 mg/dL  Borderline High...............130-159 mg/dL  High..........................160-189 mg/dL  Very High.....................>190 mg/dL       Triglycerides   Date Value Ref Range Status   01/10/2023 81 30 - 150 mg/dL Final     Comment:     The National Cholesterol Education Program (NCEP) has set the  following guidelines (reference values) for triglycerides:  Normal......................<150 mg/dL  Borderline High.............150-199 mg/dL  High........................200-499 mg/dL           Hypothyroid  Continue synthroid    Hypertension, essential  Patient took 100mg of losartan today. Will continue this along with toprol 25mg      VTE Risk Mitigation (From admission, onward)    lovenox             On 05/27/2023, patient should be placed in hospital observation services under my care.        Meera Stewart MD  Department of Hospital Medicine  [facility] Emergency Dept

## 2023-05-27 NOTE — HPI
"HPI per ED:   "Patient presents emergency department reported bilateral lower extremity swelling worsened with associated shortness of breath blood pressure elevated this morning with a systolic pressure 205 she reports she saw her primary physician and orthopedist yesterday they were concerned about the possibility of a blood clot in the right lower extremity were ordered able to obtain an ultrasound he did do blood work and a chest x-ray performed at that time she does report some or stasis dyspnea on exertion no significant chest pain is reported she does report palpitations we have noted some ectopy on our monitor patient's family reports that she is not terribly compliant with the Lasix that she is supposed to take twice a week she did take Lasix last night and this morning patient reports they did place her on antibiotics with the redness in her right lower extremity"    Saw patient in ED for further evaluation. States that she started having left-sided chest pain this morning and noted that her blood pressure was elevated. Also started to suffer from shortness of breath as well. Took 100mg of losartan (instead of th 50mg as prescribed) to alleviate this symptom. Right now symptoms have improved. Also suffering from swelling of both legs. Admits that she doesn't always take her lasix as prescribed. Also started taking clindamycin yesterday for cellulitis of her right leg.   "

## 2023-05-27 NOTE — SUBJECTIVE & OBJECTIVE
Past Medical History:   Diagnosis Date    Bilateral knee pain 2012    left worse, right knee painful even after partial replacement.    Bruises easily     Clot ?    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 Took Coumadin x 6 weeks    KNEE ARTHROSCOPY  2010    right    TONSILLECTOMY         Review of patient's allergies indicates:  No Known Allergies    No current facility-administered medications on file prior to encounter.     Current Outpatient Medications on File Prior to Encounter   Medication Sig    cholecalciferol, vitamin D3, 5,000 unit capsule Take 5,000 Units by mouth once daily.    clindamycin (CLEOCIN) 300 MG capsule Take 1 capsule (300 mg total) by mouth 4 (four) times daily.    cyanocobalamin (VITAMIN B-12) 1000 MCG tablet Take 5,000 mcg by mouth once daily.    DULoxetine (CYMBALTA) 60 MG capsule Take 60 mg by mouth once daily.    fluconazole (DIFLUCAN) 150 MG Tab Take 1 tablet (150 mg total) by mouth once daily. (Patient taking differently: Take 150 mg by mouth once daily. NEW Rx - NOT YET STARTED)    furosemide (LASIX) 40 MG tablet Take 40 mg by mouth once daily. Takes as needed    gabapentin (NEURONTIN) 800 MG  tablet Take 800 mg by mouth 3 (three) times daily.    levothyroxine (SYNTHROID) 125 MCG tablet TAKE 1 TABLET EVERY DAY BEFORE BREAKFAST  Strength: 125 mcg (Patient taking differently: Take 125 mcg by mouth before breakfast. TAKE 1 TABLET EVERY DAY BEFORE BREAKFAST  Strength: 125 mcg)    LORazepam (ATIVAN) 1 MG tablet Take 1 mg by mouth every evening.    losartan (COZAAR) 100 MG tablet Take 0.5 tablets (50 mg total) by mouth once daily.    magnesium 30 mg Tab Take 1 tablet by mouth once daily.    metoprolol succinate (TOPROL-XL) 50 MG 24 hr tablet Take 0.5 tablets (25 mg total) by mouth every evening.    omeprazole (PRILOSEC) 40 MG capsule TAKE 1 CAPSULE EVERY DAY (Patient taking differently: Take 40 mg by mouth every morning.)    oxyCODONE-acetaminophen (PERCOCET)  mg per tablet Take 1 tablet by mouth every 8 (eight) hours as needed for Pain.    potassium chloride SA (K-DUR,KLOR-CON) 20 MEQ tablet TAKE 1 TABLET TWICE DAILY    triamcinolone acetonide 0.1% (KENALOG) 0.1 % ointment Apply topically 2 (two) times daily. (Patient taking differently: Apply 1 application topically 2 (two) times daily.)    turmeric 400 mg Cap Take 400 mg by mouth once daily.    vit C/E/Zn/coppr/lutein/zeaxan (PRESERVISION AREDS-2 ORAL) Take 1 capsule by mouth once daily.    famotidine (PEPCID) 20 MG tablet Take 20 mg by mouth as needed.    methylPREDNISolone (MEDROL DOSEPACK) 4 mg tablet     oxyCODONE-acetaminophen (PERCOCET)  mg per tablet Take 1 tablet by mouth every 6 (six) hours as needed.     [DISCONTINUED] calcium-vitamin D3 (OS-HALLIE 500 + D3) 500 mg-5 mcg (200 unit) per tablet Take 1 tablet by mouth 2 (two) times daily with meals.    [DISCONTINUED] colchicine (COLCRYS) 0.6 mg tablet Take 1 tablet (0.6 mg total) by mouth 2 (two) times daily.    [DISCONTINUED] gabapentin (NEURONTIN) 800 MG tablet TAKE 1 TABLET THREE TIMES DAILY    [DISCONTINUED] LORazepam (ATIVAN) 1 MG tablet TAKE 1 TABLET BY MOUTH ONCE DAILY AT BEDTIME AS  NEEDED FOR ANXIETY    [DISCONTINUED] valACYclovir (VALTREX) 500 MG tablet      Family History       Problem Relation (Age of Onset)    Cancer Daughter    Diabetes Maternal Grandmother    Hypertension Mother    Neuropathy Mother, Father    Stroke Mother          Tobacco Use    Smoking status: Former     Types: Cigarettes     Quit date: 3/23/2012     Years since quittin.1    Smokeless tobacco: Never   Substance and Sexual Activity    Alcohol use: Yes     Comment: occasional    Drug use: No    Sexual activity: Not Currently     Review of Systems   Constitutional:  Negative for chills and fever.   HENT:  Negative for congestion and sore throat.    Eyes:  Negative for visual disturbance.   Respiratory:  Positive for shortness of breath. Negative for cough.    Cardiovascular:  Positive for chest pain.   Gastrointestinal:  Negative for constipation, diarrhea, nausea and vomiting.   Genitourinary:  Negative for dysuria.   Musculoskeletal:  Negative for joint swelling.   Skin:  Positive for rash. Negative for wound.   Neurological:  Negative for dizziness and headaches.   Hematological:  Does not bruise/bleed easily.   Objective:     Vital Signs (Most Recent):  Temp: 98 °F (36.7 °C) (23 1045)  Pulse: 86 (23 1500)  Resp: (!) 30 (23 1500)  BP: 117/68 (23 1500)  SpO2: (!) 94 % (23 1500) Vital Signs (24h Range):  Temp:  [98 °F (36.7 °C)] 98 °F (36.7 °C)  Pulse:  [72-86] 86  Resp:  [18-33] 30  SpO2:  [93 %-97 %] 94 %  BP: (104-164)/(61-88) 117/68     Weight: 108.9 kg (240 lb)  Body mass index is 35.44 kg/m².     Physical Exam  Vitals reviewed.   Constitutional:       General: She is not in acute distress.     Appearance: Normal appearance. She is well-developed.   HENT:      Head: Normocephalic and atraumatic.   Eyes:      Conjunctiva/sclera: Conjunctivae normal.   Cardiovascular:      Rate and Rhythm: Normal rate and regular rhythm.      Pulses: Normal pulses.      Heart sounds: Normal heart  sounds.   Pulmonary:      Effort: Pulmonary effort is normal.      Breath sounds: Normal breath sounds.   Abdominal:      Palpations: Abdomen is soft.      Tenderness: There is no abdominal tenderness.   Skin:     General: Skin is warm.      Findings: Rash (redness noted on right lower leg) present.   Neurological:      General: No focal deficit present.      Mental Status: She is alert.   Psychiatric:         Mood and Affect: Mood normal.         Speech: Speech normal.         Behavior: Behavior normal. Behavior is cooperative.              Significant Labs: All pertinent labs within the past 24 hours have been reviewed.  Recent Lab Results         05/27/23  1510   05/27/23  1146   05/27/23  1130        Albumin   4.0         Alkaline Phosphatase   45         ALT   25         Anion Gap   6         Appearance, UA     Clear       AST   26         Baso #   0.03         Basophil %   0.6         Bilirubin (UA)     Negative       BILIRUBIN TOTAL   0.8  Comment: For infants and newborns, interpretation of results should be based  on gestational age, weight and in agreement with clinical  observations.    Premature Infant recommended reference ranges:  Up to 24 hours.............<8.0 mg/dL  Up to 48 hours............<12.0 mg/dL  3-5 days..................<15.0 mg/dL  6-29 days.................<15.0 mg/dL           BNP   133  Comment: Values of less than 100 pg/ml are consistent with non-CHF populations.         BUN   12         Calcium   9.1         Chloride   106         CO2   26         Color, UA     Yellow       Creatinine   0.6         Differential Method   Automated         eGFR   >60.0         Eos #   0.1         Eosinophil %   2.8         Glucose   100         Glucose, UA     Negative       Gran # (ANC)   2.5         Gran %   51.4         Hematocrit   36.0         Hemoglobin   11.5         Immature Grans (Abs)   0.01  Comment: Mild elevation in immature granulocytes is non specific and   can be seen in a variety of  conditions including stress response,   acute inflammation, trauma and pregnancy. Correlation with other   laboratory and clinical findings is essential.           Immature Granulocytes   0.2         Ketones, UA     Negative       Leukocytes, UA     Negative       Lymph #   1.6         Lymph %   31.8         Magnesium   2.0         MCH   26.9         MCHC   31.9         MCV   84         Mono #   0.7         Mono %   13.2         MPV   12.0         NITRITE UA     Negative       nRBC   0         Occult Blood UA     Negative       pH, UA     7.0       Platelets   277         Potassium   3.8         PROTEIN TOTAL   6.8         Protein, UA     Negative  Comment: Recommend a 24 hour urine protein or a urine   protein/creatinine ratio if globulin induced proteinuria is  clinically suspected.         RBC   4.28         RDW   17.1         Sodium   138         Specific [place], UA     1.010       Specimen UA     Urine, Clean Catch       Troponin I High Sensitivity 7.4  Comment: Troponin results differ between methods. Do not use   results between Troponin methods interchangeably.    Alkaline Phospatase levels above 400 U/L may   cause false positive results.    Access hsTnI should not be used for patients taking   Asfotase alena (Strensiq).     8.5  Comment: Troponin results differ between methods. Do not use   results between Troponin methods interchangeably.    Alkaline Phospatase levels above 400 U/L may   cause false positive results.    Access hsTnI should not be used for patients taking   Asfotase alena (Strensiq).           UROBILINOGEN UA     Negative       WBC   4.94                 Significant Imaging:   Imaging Results              US Lower Extremity Veins Bilateral (Final result)  Result time 05/27/23 12:49:52      Final result by Tim Neely MD (05/27/23 12:49:52)                   Narrative:    US LOWER EXTREMITY VEINS BILATERAL    CLINICAL HISTORY:  78 years Female leg pain and swelling    FINDINGS:  Grayscale, color and spectral Doppler analysis of the bilateral lower extremity deep venous system was performed.    There is normal compressibility, with normal flow by color and spectral Doppler analysis in the bilateral lower extremity deep venous system, with normal augmentation and no evidence of deep venous thrombosis.    IMPRESSION: Negative for DVT.    Electronically signed by:  Tim Neely MD  5/27/2023 12:49 PM CDT Workstation: [id number]                                     X-Ray Chest AP Portable (Final result)  Result time 05/27/23 11:33:20      Final result by Tim Neely MD (05/27/23 11:33:20)                   Narrative:    XR CHEST 1 VIEW    CLINICAL HISTORY:  78 years Female CHF    COMPARISON: December 1, 2022    FINDINGS: Cardiac silhouette size is stable compared to prior. Atherosclerotic calcification of the aorta. No airspace consolidation. Mild increased interstitial markings involving the lower lung zones, greater on the left. No pleural fluid or pneumothorax.    IMPRESSION:    Very mild findings of interstitial edema in this patient with history of CHF.    Electronically signed by:  Tim Neely MD  5/27/2023 11:33 AM CDT Workstation: [id number]

## 2023-05-27 NOTE — PHARMACY MED REC
"    Admission Medication History     The home medication history was taken by Cristiana Maravilla.    You may go to "Admission" then "Reconcile Home Medications" tabs to review and/or act upon these items.     The home medication list has been updated by the Pharmacy department.   Please read ALL comments highlighted in yellow.   Please address this information as you see fit.    Feel free to contact us if you have any questions or require assistance.      The medications listed below were removed from the home medication list. Please reorder if appropriate:  Patient reports no longer taking the following medication(s):  Os-tamiko  Colcrys  Percocet -flagged  Medrol dose lisandro-flagged        Medications listed below were obtained from: Patient/family  No current facility-administered medications on file prior to encounter.     Current Outpatient Medications on File Prior to Encounter   Medication Sig Dispense Refill    cholecalciferol, vitamin D3, 5,000 unit capsule Take 5,000 Units by mouth once daily.      clindamycin (CLEOCIN) 300 MG capsule Take 1 capsule (300 mg total) by mouth 4 (four) times daily. 40 capsule 0    cyanocobalamin (VITAMIN B-12) 1000 MCG tablet Take 5,000 mcg by mouth once daily.      DULoxetine (CYMBALTA) 60 MG capsule Take 60 mg by mouth once daily.      fluconazole (DIFLUCAN) 150 MG Tab Take 1 tablet (150 mg total) by mouth once daily. (Patient taking differently: Take 150 mg by mouth once daily. NEW Rx - NOT YET STARTED) 2 tablet 0    furosemide (LASIX) 40 MG tablet Take 40 mg by mouth once daily. Takes as needed      gabapentin (NEURONTIN) 800 MG tablet Take 800 mg by mouth 3 (three) times daily.      levothyroxine (SYNTHROID) 125 MCG tablet TAKE 1 TABLET EVERY DAY BEFORE BREAKFAST  Strength: 125 mcg (Patient taking differently: Take 125 mcg by mouth before breakfast. TAKE 1 TABLET EVERY DAY BEFORE BREAKFAST  Strength: 125 mcg) 90 tablet 3    LORazepam (ATIVAN) 1 MG tablet Take 1 mg by mouth " every evening.      losartan (COZAAR) 100 MG tablet Take 0.5 tablets (50 mg total) by mouth once daily. 90 tablet 1    magnesium 30 mg Tab Take 1 tablet by mouth once daily.      metoprolol succinate (TOPROL-XL) 50 MG 24 hr tablet Take 0.5 tablets (25 mg total) by mouth every evening. 90 tablet 1    omeprazole (PRILOSEC) 40 MG capsule TAKE 1 CAPSULE EVERY DAY (Patient taking differently: Take 40 mg by mouth every morning.) 90 capsule 1    oxyCODONE-acetaminophen (PERCOCET)  mg per tablet Take 1 tablet by mouth every 8 (eight) hours as needed for Pain.      potassium chloride SA (K-DUR,KLOR-CON) 20 MEQ tablet TAKE 1 TABLET TWICE DAILY 180 tablet 0    triamcinolone acetonide 0.1% (KENALOG) 0.1 % ointment Apply topically 2 (two) times daily. (Patient taking differently: Apply 1 application topically 2 (two) times daily.) 80 g 0    turmeric 400 mg Cap Take 400 mg by mouth once daily.      vit C/E/Zn/coppr/lutein/zeaxan (PRESERVISION AREDS-2 ORAL) Take 1 capsule by mouth once daily.      famotidine (PEPCID) 20 MG tablet Take 20 mg by mouth as needed.      methylPREDNISolone (MEDROL DOSEPACK) 4 mg tablet       oxyCODONE-acetaminophen (PERCOCET)  mg per tablet Take 1 tablet by mouth every 6 (six) hours as needed.       [DISCONTINUED] calcium-vitamin D3 (OS-HALLIE 500 + D3) 500 mg-5 mcg (200 unit) per tablet Take 1 tablet by mouth 2 (two) times daily with meals.      [DISCONTINUED] colchicine (COLCRYS) 0.6 mg tablet Take 1 tablet (0.6 mg total) by mouth 2 (two) times daily. 14 tablet 0    [DISCONTINUED] gabapentin (NEURONTIN) 800 MG tablet TAKE 1 TABLET THREE TIMES DAILY 270 tablet 0    [DISCONTINUED] LORazepam (ATIVAN) 1 MG tablet TAKE 1 TABLET BY MOUTH ONCE DAILY AT BEDTIME AS NEEDED FOR ANXIETY 30 tablet 0    [DISCONTINUED] valACYclovir (VALTREX) 500 MG tablet            Cristiana Maravilla  [id number]             .

## 2023-05-27 NOTE — TELEPHONE ENCOUNTER
Spoke with pt daughter, Maia. Seen in clinic yesterday by PCP and ortho. Started on clindamycin by PCP for cellulitis. Woke up this morning ab 730am, feeling short of breath & slight pain in chest 205/137. Took whole losartan instead of prescribed half. Says swelling, sob, & elevated BP readings have daughter concerned that pt should have gotten chest xr yesterday.     At 815am 152/96.   At 9am 192/127.     At 930am 176/107. HR 73. Pt states pain in chest in completely gone & she denies sob. Daughter disagrees ab sob, states if sitting still she's, but little movement or taking 2 steps pt is very winded. Pulse ox applied:  po2 77% with 3 bars being highest reached, with deep breaths increased to 84%. Pt continues to deny sob.     Advised per protocol-ER now for eval. Daughter mateus.     Reason for Disposition   [1] Systolic BP  >= 160 OR Diastolic >= 100 AND [2] cardiac or neurologic symptoms (e.g., chest pain, difficulty breathing, unsteady gait, blurred vision)    Additional Information   Negative: Difficult to awaken or acting confused (e.g., disoriented, slurred speech)   Negative: Severe difficulty breathing (e.g., struggling for each breath, speaks in single words)   Negative: [1] Weakness of the face, arm or leg on one side of the body AND [2] new onset   Negative: [1] Numbness (i.e., loss of sensation) of the face, arm or leg on one side of the body AND [2] new onset   Negative: [1] Chest pain lasts > 5 minutes AND [2] history of heart disease  (i.e., heart attack, bypass surgery, angina, angioplasty, CHF)   Negative: [1] Chest pain AND [2] took nitrogylcerin AND [3] pain was not relieved   Negative: Sounds like a life-threatening emergency to the triager    Protocols used: High Blood Pressure-A-AH

## 2023-05-27 NOTE — ED PROVIDER NOTES
Encounter Date: 5/27/2023       History     Chief Complaint   Patient presents with    Shortness of Breath     Pt saw pcp yesterday for leg swelling and prescribed abx. Pt is c/o SOB and high blood pressure at home (205/137)      Patient presents emergency department reported bilateral lower extremity swelling worsened with associated shortness of breath blood pressure elevated this morning with a systolic pressure 205 he reports she saw her primary physician and orthopedist yesterday they were concerned about the possibility of a blood clot in the right lower extremity were ordered able to obtain an ultrasound he did do blood work and a chest x-ray performed at that time she does report some or stasis dyspnea on exertion no significant chest pain is reported she does report palpitations we have noted some ectopy on our monitor patient's family reports that she is not terribly compliant with the Lasix that she is supposed to take twice a week she did take Lasix last night and this morning patient reports they did place her on antibiotics with the redness in her right lower extremity      Review of patient's allergies indicates:  No Known Allergies  Past Medical History:   Diagnosis Date    Bilateral knee pain July 2012    left worse, right knee painful even after partial replacement.    Bruises easily     Clot 1990's?    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 Took Coumadin x 6 weeks    KNEE ARTHROSCOPY  2010    right    TONSILLECTOMY       Family History   Problem Relation Age of Onset    Neuropathy Mother     Hypertension Mother     Stroke Mother     Neuropathy Father     Diabetes Maternal Grandmother     Cancer Daughter     Melanoma Neg Hx     Psoriasis Neg Hx     Lupus Neg Hx     Eczema Neg Hx      Social History     Tobacco Use    Smoking status: Former     Types: Cigarettes     Quit date: 3/23/2012     Years since quittin.1    Smokeless tobacco: Never   Substance Use Topics    Alcohol use: Yes     Comment: occasional    Drug use: No     Review of Systems   Constitutional:  Negative for appetite change, chills and fever.   HENT:  Negative for congestion.    Respiratory:  Positive for cough and shortness of breath. Negative for wheezing.    Cardiovascular:  Positive for palpitations and leg swelling. Negative for chest pain.   Gastrointestinal:  Negative for abdominal pain, diarrhea, nausea and vomiting.   Genitourinary:  Negative for dysuria, hematuria and urgency.   Skin:  Positive for color change.   All other systems reviewed and are negative.    Physical Exam     Initial Vitals [23 1045]   BP Pulse Resp Temp SpO2   (!) 154/88 77 18 98 °F (36.7 °C) (!) 93 %      MAP       --         Physical Exam    Constitutional: She appears well-developed and well-nourished. No distress.   HENT:   Head: Normocephalic and atraumatic.   Right Ear: External ear normal.   Left Ear: External ear normal.   Mouth/Throat: Oropharynx is clear and moist.   Eyes: Conjunctivae and EOM are normal. Pupils are equal, round, and reactive to light.   Neck: Neck supple.   Normal range of motion.  Cardiovascular:  Normal rate, regular rhythm, normal heart sounds and  intact distal pulses.           Pulmonary/Chest: Breath sounds normal. No respiratory distress.   Abdominal: Abdomen is soft. Bowel sounds are normal. There is no abdominal tenderness.   Musculoskeletal:         General: Edema present. Normal range of motion.      Cervical back: Normal range of motion and neck supple.     Neurological: She is alert and oriented to person, place, and time. GCS score is 15. GCS eye subscore is 4. GCS verbal subscore is 5. GCS motor subscore is 6.   Skin: Skin is warm and dry. Capillary refill takes less than 2 seconds. No rash noted. There is erythema.   Psychiatric: She has a normal mood and affect. Her behavior is normal.       ED Course   Procedures  Labs Reviewed   CBC W/ AUTO DIFFERENTIAL - Abnormal; Notable for the following components:       Result Value    Hemoglobin 11.5 (*)     Hematocrit 36.0 (*)     MCH 26.9 (*)     MCHC 31.9 (*)     RDW 17.1 (*)     All other components within normal limits   COMPREHENSIVE METABOLIC PANEL - Abnormal; Notable for the following components:    Alkaline Phosphatase 45 (*)     Anion Gap 6 (*)     All other components within normal limits   B-TYPE NATRIURETIC PEPTIDE - Abnormal; Notable for the following components:     (*)     All other components within normal limits   TROPONIN I HIGH SENSITIVITY   MAGNESIUM   URINALYSIS, REFLEX TO URINE CULTURE    Narrative:     Specimen Source->Urine   TROPONIN I HIGH SENSITIVITY          Imaging Results              US Lower Extremity Veins Bilateral (Final result)  Result time 05/27/23 12:49:52      Final result by Tim Neely MD (05/27/23 12:49:52)                   Narrative:    US LOWER EXTREMITY VEINS BILATERAL    CLINICAL HISTORY:  78 years Female leg pain and swelling    FINDINGS: Grayscale, color and spectral Doppler analysis of the bilateral lower extremity deep venous system was performed.    There is normal compressibility, with normal flow by color and spectral Doppler analysis in  the bilateral lower extremity deep venous system, with normal augmentation and no evidence of deep venous thrombosis.    IMPRESSION: Negative for DVT.    Electronically signed by:  Tim Neely MD  5/27/2023 12:49 PM CDT Workstation: [id number]                                     X-Ray Chest AP Portable (Final result)  Result time 05/27/23 11:33:20      Final result by Tim Neely MD (05/27/23 11:33:20)                   Narrative:    XR CHEST 1 VIEW    CLINICAL HISTORY:  78 years Female CHF    COMPARISON: December 1, 2022    FINDINGS: Cardiac silhouette size is stable compared to prior. Atherosclerotic calcification of the aorta. No airspace consolidation. Mild increased interstitial markings involving the lower lung zones, greater on the left. No pleural fluid or pneumothorax.    IMPRESSION:    Very mild findings of interstitial edema in this patient with history of CHF.    Electronically signed by:  Tim Neely MD  5/27/2023 11:33 AM CDT Workstation: [id number]                                     Medications   furosemide injection 40 mg (40 mg Intravenous Given 5/27/23 1159)   nitroGLYCERIN 2% TD oint ointment 0.5 inch (0.5 inches Topical (Top) Given 5/27/23 1159)     Medical Decision Making:   Clinical Tests:   Lab Tests: Ordered and Reviewed  Radiological Study: Ordered and Reviewed  Medical Tests: Ordered and Reviewed  ED Management:  Laboratory evaluation reviewed chest x-ray shows evidence of congestive heart failure patient received Lasix and nitroglycerin in the ER will admit for further cardiac evaluation for patient's new onset CHF I discussed patient's findings with Dr. Stewart who will evaluate patient in the ER                        Clinical Impression:   Final diagnoses:  [R06.02] Shortness of breath  [M79.89] Swelling of lower leg  [I50.9] Congestive heart failure, unspecified HF chronicity, unspecified heart failure type (Primary)        ED Disposition Condition    Admit Stable                 Colin Herrera MD  05/27/23 1521

## 2023-05-28 VITALS
BODY MASS INDEX: 35.55 KG/M2 | OXYGEN SATURATION: 98 % | SYSTOLIC BLOOD PRESSURE: 158 MMHG | DIASTOLIC BLOOD PRESSURE: 73 MMHG | WEIGHT: 240 LBS | RESPIRATION RATE: 18 BRPM | HEART RATE: 77 BPM | TEMPERATURE: 98 F | HEIGHT: 69 IN

## 2023-05-28 PROBLEM — R07.9 CHEST PAIN IN ADULT: Status: RESOLVED | Noted: 2023-05-27 | Resolved: 2023-05-28

## 2023-05-28 LAB
ALBUMIN SERPL BCP-MCNC: 3.9 G/DL (ref 3.5–5.2)
ALP SERPL-CCNC: 41 U/L (ref 55–135)
ALT SERPL W/O P-5'-P-CCNC: 24 U/L (ref 10–44)
ANION GAP SERPL CALC-SCNC: 7 MMOL/L (ref 8–16)
AST SERPL-CCNC: 25 U/L (ref 10–40)
BASOPHILS # BLD AUTO: 0.02 K/UL (ref 0–0.2)
BASOPHILS NFR BLD: 0.4 % (ref 0–1.9)
BILIRUB SERPL-MCNC: 0.6 MG/DL (ref 0.1–1)
BUN SERPL-MCNC: 13 MG/DL (ref 8–23)
CALCIUM SERPL-MCNC: 8.9 MG/DL (ref 8.7–10.5)
CHLORIDE SERPL-SCNC: 105 MMOL/L (ref 95–110)
CO2 SERPL-SCNC: 29 MMOL/L (ref 23–29)
CREAT SERPL-MCNC: 0.6 MG/DL (ref 0.5–1.4)
DIFFERENTIAL METHOD: ABNORMAL
EOSINOPHIL # BLD AUTO: 0.2 K/UL (ref 0–0.5)
EOSINOPHIL NFR BLD: 3.7 % (ref 0–8)
ERYTHROCYTE [DISTWIDTH] IN BLOOD BY AUTOMATED COUNT: 17.2 % (ref 11.5–14.5)
EST. GFR  (NO RACE VARIABLE): >60 ML/MIN/1.73 M^2
GLUCOSE SERPL-MCNC: 108 MG/DL (ref 70–110)
HCT VFR BLD AUTO: 36.6 % (ref 37–48.5)
HGB BLD-MCNC: 11.5 G/DL (ref 12–16)
IMM GRANULOCYTES # BLD AUTO: 0.02 K/UL (ref 0–0.04)
IMM GRANULOCYTES NFR BLD AUTO: 0.4 % (ref 0–0.5)
LYMPHOCYTES # BLD AUTO: 1.8 K/UL (ref 1–4.8)
LYMPHOCYTES NFR BLD: 39.3 % (ref 18–48)
MCH RBC QN AUTO: 26.5 PG (ref 27–31)
MCHC RBC AUTO-ENTMCNC: 31.4 G/DL (ref 32–36)
MCV RBC AUTO: 84 FL (ref 82–98)
MONOCYTES # BLD AUTO: 0.6 K/UL (ref 0.3–1)
MONOCYTES NFR BLD: 13.2 % (ref 4–15)
NEUTROPHILS # BLD AUTO: 2 K/UL (ref 1.8–7.7)
NEUTROPHILS NFR BLD: 43 % (ref 38–73)
NRBC BLD-RTO: 0 /100 WBC
PLATELET # BLD AUTO: 266 K/UL (ref 150–450)
PMV BLD AUTO: 11.8 FL (ref 9.2–12.9)
POTASSIUM SERPL-SCNC: 3.4 MMOL/L (ref 3.5–5.1)
PROT SERPL-MCNC: 6.3 G/DL (ref 6–8.4)
RBC # BLD AUTO: 4.34 M/UL (ref 4–5.4)
SODIUM SERPL-SCNC: 141 MMOL/L (ref 136–145)
WBC # BLD AUTO: 4.61 K/UL (ref 3.9–12.7)

## 2023-05-28 PROCEDURE — 80053 COMPREHEN METABOLIC PANEL: CPT | Performed by: FAMILY MEDICINE

## 2023-05-28 PROCEDURE — 96376 TX/PRO/DX INJ SAME DRUG ADON: CPT

## 2023-05-28 PROCEDURE — G0378 HOSPITAL OBSERVATION PER HR: HCPCS

## 2023-05-28 PROCEDURE — 85025 COMPLETE CBC W/AUTO DIFF WBC: CPT | Performed by: FAMILY MEDICINE

## 2023-05-28 PROCEDURE — 25000003 PHARM REV CODE 250: Performed by: FAMILY MEDICINE

## 2023-05-28 PROCEDURE — 36415 COLL VENOUS BLD VENIPUNCTURE: CPT | Performed by: FAMILY MEDICINE

## 2023-05-28 PROCEDURE — 63600175 PHARM REV CODE 636 W HCPCS: Performed by: INTERNAL MEDICINE

## 2023-05-28 RX ORDER — FUROSEMIDE 10 MG/ML
60 INJECTION INTRAMUSCULAR; INTRAVENOUS ONCE
Status: COMPLETED | OUTPATIENT
Start: 2023-05-28 | End: 2023-05-28

## 2023-05-28 RX ORDER — FUROSEMIDE 40 MG/1
40 TABLET ORAL DAILY
Start: 2023-05-28 | End: 2023-06-01 | Stop reason: SDUPTHER

## 2023-05-28 RX ADMIN — POLYETHYLENE GLYCOL 3350 17 G: 17 POWDER, FOR SOLUTION ORAL at 08:05

## 2023-05-28 RX ADMIN — CYANOCOBALAMIN TAB 1000 MCG 5000 MCG: 1000 TAB at 08:05

## 2023-05-28 RX ADMIN — GABAPENTIN 800 MG: 300 CAPSULE ORAL at 08:05

## 2023-05-28 RX ADMIN — MAGNESIUM 64 MG (MAGNESIUM CHLORIDE) TABLET,DELAYED RELEASE 64 MG: at 08:05

## 2023-05-28 RX ADMIN — Medication 6000 UNITS: at 08:05

## 2023-05-28 RX ADMIN — LEVOTHYROXINE SODIUM 125 MCG: 0.1 TABLET ORAL at 05:05

## 2023-05-28 RX ADMIN — DULOXETINE HYDROCHLORIDE 60 MG: 30 CAPSULE, DELAYED RELEASE ORAL at 08:05

## 2023-05-28 RX ADMIN — POTASSIUM CHLORIDE 20 MEQ: 1500 TABLET, EXTENDED RELEASE ORAL at 08:05

## 2023-05-28 RX ADMIN — CLINDAMYCIN HYDROCHLORIDE 300 MG: 150 CAPSULE ORAL at 08:05

## 2023-05-28 RX ADMIN — FUROSEMIDE 60 MG: 10 INJECTION, SOLUTION INTRAMUSCULAR; INTRAVENOUS at 08:05

## 2023-05-28 RX ADMIN — LOSARTAN POTASSIUM 50 MG: 50 TABLET, FILM COATED ORAL at 08:05

## 2023-05-28 RX ADMIN — PANTOPRAZOLE SODIUM 40 MG: 40 TABLET, DELAYED RELEASE ORAL at 08:05

## 2023-05-28 NOTE — DISCHARGE SUMMARY
"On license of [facility] Medicine  Discharge Summary      Patient Name: Marisela Eagle  MRN: [id number]  RADHA: [id number]  Patient Class: OP- Observation  Admission Date: 5/27/2023  Hospital Length of Stay: 0 days  Discharge Date and Time: 5/28/2023 11:00 AM  Attending Physician: No att. providers found   Discharging Provider: Cam Beck MD  Primary Care Provider: Pawel Badillo III, MD    Primary Care Team: Networked reference to record PCT     HPI:   HPI per ED:   "Patient presents emergency department reported bilateral lower extremity swelling worsened with associated shortness of breath blood pressure elevated this morning with a systolic pressure 205 she reports she saw her primary physician and orthopedist yesterday they were concerned about the possibility of a blood clot in the right lower extremity were ordered able to obtain an ultrasound he did do blood work and a chest x-ray performed at that time she does report some or stasis dyspnea on exertion no significant chest pain is reported she does report palpitations we have noted some ectopy on our monitor patient's family reports that she is not terribly compliant with the Lasix that she is supposed to take twice a week she did take Lasix last night and this morning patient reports they did place her on antibiotics with the redness in her right lower extremity"    Saw patient in ED for further evaluation. States that she started having left-sided chest pain this morning and noted that her blood pressure was elevated. Also started to suffer from shortness of breath as well. Took 100mg of losartan (instead of th 50mg as prescribed) to alleviate this symptom. Right now symptoms have improved. Also suffering from swelling of both legs. Admits that she doesn't always take her lasix as prescribed. Also started taking clindamycin yesterday for cellulitis of her right leg.       * No surgery found *      Hospital Course:   Pt got admitted with a cute " on chronic diastolic/systolic heart failure and was treated with iv lasix  Pt is on PRN basis PO lasix at home and was instructed to take this on everyday basis until seen by Cardiologist in Clinic  Pt gained 6 pounds after the cruise trip(Education was provided )  Pts condition came back to baseline and was later discharged back to home        Goals of Care Treatment Preferences:  Code Status: Full Code      Consults:     No new Assessment & Plan notes have been filed under this hospital service since the last note was generated.  Service: Hospital Medicine    Final Active Diagnoses:    Diagnosis Date Noted POA    Cellulitis of right lower extremity [L03.115] 05/27/2023 Yes    Back pain of lumbar region with sciatica [M54.40] 10/20/2021 Yes    Anxiety [F41.9] 07/20/2021 Yes    HFrEF (heart failure with reduced ejection fraction) [I50.20] 10/28/2020 Yes    Hyperlipidemia [E78.5] 08/20/2018 Yes    Hypertension, essential [I10] 03/27/2012 Yes    Hypothyroid [E03.9] 03/27/2012 Yes      Problems Resolved During this Admission:    Diagnosis Date Noted Date Resolved POA    PRINCIPAL PROBLEM:  Chest pain in adult [R07.9] 05/27/2023 05/28/2023 Yes       Discharged Condition: good    Disposition: Home or Self Care    Follow Up:   Follow-up Information     Jonah Chavez MD Follow up in 1 week(s).    Specialties: Cardiology, Cardiovascular Disease  Contact information:  [address] [place] [address]  [address]  [facility] [address]  [phone]                       Patient Instructions:   No discharge procedures on file.    Significant Diagnostic Studies: Labs:   CMP   Recent Labs   Lab 05/27/23  1146 05/28/23  0744    141   K 3.8 3.4*    105   CO2 26 29    108   BUN 12 13   CREATININE 0.6 0.6   CALCIUM 9.1 8.9   PROT 6.8 6.3   ALBUMIN 4.0 3.9   BILITOT 0.8 0.6   ALKPHOS 45* 41*   AST 26 25   ALT 25 24   ANIONGAP 6* 7*    and CBC   Recent Labs   Lab 05/27/23  1146 05/28/23  0744   WBC 4.94 4.61   HGB 11.5* 11.5*    HCT 36.0* 36.6*    266       Pending Diagnostic Studies:     None         Medications:  Reconciled Home Medications:      Medication List      CHANGE how you take these medications    fluconazole 150 MG Tab  Commonly known as: DIFLUCAN  Take 1 tablet (150 mg total) by mouth once daily.  What changed: additional instructions     furosemide 40 MG tablet  Commonly known as: LASIX  Take 1 tablet (40 mg total) by mouth once daily. Take daily  What changed: additional instructions     levothyroxine 125 MCG tablet  Commonly known as: SYNTHROID  TAKE 1 TABLET EVERY DAY BEFORE BREAKFAST  Strength: 125 mcg  What changed:   · how much to take  · how to take this  · when to take this     omeprazole 40 MG capsule  Commonly known as: PRILOSEC  TAKE 1 CAPSULE EVERY DAY  What changed: when to take this     triamcinolone acetonide 0.1% 0.1 % ointment  Commonly known as: KENALOG  Apply topically 2 (two) times daily.  What changed: how much to take        CONTINUE taking these medications    cholecalciferol (vitamin D3) 125 mcg (5,000 unit) capsule  Take 5,000 Units by mouth once daily.     clindamycin 300 MG capsule  Commonly known as: CLEOCIN  Take 1 capsule (300 mg total) by mouth 4 (four) times daily.     cyanocobalamin 1000 MCG tablet  Commonly known as: VITAMIN B-12  Take 5,000 mcg by mouth once daily.     DULoxetine 60 MG capsule  Commonly known as: CYMBALTA  Take 60 mg by mouth once daily.     famotidine 20 MG tablet  Commonly known as: PEPCID  Take 20 mg by mouth as needed.     gabapentin 800 MG tablet  Commonly known as: NEURONTIN  Take 800 mg by mouth 3 (three) times daily.     LORazepam 1 MG tablet  Commonly known as: ATIVAN  Take 1 mg by mouth every evening.     losartan 100 MG tablet  Commonly known as: COZAAR  Take 0.5 tablets (50 mg total) by mouth once daily.     magnesium 30 mg Tab  Take 1 tablet by mouth once daily.     metoprolol succinate 50 MG 24 hr tablet  Commonly known as: TOPROL-XL  Take 0.5 tablets  (25 mg total) by mouth every evening.     * oxyCODONE-acetaminophen  mg per tablet  Commonly known as: PERCOCET  Take 1 tablet by mouth every 6 (six) hours as needed.     * oxyCODONE-acetaminophen  mg per tablet  Commonly known as: PERCOCET  Take 1 tablet by mouth every 8 (eight) hours as needed for Pain.     potassium chloride SA 20 MEQ tablet  Commonly known as: K-DUR,KLOR-CON  TAKE 1 TABLET TWICE DAILY     PRESERVISION AREDS-2 ORAL  Take 1 capsule by mouth once daily.     turmeric 400 mg Cap  Take 400 mg by mouth once daily.         * This list has 2 medication(s) that are the same as other medications prescribed for you. Read the directions carefully, and ask your doctor or other care provider to review them with you.            STOP taking these medications    methylPREDNISolone 4 mg tablet  Commonly known as: MEDROL DOSEPACK            Indwelling Lines/Drains at time of discharge:   Lines/Drains/Airways     None               Physical Exam   Cardiovascular: Normal rate.   Neurological: She is alert.     Time spent on the discharge of patient: 25 minutes         Cam Beck MD  Department of Hospital Medicine  [facility]

## 2023-05-28 NOTE — PLAN OF CARE
05/28/23 0931   Final Note   Assessment Type Final Discharge Note   Anticipated Discharge Disposition Home   What phone number can be called within the next 1-3 days to see how you are doing after discharge? [phone]   Post-Acute Status   Discharge Delays None known at this time     Patient cleared for discharge from case management standpoint.  Chart and discharge orders reviewed.  Patient discharged home with no further case management needs.

## 2023-05-28 NOTE — PLAN OF CARE
05/28/23 0906   ZALDIVAR Message   Medicare Outpatient and Observation Notification regarding financial responsibility Given to patient/caregiver;Explained to patient/caregiver;Signed/date by patient/caregiver   Date ZALDIVAR was signed 05/28/23   Time ZALDIVAR was signed 0839

## 2023-05-28 NOTE — HOSPITAL COURSE
Pt got admitted with a cute on chronic diastolic/systolic heart failure and was treated with iv lasix  Pt is on PRN basis PO lasix at home and was instructed to take this on everyday basis until seen by Cardiologist in Clinic  Pt gained 6 pounds after the cruise trip(Education was provided )  Pts condition came back to baseline and was later discharged back to home

## 2023-05-28 NOTE — NURSING
Nurses Note -- 4 Eyes      5/28/2023   5:34 AM      Skin assessed during: Admit      [] No Altered Skin Integrity Present    []Prevention Measures Documented      [x] Yes- Altered Skin Integrity Present or Discovered   [] LDA Added if Not in Epic (Describe Wound)   [x] New Altered Skin Integrity was Present on Admit and Documented in LDA   [] Wound Image Taken    Wound Care Consulted? No    Attending Nurse:  Paulette Mcrae RN     Second RN/Staff Member:  [id number]

## 2023-05-28 NOTE — PLAN OF CARE
[facility]  Initial Discharge Assessment       Primary Care Provider: Pawel Badillo III, MD    Admission Diagnosis: Congestive heart failure, unspecified HF chronicity, unspecified heart failure type [I50.9]    Admission Date: 5/27/2023  Expected Discharge Date:     Face-to-face assessment completed at the patient's bedside. Verified information on the facing sheet. Pt lives with ; Pt had 2 hip replacement and 2 knee replace with requires her to use a cane.. Upon discharged Pt's daughter (Ignacia EagleNlprnba-[phone]) will pick her up. The Pt is alert and oriented to person, place, and time. Pt does not use coumadin and not on dialysis. Pt does not require assistance with dressing, or bathing.  CM will continue to monitor.    Transition of Care Barriers: None    Payor: HU[employer] MANAGED MEDICARE / Plan: HU[employer] MEDICARE HMO / Product Type: Capitation /     Extended Emergency Contact Information  Primary Emergency Contact: Ignacia Eagle  Home Phone: [phone]  Mobile Phone: [phone]  Relation: Daughter   needed? No  Secondary Emergency Contact: Judah Eagle  Home Phone: [phone]  Mobile Phone: [phone]  Relation: Son    Discharge Plan A: Home with family  Discharge Plan B: Home with family      [id number] - [place] - [address]  [address] [address]  Phone: [phone] Fax: [phone]    [facility] - [facility] [address]  [address]  [facility] [address]  Phone: [phone] Fax: [phone]      Initial Assessment (most recent)       Adult Discharge Assessment - 05/28/23 0907          Discharge Assessment    Assessment Type Discharge Planning Assessment     Confirmed/corrected address, phone number and insurance Yes     Confirmed Demographics Correct on Facesheet     Source of Information patient     When was your last doctors appointment? 05/26/23     Does patient/caregiver  understand observation status Yes     Communicated SANDOR with patient/caregiver Date not available/Unable to determine     Reason For Admission Chest pain in adult     Facility Arrived From: Home     Do you expect to return to your current living situation? Yes     Do you have help at home or someone to help you manage your care at home? No     Prior to hospitilization cognitive status: Alert/Oriented     Current cognitive status: Alert/Oriented     Walking or Climbing Stairs ambulation difficulty, requires equipment     Equipment Currently Used at Home cane, straight     Readmission within 30 days? No     Patient currently being followed by outpatient case management? No     Do you currently have service(s) that help you manage your care at home? No     Do you take prescription medications? Yes     Do you have prescription coverage? Yes     Coverage Payor:  HU[employer] MANAGED MEDICARE - [employer] MEDICARE HMO     Do you have any problems affording any of your prescribed medications? No     Is the patient taking medications as prescribed? yes     Who is going to help you get home at discharge? Ignacia Eagle/ Daughter [phone]     How do you get to doctors appointments? car, drives self     Are you on dialysis? No     Do you take coumadin? No     Discharge Plan A Home with family     Discharge Plan B Home with family     DME Needed Upon Discharge  none     Discharge Plan discussed with: Patient     Transition of Care Barriers None

## 2023-05-29 PROBLEM — E66.01 SEVERE OBESITY (BMI 35.0-39.9) WITH COMORBIDITY: Status: ACTIVE | Noted: 2023-05-29

## 2023-05-29 NOTE — TELEPHONE ENCOUNTER
RN advised patient.  Tried to call patient to get scheduled for hospital follow up visit.  No answer and no voice mail avilable

## 2023-05-30 ENCOUNTER — TELEPHONE (OUTPATIENT)
Dept: CARDIOLOGY | Facility: CLINIC | Age: 78
End: 2023-05-30
Payer: MEDICARE

## 2023-05-30 NOTE — TELEPHONE ENCOUNTER
----- Message from Alberto Noble sent at 5/30/2023 10:33 AM CDT -----  Type:  Sooner Appointment Request    Caller is requesting a sooner appointment.  Caller declined first available appointment listed below.  Caller will not accept being placed on the waitlist and is requesting a message be sent to doctor.    Name of Caller:  pt  When is the first available appointment?  7/17--said she need to be seen sooner--please call and advise  Symptoms:  hospital f/u dc 5/28 CHF need appt ASAP  Best Call Back Number:  [phone] (home)     Additional Information:  thank you

## 2023-05-30 NOTE — PROGRESS NOTES
Subjective:       Patient ID: Marisela Eagle is a 78 y.o. female.    Chief Complaint: Leg Pain    Right leg swelling.  Cellulitis on cruise in March.  Bruising in the right arch 1st.  Pain there.  Shot in the left hip by pain doctor.  Also injected the right foot.  Saw Podiatry x-ray was negative for fracture.  Has venous stasis.  Slightly reddened swollen lower leg no fever chills.  Uric acid normal 5.4.  Right ankle and lower leg painful.  Left great toe pain at times.  Has neuropathy.  Uses Lasix only p.r.n..  Heart failure reduced ejection fraction doing okay.  Slight cough.  No PND orthopnea.  Rarely uses her Lasix but is using her potassium daily.      Physical examination.  Vital signs noted.  No acute distress.  Chest clear.  Heart regular rate rhythm.  Abdomen bowel sounds positive soft and nontender.  2+ pedal edema erythema of the lower leg right more so than the left.      Objective:        Assessment:       1. HFrEF (heart failure with reduced ejection fraction)    2. Cellulitis of right lower extremity    3. Pedal edema    4. Venous stasis    5. Severe obesity (BMI 35.0-39.9) with comorbidity        Plan:       HFrEF (heart failure with reduced ejection fraction)  -     BNP; Future; Expected date: 05/26/2023    Cellulitis of right lower extremity  -     Segmental Pressure Lower Extremity; Future; Expected date: 05/26/2023    Pedal edema  -     Segmental Pressure Lower Extremity; Future; Expected date: 05/26/2023  -     Basic Metabolic Panel; Future; Expected date: 05/26/2023    Venous stasis  -     Segmental Pressure Lower Extremity; Future; Expected date: 05/26/2023    Severe obesity (BMI 35.0-39.9) with comorbidity    Other orders  -     Discontinue: LORazepam (ATIVAN) 1 MG tablet; TAKE 1 TABLET BY MOUTH ONCE DAILY AT BEDTIME AS NEEDED FOR ANXIETY  Dispense: 30 tablet; Refill: 0  -     clindamycin (CLEOCIN) 300 MG capsule; Take 1 capsule (300 mg total) by mouth 4 (four) times daily.  Dispense: 40  capsule; Refill: 0  -     fluconazole (DIFLUCAN) 150 MG Tab; Take 1 tablet (150 mg total) by mouth once daily.  Dispense: 2 tablet; Refill: 0    Check ABIs.  Get a BMP and a BNP.  Use her Lasix and potassium daily for the next few days.  Follow-up in 7 days.  Clindamycin 300 mg q.i.d. for 10 days.  Have x-ray done as ordered by Orthopedics.

## 2023-05-31 ENCOUNTER — TELEPHONE (OUTPATIENT)
Dept: CARDIOLOGY | Facility: CLINIC | Age: 78
End: 2023-05-31
Payer: MEDICARE

## 2023-05-31 NOTE — TELEPHONE ENCOUNTER
----- Message from Enoch Stinson sent at 5/31/2023  9:48 AM CDT -----  Regarding: Return Call  Contact: Patient  Type:  Patient Returning Call    Who Called:Patient  Who Left Message for Patient:office staff  Does the patient know what this is regarding?:sooner appointment/Hospital f/u with high BP  Would the patient rather a call back or a response via [facility]? call  Best Call Back Number:[phone]  Additional Information: Please call patient to schedule

## 2023-06-01 ENCOUNTER — OFFICE VISIT (OUTPATIENT)
Dept: CARDIOLOGY | Facility: CLINIC | Age: 78
End: 2023-06-01
Payer: MEDICARE

## 2023-06-01 VITALS
WEIGHT: 235 LBS | BODY MASS INDEX: 34.8 KG/M2 | OXYGEN SATURATION: 97 % | DIASTOLIC BLOOD PRESSURE: 88 MMHG | SYSTOLIC BLOOD PRESSURE: 130 MMHG | HEIGHT: 69 IN | HEART RATE: 72 BPM

## 2023-06-01 DIAGNOSIS — I70.0 ATHEROSCLEROSIS OF AORTA: ICD-10-CM

## 2023-06-01 DIAGNOSIS — L03.115 CELLULITIS OF RIGHT LOWER EXTREMITY: ICD-10-CM

## 2023-06-01 DIAGNOSIS — E78.2 MIXED HYPERLIPIDEMIA: ICD-10-CM

## 2023-06-01 DIAGNOSIS — I10 HYPERTENSION, ESSENTIAL: ICD-10-CM

## 2023-06-01 DIAGNOSIS — I50.32 CHRONIC HEART FAILURE WITH PRESERVED EJECTION FRACTION: ICD-10-CM

## 2023-06-01 DIAGNOSIS — I50.9 CONGESTIVE HEART FAILURE, UNSPECIFIED HF CHRONICITY, UNSPECIFIED HEART FAILURE TYPE: Primary | ICD-10-CM

## 2023-06-01 DIAGNOSIS — R60.0 LOCALIZED EDEMA: ICD-10-CM

## 2023-06-01 PROBLEM — I50.30 (HFPEF) HEART FAILURE WITH PRESERVED EJECTION FRACTION: Status: ACTIVE | Noted: 2020-10-28

## 2023-06-01 PROCEDURE — 3079F PR MOST RECENT DIASTOLIC BLOOD PRESSURE 80-89 MM HG: ICD-10-PCS | Mod: CPTII,S$GLB,, | Performed by: NURSE PRACTITIONER

## 2023-06-01 PROCEDURE — 1126F PR PAIN SEVERITY QUANTIFIED, NO PAIN PRESENT: ICD-10-PCS | Mod: CPTII,S$GLB,, | Performed by: NURSE PRACTITIONER

## 2023-06-01 PROCEDURE — 99215 PR OFFICE/OUTPT VISIT, EST, LEVL V, 40-54 MIN: ICD-10-PCS | Mod: S$GLB,,, | Performed by: NURSE PRACTITIONER

## 2023-06-01 PROCEDURE — 1160F PR REVIEW ALL MEDS BY PRESCRIBER/CLIN PHARMACIST DOCUMENTED: ICD-10-PCS | Mod: CPTII,S$GLB,, | Performed by: NURSE PRACTITIONER

## 2023-06-01 PROCEDURE — 99215 OFFICE O/P EST HI 40 MIN: CPT | Mod: S$GLB,,, | Performed by: NURSE PRACTITIONER

## 2023-06-01 PROCEDURE — 3288F PR FALLS RISK ASSESSMENT DOCUMENTED: ICD-10-PCS | Mod: CPTII,S$GLB,, | Performed by: NURSE PRACTITIONER

## 2023-06-01 PROCEDURE — 99999 PR PBB SHADOW E&M-EST. PATIENT-LVL IV: CPT | Mod: PBBFAC,,, | Performed by: NURSE PRACTITIONER

## 2023-06-01 PROCEDURE — 1159F PR MEDICATION LIST DOCUMENTED IN MEDICAL RECORD: ICD-10-PCS | Mod: CPTII,S$GLB,, | Performed by: NURSE PRACTITIONER

## 2023-06-01 PROCEDURE — 3079F DIAST BP 80-89 MM HG: CPT | Mod: CPTII,S$GLB,, | Performed by: NURSE PRACTITIONER

## 2023-06-01 PROCEDURE — 1126F AMNT PAIN NOTED NONE PRSNT: CPT | Mod: CPTII,S$GLB,, | Performed by: NURSE PRACTITIONER

## 2023-06-01 PROCEDURE — 1160F RVW MEDS BY RX/DR IN RCRD: CPT | Mod: CPTII,S$GLB,, | Performed by: NURSE PRACTITIONER

## 2023-06-01 PROCEDURE — 1159F MED LIST DOCD IN RCRD: CPT | Mod: CPTII,S$GLB,, | Performed by: NURSE PRACTITIONER

## 2023-06-01 PROCEDURE — 3288F FALL RISK ASSESSMENT DOCD: CPT | Mod: CPTII,S$GLB,, | Performed by: NURSE PRACTITIONER

## 2023-06-01 PROCEDURE — 1101F PT FALLS ASSESS-DOCD LE1/YR: CPT | Mod: CPTII,S$GLB,, | Performed by: NURSE PRACTITIONER

## 2023-06-01 PROCEDURE — 3075F SYST BP GE 130 - 139MM HG: CPT | Mod: CPTII,S$GLB,, | Performed by: NURSE PRACTITIONER

## 2023-06-01 PROCEDURE — 99999 PR PBB SHADOW E&M-EST. PATIENT-LVL IV: ICD-10-PCS | Mod: PBBFAC,,, | Performed by: NURSE PRACTITIONER

## 2023-06-01 PROCEDURE — 3075F PR MOST RECENT SYSTOLIC BLOOD PRESS GE 130-139MM HG: ICD-10-PCS | Mod: CPTII,S$GLB,, | Performed by: NURSE PRACTITIONER

## 2023-06-01 PROCEDURE — 1101F PR PT FALLS ASSESS DOC 0-1 FALLS W/OUT INJ PAST YR: ICD-10-PCS | Mod: CPTII,S$GLB,, | Performed by: NURSE PRACTITIONER

## 2023-06-01 RX ORDER — FUROSEMIDE 40 MG/1
40 TABLET ORAL DAILY
Qty: 90 TABLET | Refills: 1
Start: 2023-06-01 | End: 2023-07-31

## 2023-06-01 RX ORDER — METOPROLOL SUCCINATE 50 MG/1
25 TABLET, EXTENDED RELEASE ORAL NIGHTLY
Qty: 90 TABLET | Refills: 1 | Status: SHIPPED | OUTPATIENT
Start: 2023-06-01 | End: 2023-10-23 | Stop reason: SDUPTHER

## 2023-06-01 RX ORDER — LOSARTAN POTASSIUM 100 MG/1
50 TABLET ORAL 2 TIMES DAILY
Qty: 180 TABLET | Refills: 1 | Status: SHIPPED | OUTPATIENT
Start: 2023-06-01 | End: 2024-03-31

## 2023-06-01 RX ORDER — SPIRONOLACTONE 25 MG/1
25 TABLET ORAL DAILY
Qty: 30 TABLET | Refills: 11 | Status: SHIPPED | OUTPATIENT
Start: 2023-06-01 | End: 2023-10-23 | Stop reason: SDUPTHER

## 2023-06-01 NOTE — ASSESSMENT & PLAN NOTE
Echo done   12/13/22      The left ventricle is normal in size with low normal systolic function.   The estimated ejection fraction is 50%.   Indeterminate left ventricular diastolic function with elevated left atrial pressure.   Atrial fibrillation not observed.   Normal right ventricular size with normal right ventricular systolic function.   Mild tricuspid regurgitation.   Normal central venous pressure (3 mmHg).   The estimated PA systolic pressure is 30 mmHg.    Added Aldactone 25 mg daily.  Continue losartan, metoprolol, and Lasix as directed.  Daily weight recommended  Low-sodium diet recommended  1.5-2 L fluid restriction recommended  BNP and echocardiogram ordered

## 2023-06-01 NOTE — ASSESSMENT & PLAN NOTE
Patient has localized edema in signs of decreased circulation to bilateral lower extremities.  She was an ex-smoker.  I have ordered an BABATUNDE to evaluate.

## 2023-06-01 NOTE — ASSESSMENT & PLAN NOTE
Blood pressure is 130/88 mm Hg today in the office.  Continue Lasix 40 mg daily, losartan 50 mg p.o. b.i.d., metoprolol 25 mg p.o. q.h.s.,  Added spironolactone 25 mg daily  Advise low-sodium diet

## 2023-06-01 NOTE — PROGRESS NOTES
Subjective:    Patient ID:  Marisela Eagle is a 78 y.o. female patient here for evaluation Hospital Follow Up, Edema, and Shortness of Breath    History of Present Illness:  Hospital follow up today    [facility]  Hospital Course:   Pt got admitted with acute on chronic diastolic/systolic heart failure and was treated with iv lasix  Pt is on PRN basis PO lasix at home and was instructed to take this on everyday basis until seen by Cardiologist in Clinic  Pt gained 6 pounds after the cruise trip(Education was provided )  Pts condition came back to baseline and was later discharged back to home       No coughing or chest pain  +JARA and some minimal palpitations  Using cane, no recent falls  Has some persistent swelling in legs  Discoloration bilateral lower extremities  Pain bilateral lower extremities from knees down  Campbell hip and campbell knee replacement        Most Recent Echocardiogram Results  Results for orders placed during the hospital encounter of 12/13/22    Echo    Interpretation Summary  · The left ventricle is normal in size with low normal systolic function.  · The estimated ejection fraction is 50%.  · Indeterminate left ventricular diastolic function with elevated left atrial pressure.  · Atrial fibrillation not observed.  · Normal right ventricular size with normal right ventricular systolic function.  · Mild tricuspid regurgitation.  · Normal central venous pressure (3 mmHg).  · The estimated PA systolic pressure is 30 mmHg.      Most Recent Nuclear Stress Test Results  Results for orders placed in visit on 02/21/22    Nuclear Stress Test    Interpretation Summary    PET CT Findings The nuclear portion of this study will be reported separately.    The EKG portion of this study is negative for ischemia.    The patient reported no chest pain during the stress test.    There were no arrhythmias during stress.      Most Recent Cardiac PET Stress Test Results  No results found for this or  any previous visit.      Most Recent Cardiovascular Angiogram results  No results found for this or any previous visit.      Other Most Recent Cardiology Results  Results for orders placed during the hospital encounter of 23    CARDIAC MONITORING STRIPS      REVIEW OF SYSTEMS: As noted in HPI       Past Medical History:   Diagnosis Date    Bilateral knee pain 2012    left worse, right knee painful even after partial replacement.    Bruises easily     Clot ?    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 Took Coumadin x 6 weeks    KNEE ARTHROSCOPY  2010    right    TONSILLECTOMY       Social History     Tobacco Use    Smoking status: Former     Types: Cigarettes     Quit date: 3/23/2012     Years since quittin.1    Smokeless tobacco: Never   Substance Use Topics    Alcohol use: Yes     Comment: occasional    Drug use: No         Objective      Vitals:    23 1314   BP: 130/88   Pulse: 72       LAST EKG  Results for orders placed or performed during the hospital encounter of 23   EKG 12-lead    Collection Time: 23 10:54 AM    Narrative    Test Reason : R06.02,    Vent. Rate : 079 BPM     Atrial Rate : 079  BPM     P-R Int : 220 ms          QRS Dur : 104 ms      QT Int : 422 ms       P-R-T Axes : 044 -20 089 degrees     QTc Int : 483 ms    Sinus rhythm with marked sinus arrythmia with 1st degree A-V block  Incomplete right bundle branch block  Septal infarct ,age undetermined  Abnormal ECG  When compared with ECG of 04-JAN-2022 14:16,  Incomplete right bundle branch block is now Present  Septal infarct is now Present  Confirmed by Angela VVIAS, Farrukh SU ([id number]) on 5/29/2023 9:28:33 PM    Referred By: [email or web address]   SELF           Confirmed By:Farrukh Miller MD     LIPIDS - LAST 2   Lab Results   Component Value Date    CHOL 241 (H) 01/10/2023    CHOL 186 10/29/2021    HDL 77 (H) 01/10/2023    HDL 77 (H) 10/29/2021    LDLCALC 147.8 01/10/2023    LDLCALC 101.2 10/29/2021    TRIG 81 01/10/2023    TRIG 39 10/29/2021    CHOLHDL 32.0 01/10/2023    CHOLHDL 41.4 10/29/2021     CARDIAC PROFILE - LAST 2  Lab Results   Component Value Date     (H) 05/27/2023     (H) 05/26/2023     08/10/2022     (H) 01/04/2022    CPKMB 6.9 (H) 01/04/2022    CPKMB 1.9 09/19/2019    TROPONINI <0.030 01/04/2022    TROPONINI 0.038 01/31/2021      CBC - LAST 2  Lab Results   Component Value Date    WBC 4.61 05/28/2023    WBC 4.94 05/27/2023    RBC 4.34 05/28/2023    RBC 4.28 05/27/2023    HGB 11.5 (L) 05/28/2023    HGB 11.5 (L) 05/27/2023    HCT 36.6 (L) 05/28/2023    HCT 36.0 (L) 05/27/2023     05/28/2023     05/27/2023     Lab Results   Component Value Date    LABPT 13.8 09/17/2019    INR 1.1 09/17/2019    APTT 23.0 (L) 09/17/2019     CHEMISTRY - LAST 2  Lab Results   Component Value Date     05/28/2023     05/27/2023    K 3.4 (L) 05/28/2023    K 3.8 05/27/2023     05/28/2023     05/27/2023    CO2 29 05/28/2023    CO2 26 05/27/2023    ANIONGAP 7 (L) 05/28/2023    ANIONGAP 6 (L) 05/27/2023    BUN 13 05/28/2023    BUN 12 05/27/2023    CREATININE 0.6 05/28/2023    CREATININE 0.6 05/27/2023      05/28/2023     05/27/2023    CALCIUM 8.9 05/28/2023    CALCIUM 9.1 05/27/2023    MG 2.0 05/27/2023    MG 1.9 02/02/2021    ALBUMIN 3.9 05/28/2023    ALBUMIN 4.0 05/27/2023    PROT 6.3 05/28/2023    PROT 6.8 05/27/2023    ALKPHOS 41 (L) 05/28/2023    ALKPHOS 45 (L) 05/27/2023    ALT 24 05/28/2023    ALT 25 05/27/2023    AST 25 05/28/2023    AST 26 05/27/2023    BILITOT 0.6 05/28/2023    BILITOT 0.8 05/27/2023      ENDOCRINE - LAST 2  Lab Results   Component Value Date    HGBA1C 5.2 02/24/2021    HGBA1C 5.4 07/03/2019    TSH 1.741 04/03/2023    TSH 3.040 01/10/2023        PHYSICAL EXAM  CONSTITUTIONAL: Well built, well nourished breathing comfortably on room air in no apparent distress  NECK: no carotid bruit, no JVD  LUNGS: CTA  CHEST WALL: no tenderness  HEART: regular rate and rhythm, S1, S2 normal, no murmur, click, rub or gallop   ABDOMEN: soft, obese, non-tender; bowel sounds normal; no masses,  no organomegaly  EXTREMITIES:  no calf tenderness noted, mild purple discoloration to bilateral lower extremities below-the-knee.  Mild nonpitting edema to lower extremities below-the-knee.  NEURO: AAO X 3    I HAVE REVIEWED :    The vital signs, most recent cardiac testing, and most recent pertinent non-cardiology provider notes.    Current Outpatient Medications   Medication Instructions    cholecalciferol (vitamin D3) 5,000 Units, Oral, Daily    clindamycin (CLEOCIN) 300 mg, Oral, 4 times daily    cyanocobalamin (VITAMIN B-12) 5,000 mcg, Oral, Daily    DULoxetine (CYMBALTA) 60 mg, Oral, Daily    famotidine (PEPCID) 20 mg, Oral, As needed (PRN)    fluconazole (DIFLUCAN) 150 mg, Oral, Daily    furosemide (LASIX) 40 mg, Oral, Daily, Take daily    gabapentin (NEURONTIN) 800 mg, Oral, 3 times daily    levothyroxine (SYNTHROID) 125 MCG tablet TAKE 1 TABLET EVERY DAY BEFORE BREAKFAST<BR>Strength: 125 mcg    LORazepam (ATIVAN) 1 mg, Oral, Nightly    losartan (COZAAR) 50 mg, Oral, 2 times daily    magnesium  30 mg Tab 1 tablet, Oral, Daily    metoprolol succinate (TOPROL-XL) 25 mg, Oral, Nightly    omeprazole (PRILOSEC) 40 MG capsule TAKE 1 CAPSULE EVERY DAY    oxyCODONE-acetaminophen (PERCOCET)  mg per tablet 1 tablet, Oral, Every 6 hours PRN    oxyCODONE-acetaminophen (PERCOCET)  mg per tablet 1 tablet, Oral, Every 8 hours PRN    potassium chloride SA (K-DUR,KLOR-CON) 20 MEQ tablet TAKE 1 TABLET TWICE DAILY    spironolactone (ALDACTONE) 25 mg, Oral, Daily    triamcinolone acetonide 0.1% (KENALOG) 0.1 % ointment Topical (Top), 2 times daily    turmeric 400 mg, Oral, Daily    vit C/E/Zn/coppr/lutein/zeaxan (PRESERVISION AREDS-2 ORAL) 1 capsule, Oral, Daily      Assessment & Plan     Hypertension, essential  Blood pressure is 130/88 mm Hg today in the office.  Continue Lasix 40 mg daily, losartan 50 mg p.o. b.i.d., metoprolol 25 mg p.o. q.h.s.,  Added spironolactone 25 mg daily  Advise low-sodium diet    Hyperlipidemia  LDL was 147  Zetia 10 mg daily prescribed  Heart healthy diet recommended    (HFpEF) heart failure with preserved ejection fraction  Echo done   12/13/22     The left ventricle is normal in size with low normal systolic function.  The estimated ejection fraction is 50%.  Indeterminate left ventricular diastolic function with elevated left atrial pressure.  Atrial fibrillation not observed.  Normal right ventricular size with normal right ventricular systolic function.  Mild tricuspid regurgitation.  Normal central venous pressure (3 mmHg).  The estimated PA systolic pressure is 30 mmHg.    Added Aldactone 25 mg daily.  Continue losartan, metoprolol, and Lasix as directed.  Daily weight recommended  Low-sodium diet recommended  1.5-2 L fluid restriction recommended  BNP and echocardiogram ordered    Atherosclerosis of aorta  LDL was 147.  Prescribed Zetia 10 mg q.h.s.     Cellulitis of right lower extremity  Patient is completing regimen of clindamycin for this.    BMI 34.0-34.9,adult  Heart  healthy low-fat diet recommended    Localized edema  Patient has localized edema in signs of decreased circulation to bilateral lower extremities.  She was an ex-smoker.  I have ordered an BABATUNDE to evaluate.

## 2023-06-02 ENCOUNTER — LAB VISIT (OUTPATIENT)
Dept: LAB | Facility: HOSPITAL | Age: 78
End: 2023-06-02
Attending: FAMILY MEDICINE
Payer: MEDICARE

## 2023-06-02 ENCOUNTER — OFFICE VISIT (OUTPATIENT)
Dept: FAMILY MEDICINE | Facility: CLINIC | Age: 78
End: 2023-06-02
Payer: MEDICARE

## 2023-06-02 VITALS
TEMPERATURE: 97 F | HEIGHT: 69 IN | SYSTOLIC BLOOD PRESSURE: 124 MMHG | BODY MASS INDEX: 34.71 KG/M2 | DIASTOLIC BLOOD PRESSURE: 70 MMHG | WEIGHT: 234.38 LBS

## 2023-06-02 DIAGNOSIS — D64.9 ANEMIA, UNSPECIFIED TYPE: ICD-10-CM

## 2023-06-02 DIAGNOSIS — E53.8 VITAMIN B 12 DEFICIENCY: ICD-10-CM

## 2023-06-02 DIAGNOSIS — I50.42 CHRONIC COMBINED SYSTOLIC AND DIASTOLIC HEART FAILURE: Primary | ICD-10-CM

## 2023-06-02 DIAGNOSIS — G62.9 NEUROPATHY: ICD-10-CM

## 2023-06-02 LAB — VIT B12 SERPL-MCNC: >1500 PG/ML (ref 210–950)

## 2023-06-02 PROCEDURE — 99214 PR OFFICE/OUTPT VISIT, EST, LEVL IV, 30-39 MIN: ICD-10-PCS | Mod: S$GLB,,, | Performed by: FAMILY MEDICINE

## 2023-06-02 PROCEDURE — 1159F PR MEDICATION LIST DOCUMENTED IN MEDICAL RECORD: ICD-10-PCS | Mod: CPTII,S$GLB,, | Performed by: FAMILY MEDICINE

## 2023-06-02 PROCEDURE — 3078F PR MOST RECENT DIASTOLIC BLOOD PRESSURE < 80 MM HG: ICD-10-PCS | Mod: CPTII,S$GLB,, | Performed by: FAMILY MEDICINE

## 2023-06-02 PROCEDURE — 3288F FALL RISK ASSESSMENT DOCD: CPT | Mod: CPTII,S$GLB,, | Performed by: FAMILY MEDICINE

## 2023-06-02 PROCEDURE — 3078F DIAST BP <80 MM HG: CPT | Mod: CPTII,S$GLB,, | Performed by: FAMILY MEDICINE

## 2023-06-02 PROCEDURE — 1101F PR PT FALLS ASSESS DOC 0-1 FALLS W/OUT INJ PAST YR: ICD-10-PCS | Mod: CPTII,S$GLB,, | Performed by: FAMILY MEDICINE

## 2023-06-02 PROCEDURE — 3074F SYST BP LT 130 MM HG: CPT | Mod: CPTII,S$GLB,, | Performed by: FAMILY MEDICINE

## 2023-06-02 PROCEDURE — 1125F PR PAIN SEVERITY QUANTIFIED, PAIN PRESENT: ICD-10-PCS | Mod: CPTII,S$GLB,, | Performed by: FAMILY MEDICINE

## 2023-06-02 PROCEDURE — 1125F AMNT PAIN NOTED PAIN PRSNT: CPT | Mod: CPTII,S$GLB,, | Performed by: FAMILY MEDICINE

## 2023-06-02 PROCEDURE — 82607 VITAMIN B-12: CPT | Performed by: FAMILY MEDICINE

## 2023-06-02 PROCEDURE — 1160F RVW MEDS BY RX/DR IN RCRD: CPT | Mod: CPTII,S$GLB,, | Performed by: FAMILY MEDICINE

## 2023-06-02 PROCEDURE — 36415 COLL VENOUS BLD VENIPUNCTURE: CPT | Performed by: FAMILY MEDICINE

## 2023-06-02 PROCEDURE — 1159F MED LIST DOCD IN RCRD: CPT | Mod: CPTII,S$GLB,, | Performed by: FAMILY MEDICINE

## 2023-06-02 PROCEDURE — 99214 OFFICE O/P EST MOD 30 MIN: CPT | Mod: S$GLB,,, | Performed by: FAMILY MEDICINE

## 2023-06-02 PROCEDURE — 1101F PT FALLS ASSESS-DOCD LE1/YR: CPT | Mod: CPTII,S$GLB,, | Performed by: FAMILY MEDICINE

## 2023-06-02 PROCEDURE — 1160F PR REVIEW ALL MEDS BY PRESCRIBER/CLIN PHARMACIST DOCUMENTED: ICD-10-PCS | Mod: CPTII,S$GLB,, | Performed by: FAMILY MEDICINE

## 2023-06-02 PROCEDURE — 3074F PR MOST RECENT SYSTOLIC BLOOD PRESSURE < 130 MM HG: ICD-10-PCS | Mod: CPTII,S$GLB,, | Performed by: FAMILY MEDICINE

## 2023-06-02 PROCEDURE — 3288F PR FALLS RISK ASSESSMENT DOCUMENTED: ICD-10-PCS | Mod: CPTII,S$GLB,, | Performed by: FAMILY MEDICINE

## 2023-06-02 RX ORDER — AMITRIPTYLINE HYDROCHLORIDE 10 MG/1
10 TABLET, FILM COATED ORAL NIGHTLY PRN
Qty: 30 TABLET | Refills: 2 | Status: SHIPPED | OUTPATIENT
Start: 2023-06-02 | End: 2023-10-23 | Stop reason: SDUPTHER

## 2023-06-02 RX ORDER — AMITRIPTYLINE HYDROCHLORIDE 10 MG/1
10 TABLET, FILM COATED ORAL NIGHTLY PRN
COMMUNITY
End: 2023-06-02 | Stop reason: SDUPTHER

## 2023-06-05 PROBLEM — I50.42 CHRONIC COMBINED SYSTOLIC AND DIASTOLIC HEART FAILURE: Status: ACTIVE | Noted: 2023-06-05

## 2023-06-06 ENCOUNTER — TELEPHONE (OUTPATIENT)
Dept: FAMILY MEDICINE | Facility: CLINIC | Age: 78
End: 2023-06-06
Payer: MEDICARE

## 2023-06-06 ENCOUNTER — CLINICAL SUPPORT (OUTPATIENT)
Dept: CARDIOLOGY | Facility: HOSPITAL | Age: 78
End: 2023-06-06
Attending: NURSE PRACTITIONER
Payer: MEDICARE

## 2023-06-06 DIAGNOSIS — R60.0 LOCALIZED EDEMA: ICD-10-CM

## 2023-06-06 PROCEDURE — 93923 SEGMENTAL PRESSURE LOWER EXTREMITY: ICD-10-PCS | Mod: 26,,, | Performed by: INTERNAL MEDICINE

## 2023-06-06 PROCEDURE — 93923 UPR/LXTR ART STDY 3+ LVLS: CPT | Mod: 26,,, | Performed by: INTERNAL MEDICINE

## 2023-06-06 PROCEDURE — 93923 UPR/LXTR ART STDY 3+ LVLS: CPT | Mod: 50

## 2023-06-06 NOTE — PROGRESS NOTES
Subjective:       Patient ID: Marisela Eagle is a 78 y.o. female.    Chief Complaint: Follow-up    Coordination of care visit.  Feet are burning and hurting hardly walk.  Right thigh hurts also.  Drinking extra water because she is thirsty from the diuretics.  Not staying with her 2 L per day restriction.  Has chronic diastolic and systolic heart failure.  Got short of breath when in the hospital.  Aldactone added.  BMI is at 34.6 she is down 6 lb.  GFR is greater than 60 .  Hemoglobin is 11.5 slight anemia.  Neuropathy symptoms.  Numb over the feet lower legs.  On Cymbalta and gabapentin.  Glucose is okay.  TSH 1.47.      Physical examination.  Vital signs noted.  Neck without bruit.  Chest clear.  Heart regular rate and rhythm.  Extremities are without edema.  Feet are somewhat dusky.  Light touch is absent up to longterm up the lower leg.      Objective:        Assessment:       1. Chronic combined systolic and diastolic heart failure    2. Neuropathy    3. BMI 34.0-34.9,adult    4. Anemia, unspecified type    5. Vitamin B 12 deficiency        Plan:       Chronic combined systolic and diastolic heart failure    Neuropathy    BMI 34.0-34.9,adult    Anemia, unspecified type    Vitamin B 12 deficiency  -     Vitamin B12; Future; Expected date: 06/02/2023    Other orders  -     amitriptyline (ELAVIL) 10 MG tablet; Take 1 tablet (10 mg total) by mouth nightly as needed for Pain.  Dispense: 30 tablet; Refill: 2      Echocardiogram.  Check ABIs.  Restrict her fluids discuss this in detail.  Complete the clindamycin has 5 days left.  Check a B12 level.  Elavil 10 mg HS 30 with 2 refills.  Check daily weights.

## 2023-06-06 NOTE — TELEPHONE ENCOUNTER
----- Message from Pawel Badillo III, MD sent at 6/2/2023  8:00 PM CDT -----  NORMAL followup as needed.

## 2023-06-06 NOTE — TELEPHONE ENCOUNTER
----- Message from Pawel Badillo III, MD sent at 5/26/2023  9:30 PM CDT -----  Same plan.  FOLLOW-UP AS RECOMMENDED

## 2023-06-10 LAB
LEFT ABI: 1.05
LEFT ARM BP: 127 MMHG
LEFT CALF BP: 184 MMHG
LEFT POSTERIOR TIBIAL: 149 MMHG
LEFT UPPER LEG BP: 179 MMHG
RIGHT ABI: 0.99
RIGHT ARM BP: 142 MMHG
RIGHT CALF BP: 172 MMHG
RIGHT POSTERIOR TIBIAL: 141 MMHG
RIGHT UPPER LEG BP: 239 MMHG

## 2023-06-12 NOTE — TELEPHONE ENCOUNTER
----- Message from Blane Man sent at 6/12/2023 11:43 AM CDT -----  Contact: pt  Type: Needs Medical Advice  Who Called:  pt     Pharmacy name and phone #:    [id number] - [place] - [address]  [address] [place] [address]  Phone: [phone] Fax: [phone]        Best Call Back Number: [phone]    Additional Information: pt states she finished her antibiotics and her leg is still red and swollen. Please advise.

## 2023-06-13 RX ORDER — DOXYCYCLINE HYCLATE 50 MG/1
50 CAPSULE ORAL 2 TIMES DAILY
COMMUNITY
Start: 2023-06-13 | End: 2023-06-13 | Stop reason: SDUPTHER

## 2023-06-13 RX ORDER — DOXYCYCLINE HYCLATE 50 MG/1
50 CAPSULE ORAL 2 TIMES DAILY
Qty: 14 CAPSULE | Refills: 0 | Status: SHIPPED | OUTPATIENT
Start: 2023-06-13 | End: 2023-06-20

## 2023-06-13 NOTE — TELEPHONE ENCOUNTER
----- Message from Marlene Naik sent at 6/13/2023  9:35 AM CDT -----  Regarding: pt call back  Name of Who is Calling: TU DORANTES [[id number]]        What is the request in detail: pt says she finished antibiotics and her right leg is still not better, please advise.         Can the clinic reply by [facility]: no           [facility]: [phone]

## 2023-06-14 ENCOUNTER — PES CALL (OUTPATIENT)
Dept: ADMINISTRATIVE | Facility: CLINIC | Age: 78
End: 2023-06-14
Payer: MEDICARE

## 2023-06-16 ENCOUNTER — OFFICE VISIT (OUTPATIENT)
Dept: URGENT CARE | Facility: CLINIC | Age: 78
End: 2023-06-16
Payer: MEDICARE

## 2023-06-16 VITALS
DIASTOLIC BLOOD PRESSURE: 73 MMHG | HEIGHT: 69 IN | HEART RATE: 75 BPM | SYSTOLIC BLOOD PRESSURE: 109 MMHG | WEIGHT: 233 LBS | RESPIRATION RATE: 20 BRPM | TEMPERATURE: 99 F | OXYGEN SATURATION: 95 % | BODY MASS INDEX: 34.51 KG/M2

## 2023-06-16 DIAGNOSIS — J22 LOWER RESPIRATORY INFECTION: ICD-10-CM

## 2023-06-16 DIAGNOSIS — J02.9 SORE THROAT: Primary | ICD-10-CM

## 2023-06-16 DIAGNOSIS — I50.9 CONGESTIVE HEART FAILURE, UNSPECIFIED HF CHRONICITY, UNSPECIFIED HEART FAILURE TYPE: ICD-10-CM

## 2023-06-16 DIAGNOSIS — Z86.79 HISTORY OF HYPERTENSION: ICD-10-CM

## 2023-06-16 DIAGNOSIS — R05.9 COUGH, UNSPECIFIED TYPE: ICD-10-CM

## 2023-06-16 LAB
CTP QC/QA: YES
CTP QC/QA: YES
S PYO RRNA THROAT QL PROBE: NEGATIVE
SARS-COV-2 AG RESP QL IA.RAPID: NEGATIVE

## 2023-06-16 PROCEDURE — 99204 PR OFFICE/OUTPT VISIT, NEW, LEVL IV, 45-59 MIN: ICD-10-PCS | Mod: S$GLB,,, | Performed by: NURSE PRACTITIONER

## 2023-06-16 PROCEDURE — 99204 OFFICE O/P NEW MOD 45 MIN: CPT | Mod: S$GLB,,, | Performed by: NURSE PRACTITIONER

## 2023-06-16 RX ORDER — FLUCONAZOLE 150 MG/1
150 TABLET ORAL DAILY
Qty: 1 TABLET | Refills: 0 | Status: SHIPPED | OUTPATIENT
Start: 2023-06-16 | End: 2023-06-17

## 2023-06-16 RX ORDER — AMOXICILLIN AND CLAVULANATE POTASSIUM 875; 125 MG/1; MG/1
1 TABLET, FILM COATED ORAL EVERY 12 HOURS
Qty: 14 TABLET | Refills: 0 | Status: SHIPPED | OUTPATIENT
Start: 2023-06-16 | End: 2023-06-23

## 2023-06-16 RX ORDER — CETIRIZINE HYDROCHLORIDE 10 MG/1
10 TABLET ORAL DAILY
Qty: 30 TABLET | Refills: 0 | Status: SHIPPED | OUTPATIENT
Start: 2023-06-16 | End: 2023-07-16

## 2023-06-16 RX ORDER — PROMETHAZINE HYDROCHLORIDE AND DEXTROMETHORPHAN HYDROBROMIDE 6.25; 15 MG/5ML; MG/5ML
5 SYRUP ORAL EVERY 4 HOURS PRN
Qty: 118 ML | Refills: 0 | Status: SHIPPED | OUTPATIENT
Start: 2023-06-16 | End: 2023-06-26

## 2023-06-16 RX ORDER — ALBUTEROL SULFATE 90 UG/1
2 AEROSOL, METERED RESPIRATORY (INHALATION) EVERY 6 HOURS PRN
Qty: 18 G | Refills: 0 | Status: SHIPPED | OUTPATIENT
Start: 2023-06-16

## 2023-06-16 RX ORDER — BENZONATATE 100 MG/1
100 CAPSULE ORAL 3 TIMES DAILY PRN
Qty: 30 CAPSULE | Refills: 0 | Status: SHIPPED | OUTPATIENT
Start: 2023-06-16 | End: 2023-06-26

## 2023-06-16 RX ORDER — FLUTICASONE PROPIONATE 50 MCG
1 SPRAY, SUSPENSION (ML) NASAL DAILY
Qty: 15.8 ML | Refills: 0 | Status: SHIPPED | OUTPATIENT
Start: 2023-06-16

## 2023-06-16 NOTE — PROGRESS NOTES
"Subjective:      Patient ID: Marisela Eagle is a 78 y.o. female.    Vitals:  height is 5' 9" (1.753 m) and weight is 105.7 kg (233 lb). Her temperature is 98.5 °F (36.9 °C). Her blood pressure is 109/73 and her pulse is 75. Her respiration is 20 and oxygen saturation is 95%.     Chief Complaint: Sore Throat    Onset of symptoms x1 week    Sore Throat   This is a new problem. The current episode started in the past 7 days. The problem has been gradually worsening. Associated symptoms include congestion, coughing, ear pain (Right) and headaches. Pertinent negatives include no abdominal pain, diarrhea, ear discharge, shortness of breath, trouble swallowing or vomiting. She has tried NSAIDs for the symptoms. The treatment provided mild relief.     Constitution: Positive for fatigue. Negative for appetite change and fever.   HENT:  Positive for ear pain (Right), congestion and sore throat. Negative for ear discharge and trouble swallowing.    Respiratory:  Positive for chest tightness, cough and sputum production (Green). Negative for shortness of breath and wheezing.    Gastrointestinal:  Negative for abdominal pain, nausea, vomiting and diarrhea.   Musculoskeletal:  Negative for muscle ache (Nothing new or different).   Neurological:  Positive for headaches.    Objective:     Physical Exam   Constitutional: She is oriented to person, place, and time. She is cooperative.  Non-toxic appearance. She does not appear ill. No distress. obesityawake  HENT:   Head: Normocephalic and atraumatic.   Ears:   Right Ear: Tympanic membrane, external ear and ear canal normal.   Left Ear: Tympanic membrane, external ear and ear canal normal.   Nose: Congestion present. No rhinorrhea.   Mouth/Throat: Mucous membranes are moist. Posterior oropharyngeal erythema present. No oropharyngeal exudate.   Eyes: Conjunctivae are normal. Right eye exhibits no discharge. Left eye exhibits no discharge.   Neck: Neck supple. No neck rigidity " present.   Cardiovascular: Normal rate, regular rhythm and normal heart sounds.   Pulmonary/Chest: Effort normal and breath sounds normal. No accessory muscle usage. No tachypnea. No respiratory distress. She has no wheezes. She has no rhonchi. She has no rales. She exhibits no tenderness.   Abdominal: Normal appearance.   Musculoskeletal:      Cervical back: She exhibits no tenderness.   Lymphadenopathy:     She has no cervical adenopathy.   Neurological: no focal deficit. She is alert and oriented to person, place, and time. No sensory deficit.   Skin: Skin is warm, dry, not diaphoretic and no rash. Capillary refill takes 2 to 3 seconds.   Psychiatric: Her behavior is normal. Mood normal.   Nursing note and vitals reviewed.    Assessment:     1. Sore throat    2. History of hypertension    3. Congestive heart failure, unspecified HF chronicity, unspecified heart failure type    4. Cough, unspecified type    5. Lower respiratory infection      COVID negative  Strep A negative      Plan:       Sore throat  -     SARS Coronavirus 2 Antigen, POCT Manual Read  -     POCT rapid strep A    History of hypertension    Congestive heart failure, unspecified HF chronicity, unspecified heart failure type    Cough, unspecified type    Lower respiratory infection  -     albuterol (VENTOLIN HFA) 90 mcg/actuation inhaler; Inhale 2 puffs into the lungs every 6 (six) hours as needed for Wheezing. Rescue  Dispense: 18 g; Refill: 0  -     amoxicillin-clavulanate 875-125mg (AUGMENTIN) 875-125 mg per tablet; Take 1 tablet by mouth every 12 (twelve) hours. for 7 days  Dispense: 14 tablet; Refill: 0  -     benzonatate (TESSALON) 100 MG capsule; Take 1 capsule (100 mg total) by mouth 3 (three) times daily as needed for Cough.  Dispense: 30 capsule; Refill: 0  -     promethazine-dextromethorphan (PROMETHAZINE-DM) 6.25-15 mg/5 mL Syrp; Take 5 mLs by mouth every 4 (four) hours as needed (cough).  Dispense: 118 mL; Refill: 0  -     fluticasone  propionate (FLONASE) 50 mcg/actuation nasal spray; 1 spray (50 mcg total) by Each Nostril route once daily.  Dispense: 15.8 mL; Refill: 0  -     cetirizine (ZYRTEC) 10 MG tablet; Take 1 tablet (10 mg total) by mouth once daily.  Dispense: 30 tablet; Refill: 0  -     fluconazole (DIFLUCAN) 150 MG Tab; Take 1 tablet (150 mg total) by mouth once daily. for 1 day  Dispense: 1 tablet; Refill: 0

## 2023-06-16 NOTE — PATIENT INSTRUCTIONS
Augmentin twice a day for 7 days.    It is always advisable to take probiotics for intestinal health over-the-counter as directed when taking any antibiotic and continue taking for an additional 3-4 weeks after completing the antibiotic.      Continue doxycycline as already prescribed    Albuterol inhaler every 4-6 hours while awake for the next 3 days then just as needed for persistent cough, shortness of breath, wheezing, chest tightness.    Cough and deep breathing exercises every 1-2 hours while awake until symptoms are improving then as needed.    Increase fluid intake significantly to help thin secretions.    Guaifenesin 1200 mg over-the-counter twice a day over-the-counter for 10 days.    Flonase and Zyrtec daily as prescribed for the next 3-4 weeks.    Return to clinic, seek medical re-evaluation if symptoms worsen or fail to improve after 48 hours of the above treatment plan  Tessalon Paulinoes cough pills that help with cough caused by the postnasal drip, throat irritation.  There good to take at the time and do not cause drowsiness.    Phenergan cough syrup at night only to suppress cough so that you are able to rest.  You can take this together with Tessalon Perles for added benefit  Refrain from taking any decongestants, alcohol, caffeine as this will thicken mucous

## 2023-06-22 ENCOUNTER — TELEPHONE (OUTPATIENT)
Dept: FAMILY MEDICINE | Facility: CLINIC | Age: 78
End: 2023-06-22
Payer: MEDICARE

## 2023-06-22 NOTE — TELEPHONE ENCOUNTER
Pt want to fu with a specialist. Devon light said fu with dr mesa. If having a fever it cannot wait and need to be seen now. Pt denies fever and will call bonita she is familiar with him

## 2023-06-22 NOTE — TELEPHONE ENCOUNTER
Done  ----- Message from Eric Kellogg sent at 6/22/2023 11:48 AM CDT -----  Type: Needs Medical Advice  Who Called:  Pt  Best Call Back Number: [phone]  Additional Information: Pt states Dr Hall will not see the pt unless he gets medical records or the last date pt was seen, pl call bk to advise thanks

## 2023-06-23 ENCOUNTER — OFFICE VISIT (OUTPATIENT)
Dept: ENDOCRINOLOGY | Facility: CLINIC | Age: 78
End: 2023-06-23
Payer: MEDICARE

## 2023-06-23 ENCOUNTER — LAB VISIT (OUTPATIENT)
Dept: LAB | Facility: HOSPITAL | Age: 78
End: 2023-06-23
Attending: PHYSICIAN ASSISTANT
Payer: MEDICARE

## 2023-06-23 VITALS
BODY MASS INDEX: 34.29 KG/M2 | OXYGEN SATURATION: 95 % | HEART RATE: 86 BPM | HEIGHT: 69 IN | SYSTOLIC BLOOD PRESSURE: 122 MMHG | DIASTOLIC BLOOD PRESSURE: 70 MMHG | WEIGHT: 231.5 LBS | TEMPERATURE: 99 F

## 2023-06-23 DIAGNOSIS — R73.9 HYPERGLYCEMIA: ICD-10-CM

## 2023-06-23 DIAGNOSIS — E66.9 OBESITY (BMI 30-39.9): ICD-10-CM

## 2023-06-23 DIAGNOSIS — E78.5 HYPERLIPIDEMIA, UNSPECIFIED HYPERLIPIDEMIA TYPE: ICD-10-CM

## 2023-06-23 DIAGNOSIS — I10 HYPERTENSION, UNSPECIFIED TYPE: ICD-10-CM

## 2023-06-23 DIAGNOSIS — M81.0 AGE-RELATED OSTEOPOROSIS WITHOUT CURRENT PATHOLOGICAL FRACTURE: ICD-10-CM

## 2023-06-23 DIAGNOSIS — E03.9 HYPOTHYROIDISM, UNSPECIFIED TYPE: Primary | ICD-10-CM

## 2023-06-23 DIAGNOSIS — E04.9 GOITER: ICD-10-CM

## 2023-06-23 DIAGNOSIS — Z78.0 POSTMENOPAUSAL: ICD-10-CM

## 2023-06-23 DIAGNOSIS — E55.9 HYPOVITAMINOSIS D: ICD-10-CM

## 2023-06-23 LAB
ESTIMATED AVG GLUCOSE: 105 MG/DL (ref 68–131)
HBA1C MFR BLD: 5.3 % (ref 4–5.6)

## 2023-06-23 PROCEDURE — 3288F FALL RISK ASSESSMENT DOCD: CPT | Mod: CPTII,S$GLB,, | Performed by: PHYSICIAN ASSISTANT

## 2023-06-23 PROCEDURE — 1101F PT FALLS ASSESS-DOCD LE1/YR: CPT | Mod: CPTII,S$GLB,, | Performed by: PHYSICIAN ASSISTANT

## 2023-06-23 PROCEDURE — 36415 COLL VENOUS BLD VENIPUNCTURE: CPT | Mod: PO | Performed by: PHYSICIAN ASSISTANT

## 2023-06-23 PROCEDURE — 3078F DIAST BP <80 MM HG: CPT | Mod: CPTII,S$GLB,, | Performed by: PHYSICIAN ASSISTANT

## 2023-06-23 PROCEDURE — 99214 PR OFFICE/OUTPT VISIT, EST, LEVL IV, 30-39 MIN: ICD-10-PCS | Mod: S$GLB,,, | Performed by: PHYSICIAN ASSISTANT

## 2023-06-23 PROCEDURE — 99999 PR PBB SHADOW E&M-EST. PATIENT-LVL IV: ICD-10-PCS | Mod: PBBFAC,,, | Performed by: PHYSICIAN ASSISTANT

## 2023-06-23 PROCEDURE — 99214 OFFICE O/P EST MOD 30 MIN: CPT | Mod: S$GLB,,, | Performed by: PHYSICIAN ASSISTANT

## 2023-06-23 PROCEDURE — 1160F PR REVIEW ALL MEDS BY PRESCRIBER/CLIN PHARMACIST DOCUMENTED: ICD-10-PCS | Mod: CPTII,S$GLB,, | Performed by: PHYSICIAN ASSISTANT

## 2023-06-23 PROCEDURE — 1159F MED LIST DOCD IN RCRD: CPT | Mod: CPTII,S$GLB,, | Performed by: PHYSICIAN ASSISTANT

## 2023-06-23 PROCEDURE — 99999 PR PBB SHADOW E&M-EST. PATIENT-LVL IV: CPT | Mod: PBBFAC,,, | Performed by: PHYSICIAN ASSISTANT

## 2023-06-23 PROCEDURE — 83036 HEMOGLOBIN GLYCOSYLATED A1C: CPT | Performed by: PHYSICIAN ASSISTANT

## 2023-06-23 PROCEDURE — 3078F PR MOST RECENT DIASTOLIC BLOOD PRESSURE < 80 MM HG: ICD-10-PCS | Mod: CPTII,S$GLB,, | Performed by: PHYSICIAN ASSISTANT

## 2023-06-23 PROCEDURE — 1125F PR PAIN SEVERITY QUANTIFIED, PAIN PRESENT: ICD-10-PCS | Mod: CPTII,S$GLB,, | Performed by: PHYSICIAN ASSISTANT

## 2023-06-23 PROCEDURE — 3074F SYST BP LT 130 MM HG: CPT | Mod: CPTII,S$GLB,, | Performed by: PHYSICIAN ASSISTANT

## 2023-06-23 PROCEDURE — 3074F PR MOST RECENT SYSTOLIC BLOOD PRESSURE < 130 MM HG: ICD-10-PCS | Mod: CPTII,S$GLB,, | Performed by: PHYSICIAN ASSISTANT

## 2023-06-23 PROCEDURE — 1160F RVW MEDS BY RX/DR IN RCRD: CPT | Mod: CPTII,S$GLB,, | Performed by: PHYSICIAN ASSISTANT

## 2023-06-23 PROCEDURE — 3288F PR FALLS RISK ASSESSMENT DOCUMENTED: ICD-10-PCS | Mod: CPTII,S$GLB,, | Performed by: PHYSICIAN ASSISTANT

## 2023-06-23 PROCEDURE — 1159F PR MEDICATION LIST DOCUMENTED IN MEDICAL RECORD: ICD-10-PCS | Mod: CPTII,S$GLB,, | Performed by: PHYSICIAN ASSISTANT

## 2023-06-23 PROCEDURE — 1101F PR PT FALLS ASSESS DOC 0-1 FALLS W/OUT INJ PAST YR: ICD-10-PCS | Mod: CPTII,S$GLB,, | Performed by: PHYSICIAN ASSISTANT

## 2023-06-23 PROCEDURE — 1125F AMNT PAIN NOTED PAIN PRSNT: CPT | Mod: CPTII,S$GLB,, | Performed by: PHYSICIAN ASSISTANT

## 2023-06-23 NOTE — PROGRESS NOTES
CC: Hypothyroidism    HPI: Marisela Eagle is a 78 y.o. female here for hypothyroidism along with pending conditions listed in the Visit Diagnosis. Diagnosed several years ago. Her daughter had thyroid cancer. They think this was due to radiation on her face for acne. Her cousin and grandmother had T2DM. She is taking biotin. Reports taking ditropan 10 mg daily which helped w/ sweating but caused dry mouth.    Taking 125 mcg daily. She takes it ~6 am and waits an hour before eating.    + sweating (on left side, taking elavil--states this helps), fatigue, wt loss (fluid).    No d/c, palpitations, tremors, hair loss or changes in nails.    Thyroid u/s 1/23 showed 2 subcentimeter nodules that have not changed in size since 2012. Repeat in 3 years. Reports SOB (seeing cardiology). No dysphagia or voice changes.     DEXA 4/23: wnl. Prescribed fosamax last visit but she keeps forgetting to take. No falls or fractures. Taking 10,000 IU daily. She had one fall in 9/21 when she fractured her pelvis. She recently found out she fractured her femur. She has had two steriod injections this year in her hips. She is going to water aerobics.    PMHx, PSHx: reviewed in epic.  Social Hx: Occasionally has mixed 2-3 drinks. She smoked 0.5 ppd for 53 years.     Wt Readings from Last 6 Encounters:   06/23/23 105 kg (231 lb 7.7 oz)   06/16/23 105.7 kg (233 lb)   06/02/23 106.3 kg (234 lb 6.4 oz)   06/01/23 106.6 kg (235 lb)   05/27/23 108.9 kg (239 lb 15.9 oz)   05/26/23 109 kg (240 lb 4.8 oz)      ROS:   Constitutional: feels tired, wt loss (16 lbs since 1/22).  Eyes: No recent visual changes  Cardiovascular: + occasional cp and palpitations  Respiratory: + SOB, no cough  Gastrointestinal: Denies recent bowel disturbances  GenitoUrinary - No dysuria  Skin: No new skin rash  Musc: + back pain, knee pain  Neurologic: + numbness and tingling in feet  Endocrine: no polyphagia, polydipsia or polyuria  Remainder ROS negative     /70 (BP  "Location: Left arm, Patient Position: Sitting, BP Method: Large (Manual))   Pulse 86   Temp 98.5 °F (36.9 °C) (Oral)   Ht 5' 9" (1.753 m)   Wt 105 kg (231 lb 7.7 oz)   SpO2 95%   BMI 34.18 kg/m²      Personally reviewed labs below:    Lab Results   Component Value Date    TSH 1.741 04/03/2023    Z2ESNAE 99 07/03/2019    FREET4 1.25 04/03/2023        Chemistry        Component Value Date/Time     05/28/2023 0744    K 3.4 (L) 05/28/2023 0744     05/28/2023 0744    CO2 29 05/28/2023 0744    BUN 13 05/28/2023 0744    CREATININE 0.6 05/28/2023 0744     05/28/2023 0744        Component Value Date/Time    CALCIUM 8.9 05/28/2023 0744    ALKPHOS 41 (L) 05/28/2023 0744    AST 25 05/28/2023 0744    ALT 24 05/28/2023 0744    BILITOT 0.6 05/28/2023 0744    ESTGFRAFRICA >60.0 07/21/2022 1530    EGFRNONAA >60.0 07/21/2022 1530         Lab Results   Component Value Date    HGBA1C 5.2 02/24/2021    HGBA1C 5.4 07/03/2019    HGBA1C 5.5 09/02/2011      PE:  GENERAL: middle aged female, well developed, well nourished  NECK: Supple neck, normal thyroid. No bruit  LYMPHATIC: No cervical or supraclavicular lymphadenopathy  CARDIOVASCULAR: Normal heart sounds, no pedal edema  RESPIRATORY: Normal effort, clear to auscultation bl  MUSC: ambulates with a four pronged cane  PSYCH: no anxiety or depression    EXAMINATION:  US SOFT TISSUE HEAD NECK THYROID     CLINICAL HISTORY:  Nontoxic goiter, unspecified     TECHNIQUE:  Ultrasound of the thyroid and cervical lymph nodes was performed.     COMPARISON:  Thyroid ultrasound of August 27, 2018     FINDINGS:  The right lobe measures 3.4 x 0.9 x 0.7 cm for a calculated volume of 1.1 cc.  The left lobe measures 3.3 x 0.9 x 0.9 cm for a calculated volume of 1.3 cc and a total thyroid volume of 2.4 cc which is quite small.     On the right there is a 6 mm solid nodule in the posterior lower pole.  There is a 2 mm cyst.  In the midpole.     On the left there is a 5 mm nodule at " the upper pole and a 4 mm cyst in the upper pole.  There is a 4 mm nodule in the isthmus.     Impression:     Quite small thyroid with small nodules on each side and in the isthmus.        Electronically signed by: Navarro Bryant MD  Date:                                            02/21/2022  Time:                                           16:03    Assessment/Plan:   1. Hypothyroidism, unspecified type  Comprehensive Metabolic Panel    TSH    T4, Free    T4, Free    TSH    Comprehensive Metabolic Panel      2. Goiter        3. Hypertension, unspecified type        4. Hyperlipidemia, unspecified hyperlipidemia type        5. Postmenopausal        6. Obesity (BMI 30-39.9)  Hemoglobin A1C      7. Hypovitaminosis D  Vitamin D    Vitamin D      8. Age-related osteoporosis without current pathological fracture        9. Hyperglycemia  Hemoglobin A1C          Hypothyroidism-TFTs wnl. Continue LT4 dose.  Goiter-repeat thyroid u/s 2/25.  VAY-tmofsl-hgztowbm meds.  UYR-jlwssf-ztnfekqd statin  Postmenopausal w/ fragility fx-pt forgets to take fosamax. Start prolia 60 mg q six months. Discussed se.   -DEXA scan 4/25. Continue taking 2000 IU of vitamin D and 1500 mg of calcium. Also, participate in weight bearing and resistance exercises for 40 min 4x weekly to maintain your bone density.   Obesity-Body mass index is 34.18 kg/m². Encouraged to increase exercise. Declines MNT.  Hypovitaminosis d-low-normal-increase vitamin d intake by 2000 IU daily.   Sweating-continue ditropan.  Hyperglycemia-check a1c.    F/u in six months w/ labs

## 2023-07-31 ENCOUNTER — OFFICE VISIT (OUTPATIENT)
Dept: CARDIOLOGY | Facility: CLINIC | Age: 78
End: 2023-07-31
Payer: MEDICARE

## 2023-07-31 ENCOUNTER — LAB VISIT (OUTPATIENT)
Dept: LAB | Facility: HOSPITAL | Age: 78
End: 2023-07-31
Attending: SPECIALIST
Payer: MEDICARE

## 2023-07-31 VITALS
WEIGHT: 228.5 LBS | DIASTOLIC BLOOD PRESSURE: 78 MMHG | OXYGEN SATURATION: 98 % | RESPIRATION RATE: 16 BRPM | HEART RATE: 62 BPM | HEIGHT: 69 IN | SYSTOLIC BLOOD PRESSURE: 128 MMHG | BODY MASS INDEX: 33.84 KG/M2

## 2023-07-31 DIAGNOSIS — R61 HYPERHIDROSIS: ICD-10-CM

## 2023-07-31 DIAGNOSIS — F41.9 ANXIETY: ICD-10-CM

## 2023-07-31 DIAGNOSIS — I10 HYPERTENSION, ESSENTIAL: ICD-10-CM

## 2023-07-31 DIAGNOSIS — E53.8 VITAMIN B12 DEFICIENCY: ICD-10-CM

## 2023-07-31 DIAGNOSIS — G62.9 NEUROPATHY: ICD-10-CM

## 2023-07-31 DIAGNOSIS — R60.0 LOCALIZED EDEMA: ICD-10-CM

## 2023-07-31 DIAGNOSIS — E87.6 HYPOKALEMIA: ICD-10-CM

## 2023-07-31 DIAGNOSIS — E87.6 HYPOKALEMIA: Primary | ICD-10-CM

## 2023-07-31 LAB
ANION GAP SERPL CALC-SCNC: 11 MMOL/L (ref 8–16)
BUN SERPL-MCNC: 17 MG/DL (ref 8–23)
CALCIUM SERPL-MCNC: 9.3 MG/DL (ref 8.7–10.5)
CHLORIDE SERPL-SCNC: 98 MMOL/L (ref 95–110)
CO2 SERPL-SCNC: 24 MMOL/L (ref 23–29)
CREAT SERPL-MCNC: 0.7 MG/DL (ref 0.5–1.4)
EST. GFR  (NO RACE VARIABLE): >60 ML/MIN/1.73 M^2
GLUCOSE SERPL-MCNC: 102 MG/DL (ref 70–110)
POTASSIUM SERPL-SCNC: 4.9 MMOL/L (ref 3.5–5.1)
SODIUM SERPL-SCNC: 133 MMOL/L (ref 136–145)

## 2023-07-31 PROCEDURE — 3078F DIAST BP <80 MM HG: CPT | Mod: HCNC,CPTII,S$GLB, | Performed by: SPECIALIST

## 2023-07-31 PROCEDURE — 3074F PR MOST RECENT SYSTOLIC BLOOD PRESSURE < 130 MM HG: ICD-10-PCS | Mod: HCNC,CPTII,S$GLB, | Performed by: SPECIALIST

## 2023-07-31 PROCEDURE — 1125F AMNT PAIN NOTED PAIN PRSNT: CPT | Mod: HCNC,CPTII,S$GLB, | Performed by: SPECIALIST

## 2023-07-31 PROCEDURE — 80048 BASIC METABOLIC PNL TOTAL CA: CPT | Performed by: SPECIALIST

## 2023-07-31 PROCEDURE — 99999 PR PBB SHADOW E&M-EST. PATIENT-LVL V: CPT | Mod: PBBFAC,HCNC,, | Performed by: SPECIALIST

## 2023-07-31 PROCEDURE — 3288F FALL RISK ASSESSMENT DOCD: CPT | Mod: HCNC,CPTII,S$GLB, | Performed by: SPECIALIST

## 2023-07-31 PROCEDURE — 3078F PR MOST RECENT DIASTOLIC BLOOD PRESSURE < 80 MM HG: ICD-10-PCS | Mod: HCNC,CPTII,S$GLB, | Performed by: SPECIALIST

## 2023-07-31 PROCEDURE — 99999 PR PBB SHADOW E&M-EST. PATIENT-LVL V: ICD-10-PCS | Mod: PBBFAC,HCNC,, | Performed by: SPECIALIST

## 2023-07-31 PROCEDURE — 1159F MED LIST DOCD IN RCRD: CPT | Mod: HCNC,CPTII,S$GLB, | Performed by: SPECIALIST

## 2023-07-31 PROCEDURE — 3288F PR FALLS RISK ASSESSMENT DOCUMENTED: ICD-10-PCS | Mod: HCNC,CPTII,S$GLB, | Performed by: SPECIALIST

## 2023-07-31 PROCEDURE — 99215 OFFICE O/P EST HI 40 MIN: CPT | Mod: HCNC,S$GLB,, | Performed by: SPECIALIST

## 2023-07-31 PROCEDURE — 1159F PR MEDICATION LIST DOCUMENTED IN MEDICAL RECORD: ICD-10-PCS | Mod: HCNC,CPTII,S$GLB, | Performed by: SPECIALIST

## 2023-07-31 PROCEDURE — 3074F SYST BP LT 130 MM HG: CPT | Mod: HCNC,CPTII,S$GLB, | Performed by: SPECIALIST

## 2023-07-31 PROCEDURE — 99215 PR OFFICE/OUTPT VISIT, EST, LEVL V, 40-54 MIN: ICD-10-PCS | Mod: HCNC,S$GLB,, | Performed by: SPECIALIST

## 2023-07-31 PROCEDURE — 1101F PR PT FALLS ASSESS DOC 0-1 FALLS W/OUT INJ PAST YR: ICD-10-PCS | Mod: HCNC,CPTII,S$GLB, | Performed by: SPECIALIST

## 2023-07-31 PROCEDURE — 1101F PT FALLS ASSESS-DOCD LE1/YR: CPT | Mod: HCNC,CPTII,S$GLB, | Performed by: SPECIALIST

## 2023-07-31 PROCEDURE — 1125F PR PAIN SEVERITY QUANTIFIED, PAIN PRESENT: ICD-10-PCS | Mod: HCNC,CPTII,S$GLB, | Performed by: SPECIALIST

## 2023-07-31 PROCEDURE — 36415 COLL VENOUS BLD VENIPUNCTURE: CPT | Performed by: SPECIALIST

## 2023-07-31 RX ORDER — LORAZEPAM 1 MG/1
1 TABLET ORAL NIGHTLY
COMMUNITY
End: 2023-10-23 | Stop reason: SDUPTHER

## 2023-07-31 RX ORDER — FUROSEMIDE 40 MG/1
40 TABLET ORAL
Qty: 90 TABLET | Refills: 1
Start: 2023-08-01

## 2023-07-31 NOTE — PATIENT INSTRUCTIONS
Take extra lasix if swelling       Stay on  aldactone     Check  bp  everty day    keep  list goal , 130/80

## 2023-07-31 NOTE — PROGRESS NOTES
Subjective:    Patient ID:  Marisela Eagle is a 78 y.o. female who presents for   Hypertension and Hyperlipidemia    HPI  sweaty  spells assoc with hi bp    V         gets sob   little leg swell   austyn    ok          Cymbalta and gabapentin for legs - constant pain  legs       Main prob is chronic pain legs  heurts all time  austyn  ok  ?  Small vessel disease         Has artritis in thumb        Spasma in hips       10 mg percocet     Rheumatology note=      Patient's joint pain is not related to CREST or her autoimmune serology.  Does not need a DMARD.  She has OA complicated by CSS  Review of patient's allergies indicates:  No Known Allergies    Patient also complains that she gets sweating in her blood pressures up with those done    Past Medical History:   Diagnosis Date    Bilateral knee pain 2012    left worse, right knee painful even after partial replacement.    Bruises easily     Clot ?    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 Took Coumadin x 6 weeks    KNEE ARTHROSCOPY  2010    right    TONSILLECTOMY       Social History     Tobacco Use    Smoking status: Former     Current packs/day:  0.00     Types: Cigarettes     Quit date: 3/23/2012     Years since quittin.3    Smokeless tobacco: Never   Substance Use Topics    Alcohol use: Yes     Comment: occasional    Drug use: No        Review of Systems     Review of Systems   Constitutional: Positive for decreased appetite, malaise/fatigue and weight gain.   HENT: Negative.     Eyes: Negative.    Cardiovascular:  Positive for dyspnea on exertion and irregular heartbeat.   Skin:  Positive for color change and dry skin.   Musculoskeletal:  Positive for arthritis, joint pain, joint swelling and muscle weakness.   Gastrointestinal:  Positive for abdominal pain and heartburn.   Genitourinary:  Positive for frequency.   Neurological:  Positive for focal weakness, numbness, paresthesias and sensory change.           Objective:        Vitals:    23 1455   BP: 128/78   Pulse: 62   Resp: 16       Lab Results   Component Value Date    WBC 4.61 2023    HGB 11.5 (L) 2023    HCT 36.6 (L) 2023     2023    CHOL 241 (H) 01/10/2023    TRIG 81 01/10/2023    HDL 77 (H) 01/10/2023    ALT 24 2023    AST 25 2023     2023    K 3.4 (L) 2023     2023    CREATININE 0.6 2023    BUN 13 2023    CO2 29 2023    TSH 1.741 2023    INR 1.1 2019    HGBA1C 5.3 2023        ECHOCARDIOGRAM RESULTS  Results for orders placed during the hospital encounter of 22    Echo    Interpretation Summary  · The left ventricle is normal in size with low normal systolic function.  · The estimated ejection fraction is 50%.  · Indeterminate left ventricular diastolic function with elevated left atrial pressure.  · Atrial fibrillation not observed.  · Normal right ventricular size with normal right ventricular systolic function.  · Mild tricuspid regurgitation.  · Normal central venous pressure (3 mmHg).  · The estimated PA systolic pressure is 30 mmHg.      CURRENT/PREVIOUS VISIT  EKG  Results for orders placed or performed during the hospital encounter of 05/27/23   EKG 12-lead    Collection Time: 05/27/23 10:54 AM    Narrative    Test Reason : R06.02,    Vent. Rate : 079 BPM     Atrial Rate : 079 BPM     P-R Int : 220 ms          QRS Dur : 104 ms      QT Int : 422 ms       P-R-T Axes : 044 -20 089 degrees     QTc Int : 483 ms    Sinus rhythm with marked sinus arrythmia with 1st degree A-V block  Incomplete right bundle branch block  Septal infarct ,age undetermined  Abnormal ECG  When compared with ECG of 04-JAN-2022 14:16,  Incomplete right bundle branch block is now Present  Septal infarct is now Present  Confirmed by Angela VIVAS, Farrukh SU ([id number]) on 5/29/2023 9:28:33 PM    Referred By: [email or web address]   SELF           Confirmed By:Farrukh Miller MD     Results for orders placed during the hospital encounter of 12/13/22    Echo    Interpretation Summary  · The left ventricle is normal in size with low normal systolic function.  · The estimated ejection fraction is 50%.  · Indeterminate left ventricular diastolic function with elevated left atrial pressure.  · Atrial fibrillation not observed.  · Normal right ventricular size with normal right ventricular systolic function.  · Mild tricuspid regurgitation.  · Normal central venous pressure (3 mmHg).  · The estimated PA systolic pressure is 30 mmHg.    Results for orders placed in visit on 02/21/22    Nuclear Stress Test    Interpretation Summary    PET CT Findings The nuclear portion of this study will be reported separately.    The EKG portion of this study is negative for ischemia.    The patient reported no chest pain during the stress test.    There were no arrhythmias during stress.      PHYSICAL EXAM    Physical Exam     Medication List with Changes/Refills   Current Medications    ALBUTEROL (VENTOLIN HFA) 90 MCG/ACTUATION INHALER    Inhale 2 puffs into the lungs every 6 (six) hours as needed for Wheezing. Rescue    AMITRIPTYLINE  (ELAVIL) 10 MG TABLET    Take 1 tablet (10 mg total) by mouth nightly as needed for Pain.    CETIRIZINE (ZYRTEC) 10 MG TABLET    Take 1 tablet (10 mg total) by mouth once daily.    CHOLECALCIFEROL, VITAMIN D3, 5,000 UNIT CAPSULE    Take 5,000 Units by mouth once daily.    CYANOCOBALAMIN (VITAMIN B-12) 1000 MCG TABLET    Take 5,000 mcg by mouth once daily.    DULOXETINE (CYMBALTA) 60 MG CAPSULE    Take 60 mg by mouth once daily.    FAMOTIDINE (PEPCID) 20 MG TABLET    Take 20 mg by mouth as needed.    FLUTICASONE PROPIONATE (FLONASE) 50 MCG/ACTUATION NASAL SPRAY    1 spray (50 mcg total) by Each Nostril route once daily.    FUROSEMIDE (LASIX) 40 MG TABLET    Take 1 tablet (40 mg total) by mouth once daily. Take daily    GABAPENTIN (NEURONTIN) 800 MG TABLET    Take 800 mg by mouth 3 (three) times daily.    LEVOTHYROXINE (SYNTHROID) 125 MCG TABLET    TAKE 1 TABLET EVERY DAY BEFORE BREAKFAST  Strength: 125 mcg    LORAZEPAM (ATIVAN) 1 MG TABLET    Take 1 mg by mouth every evening.    LOSARTAN (COZAAR) 100 MG TABLET    Take 0.5 tablets (50 mg total) by mouth 2 (two) times a day.    MAGNESIUM 30 MG TAB    Take 1 tablet by mouth once daily.    METOPROLOL SUCCINATE (TOPROL-XL) 50 MG 24 HR TABLET    Take 0.5 tablets (25 mg total) by mouth every evening.    OMEPRAZOLE (PRILOSEC) 40 MG CAPSULE    TAKE 1 CAPSULE EVERY DAY    OXYCODONE-ACETAMINOPHEN (PERCOCET)  MG PER TABLET    Take 1 tablet by mouth every 6 (six) hours as needed.     OXYCODONE-ACETAMINOPHEN (PERCOCET)  MG PER TABLET    Take 1 tablet by mouth every 8 (eight) hours as needed for Pain.    POTASSIUM CHLORIDE SA (K-DUR,KLOR-CON) 20 MEQ TABLET    TAKE 1 TABLET TWICE DAILY    SPIRONOLACTONE (ALDACTONE) 25 MG TABLET    Take 1 tablet (25 mg total) by mouth once daily.    TRIAMCINOLONE ACETONIDE 0.1% (KENALOG) 0.1 % OINTMENT    Apply topically 2 (two) times daily.    TURMERIC 400 MG CAP    Take 400 mg by mouth once daily.    VIT C/E/ZN/COPPR/LUTEIN/ZEAXAN  (PRESERVISION AREDS-2 ORAL)    Take 1 capsule by mouth once daily.           Assessment:     I substantially and  personally reviewed old and new ecg's, lab reports,, xray reports  and  other cardiovascular studies including  echo's, stress tests, angiogram reports, holters,and vascular studies .  In addition I evaluated original cardiac cath  ___echo  ____cxr ______ct ____scan on [employer] view or [employer] or other viewing platforms and  __x__EKG's .  Sinus rhythm question of septal MI  I reviewed  office and hospital notes Yes ____x  of  referring providers and other providers., I reviewed BABATUNDE studies which demonstrated normal circulation  I reviewed personally old hospital and office notes   Time spent evaluating and managing this patient:_____45 ___min.   Hypertension, heart failure mildly reduced ejection fraction, question of ASCVD, patient has arthritis  Peripheral neuropathy syndrome-chronic attributed to venous or arterial circulation and arthritis or neuropathy of unknown etiology  Blood pressure is currently under control  She is been told to take extra metoprolol blood pressures or extra Lasix if she gets swelling       Plan:   Patient is see a vascular cardiologist to probably wind up doing angiography she could have small-vessel disease in le legs and might benefit from plate  Some of her neuropathy is due to nerve; consider arthritis    Problem List Items Addressed This Visit    None       No follow-ups on file.

## 2023-08-03 ENCOUNTER — TELEPHONE (OUTPATIENT)
Dept: CARDIOLOGY | Facility: CLINIC | Age: 78
End: 2023-08-03
Payer: MEDICARE

## 2023-08-03 NOTE — TELEPHONE ENCOUNTER
----- Message from Pooanm Rodriguez sent at 8/3/2023  3:25 PM CDT -----  Regarding: advise  Contact: Dr Brenda Bah office  Type: Needs Medical Advice  Who Called:  Dr. Bah Office  Symptoms (please be specific):    How long has patient had these symptoms:   Pharmacy name and phone #:    Best Call Back Number: [phone]  Additional Information: Needs to know if you received the medical record request form they sent they need pt ultrasound results thanks! Fax: [phone]

## 2023-08-08 NOTE — TELEPHONE ENCOUNTER
----- Message from Mary Good sent at 8/24/2018  1:32 PM CDT -----  Type:  Patient Returning Call    Who Called:  Self Who Left Message for Patient:  STACIE  Does the patient know what this is regarding?:  STACIE  Best Call Back Number:  [phone]  Additional Information:  STACIE     Cibinqo Pregnancy And Lactation Text: It is unknown if this medication will adversely affect pregnancy or breast feeding.  You should not take this medication if you are currently pregnant or planning a pregnancy or while breastfeeding.

## 2023-08-14 RX ORDER — OMEPRAZOLE 40 MG/1
CAPSULE, DELAYED RELEASE ORAL
Qty: 90 CAPSULE | Refills: 1 | Status: SHIPPED | OUTPATIENT
Start: 2023-08-14

## 2023-08-21 RX ORDER — FAMOTIDINE 20 MG/1
20 TABLET, FILM COATED ORAL NIGHTLY
Qty: 90 TABLET | Refills: 0 | Status: SHIPPED | OUTPATIENT
Start: 2023-08-21 | End: 2023-10-23 | Stop reason: SDUPTHER

## 2023-08-21 RX ORDER — GABAPENTIN 800 MG/1
800 TABLET ORAL 3 TIMES DAILY
Qty: 270 TABLET | Refills: 0 | Status: SHIPPED | OUTPATIENT
Start: 2023-08-21 | End: 2023-09-28 | Stop reason: SDUPTHER

## 2023-08-21 NOTE — TELEPHONE ENCOUNTER
Refill Routing Note   Medication(s) are not appropriate for processing by [facility] for the following reason(s):      No active prescription written by provider    [facility] action(s):  Defer Care Due:  None identified            Appointments  past 12m or future 3m with PCP    Date Provider   Last Visit   6/2/2023 Pawel Badillo III, MD   Next Visit   Visit date not found Pawel Badillo III, MD   ED visits in past 90 days: 0        Note composed:4:48 PM 08/21/2023

## 2023-08-21 NOTE — TELEPHONE ENCOUNTER
No care due was identified.  [facility] Embedded Care Due Messages. Reference number: [id number].   8/21/2023 3:42:49 PM CDT

## 2023-08-25 RX ORDER — LORAZEPAM 1 MG/1
TABLET ORAL
Qty: 30 TABLET | Refills: 0 | Status: SHIPPED | OUTPATIENT
Start: 2023-08-25

## 2023-08-25 NOTE — TELEPHONE ENCOUNTER
No care due was identified.  [facility] Embedded Care Due Messages. Reference number: [id number].   8/25/2023 10:40:08 AM CDT

## 2023-08-28 DIAGNOSIS — I73.9 CLAUDICATION: Primary | ICD-10-CM

## 2023-08-29 ENCOUNTER — TELEPHONE (OUTPATIENT)
Dept: CARDIOLOGY | Facility: CLINIC | Age: 78
End: 2023-08-29
Payer: MEDICARE

## 2023-08-29 ENCOUNTER — TELEPHONE (OUTPATIENT)
Dept: CARDIOLOGY | Facility: CLINIC | Age: 78
End: 2023-08-29

## 2023-08-29 NOTE — TELEPHONE ENCOUNTER
----- Message from Makenna Leo sent at 8/29/2023  9:36 AM CDT -----  Contact: pt [phone]  Type: Needs Medical Advice  Who Called:  Pt     Best Call Back Number: [phone]    Additional Information: Pt calling to ask if we do Segmental Pressure Lower Extremity at [facility] office. [facility] machine is broken. Pls call back and advise

## 2023-08-29 NOTE — TELEPHONE ENCOUNTER
----- Message from Bailey Ricci sent at 8/29/2023 11:56 AM CDT -----  Contact: patient  Type:  Patient Returning Call    Who Called:  patient  Who Left Message for Patient:  carley  Does the patient know what this is regarding?:    Best Call Back Number:  [phone] (home)   Additional Information:

## 2023-08-29 NOTE — TELEPHONE ENCOUNTER
Spoke with pt and advised that we can do test here but need correct order to be able to schedule. Pt VU and will get order changed.

## 2023-08-31 ENCOUNTER — CLINICAL SUPPORT (OUTPATIENT)
Dept: CARDIOLOGY | Facility: HOSPITAL | Age: 78
End: 2023-08-31
Attending: INTERNAL MEDICINE
Payer: MEDICARE

## 2023-08-31 DIAGNOSIS — I73.9 CLAUDICATION: ICD-10-CM

## 2023-08-31 LAB
LEFT ABI: 1.07
LEFT ARM BP: 147 MMHG
LEFT DORSALIS PEDIS: 158 MMHG
LEFT POSTERIOR TIBIAL: 151 MMHG
RIGHT ABI: 1.03
RIGHT ARM BP: 135 MMHG
RIGHT DORSALIS PEDIS: 152 MMHG
RIGHT POSTERIOR TIBIAL: 147 MMHG

## 2023-08-31 PROCEDURE — 93922 UPR/L XTREMITY ART 2 LEVELS: CPT | Mod: HCNC,PO

## 2023-08-31 PROCEDURE — 93922 UPR/L XTREMITY ART 2 LEVELS: CPT | Mod: 26,HCNC,, | Performed by: INTERNAL MEDICINE

## 2023-08-31 PROCEDURE — 93922 ANKLE BRACHIAL INDICES (ABI): ICD-10-PCS | Mod: 26,HCNC,, | Performed by: INTERNAL MEDICINE

## 2023-09-08 ENCOUNTER — HOSPITAL ENCOUNTER (OUTPATIENT)
Dept: CARDIOLOGY | Facility: HOSPITAL | Age: 78
Discharge: HOME | End: 2023-09-08
Attending: NURSE PRACTITIONER
Payer: MEDICARE

## 2023-09-08 VITALS — WEIGHT: 228 LBS | HEIGHT: 69 IN | BODY MASS INDEX: 33.77 KG/M2

## 2023-09-08 DIAGNOSIS — I50.9 CONGESTIVE HEART FAILURE, UNSPECIFIED HF CHRONICITY, UNSPECIFIED HEART FAILURE TYPE: ICD-10-CM

## 2023-09-08 PROCEDURE — 93306 TTE W/DOPPLER COMPLETE: CPT

## 2023-09-08 PROCEDURE — 93306 TTE W/DOPPLER COMPLETE: CPT | Mod: 26,,, | Performed by: GENERAL PRACTICE

## 2023-09-08 PROCEDURE — 93306 ECHO (CUPID ONLY): ICD-10-PCS | Mod: 26,,, | Performed by: GENERAL PRACTICE

## 2023-09-15 LAB
AORTIC ROOT ANNULUS: 3.6 CM
AORTIC VALVE CUSP SEPERATION: 1.9 CM
AV INDEX (PROSTH): 0.76
AV MEAN GRADIENT: 3 MMHG
AV PEAK GRADIENT: 5 MMHG
AV VALVE AREA BY VELOCITY RATIO: 2.38 CM²
AV VALVE AREA: 2.63 CM²
AV VELOCITY RATIO: 0.69
BSA FOR ECHO PROCEDURE: 2.24 M2
CV ECHO LV RWT: 0.34 CM
DOP CALC AO PEAK VEL: 1.09 M/S
DOP CALC AO VTI: 23.2 CM
DOP CALC LVOT AREA: 3.5 CM2
DOP CALC LVOT DIAMETER: 2.1 CM
DOP CALC LVOT PEAK VEL: 0.75 M/S
DOP CALC LVOT STROKE VOLUME: 60.93 CM3
DOP CALCLVOT PEAK VEL VTI: 17.6 CM
E WAVE DECELERATION TIME: 158 MSEC
E/A RATIO: 1.16
E/E' RATIO: 12.67 M/S
ECHO LV POSTERIOR WALL: 0.88 CM (ref 0.6–1.1)
FRACTIONAL SHORTENING: 30 % (ref 28–44)
INTERVENTRICULAR SEPTUM: 1.02 CM (ref 0.6–1.1)
IVRT: 116 MSEC
LEFT ATRIUM SIZE: 4.3 CM
LEFT INTERNAL DIMENSION IN SYSTOLE: 3.6 CM (ref 2.1–4)
LEFT VENTRICLE DIASTOLIC VOLUME INDEX: 57.8 ML/M2
LEFT VENTRICLE DIASTOLIC VOLUME: 126 ML
LEFT VENTRICLE MASS INDEX: 82 G/M2
LEFT VENTRICLE SYSTOLIC VOLUME INDEX: 25 ML/M2
LEFT VENTRICLE SYSTOLIC VOLUME: 54.4 ML
LEFT VENTRICULAR INTERNAL DIMENSION IN DIASTOLE: 5.14 CM (ref 3.5–6)
LEFT VENTRICULAR MASS: 177.91 G
LV LATERAL E/E' RATIO: 13.57 M/S
LV SEPTAL E/E' RATIO: 11.88 M/S
LVOT MG: 1 MMHG
LVOT MV: 0.52 CM/S
MV PEAK A VEL: 0.82 M/S
MV PEAK E VEL: 0.95 M/S
MV STENOSIS PRESSURE HALF TIME: 53 MS
MV VALVE AREA P 1/2 METHOD: 4.15 CM2
RA PRESSURE ESTIMATED: 3 MMHG
RIGHT VENTRICULAR END-DIASTOLIC DIMENSION: 3.39 CM
TDI LATERAL: 0.07 M/S
TDI SEPTAL: 0.08 M/S
TDI: 0.08 M/S
Z-SCORE OF LEFT VENTRICULAR DIMENSION IN END DIASTOLE: -3.49
Z-SCORE OF LEFT VENTRICULAR DIMENSION IN END SYSTOLE: -1.64

## 2023-09-28 DIAGNOSIS — I10 HYPERTENSION, ESSENTIAL: ICD-10-CM

## 2023-09-28 RX ORDER — GABAPENTIN 800 MG/1
800 TABLET ORAL 3 TIMES DAILY
Qty: 270 TABLET | Refills: 0 | Status: SHIPPED | OUTPATIENT
Start: 2023-09-28 | End: 2024-01-16

## 2023-09-28 RX ORDER — LEVOTHYROXINE SODIUM 125 UG/1
TABLET ORAL
Qty: 90 TABLET | Refills: 0 | Status: SHIPPED | OUTPATIENT
Start: 2023-09-28 | End: 2024-01-04

## 2023-09-28 RX ORDER — LEVOTHYROXINE SODIUM 125 UG/1
TABLET ORAL
Qty: 90 TABLET | Refills: 3 | Status: SHIPPED | OUTPATIENT
Start: 2023-09-28 | End: 2024-01-04 | Stop reason: SDUPTHER

## 2023-10-17 ENCOUNTER — TELEPHONE (OUTPATIENT)
Dept: FAMILY MEDICINE | Facility: CLINIC | Age: 78
End: 2023-10-17
Payer: MEDICARE

## 2023-10-17 NOTE — TELEPHONE ENCOUNTER
----- Message from Jhonatan Rojas sent at 10/17/2023 10:49 AM CDT -----  Type:  RX Refill Request    Who Called:  Patient  Refill or New Rx:  New  RX Name and Strength:  LORazepam (ATIVAN) 1 MG tablet  How is the patient currently taking it? (ex. 1XDay):  1XDay  Is this a 30 day or 90 day RX:  30    Preferred Pharmacy with phone number:    [facility]Please add to file   [address] [place] [address]  [phone]    Local or Mail Order:  Local  Ordering Provider:  Justino Ellis Call Back Number:  [phone]  Additional Information:

## 2023-10-23 ENCOUNTER — OFFICE VISIT (OUTPATIENT)
Dept: FAMILY MEDICINE | Facility: CLINIC | Age: 78
End: 2023-10-23
Payer: MEDICARE

## 2023-10-23 VITALS
SYSTOLIC BLOOD PRESSURE: 122 MMHG | HEART RATE: 70 BPM | DIASTOLIC BLOOD PRESSURE: 70 MMHG | TEMPERATURE: 98 F | WEIGHT: 238.13 LBS | BODY MASS INDEX: 35.27 KG/M2 | HEIGHT: 69 IN | OXYGEN SATURATION: 100 %

## 2023-10-23 DIAGNOSIS — T16.2XXA FOREIGN BODY OF LEFT EAR, INITIAL ENCOUNTER: ICD-10-CM

## 2023-10-23 DIAGNOSIS — I50.42 CHRONIC COMBINED SYSTOLIC AND DIASTOLIC HEART FAILURE: ICD-10-CM

## 2023-10-23 DIAGNOSIS — E03.9 HYPOTHYROIDISM, UNSPECIFIED TYPE: ICD-10-CM

## 2023-10-23 DIAGNOSIS — I10 HYPERTENSION, ESSENTIAL: Primary | ICD-10-CM

## 2023-10-23 DIAGNOSIS — E78.5 HYPERLIPIDEMIA, UNSPECIFIED HYPERLIPIDEMIA TYPE: ICD-10-CM

## 2023-10-23 DIAGNOSIS — Z99.89 USE OF CANE AS AMBULATORY AID: ICD-10-CM

## 2023-10-23 DIAGNOSIS — E66.01 SEVERE OBESITY (BMI 35.0-39.9) WITH COMORBIDITY: ICD-10-CM

## 2023-10-23 DIAGNOSIS — F41.9 ANXIETY: ICD-10-CM

## 2023-10-23 PROCEDURE — 90694 VACC AIIV4 NO PRSRV 0.5ML IM: CPT | Mod: S$GLB,,, | Performed by: FAMILY MEDICINE

## 2023-10-23 PROCEDURE — 1126F PR PAIN SEVERITY QUANTIFIED, NO PAIN PRESENT: ICD-10-PCS | Mod: CPTII,S$GLB,, | Performed by: FAMILY MEDICINE

## 2023-10-23 PROCEDURE — 3078F DIAST BP <80 MM HG: CPT | Mod: CPTII,S$GLB,, | Performed by: FAMILY MEDICINE

## 2023-10-23 PROCEDURE — 3078F PR MOST RECENT DIASTOLIC BLOOD PRESSURE < 80 MM HG: ICD-10-PCS | Mod: CPTII,S$GLB,, | Performed by: FAMILY MEDICINE

## 2023-10-23 PROCEDURE — 1159F MED LIST DOCD IN RCRD: CPT | Mod: CPTII,S$GLB,, | Performed by: FAMILY MEDICINE

## 2023-10-23 PROCEDURE — G0008 FLU VACCINE - QUADRIVALENT - ADJUVANTED: ICD-10-PCS | Mod: S$GLB,,, | Performed by: FAMILY MEDICINE

## 2023-10-23 PROCEDURE — 10120 PR REMOVE FOREIGN BODY SIMPLE: ICD-10-PCS | Mod: S$GLB,,, | Performed by: FAMILY MEDICINE

## 2023-10-23 PROCEDURE — 1101F PT FALLS ASSESS-DOCD LE1/YR: CPT | Mod: CPTII,S$GLB,, | Performed by: FAMILY MEDICINE

## 2023-10-23 PROCEDURE — 10120 INC&RMVL FB SUBQ TISS SMPL: CPT | Mod: S$GLB,,, | Performed by: FAMILY MEDICINE

## 2023-10-23 PROCEDURE — 1160F RVW MEDS BY RX/DR IN RCRD: CPT | Mod: CPTII,S$GLB,, | Performed by: FAMILY MEDICINE

## 2023-10-23 PROCEDURE — 90694 FLU VACCINE - QUADRIVALENT - ADJUVANTED: ICD-10-PCS | Mod: S$GLB,,, | Performed by: FAMILY MEDICINE

## 2023-10-23 PROCEDURE — G0008 ADMIN INFLUENZA VIRUS VAC: HCPCS | Mod: S$GLB,,, | Performed by: FAMILY MEDICINE

## 2023-10-23 PROCEDURE — 1126F AMNT PAIN NOTED NONE PRSNT: CPT | Mod: CPTII,S$GLB,, | Performed by: FAMILY MEDICINE

## 2023-10-23 PROCEDURE — 3288F FALL RISK ASSESSMENT DOCD: CPT | Mod: CPTII,S$GLB,, | Performed by: FAMILY MEDICINE

## 2023-10-23 PROCEDURE — 1159F PR MEDICATION LIST DOCUMENTED IN MEDICAL RECORD: ICD-10-PCS | Mod: CPTII,S$GLB,, | Performed by: FAMILY MEDICINE

## 2023-10-23 PROCEDURE — 3074F SYST BP LT 130 MM HG: CPT | Mod: CPTII,S$GLB,, | Performed by: FAMILY MEDICINE

## 2023-10-23 PROCEDURE — 1101F PR PT FALLS ASSESS DOC 0-1 FALLS W/OUT INJ PAST YR: ICD-10-PCS | Mod: CPTII,S$GLB,, | Performed by: FAMILY MEDICINE

## 2023-10-23 PROCEDURE — 99214 OFFICE O/P EST MOD 30 MIN: CPT | Mod: 25,S$GLB,, | Performed by: FAMILY MEDICINE

## 2023-10-23 PROCEDURE — 3074F PR MOST RECENT SYSTOLIC BLOOD PRESSURE < 130 MM HG: ICD-10-PCS | Mod: CPTII,S$GLB,, | Performed by: FAMILY MEDICINE

## 2023-10-23 PROCEDURE — 99214 PR OFFICE/OUTPT VISIT, EST, LEVL IV, 30-39 MIN: ICD-10-PCS | Mod: 25,S$GLB,, | Performed by: FAMILY MEDICINE

## 2023-10-23 PROCEDURE — 1160F PR REVIEW ALL MEDS BY PRESCRIBER/CLIN PHARMACIST DOCUMENTED: ICD-10-PCS | Mod: CPTII,S$GLB,, | Performed by: FAMILY MEDICINE

## 2023-10-23 PROCEDURE — 3288F PR FALLS RISK ASSESSMENT DOCUMENTED: ICD-10-PCS | Mod: CPTII,S$GLB,, | Performed by: FAMILY MEDICINE

## 2023-10-23 RX ORDER — METOPROLOL SUCCINATE 50 MG/1
25 TABLET, EXTENDED RELEASE ORAL NIGHTLY
Qty: 90 TABLET | Refills: 1 | Status: SHIPPED | OUTPATIENT
Start: 2023-10-23

## 2023-10-23 RX ORDER — LORAZEPAM 1 MG/1
1 TABLET ORAL NIGHTLY
Qty: 90 TABLET | Refills: 0 | Status: SHIPPED | OUTPATIENT
Start: 2023-10-23 | End: 2024-02-01 | Stop reason: SDUPTHER

## 2023-10-23 RX ORDER — LORAZEPAM 1 MG/1
1 TABLET ORAL NIGHTLY
Qty: 30 TABLET | Refills: 0 | Status: CANCELLED | OUTPATIENT
Start: 2023-10-23

## 2023-10-23 RX ORDER — LORAZEPAM 1 MG/1
TABLET ORAL
Qty: 30 TABLET | Refills: 0 | Status: CANCELLED | OUTPATIENT
Start: 2023-10-23

## 2023-10-23 RX ORDER — SPIRONOLACTONE 25 MG/1
25 TABLET ORAL DAILY
Qty: 90 TABLET | Refills: 1 | Status: SHIPPED | OUTPATIENT
Start: 2023-10-23 | End: 2024-04-03

## 2023-10-23 RX ORDER — FAMOTIDINE 20 MG/1
20 TABLET, FILM COATED ORAL NIGHTLY
Qty: 90 TABLET | Refills: 1 | Status: SHIPPED | OUTPATIENT
Start: 2023-10-23 | End: 2024-03-28

## 2023-10-23 RX ORDER — AMITRIPTYLINE HYDROCHLORIDE 10 MG/1
10 TABLET, FILM COATED ORAL NIGHTLY PRN
Qty: 90 TABLET | Refills: 1 | Status: SHIPPED | OUTPATIENT
Start: 2023-10-23

## 2023-10-24 PROBLEM — Z99.89 USE OF CANE AS AMBULATORY AID: Status: ACTIVE | Noted: 2023-10-24

## 2023-10-25 NOTE — PROGRESS NOTES
Subjective:       Patient ID: Marisela Eagle is a 78 y.o. female.    Chief Complaint: Medication Refill    Follow-up of hypertension.  Blood pressure under good control.  Cardiovascular chest pain or palpitations.  Does have hyperlipidemia.  Chronic combined systolic and diastolic heart failure.  No PND orthopnea.  BMI is 35.  Uses cane for ambulation.  Also has hypothyroidism.  Anxiety issues.  Has venous insufficiency has some lower extremity swelling.  Also used a Q-tip in her left ear today.  In the tip came off.    Physical examination.  Vital signs noted.  Neck without bruit.  Chest clear.  Heart regular rate rhythm.  Right ear normal.  Left ear Q-tip covering is in the lower ear canal.  Abdomen bowel sounds are positive soft nontender.  Extremities trace pedal edema.        Objective:        Assessment:       1. Hypertension, essential    2. Chronic combined systolic and diastolic heart failure    3. Hyperlipidemia, unspecified hyperlipidemia type    4. Severe obesity (BMI 35.0-39.9) with comorbidity    5. Use of cane as ambulatory aid    6. Hypothyroidism, unspecified type    7. Anxiety    8. Foreign body of left ear, initial encounter        Plan:       Hypertension, essential    Chronic combined systolic and diastolic heart failure    Hyperlipidemia, unspecified hyperlipidemia type  -     Lipid Panel; Future; Expected date: 10/23/2023    Severe obesity (BMI 35.0-39.9) with comorbidity    Use of cane as ambulatory aid    Hypothyroidism, unspecified type  -     TSH; Future; Expected date: 10/23/2023    Anxiety    Foreign body of left ear, initial encounter    Other orders  -     amitriptyline (ELAVIL) 10 MG tablet; Take 1 tablet (10 mg total) by mouth nightly as needed for Pain.  Dispense: 90 tablet; Refill: 1  -     famotidine (PEPCID) 20 MG tablet; Take 1 tablet (20 mg total) by mouth every evening.  Dispense: 90 tablet; Refill: 1  -     LORazepam (ATIVAN) 1 MG tablet; Take 1 tablet (1 mg total) by mouth  every evening.  Dispense: 90 tablet; Refill: 0  -     metoprolol succinate (TOPROL-XL) 50 MG 24 hr tablet; Take 0.5 tablets (25 mg total) by mouth every evening.  Dispense: 90 tablet; Refill: 1  -     spironolactone (ALDACTONE) 25 MG tablet; Take 1 tablet (25 mg total) by mouth once daily.  Dispense: 90 tablet; Refill: 1  -     Influenza - Quadrivalent (Adjuvanted)      Flu shot high-dose today.  Lipids and TSH ordered.  Same medications.  Ativan p.r.n..  Restrict sodium and water intake.    Procedure note.  Patient had tip of Q-tip come off in the left ear this morning.  No pain with they are unable to remove it at home.    [place]ton Q-tip end found in the left ear canal.  Lower.  Forceps used to gently remove the Q-tip from the ear.  Without difficulty.  No erythema behind the Q-tip.  Follow-up as needed.

## 2023-10-30 ENCOUNTER — LAB VISIT (OUTPATIENT)
Dept: LAB | Facility: HOSPITAL | Age: 78
End: 2023-10-30
Attending: FAMILY MEDICINE
Payer: MEDICARE

## 2023-10-30 DIAGNOSIS — E03.9 HYPOTHYROIDISM, UNSPECIFIED TYPE: ICD-10-CM

## 2023-10-30 DIAGNOSIS — E78.5 HYPERLIPIDEMIA, UNSPECIFIED HYPERLIPIDEMIA TYPE: ICD-10-CM

## 2023-10-30 LAB
CHOLEST SERPL-MCNC: 222 MG/DL (ref 120–199)
CHOLEST/HDLC SERPL: 3.1 {RATIO} (ref 2–5)
HDLC SERPL-MCNC: 72 MG/DL (ref 40–75)
HDLC SERPL: 32.4 % (ref 20–50)
LDLC SERPL CALC-MCNC: 129.6 MG/DL (ref 63–159)
NONHDLC SERPL-MCNC: 150 MG/DL
TRIGL SERPL-MCNC: 102 MG/DL (ref 30–150)
TSH SERPL DL<=0.005 MIU/L-ACNC: 0.88 UIU/ML (ref 0.34–5.6)

## 2023-10-30 PROCEDURE — 80061 LIPID PANEL: CPT | Performed by: FAMILY MEDICINE

## 2023-10-30 PROCEDURE — 84443 ASSAY THYROID STIM HORMONE: CPT | Performed by: FAMILY MEDICINE

## 2023-10-30 PROCEDURE — 36415 COLL VENOUS BLD VENIPUNCTURE: CPT | Performed by: FAMILY MEDICINE

## 2023-11-02 ENCOUNTER — TELEPHONE (OUTPATIENT)
Dept: FAMILY MEDICINE | Facility: CLINIC | Age: 78
End: 2023-11-02
Payer: MEDICARE

## 2023-11-02 NOTE — TELEPHONE ENCOUNTER
Notified pt labs stable fu as rec, also she had question re elavil , not same as ativan and also the ativan was sent samr day to [facility] to ck with them.

## 2023-11-22 ENCOUNTER — OFFICE VISIT (OUTPATIENT)
Dept: CARDIOLOGY | Facility: CLINIC | Age: 78
End: 2023-11-22
Payer: MEDICARE

## 2023-11-22 ENCOUNTER — TELEPHONE (OUTPATIENT)
Dept: FAMILY MEDICINE | Facility: CLINIC | Age: 78
End: 2023-11-22
Payer: MEDICARE

## 2023-11-22 VITALS
HEART RATE: 80 BPM | RESPIRATION RATE: 16 BRPM | OXYGEN SATURATION: 98 % | BODY MASS INDEX: 35.4 KG/M2 | HEIGHT: 69 IN | WEIGHT: 239 LBS | DIASTOLIC BLOOD PRESSURE: 72 MMHG | SYSTOLIC BLOOD PRESSURE: 132 MMHG

## 2023-11-22 DIAGNOSIS — E87.6 HYPOKALEMIA: ICD-10-CM

## 2023-11-22 DIAGNOSIS — I10 HYPERTENSION, ESSENTIAL: ICD-10-CM

## 2023-11-22 DIAGNOSIS — I50.9 CONGESTIVE HEART FAILURE, UNSPECIFIED HF CHRONICITY, UNSPECIFIED HEART FAILURE TYPE: ICD-10-CM

## 2023-11-22 DIAGNOSIS — E78.2 MIXED HYPERLIPIDEMIA: ICD-10-CM

## 2023-11-22 DIAGNOSIS — R07.9 CHEST PAIN, UNSPECIFIED TYPE: ICD-10-CM

## 2023-11-22 DIAGNOSIS — I50.20 HFREF (HEART FAILURE WITH REDUCED EJECTION FRACTION): Primary | ICD-10-CM

## 2023-11-22 DIAGNOSIS — I87.2 VENOUS INSUFFICIENCY: ICD-10-CM

## 2023-11-22 DIAGNOSIS — E03.9 HYPOTHYROIDISM, UNSPECIFIED TYPE: ICD-10-CM

## 2023-11-22 PROCEDURE — 1159F PR MEDICATION LIST DOCUMENTED IN MEDICAL RECORD: ICD-10-PCS | Mod: HCNC,CPTII,S$GLB, | Performed by: INTERNAL MEDICINE

## 2023-11-22 PROCEDURE — 3288F FALL RISK ASSESSMENT DOCD: CPT | Mod: HCNC,CPTII,S$GLB, | Performed by: INTERNAL MEDICINE

## 2023-11-22 PROCEDURE — 1160F RVW MEDS BY RX/DR IN RCRD: CPT | Mod: HCNC,CPTII,S$GLB, | Performed by: INTERNAL MEDICINE

## 2023-11-22 PROCEDURE — 99214 PR OFFICE/OUTPT VISIT, EST, LEVL IV, 30-39 MIN: ICD-10-PCS | Mod: HCNC,S$GLB,, | Performed by: INTERNAL MEDICINE

## 2023-11-22 PROCEDURE — 93000 ELECTROCARDIOGRAM COMPLETE: CPT | Mod: HCNC,S$GLB,, | Performed by: INTERNAL MEDICINE

## 2023-11-22 PROCEDURE — 93000 EKG 12-LEAD: ICD-10-PCS | Mod: HCNC,S$GLB,, | Performed by: INTERNAL MEDICINE

## 2023-11-22 PROCEDURE — 3078F DIAST BP <80 MM HG: CPT | Mod: HCNC,CPTII,S$GLB, | Performed by: INTERNAL MEDICINE

## 2023-11-22 PROCEDURE — 1125F AMNT PAIN NOTED PAIN PRSNT: CPT | Mod: HCNC,CPTII,S$GLB, | Performed by: INTERNAL MEDICINE

## 2023-11-22 PROCEDURE — 1159F MED LIST DOCD IN RCRD: CPT | Mod: HCNC,CPTII,S$GLB, | Performed by: INTERNAL MEDICINE

## 2023-11-22 PROCEDURE — 1100F PR PT FALLS ASSESS DOC 2+ FALLS/FALL W/INJURY/YR: ICD-10-PCS | Mod: HCNC,CPTII,S$GLB, | Performed by: INTERNAL MEDICINE

## 2023-11-22 PROCEDURE — 3078F PR MOST RECENT DIASTOLIC BLOOD PRESSURE < 80 MM HG: ICD-10-PCS | Mod: HCNC,CPTII,S$GLB, | Performed by: INTERNAL MEDICINE

## 2023-11-22 PROCEDURE — 1160F PR REVIEW ALL MEDS BY PRESCRIBER/CLIN PHARMACIST DOCUMENTED: ICD-10-PCS | Mod: HCNC,CPTII,S$GLB, | Performed by: INTERNAL MEDICINE

## 2023-11-22 PROCEDURE — 1100F PTFALLS ASSESS-DOCD GE2>/YR: CPT | Mod: HCNC,CPTII,S$GLB, | Performed by: INTERNAL MEDICINE

## 2023-11-22 PROCEDURE — 3075F PR MOST RECENT SYSTOLIC BLOOD PRESS GE 130-139MM HG: ICD-10-PCS | Mod: HCNC,CPTII,S$GLB, | Performed by: INTERNAL MEDICINE

## 2023-11-22 PROCEDURE — 3075F SYST BP GE 130 - 139MM HG: CPT | Mod: HCNC,CPTII,S$GLB, | Performed by: INTERNAL MEDICINE

## 2023-11-22 PROCEDURE — 1125F PR PAIN SEVERITY QUANTIFIED, PAIN PRESENT: ICD-10-PCS | Mod: HCNC,CPTII,S$GLB, | Performed by: INTERNAL MEDICINE

## 2023-11-22 PROCEDURE — 99999 PR PBB SHADOW E&M-EST. PATIENT-LVL V: ICD-10-PCS | Mod: PBBFAC,HCNC,, | Performed by: INTERNAL MEDICINE

## 2023-11-22 PROCEDURE — 99999 PR PBB SHADOW E&M-EST. PATIENT-LVL V: CPT | Mod: PBBFAC,HCNC,, | Performed by: INTERNAL MEDICINE

## 2023-11-22 PROCEDURE — 3288F PR FALLS RISK ASSESSMENT DOCUMENTED: ICD-10-PCS | Mod: HCNC,CPTII,S$GLB, | Performed by: INTERNAL MEDICINE

## 2023-11-22 PROCEDURE — 99214 OFFICE O/P EST MOD 30 MIN: CPT | Mod: HCNC,S$GLB,, | Performed by: INTERNAL MEDICINE

## 2023-11-22 NOTE — PROGRESS NOTES
Subjective:    Patient ID:  Marisela Eagle is a 78 y.o. female     Chief Complaint   Patient presents with    Hyperlipidemia       HPI:  Ms Marisela Eagle is a 78 y.o. female is here for follow-up.    Patient used to follow-up with Dr. Michael and Dr. Michael h had retired.    Patient is doing well except that she does have episodes of pain and her neuropathy really causes problem to her.  Her breathing is stable her blood pressure has been stable denies any chest pain denies any loss of consciousness.  She is taking all her medications regularly.    Review of patient's allergies indicates:  No Known Allergies    Past Medical History:   Diagnosis Date    Bilateral knee pain 2012    left worse, right knee painful even after partial replacement.    Bruises easily     Clot ?    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 Took Coumadin x 6 weeks    KNEE ARTHROSCOPY  2010    right    TONSILLECTOMY       Social History     Tobacco Use    Smoking status: Former     Current packs/day: 0.00     Types: Cigarettes     Quit date: 3/23/2012     Years since quittin.6    Smokeless tobacco: Never   Substance Use Topics    Alcohol use: Yes      Comment: occasional    Drug use: No     Family History   Problem Relation Age of Onset    Neuropathy Mother     Hypertension Mother     Stroke Mother     Neuropathy Father     Diabetes Maternal Grandmother     Cancer Daughter     Melanoma Neg Hx     Psoriasis Neg Hx     Lupus Neg Hx     Eczema Neg Hx         Review of Systems:   Constitution: Negative for diaphoresis and fever.   HEENT: Negative for nosebleeds.    Cardiovascular: Negative for chest pain       Intermittent dyspnea on exertion       Intermittent leg swelling        No palpitations  Respiratory: Negative for shortness of breath and wheezing.    Hematologic/Lymphatic: Negative for bleeding problem. Does not bruise/bleed easily.   Skin: Negative for color change and rash.   Musculoskeletal: Negative for falls and myalgias.   Gastrointestinal: Negative for hematemesis and hematochezia.   Genitourinary: Negative for hematuria.   Neurological: Negative for dizziness and light-headedness.   Psychiatric/Behavioral: Negative for altered mental status and memory loss.          Objective:        Vitals:    11/22/23 1552   BP: 132/72   Pulse: 80   Resp: 16       Lab Results   Component Value Date    WBC 4.61 05/28/2023    HGB 11.5 (L) 05/28/2023    HCT 36.6 (L) 05/28/2023     05/28/2023    CHOL 222 (H) 10/30/2023    TRIG 102 10/30/2023    HDL 72 10/30/2023    ALT 24 05/28/2023    AST 25 05/28/2023     (L) 07/31/2023    K 4.9 07/31/2023    CL 98 07/31/2023    CREATININE 0.7 07/31/2023    BUN 17 07/31/2023    CO2 24 07/31/2023    TSH 0.881 10/30/2023    INR 1.1 09/17/2019    HGBA1C 5.3 06/23/2023        ECHOCARDIOGRAM RESULTS  Results for orders placed during the hospital encounter of 09/08/23    Echo    Interpretation Summary    Left Ventricle: The left ventricle is normal in size. Normal wall thickness. Normal wall motion. There is normal systolic function with a visually estimated ejection fraction of 60 - 65%. There is normal diastolic function.     Left Atrium: Left atrium is mildly dilated.    Right Ventricle: Mild right ventricular enlargement. Wall thickness is normal. Right ventricle wall motion  is normal. Systolic function is normal.    Mitral Valve: There is mild mitral annular calcification present.    IVC/SVC: Normal venous pressure at 3 mmHg.        CURRENT/PREVIOUS VISIT EKG  Results for orders placed or performed during the hospital encounter of 05/27/23   EKG 12-lead    Collection Time: 05/27/23 10:54 AM    Narrative    Test Reason : R06.02,    Vent. Rate : 079 BPM     Atrial Rate : 079 BPM     P-R Int : 220 ms          QRS Dur : 104 ms      QT Int : 422 ms       P-R-T Axes : 044 -20 089 degrees     QTc Int : 483 ms    Sinus rhythm with marked sinus arrythmia with 1st degree A-V block  Incomplete right bundle branch block  Septal infarct ,age undetermined  Abnormal ECG  When compared with ECG of 04-JAN-2022 14:16,  Incomplete right bundle branch block is now Present  Septal infarct is now Present  Confirmed by Angela VIVAS, Farrukh SU ([id number]) on 5/29/2023 9:28:33 PM    Referred By: [email or web address]   SELF           Confirmed By:Farrukh Miller MD     No valid procedures specified.   Results for orders placed in visit on 02/21/22    Nuclear Stress Test    Interpretation Summary    PET CT Findings The nuclear portion of this study will be reported separately.    The EKG portion of this study is negative for ischemia.    The patient reported no chest pain during the stress test.    There were no arrhythmias during stress.      Physical Exam:  CONSTITUTIONAL: No fever, no chills  HEENT: Normocephalic, atraumatic,pupils reactive to light                 NECK:  No JVD no carotid bruit  CVS: S1S2+, RRR, systolic murmurs,   LUNGS: Clear  ABDOMEN: Soft, NT, BS+  EXTREMITIES: No cyanosis, edema  : No lynn catheter  NEURO: AAO X 3  PSY: Normal affect      Medication List with Changes/Refills   Current Medications    ALBUTEROL (VENTOLIN HFA) 90 MCG/ACTUATION INHALER     Inhale 2 puffs into the lungs every 6 (six) hours as needed for Wheezing. Rescue    AMITRIPTYLINE (ELAVIL) 10 MG TABLET    Take 1 tablet (10 mg total) by mouth nightly as needed for Pain.    CETIRIZINE (ZYRTEC) 10 MG TABLET    Take 1 tablet (10 mg total) by mouth once daily.    CHOLECALCIFEROL, VITAMIN D3, 5,000 UNIT CAPSULE    Take 5,000 Units by mouth once daily.    CYANOCOBALAMIN (VITAMIN B-12) 1000 MCG TABLET    Take 5,000 mcg by mouth once daily.    DULOXETINE (CYMBALTA) 60 MG CAPSULE    Take 60 mg by mouth once daily.    FAMOTIDINE (PEPCID) 20 MG TABLET    Take 1 tablet (20 mg total) by mouth every evening.    FLUTICASONE PROPIONATE (FLONASE) 50 MCG/ACTUATION NASAL SPRAY    1 spray (50 mcg total) by Each Nostril route once daily.    FUROSEMIDE (LASIX) 40 MG TABLET    Take 1 tablet (40 mg total) by mouth every Tues, Fri. Take daily    GABAPENTIN (NEURONTIN) 800 MG TABLET    Take 1 tablet (800 mg total) by mouth 3 (three) times daily.    LEVOTHYROXINE (SYNTHROID) 125 MCG TABLET    One a day before breakfast    LEVOTHYROXINE (SYNTHROID) 125 MCG TABLET    TAKE 1 TABLET EVERY DAY BEFORE BREAKFAST    LORAZEPAM (ATIVAN) 1 MG TABLET    Take 1 mg by mouth every evening.    LORAZEPAM (ATIVAN) 1 MG TABLET    Take 1 tablet by mouth once daily at bedtime  as needed for anxiety    LORAZEPAM (ATIVAN) 1 MG TABLET    Take 1 tablet (1 mg total) by mouth every evening.    LOSARTAN (COZAAR) 100 MG TABLET    Take 0.5 tablets (50 mg total) by mouth 2 (two) times a day.    MAGNESIUM 30 MG TAB    Take 1 tablet by mouth once daily.    METOPROLOL SUCCINATE (TOPROL-XL) 50 MG 24 HR TABLET    Take 0.5 tablets (25 mg total) by mouth every evening.    OMEPRAZOLE (PRILOSEC) 40 MG CAPSULE    TAKE 1 CAPSULE EVERY DAY    OXYCODONE-ACETAMINOPHEN (PERCOCET)  MG PER TABLET    Take 1 tablet by mouth every 6 (six) hours as needed.     OXYCODONE-ACETAMINOPHEN (PERCOCET)  MG PER TABLET    Take 1 tablet by mouth every 8 (eight) hours as  needed for Pain.    SPIRONOLACTONE (ALDACTONE) 25 MG TABLET    Take 1 tablet (25 mg total) by mouth once daily.    TRIAMCINOLONE ACETONIDE 0.1% (KENALOG) 0.1 % OINTMENT    Apply topically 2 (two) times daily.    TURMERIC 400 MG CAP    Take 400 mg by mouth once daily.    VIT C/E/ZN/COPPR/LUTEIN/ZEAXAN (PRESERVISION AREDS-2 ORAL)    Take 1 capsule by mouth once daily.             Assessment:       1. HFrEF (heart failure with reduced ejection fraction)    2. Hypertension, essential    3. Congestive heart failure, unspecified HF chronicity, unspecified heart failure type    4. Mixed hyperlipidemia    5. Hypothyroidism, unspecified type    6. BMI 34.0-34.9,adult    7. Hypokalemia    8. Venous insufficiency    9. Chest pain, unspecified type         Plan:   1. Heart failure with reduced ejection fraction.    Patient is doing well at the present time she is on spironolactone 25 mg p.o. daily metoprolol succinate 25 mg p.o. HS and patient to continue the same.  She takes Lasix intermittently as needed when she wants to.  She also takes potassium discussed with patient to take potassium only when she takes Lasix.  2. Essential hypertension  Her blood pressure is well controlled is 132/72 and patient to continue her current medications.  Including losartan 50 mg p.o. b.i.d. and metoprolol succinate 25 mg p.o. daily and spironolactone 25 mg p.o. daily    3. Hypothyroidism   He is currently on levothyroxine 125 mcg p.o. daily continue the same.  4. EKG  Reviewed EKG independently patient is in normal sinus rhythm with sinus arrhythmia with a heart rate of 80 beats per minute with left axis deviation and nonspecific ST T-wave changes.  5. Hypokalemia   Patient is currently on spironolactone 25 mg p.o. daily continue the same and patient follows up with the primary physician is checking her blood work.  6. Patient to continue current management and I will see her back in the office in 6 months time.          Problem List Items  Addressed This Visit          Cardiac/Vascular    Hypertension, essential    Hyperlipidemia       Endocrine    Hypothyroid    BMI 34.0-34.9,adult     Other Visit Diagnoses       HFrEF (heart failure with reduced ejection fraction)    -  Primary    Congestive heart failure, unspecified HF chronicity, unspecified heart failure type        Hypokalemia        Venous insufficiency        Chest pain, unspecified type        Relevant Orders    IN OFFICE EKG 12-LEAD (to [place])            No follow-ups on file.

## 2023-11-27 ENCOUNTER — OFFICE VISIT (OUTPATIENT)
Dept: FAMILY MEDICINE | Facility: CLINIC | Age: 78
End: 2023-11-27
Payer: MEDICARE

## 2023-11-27 VITALS
WEIGHT: 240.06 LBS | HEIGHT: 69 IN | DIASTOLIC BLOOD PRESSURE: 76 MMHG | TEMPERATURE: 99 F | BODY MASS INDEX: 35.56 KG/M2 | OXYGEN SATURATION: 97 % | HEART RATE: 64 BPM | SYSTOLIC BLOOD PRESSURE: 130 MMHG

## 2023-11-27 DIAGNOSIS — Z00.00 ENCOUNTER FOR MEDICARE ANNUAL WELLNESS EXAM: ICD-10-CM

## 2023-11-27 DIAGNOSIS — R26.9 ABNORMALITY OF GAIT AND MOBILITY: ICD-10-CM

## 2023-11-27 DIAGNOSIS — I70.0 ATHEROSCLEROSIS OF AORTA: ICD-10-CM

## 2023-11-27 DIAGNOSIS — M51.9 LUMBAR DISC DISEASE: ICD-10-CM

## 2023-11-27 DIAGNOSIS — Z00.00 ENCOUNTER FOR PREVENTIVE HEALTH EXAMINATION: Primary | ICD-10-CM

## 2023-11-27 DIAGNOSIS — I50.20 HFREF (HEART FAILURE WITH REDUCED EJECTION FRACTION): ICD-10-CM

## 2023-11-27 DIAGNOSIS — I10 HYPERTENSION, ESSENTIAL: ICD-10-CM

## 2023-11-27 PROCEDURE — 99999 PR PBB SHADOW E&M-EST. PATIENT-LVL V: CPT | Mod: PBBFAC,HCNC,, | Performed by: NURSE PRACTITIONER

## 2023-11-27 PROCEDURE — 1160F RVW MEDS BY RX/DR IN RCRD: CPT | Mod: HCNC,CPTII,S$GLB, | Performed by: NURSE PRACTITIONER

## 2023-11-27 PROCEDURE — 1125F AMNT PAIN NOTED PAIN PRSNT: CPT | Mod: HCNC,CPTII,S$GLB, | Performed by: NURSE PRACTITIONER

## 2023-11-27 PROCEDURE — 1170F PR FUNCTIONAL STATUS ASSESSED: ICD-10-PCS | Mod: HCNC,CPTII,S$GLB, | Performed by: NURSE PRACTITIONER

## 2023-11-27 PROCEDURE — 1170F FXNL STATUS ASSESSED: CPT | Mod: HCNC,CPTII,S$GLB, | Performed by: NURSE PRACTITIONER

## 2023-11-27 PROCEDURE — 3288F PR FALLS RISK ASSESSMENT DOCUMENTED: ICD-10-PCS | Mod: HCNC,CPTII,S$GLB, | Performed by: NURSE PRACTITIONER

## 2023-11-27 PROCEDURE — 1159F PR MEDICATION LIST DOCUMENTED IN MEDICAL RECORD: ICD-10-PCS | Mod: HCNC,CPTII,S$GLB, | Performed by: NURSE PRACTITIONER

## 2023-11-27 PROCEDURE — 3075F SYST BP GE 130 - 139MM HG: CPT | Mod: HCNC,CPTII,S$GLB, | Performed by: NURSE PRACTITIONER

## 2023-11-27 PROCEDURE — 1101F PT FALLS ASSESS-DOCD LE1/YR: CPT | Mod: HCNC,CPTII,S$GLB, | Performed by: NURSE PRACTITIONER

## 2023-11-27 PROCEDURE — 3078F PR MOST RECENT DIASTOLIC BLOOD PRESSURE < 80 MM HG: ICD-10-PCS | Mod: HCNC,CPTII,S$GLB, | Performed by: NURSE PRACTITIONER

## 2023-11-27 PROCEDURE — 1160F PR REVIEW ALL MEDS BY PRESCRIBER/CLIN PHARMACIST DOCUMENTED: ICD-10-PCS | Mod: HCNC,CPTII,S$GLB, | Performed by: NURSE PRACTITIONER

## 2023-11-27 PROCEDURE — 3078F DIAST BP <80 MM HG: CPT | Mod: HCNC,CPTII,S$GLB, | Performed by: NURSE PRACTITIONER

## 2023-11-27 PROCEDURE — G0439 PR MEDICARE ANNUAL WELLNESS SUBSEQUENT VISIT: ICD-10-PCS | Mod: HCNC,S$GLB,, | Performed by: NURSE PRACTITIONER

## 2023-11-27 PROCEDURE — 3075F PR MOST RECENT SYSTOLIC BLOOD PRESS GE 130-139MM HG: ICD-10-PCS | Mod: HCNC,CPTII,S$GLB, | Performed by: NURSE PRACTITIONER

## 2023-11-27 PROCEDURE — 1125F PR PAIN SEVERITY QUANTIFIED, PAIN PRESENT: ICD-10-PCS | Mod: HCNC,CPTII,S$GLB, | Performed by: NURSE PRACTITIONER

## 2023-11-27 PROCEDURE — 99999 PR PBB SHADOW E&M-EST. PATIENT-LVL V: ICD-10-PCS | Mod: PBBFAC,HCNC,, | Performed by: NURSE PRACTITIONER

## 2023-11-27 PROCEDURE — 1159F MED LIST DOCD IN RCRD: CPT | Mod: HCNC,CPTII,S$GLB, | Performed by: NURSE PRACTITIONER

## 2023-11-27 PROCEDURE — 1101F PR PT FALLS ASSESS DOC 0-1 FALLS W/OUT INJ PAST YR: ICD-10-PCS | Mod: HCNC,CPTII,S$GLB, | Performed by: NURSE PRACTITIONER

## 2023-11-27 PROCEDURE — G0439 PPPS, SUBSEQ VISIT: HCPCS | Mod: HCNC,S$GLB,, | Performed by: NURSE PRACTITIONER

## 2023-11-27 PROCEDURE — 3288F FALL RISK ASSESSMENT DOCD: CPT | Mod: HCNC,CPTII,S$GLB, | Performed by: NURSE PRACTITIONER

## 2023-11-27 NOTE — PATIENT INSTRUCTIONS
Counseling and Referral of Other Preventative  (Italic type indicates deductible and co-insurance are waived)    Patient Name: Marisela Eagle  Today's Date: 11/27/2023    Health Maintenance       Date Due Completion Date    TETANUS VACCINE Never done ---    RSV Vaccine (Age 60+ and Pregnant patients) (1 - 1-dose 60+ series) Never done ---    COVID-19 Vaccine (4 - 2023-24 season) 09/01/2023 12/27/2021    DEXA Scan 04/03/2026 4/3/2023    Lipid Panel 10/30/2028 10/30/2023        No orders of the defined types were placed in this encounter.    The following information is provided to all patients.  This information is to help you find resources for any of the problems found today that may be affecting your health:                Living healthy guide: www.[facility].[place].gov      Understanding Diabetes: www.diabetes.org      Eating healthy: www.cdc.gov/healthyweight      CDC home safety checklist: www.cdc.gov/steadi/patient.html      Agency on Aging: www.goea.[place].gov      Alcoholics anonymous (AA): www.aa.org      Physical Activity: www.elizabeth.nih.gov/nh7mqog      Tobacco use: www.quitwithusla.org

## 2023-11-27 NOTE — PROGRESS NOTES
"  Marisela Eagle presented for a  Medicare AWV and comprehensive Health Risk Assessment today. The following components were reviewed and updated:    Medical history  Family History  Social history  Allergies and Current Medications  Health Risk Assessment  Health Maintenance  Care Team         ** See Completed Assessments for Annual Wellness Visit within the encounter summary.**         The following assessments were completed:  Living Situation  CAGE  Depression Screening  Timed Get Up and Go  Whisper Test  Cognitive Function Screening  Nutrition Screening  ADL Screening  PAQ Screening    Clock in media     Vitals:    11/27/23 1049   BP: 130/76   Pulse: 64   Temp: 98.7 °F (37.1 °C)   TempSrc: Oral   SpO2: 97%   Weight: 108.9 kg (240 lb 1.3 oz)   Height: 5' 9" (1.753 m)     Body mass index is 35.45 kg/m².  Physical Exam  Constitutional:       Appearance: She is well-developed.   HENT:      Head: Normocephalic and atraumatic.      Nose: Nose normal.   Eyes:      General: Lids are normal.      Conjunctiva/sclera: Conjunctivae normal.   Cardiovascular:      Rate and Rhythm: Normal rate.   Pulmonary:      Effort: Pulmonary effort is normal.   Neurological:      Mental Status: She is alert and oriented to person, place, and time.   Psychiatric:         Speech: Speech normal.         Behavior: Behavior normal.               Diagnoses and health risks identified today and associated recommendations/orders:      1. Encounter for preventive health examination  Discussed health maintenance guidelines appropriate for age.     2. Atherosclerosis of aorta  Stable, continue to monitor  On statin, bp well controlled  Followed by pcp     3. HFrEF (heart failure with reduced ejection fraction)  Stable, continue to monitor  Followed by cardiology  4. Lumbar disc disease  Review for Opioid Screening: Patient does have rx for Opioids-percocet per pain management, has tried other treatments, risk score 1. .     Review for Substance Use " Disorders: Patient does not use substance.        5. Abnormality of gait and mobility  Stable, continue to monitor        6. Hypertension, essential  Controlled, continue current medication regimen  Low salt diet  Increase physical activity  Followed by pcp        Provided Marisela with a 5-10 year written screening schedule and personal prevention plan. Recommendations were developed using the USPSTF age appropriate recommendations. Education, counseling, and referrals were provided as needed. After Visit Summary printed and given to patient which includes a list of additional screenings\tests needed.    Follow up for One year for Annual Wellness Visit.    Vivi Zee NP      I offered to discuss advanced care planning, including how to pick a person who would make decisions for you if you were unable to make them for yourself, called a health care power of , and what kind of decisions you might make such as use of life sustaining treatments such as ventilators and tube feeding when faced with a life limiting illness recorded on a living will that they will need to know. (How you want to be cared for as you near the end of your natural life)     X  Patient has advanced directives on file, they would like to make changes. I provided information on how to revoke their previous directives and make new ones.

## 2023-12-12 DIAGNOSIS — M54.50 LOW BACK PAIN, UNSPECIFIED: Primary | ICD-10-CM

## 2023-12-20 ENCOUNTER — HOSPITAL ENCOUNTER (OUTPATIENT)
Dept: RADIOLOGY | Facility: HOSPITAL | Age: 78
Discharge: HOME | End: 2023-12-20
Attending: PAIN MEDICINE
Payer: MEDICARE

## 2023-12-20 DIAGNOSIS — M54.50 LOW BACK PAIN, UNSPECIFIED: ICD-10-CM

## 2023-12-20 PROCEDURE — 72148 MRI LUMBAR SPINE W/O DYE: CPT | Mod: TC

## 2023-12-20 PROCEDURE — 72148 MRI LUMBAR SPINE W/O DYE: CPT | Mod: 26,,, | Performed by: RADIOLOGY

## 2023-12-20 PROCEDURE — 72148 MRI LUMBAR SPINE WITHOUT CONTRAST: ICD-10-PCS | Mod: 26,,, | Performed by: RADIOLOGY

## 2023-12-28 ENCOUNTER — LAB VISIT (OUTPATIENT)
Dept: LAB | Facility: HOSPITAL | Age: 78
End: 2023-12-28
Attending: PHYSICIAN ASSISTANT
Payer: MEDICARE

## 2023-12-28 DIAGNOSIS — E55.9 HYPOVITAMINOSIS D: ICD-10-CM

## 2023-12-28 DIAGNOSIS — E03.9 HYPOTHYROIDISM, UNSPECIFIED TYPE: ICD-10-CM

## 2023-12-28 LAB
25(OH)D3+25(OH)D2 SERPL-MCNC: 27 NG/ML (ref 30–96)
25(OH)D3+25(OH)D2 SERPL-MCNC: 27 NG/ML (ref 30–96)
ALBUMIN SERPL BCP-MCNC: 4.2 G/DL (ref 3.5–5.2)
ALBUMIN SERPL BCP-MCNC: 4.2 G/DL (ref 3.5–5.2)
ALP SERPL-CCNC: 53 U/L (ref 55–135)
ALP SERPL-CCNC: 53 U/L (ref 55–135)
ALT SERPL W/O P-5'-P-CCNC: 23 U/L (ref 10–44)
ALT SERPL W/O P-5'-P-CCNC: 23 U/L (ref 10–44)
ANION GAP SERPL CALC-SCNC: 10 MMOL/L (ref 8–16)
ANION GAP SERPL CALC-SCNC: 10 MMOL/L (ref 8–16)
AST SERPL-CCNC: 18 U/L (ref 10–40)
AST SERPL-CCNC: 18 U/L (ref 10–40)
BILIRUB SERPL-MCNC: 0.4 MG/DL (ref 0.1–1)
BILIRUB SERPL-MCNC: 0.4 MG/DL (ref 0.1–1)
BUN SERPL-MCNC: 12 MG/DL (ref 8–23)
BUN SERPL-MCNC: 12 MG/DL (ref 8–23)
CALCIUM SERPL-MCNC: 9.9 MG/DL (ref 8.7–10.5)
CALCIUM SERPL-MCNC: 9.9 MG/DL (ref 8.7–10.5)
CHLORIDE SERPL-SCNC: 101 MMOL/L (ref 95–110)
CHLORIDE SERPL-SCNC: 101 MMOL/L (ref 95–110)
CO2 SERPL-SCNC: 27 MMOL/L (ref 23–29)
CO2 SERPL-SCNC: 27 MMOL/L (ref 23–29)
CREAT SERPL-MCNC: 0.7 MG/DL (ref 0.5–1.4)
CREAT SERPL-MCNC: 0.7 MG/DL (ref 0.5–1.4)
EST. GFR  (NO RACE VARIABLE): >60 ML/MIN/1.73 M^2
EST. GFR  (NO RACE VARIABLE): >60 ML/MIN/1.73 M^2
GLUCOSE SERPL-MCNC: 89 MG/DL (ref 70–110)
GLUCOSE SERPL-MCNC: 89 MG/DL (ref 70–110)
POTASSIUM SERPL-SCNC: 4.5 MMOL/L (ref 3.5–5.1)
POTASSIUM SERPL-SCNC: 4.5 MMOL/L (ref 3.5–5.1)
PROT SERPL-MCNC: 7.2 G/DL (ref 6–8.4)
PROT SERPL-MCNC: 7.2 G/DL (ref 6–8.4)
SODIUM SERPL-SCNC: 138 MMOL/L (ref 136–145)
SODIUM SERPL-SCNC: 138 MMOL/L (ref 136–145)
T4 FREE SERPL-MCNC: 1.13 NG/DL (ref 0.71–1.51)
T4 FREE SERPL-MCNC: 1.13 NG/DL (ref 0.71–1.51)
TSH SERPL DL<=0.005 MIU/L-ACNC: 1.25 UIU/ML (ref 0.4–4)
TSH SERPL DL<=0.005 MIU/L-ACNC: 1.25 UIU/ML (ref 0.4–4)

## 2023-12-28 PROCEDURE — 80053 COMPREHEN METABOLIC PANEL: CPT | Mod: HCNC | Performed by: PHYSICIAN ASSISTANT

## 2023-12-28 PROCEDURE — 84439 ASSAY OF FREE THYROXINE: CPT | Mod: HCNC | Performed by: PHYSICIAN ASSISTANT

## 2023-12-28 PROCEDURE — 36415 COLL VENOUS BLD VENIPUNCTURE: CPT | Mod: HCNC,PO | Performed by: PHYSICIAN ASSISTANT

## 2023-12-28 PROCEDURE — 82306 VITAMIN D 25 HYDROXY: CPT | Mod: HCNC | Performed by: PHYSICIAN ASSISTANT

## 2023-12-28 PROCEDURE — 84443 ASSAY THYROID STIM HORMONE: CPT | Mod: HCNC | Performed by: PHYSICIAN ASSISTANT

## 2024-01-04 ENCOUNTER — OFFICE VISIT (OUTPATIENT)
Dept: ENDOCRINOLOGY | Facility: CLINIC | Age: 79
End: 2024-01-04
Payer: MEDICARE

## 2024-01-04 VITALS
DIASTOLIC BLOOD PRESSURE: 70 MMHG | TEMPERATURE: 98 F | HEART RATE: 71 BPM | SYSTOLIC BLOOD PRESSURE: 126 MMHG | HEIGHT: 69 IN | OXYGEN SATURATION: 94 % | WEIGHT: 232.13 LBS | BODY MASS INDEX: 34.38 KG/M2

## 2024-01-04 DIAGNOSIS — M81.0 AGE-RELATED OSTEOPOROSIS WITHOUT CURRENT PATHOLOGICAL FRACTURE: ICD-10-CM

## 2024-01-04 DIAGNOSIS — R73.9 HYPERGLYCEMIA: ICD-10-CM

## 2024-01-04 DIAGNOSIS — E78.5 HYPERLIPIDEMIA, UNSPECIFIED HYPERLIPIDEMIA TYPE: ICD-10-CM

## 2024-01-04 DIAGNOSIS — I10 HYPERTENSION, UNSPECIFIED TYPE: ICD-10-CM

## 2024-01-04 DIAGNOSIS — E55.9 HYPOVITAMINOSIS D: ICD-10-CM

## 2024-01-04 DIAGNOSIS — Z87.81 HISTORY OF FRACTURE: ICD-10-CM

## 2024-01-04 DIAGNOSIS — E66.9 OBESITY (BMI 30-39.9): ICD-10-CM

## 2024-01-04 DIAGNOSIS — Z87.310 HISTORY OF HEALED FRAGILITY FRACTURE: ICD-10-CM

## 2024-01-04 DIAGNOSIS — L74.9 SWEATING ABNORMALITY: ICD-10-CM

## 2024-01-04 DIAGNOSIS — E21.3 HYPERPARATHYROIDISM: ICD-10-CM

## 2024-01-04 DIAGNOSIS — Z78.0 POSTMENOPAUSAL: ICD-10-CM

## 2024-01-04 DIAGNOSIS — E04.9 GOITER: ICD-10-CM

## 2024-01-04 DIAGNOSIS — E03.9 HYPOTHYROIDISM, UNSPECIFIED TYPE: Primary | ICD-10-CM

## 2024-01-04 PROBLEM — E66.01 SEVERE OBESITY (BMI 35.0-39.9) WITH COMORBIDITY: Status: RESOLVED | Noted: 2023-05-29 | Resolved: 2024-01-04

## 2024-01-04 PROCEDURE — 3074F SYST BP LT 130 MM HG: CPT | Mod: HCNC,CPTII,S$GLB, | Performed by: PHYSICIAN ASSISTANT

## 2024-01-04 PROCEDURE — 3288F FALL RISK ASSESSMENT DOCD: CPT | Mod: HCNC,CPTII,S$GLB, | Performed by: PHYSICIAN ASSISTANT

## 2024-01-04 PROCEDURE — 99999 PR PBB SHADOW E&M-EST. PATIENT-LVL IV: CPT | Mod: PBBFAC,HCNC,, | Performed by: PHYSICIAN ASSISTANT

## 2024-01-04 PROCEDURE — 1159F MED LIST DOCD IN RCRD: CPT | Mod: HCNC,CPTII,S$GLB, | Performed by: PHYSICIAN ASSISTANT

## 2024-01-04 PROCEDURE — 1125F AMNT PAIN NOTED PAIN PRSNT: CPT | Mod: HCNC,CPTII,S$GLB, | Performed by: PHYSICIAN ASSISTANT

## 2024-01-04 PROCEDURE — 1160F RVW MEDS BY RX/DR IN RCRD: CPT | Mod: HCNC,CPTII,S$GLB, | Performed by: PHYSICIAN ASSISTANT

## 2024-01-04 PROCEDURE — 99214 OFFICE O/P EST MOD 30 MIN: CPT | Mod: HCNC,S$GLB,, | Performed by: PHYSICIAN ASSISTANT

## 2024-01-04 PROCEDURE — 1101F PT FALLS ASSESS-DOCD LE1/YR: CPT | Mod: HCNC,CPTII,S$GLB, | Performed by: PHYSICIAN ASSISTANT

## 2024-01-04 PROCEDURE — 3078F DIAST BP <80 MM HG: CPT | Mod: HCNC,CPTII,S$GLB, | Performed by: PHYSICIAN ASSISTANT

## 2024-01-04 RX ORDER — LEVOTHYROXINE SODIUM 125 UG/1
TABLET ORAL
Qty: 90 TABLET | Refills: 3 | Status: SHIPPED | OUTPATIENT
Start: 2024-01-04

## 2024-01-04 RX ORDER — SODIUM CHLORIDE 0.9 % (FLUSH) 0.9 %
10 SYRINGE (ML) INJECTION
Status: CANCELLED | OUTPATIENT
Start: 2024-01-05

## 2024-01-04 RX ORDER — ZOLEDRONIC ACID 5 MG/100ML
5 INJECTION, SOLUTION INTRAVENOUS
Status: CANCELLED | OUTPATIENT
Start: 2024-01-05

## 2024-01-04 RX ORDER — HEPARIN 100 UNIT/ML
500 SYRINGE INTRAVENOUS
Status: CANCELLED | OUTPATIENT
Start: 2024-01-05

## 2024-01-04 NOTE — PROGRESS NOTES
CC: Hypothyroidism    HPI: Marisela Eagle is a 78 y.o. female here for hypothyroidism along with pending conditions listed in the Visit Diagnosis. Diagnosed several years ago. Her daughter had thyroid cancer. They think this was due to radiation on her face for acne. Her cousin and grandmother had T2DM. She is taking biotin. Reports taking ditropan 10 mg daily which helped w/ sweating but caused dry mouth.    Taking 125 mcg daily. She takes it ~6 am and waits an hour before eating.    + sweating (on left side, taking elavil--states this helps), fatigue, wt loss (fluid), constipation, heat intolerance.     No d/c, palpitations, tremors, hair loss or changes in nails.    Thyroid u/s 1/23 showed 2 subcentimeter nodules that have not changed in size since 2012. Repeat in 3 years. Reports SOB (seeing cardiology). No dysphagia or voice changes.     DEXA 4/23: wnl. Prescribed fosamax in 10/22 but she keeps forgetting to take. No falls or fractures. Taking 10,000 IU daily. She had one fall in 9/21 when she fractured her pelvis. She recently found out she fractured her femur. She had two steroid injections. She is using a pedal exerciser.     PMHx, PSHx: reviewed in epic.  Social Hx: Occasionally has mixed 2-3 drinks. She smoked 0.5 ppd for 53 years.     Wt Readings from Last 6 Encounters:   01/04/24 105.3 kg (232 lb 2.3 oz)   11/27/23 108.9 kg (240 lb 1.3 oz)   11/22/23 108.4 kg (239 lb)   10/23/23 108 kg (238 lb 1.6 oz)   09/08/23 103.4 kg (228 lb)   07/31/23 103.7 kg (228 lb 8.1 oz)      ROS:   Constitutional: feels tired, wt loss (7 lbs).  Eyes: No recent visual changes  Cardiovascular: + occasional cp and palpitations  Respiratory: + SOB, no cough  Gastrointestinal: Denies recent bowel disturbances  GenitoUrinary - No dysuria  Skin: No new skin rash  Musc: + back pain, knee pain  Neurologic: + numbness and tingling in feet  Endocrine: no polyphagia, polydipsia or polyuria  Remainder ROS negative     /70 (BP  "Location: Right arm, Patient Position: Sitting, BP Method: Large (Manual))   Pulse 71   Temp 98.4 °F (36.9 °C) (Oral)   Ht 5' 9" (1.753 m)   Wt 105.3 kg (232 lb 2.3 oz)   SpO2 (!) 94%   BMI 34.28 kg/m²      Personally reviewed labs below:    Lab Results   Component Value Date    TSH 1.253 12/28/2023    TSH 1.253 12/28/2023    X3NOLFZ 99 07/03/2019    FREET4 1.13 12/28/2023    FREET4 1.13 12/28/2023        Chemistry        Component Value Date/Time     12/28/2023 1127     12/28/2023 1127    K 4.5 12/28/2023 1127    K 4.5 12/28/2023 1127     12/28/2023 1127     12/28/2023 1127    CO2 27 12/28/2023 1127    CO2 27 12/28/2023 1127    BUN 12 12/28/2023 1127    BUN 12 12/28/2023 1127    CREATININE 0.7 12/28/2023 1127    CREATININE 0.7 12/28/2023 1127    GLU 89 12/28/2023 1127    GLU 89 12/28/2023 1127        Component Value Date/Time    CALCIUM 9.9 12/28/2023 1127    CALCIUM 9.9 12/28/2023 1127    ALKPHOS 53 (L) 12/28/2023 1127    ALKPHOS 53 (L) 12/28/2023 1127    AST 18 12/28/2023 1127    AST 18 12/28/2023 1127    ALT 23 12/28/2023 1127    ALT 23 12/28/2023 1127    BILITOT 0.4 12/28/2023 1127    BILITOT 0.4 12/28/2023 1127    ESTGFRAFRICA >60.0 07/21/2022 1530    EGFRNONAA >60.0 07/21/2022 1530         Lab Results   Component Value Date    HGBA1C 5.3 06/23/2023    HGBA1C 5.2 02/24/2021    HGBA1C 5.4 07/03/2019      PE:  GENERAL: middle aged female, well developed, well nourished  NECK: Supple neck, normal thyroid. No bruit  MUSC: ambulates with a four pronged cane  PSYCH: no anxiety or depression    EXAMINATION:  US SOFT TISSUE HEAD NECK THYROID     CLINICAL HISTORY:  Nontoxic goiter, unspecified     TECHNIQUE:  Ultrasound of the thyroid and cervical lymph nodes was performed.     COMPARISON:  Thyroid ultrasound of August 27, 2018     FINDINGS:  The right lobe measures 3.4 x 0.9 x 0.7 cm for a calculated volume of 1.1 cc.  The left lobe measures 3.3 x 0.9 x 0.9 cm for a calculated volume of " 1.3 cc and a total thyroid volume of 2.4 cc which is quite small.     On the right there is a 6 mm solid nodule in the posterior lower pole.  There is a 2 mm cyst.  In the midpole.     On the left there is a 5 mm nodule at the upper pole and a 4 mm cyst in the upper pole.  There is a 4 mm nodule in the isthmus.     Impression:     Quite small thyroid with small nodules on each side and in the isthmus.        Electronically signed by: Navarro Bryant MD  Date:                                            02/21/2022  Time:                                           16:03    Assessment/Plan:   1. Hypothyroidism, unspecified type  levothyroxine (SYNTHROID) 125 MCG tablet    TSH    T4, Free    Comprehensive Metabolic Panel      2. Goiter        3. Hypertension, unspecified type        4. Hyperlipidemia, unspecified hyperlipidemia type        5. Postmenopausal        6. Obesity (BMI 30-39.9)  Hemoglobin A1C      7. Hypovitaminosis D  Vitamin D      8. Age-related osteoporosis without current pathological fracture        9. Sweating abnormality        10. Hyperglycemia  Hemoglobin A1C      11. Hypertension, essential        12. Hyperparathyroidism  PTH, Intact        Hypothyroidism-TFTs wnl. Continue LT4 dose.  Goiter-repeat thyroid u/s 2/25.  WIN-etpovz-eioosits meds.  IFH-urqruz-yyartkup statin  Postmenopausal w/ fragility fx-pt forgets to take fosamax. Start reclast. Discussed se.   -DEXA scan 4/25. Continue taking 2000 IU of vitamin D and 1500 mg of calcium. Also, participate in weight bearing and resistance exercises for 40 min 4x weekly to maintain your bone density.   Obesity-Body mass index is 34.28 kg/m². Encouraged to increase exercise. Declines MNT.  Hypovitaminosis d-low-normal-increase vitamin d intake by 2000 IU daily.   Sweating-monitor-may restart ditropan if worsening.   Hyperglycemia-check a1c.  Hyperparathyroidism-check PTH. PTH elevated in the past.    F/u in six months w/ labs

## 2024-01-10 ENCOUNTER — OFFICE VISIT (OUTPATIENT)
Dept: PODIATRY | Facility: CLINIC | Age: 79
End: 2024-01-10
Payer: MEDICARE

## 2024-01-10 ENCOUNTER — HOSPITAL ENCOUNTER (OUTPATIENT)
Dept: RADIOLOGY | Facility: CLINIC | Age: 79
Discharge: HOME | End: 2024-01-10
Attending: PODIATRIST
Payer: MEDICARE

## 2024-01-10 VITALS — BODY MASS INDEX: 34.68 KG/M2 | WEIGHT: 234.13 LBS | HEIGHT: 69 IN

## 2024-01-10 DIAGNOSIS — M79.672 ARCH PAIN OF LEFT FOOT: ICD-10-CM

## 2024-01-10 DIAGNOSIS — L85.1 ACQUIRED KERATODERMA: Primary | ICD-10-CM

## 2024-01-10 DIAGNOSIS — M79.672 LEFT FOOT PAIN: ICD-10-CM

## 2024-01-10 PROCEDURE — 1160F RVW MEDS BY RX/DR IN RCRD: CPT | Mod: HCNC,CPTII,S$GLB, | Performed by: PODIATRIST

## 2024-01-10 PROCEDURE — 3288F FALL RISK ASSESSMENT DOCD: CPT | Mod: HCNC,CPTII,S$GLB, | Performed by: PODIATRIST

## 2024-01-10 PROCEDURE — 99213 OFFICE O/P EST LOW 20 MIN: CPT | Mod: HCNC,S$GLB,, | Performed by: PODIATRIST

## 2024-01-10 PROCEDURE — 1125F AMNT PAIN NOTED PAIN PRSNT: CPT | Mod: HCNC,CPTII,S$GLB, | Performed by: PODIATRIST

## 2024-01-10 PROCEDURE — 1101F PT FALLS ASSESS-DOCD LE1/YR: CPT | Mod: HCNC,CPTII,S$GLB, | Performed by: PODIATRIST

## 2024-01-10 PROCEDURE — 1159F MED LIST DOCD IN RCRD: CPT | Mod: HCNC,CPTII,S$GLB, | Performed by: PODIATRIST

## 2024-01-10 PROCEDURE — 99999 PR PBB SHADOW E&M-EST. PATIENT-LVL IV: CPT | Mod: PBBFAC,HCNC,, | Performed by: PODIATRIST

## 2024-01-10 PROCEDURE — 73630 X-RAY EXAM OF FOOT: CPT | Mod: HCNC,LT,S$GLB, | Performed by: RADIOLOGY

## 2024-01-10 NOTE — PROGRESS NOTES
"  [address] [facility] [address]  [place] [address]  Phone: [phone]   [phone]    Patient's PCP:Pawel Badillo III, MD  Referring Provider: No ref. provider found    Subjective:      Chief Complaint:: Foot Pain (Pain in the left foot arch area)    Foot Pain  Associated symptoms include arthralgias, joint swelling and numbness. Pertinent negatives include no abdominal pain, chest pain, chills, coughing, fatigue, fever, headaches, nausea, rash or weakness.     Marisela Eagle is a 78 y.o. female who presents today with a complaint of Pain in the left foot arch area. The current episode started a couple weeks ago.  The symptoms include feels like walking on a rock and aching pain. Probable cause of complaint unknown.  The symptoms are aggravated by prolonged walking . The problem has worsened. Treatment to date have included elevation which provided some relief.         Vitals:    01/10/24 1037   Weight: 106.2 kg (234 lb 2.1 oz)   Height: 5' 9" (1.753 m)   PainSc:   8      Shoe Size: 10.5    Past Surgical History:   Procedure Laterality Date    ADENOIDECTOMY      APPENDECTOMY      BILATERAL SALPINGOOPHORECTOMY       SECTION      COLONOSCOPY  12    HYSTERECTOMY      with BSO    JOINT REPLACEMENT  2012    rt unilateral knee arthroplasty. Took Coumadin x 6 weeks    KNEE ARTHROSCOPY  2010    right    TONSILLECTOMY       Past Medical History:   Diagnosis Date    Bilateral knee pain 2012    left worse, right knee painful even after partial replacement.    Bruises easily     Clot ?    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  Tobacco History:  reports that she quit smoking about 11 years ago. Her smoking use included cigarettes. She has never used smokeless tobacco.  Drug History:  reports no history of drug use.    Review of patient's allergies indicates:  No Known Allergies    Current Outpatient Medications   Medication Sig Dispense Refill    albuterol (VENTOLIN HFA) 90 mcg/actuation inhaler Inhale 2 puffs into the lungs every 6 (six) hours as needed for Wheezing. Rescue 18 g 0    amitriptyline (ELAVIL) 10 MG tablet Take 1 tablet (10 mg total) by mouth nightly as needed for Pain. 90 tablet 1    cetirizine (ZYRTEC) 10 MG tablet Take 1 tablet (10 mg total) by mouth once daily. 30 tablet 0    cholecalciferol, vitamin D3, 5,000 unit capsule Take 5,000 Units by mouth once daily.      cyanocobalamin (VITAMIN B-12) 1000 MCG tablet Take 5,000 mcg by mouth once daily.      DULoxetine (CYMBALTA) 60 MG capsule Take 60 mg by mouth once daily.      famotidine (PEPCID) 20 MG tablet Take 1 tablet (20 mg total) by mouth every evening. 90 tablet 1    fluticasone propionate (FLONASE) 50 mcg/actuation nasal spray 1 spray (50 mcg total) by Each Nostril route once daily. 15.8 mL 0    furosemide (LASIX) 40 MG tablet Take 1 tablet (40 mg total) by mouth every Tues, Fri. Take daily 90 tablet 1    gabapentin (NEURONTIN) 800 MG tablet Take 1 tablet (800 mg total) by mouth 3 (three) times daily. 270 tablet 0    levothyroxine (SYNTHROID) 125 MCG  tablet TAKE 1 TABLET EVERY DAY BEFORE BREAKFAST 90 tablet 3    LORazepam (ATIVAN) 1 MG tablet Take 1 mg by mouth every evening.      LORazepam (ATIVAN) 1 MG tablet Take 1 tablet by mouth once daily at bedtime  as needed for anxiety 30 tablet 0    LORazepam (ATIVAN) 1 MG tablet Take 1 tablet (1 mg total) by mouth every evening. 90 tablet 0    losartan (COZAAR) 100 MG tablet Take 0.5 tablets (50 mg total) by mouth 2 (two) times a day. 180 tablet 1    magnesium 30 mg Tab Take 1 tablet by mouth once daily.      metoprolol succinate (TOPROL-XL) 50 MG 24 hr tablet Take 0.5 tablets (25 mg total) by mouth every evening. 90 tablet 1    omeprazole (PRILOSEC) 40 MG capsule TAKE 1 CAPSULE EVERY DAY 90 capsule 1    oxyCODONE-acetaminophen (PERCOCET)  mg per tablet Take 1 tablet by mouth every 6 (six) hours as needed.       oxyCODONE-acetaminophen (PERCOCET)  mg per tablet Take 1 tablet by mouth every 8 (eight) hours as needed for Pain.      spironolactone (ALDACTONE) 25 MG tablet Take 1 tablet (25 mg total) by mouth once daily. 90 tablet 1    triamcinolone acetonide 0.1% (KENALOG) 0.1 % ointment Apply topically 2 (two) times daily. 80 g 0    turmeric 400 mg Cap Take 400 mg by mouth once daily.      vit C/E/Zn/coppr/lutein/zeaxan (PRESERVISION AREDS-2 ORAL) Take 1 capsule by mouth once daily.       No current facility-administered medications for this visit.       Review of Systems   Constitutional:  Negative for chills, fatigue, fever and unexpected weight change.   HENT:  Negative for hearing loss and trouble swallowing.    Eyes:  Negative for photophobia and visual disturbance.   Respiratory:  Negative for cough, shortness of breath and wheezing.    Cardiovascular:  Positive for leg swelling. Negative for chest pain and palpitations.   Gastrointestinal:  Negative for abdominal pain and nausea.   Genitourinary:  Negative for dysuria and frequency.   Musculoskeletal:  Positive for arthralgias, back pain and joint  swelling. Negative for gait problem.   Skin:  Negative for rash and wound.   Neurological:  Positive for numbness. Negative for tremors, seizures, weakness and headaches.   Hematological:  Does not bruise/bleed easily.         Objective:        Physical Exam:   Foot Exam    General  Orientation: alert and oriented to person, place, and time   Affect: appropriate   Gait: antalgic       Left Foot/Ankle      Inspection and Palpation  Ecchymosis: none  Tenderness: lesser metatarsophalangeal joints   Swelling: none   Arch: pes planus  Hammertoes: absent  Claw toes: absent  Hallux valgus: no  Hallux limitus: no  Skin Exam: callus; no drainage, no ulcer and no erythema   Neurovascular  Dorsalis pedis: 2+  Posterior tibial: 2+  Capillary refill: 2+  Varicose veins: not present  Saphenous nerve sensation: diminished  Tibial nerve sensation: diminished  Superficial peroneal nerve sensation: diminished  Deep peroneal nerve sensation: diminished  Sural nerve sensation: diminished    Muscle Strength  Ankle dorsiflexion: 5  Ankle plantar flexion: 5  Ankle inversion: 5  Ankle eversion: 5  Great toe extension: 5  Great toe flexion: 5    Range of Motion    Normal left ankle ROM    Tests  Anterior drawer: negative   Talar tilt: negative   PT Tinel's sign: negative  Paresthesia: positive      Physical Exam  Cardiovascular:      Pulses:           Dorsalis pedis pulses are 2+ on the left side.        Posterior tibial pulses are 2+ on the left side.   Musculoskeletal:      Left foot: No bunion.   Feet:      Left foot:      Skin integrity: Callus present. No ulcer or erythema.               Left Ankle/Foot Exam     Tenderness   The patient is tender to palpation of the lesser metatarsophalangeal joints.    Range of Motion   The patient has normal left ankle ROM.       Muscle Strength   Left Lower Extremity   Ankle Dorsiflexion:  5   Plantar flexion:  5/5     Reflexes     Left Side  Paresthesia: present    Vascular Exam       Left  Pulses  Dorsalis Pedis:      2+  Posterior Tibial:      2+           Imaging: X-Ray Foot Complete Left  EXAMINATION:  XR FOOT COMPLETE 3 VIEW LEFT    CLINICAL HISTORY:  .  Pain in left foot    TECHNIQUE:  AP, lateral and oblique views of the left foot were performed.    COMPARISON:  None    FINDINGS:  No acute fracture.  Plantar calcaneal spur.  Pes planus.  Mild mid and forefoot degenerative change.    Electronically signed by: Jadon Corrigan  Date:    01/10/2024  Time:    11:14               Assessment:       1. Acquired keratoderma    2. Arch pain of left foot    3. Left foot pain      Plan:   Acquired keratoderma    Arch pain of left foot  -     X-Ray Foot Complete Left    Left foot pain      Follow up if symptoms worsen or fail to improve.    Procedures        I discussed the patient that there is callus buildup in her area of pain.  I explained this is coming due to excess pressure under the 2nd metatarsal.  I trimmed the callus as a courtesy.  Patient tolerated well.  Recommend pumice stone and urea cream 40% for continued management.    Counseling:     I provided patient education verbally regarding:   Patient diagnosis, treatment options, as well as alternatives, risks, and benefits.     This note was created using Dragon voice recognition software that occasionally misinterpreted phrases or words.

## 2024-01-16 RX ORDER — GABAPENTIN 800 MG/1
800 TABLET ORAL 3 TIMES DAILY
Qty: 270 TABLET | Refills: 3 | Status: SHIPPED | OUTPATIENT
Start: 2024-01-16

## 2024-01-16 NOTE — TELEPHONE ENCOUNTER
No care due was identified.  Health [facility] Embedded Care Due Messages. Reference number: [id number].   1/16/2024 12:20:20 PM CST

## 2024-01-29 ENCOUNTER — PATIENT MESSAGE (OUTPATIENT)
Dept: PODIATRY | Facility: CLINIC | Age: 79
End: 2024-01-29
Payer: MEDICARE

## 2024-01-29 ENCOUNTER — TELEPHONE (OUTPATIENT)
Dept: ENDOCRINOLOGY | Facility: CLINIC | Age: 79
End: 2024-01-29
Payer: MEDICARE

## 2024-01-29 DIAGNOSIS — M81.0 AGE-RELATED OSTEOPOROSIS WITHOUT CURRENT PATHOLOGICAL FRACTURE: ICD-10-CM

## 2024-01-29 DIAGNOSIS — M81.0 AGE-RELATED OSTEOPOROSIS WITHOUT CURRENT PATHOLOGICAL FRACTURE: Primary | ICD-10-CM

## 2024-01-29 DIAGNOSIS — M79.672 LEFT FOOT PAIN: Primary | ICD-10-CM

## 2024-01-29 RX ORDER — ALENDRONATE SODIUM 70 MG/1
TABLET ORAL
Qty: 12 TABLET | Refills: 0 | Status: SHIPPED | OUTPATIENT
Start: 2024-01-29

## 2024-01-29 RX ORDER — ALENDRONATE SODIUM 70 MG/1
TABLET ORAL
Qty: 4 TABLET | Refills: 1 | Status: SHIPPED | OUTPATIENT
Start: 2024-01-29 | End: 2024-01-29

## 2024-01-29 NOTE — TELEPHONE ENCOUNTER
----- Message from Joshua Helm sent at 1/29/2024  9:02 AM CST -----    Patient scheduled the Reclast infusion for 1/30/24, but cancelled this, stating she would like to discuss with LAVERN Kirkpatrick again, she has questions about it.       Thank you,  Joshua  [facility]     
Pt wants to wait for reclast. Start fosamax until infusion. Daughter present for phone call.  
English

## 2024-01-30 RX ORDER — DOXYCYCLINE 100 MG/1
100 CAPSULE ORAL 2 TIMES DAILY
Qty: 14 CAPSULE | Refills: 0 | Status: SHIPPED | OUTPATIENT
Start: 2024-01-30 | End: 2024-02-06

## 2024-01-30 NOTE — TELEPHONE ENCOUNTER
It looks like she formed a small blister. I will send in some antibiotics for her. I would recommend a little betadine to the area

## 2024-01-31 NOTE — TELEPHONE ENCOUNTER
----- Message from Jonathan Hutchison sent at 1/31/2024  4:13 PM CST -----  Contact: Self  Type:  RX Refill Request    Who Called:  Patient  Refill or New Rx:  refill  RX Name and Strength:  LORazepam (ATIVAN) 1 MG tablet  How is the patient currently taking it? (ex. 1XDay):  as directed  Is this a 30 day or 90 day RX:  90  Preferred Pharmacy with phone number:      [id number] - [place]  [address] AT [facility] [address]  [address]  [place] [address]  Phone: [phone] Fax: [phone]      Local or Mail Order:  Local  Ordering Provider:  Justino Ellis Call Back Number:  [phone]   Additional Information:

## 2024-02-01 RX ORDER — LORAZEPAM 1 MG/1
1 TABLET ORAL NIGHTLY
Qty: 30 TABLET | Refills: 0 | Status: SHIPPED | OUTPATIENT
Start: 2024-02-01

## 2024-02-08 ENCOUNTER — INFUSION (OUTPATIENT)
Dept: INFUSION THERAPY | Facility: HOSPITAL | Age: 79
End: 2024-02-08
Attending: PHYSICIAN ASSISTANT
Payer: MEDICARE

## 2024-02-08 VITALS
BODY MASS INDEX: 35.1 KG/M2 | HEIGHT: 69 IN | RESPIRATION RATE: 18 BRPM | TEMPERATURE: 98 F | DIASTOLIC BLOOD PRESSURE: 56 MMHG | HEART RATE: 60 BPM | SYSTOLIC BLOOD PRESSURE: 116 MMHG | WEIGHT: 237 LBS

## 2024-02-08 DIAGNOSIS — M81.0 AGE-RELATED OSTEOPOROSIS WITHOUT CURRENT PATHOLOGICAL FRACTURE: Primary | ICD-10-CM

## 2024-02-08 DIAGNOSIS — Z87.310 HISTORY OF HEALED FRAGILITY FRACTURE: ICD-10-CM

## 2024-02-08 PROCEDURE — 25000003 PHARM REV CODE 250: Performed by: PHYSICIAN ASSISTANT

## 2024-02-08 PROCEDURE — 96365 THER/PROPH/DIAG IV INF INIT: CPT

## 2024-02-08 PROCEDURE — 63600175 PHARM REV CODE 636 W HCPCS: Performed by: PHYSICIAN ASSISTANT

## 2024-02-08 RX ORDER — HEPARIN 100 UNIT/ML
500 SYRINGE INTRAVENOUS
OUTPATIENT
Start: 2024-02-08

## 2024-02-08 RX ORDER — ZOLEDRONIC ACID 5 MG/100ML
5 INJECTION, SOLUTION INTRAVENOUS
OUTPATIENT
Start: 2024-02-08

## 2024-02-08 RX ORDER — SODIUM CHLORIDE 0.9 % (FLUSH) 0.9 %
10 SYRINGE (ML) INJECTION
Status: DISCONTINUED | OUTPATIENT
Start: 2024-02-08 | End: 2024-02-08 | Stop reason: HOSPADM

## 2024-02-08 RX ORDER — SODIUM CHLORIDE 0.9 % (FLUSH) 0.9 %
10 SYRINGE (ML) INJECTION
OUTPATIENT
Start: 2024-02-08

## 2024-02-08 RX ORDER — ZOLEDRONIC ACID 5 MG/100ML
5 INJECTION, SOLUTION INTRAVENOUS
Status: COMPLETED | OUTPATIENT
Start: 2024-02-08 | End: 2024-02-08

## 2024-02-08 RX ADMIN — ZOLEDRONIC ACID 5 MG: 5 INJECTION, SOLUTION INTRAVENOUS at 02:02

## 2024-02-08 RX ADMIN — SODIUM CHLORIDE: 9 INJECTION, SOLUTION INTRAVENOUS at 02:02

## 2024-02-08 NOTE — PLAN OF CARE
Problem: Fall Injury Risk  Goal: Absence of Fall and Fall-Related Injury  Outcome: Ongoing, Progressing  Intervention: Promote Injury-Free Environment  Flowsheets (Taken 2/8/2024 2363)  Safety Promotion/Fall Prevention:   Fall Risk reviewed with patient/family   instructed to call staff for mobility   in recliner, wheels locked   nonskid shoes/socks when out of bed   supervised activity      tsh  Cbc  cmp  hgb a1c  ldl  Vit d if not done in past year

## 2024-03-26 DIAGNOSIS — I10 HYPERTENSION, ESSENTIAL: ICD-10-CM

## 2024-03-28 RX ORDER — FAMOTIDINE 20 MG/1
20 TABLET, FILM COATED ORAL NIGHTLY
Qty: 90 TABLET | Refills: 1 | Status: SHIPPED | OUTPATIENT
Start: 2024-03-28

## 2024-03-28 NOTE — TELEPHONE ENCOUNTER
Refill Decision Note   Marisela Eagle  is requesting a refill authorization.  Brief Assessment and Rationale for Refill:  Approve     Medication Therapy Plan:         Alert overridden per protocol: Yes   Comments:     Note composed:1:53 PM 03/28/2024

## 2024-03-28 NOTE — TELEPHONE ENCOUNTER
No care due was identified.  [facility] Embedded Care Due Messages. Reference number: [id number].   3/28/2024 2:02:48 AM CDT

## 2024-03-31 RX ORDER — LOSARTAN POTASSIUM 100 MG/1
50 TABLET ORAL 2 TIMES DAILY
Qty: 90 TABLET | Refills: 3 | Status: SHIPPED | OUTPATIENT
Start: 2024-03-31

## 2024-04-02 NOTE — TELEPHONE ENCOUNTER
No care due was identified.  Health [facility] Embedded Care Due Messages. Reference number: [id number].   4/02/2024 12:18:53 AM CDT

## 2024-04-03 RX ORDER — SPIRONOLACTONE 25 MG/1
25 TABLET ORAL
Qty: 90 TABLET | Refills: 1 | Status: SHIPPED | OUTPATIENT
Start: 2024-04-03

## 2024-04-03 NOTE — TELEPHONE ENCOUNTER
Refill Routing Note   Medication(s) are not appropriate for processing by [facility] for the following reason(s):        Drug-drug interaction    [facility] action(s):  Defer        Medication Therapy Plan: Duplicate Therapy: furosemide, spironolactone (Overridden by Pawle Badillo III, MD on Oct 23, 2023)    Pharmacist review requested: Yes     Appointments  past 12m or future 3m with PCP    Date Provider   Last Visit   10/23/2023 Pawel Badillo III, MD   Next Visit   Visit date not found Pawel Badillo III, MD   ED visits in past 90 days: 0        Note composed:9:17 AM 04/03/2024

## 2024-04-03 NOTE — TELEPHONE ENCOUNTER
Refill Decision Note   Marisela Eagle  is requesting a refill authorization.  Brief Assessment and Rationale for Refill:  Approve     Medication Therapy Plan:         Pharmacist review requested: Yes   Extended chart review required: Yes   Comments:     Note composed:10:25 AM 04/03/2024

## 2024-04-04 ENCOUNTER — TELEPHONE (OUTPATIENT)
Dept: FAMILY MEDICINE | Facility: CLINIC | Age: 79
End: 2024-04-04
Payer: MEDICARE

## 2024-04-04 ENCOUNTER — OFFICE VISIT (OUTPATIENT)
Dept: FAMILY MEDICINE | Facility: CLINIC | Age: 79
End: 2024-04-04
Payer: MEDICARE

## 2024-04-04 VITALS
HEIGHT: 69 IN | DIASTOLIC BLOOD PRESSURE: 68 MMHG | WEIGHT: 238 LBS | TEMPERATURE: 99 F | SYSTOLIC BLOOD PRESSURE: 116 MMHG | RESPIRATION RATE: 16 BRPM | BODY MASS INDEX: 35.25 KG/M2 | HEART RATE: 76 BPM | OXYGEN SATURATION: 95 %

## 2024-04-04 DIAGNOSIS — I10 HYPERTENSION, ESSENTIAL: ICD-10-CM

## 2024-04-04 DIAGNOSIS — I50.42 CHRONIC COMBINED SYSTOLIC AND DIASTOLIC HEART FAILURE: ICD-10-CM

## 2024-04-04 DIAGNOSIS — R06.02 SOB (SHORTNESS OF BREATH): Primary | ICD-10-CM

## 2024-04-04 PROCEDURE — 1159F MED LIST DOCD IN RCRD: CPT | Mod: HCNC,CPTII,S$GLB, | Performed by: NURSE PRACTITIONER

## 2024-04-04 PROCEDURE — 1101F PT FALLS ASSESS-DOCD LE1/YR: CPT | Mod: HCNC,CPTII,S$GLB, | Performed by: NURSE PRACTITIONER

## 2024-04-04 PROCEDURE — 3074F SYST BP LT 130 MM HG: CPT | Mod: HCNC,CPTII,S$GLB, | Performed by: NURSE PRACTITIONER

## 2024-04-04 PROCEDURE — 1157F ADVNC CARE PLAN IN RCRD: CPT | Mod: HCNC,CPTII,S$GLB, | Performed by: NURSE PRACTITIONER

## 2024-04-04 PROCEDURE — 99999 PR PBB SHADOW E&M-EST. PATIENT-LVL V: CPT | Mod: PBBFAC,HCNC,, | Performed by: NURSE PRACTITIONER

## 2024-04-04 PROCEDURE — 99215 OFFICE O/P EST HI 40 MIN: CPT | Mod: HCNC,S$GLB,, | Performed by: NURSE PRACTITIONER

## 2024-04-04 PROCEDURE — 1125F AMNT PAIN NOTED PAIN PRSNT: CPT | Mod: HCNC,CPTII,S$GLB, | Performed by: NURSE PRACTITIONER

## 2024-04-04 PROCEDURE — 1160F RVW MEDS BY RX/DR IN RCRD: CPT | Mod: HCNC,CPTII,S$GLB, | Performed by: NURSE PRACTITIONER

## 2024-04-04 PROCEDURE — 3078F DIAST BP <80 MM HG: CPT | Mod: HCNC,CPTII,S$GLB, | Performed by: NURSE PRACTITIONER

## 2024-04-04 PROCEDURE — 3288F FALL RISK ASSESSMENT DOCD: CPT | Mod: HCNC,CPTII,S$GLB, | Performed by: NURSE PRACTITIONER

## 2024-04-04 RX ORDER — BUDESONIDE AND FORMOTEROL FUMARATE DIHYDRATE 160; 4.5 UG/1; UG/1
2 AEROSOL RESPIRATORY (INHALATION) EVERY 12 HOURS
Qty: 10.2 G | Refills: 1 | Status: SHIPPED | OUTPATIENT
Start: 2024-04-04 | End: 2024-05-07 | Stop reason: SDUPTHER

## 2024-04-04 RX ORDER — ALBUTEROL SULFATE 90 UG/1
2 AEROSOL, METERED RESPIRATORY (INHALATION) EVERY 6 HOURS PRN
Qty: 18 G | Refills: 0 | Status: SHIPPED | OUTPATIENT
Start: 2024-04-04 | End: 2024-06-05

## 2024-04-04 NOTE — TELEPHONE ENCOUNTER
----- Message from Karlos Matthew sent at 4/4/2024 11:18 AM CDT -----  Contact: shahida at [phone]  Type: Needs Medical Advice    Who Called:  ESTEPHANIA Gonzáles's nurse shahida    Best Call Back Number: [phone]    Additional Information: needs to clarify appt notes for appt just made today. Please call

## 2024-04-04 NOTE — TELEPHONE ENCOUNTER
----- Message from Makenna Leo sent at 4/4/2024 10:34 AM CDT -----  Contact: pt [phone]  Type:  Same Day Appointment Request    Caller is requesting a same day appointment.  Caller declined first available appointment listed below.      Name of Caller:  Pt   When is the first available appointment?  May   Symptoms: frequent sob   Best Call Back Number:  [phone]    Additional Information:   Pt stated she had sob but its becoming for frequent

## 2024-04-04 NOTE — PROGRESS NOTES
SUBJECTIVE:      Patient ID: Marisela Eagle is a 78 y.o. female.    Chief Complaint: Shortness of Breath    78-year-old female with an extensive past medical history presents to the clinic with complaints of shortness of breath.  She is an established patient of Dr. Badillo. SOB has been intermittent for years, but has been persistent over the last week. Patient is a former smoker, up to 1 pack per day x50 years. No dx of COPD and no imaging shows emphysema. Patient has CHF, echo completed in 2023, last EF 60-65%, on spironolactone 25 mg daily, metoprolol succinate 25 mg daily, and uses Lasix prn. No increase weight gain over the last week. No new leg swelling. Took whole lasix tablet twice this week, but reports no improvement. Last office visit with cardiology in 2023, no follow-up scheduled.         Family History   Problem Relation Age of Onset    Neuropathy Mother     Hypertension Mother     Stroke Mother     Neuropathy Father     Cancer Daughter     Diabetes Maternal Grandmother     Melanoma Neg Hx     Psoriasis Neg Hx     Lupus Neg Hx     Eczema Neg Hx       Social History     Socioeconomic History    Marital status:    Occupational History    Occupation: RETIRED   Tobacco Use    Smoking status: Former     Current packs/day: 0.00     Types: Cigarettes     Quit date: 3/23/2012     Years since quittin.0    Smokeless tobacco: Never   Substance and Sexual Activity    Alcohol use: Yes     Comment: occasional    Drug use: No    Sexual activity: Not Currently     Social Determinants of Health     Financial Resource Strain: Low Risk  (2023)    Overall Financial Resource Strain (CARDIA)     Difficulty of Paying Living Expenses: Not hard at all   Food Insecurity: No Food Insecurity (2023)    Hunger Vital Sign     Worried About Running Out of Food in the Last Year: Never true     Ran Out of Food in the Last Year: Never true   Transportation Needs: No Transportation Needs (2023)     PRAPARE - Transportation     Lack of Transportation (Medical): No     Lack of Transportation (Non-Medical): No   Physical Activity: Inactive (11/27/2023)    Exercise Vital Sign     Days of Exercise per Week: 0 days     Minutes of Exercise per Session: 0 min   Stress: No Stress Concern Present (11/27/2023)    [employer] [place] [employer] - Occupational Stress Questionnaire     Feeling of Stress : Not at all   Social Connections: Moderately Isolated (11/27/2023)    Social Connection and Isolation Panel [NHANES]     Frequency of Communication with Friends and Family: More than three times a week     Frequency of Social Gatherings with Friends and Family: Three times a week     Attends Taoism Services: Never     Active Member of Clubs or Organizations: No     Attends Club or Organization Meetings: Never     Marital Status:    Housing Stability: Low Risk  (11/27/2023)    Housing Stability Vital Sign     Unable to Pay for Housing in the Last Year: No     Number of Places Lived in the Last Year: 1     Unstable Housing in the Last Year: No     Current Outpatient Medications   Medication Sig Dispense Refill    alendronate (FOSAMAX) 70 MG tablet TAKE 1 TABLET BY MOUTH WEEKLY 12 tablet 0    amitriptyline (ELAVIL) 10 MG tablet Take 1 tablet (10 mg total) by mouth nightly as needed for Pain. 90 tablet 1    cholecalciferol, vitamin D3, 5,000 unit capsule Take 5,000 Units by mouth once daily.      cyanocobalamin (VITAMIN B-12) 1000 MCG tablet Take 5,000 mcg by mouth once daily.      DULoxetine (CYMBALTA) 60 MG capsule Take 60 mg by mouth once daily.      famotidine (PEPCID) 20 MG tablet TAKE 1 TABLET EVERY EVENING 90 tablet 1    fluticasone propionate (FLONASE) 50 mcg/actuation nasal spray 1 spray (50 mcg total) by Each Nostril route once daily. 15.8 mL 0    furosemide (LASIX) 40 MG tablet Take 1 tablet (40 mg total) by mouth every Tues, Fri. Take daily 90 tablet 1    gabapentin (NEURONTIN) 800 MG tablet  TAKE 1 TABLET THREE TIMES DAILY 270 tablet 3    levothyroxine (SYNTHROID) 125 MCG tablet TAKE 1 TABLET EVERY DAY BEFORE BREAKFAST 90 tablet 3    LORazepam (ATIVAN) 1 MG tablet Take 1 mg by mouth every evening.      LORazepam (ATIVAN) 1 MG tablet Take 1 tablet by mouth once daily at bedtime  as needed for anxiety 30 tablet 0    LORazepam (ATIVAN) 1 MG tablet Take 1 tablet (1 mg total) by mouth every evening. 30 tablet 0    losartan (COZAAR) 100 MG tablet TAKE 1/2 TABLET TWICE DAILY 90 tablet 3    magnesium 30 mg Tab Take 1 tablet by mouth once daily.      metoprolol succinate (TOPROL-XL) 50 MG 24 hr tablet Take 0.5 tablets (25 mg total) by mouth every evening. 90 tablet 1    omeprazole (PRILOSEC) 40 MG capsule TAKE 1 CAPSULE EVERY DAY 90 capsule 1    oxyCODONE-acetaminophen (PERCOCET)  mg per tablet Take 1 tablet by mouth every 6 (six) hours as needed.       oxyCODONE-acetaminophen (PERCOCET)  mg per tablet Take 1 tablet by mouth every 8 (eight) hours as needed for Pain.      spironolactone (ALDACTONE) 25 MG tablet TAKE 1 TABLET ONE TIME DAILY 90 tablet 1    triamcinolone acetonide 0.1% (KENALOG) 0.1 % ointment Apply topically 2 (two) times daily. 80 g 0    turmeric 400 mg Cap Take 400 mg by mouth once daily.      vit C/E/Zn/coppr/lutein/zeaxan (PRESERVISION AREDS-2 ORAL) Take 1 capsule by mouth once daily.      albuterol (VENTOLIN HFA) 90 mcg/actuation inhaler Inhale 2 puffs into the lungs every 6 (six) hours as needed for Wheezing. Rescue 18 g 0    budesonide-formoterol 160-4.5 mcg (SYMBICORT) 160-4.5 mcg/actuation HFAA Inhale 2 puffs into the lungs every 12 (twelve) hours. Controller 10.2 g 1    cetirizine (ZYRTEC) 10 MG tablet Take 1 tablet (10 mg total) by mouth once daily. 30 tablet 0     No current facility-administered medications for this visit.     Review of patient's allergies indicates:  No Known Allergies   Past Medical History:   Diagnosis Date    Bilateral knee pain July 2012    left worse,  right knee painful even after partial replacement.    Bruises easily     Clot ?    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 Took Coumadin x 6 weeks    KNEE ARTHROSCOPY  2010    right    TONSILLECTOMY         Review of Systems   Constitutional:  Negative for activity change, appetite change, chills, diaphoresis, fatigue, fever and unexpected weight change.   HENT:  Negative for congestion, ear pain, sinus pressure, sore throat, trouble swallowing and voice change.    Eyes:  Negative for pain, discharge and visual disturbance.   Respiratory:  Positive for shortness of breath. Negative for cough, chest tightness and wheezing.    Cardiovascular:  Negative for chest pain and palpitations.        CHF, HTN, HLD   Gastrointestinal:  Negative for abdominal pain, constipation, diarrhea, nausea and vomiting.   Endocrine:        Hypothyroidism    Genitourinary:  Negative for difficulty urinating, flank pain, frequency and urgency.   Musculoskeletal:  Positive for arthralgias, back pain (chronic, plans to get nerve stimulator) and gait problem (ambulates with walker). Negative for joint swelling and neck pain.        Left foot pain   Skin:  Positive  "for color change (in legs and hands). Negative for rash.   Neurological:  Negative for dizziness, seizures, syncope, weakness, numbness and headaches.   Hematological:  Negative for adenopathy.   Psychiatric/Behavioral:  Negative for dysphoric mood and sleep disturbance. The patient is not nervous/anxious.       OBJECTIVE:      Vitals:    04/04/24 1437   BP: 116/68   Pulse: 76   Resp: 16   Temp: 98.8 °F (37.1 °C)   SpO2: 95%   Weight: 108 kg (238 lb)   Height: 5' 9" (1.753 m)     Physical Exam  Vitals and nursing note reviewed.   Constitutional:       General: She is awake. She is not in acute distress.     Appearance: Normal appearance. She is obese. She is not ill-appearing, toxic-appearing or diaphoretic.   HENT:      Head: Normocephalic and atraumatic.      Nose: Nose normal.   Eyes:      General: Lids are normal. Gaze aligned appropriately.      Conjunctiva/sclera: Conjunctivae normal.      Right eye: Right conjunctiva is not injected.      Left eye: Left conjunctiva is not injected.      Pupils: Pupils are equal, round, and reactive to light.   Cardiovascular:      Rate and Rhythm: Normal rate and regular rhythm.      Pulses: Normal pulses.      Heart sounds: Normal heart sounds, S1 normal and S2 normal. No murmur heard.  Pulmonary:      Effort: Pulmonary effort is normal. No respiratory distress.      Breath sounds: Normal breath sounds. No stridor. No decreased breath sounds, wheezing, rhonchi or rales.   Chest:      Chest wall: No tenderness.   Musculoskeletal:      Cervical back: Neck supple.      Right lower leg: No edema.      Left lower leg: No edema.   Lymphadenopathy:      Cervical: No cervical adenopathy.   Skin:     General: Skin is warm and dry.      Capillary Refill: Capillary refill takes less than 2 seconds.      Findings: No erythema or rash.   Neurological:      Mental Status: She is alert and oriented to person, place, and time. Mental status is at baseline.   Psychiatric:         Attention " and Perception: Attention normal.         Mood and Affect: Mood normal.         Speech: Speech normal.         Behavior: Behavior normal. Behavior is cooperative.         Thought Content: Thought content normal.         Judgment: Judgment normal.        Assessment:       1. SOB (shortness of breath)    2. Chronic combined systolic and diastolic heart failure    3. Hypertension, essential        Plan:       SOB (shortness of breath)  Unsure the cause at this time. BBS clear. Heart with regular rate and rhythm. Suspect COPD. Will check PFTs. Will start patient on Symbicort twice daily, rinse mouth after using. Albuterol inhaler also prescribed prn. Patient does have CHF, but last ejection fraction stable and reports no improvement with lasix at this time. EKG and CXR pending. Patient to follow-up with PCP and Cardiology.   -     Complete PFT w/ bronchodilator; Future  -     EKG 12-lead; Future  -     X-Ray Chest PA And Lateral; Future; Expected date: 04/04/2024  -     budesonide-formoterol 160-4.5 mcg (SYMBICORT) 160-4.5 mcg/actuation HFAA; Inhale 2 puffs into the lungs every 12 (twelve) hours. Controller  Dispense: 10.2 g; Refill: 1  -     albuterol (VENTOLIN HFA) 90 mcg/actuation inhaler; Inhale 2 puffs into the lungs every 6 (six) hours as needed for Wheezing. Rescue  Dispense: 18 g; Refill: 0    Chronic combined systolic and diastolic heart failure  Continue lasix. Discussed getting daily weights to monitor for fluid overload. Continue current meds and follow-up with Cardiology.     Hypertension, essential  BP is stable, continue current meds. Follow-up with PCP and Cardiology.     This note was created using "[employer] voice recognition software that occasionally misinterprets phrases or words.     I spent a total of 50 minutes on the day of the visit.This includes face to face time and non-face to face time preparing to see the patient (eg, review of tests), obtaining and/or reviewing separately obtained history,  documenting clinical information in the electronic or other health record, independently interpreting results and communicating results to the patient/family/caregiver, or care coordinator.    Follow up if symptoms worsen or fail to improve.          4/4/2024 RUTHY Lopez, FNP

## 2024-04-07 RX ORDER — AMITRIPTYLINE HYDROCHLORIDE 10 MG/1
TABLET, FILM COATED ORAL
Qty: 90 TABLET | Refills: 1 | Status: SHIPPED | OUTPATIENT
Start: 2024-04-07

## 2024-04-07 NOTE — TELEPHONE ENCOUNTER
No care due was identified.  [facility] Embedded Care Due Messages. Reference number: [id number].   4/07/2024 1:43:59 AM CDT

## 2024-04-07 NOTE — TELEPHONE ENCOUNTER
Refill Decision Note   Marisela Eagle  is requesting a refill authorization.  Brief Assessment and Rationale for Refill:  Approve     Medication Therapy Plan:        Comments:     Note composed:2:37 PM 04/07/2024

## 2024-04-12 ENCOUNTER — HOSPITAL ENCOUNTER (OUTPATIENT)
Dept: PREADMISSION TESTING | Facility: HOSPITAL | Age: 79
Discharge: HOME | End: 2024-04-12
Attending: NURSE PRACTITIONER
Payer: MEDICARE

## 2024-04-12 ENCOUNTER — HOSPITAL ENCOUNTER (OUTPATIENT)
Dept: PULMONOLOGY | Facility: HOSPITAL | Age: 79
Discharge: HOME | End: 2024-04-12
Attending: NURSE PRACTITIONER
Payer: MEDICARE

## 2024-04-12 ENCOUNTER — HOSPITAL ENCOUNTER (OUTPATIENT)
Dept: RADIOLOGY | Facility: HOSPITAL | Age: 79
Discharge: HOME | End: 2024-04-12
Attending: NURSE PRACTITIONER
Payer: MEDICARE

## 2024-04-12 DIAGNOSIS — R06.02 SOB (SHORTNESS OF BREATH): ICD-10-CM

## 2024-04-12 PROCEDURE — 93010 ELECTROCARDIOGRAM REPORT: CPT | Mod: ,,, | Performed by: INTERNAL MEDICINE

## 2024-04-12 PROCEDURE — 94010 BREATHING CAPACITY TEST: CPT

## 2024-04-12 PROCEDURE — 93005 ELECTROCARDIOGRAM TRACING: CPT | Performed by: INTERNAL MEDICINE

## 2024-04-12 PROCEDURE — 71046 X-RAY EXAM CHEST 2 VIEWS: CPT | Mod: 26,,, | Performed by: RADIOLOGY

## 2024-04-12 PROCEDURE — 94729 DIFFUSING CAPACITY: CPT

## 2024-04-12 PROCEDURE — 71046 X-RAY EXAM CHEST 2 VIEWS: CPT | Mod: TC

## 2024-04-12 PROCEDURE — 94727 GAS DIL/WSHOT DETER LNG VOL: CPT

## 2024-04-15 NOTE — TELEPHONE ENCOUNTER
----- Message from Tressa Bolañoson sent at 4/15/2024 12:39 PM CDT -----  Contact: self  Type:  RX Refill Request    Who Called: Pt  Refill or New Rx:Refill   RX Name and Strength: DULoxetine (CYMBALTA) 60 MG capsule  How is the patient currently taking it? (ex. 1XDay): as directed  Is this a 30 day or 90 day RX:  Preferred Pharmacy with phone number:  [facility] - [place], [place] - [address]  [address] [address]  Phone: [phone] Fax: [phone] [place] #: [phone]  Local or Mail Order: Mail Order   Ordering Provider: Dr. Badillo  Would the patient rather a call back or a response via [facility]? Call  Best Call Back Number: [phone]  Thank you

## 2024-04-16 RX ORDER — DULOXETIN HYDROCHLORIDE 60 MG/1
60 CAPSULE, DELAYED RELEASE ORAL DAILY
Qty: 90 CAPSULE | Refills: 1 | Status: SHIPPED | OUTPATIENT
Start: 2024-04-16 | End: 2024-06-14 | Stop reason: SDUPTHER

## 2024-04-29 ENCOUNTER — TELEPHONE (OUTPATIENT)
Dept: FAMILY MEDICINE | Facility: CLINIC | Age: 79
End: 2024-04-29
Payer: MEDICARE

## 2024-04-29 NOTE — TELEPHONE ENCOUNTER
----- Message from Bailey Ricci sent at 4/29/2024  9:17 AM CDT -----  Type:  RX Refill Request    Who Called:  patient  Refill or New Rx:  refill  RX Name and Strength:  LORazepam (ATIVAN) 1 MG tablet  How is the patient currently taking it? (ex. 1XDay):  as directed  Is this a 30 day or 90 day RX:  90  Preferred Pharmacy with phone number:    [facility] [address] [place], [place] - [address] [facility] [address] [address]  Phone: [phone] Fax: [phone]  Local or Mail Order:  local  Ordering Provider:  kuldeep Ellis Call Back Number:  [phone] (home)   Additional Information:

## 2024-05-01 ENCOUNTER — TELEPHONE (OUTPATIENT)
Dept: FAMILY MEDICINE | Facility: CLINIC | Age: 79
End: 2024-05-01

## 2024-05-01 ENCOUNTER — OFFICE VISIT (OUTPATIENT)
Dept: FAMILY MEDICINE | Facility: CLINIC | Age: 79
End: 2024-05-01
Payer: MEDICARE

## 2024-05-01 VITALS
WEIGHT: 238 LBS | BODY MASS INDEX: 35.15 KG/M2 | HEART RATE: 55 BPM | SYSTOLIC BLOOD PRESSURE: 128 MMHG | TEMPERATURE: 98 F | OXYGEN SATURATION: 95 % | DIASTOLIC BLOOD PRESSURE: 70 MMHG

## 2024-05-01 DIAGNOSIS — M25.512 PAIN OF BOTH SHOULDER JOINTS: ICD-10-CM

## 2024-05-01 DIAGNOSIS — B37.81 MONILIAL ESOPHAGITIS: ICD-10-CM

## 2024-05-01 DIAGNOSIS — F41.9 ANXIETY: Primary | ICD-10-CM

## 2024-05-01 DIAGNOSIS — M35.1 CONNECTIVE TISSUE DISEASE OVERLAP SYNDROME: ICD-10-CM

## 2024-05-01 DIAGNOSIS — F41.9 ANXIETY: ICD-10-CM

## 2024-05-01 DIAGNOSIS — M25.511 PAIN OF BOTH SHOULDER JOINTS: ICD-10-CM

## 2024-05-01 DIAGNOSIS — K22.2 ESOPHAGEAL STRICTURE: ICD-10-CM

## 2024-05-01 DIAGNOSIS — I10 HYPERTENSION, ESSENTIAL: Primary | ICD-10-CM

## 2024-05-01 DIAGNOSIS — I70.0 ATHEROSCLEROSIS OF AORTA: ICD-10-CM

## 2024-05-01 DIAGNOSIS — E03.9 HYPOTHYROIDISM, UNSPECIFIED TYPE: ICD-10-CM

## 2024-05-01 DIAGNOSIS — I50.42 CHRONIC COMBINED SYSTOLIC AND DIASTOLIC HEART FAILURE: ICD-10-CM

## 2024-05-01 DIAGNOSIS — E66.01 SEVERE OBESITY (BMI 35.0-39.9) WITH COMORBIDITY: ICD-10-CM

## 2024-05-01 PROCEDURE — 1101F PT FALLS ASSESS-DOCD LE1/YR: CPT | Mod: CPTII,S$GLB,, | Performed by: FAMILY MEDICINE

## 2024-05-01 PROCEDURE — 3288F FALL RISK ASSESSMENT DOCD: CPT | Mod: CPTII,S$GLB,, | Performed by: FAMILY MEDICINE

## 2024-05-01 PROCEDURE — 1125F AMNT PAIN NOTED PAIN PRSNT: CPT | Mod: CPTII,S$GLB,, | Performed by: FAMILY MEDICINE

## 2024-05-01 PROCEDURE — 1160F RVW MEDS BY RX/DR IN RCRD: CPT | Mod: CPTII,S$GLB,, | Performed by: FAMILY MEDICINE

## 2024-05-01 PROCEDURE — 3078F DIAST BP <80 MM HG: CPT | Mod: CPTII,S$GLB,, | Performed by: FAMILY MEDICINE

## 2024-05-01 PROCEDURE — 1157F ADVNC CARE PLAN IN RCRD: CPT | Mod: CPTII,S$GLB,, | Performed by: FAMILY MEDICINE

## 2024-05-01 PROCEDURE — 99214 OFFICE O/P EST MOD 30 MIN: CPT | Mod: S$GLB,,, | Performed by: FAMILY MEDICINE

## 2024-05-01 PROCEDURE — 1159F MED LIST DOCD IN RCRD: CPT | Mod: CPTII,S$GLB,, | Performed by: FAMILY MEDICINE

## 2024-05-01 PROCEDURE — 99999 PR PBB SHADOW E&M-EST. PATIENT-LVL V: CPT | Mod: PBBFAC,,, | Performed by: FAMILY MEDICINE

## 2024-05-01 PROCEDURE — 3074F SYST BP LT 130 MM HG: CPT | Mod: CPTII,S$GLB,, | Performed by: FAMILY MEDICINE

## 2024-05-01 RX ORDER — PANTOPRAZOLE SODIUM 40 MG/1
40 TABLET, DELAYED RELEASE ORAL EVERY MORNING
COMMUNITY
Start: 2024-04-22 | End: 2024-05-01 | Stop reason: SDUPTHER

## 2024-05-01 RX ORDER — PREDNISONE 20 MG/1
TABLET ORAL
Qty: 10 TABLET | Refills: 0 | Status: SHIPPED | OUTPATIENT
Start: 2024-05-01 | End: 2024-06-06

## 2024-05-01 RX ORDER — FLUCONAZOLE 100 MG/1
100 TABLET ORAL DAILY
Qty: 10 TABLET | Refills: 0 | Status: SHIPPED | OUTPATIENT
Start: 2024-05-01

## 2024-05-01 RX ORDER — PANTOPRAZOLE SODIUM 40 MG/1
40 TABLET, DELAYED RELEASE ORAL EVERY MORNING
Qty: 90 TABLET | Refills: 1 | Status: SHIPPED | OUTPATIENT
Start: 2024-05-01

## 2024-05-01 RX ORDER — CICLOPIROX 7.7 MG/G
GEL TOPICAL
COMMUNITY
Start: 2024-02-26 | End: 2024-06-06

## 2024-05-01 RX ORDER — DULOXETIN HYDROCHLORIDE 60 MG/1
60 CAPSULE, DELAYED RELEASE ORAL DAILY
Qty: 90 CAPSULE | Refills: 1 | Status: CANCELLED | OUTPATIENT
Start: 2024-05-01

## 2024-05-01 RX ORDER — FLUCONAZOLE 100 MG/1
100 TABLET ORAL DAILY
COMMUNITY
End: 2024-05-01 | Stop reason: SDUPTHER

## 2024-05-01 RX ORDER — LORAZEPAM 1 MG/1
1 TABLET ORAL NIGHTLY
Qty: 30 TABLET | Refills: 5 | Status: SHIPPED | OUTPATIENT
Start: 2024-05-01 | End: 2024-06-05 | Stop reason: SDUPTHER

## 2024-05-01 NOTE — TELEPHONE ENCOUNTER
----- Message from Enoch Stinson sent at 5/1/2024  4:12 PM CDT -----  Regarding: pharmacy  Contact: [facility]  Type:  Pharmacy Calling to Clarify an RX    Name of Caller:  [facility] [address] [place] [facility]  [address] [place] [address]  Phone: [phone] Fax: [phone]  Pharmacy Name:  Prescription Name:LORazepam (ATIVAN) 1 MG tablet  What do they need to clarify?:diagnosis code  Best Call Back Number:  Additional Information:

## 2024-05-01 NOTE — PROGRESS NOTES
SUBJECTIVE:    Patient ID: Marisela Eagle is a 79 y.o. female.    Chief Complaint: Shoulder Pain (Yeast in throat )    Marisela Eagle is a 79 y.o. female with Medical History of HTN, HLD, Chronic combined systolic and diastolic heart failure, Hypothyroidism, Anxiety who presents for a follow up. Reports recent yeast infection diagnosis, saw Dr Melgar about 10 days ago. Has not been on antibiotics recently. Patient also endorses left shoulder pain worse this morning. Has been taking pain medication as well as she has been getting injections to help with the pain. Has been seeing Dr. Eden for pain management. She needs refill on Ativan, she has been taking 1 a day, but lost the bottle. Heart failure doing okay.        Hospital Outpatient Visit on 04/12/2024   Component Date Value Ref Range Status    QRS Duration 04/12/2024 102  ms In process    [facility] QTC Calculation 04/12/2024 460  ms In process   Lab Visit on 12/28/2023   Component Date Value Ref Range Status    Sodium 12/28/2023 138  136 - 145 mmol/L Final    Potassium 12/28/2023 4.5  3.5 - 5.1 mmol/L Final    Chloride 12/28/2023 101  95 - 110 mmol/L Final    CO2 12/28/2023 27  23 - 29 mmol/L Final    Glucose 12/28/2023 89  70 - 110 mg/dL Final    BUN 12/28/2023 12  8 - 23 mg/dL Final    Creatinine 12/28/2023 0.7  0.5 - 1.4 mg/dL Final    Calcium 12/28/2023 9.9  8.7 - 10.5 mg/dL Final    Total Protein 12/28/2023 7.2  6.0 - 8.4 g/dL Final    Albumin 12/28/2023 4.2  3.5 - 5.2 g/dL Final    Total Bilirubin 12/28/2023 0.4  0.1 - 1.0 mg/dL Final    Alkaline Phosphatase 12/28/2023 53 (L)  55 - 135 U/L Final    AST 12/28/2023 18  10 - 40 U/L Final    ALT 12/28/2023 23  10 - 44 U/L Final    eGFR 12/28/2023 >60.0  >60 mL/min/1.73 m^2 Final    Anion Gap 12/28/2023 10  8 - 16 mmol/L Final    Vit D, 25-Hydroxy 12/28/2023 27 (L)  30 - 96 ng/mL Final    TSH 12/28/2023 1.253  0.400 - 4.000 uIU/mL Final    Free T4 12/28/2023 1.13  0.71 - 1.51 ng/dL Final    Free T4 12/28/2023  1.13  0.71 - 1.51 ng/dL Final    TSH 12/28/2023 1.253  0.400 - 4.000 uIU/mL Final    Sodium 12/28/2023 138  136 - 145 mmol/L Final    Potassium 12/28/2023 4.5  3.5 - 5.1 mmol/L Final    Chloride 12/28/2023 101  95 - 110 mmol/L Final    CO2 12/28/2023 27  23 - 29 mmol/L Final    Glucose 12/28/2023 89  70 - 110 mg/dL Final    BUN 12/28/2023 12  8 - 23 mg/dL Final    Creatinine 12/28/2023 0.7  0.5 - 1.4 mg/dL Final    Calcium 12/28/2023 9.9  8.7 - 10.5 mg/dL Final    Total Protein 12/28/2023 7.2  6.0 - 8.4 g/dL Final    Albumin 12/28/2023 4.2  3.5 - 5.2 g/dL Final    Total Bilirubin 12/28/2023 0.4  0.1 - 1.0 mg/dL Final    Alkaline Phosphatase 12/28/2023 53 (L)  55 - 135 U/L Final    AST 12/28/2023 18  10 - 40 U/L Final    ALT 12/28/2023 23  10 - 44 U/L Final    eGFR 12/28/2023 >60.0  >60 mL/min/1.73 m^2 Final    Anion Gap 12/28/2023 10  8 - 16 mmol/L Final    Vit D, 25-Hydroxy 12/28/2023 27 (L)  30 - 96 ng/mL Final   Lab Visit on 10/30/2023   Component Date Value Ref Range Status    Cholesterol 10/30/2023 222 (H)  120 - 199 mg/dL Final    Triglycerides 10/30/2023 102  30 - 150 mg/dL Final    HDL 10/30/2023 72  40 - 75 mg/dL Final    LDL Cholesterol 10/30/2023 129.6  63.0 - 159.0 mg/dL Final    HDL/Cholesterol Ratio 10/30/2023 32.4  20.0 - 50.0 % Final    Total Cholesterol/HDL Ratio 10/30/2023 3.1  2.0 - 5.0 Final    Non-HDL Cholesterol 10/30/2023 150  mg/dL Final    TSH 10/30/2023 0.881  0.340 - 5.600 uIU/mL Final   Hospital Outpatient Visit on 09/08/2023   Component Date Value Ref Range Status    BSA 09/08/2023 2.24  m2 Final    LVOT stroke volume 09/08/2023 60.93  cm3 Final    LVIDd 09/08/2023 5.14  3.5 - 6.0 cm Final    LV Systolic Volume 09/08/2023 54.40  mL Final    LV Systolic Volume Index 09/08/2023 25.0  mL/m2 Final    LVIDs 09/08/2023 3.60  2.1 - 4.0 cm Final    LV Diastolic Volume 09/08/2023 126.00  mL Final    LV Diastolic Volume Index 09/08/2023 57.80  mL/m2 Final    IVS 09/08/2023 1.02  0.6 - 1.1 cm Final     LVOT diameter 09/08/2023 2.10  cm Final    LVOT area 09/08/2023 3.5  cm2 Final    FS 09/08/2023 30  28 - 44 % Final    Left Ventricle Relative Wall Thick* 09/08/2023 0.34  cm Final    Posterior Wall 09/08/2023 0.88  0.6 - 1.1 cm Final    LV mass 09/08/2023 177.91  g Final    LV Mass Index 09/08/2023 82  g/m2 Final    MV Peak E Shakeel 09/08/2023 0.95  m/s Final    TDI LATERAL 09/08/2023 0.07  m/s Final    TDI SEPTAL 09/08/2023 0.08  m/s Final    E/E' ratio 09/08/2023 12.67  m/s Final    MV Peak A Shakeel 09/08/2023 0.82  m/s Final    E/A ratio 09/08/2023 1.16   Final    IVRT 09/08/2023 116.00  msec Final    E wave deceleration time 09/08/2023 158.00  msec Final    LV SEPTAL E/E' RATIO 09/08/2023 11.88  m/s Final    LV LATERAL E/E' RATIO 09/08/2023 13.57  m/s Final    LVOT peak shakeel 09/08/2023 0.75  m/s Final    Left Ventricular Outflow Tract Chloe* 09/08/2023 0.52  cm/s Final    Left Ventricular Outflow Tract Chloe* 09/08/2023 1.00  mmHg Final    LA size 09/08/2023 4.30  cm Final    RVDD 09/08/2023 3.39  cm Final    AV mean gradient 09/08/2023 3  mmHg Final    AV peak gradient 09/08/2023 5  mmHg Final    Ao peak shakeel 09/08/2023 1.09  m/s Final    Ao VTI 09/08/2023 23.20  cm Final    LVOT peak VTI 09/08/2023 17.60  cm Final    AV valve area 09/08/2023 2.63  cm² Final    AV Velocity Ratio 09/08/2023 0.69   Final    AV index (prosthetic) 09/08/2023 0.76   Final    BERNARDO by Velocity Ratio 09/08/2023 2.38  cm² Final    MV stenosis pressure 1/2 time 09/08/2023 53.00  ms Final    MV valve area p 1/2 method 09/08/2023 4.15  cm2 Final    Ao root annulus 09/08/2023 3.60  cm Final    Mean e' 09/08/2023 0.08  m/s Final    ZLVIDS 09/08/2023 -1.64   Final    ZLVIDD 09/08/2023 -3.49   Final    AORTIC VALVE CUSP SEPERATION 09/08/2023 1.90  cm Final    Est. RA pres 09/08/2023 3  mmHg Final   Clinical Support on 08/31/2023   Component Date Value Ref Range Status    Right arm BP 08/31/2023 135.00  mmHg Final    Right posterior tibial 08/31/2023  147.00  mmHg Final    Right dorsalis pedis 08/31/2023 152.00  mmHg Final    Right BABATUNDE 08/31/2023 1.03   Final    Left arm BP 08/31/2023 147.00  mmHg Final    Left posterior tibial 08/31/2023 151.00  mmHg Final    Left dorsalis pedis 08/31/2023 158.00  mmHg Final    Left BABATUNDE 08/31/2023 1.07   Final   Lab Visit on 07/31/2023   Component Date Value Ref Range Status    Sodium 07/31/2023 133 (L)  136 - 145 mmol/L Final    Potassium 07/31/2023 4.9  3.5 - 5.1 mmol/L Final    Chloride 07/31/2023 98  95 - 110 mmol/L Final    CO2 07/31/2023 24  23 - 29 mmol/L Final    Glucose 07/31/2023 102  70 - 110 mg/dL Final    BUN 07/31/2023 17  8 - 23 mg/dL Final    Creatinine 07/31/2023 0.7  0.5 - 1.4 mg/dL Final    Calcium 07/31/2023 9.3  8.7 - 10.5 mg/dL Final    Anion Gap 07/31/2023 11  8 - 16 mmol/L Final    eGFR 07/31/2023 >60.0  >60 mL/min/1.73 m^2 Final   Lab Visit on 06/23/2023   Component Date Value Ref Range Status    Hemoglobin A1C 06/23/2023 5.3  4.0 - 5.6 % Final    Estimated Avg Glucose 06/23/2023 105  68 - 131 mg/dL Final   Office Visit on 06/16/2023   Component Date Value Ref Range Status    SARS Coronavirus 2 Antigen 06/16/2023 Negative  Negative Final     Acceptable 06/16/2023 Yes   Final    Rapid Strep A Screen 06/16/2023 Negative  Negative Final     Acceptable 06/16/2023 Yes   Final   Clinical Support on 06/06/2023   Component Date Value Ref Range Status    Right calf BP 06/06/2023 172  mmHg Final    Left arm BP 06/06/2023 127  mmHg Final    Right arm BP 06/06/2023 142  mmHg Final    Left posterior tibial 06/06/2023 149  mmHg Final    Right posterior tibial 06/06/2023 141  mmHg Final    Left BABATUNDE 06/06/2023 1.05   Final    Right BABATUNDE 06/06/2023 0.99   Final    Left high thigh BP 06/06/2023 179  mmHg Final    Left calf BP 06/06/2023 184  mmHg Final    Right high thigh BP 06/06/2023 239  mmHg Final   Lab Visit on 06/02/2023   Component Date Value Ref Range Status    Vitamin B-12 06/02/2023  >1500 (H)  210 - 950 pg/mL Final   There may be more visits with results that are not included.       Past Medical History:   Diagnosis Date    Bilateral knee pain 2012    left worse, right knee painful even after partial replacement.    Bruises easily     Clot ?    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)     no treatment needed    Hyperlipidemia     Hypertension     Migraines     Neuropathy     peripheral    Thyroid disease     hypothyroid, has nodules    Varicose veins      Social History     Socioeconomic History    Marital status:    Occupational History    Occupation: RETIRED   Tobacco Use    Smoking status: Former     Current packs/day: 0.00     Types: Cigarettes     Quit date: 3/23/2012     Years since quittin.1    Smokeless tobacco: Never   Substance and Sexual Activity    Alcohol use: Yes     Comment: occasional    Drug use: No    Sexual activity: Not Currently     Social Determinants of Health     Financial Resource Strain: Low Risk  (2023)    Overall Financial Resource Strain (CARDIA)     Difficulty of Paying Living Expenses: Not hard at all   Food Insecurity: No Food Insecurity (2023)    Hunger Vital Sign     Worried About Running Out of Food in the Last Year: Never true     Ran Out of Food in the Last Year: Never true   Transportation Needs: No Transportation Needs (2023)    PRAPARE - Transportation     Lack of Transportation (Medical): No     Lack of Transportation (Non-Medical): No   Physical Activity: Inactive (2023)    Exercise Vital Sign     Days of Exercise per Week: 0 days     Minutes of Exercise per Session:  0 min   Stress: No Stress Concern Present (2023)    [employer] - Occupational Stress Questionnaire     Feeling of Stress : Not at all   Housing Stability: Low Risk  (2023)    Housing Stability Vital Sign     Unable to Pay for Housing in the Last Year: No     Number of Places Lived in the Last Year: 1     Unstable Housing in the Last Year: No     Past Surgical History:   Procedure Laterality Date    ADENOIDECTOMY      APPENDECTOMY      BILATERAL SALPINGOOPHORECTOMY       SECTION      COLONOSCOPY  12    HYSTERECTOMY      with BSO    JOINT REPLACEMENT  2012    rt unilateral knee arthroplasty. Took Coumadin x 6 weeks    KNEE ARTHROSCOPY  2010    right    TONSILLECTOMY       Family History   Problem Relation Name Age of Onset    Neuropathy Mother      Hypertension Mother      Stroke Mother      Neuropathy Father      Cancer Daughter      Diabetes Maternal Grandmother      Melanoma Neg Hx      Psoriasis Neg Hx      Lupus Neg Hx      Eczema Neg Hx         Review of patient's allergies indicates:  No Known Allergies    Current Outpatient Medications:     albuterol (VENTOLIN HFA) 90 mcg/actuation inhaler, Inhale 2 puffs into the lungs every 6 (six) hours as needed for Wheezing. Rescue, Disp: 18 g, Rfl: 0    alendronate (FOSAMAX) 70 MG tablet, TAKE 1 TABLET BY MOUTH WEEKLY, Disp: 12 tablet, Rfl: 0    amitriptyline (ELAVIL) 10 MG tablet, TAKE 1 TABLET EVERY NIGHT AS NEEDED FOR PAIN, Disp: 90 tablet, Rfl: 1    cholecalciferol, vitamin D3, 5,000 unit capsule, Take 5,000 Units by mouth once daily., Disp: , Rfl:     ciclopirox 0.77 % Gel, APPLY TO THE AFFECTED AND SURROUNDING AREAS OF SKIN BY TOPICAL ROUTE 2 TIMES PER DAY IN THE MORNING AND EVENING, Disp: , Rfl:     cyanocobalamin (VITAMIN B-12) 1000 MCG tablet, Take 5,000 mcg by mouth once daily., Disp: , Rfl:     DULoxetine (CYMBALTA) 60 MG capsule, Take 1 capsule (60 mg total) by mouth once daily., Disp: 90 capsule,  Rfl: 1    famotidine (PEPCID) 20 MG tablet, TAKE 1 TABLET EVERY EVENING, Disp: 90 tablet, Rfl: 1    furosemide (LASIX) 40 MG tablet, Take 1 tablet (40 mg total) by mouth every Tues, Fri. Take daily, Disp: 90 tablet, Rfl: 1    gabapentin (NEURONTIN) 800 MG tablet, TAKE 1 TABLET THREE TIMES DAILY, Disp: 270 tablet, Rfl: 3    levothyroxine (SYNTHROID) 125 MCG tablet, TAKE 1 TABLET EVERY DAY BEFORE BREAKFAST, Disp: 90 tablet, Rfl: 3    LORazepam (ATIVAN) 1 MG tablet, Take 1 tablet (1 mg total) by mouth every evening., Disp: 30 tablet, Rfl: 0    losartan (COZAAR) 100 MG tablet, TAKE 1/2 TABLET TWICE DAILY, Disp: 90 tablet, Rfl: 3    magnesium 30 mg Tab, Take 1 tablet by mouth once daily., Disp: , Rfl:     metoprolol succinate (TOPROL-XL) 50 MG 24 hr tablet, Take 0.5 tablets (25 mg total) by mouth every evening., Disp: 90 tablet, Rfl: 1    oxyCODONE-acetaminophen (PERCOCET)  mg per tablet, Take 1 tablet by mouth every 6 (six) hours as needed. , Disp: , Rfl:     spironolactone (ALDACTONE) 25 MG tablet, TAKE 1 TABLET ONE TIME DAILY, Disp: 90 tablet, Rfl: 1    triamcinolone acetonide 0.1% (KENALOG) 0.1 % ointment, Apply topically 2 (two) times daily., Disp: 80 g, Rfl: 0    turmeric 400 mg Cap, Take 400 mg by mouth once daily., Disp: , Rfl:     vit C/E/Zn/coppr/lutein/zeaxan (PRESERVISION AREDS-2 ORAL), Take 1 capsule by mouth once daily., Disp: , Rfl:     budesonide-formoterol 160-4.5 mcg (SYMBICORT) 160-4.5 mcg/actuation HFAA, Inhale 2 puffs into the lungs every 12 (twelve) hours. Controller (Patient not taking: Reported on 5/1/2024), Disp: 10.2 g, Rfl: 1    cetirizine (ZYRTEC) 10 MG tablet, Take 1 tablet (10 mg total) by mouth once daily., Disp: 30 tablet, Rfl: 0    fluconazole (DIFLUCAN) 100 MG tablet, Take 1 tablet (100 mg total) by mouth once daily., Disp: 10 tablet, Rfl: 0    fluticasone propionate (FLONASE) 50 mcg/actuation nasal spray, 1 spray (50 mcg total) by Each Nostril route once daily. (Patient not  "taking: Reported on 5/1/2024), Disp: 15.8 mL, Rfl: 0    GABAPENTIN 4% CREAM, , Disp: , Rfl:     LORazepam (ATIVAN) 1 MG tablet, Take 1 tablet by mouth once daily at bedtime  as needed for anxiety, Disp: 30 tablet, Rfl: 0    LORazepam (ATIVAN) 1 MG tablet, Take 1 tablet (1 mg total) by mouth every evening., Disp: 30 tablet, Rfl: 5    oxyCODONE-acetaminophen (PERCOCET)  mg per tablet, Take 1 tablet by mouth every 8 (eight) hours as needed for Pain., Disp: , Rfl:     pantoprazole (PROTONIX) 40 MG tablet, Take 1 tablet (40 mg total) by mouth every morning., Disp: 90 tablet, Rfl: 1    predniSONE (DELTASONE) 20 MG tablet, Take two tablets po qd x 5 days, Disp: 10 tablet, Rfl: 0    Review of Systems   Constitutional: Negative.    HENT: Negative.     Eyes: Negative.    Respiratory: Negative.     Cardiovascular: Negative.    Gastrointestinal: Negative.    Endocrine: Negative.    Genitourinary: Negative.    Musculoskeletal: Negative.    Skin: Negative.    Allergic/Immunologic: Negative.    Neurological: Negative.    Hematological: Negative.    Psychiatric/Behavioral: Negative.     All other systems reviewed and are negative.         Objective:          11/27/2023    10:49 AM 1/4/2024     2:19 PM 1/10/2024    10:37 AM 2/8/2024     2:00 PM 4/4/2024     2:37 PM 5/1/2024    10:55 AM   Vitals - 1 value per visit   SYSTOLIC 130 126  117 116 128   DIASTOLIC 76 70  56 68 70   Pulse 64 71  63 76 55   Temp 98.7 °F (37.1 °C) 98.4 °F (36.9 °C)  97.7 °F (36.5 °C) 98.8 °F (37.1 °C) 98 °F (36.7 °C)   Resp    18 16    SPO2 97 % 94 %   95 % 95 %   Weight (lb) 240.08 232.14 234.13 237 238 238   Weight (kg) 108.9 105.3 106.2 107.502 107.956 107.956   Height 5' 9" (1.753 m) 5' 9" (1.753 m) 5' 9" (1.753 m) 5' 9" (1.753 m) 5' 9" (1.753 m)    BMI (Calculated) 35.4 34.3 34.6 35 35.1    Pain Score Six Six Eight Eight Ten Ten       Physical Exam  Vitals and nursing note reviewed.   Constitutional:       Appearance: Normal appearance. She is " well-developed and normal weight.   HENT:      Head: Normocephalic and atraumatic.      Right Ear: Tympanic membrane normal.      Left Ear: Tympanic membrane normal.      Nose: Nose normal.      Mouth/Throat:      Mouth: Mucous membranes are moist.   Eyes:      Conjunctiva/sclera: Conjunctivae normal.      Pupils: Pupils are equal, round, and reactive to light.   Neck:      Vascular: No carotid bruit.   Cardiovascular:      Rate and Rhythm: Normal rate and regular rhythm.      Pulses: Normal pulses.      Heart sounds: Normal heart sounds. No murmur heard.     No gallop.   Pulmonary:      Effort: Pulmonary effort is normal.      Breath sounds: Normal breath sounds.   Abdominal:      General: Bowel sounds are normal.      Palpations: Abdomen is soft.      Tenderness: There is no abdominal tenderness.   Musculoskeletal:         General: Normal range of motion.      Cervical back: Normal range of motion.      Right lower leg: No edema.      Left lower leg: No edema.      Comments: Left shoulder is painful to palpation.  Some pain with movement.   Lymphadenopathy:      Cervical: No cervical adenopathy.   Skin:     General: Skin is warm and dry.   Neurological:      General: No focal deficit present.      Mental Status: She is alert and oriented to person, place, and time.   Psychiatric:         Behavior: Behavior normal.         Thought Content: Thought content normal.         Judgment: Judgment normal.           Assessment:       1. Hypertension, essential    2. Anxiety    3. Monilial esophagitis    4. Esophageal stricture    5. Pain of both shoulder joints    6. Chronic combined systolic and diastolic heart failure    7. Hypothyroidism, unspecified type    8. Severe obesity (BMI 35.0-39.9) with comorbidity         Plan:       Hypertension, essential    Anxiety    Monilial esophagitis    Esophageal stricture    Pain of both shoulder joints    Chronic combined systolic and diastolic heart failure    Hypothyroidism,  unspecified type    Severe obesity (BMI 35.0-39.9) with comorbidity    Other orders  -     LORazepam (ATIVAN) 1 MG tablet; Take 1 tablet (1 mg total) by mouth every evening.  Dispense: 30 tablet; Refill: 5  -     predniSONE (DELTASONE) 20 MG tablet; Take two tablets po qd x 5 days  Dispense: 10 tablet; Refill: 0  -     fluconazole (DIFLUCAN) 100 MG tablet; Take 1 tablet (100 mg total) by mouth once daily.  Dispense: 10 tablet; Refill: 0  -     pantoprazole (PROTONIX) 40 MG tablet; Take 1 tablet (40 mg total) by mouth every morning.  Dispense: 90 tablet; Refill: 1      No follow-ups on file.      Refill Ativan 1 mg 30 with 5 refills.  Use Protonix daily.  Discontinue the omeprazole.  Prednisone 20 mg 2 a day for 5 days see if this helps the shoulder.  Diflucan 100 mg daily for 10 days for the monilial esophagitis.

## 2024-05-02 ENCOUNTER — TELEPHONE (OUTPATIENT)
Dept: FAMILY MEDICINE | Facility: CLINIC | Age: 79
End: 2024-05-02
Payer: MEDICARE

## 2024-05-02 LAB
OHS QRS DURATION: 102 MS
OHS QTC CALCULATION: 460 MS

## 2024-05-03 PROBLEM — K22.2 ESOPHAGEAL STRICTURE: Status: ACTIVE | Noted: 2024-05-03

## 2024-05-03 PROBLEM — M35.1 CONNECTIVE TISSUE DISEASE OVERLAP SYNDROME: Status: ACTIVE | Noted: 2024-05-03

## 2024-05-03 NOTE — PROGRESS NOTES
Please call patient.  EKG was abnormal. She has a 1st degree A-V block, which is not new. ND interval has increased. There are some T wave abnormalities. Make sure patient keeps routine follow-up with PCP and Cardiology.

## 2024-05-07 DIAGNOSIS — R06.02 SOB (SHORTNESS OF BREATH): ICD-10-CM

## 2024-05-07 RX ORDER — BUDESONIDE AND FORMOTEROL FUMARATE DIHYDRATE 160; 4.5 UG/1; UG/1
2 AEROSOL RESPIRATORY (INHALATION) EVERY 12 HOURS
Qty: 10.2 G | Refills: 1 | Status: SHIPPED | OUTPATIENT
Start: 2024-05-07 | End: 2024-06-06

## 2024-06-05 ENCOUNTER — OFFICE VISIT (OUTPATIENT)
Dept: CARDIOLOGY | Facility: CLINIC | Age: 79
End: 2024-06-05
Payer: MEDICARE

## 2024-06-05 VITALS
WEIGHT: 246.38 LBS | HEART RATE: 70 BPM | OXYGEN SATURATION: 96 % | BODY MASS INDEX: 36.49 KG/M2 | DIASTOLIC BLOOD PRESSURE: 68 MMHG | HEIGHT: 69 IN | SYSTOLIC BLOOD PRESSURE: 102 MMHG

## 2024-06-05 DIAGNOSIS — R60.0 LOCALIZED EDEMA: ICD-10-CM

## 2024-06-05 DIAGNOSIS — G62.9 NEUROPATHY: ICD-10-CM

## 2024-06-05 DIAGNOSIS — F41.9 ANXIETY: ICD-10-CM

## 2024-06-05 DIAGNOSIS — I10 HYPERTENSION, ESSENTIAL: ICD-10-CM

## 2024-06-05 DIAGNOSIS — E78.2 MIXED HYPERLIPIDEMIA: ICD-10-CM

## 2024-06-05 DIAGNOSIS — G89.29 OTHER CHRONIC PAIN: ICD-10-CM

## 2024-06-05 DIAGNOSIS — R07.89 TIGHTNESS IN CHEST: Primary | ICD-10-CM

## 2024-06-05 DIAGNOSIS — I50.32 CHRONIC HEART FAILURE WITH PRESERVED EJECTION FRACTION: ICD-10-CM

## 2024-06-05 DIAGNOSIS — R07.9 ACUTE CHEST PAIN: Primary | ICD-10-CM

## 2024-06-05 DIAGNOSIS — R07.9 CHEST PAIN, UNSPECIFIED TYPE: ICD-10-CM

## 2024-06-05 DIAGNOSIS — Z99.89 USE OF CANE AS AMBULATORY AID: ICD-10-CM

## 2024-06-05 DIAGNOSIS — R79.89 ELEVATED BRAIN NATRIURETIC PEPTIDE (BNP) LEVEL: ICD-10-CM

## 2024-06-05 DIAGNOSIS — R06.02 SOB (SHORTNESS OF BREATH) ON EXERTION: ICD-10-CM

## 2024-06-05 DIAGNOSIS — I83.90 ASYMPTOMATIC VARICOSE VEINS: ICD-10-CM

## 2024-06-05 DIAGNOSIS — I70.0 ATHEROSCLEROSIS OF AORTA: ICD-10-CM

## 2024-06-05 PROCEDURE — 99214 OFFICE O/P EST MOD 30 MIN: CPT | Mod: HCNC,S$GLB,, | Performed by: NURSE PRACTITIONER

## 2024-06-05 PROCEDURE — 1101F PT FALLS ASSESS-DOCD LE1/YR: CPT | Mod: HCNC,CPTII,S$GLB, | Performed by: NURSE PRACTITIONER

## 2024-06-05 PROCEDURE — 99999 PR PBB SHADOW E&M-EST. PATIENT-LVL IV: CPT | Mod: PBBFAC,HCNC,, | Performed by: NURSE PRACTITIONER

## 2024-06-05 PROCEDURE — 3288F FALL RISK ASSESSMENT DOCD: CPT | Mod: HCNC,CPTII,S$GLB, | Performed by: NURSE PRACTITIONER

## 2024-06-05 PROCEDURE — 1157F ADVNC CARE PLAN IN RCRD: CPT | Mod: HCNC,CPTII,S$GLB, | Performed by: NURSE PRACTITIONER

## 2024-06-05 PROCEDURE — 3078F DIAST BP <80 MM HG: CPT | Mod: HCNC,CPTII,S$GLB, | Performed by: NURSE PRACTITIONER

## 2024-06-05 PROCEDURE — 1159F MED LIST DOCD IN RCRD: CPT | Mod: HCNC,CPTII,S$GLB, | Performed by: NURSE PRACTITIONER

## 2024-06-05 PROCEDURE — 3074F SYST BP LT 130 MM HG: CPT | Mod: HCNC,CPTII,S$GLB, | Performed by: NURSE PRACTITIONER

## 2024-06-05 PROCEDURE — 1126F AMNT PAIN NOTED NONE PRSNT: CPT | Mod: HCNC,CPTII,S$GLB, | Performed by: NURSE PRACTITIONER

## 2024-06-05 NOTE — ASSESSMENT & PLAN NOTE
Obtain stress test to evaluate for myocardial ischemia  Discussed warning signs and symptoms of a heart attack and went to go the nearest emergency room or call 911  Blood pressure is well controlled  Pain possibly arthritic in nature she has widespread joint pain  Patient has a history of esophageal stricture with recent dilatation in April

## 2024-06-05 NOTE — ASSESSMENT & PLAN NOTE
Ordered echocardiogram, BNP, lung sounds are clear  She takes spironolactone, losartan, Lasix for HFpEF continue the same

## 2024-06-05 NOTE — PROGRESS NOTES
" Subjective:    Patient ID:  Marisela Eagle is a 79 y.o. female patient here for evaluation Shortness of Breath (Ongoing )    History of Present Illness:     Pt is 78 yo in clinic today to report tightness of chest on left; non radiating  As well as shortness of breath, "no matter what I do I am short of breath"   No cough or wheeze or orthopnea  Has chronic venous insufficiency; saw cardiologist in [place] for this.  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  Also has arthritis and redness in the joints as well  Never had MI or Stroke; angiogram several years ago, can't remember when or where    Smoked for 53 years; quit 2012    Has bad knees and uses cane for ambulation- can't walk the treadmill for testing      Most Recent Echocardiogram Results  Results for orders placed during the hospital encounter of 09/08/23    Echo    Interpretation Summary    Left Ventricle: The left ventricle is normal in size. Normal wall thickness. Normal wall motion. There is normal systolic function with a visually estimated ejection fraction of 60 - 65%. There is normal diastolic function.    Left Atrium: Left atrium is mildly dilated.    Right Ventricle: Mild right ventricular enlargement. Wall thickness is normal. Right ventricle wall motion  is normal. Systolic function is normal.    Mitral Valve: There is mild mitral annular calcification present.    IVC/SVC: Normal venous pressure at 3 mmHg.      Most Recent Nuclear Stress Test Results  Results for orders placed in visit on 02/21/22    Nuclear Stress Test    Interpretation Summary    PET CT Findings The nuclear portion of this study will be reported separately.    The EKG portion of this study " is negative for ischemia.    The patient reported no chest pain during the stress test.    There were no arrhythmias during stress.      Most Recent Cardiac PET Stress Test Results  No results found for this or any previous visit.      Most Recent Cardiovascular Angiogram results  No results found for this or any previous visit.      Other Most Recent Cardiology Results  Results for orders placed in visit on 23    Ankle Brachial Indices (BABATUNDE)    Narrative    Normal resting ABIs bilaterally.      REVIEW OF SYSTEMS: As noted in HPI     Past Medical History:   Diagnosis Date    Bilateral knee pain 2012    left worse, right knee painful even after partial replacement.    Bruises easily     Clot ?    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 Took Coumadin x 6 weeks    KNEE ARTHROSCOPY  2010    right    TONSILLECTOMY       Social History     Tobacco Use    Smoking status: Former     Current packs/day: 0.00     Types: Cigarettes     Quit date: 3/23/2012     Years since quittin.2    Smokeless tobacco: Never   Substance Use Topics    Alcohol use: Yes     Comment: occasional    Drug use: No         Objective      Vitals:     06/05/24 1530   BP: 102/68   Pulse: 70       LAST EKG  Results for orders placed or performed during the hospital encounter of 04/12/24   EKG 12-lead    Collection Time: 04/12/24  1:33 PM   Result Value Ref Range    QRS Duration 102 ms    OHS QTC Calculation 460 ms    Narrative    Test Reason : R06.02,    Vent. Rate : 063 BPM     Atrial Rate : 063 BPM     P-R Int : 222 ms          QRS Dur : 102 ms      QT Int : 450 ms       P-R-T Axes : 033 -26 097 degrees     QTc Int : 460 ms    Sinus rhythm with 1st degree A-V block  Abnormal QRS-T angle, consider primary T wave abnormality  Leftward axis  Abnormal ECG  When compared with ECG of 22-NOV-2023 15:47,  MN interval has increased  Nonspecific T wave abnormality no longer evident in Inferior leads  Confirmed by Avni Patton MD [id number]) on 5/2/2024 2:53:20 PM    Referred By: CESAR FINCH           Confirmed By:Avni Patton MD     LIPIDS - LAST 2   Lab Results   Component Value Date    CHOL 222 (H) 10/30/2023    CHOL 241 (H) 01/10/2023    HDL 72 10/30/2023    HDL 77 (H) 01/10/2023    LDLCALC 129.6 10/30/2023    LDLCALC 147.8 01/10/2023    TRIG 102 10/30/2023    TRIG 81 01/10/2023    CHOLHDL 32.4 10/30/2023    CHOLHDL 32.0 01/10/2023     CARDIAC PROFILE - LAST 2  Lab Results   Component Value Date     (H) 05/27/2023     (H) 05/26/2023     08/10/2022     (H) 01/04/2022    CPKMB 6.9 (H) 01/04/2022    CPKMB 1.9 09/19/2019    TROPONINI <0.030 01/04/2022    TROPONINI 0.038 01/31/2021      CBC - LAST 2  Lab Results   Component Value Date    WBC 4.61 05/28/2023    WBC 4.94 05/27/2023    RBC 4.34 05/28/2023    RBC 4.28 05/27/2023    HGB 11.5 (L) 05/28/2023    HGB 11.5 (L) 05/27/2023    HCT 36.6 (L) 05/28/2023    HCT 36.0 (L) 05/27/2023     05/28/2023     05/27/2023     Lab Results   Component Value Date    LABPT 13.8 09/17/2019    INR 1.1 09/17/2019    APTT 23.0 (L) 09/17/2019     CHEMISTRY - LAST 2  Lab Results   Component Value  Date     12/28/2023     12/28/2023    K 4.5 12/28/2023    K 4.5 12/28/2023     12/28/2023     12/28/2023    CO2 27 12/28/2023    CO2 27 12/28/2023    ANIONGAP 10 12/28/2023    ANIONGAP 10 12/28/2023    BUN 12 12/28/2023    BUN 12 12/28/2023    CREATININE 0.7 12/28/2023    CREATININE 0.7 12/28/2023    GLU 89 12/28/2023    GLU 89 12/28/2023    CALCIUM 9.9 12/28/2023    CALCIUM 9.9 12/28/2023    MG 2.0 05/27/2023    MG 1.9 02/02/2021    ALBUMIN 4.2 12/28/2023    ALBUMIN 4.2 12/28/2023    PROT 7.2 12/28/2023    PROT 7.2 12/28/2023    ALKPHOS 53 (L) 12/28/2023    ALKPHOS 53 (L) 12/28/2023    ALT 23 12/28/2023    ALT 23 12/28/2023    AST 18 12/28/2023    AST 18 12/28/2023    BILITOT 0.4 12/28/2023    BILITOT 0.4 12/28/2023      ENDOCRINE - LAST 2  Lab Results   Component Value Date    HGBA1C 5.3 06/23/2023    HGBA1C 5.2 02/24/2021    TSH 1.253 12/28/2023    TSH 1.253 12/28/2023        PHYSICAL EXAM  CONSTITUTIONAL: Well built, well nourished elderly female in no apparent distress  NECK: no carotid bruit, no JVD  LUNGS: CTA  CHEST WALL: no tenderness  HEART: regular rate and rhythm, S1, S2 normal, no murmur  ABDOMEN: soft, non-tender; bowel sounds normal; no masses  EXTREMITIES:  Toes discolored, purple, nonpitting edema to feet  NEURO: AAO X 3, speech clear, memory clear    I HAVE REVIEWED :    The vital signs, most recent cardiac testing, and most recent pertinent non-cardiology provider notes.    Current Outpatient Medications   Medication Instructions    alendronate (FOSAMAX) 70 MG tablet TAKE 1 TABLET BY MOUTH WEEKLY    amitriptyline (ELAVIL) 10 MG tablet TAKE 1 TABLET EVERY NIGHT AS NEEDED FOR PAIN    budesonide-formoterol 160-4.5 mcg (SYMBICORT) 160-4.5 mcg/actuation HFAA 2 puffs, Inhalation, Every 12 hours, Controller    cholecalciferol (vitamin D3) 5,000 Units, Oral, Daily    ciclopirox 0.77 % Gel APPLY TO THE AFFECTED AND SURROUNDING AREAS OF SKIN BY TOPICAL ROUTE 2 TIMES PER DAY IN THE  MORNING AND EVENING    cyanocobalamin (VITAMIN B-12) 5,000 mcg, Oral, Daily    DULoxetine (CYMBALTA) 60 mg, Oral, Daily    famotidine (PEPCID) 20 mg, Oral, Nightly    fluconazole (DIFLUCAN) 100 mg, Oral, Daily    furosemide (LASIX) 40 mg, Oral, Every Tues, Fri, Take daily    gabapentin (NEURONTIN) 800 mg, Oral, 3 times daily    levothyroxine (SYNTHROID) 125 MCG tablet TAKE 1 TABLET EVERY DAY BEFORE BREAKFAST    LORazepam (ATIVAN) 1 mg, Oral, Nightly    losartan (COZAAR) 50 mg, Oral, 2 times daily    magnesium 30 mg Tab 1 tablet, Oral, Daily    metoprolol succinate (TOPROL-XL) 25 mg, Oral, Nightly    oxyCODONE-acetaminophen (PERCOCET)  mg per tablet 1 tablet, Oral, Every 6 hours PRN    pantoprazole (PROTONIX) 40 mg, Oral, Every morning    predniSONE (DELTASONE) 20 MG tablet Take two tablets po qd x 5 days    spironolactone (ALDACTONE) 25 mg, Oral    triamcinolone acetonide 0.1% (KENALOG) 0.1 % ointment Topical (Top), 2 times daily    turmeric 400 mg, Oral, Daily    vit C/E/Zn/coppr/lutein/zeaxan (PRESERVISION AREDS-2 ORAL) 1 capsule, Oral, Daily      Assessment & Plan     Hyperlipidemia   Latest Reference Range & Units 10/30/23 09:43   Cholesterol Total 120 - 199 mg/dL 222 (H)   HDL 40 - 75 mg/dL 72   HDL/Cholesterol Ratio 20.0 - 50.0 % 32.4   Non-HDL Cholesterol mg/dL 150   Total Cholesterol/HDL Ratio 2.0 - 5.0  3.1   Triglycerides 30 - 150 mg/dL 102   LDL Cholesterol 63.0 - 159.0 mg/dL 129.6       (HFpEF) heart failure with preserved ejection fraction  Ordered echocardiogram, BNP, lung sounds are clear  She takes spironolactone, losartan, Lasix for HFpEF continue the same    Chest pain  Obtain stress test to evaluate for myocardial ischemia  Discussed warning signs and symptoms of a heart attack and went to go the nearest emergency room or call 911  Blood pressure is well controlled  Pain possibly arthritic in nature she has widespread joint pain  Patient has a history of esophageal stricture with recent  dilatation in April    Hypertension, essential  BP is well controlled at home and in the office today, 102/68.  Continue losartan and spironolactone as directed  Continue low-sodium diet    Use of cane as ambulatory aid  Unable to what treadmill for stress test    Localized edema  BABATUNDE was normal August, 2023      BMI 36.0-36.9,adult  Patient would benefit from weight loss  Unable to exercise due to chronic pain      Follow up after testing completed             Ruth Cedeño, JOSÉ LUISP-C

## 2024-06-05 NOTE — ASSESSMENT & PLAN NOTE
BP is well controlled at home and in the office today, 102/68.  Continue losartan and spironolactone as directed  Continue low-sodium diet

## 2024-06-05 NOTE — ASSESSMENT & PLAN NOTE
Latest Reference Range & Units 10/30/23 09:43   Cholesterol Total 120 - 199 mg/dL 222 (H)   HDL 40 - 75 mg/dL 72   HDL/Cholesterol Ratio 20.0 - 50.0 % 32.4   Non-HDL Cholesterol mg/dL 150   Total Cholesterol/HDL Ratio 2.0 - 5.0  3.1   Triglycerides 30 - 150 mg/dL 102   LDL Cholesterol 63.0 - 159.0 mg/dL 129.6

## 2024-06-06 ENCOUNTER — HOSPITAL ENCOUNTER (OUTPATIENT)
Dept: RADIOLOGY | Facility: HOSPITAL | Age: 79
Discharge: HOME | DRG: 176 | End: 2024-06-06
Attending: NURSE PRACTITIONER
Payer: MEDICARE

## 2024-06-06 ENCOUNTER — TELEPHONE (OUTPATIENT)
Dept: CARDIOLOGY | Facility: CLINIC | Age: 79
End: 2024-06-06
Payer: MEDICARE

## 2024-06-06 ENCOUNTER — HOSPITAL ENCOUNTER (INPATIENT)
Facility: HOSPITAL | Age: 79
LOS: 1 days | Discharge: HOME | DRG: 176 | End: 2024-06-08
Attending: EMERGENCY MEDICINE | Admitting: HOSPITALIST
Payer: MEDICARE

## 2024-06-06 DIAGNOSIS — I34.1 MITRAL VALVE PROLAPSE: Primary | ICD-10-CM

## 2024-06-06 DIAGNOSIS — I26.99 PULMONARY EMBOLI: ICD-10-CM

## 2024-06-06 DIAGNOSIS — M81.0 SENILE OSTEOPOROSIS: ICD-10-CM

## 2024-06-06 DIAGNOSIS — Z01.818 PRE-OP TESTING: ICD-10-CM

## 2024-06-06 DIAGNOSIS — I50.42 CHRONIC COMBINED SYSTOLIC AND DIASTOLIC HEART FAILURE: Primary | ICD-10-CM

## 2024-06-06 DIAGNOSIS — R07.9 ACUTE CHEST PAIN: ICD-10-CM

## 2024-06-06 DIAGNOSIS — I26.99 PULMONARY EMBOLISM: ICD-10-CM

## 2024-06-06 DIAGNOSIS — I82.409 DVT (DEEP VENOUS THROMBOSIS): ICD-10-CM

## 2024-06-06 DIAGNOSIS — R07.9 CHEST PAIN: ICD-10-CM

## 2024-06-06 PROBLEM — I82.431 ACUTE DEEP VEIN THROMBOSIS (DVT) OF POPLITEAL VEIN OF RIGHT LOWER EXTREMITY: Status: ACTIVE | Noted: 2024-06-06

## 2024-06-06 LAB
ALBUMIN SERPL BCP-MCNC: 4.2 G/DL (ref 3.5–5.2)
ALP SERPL-CCNC: 39 U/L (ref 55–135)
ALT SERPL W/O P-5'-P-CCNC: 16 U/L (ref 10–44)
ANION GAP SERPL CALC-SCNC: 8 MMOL/L (ref 8–16)
APTT PPP: 25.6 SEC (ref 21–32)
AST SERPL-CCNC: 17 U/L (ref 10–40)
BASOPHILS # BLD AUTO: 0.02 K/UL (ref 0–0.2)
BASOPHILS NFR BLD: 0.3 % (ref 0–1.9)
BILIRUB SERPL-MCNC: 0.5 MG/DL (ref 0.1–1)
BILIRUB UR QL STRIP: NEGATIVE
BNP SERPL-MCNC: 152 PG/ML (ref 0–99)
BUN SERPL-MCNC: 8 MG/DL (ref 8–23)
CALCIUM SERPL-MCNC: 9.2 MG/DL (ref 8.7–10.5)
CHLORIDE SERPL-SCNC: 102 MMOL/L (ref 95–110)
CLARITY UR: CLEAR
CO2 SERPL-SCNC: 28 MMOL/L (ref 23–29)
COLOR UR: YELLOW
CREAT SERPL-MCNC: 0.7 MG/DL (ref 0.5–1.4)
D DIMER PPP IA.FEU-MCNC: 2.26 MG/L FEU (ref 0–0.49)
DIFFERENTIAL METHOD BLD: ABNORMAL
EOSINOPHIL # BLD AUTO: 0.1 K/UL (ref 0–0.5)
EOSINOPHIL NFR BLD: 1.5 % (ref 0–8)
ERYTHROCYTE [DISTWIDTH] IN BLOOD BY AUTOMATED COUNT: 17.2 % (ref 11.5–14.5)
EST. GFR  (NO RACE VARIABLE): >60 ML/MIN/1.73 M^2
GLUCOSE SERPL-MCNC: 108 MG/DL (ref 70–110)
GLUCOSE UR QL STRIP: NEGATIVE
HCT VFR BLD AUTO: 38.1 % (ref 37–48.5)
HGB BLD-MCNC: 12.4 G/DL (ref 12–16)
HGB UR QL STRIP: NEGATIVE
IMM GRANULOCYTES # BLD AUTO: 0.02 K/UL (ref 0–0.04)
IMM GRANULOCYTES NFR BLD AUTO: 0.3 % (ref 0–0.5)
INR PPP: 0.9 (ref 0.8–1.2)
KETONES UR QL STRIP: NEGATIVE
LEUKOCYTE ESTERASE UR QL STRIP: NEGATIVE
LYMPHOCYTES # BLD AUTO: 2.4 K/UL (ref 1–4.8)
LYMPHOCYTES NFR BLD: 35.1 % (ref 18–48)
MCH RBC QN AUTO: 27.6 PG (ref 27–31)
MCHC RBC AUTO-ENTMCNC: 32.5 G/DL (ref 32–36)
MCV RBC AUTO: 85 FL (ref 82–98)
MONOCYTES # BLD AUTO: 0.7 K/UL (ref 0.3–1)
MONOCYTES NFR BLD: 10.1 % (ref 4–15)
NEUTROPHILS # BLD AUTO: 3.6 K/UL (ref 1.8–7.7)
NEUTROPHILS NFR BLD: 52.7 % (ref 38–73)
NITRITE UR QL STRIP: NEGATIVE
NRBC BLD-RTO: 0 /100 WBC
PH UR STRIP: 6 [PH] (ref 5–8)
PLATELET # BLD AUTO: 252 K/UL (ref 150–450)
PMV BLD AUTO: 11.2 FL (ref 9.2–12.9)
POTASSIUM SERPL-SCNC: 3.8 MMOL/L (ref 3.5–5.1)
PROT SERPL-MCNC: 6.6 G/DL (ref 6–8.4)
PROT UR QL STRIP: NEGATIVE
PROTHROMBIN TIME: 10.3 SEC (ref 9–12.5)
RBC # BLD AUTO: 4.5 M/UL (ref 4–5.4)
SODIUM SERPL-SCNC: 138 MMOL/L (ref 136–145)
SP GR UR STRIP: >1.03 (ref 1–1.03)
TROPONIN I SERPL HS-MCNC: 7.4 PG/ML (ref 0–14.9)
URN SPEC COLLECT METH UR: ABNORMAL
UROBILINOGEN UR STRIP-ACNC: NEGATIVE EU/DL
WBC # BLD AUTO: 6.84 K/UL (ref 3.9–12.7)

## 2024-06-06 PROCEDURE — 84484 ASSAY OF TROPONIN QUANT: CPT | Performed by: NURSE PRACTITIONER

## 2024-06-06 PROCEDURE — 85730 THROMBOPLASTIN TIME PARTIAL: CPT | Performed by: NURSE PRACTITIONER

## 2024-06-06 PROCEDURE — 85379 FIBRIN DEGRADATION QUANT: CPT | Performed by: NURSE PRACTITIONER

## 2024-06-06 PROCEDURE — 85025 COMPLETE CBC W/AUTO DIFF WBC: CPT | Performed by: NURSE PRACTITIONER

## 2024-06-06 PROCEDURE — 80053 COMPREHEN METABOLIC PANEL: CPT | Performed by: NURSE PRACTITIONER

## 2024-06-06 PROCEDURE — G0378 HOSPITAL OBSERVATION PER HR: HCPCS

## 2024-06-06 PROCEDURE — 81003 URINALYSIS AUTO W/O SCOPE: CPT

## 2024-06-06 PROCEDURE — 25000003 PHARM REV CODE 250: Performed by: HOSPITALIST

## 2024-06-06 PROCEDURE — 71275 CT ANGIOGRAPHY CHEST: CPT | Mod: 26,,, | Performed by: RADIOLOGY

## 2024-06-06 PROCEDURE — 85610 PROTHROMBIN TIME: CPT | Performed by: NURSE PRACTITIONER

## 2024-06-06 PROCEDURE — 96372 THER/PROPH/DIAG INJ SC/IM: CPT | Performed by: STUDENT IN AN ORGANIZED HEALTH CARE EDUCATION/TRAINING PROGRAM

## 2024-06-06 PROCEDURE — 25500020 PHARM REV CODE 255: Performed by: NURSE PRACTITIONER

## 2024-06-06 PROCEDURE — 71275 CT ANGIOGRAPHY CHEST: CPT | Mod: TC

## 2024-06-06 PROCEDURE — 93005 ELECTROCARDIOGRAM TRACING: CPT | Performed by: INTERNAL MEDICINE

## 2024-06-06 PROCEDURE — 93010 ELECTROCARDIOGRAM REPORT: CPT | Mod: ,,, | Performed by: INTERNAL MEDICINE

## 2024-06-06 PROCEDURE — 25000003 PHARM REV CODE 250

## 2024-06-06 PROCEDURE — 83880 ASSAY OF NATRIURETIC PEPTIDE: CPT | Mod: 91 | Performed by: NURSE PRACTITIONER

## 2024-06-06 PROCEDURE — 63600175 PHARM REV CODE 636 W HCPCS: Performed by: STUDENT IN AN ORGANIZED HEALTH CARE EDUCATION/TRAINING PROGRAM

## 2024-06-06 PROCEDURE — 99285 EMERGENCY DEPT VISIT HI MDM: CPT | Mod: 25

## 2024-06-06 RX ORDER — GABAPENTIN 800 MG/1
800 TABLET ORAL 2 TIMES DAILY
Status: DISCONTINUED | OUTPATIENT
Start: 2024-06-06 | End: 2024-06-06

## 2024-06-06 RX ORDER — SODIUM,POTASSIUM PHOSPHATES 280-250MG
2 POWDER IN PACKET (EA) ORAL
Status: DISCONTINUED | OUTPATIENT
Start: 2024-06-06 | End: 2024-06-08 | Stop reason: HOSPADM

## 2024-06-06 RX ORDER — HYDROCODONE BITARTRATE AND ACETAMINOPHEN 5; 325 MG/1; MG/1
1 TABLET ORAL EVERY 6 HOURS PRN
Status: DISCONTINUED | OUTPATIENT
Start: 2024-06-06 | End: 2024-06-06

## 2024-06-06 RX ORDER — AMOXICILLIN 250 MG
1 CAPSULE ORAL DAILY PRN
Status: DISCONTINUED | OUTPATIENT
Start: 2024-06-06 | End: 2024-06-08 | Stop reason: HOSPADM

## 2024-06-06 RX ORDER — ALUMINUM HYDROXIDE, MAGNESIUM HYDROXIDE, AND SIMETHICONE 1200; 120; 1200 MG/30ML; MG/30ML; MG/30ML
30 SUSPENSION ORAL 4 TIMES DAILY PRN
Status: DISCONTINUED | OUTPATIENT
Start: 2024-06-06 | End: 2024-06-08 | Stop reason: HOSPADM

## 2024-06-06 RX ORDER — METOPROLOL SUCCINATE 25 MG/1
25 TABLET, EXTENDED RELEASE ORAL NIGHTLY
Status: DISCONTINUED | OUTPATIENT
Start: 2024-06-06 | End: 2024-06-08 | Stop reason: HOSPADM

## 2024-06-06 RX ORDER — SODIUM CHLORIDE 0.9 % (FLUSH) 0.9 %
10 SYRINGE (ML) INJECTION EVERY 12 HOURS PRN
Status: DISCONTINUED | OUTPATIENT
Start: 2024-06-06 | End: 2024-06-08 | Stop reason: HOSPADM

## 2024-06-06 RX ORDER — OXYCODONE AND ACETAMINOPHEN 10; 325 MG/1; MG/1
1 TABLET ORAL DAILY PRN
Status: DISCONTINUED | OUTPATIENT
Start: 2024-06-06 | End: 2024-06-08 | Stop reason: HOSPADM

## 2024-06-06 RX ORDER — POTASSIUM CHLORIDE 20 MEQ/1
20 TABLET, EXTENDED RELEASE ORAL DAILY
COMMUNITY

## 2024-06-06 RX ORDER — LANOLIN ALCOHOL/MO/W.PET/CERES
5000 CREAM (GRAM) TOPICAL DAILY
Status: DISCONTINUED | OUTPATIENT
Start: 2024-06-07 | End: 2024-06-08 | Stop reason: HOSPADM

## 2024-06-06 RX ORDER — NALOXONE HCL 0.4 MG/ML
0.02 VIAL (ML) INJECTION
Status: DISCONTINUED | OUTPATIENT
Start: 2024-06-06 | End: 2024-06-08 | Stop reason: HOSPADM

## 2024-06-06 RX ORDER — HYPROMELLOSE 3 MG/G
1 GEL OPHTHALMIC
COMMUNITY

## 2024-06-06 RX ORDER — FUROSEMIDE 40 MG/1
40 TABLET ORAL
Status: DISCONTINUED | OUTPATIENT
Start: 2024-06-07 | End: 2024-06-08 | Stop reason: HOSPADM

## 2024-06-06 RX ORDER — LORAZEPAM 1 MG/1
1 TABLET ORAL NIGHTLY
Status: DISCONTINUED | OUTPATIENT
Start: 2024-06-06 | End: 2024-06-08 | Stop reason: HOSPADM

## 2024-06-06 RX ORDER — ONDANSETRON HYDROCHLORIDE 2 MG/ML
4 INJECTION, SOLUTION INTRAVENOUS EVERY 6 HOURS PRN
Status: DISCONTINUED | OUTPATIENT
Start: 2024-06-06 | End: 2024-06-08 | Stop reason: HOSPADM

## 2024-06-06 RX ORDER — CARBOXYMETHYLCELLULOSE SODIUM 10 MG/ML
1 GEL OPHTHALMIC
Status: DISCONTINUED | OUTPATIENT
Start: 2024-06-06 | End: 2024-06-08 | Stop reason: HOSPADM

## 2024-06-06 RX ORDER — ALBUTEROL SULFATE 90 UG/1
2 AEROSOL, METERED RESPIRATORY (INHALATION) EVERY 6 HOURS PRN
COMMUNITY
End: 2024-06-06

## 2024-06-06 RX ORDER — LANOLIN ALCOHOL/MO/W.PET/CERES
800 CREAM (GRAM) TOPICAL
Status: DISCONTINUED | OUTPATIENT
Start: 2024-06-06 | End: 2024-06-08 | Stop reason: HOSPADM

## 2024-06-06 RX ORDER — DULOXETIN HYDROCHLORIDE 30 MG/1
60 CAPSULE, DELAYED RELEASE ORAL DAILY
Status: DISCONTINUED | OUTPATIENT
Start: 2024-06-07 | End: 2024-06-08 | Stop reason: HOSPADM

## 2024-06-06 RX ORDER — ACETAMINOPHEN 325 MG/1
650 TABLET ORAL EVERY 4 HOURS PRN
Status: DISCONTINUED | OUTPATIENT
Start: 2024-06-06 | End: 2024-06-08 | Stop reason: HOSPADM

## 2024-06-06 RX ORDER — LOSARTAN POTASSIUM 25 MG/1
50 TABLET ORAL 2 TIMES DAILY
Status: DISCONTINUED | OUTPATIENT
Start: 2024-06-06 | End: 2024-06-08 | Stop reason: HOSPADM

## 2024-06-06 RX ORDER — OXYCODONE AND ACETAMINOPHEN 10; 325 MG/1; MG/1
1 TABLET ORAL DAILY PRN
COMMUNITY

## 2024-06-06 RX ORDER — TALC
6 POWDER (GRAM) TOPICAL NIGHTLY PRN
Status: DISCONTINUED | OUTPATIENT
Start: 2024-06-06 | End: 2024-06-08 | Stop reason: HOSPADM

## 2024-06-06 RX ORDER — SPIRONOLACTONE 25 MG/1
25 TABLET ORAL DAILY
Status: DISCONTINUED | OUTPATIENT
Start: 2024-06-07 | End: 2024-06-08 | Stop reason: HOSPADM

## 2024-06-06 RX ORDER — ENOXAPARIN SODIUM 150 MG/ML
1 INJECTION SUBCUTANEOUS
Status: DISCONTINUED | OUTPATIENT
Start: 2024-06-06 | End: 2024-06-07

## 2024-06-06 RX ORDER — NEOMYCIN SULFATE, POLYMYXIN B SULFATE AND DEXAMETHASONE 3.5; 10000; 1 MG/ML; [USP'U]/ML; MG/ML
1 SUSPENSION/ DROPS OPHTHALMIC
COMMUNITY
Start: 2024-05-06 | End: 2024-06-06

## 2024-06-06 RX ORDER — AMITRIPTYLINE HYDROCHLORIDE 10 MG/1
10 TABLET, FILM COATED ORAL NIGHTLY PRN
Status: DISCONTINUED | OUTPATIENT
Start: 2024-06-06 | End: 2024-06-08 | Stop reason: HOSPADM

## 2024-06-06 RX ORDER — PANTOPRAZOLE SODIUM 40 MG/1
40 TABLET, DELAYED RELEASE ORAL EVERY MORNING
Status: DISCONTINUED | OUTPATIENT
Start: 2024-06-07 | End: 2024-06-08 | Stop reason: HOSPADM

## 2024-06-06 RX ADMIN — IOHEXOL 100 ML: 350 INJECTION, SOLUTION INTRAVENOUS at 04:06

## 2024-06-06 RX ADMIN — GABAPENTIN 800 MG: 300 CAPSULE ORAL at 11:06

## 2024-06-06 RX ADMIN — LOSARTAN POTASSIUM 50 MG: 25 TABLET, FILM COATED ORAL at 11:06

## 2024-06-06 RX ADMIN — METOPROLOL SUCCINATE 25 MG: 25 TABLET, FILM COATED, EXTENDED RELEASE ORAL at 11:06

## 2024-06-06 RX ADMIN — LORAZEPAM 1 MG: 1 TABLET ORAL at 11:06

## 2024-06-06 RX ADMIN — ENOXAPARIN SODIUM 105 MG: 120 INJECTION SUBCUTANEOUS at 08:06

## 2024-06-06 NOTE — FIRST PROVIDER EVALUATION
" Emergency Department TeleTriage Encounter Note      CHIEF COMPLAINT    Chief Complaint   Patient presents with    Shortness of Breath     For several months, had CT scan today and advised by Dr. Patton to come to ED       VITAL SIGNS   Initial Vitals [06/06/24 1814]   BP Pulse Resp Temp SpO2   (!) 143/76 85 18 98.1 °F (36.7 °C) 97 %      MAP       --            ALLERGIES    Review of patient's allergies indicates:  No Known Allergies    PROVIDER TRIAGE NOTE  This is a teletriage evaluation of a 79 y.o. female presenting to the ED complaining of SOB "for months" but CTA revealed bilateral PEs today. Reports JARA but at rest no SOB. Has mild CP. No anticoagulant use.     Alert, no distress.     Initial orders will be placed and care will be transferred to an alternate provider when patient is roomed for a full evaluation. Any additional orders and the final disposition will be determined by that provider.       ORDERS  Labs Reviewed - No data to display    ED Orders (720h ago, onward)      Start Ordered     Status Ordering Provider    06/06/24 1831 06/06/24 1830  Protime-INR  STAT         Ordered TRICIA SYLVESTER N.    06/06/24 1831 06/06/24 1830  APTT  STAT         Ordered TRICIA SYLVESTER N.    06/06/24 1831 06/06/24 1830  Possible Hospitalization  Once        Comments: For further elaboration on reason for hospitalization.    Ordered TRICIA SYLVESTER N.    06/06/24 1830 06/06/24 1830  CBC Auto Differential  STAT         Ordered TRICIA SYLVESTER N.    06/06/24 1830 06/06/24 1830  Comprehensive Metabolic Panel  STAT         Ordered TRICIA SYLVESTER N.    06/06/24 1830 06/06/24 1830  Troponin I High Sensitivity  STAT         Ordered TRICIA SYLVESTER N.    06/06/24 1830 06/06/24 1830  BNP  STAT         Ordered TRICIA SYLVESTER N.    06/06/24 1830 06/06/24 1830  D-Dimer, Quantitative  STAT         Ordered TRICIA SYLVESTER N.    06/06/24 1830 06/06/24 1830  EKG 12-lead  " Once         Ordered TRICIA SYLVESTER              Virtual Visit Note: The provider triage portion of this emergency department evaluation and documentation was performed via [employer], a HIPAA-compliant telemedicine application, in concert with a tele-presenter in the room. A face to face patient evaluation with one of my colleagues will occur once the patient is placed in an emergency department room.      DISCLAIMER: This note was prepared with [employer] voice recognition transcription software. Garbled syntax, mangled pronouns, and other bizarre constructions may be attributed to that software system.

## 2024-06-06 NOTE — Clinical Note
Diagnosis: Pulmonary embolism [[id number]]   Future Attending Provider: BELLA NICE [[id number]]   Place in Observation: On license of [facility] [[id number]]

## 2024-06-06 NOTE — TELEPHONE ENCOUNTER
----- Message from Tressa Holman sent at 6/6/2024  4:21 PM CDT -----  Contact: [facility]  Type:  Needs Medical Advice  Who Called:  Dr. Eleazar Vogel w/[facility] Rad, Best Call Back Number Dr. Vogel's Cell:  [phone]  Symptoms (please be specific):  CT chest w/ Pulm embolism   Please have MsBrenda Cedeño reach out to Dr. Vogel... thank you...

## 2024-06-07 PROBLEM — R07.9 CHEST PAIN: Status: RESOLVED | Noted: 2023-05-27 | Resolved: 2024-06-07

## 2024-06-07 PROBLEM — E21.3 HYPERPARATHYROIDISM, UNSPECIFIED: Status: ACTIVE | Noted: 2024-06-07

## 2024-06-07 PROBLEM — L03.115 CELLULITIS OF RIGHT LOWER EXTREMITY: Status: RESOLVED | Noted: 2023-05-27 | Resolved: 2024-06-07

## 2024-06-07 PROBLEM — R60.0 LOCALIZED EDEMA: Status: RESOLVED | Noted: 2023-06-01 | Resolved: 2024-06-07

## 2024-06-07 PROBLEM — I50.42 CHRONIC COMBINED SYSTOLIC AND DIASTOLIC HEART FAILURE: Status: RESOLVED | Noted: 2023-06-05 | Resolved: 2024-06-07

## 2024-06-07 LAB
ALBUMIN SERPL BCP-MCNC: 3.7 G/DL (ref 3.5–5.2)
ALP SERPL-CCNC: 34 U/L (ref 55–135)
ALT SERPL W/O P-5'-P-CCNC: 14 U/L (ref 10–44)
ANION GAP SERPL CALC-SCNC: 5 MMOL/L (ref 8–16)
AST SERPL-CCNC: 16 U/L (ref 10–40)
BASOPHILS # BLD AUTO: 0.04 K/UL (ref 0–0.2)
BASOPHILS NFR BLD: 0.6 % (ref 0–1.9)
BILIRUB SERPL-MCNC: 0.3 MG/DL (ref 0.1–1)
BUN SERPL-MCNC: 13 MG/DL (ref 8–23)
CALCIUM SERPL-MCNC: 8.9 MG/DL (ref 8.7–10.5)
CHLORIDE SERPL-SCNC: 106 MMOL/L (ref 95–110)
CO2 SERPL-SCNC: 28 MMOL/L (ref 23–29)
CREAT SERPL-MCNC: 0.6 MG/DL (ref 0.5–1.4)
DIFFERENTIAL METHOD BLD: ABNORMAL
EOSINOPHIL # BLD AUTO: 0.1 K/UL (ref 0–0.5)
EOSINOPHIL NFR BLD: 1.7 % (ref 0–8)
ERYTHROCYTE [DISTWIDTH] IN BLOOD BY AUTOMATED COUNT: 17 % (ref 11.5–14.5)
EST. GFR  (NO RACE VARIABLE): >60 ML/MIN/1.73 M^2
GLUCOSE SERPL-MCNC: 105 MG/DL (ref 70–110)
HCT VFR BLD AUTO: 34.8 % (ref 37–48.5)
HGB BLD-MCNC: 11.3 G/DL (ref 12–16)
IMM GRANULOCYTES # BLD AUTO: 0.02 K/UL (ref 0–0.04)
IMM GRANULOCYTES NFR BLD AUTO: 0.3 % (ref 0–0.5)
LYMPHOCYTES # BLD AUTO: 2.7 K/UL (ref 1–4.8)
LYMPHOCYTES NFR BLD: 42.4 % (ref 18–48)
MAGNESIUM SERPL-MCNC: 2.1 MG/DL (ref 1.6–2.6)
MCH RBC QN AUTO: 27.7 PG (ref 27–31)
MCHC RBC AUTO-ENTMCNC: 32.5 G/DL (ref 32–36)
MCV RBC AUTO: 85 FL (ref 82–98)
MONOCYTES # BLD AUTO: 0.7 K/UL (ref 0.3–1)
MONOCYTES NFR BLD: 11.4 % (ref 4–15)
NEUTROPHILS # BLD AUTO: 2.8 K/UL (ref 1.8–7.7)
NEUTROPHILS NFR BLD: 43.6 % (ref 38–73)
NRBC BLD-RTO: 0 /100 WBC
PLATELET # BLD AUTO: 228 K/UL (ref 150–450)
PMV BLD AUTO: 11.7 FL (ref 9.2–12.9)
POTASSIUM SERPL-SCNC: 3.8 MMOL/L (ref 3.5–5.1)
PROT SERPL-MCNC: 5.9 G/DL (ref 6–8.4)
RBC # BLD AUTO: 4.08 M/UL (ref 4–5.4)
SODIUM SERPL-SCNC: 139 MMOL/L (ref 136–145)
WBC # BLD AUTO: 6.39 K/UL (ref 3.9–12.7)

## 2024-06-07 PROCEDURE — 83735 ASSAY OF MAGNESIUM: CPT

## 2024-06-07 PROCEDURE — 25000003 PHARM REV CODE 250: Performed by: HOSPITALIST

## 2024-06-07 PROCEDURE — 85025 COMPLETE CBC W/AUTO DIFF WBC: CPT

## 2024-06-07 PROCEDURE — 63600175 PHARM REV CODE 636 W HCPCS: Performed by: STUDENT IN AN ORGANIZED HEALTH CARE EDUCATION/TRAINING PROGRAM

## 2024-06-07 PROCEDURE — 36415 COLL VENOUS BLD VENIPUNCTURE: CPT

## 2024-06-07 PROCEDURE — 21400001 HC TELEMETRY ROOM

## 2024-06-07 PROCEDURE — 94618 PULMONARY STRESS TESTING: CPT

## 2024-06-07 PROCEDURE — 94760 N-INVAS EAR/PLS OXIMETRY 1: CPT

## 2024-06-07 PROCEDURE — 25000003 PHARM REV CODE 250: Performed by: STUDENT IN AN ORGANIZED HEALTH CARE EDUCATION/TRAINING PROGRAM

## 2024-06-07 PROCEDURE — 25000003 PHARM REV CODE 250

## 2024-06-07 PROCEDURE — 99900035 HC TECH TIME PER 15 MIN (STAT)

## 2024-06-07 PROCEDURE — 80053 COMPREHEN METABOLIC PANEL: CPT

## 2024-06-07 RX ORDER — ENOXAPARIN SODIUM 150 MG/ML
1 INJECTION SUBCUTANEOUS EVERY 12 HOURS
Status: DISCONTINUED | OUTPATIENT
Start: 2024-06-07 | End: 2024-06-08

## 2024-06-07 RX ADMIN — ENOXAPARIN SODIUM 105 MG: 120 INJECTION SUBCUTANEOUS at 09:06

## 2024-06-07 RX ADMIN — GABAPENTIN 800 MG: 300 CAPSULE ORAL at 09:06

## 2024-06-07 RX ADMIN — LORAZEPAM 1 MG: 1 TABLET ORAL at 09:06

## 2024-06-07 RX ADMIN — LOSARTAN POTASSIUM 50 MG: 25 TABLET, FILM COATED ORAL at 09:06

## 2024-06-07 RX ADMIN — METOPROLOL SUCCINATE 25 MG: 25 TABLET, FILM COATED, EXTENDED RELEASE ORAL at 09:06

## 2024-06-07 RX ADMIN — Medication 6 MG: at 10:06

## 2024-06-07 RX ADMIN — APIXABAN 10 MG: 5 TABLET, FILM COATED ORAL at 09:06

## 2024-06-07 RX ADMIN — OXYCODONE HYDROCHLORIDE AND ACETAMINOPHEN 1 TABLET: 10; 325 TABLET ORAL at 01:06

## 2024-06-07 RX ADMIN — FUROSEMIDE 40 MG: 40 TABLET ORAL at 05:06

## 2024-06-07 RX ADMIN — CYANOCOBALAMIN TAB 1000 MCG 5000 MCG: 1000 TAB at 09:06

## 2024-06-07 RX ADMIN — PANTOPRAZOLE SODIUM 40 MG: 40 TABLET, DELAYED RELEASE ORAL at 06:06

## 2024-06-07 RX ADMIN — CHOLECALCIFEROL (VITAMIN D3) 10 MCG (400 UNIT) TABLET 4800 UNITS: at 09:06

## 2024-06-07 RX ADMIN — LEVOTHYROXINE SODIUM 125 MCG: 0.1 TABLET ORAL at 06:06

## 2024-06-07 RX ADMIN — DULOXETINE HYDROCHLORIDE 60 MG: 30 CAPSULE, DELAYED RELEASE ORAL at 09:06

## 2024-06-07 RX ADMIN — SPIRONOLACTONE 25 MG: 25 TABLET ORAL at 09:06

## 2024-06-07 RX ADMIN — Medication 6 MG: at 01:06

## 2024-06-07 NOTE — ED NOTES
PT TRANSFERRED TO HOSPITAL BED IN THE ROOM. PT THANKFUL. PLACED BACK TO CARDIAC MONITOR. CALL LIGHT AT THE BS

## 2024-06-07 NOTE — SUBJECTIVE & OBJECTIVE
"Interval History: see "Hospital Course"    Review of Systems   Respiratory:  Positive for shortness of breath.    Cardiovascular:  Positive for leg swelling. Negative for chest pain and palpitations.     Objective:     Vital Signs (Most Recent):  Temp: 98.1 °F (36.7 °C) (06/06/24 1814)  Pulse: 62 (06/07/24 0903)  Resp: 20 (06/07/24 0903)  BP: (!) 158/70 (06/07/24 0910)  SpO2: 98 % (06/07/24 0631) Vital Signs (24h Range):  Temp:  [98.1 °F (36.7 °C)] 98.1 °F (36.7 °C)  Pulse:  [54-85] 62  Resp:  [16-22] 20  SpO2:  [90 %-100 %] 98 %  BP: (139-181)/(63-95) 158/70     Weight: 111.1 kg (245 lb)  Body mass index is 36.18 kg/m².  No intake or output data in the 24 hours ending 06/07/24 1445      Physical Exam  Vitals and nursing note reviewed.   Constitutional:       General: She is not in acute distress.     Appearance: She is obese.   HENT:      Head: Normocephalic and atraumatic.      Right Ear: External ear normal.      Left Ear: External ear normal.      Nose: Nose normal.      Mouth/Throat:      Mouth: Mucous membranes are moist.      Pharynx: Oropharynx is clear.   Eyes:      Extraocular Movements: Extraocular movements intact.      Conjunctiva/sclera: Conjunctivae normal.   Cardiovascular:      Rate and Rhythm: Normal rate and regular rhythm.      Pulses: Normal pulses.      Heart sounds: Normal heart sounds.   Pulmonary:      Effort: Pulmonary effort is normal. No respiratory distress.      Breath sounds: Normal breath sounds. No wheezing, rhonchi or rales.   Abdominal:      General: Bowel sounds are normal.      Palpations: Abdomen is soft.   Musculoskeletal:         General: Normal range of motion.      Cervical back: Normal range of motion and neck supple.      Right lower leg: No edema.      Left lower leg: No edema.   Skin:     General: Skin is warm and dry.   Neurological:      Mental Status: She is alert. Mental status is at baseline.   Psychiatric:         Mood and Affect: Mood normal.         Behavior: " Behavior normal.             Significant Labs: All pertinent labs within the past 24 hours have been reviewed.    Significant Imaging: I have reviewed all pertinent imaging results/findings within the past 24 hours.

## 2024-06-07 NOTE — PROGRESS NOTES
"[facility] - Emergency Dept  Hospital Medicine  Progress Note    Patient Name: Marisela Eagle  MRN: [id number]  Patient Class: IP- Inpatient   Admission Date: 2024  Length of Stay: 0 days  Attending Physician: Adam Barrientos MD  Primary Care Provider: Pawel Badillo III, MD        Subjective:     Principal Problem:Pulmonary embolism        HPI:  79-year-old female presented to ED for eval. Patient sent to ED by her cardiologist 2/2 abn CTA chest with pulmonary emboli. Patient reported she has been having shortness of breath and dyspnea on exertion over the last several months, with associated L sided chest tightness, palpitations, and R thigh pain. She also reports BLE edema for the last 2-3 years, reports diagnosed with PVD and neuropathy. Patient does have remote history of blood clot, states it was "in her abdomen" about 50 years ago s/p , denies taking blood thinners. Denies hx AF. Denies hormone replacement therapy. CTA chest impression with bilateral pulmonary emboli, worse in the right main pulmonary artery, no right heart strain. RLE venous US impression with nonocclusive popliteal thrombus. Admit to hospital medicine for further eval.     Overview/Hospital Course:  Marisela Eagle is an 79 year old female with a past medical history of obesity, HTN, HLD, GERD, and hypothyroidism who presented with an unprovoked bilateral PE and R popliteal DVT. She is on Lovenox and stable on room air. There is no right heart strain. A home O2 evaluation was ordered and the patient did not qualify. She will need Hematology referral upon discharge for a hypercoagulable workup.    Interval History: see "Hospital Course"    Review of Systems   Respiratory:  Positive for shortness of breath.    Cardiovascular:  Positive for leg swelling. Negative for chest pain and palpitations.     Objective:     Vital Signs (Most Recent):  Temp: 98.1 °F (36.7 °C) (24 1814)  Pulse: 62 (24 0903)  Resp: " 20 (06/07/24 0903)  BP: (!) 158/70 (06/07/24 0910)  SpO2: 98 % (06/07/24 0631) Vital Signs (24h Range):  Temp:  [98.1 °F (36.7 °C)] 98.1 °F (36.7 °C)  Pulse:  [54-85] 62  Resp:  [16-22] 20  SpO2:  [90 %-100 %] 98 %  BP: (139-181)/(63-95) 158/70     Weight: 111.1 kg (245 lb)  Body mass index is 36.18 kg/m².  No intake or output data in the 24 hours ending 06/07/24 1445      Physical Exam  Vitals and nursing note reviewed.   Constitutional:       General: She is not in acute distress.     Appearance: She is obese.   HENT:      Head: Normocephalic and atraumatic.      Right Ear: External ear normal.      Left Ear: External ear normal.      Nose: Nose normal.      Mouth/Throat:      Mouth: Mucous membranes are moist.      Pharynx: Oropharynx is clear.   Eyes:      Extraocular Movements: Extraocular movements intact.      Conjunctiva/sclera: Conjunctivae normal.   Cardiovascular:      Rate and Rhythm: Normal rate and regular rhythm.      Pulses: Normal pulses.      Heart sounds: Normal heart sounds.   Pulmonary:      Effort: Pulmonary effort is normal. No respiratory distress.      Breath sounds: Normal breath sounds. No wheezing, rhonchi or rales.   Abdominal:      General: Bowel sounds are normal.      Palpations: Abdomen is soft.   Musculoskeletal:         General: Normal range of motion.      Cervical back: Normal range of motion and neck supple.      Right lower leg: No edema.      Left lower leg: No edema.   Skin:     General: Skin is warm and dry.   Neurological:      Mental Status: She is alert. Mental status is at baseline.   Psychiatric:         Mood and Affect: Mood normal.         Behavior: Behavior normal.             Significant Labs: All pertinent labs within the past 24 hours have been reviewed.    Significant Imaging: I have reviewed all pertinent imaging results/findings within the past 24 hours.    Assessment/Plan:      * Pulmonary embolism  Bilateral. Unprovoked.  -Lovenox 1 mg/kg Q12H      Acute deep  vein thrombosis (DVT) of popliteal vein of right lower extremity  -Lovenox 1 mg/kg Q12H      Severe obesity (BMI 35.0-39.9) with comorbidity  Body mass index is 36.18 kg/m². Morbid obesity complicates all aspects of disease management from diagnostic modalities to treatment.       Hyperlipidemia        GERD (gastroesophageal reflux disease)  -PPI      Hypothyroid  -Continue Synthroid      Hypertension, essential  Chronic, controlled. Latest blood pressure and vitals reviewed-     Temp:  [98.1 °F (36.7 °C)]   Pulse:  [54-85]   Resp:  [16-22]   BP: (139-181)/(63-95)   SpO2:  [90 %-100 %] .   Home meds for hypertension were reviewed and noted below.   Hypertension Medications               furosemide (LASIX) 40 MG tablet Take 1 tablet (40 mg total) by mouth every Tues, Fri. Take daily    losartan (COZAAR) 100 MG tablet TAKE 1/2 TABLET TWICE DAILY    metoprolol succinate (TOPROL-XL) 50 MG 24 hr tablet Take 0.5 tablets (25 mg total) by mouth every evening.    spironolactone (ALDACTONE) 25 MG tablet TAKE 1 TABLET ONE TIME DAILY            While in the hospital, will manage blood pressure as follows; Continue home antihypertensive regimen    Will utilize p.r.n. blood pressure medication only if patient's blood pressure greater than 180/110 and she develops symptoms such as worsening chest pain or shortness of breath.      VTE Risk Mitigation (From admission, onward)           Ordered     enoxaparin injection 105 mg  Every 12 hours         06/07/24 1311                    Discharge Planning   SANDOR: 6/8/2024    Code Status: Full Code   Is the patient medically ready for discharge?:     Reason for patient still in hospital (select all that apply): Patient trending condition, Treatment, and Consult recommendations                     Adam Barrientos MD  Department of Hospital Medicine   [facility] - Emergency Dept

## 2024-06-07 NOTE — HOSPITAL COURSE
Marisela Eagle is an 79 year old female with a past medical history of obesity, HTN, HLD, GERD, and hypothyroidism who presented with an unprovoked bilateral PE and R popliteal DVT. She is on Lovenox and stable on room air. There is no right heart strain. A home O2 evaluation was ordered and the patient did not qualify.  She was referred to Hematology on discharge for hypercoagulable workup.  She was discharged on Eliquis.  Three-month supply given on discharge but informed that duration of anticoagulant to be determined by PCP/Hematology.  Patient expressed understanding.  She was counseled avoiding falls while on Eliquis.  She will follow up with PCP and Hematology.  She expressed understanding of discharge plan.

## 2024-06-07 NOTE — PLAN OF CARE
SW attempted to meet with pt at bedside. Pt was sleeping both times SW attempted to conduct d/c assessment. SW called pt's daughter and left voicemail to please return phone call.       06/07/24 0989   Discharge Assessment   Assessment Type Discharge Planning Assessment   Prior to hospitilization cognitive status: Unable to Assess   Current cognitive status: Unable to Assess

## 2024-06-07 NOTE — HPI
"79-year-old female presented to ED for eval. Patient sent to ED by her cardiologist 2/2 abn CTA chest with pulmonary emboli. Patient reported she has been having shortness of breath and dyspnea on exertion over the last several months, with associated L sided chest tightness, palpitations, and R thigh pain. She also reports BLE edema for the last 2-3 years, reports diagnosed with PVD and neuropathy. Patient does have remote history of blood clot, states it was "in her abdomen" about 50 years ago s/p , denies taking blood thinners. Denies hx AF. Denies hormone replacement therapy. CTA chest impression with bilateral pulmonary emboli, worse in the right main pulmonary artery, no right heart strain. RLE venous US impression with nonocclusive popliteal thrombus. Admit to hospital medicine for further eval.   "

## 2024-06-07 NOTE — ASSESSMENT & PLAN NOTE
Chronic, controlled. Latest blood pressure and vitals reviewed-     Temp:  [98.1 °F (36.7 °C)]   Pulse:  [54-85]   Resp:  [16-22]   BP: (139-181)/(63-95)   SpO2:  [90 %-100 %] .   Home meds for hypertension were reviewed and noted below.   Hypertension Medications               furosemide (LASIX) 40 MG tablet Take 1 tablet (40 mg total) by mouth every Tues, Fri. Take daily    losartan (COZAAR) 100 MG tablet TAKE 1/2 TABLET TWICE DAILY    metoprolol succinate (TOPROL-XL) 50 MG 24 hr tablet Take 0.5 tablets (25 mg total) by mouth every evening.    spironolactone (ALDACTONE) 25 MG tablet TAKE 1 TABLET ONE TIME DAILY            While in the hospital, will manage blood pressure as follows; Continue home antihypertensive regimen    Will utilize p.r.n. blood pressure medication only if patient's blood pressure greater than 180/110 and she develops symptoms such as worsening chest pain or shortness of breath.

## 2024-06-07 NOTE — NURSING
Nurses Note -- 4 Eyes      6/7/2024   5:29 PM      Skin assessed during: Admit      [x] No Altered Skin Integrity Present    []Prevention Measures Documented      [] Yes- Altered Skin Integrity Present or Discovered   [] LDA Added if Not in Epic (Describe Wound)   [] New Altered Skin Integrity was Present on Admit and Documented in LDA   [] Wound Image Taken    Wound Care Consulted? No    Attending Nurse:  Vale Lyle RN/Staff Member:   Ela GOODWIN

## 2024-06-07 NOTE — ED PROVIDER NOTES
Encounter Date: 6/6/2024       History     Chief Complaint   Patient presents with    Shortness of Breath     For several months, had CT scan today and advised by Dr. Patton to come to ED     HPI    Marisela Eagle is a 79 year old female with a PMH of bilateral knee and hip replacements, peripheral neuropathy, PVD, HLD, HTN who presents with SOB. Patient reports that she has been SOB for several months, worse with exertion. Reporting chest tightness on left side along with intermittent palpitations. She saw her outpatient cardiologist yesterday who sent her for lab tests along with EKG. Found to have elevated d-dimer, subsequently sent for CTA which demonstrated the following: filling defect consistent with thrombus is noted in the distal right main pulmonary artery extending into segmental pulmonary artery of the right lower lobe along with other smaller filling defects/emboli involving distal right lower lobe subsegmental pulmonary arteries and also small filling defects/emboli of left subsegmental pulmonary arteries supplying the left lower lobe. No right heart strain observed. Patient with history of umblical clot s/p hysterectomy, otherwise denies any history of DVT/PE. Denies any hormone use, increased immobilization, long car rides/air travel. No recent illnesses, URI symptoms. Patient denies any dizziness, syncope.      Review of patient's allergies indicates:  No Known Allergies  Past Medical History:   Diagnosis Date    Bilateral knee pain July 2012    left worse, right knee painful even after partial replacement.    Bruises easily     Clot 1990's?    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 Took Coumadin x 6 weeks    KNEE ARTHROSCOPY  2010    right    TONSILLECTOMY       Family History   Problem Relation Name Age of Onset    Neuropathy Mother      Hypertension Mother      Stroke Mother      Neuropathy Father      Cancer Daughter      Diabetes Maternal Grandmother      Melanoma Neg Hx      Psoriasis Neg Hx      Lupus Neg Hx      Eczema Neg Hx       Social History     Tobacco Use    Smoking status: Former     Current packs/day: 0.00     Types: Cigarettes     Quit date: 3/23/2012     Years since quittin.2    Smokeless tobacco: Never   Substance Use Topics    Alcohol use: Yes     Comment: occasional    Drug use: No     Review of Systems   Constitutional:  Negative for activity change, appetite change, chills, fatigue and fever.   Eyes:  Negative for visual disturbance.   Respiratory:  Positive for shortness of breath. Negative for cough.    Cardiovascular:  Positive for palpitations and leg swelling. Negative for chest pain.   Gastrointestinal:  Negative for abdominal distention, abdominal pain, constipation, diarrhea, nausea and vomiting.   Genitourinary:  Negative for difficulty urinating.   Musculoskeletal:  Positive for arthralgias (Chronic, unchanged.) and back pain (Chronic, unchanged.). Negative for neck pain.   Neurological:  Positive for numbness (At baseline in BLE). Negative for dizziness, syncope, weakness, light-headedness and headaches.     Physical Exam     Initial Vitals [24 1814]   BP Pulse Resp Temp SpO2   (!) 143/76  85 18 98.1 °F (36.7 °C) 97 %      MAP       --         Physical Exam    Nursing note and vitals reviewed.  Constitutional: She appears well-developed and well-nourished.   HENT:   Head: Normocephalic and atraumatic.   Eyes: Conjunctivae and EOM are normal. Right eye exhibits no discharge. Left eye exhibits no discharge. No scleral icterus.   Neck: Neck supple.   Normal range of motion.  Cardiovascular:  Normal rate, regular rhythm, normal heart sounds and intact distal pulses.           No murmur heard.  Pulses:       Radial pulses are 2+ on the right side and 2+ on the left side.        Dorsalis pedis pulses are 1+ on the right side and 1+ on the left side.   Pulmonary/Chest: Breath sounds normal. No respiratory distress. She has no wheezes. She exhibits no tenderness.   Abdominal: Abdomen is soft. She exhibits no distension. There is no abdominal tenderness. There is no rebound and no guarding.   Musculoskeletal:         General: Edema present. Normal range of motion.      Cervical back: Normal range of motion and neck supple.      Comments: BLE extremity edema. Chronic appearing skin changes in BLE appearing consistent with vascular insufficiency, slightly worse in RLE with more erythema. No calf tenderness bilaterally.      Neurological: She is alert and oriented to person, place, and time. GCS score is 15. GCS eye subscore is 4. GCS verbal subscore is 5. GCS motor subscore is 6.   Skin: Skin is warm and dry.         ED Course   Procedures  Labs Reviewed   CBC W/ AUTO DIFFERENTIAL - Abnormal; Notable for the following components:       Result Value    RDW 17.2 (*)     All other components within normal limits   COMPREHENSIVE METABOLIC PANEL - Abnormal; Notable for the following components:    Alkaline Phosphatase 39 (*)     All other components within normal limits   B-TYPE NATRIURETIC PEPTIDE - Abnormal; Notable for the following components:     (*)     All other components within normal limits   D  DIMER, QUANTITATIVE - Abnormal; Notable for the following components:    D-Dimer 2.26 (*)     All other components within normal limits    Narrative:     D-Dimer critical result(s) repeated. Called and verbal readback   obtained from Tanvi Lao RN by DOMINIQUE 06/06/2024 20:19   TROPONIN I HIGH SENSITIVITY   PROTIME-INR   APTT          Imaging Results    None          Medications   enoxaparin injection 105 mg (has no administration in time range)     Medical Decision Making  Risk  Prescription drug management.  Decision regarding hospitalization.                              PGY-3 [facility]    Patient is an 79 year old female who presents to the ED with SOB. Also reporting intermittent dizziness along with BLE edema. Outpatient CT demonstrated bilateral pulmonary emboli, worse in the right main pulmonary artery without right heart strain. Cardiopulmonary exam unremarkable. BLE extremity edema present. Chronic appearing skin changes in BLE appearing consistent with vascular insufficiency, slightly worse in RLE with more erythema. No calf tenderness bilaterally. Afebrile on presentation. Hypertensive to 143/76, otherwise VSS. Workup here included CBC, CMP, coags, BNP, troponin along with EKG, BLE DVT ultrasound. Patient without leukocytosis, WBC 6.84. H/H 12.4/38.1. Coags WNL. CMP without gross abnormalities. Troponin WNL. BNP elevated to 152, near baseline. D-dimer elevated to 2.26. EKG demonstrates NSR without evidence of acute ischemia, ST elevation. ME interval and Qtc WNL. DVT ultrasound pending.     Outpatient CT imaging with evidence of bilateral PE. Ultrasound pending to rule out concomitant DVT. No heart strain noted on CTA, patient hemodynamically stable at this time. Lovenox ordered. Patient consulted to hospitalist team for admission. Case discussed with Dr. Montero.     Kimmy Fernandez MD  PGY-3, [facility]   8:59PM 6/6/24      Clinical Impression:  Final diagnoses:  [I26.99] Pulmonary  embolism  [I82.409] DVT (deep venous thrombosis)          ED Disposition Condition    Admit Stable                Kimmy Fernandez MD  Resident  06/06/24 1880

## 2024-06-07 NOTE — ASSESSMENT & PLAN NOTE
Patient is identified as having Diastolic (HFpEF) heart failure that is Chronic. CHF is currently controlled. Latest ECHO performed and demonstrates- Results for orders placed during the hospital encounter of 09/08/23    Echo    Interpretation Summary    Left Ventricle: The left ventricle is normal in size. Normal wall thickness. Normal wall motion. There is normal systolic function with a visually estimated ejection fraction of 60 - 65%. There is normal diastolic function.    Left Atrium: Left atrium is mildly dilated.    Right Ventricle: Mild right ventricular enlargement. Wall thickness is normal. Right ventricle wall motion  is normal. Systolic function is normal.    Mitral Valve: There is mild mitral annular calcification present.    IVC/SVC: Normal venous pressure at 3 mmHg.  . Continue Beta Blocker ACE/ARB Furosemide Aldactone and monitor clinical status closely. Monitor on telemetry. Patient is off CHF pathway.  Monitor strict Is&Os and daily weights.  Place on fluid restriction of 1.5 L. Cardiology is not consulted. Continue to stress to patient importance of self efficacy and  on diet for CHF. Last BNP reviewed- and noted below   Recent Labs   Lab 06/06/24  1920   *     .

## 2024-06-07 NOTE — PROGRESS NOTES
Pharmacist Renal Dose Adjustment Note    Marisela Eagle is a 79 y.o. female being treated with the medication apixaban    Patient Data:    Vital Signs (Most Recent):  Temp: 98.1 °F (36.7 °C) (06/06/24 1814)  Pulse: (!) 56 (06/07/24 0631)  Resp: 17 (06/07/24 0629)  BP: (!) 158/79 (06/07/24 0631)  SpO2: 98 % (06/07/24 0631) Vital Signs (72h Range):  Temp:  [98.1 °F (36.7 °C)]   Pulse:  [54-85]   Resp:  [16-22]   BP: (102-181)/(63-95)   SpO2:  [90 %-100 %]      Recent Labs   Lab 06/06/24  1316 06/06/24  1920 06/07/24  0422   CREATININE 0.6 0.7 0.6     Serum creatinine: 0.6 mg/dL 06/07/24 0422  Estimated creatinine clearance: 97.7 mL/min    Medication:APIXABAN 10 MG EVERY 12 HOURS WILL BE CHANGED TO APIXIBAN 10 MG  EVERY 12 HOURS FOR 14 DOSES PER PROTOCOL.    Pharmacist's Name: Marlon Sanders  Pharmacist's Extension: [phone]

## 2024-06-07 NOTE — CARE UPDATE
06/07/24 0903   PRE-TX-O2   Pulse 62   Resp 20   Home Oxygen Qualification   $ Home O2 Qualification Pulmonary Stress Test/6 min walk;Tech time 15 minutes   Room Air SpO2 At Rest 97 %   Room Air SpO2 During Ambulation 93 %   SpO2 Post Ambulation 91 %   Post Ambulation Heart Rate 68 bpm   Home O2 Eval Comments Patient does not require home O2

## 2024-06-07 NOTE — PLAN OF CARE
[facility] - Emergency Dept  Initial Discharge Assessment       Primary Care Provider: Pawel Badillo III, MD    Admission Diagnosis: Pulmonary embolism [I26.99]    Admission Date: 6/6/2024  Expected Discharge Date:      completed discharge assessment with pt's adult daughter over the phone. Pt AAOx4s. Pt lives at home with her spouse, but her daughter lives 4 door down. Demographics, PCP, and insurance verified. No home health. No dialysis. Pt completes ADLs with assistance of DME listed. Pt to discharge home via family transport. Pt has no other needs to be addressed at this time.     Transition of Care Barriers: None    Payor: [employer] MEDICARE / Plan: [employer] MEDICARE HMO / Product Type: Capitation /     Extended Emergency Contact Information  Primary Emergency Contact: Ignacia Eagle  Home Phone: [phone]  Mobile Phone: [phone]  Relation: Daughter   needed? No  Secondary Emergency Contact: Judah Eagle  Home Phone: [phone]  Mobile Phone: [phone]  Relation: Son    Discharge Plan A: Home with family  Discharge Plan B: Home with family      [id number] - [place] - [address]  [address] [place] [address]  Phone: [phone] Fax: [phone]    [facility] - [place], [place] - [address]  [address] [address]  Phone: [phone] Fax: [phone]    [facility] - [place] - [address]  [address] [place] [address]  Phone: [phone] Fax: [phone]      Initial Assessment (most recent)       Adult Discharge Assessment - 06/07/24 1459          Discharge Assessment    Assessment Type Discharge Planning Assessment     Confirmed/corrected address, phone number and insurance Yes     Confirmed Demographics Correct on Facesheet     When was your last doctors appointment? 06/05/24     Reason For Admission Pulmonary  embolism     People in Home spouse     Facility Arrived From: Home     Do you expect to return to your current living situation? Yes     Do you have help at home or someone to help you manage your care at home? Yes     Who are your caregiver(s) and their phone number(s)? Ignacia (daughter) [phone]     Prior to hospitilization cognitive status: Alert/Oriented     Current cognitive status: Alert/Oriented     Walking or Climbing Stairs Difficulty yes     Walking or Climbing Stairs ambulation difficulty, requires equipment     Mobility Management rolling walker, quad cane     Dressing/Bathing Difficulty yes     Dressing/Bathing bathing difficulty, requires equipment     Dressing/Bathing Management grab bars, raised toilet seat, shower chair     Home Accessibility wheelchair accessible     Home Layout Able to live on 1st floor     Equipment Currently Used at Home walker, rolling;cane, quad;shower chair;grab bar;wheelchair;blood pressure machine   Pulse Ox    Readmission within 30 days? No     Patient currently being followed by outpatient case management? No     Do you currently have service(s) that help you manage your care at home? No     Do you take prescription medications? Yes     Do you have prescription coverage? Yes     Coverage [employer] MANAGED MEDICARE - [employer] MEDICARE HMO     Do you have any problems affording any of your prescribed medications? No     Is the patient taking medications as prescribed? yes     Who is going to help you get home at discharge? Ignacia Daughter [phone]     How do you get to doctors appointments? car, drives self     Are you on dialysis? No     Do you take coumadin? No     Discharge Plan A Home with family     Discharge Plan B Home with family     Discharge Plan discussed with: Adult children     Transition of Care Barriers None        OTHER    Name(s) of People in Home Pt and spouse

## 2024-06-07 NOTE — ASSESSMENT & PLAN NOTE
Patient is identified as having Diastolic (HFpEF) heart failure that is Chronic. CHF is currently controlled. Latest ECHO performed and demonstrates- Results for orders placed during the hospital encounter of 09/08/23    Echo    Interpretation Summary    Left Ventricle: The left ventricle is normal in size. Normal wall thickness. Normal wall motion. There is normal systolic function with a visually estimated ejection fraction of 60 - 65%. There is normal diastolic function.    Left Atrium: Left atrium is mildly dilated.    Right Ventricle: Mild right ventricular enlargement. Wall thickness is normal. Right ventricle wall motion  is normal. Systolic function is normal.    Mitral Valve: There is mild mitral annular calcification present.    IVC/SVC: Normal venous pressure at 3 mmHg.  . Continue Beta Blocker ACE/ARB Furosemide Aldactone and monitor clinical status closely. Monitor on telemetry. Patient is off CHF pathway.  Monitor strict Is&Os and daily weights.  Place on fluid restriction of 1.5 L. Cardiology is not consulted. Continue to stress to patient importance of self efficacy and  on diet for CHF. Last BNP reviewed- and noted below   Recent Labs   Lab 06/06/24  1920   *   .

## 2024-06-07 NOTE — H&P
"  [facility] - Emergency Dept  Hospital Medicine  History & Physical    Patient Name: Marisela Eagle  MRN: [id number]  Patient Class: OP- Observation  Admission Date: 2024  Attending Physician: Laine Bullard MD   Primary Care Provider: Pawel Badillo III, MD         Patient information was obtained from patient, relative(s), past medical records, and ER records.     Subjective:     Principal Problem:Pulmonary embolism    Chief Complaint:   Chief Complaint   Patient presents with    Shortness of Breath     For several months, had CT scan today and advised by Dr. Patton to come to ED        HPI: 79-year-old female presented to ED for eval. Patient sent to ED by her cardiologist 2/ abn CTA chest with pulmonary emboli. Patient reported she has been having shortness of breath and dyspnea on exertion over the last several months, with associated L sided chest tightness, palpitations, and R thigh pain. She also reports BLE edema for the last 2-3 years, reports diagnosed with PVD and neuropathy. Patient does have remote history of blood clot, states it was "in her abdomen" about 50 years ago s/p , denies taking blood thinners. Denies hx AF. Denies hormone replacement therapy. CTA chest impression with bilateral pulmonary emboli, worse in the right main pulmonary artery, no right heart strain. RLE venous US impression with nonocclusive popliteal thrombus. Admit to hospital medicine for further eval.     Past Medical History:   Diagnosis Date    Bilateral knee pain 2012    left worse, right knee painful even after partial replacement.    Bruises easily     Clot ?    Umbilical clot after hysterectomy    Colon polyps     adenomatous    Complication of anesthesia     very low pain tolerance    Cystocele     Degenerative disc disease     neck,back, muscle spasms    Depression     Diverticulitis     Edema of left lower extremity     ankles    GERD (gastroesophageal reflux disease)     " HCV antibody (+) but neg HCV RNA (likely cleared virus on her own)     no treatment needed    Hyperlipidemia     Hypertension     Migraines     Neuropathy     peripheral    Thyroid disease     hypothyroid, has nodules    Varicose veins        Past Surgical History:   Procedure Laterality Date    ADENOIDECTOMY      APPENDECTOMY      BILATERAL SALPINGOOPHORECTOMY       SECTION      COLONOSCOPY  12    HYSTERECTOMY      with BSO    JOINT REPLACEMENT  2012    rt unilateral knee arthroplasty. Took Coumadin x 6 weeks    KNEE ARTHROSCOPY  2010    right    TONSILLECTOMY         Review of patient's allergies indicates:  No Known Allergies    Current Facility-Administered Medications on File Prior to Encounter   Medication    [COMPLETED] iohexoL (OMNIPAQUE 350) injection 100 mL     Current Outpatient Medications on File Prior to Encounter   Medication Sig    amitriptyline (ELAVIL) 10 MG tablet TAKE 1 TABLET EVERY NIGHT AS NEEDED FOR PAIN (Patient taking differently: Take 10 mg by mouth nightly as needed.)    artificial tears,hypromellose,,GENTEAL/SUSTANE, (SYSTANE GEL) 0.3 % Gel 1 drop as needed.    cholecalciferol, vitamin D3, 5,000 unit capsule Take 5,000 Units by mouth once daily.    cyanocobalamin (VITAMIN B-12) 1000 MCG tablet Take 5,000 mcg by mouth once daily.    DULoxetine (CYMBALTA) 60 MG capsule Take 1 capsule (60 mg total) by mouth once daily.    famotidine (PEPCID) 20 MG tablet TAKE 1 TABLET EVERY EVENING    fluconazole (DIFLUCAN) 100 MG tablet Take 1 tablet (100 mg total) by mouth once daily.    furosemide (LASIX) 40 MG tablet Take 1 tablet (40 mg total) by mouth every Tu, Fri. Take daily    gabapentin (NEURONTIN) 800 MG tablet TAKE 1 TABLET THREE TIMES DAILY (Patient taking differently: Take 800 mg by mouth 2 (two) times daily.)    levothyroxine (SYNTHROID) 125 MCG tablet TAKE 1 TABLET EVERY DAY BEFORE BREAKFAST (Patient taking differently: Take 125 mcg by mouth before breakfast. TAKE 1  TABLET EVERY DAY BEFORE BREAKFAST)    LORazepam (ATIVAN) 1 MG tablet Take 1 tablet (1 mg total) by mouth every evening.    losartan (COZAAR) 100 MG tablet TAKE 1/2 TABLET TWICE DAILY (Patient taking differently: Take 50 mg by mouth 2 (two) times daily. 50 Mg BID)    magnesium 30 mg Tab Take 1 tablet by mouth once daily.    metoprolol succinate (TOPROL-XL) 50 MG 24 hr tablet Take 0.5 tablets (25 mg total) by mouth every evening.    oxyCODONE-acetaminophen (PERCOCET)  mg per tablet Take 1 tablet by mouth daily as needed for Pain.    pantoprazole (PROTONIX) 40 MG tablet Take 1 tablet (40 mg total) by mouth every morning.    potassium chloride SA (K-DUR,KLOR-CON) 20 MEQ tablet Take 20 mEq by mouth once daily.    spironolactone (ALDACTONE) 25 MG tablet TAKE 1 TABLET ONE TIME DAILY (Patient taking differently: Take 25 mg by mouth once daily.)    turmeric 400 mg Cap Take 400 mg by mouth once daily.    vit C/E/Zn/coppr/lutein/zeaxan (PRESERVISION AREDS-2 ORAL) Take 1 capsule by mouth once daily.    alendronate (FOSAMAX) 70 MG tablet TAKE 1 TABLET BY MOUTH WEEKLY (Patient taking differently: Take 70 mg by mouth every 7 days. TAKE 1 TABLET BY MOUTH WEEKLY)    [DISCONTINUED] albuterol (PROVENTIL/VENTOLIN HFA) 90 mcg/actuation inhaler Inhale 2 puffs into the lungs every 6 (six) hours as needed for Wheezing. Rescue    [DISCONTINUED] budesonide-formoterol 160-4.5 mcg (SYMBICORT) 160-4.5 mcg/actuation HFAA Inhale 2 puffs into the lungs every 12 (twelve) hours. Controller (Patient not taking: Reported on 6/5/2024)    [DISCONTINUED] ciclopirox 0.77 % Gel APPLY TO THE AFFECTED AND SURROUNDING AREAS OF SKIN BY TOPICAL ROUTE 2 TIMES PER DAY IN THE MORNING AND EVENING (Patient not taking: Reported on 6/5/2024)    [DISCONTINUED] neomycin-polymyxin-dexamethasone (MAXITROL) 3.5mg/mL-10,000 unit/mL-0.1 % DrpS Place 1 drop into both eyes every 2 (two) hours.    [DISCONTINUED] oxyCODONE-acetaminophen (PERCOCET)  mg per tablet Take  1 tablet by mouth every 6 (six) hours as needed.     [DISCONTINUED] predniSONE (DELTASONE) 20 MG tablet Take two tablets po qd x 5 days (Patient not taking: Reported on 2024)    [DISCONTINUED] triamcinolone acetonide 0.1% (KENALOG) 0.1 % ointment Apply topically 2 (two) times daily. (Patient not taking: Reported on 2024)     Family History       Problem Relation (Age of Onset)    Cancer Daughter    Diabetes Maternal Grandmother    Hypertension Mother    Neuropathy Mother, Father    Stroke Mother          Tobacco Use    Smoking status: Former     Current packs/day: 0.00     Types: Cigarettes     Quit date: 3/23/2012     Years since quittin.2    Smokeless tobacco: Never   Substance and Sexual Activity    Alcohol use: Yes     Comment: occasional    Drug use: No    Sexual activity: Not Currently     Review of Systems   Constitutional:  Negative for chills and fever.   Respiratory:  Positive for shortness of breath.    Cardiovascular:  Positive for chest pain, palpitations and leg swelling.   Gastrointestinal:  Negative for abdominal pain, nausea and vomiting.   Musculoskeletal:  Positive for arthralgias.   Neurological:  Positive for numbness. Negative for syncope.     Objective:     Vital Signs (Most Recent):  Temp: 98.1 °F (36.7 °C) (24)  Pulse: 69 (24)  Resp: (!) 21 (24)  BP: (!) 181/75 (24)  SpO2: 99 % (24) Vital Signs (24h Range):  Temp:  [98.1 °F (36.7 °C)] 98.1 °F (36.7 °C)  Pulse:  [68-85] 69  Resp:  [17-21] 21  SpO2:  [97 %-99 %] 99 %  BP: (143-181)/(67-76) 181/75     Weight: 111.1 kg (245 lb)  Body mass index is 36.18 kg/m².     Physical Exam  Vitals reviewed.   Constitutional:       General: She is not in acute distress.  HENT:      Head: Normocephalic and atraumatic.      Mouth/Throat:      Mouth: Mucous membranes are moist.   Eyes:      Conjunctiva/sclera: Conjunctivae normal.   Cardiovascular:      Rate and Rhythm: Normal rate and regular  "rhythm.      Pulses:           Dorsalis pedis pulses are 1+ on the right side and 1+ on the left side.   Pulmonary:      Effort: Pulmonary effort is normal. No respiratory distress.      Breath sounds: Decreased breath sounds present.   Abdominal:      General: Bowel sounds are normal.      Palpations: Abdomen is soft.      Tenderness: There is no abdominal tenderness.   Musculoskeletal:         General: Tenderness (RLE) present. Normal range of motion.      Cervical back: Normal range of motion.      Right lower leg: Edema present.      Left lower leg: Edema present.      Comments: Negative Homans angi   Feet:      Right foot:      Skin integrity: Erythema and warmth present.   Skin:     General: Skin is warm and dry.      Capillary Refill: Capillary refill takes less than 2 seconds.      Findings: Erythema: R foot, R calf.      Comments: 2.5cm mass to L upper back   Neurological:      Mental Status: She is alert and oriented to person, place, and time.   Psychiatric:         Mood and Affect: Mood normal.                Significant Labs: All pertinent labs within the past 24 hours have been reviewed.  CBC:   Recent Labs   Lab 06/05/24  1704 06/06/24 1920   WBC 6.70 6.84   HGB 12.0 12.4   HCT 37.5 38.1    252     CMP:   Recent Labs   Lab 06/06/24  1316 06/06/24 1920   NA  --  138   K  --  3.8   CL  --  102   CO2  --  28   GLU  --  108   BUN 9 8   CREATININE 0.6 0.7   CALCIUM  --  9.2   PROT  --  6.6   ALBUMIN  --  4.2   BILITOT  --  0.5   ALKPHOS  --  39*   AST  --  17   ALT  --  16   ANIONGAP  --  8     Cardiac Markers:   Recent Labs   Lab 06/06/24 1920   *     Coagulation:   Recent Labs   Lab 06/06/24 1920   INR 0.9   APTT 25.6     Magnesium: No results for input(s): "MG" in the last 48 hours.  Troponin:   Recent Labs   Lab 06/06/24 1920   TROPONINIHS 7.4     TSH:   Recent Labs   Lab 12/28/23  1127   TSH 1.253  1.253     Urine Studies: No results for input(s): "COLORU", "APPEARANCEUA", "PHUR", " ""SPECGRAV", "PROTEINUA", "GLUCUA", "KETONESU", "BILIRUBINUA", "OCCULTUA", "NITRITE", "UROBILINOGEN", "LEUKOCYTESUR", "RBCUA", "WBCUA", "BACTERIA", "SQUAMEPITHEL", "HYALINECASTS" in the last 48 hours.    Invalid input(s): "WRIGHTSUR"    Significant Imaging: I have reviewed all pertinent imaging results/findings within the past 24 hours.  Assessment/Plan:     * Pulmonary embolism  Stat echo  Continuous pulse ox  Supplemental O2 PRN  FD lovenox  Consult vascular      Acute deep vein thrombosis (DVT) of popliteal vein of right lower extremity  See above  No SCD to RLE      Hypertension, essential  Chronic, controlled. Latest blood pressure and vitals reviewed-     Temp:  [98.1 °F (36.7 °C)]   Pulse:  [68-85]   Resp:  [17-21]   BP: (143-181)/(67-76)   SpO2:  [97 %-99 %] .   Home meds for hypertension were reviewed and noted below.   Hypertension Medications               furosemide (LASIX) 40 MG tablet Take 1 tablet (40 mg total) by mouth every Tues, Fri. Take daily    losartan (COZAAR) 100 MG tablet TAKE 1/2 TABLET TWICE DAILY    metoprolol succinate (TOPROL-XL) 50 MG 24 hr tablet Take 0.5 tablets (25 mg total) by mouth every evening.    spironolactone (ALDACTONE) 25 MG tablet TAKE 1 TABLET ONE TIME DAILY            While in the hospital, will manage blood pressure as follows; Continue home antihypertensive regimen    Will utilize p.r.n. blood pressure medication only if patient's blood pressure greater than 180/110 and she develops symptoms such as worsening chest pain or shortness of breath.    (HFpEF) heart failure with preserved ejection fraction  Patient is identified as having Diastolic (HFpEF) heart failure that is Chronic. CHF is currently controlled. Latest ECHO performed and demonstrates- Results for orders placed during the hospital encounter of 09/08/23    Echo    Interpretation Summary    Left Ventricle: The left ventricle is normal in size. Normal wall thickness. Normal wall motion. There is normal " systolic function with a visually estimated ejection fraction of 60 - 65%. There is normal diastolic function.    Left Atrium: Left atrium is mildly dilated.    Right Ventricle: Mild right ventricular enlargement. Wall thickness is normal. Right ventricle wall motion  is normal. Systolic function is normal.    Mitral Valve: There is mild mitral annular calcification present.    IVC/SVC: Normal venous pressure at 3 mmHg.  . Continue Beta Blocker ACE/ARB Furosemide Aldactone and monitor clinical status closely. Monitor on telemetry. Patient is off CHF pathway.  Monitor strict Is&Os and daily weights.  Place on fluid restriction of 1.5 L. Cardiology is not consulted. Continue to stress to patient importance of self efficacy and  on diet for CHF. Last BNP reviewed- and noted below   Recent Labs   Lab 06/06/24  1920   *   .             VTE Risk Mitigation (From admission, onward)           Ordered     enoxaparin injection 105 mg  Every 12 hours (non-standard times)         06/06/24 2023                         On 06/06/2024, patient should be placed in hospital observation services under my care in collaboration with Dr. Bullard.           Urmila Bailey NP  Department of Hospital Medicine  [facility] - Emergency Dept

## 2024-06-07 NOTE — ASSESSMENT & PLAN NOTE
Chronic, controlled. Latest blood pressure and vitals reviewed-     Temp:  [98.1 °F (36.7 °C)]   Pulse:  [68-85]   Resp:  [17-21]   BP: (143-181)/(67-76)   SpO2:  [97 %-99 %] .   Home meds for hypertension were reviewed and noted below.   Hypertension Medications               furosemide (LASIX) 40 MG tablet Take 1 tablet (40 mg total) by mouth every Tues, Fri. Take daily    losartan (COZAAR) 100 MG tablet TAKE 1/2 TABLET TWICE DAILY    metoprolol succinate (TOPROL-XL) 50 MG 24 hr tablet Take 0.5 tablets (25 mg total) by mouth every evening.    spironolactone (ALDACTONE) 25 MG tablet TAKE 1 TABLET ONE TIME DAILY            While in the hospital, will manage blood pressure as follows; Continue home antihypertensive regimen    Will utilize p.r.n. blood pressure medication only if patient's blood pressure greater than 180/110 and she develops symptoms such as worsening chest pain or shortness of breath.

## 2024-06-07 NOTE — ASSESSMENT & PLAN NOTE
Body mass index is 36.18 kg/m². Morbid obesity complicates all aspects of disease management from diagnostic modalities to treatment.

## 2024-06-08 VITALS
BODY MASS INDEX: 34.61 KG/M2 | RESPIRATION RATE: 18 BRPM | WEIGHT: 233.69 LBS | TEMPERATURE: 98 F | SYSTOLIC BLOOD PRESSURE: 156 MMHG | HEART RATE: 66 BPM | DIASTOLIC BLOOD PRESSURE: 74 MMHG | OXYGEN SATURATION: 97 % | HEIGHT: 69 IN

## 2024-06-08 LAB
ALBUMIN SERPL BCP-MCNC: 3.8 G/DL (ref 3.5–5.2)
ALP SERPL-CCNC: 33 U/L (ref 55–135)
ALT SERPL W/O P-5'-P-CCNC: 14 U/L (ref 10–44)
ANION GAP SERPL CALC-SCNC: 5 MMOL/L (ref 8–16)
AST SERPL-CCNC: 14 U/L (ref 10–40)
BASOPHILS # BLD AUTO: 0.03 K/UL (ref 0–0.2)
BASOPHILS NFR BLD: 0.6 % (ref 0–1.9)
BILIRUB SERPL-MCNC: 0.4 MG/DL (ref 0.1–1)
BUN SERPL-MCNC: 11 MG/DL (ref 8–23)
CALCIUM SERPL-MCNC: 8.8 MG/DL (ref 8.7–10.5)
CHLORIDE SERPL-SCNC: 105 MMOL/L (ref 95–110)
CO2 SERPL-SCNC: 30 MMOL/L (ref 23–29)
CREAT SERPL-MCNC: 0.6 MG/DL (ref 0.5–1.4)
DIFFERENTIAL METHOD BLD: ABNORMAL
EOSINOPHIL # BLD AUTO: 0.1 K/UL (ref 0–0.5)
EOSINOPHIL NFR BLD: 2 % (ref 0–8)
ERYTHROCYTE [DISTWIDTH] IN BLOOD BY AUTOMATED COUNT: 17.2 % (ref 11.5–14.5)
EST. GFR  (NO RACE VARIABLE): >60 ML/MIN/1.73 M^2
GLUCOSE SERPL-MCNC: 97 MG/DL (ref 70–110)
HCT VFR BLD AUTO: 37.2 % (ref 37–48.5)
HGB BLD-MCNC: 12 G/DL (ref 12–16)
IMM GRANULOCYTES # BLD AUTO: 0.02 K/UL (ref 0–0.04)
IMM GRANULOCYTES NFR BLD AUTO: 0.4 % (ref 0–0.5)
LYMPHOCYTES # BLD AUTO: 2 K/UL (ref 1–4.8)
LYMPHOCYTES NFR BLD: 37.3 % (ref 18–48)
MAGNESIUM SERPL-MCNC: 2 MG/DL (ref 1.6–2.6)
MCH RBC QN AUTO: 27.3 PG (ref 27–31)
MCHC RBC AUTO-ENTMCNC: 32.3 G/DL (ref 32–36)
MCV RBC AUTO: 85 FL (ref 82–98)
MONOCYTES # BLD AUTO: 0.6 K/UL (ref 0.3–1)
MONOCYTES NFR BLD: 10.3 % (ref 4–15)
NEUTROPHILS # BLD AUTO: 2.7 K/UL (ref 1.8–7.7)
NEUTROPHILS NFR BLD: 49.4 % (ref 38–73)
NRBC BLD-RTO: 0 /100 WBC
OHS QRS DURATION: 104 MS
OHS QTC CALCULATION: 469 MS
PLATELET # BLD AUTO: 234 K/UL (ref 150–450)
PMV BLD AUTO: 11.8 FL (ref 9.2–12.9)
POTASSIUM SERPL-SCNC: 3.6 MMOL/L (ref 3.5–5.1)
PROT SERPL-MCNC: 6.1 G/DL (ref 6–8.4)
RBC # BLD AUTO: 4.39 M/UL (ref 4–5.4)
SODIUM SERPL-SCNC: 140 MMOL/L (ref 136–145)
WBC # BLD AUTO: 5.44 K/UL (ref 3.9–12.7)

## 2024-06-08 PROCEDURE — 83735 ASSAY OF MAGNESIUM: CPT

## 2024-06-08 PROCEDURE — 80053 COMPREHEN METABOLIC PANEL: CPT

## 2024-06-08 PROCEDURE — 25000003 PHARM REV CODE 250: Performed by: HOSPITALIST

## 2024-06-08 PROCEDURE — 36415 COLL VENOUS BLD VENIPUNCTURE: CPT

## 2024-06-08 PROCEDURE — 25000003 PHARM REV CODE 250

## 2024-06-08 PROCEDURE — 85025 COMPLETE CBC W/AUTO DIFF WBC: CPT

## 2024-06-08 RX ADMIN — LOSARTAN POTASSIUM 50 MG: 25 TABLET, FILM COATED ORAL at 09:06

## 2024-06-08 RX ADMIN — CYANOCOBALAMIN TAB 1000 MCG 5000 MCG: 1000 TAB at 09:06

## 2024-06-08 RX ADMIN — DULOXETINE HYDROCHLORIDE 60 MG: 30 CAPSULE, DELAYED RELEASE ORAL at 09:06

## 2024-06-08 RX ADMIN — LEVOTHYROXINE SODIUM 125 MCG: 0.1 TABLET ORAL at 06:06

## 2024-06-08 RX ADMIN — GABAPENTIN 800 MG: 300 CAPSULE ORAL at 09:06

## 2024-06-08 RX ADMIN — CHOLECALCIFEROL (VITAMIN D3) 10 MCG (400 UNIT) TABLET 4800 UNITS: at 09:06

## 2024-06-08 RX ADMIN — SPIRONOLACTONE 25 MG: 25 TABLET ORAL at 09:06

## 2024-06-08 RX ADMIN — PANTOPRAZOLE SODIUM 40 MG: 40 TABLET, DELAYED RELEASE ORAL at 06:06

## 2024-06-08 RX ADMIN — APIXABAN 10 MG: 5 TABLET, FILM COATED ORAL at 09:06

## 2024-06-08 NOTE — CARE UPDATE
06/07/24 2989   Patient Assessment/Suction   Level of Consciousness (AVPU) alert   Respiratory Effort Normal;Unlabored   Expansion/Accessory Muscles/Retractions no use of accessory muscles;expansion symmetric;no retractions   All Lung Fields Breath Sounds clear   Rhythm/Pattern, Respiratory pattern regular;unlabored;no shortness of breath reported   Cough Frequency no cough   PRE-TX-O2   Device (Oxygen Therapy) room air   SpO2 96 %   Pulse Oximetry Type Continuous   $ Pulse Oximetry - Single Charge Pulse Oximetry - Single   Pulse 75   Resp 18

## 2024-06-08 NOTE — PLAN OF CARE
06/08/24 1118   Final Note   Assessment Type Final Discharge Note   Anticipated Discharge Disposition Home   What phone number can be called within the next 1-3 days to see how you are doing after discharge? [phone]   Post-Acute Status   Discharge Delays None known at this time     Discharge orders and chart reviewed with no further post-acute discharge needs identified at this time.  At this time, patient is cleared for discharge from Case Management standpoint.

## 2024-06-08 NOTE — DISCHARGE SUMMARY
"[facility] Hospital Medicine  Discharge Summary      Patient Name: Marisela Eagle  MRN: [id number]  RADHA: [id number]  Patient Class: IP- Inpatient  Admission Date: 2024  Hospital Length of Stay: 1 days  Discharge Date and Time: 2024 12:30 PM  Attending Physician: No att. providers found   Discharging Provider: Yumiko Watson MD  Primary Care Provider: Pawel Badillo III, MD    Primary Care Team: Networked reference to record PCT     HPI:   79-year-old female presented to ED for eval. Patient sent to ED by her cardiologist 2/ abn CTA chest with pulmonary emboli. Patient reported she has been having shortness of breath and dyspnea on exertion over the last several months, with associated L sided chest tightness, palpitations, and R thigh pain. She also reports BLE edema for the last 2-3 years, reports diagnosed with PVD and neuropathy. Patient does have remote history of blood clot, states it was "in her abdomen" about 50 years ago s/p , denies taking blood thinners. Denies hx AF. Denies hormone replacement therapy. CTA chest impression with bilateral pulmonary emboli, worse in the right main pulmonary artery, no right heart strain. RLE venous US impression with nonocclusive popliteal thrombus. Admit to hospital medicine for further eval.     * No surgery found *      Hospital Course:   Marisela Eagle is an 79 year old female with a past medical history of obesity, HTN, HLD, GERD, and hypothyroidism who presented with an unprovoked bilateral PE and R popliteal DVT. She is on Lovenox and stable on room air. There is no right heart strain. A home O2 evaluation was ordered and the patient did not qualify.  She was referred to Hematology on discharge for hypercoagulable workup.  She was discharged on Eliquis.  Three-month supply given on discharge but informed that duration of anticoagulant to be determined by PCP/Hematology.  Patient expressed understanding.  She was counseled avoiding " falls while on Eliquis.  She will follow up with PCP and Hematology.  She expressed understanding of discharge plan.     Goals of Care Treatment Preferences:  Code Status: Full Code      Consults:     No new Assessment & Plan notes have been filed under this hospital service since the last note was generated.  Service: Hospital Medicine    Final Active Diagnoses:    Diagnosis Date Noted POA    PRINCIPAL PROBLEM:  Pulmonary embolism [I26.99] 06/06/2024 Yes    Acute deep vein thrombosis (DVT) of popliteal vein of right lower extremity [I82.431] 06/06/2024 Yes    Severe obesity (BMI 35.0-39.9) with comorbidity [E66.01] 05/29/2023 Yes    GERD (gastroesophageal reflux disease) [K21.9] 07/20/2012 Yes    Hypertension, essential [I10] 03/27/2012 Yes      Problems Resolved During this Admission:       Discharged Condition: good    Disposition: Home or Self Care    Follow Up:   Follow-up Information       Pawel Badillo III, MD Follow up in 3 day(s).    Specialty: Family Medicine  Contact information:  [address] [place] [address]  [address] [place] [id number] [phone]               Hematology Follow up.    Why: referral placed                         Patient Instructions:      Ambulatory referral/consult to Hematology / Oncology   Standing Status: Future   Referral Priority: Routine Referral Type: Consultation   Referral Reason: Specialty Services Required   Requested Specialty: Hematology and Oncology   Number of Visits Requested: 1     Notify your health care provider if you experience any of the following:  difficulty breathing or increased cough     Notify your health care provider if you experience any of the following:  severe persistent headache     Notify your health care provider if you experience any of the following:  persistent dizziness, light-headedness, or visual disturbances     Notify your health care provider if you experience any of the following:  increased confusion or weakness     Activity as tolerated        Significant Diagnostic Studies: Labs: BMP:   Recent Labs   Lab 06/06/24 1920 06/07/24  0422 06/08/24  0503    105 97    139 140   K 3.8 3.8 3.6    106 105   CO2 28 28 30*   BUN 8 13 11   CREATININE 0.7 0.6 0.6   CALCIUM 9.2 8.9 8.8   MG  --  2.1 2.0    and CBC   Recent Labs   Lab 06/06/24 1920 06/07/24  0422 06/08/24  0503   WBC 6.84 6.39 5.44   HGB 12.4 11.3* 12.0   HCT 38.1 34.8* 37.2    228 234     Radiology Results (last 7 days)    Procedure Component Value Units Date/Time   US Lower Extremity Veins Bilateral [[id number]] (Abnormal) Resulted: 06/06/24 2159   Order Status: Completed Updated: 06/06/24 2201   Narrative:     EXAMINATION:  US LOWER EXTREMITY VEINS BILATERAL    CLINICAL HISTORY:  Acute embolism and thrombosis of unspecified deep veins of unspecified lower extremity    TECHNIQUE:  Duplex and color flow Doppler and dynamic compression was performed of the bilateral lower extremity veins was performed.    COMPARISON:  CTA chest 06/06/2024.    FINDINGS:  Right thigh veins: The common femoral, femoral, upper greater saphenous, and deep femoral veins are patent and free of thrombus. The veins are normally compressible and have normal phasic flow and augmentation response.  Nonocclusive thrombus right popliteal vein.    Right calf veins: The visualized calf veins are patent.    Left thigh veins: The common femoral, femoral, popliteal, upper greater saphenous, and deep femoral veins are patent and free of thrombus. The veins are normally compressible and have normal phasic flow and augmentation response.    Left calf veins: The visualized calf veins are patent.    Miscellaneous: None   Impression:       Known pulmonary emboli on prior CTA chest.  Ultrasound demonstrates nonocclusive thrombus in the right popliteal vein.    This report was flagged in Epic as abnormal.      Electronically signed by: Ajay oSlano MD  Date: 06/06/2024  Time: 21:59     Pending Diagnostic  Studies:       None           Medications:  Reconciled Home Medications:      Medication List        START taking these medications      apixaban 5 mg Tab  Commonly known as: ELIQUIS  Take 2 tablets (10 mg total) by mouth 2 (two) times daily for 7 days, THEN 1 tablet (5 mg total) 2 (two) times daily.  Start taking on: June 8, 2024            CHANGE how you take these medications      alendronate 70 MG tablet  Commonly known as: FOSAMAX  TAKE 1 TABLET BY MOUTH WEEKLY  What changed:   how much to take  how to take this  when to take this     amitriptyline 10 MG tablet  Commonly known as: ELAVIL  TAKE 1 TABLET EVERY NIGHT AS NEEDED FOR PAIN  What changed: See the new instructions.     levothyroxine 125 MCG tablet  Commonly known as: SYNTHROID  TAKE 1 TABLET EVERY DAY BEFORE BREAKFAST  What changed:   how much to take  how to take this  when to take this            CONTINUE taking these medications      cholecalciferol (vitamin D3) 125 mcg (5,000 unit) capsule  Take 5,000 Units by mouth once daily.     cyanocobalamin 1000 MCG tablet  Commonly known as: VITAMIN B-12  Take 5,000 mcg by mouth once daily.     DULoxetine 60 MG capsule  Commonly known as: CYMBALTA  Take 1 capsule (60 mg total) by mouth once daily.     famotidine 20 MG tablet  Commonly known as: PEPCID  TAKE 1 TABLET EVERY EVENING     fluconazole 100 MG tablet  Commonly known as: DIFLUCAN  Take 1 tablet (100 mg total) by mouth once daily.     furosemide 40 MG tablet  Commonly known as: LASIX  Take 1 tablet (40 mg total) by mouth every Tues, Fri. Take daily     gabapentin 800 MG tablet  Commonly known as: NEURONTIN  TAKE 1 TABLET THREE TIMES DAILY     LORazepam 1 MG tablet  Commonly known as: ATIVAN  Take 1 tablet (1 mg total) by mouth every evening.     losartan 100 MG tablet  Commonly known as: COZAAR  TAKE 1/2 TABLET TWICE DAILY     magnesium 30 mg Tab  Take 1 tablet by mouth once daily.     metoprolol succinate 50 MG 24 hr tablet  Commonly known as:  TOPROL-XL  Take 0.5 tablets (25 mg total) by mouth every evening.     oxyCODONE-acetaminophen  mg per tablet  Commonly known as: PERCOCET  Take 1 tablet by mouth daily as needed for Pain.     pantoprazole 40 MG tablet  Commonly known as: PROTONIX  Take 1 tablet (40 mg total) by mouth every morning.     potassium chloride SA 20 MEQ tablet  Commonly known as: K-DUR,KLOR-CON  Take 20 mEq by mouth once daily.     PRESERVISION AREDS-2 ORAL  Take 1 capsule by mouth once daily.     spironolactone 25 MG tablet  Commonly known as: ALDACTONE  TAKE 1 TABLET ONE TIME DAILY     Systane GeL 0.3 % Gel  Generic drug: artificial tears(hypromellose)(GENTEAL/SUSTANE)  1 drop as needed.     turmeric 400 mg Cap  Take 400 mg by mouth once daily.              Indwelling Lines/Drains at time of discharge:   Lines/Drains/Airways       Drain  Duration             Female External Urinary Catheter w/ Suction 06/06/24 2057 1 day                    Time spent on the discharge of patient: 35 minutes         Yumiko Watson MD  Department of Hospital Medicine  [facility]

## 2024-06-09 LAB
OHS QRS DURATION: 102 MS
OHS QTC CALCULATION: 462 MS

## 2024-06-10 ENCOUNTER — TELEPHONE (OUTPATIENT)
Dept: HEMATOLOGY/ONCOLOGY | Facility: CLINIC | Age: 79
End: 2024-06-10
Payer: MEDICARE

## 2024-06-11 ENCOUNTER — PATIENT OUTREACH (OUTPATIENT)
Dept: ADMINISTRATIVE | Facility: CLINIC | Age: 79
End: 2024-06-11
Payer: MEDICARE

## 2024-06-11 NOTE — PROGRESS NOTES
C3 nurse attempted to contact patient. The following occurred:   C3 nurse attempted to contact Marisela Eagle's daughter, Ignacia, for a TCC post hospital discharge follow up call. The patient's daughter is unable to conduct the call @ this time, as she is at work, but asked for a callback around 4:00pm and she will be available.     The patient has a scheduled HOS appointment with Tamiko Tucker NP (PCP) on 6/14/24 @ 9:00am.

## 2024-06-11 NOTE — PROGRESS NOTES
C3 nurse spoke with Marisela Eagle's daughter, Ignacia, for a TCC post hospital discharge follow up call. The patient has a scheduled HOSFU appointment with Tamiko Tucker NP (PCP) on 6/14/24 @ 9:00am. They are awaiting a call for the new patient Hematology appointment.

## 2024-06-12 ENCOUNTER — TELEPHONE (OUTPATIENT)
Facility: CLINIC | Age: 79
End: 2024-06-12
Payer: MEDICARE

## 2024-06-13 NOTE — PROGRESS NOTES
[facility]  History & Physical    Subjective:      Patient ID:   NAME: Marisela Eagle : 1945     79 y.o. female    Referring Doc: Yumiko Watson MD  Other Physicians: Justino/Keren(PCP); IFRAH Patton/Ruth Cedeño NP (Card); Marcos Gusman (ortho); ALEXEI Martin; Melissa Kirkpatrick; Ruth Cedeño/HARRY Patton        Chief Complaint: DVT/pulm emboli      HPI:  79 y.o. female with diagnosis of RLE DVt and pulmonary emboli who has been referred by Yumiko Watson MD for evaluation by medical hematology/oncology. She is here with her .     She had presented to the ED at [facility] at the bequest of her cardiologist on 2024 for SOB, JARA, right thigh pain and left sided CP over the past couple of months. CTA showed bilateral pulmonary emboli and doppler US showed a nonocclusive thrombus in the right popliteal vein.     She had previously had a subacromial bursa steroid injection with Dr Gusman on 2024 for osteoarthritis of left shoulder with impingement syndrome of the right shoulder which is probably unrelated to the event.     She had been seeing Dr Eben Ball and Dr Judah Eden about having a pain stimulator placed which is now on hold. She has had injections in the past.     She had esophageal dilation with Dr Melgar about 2-3 months ago.     She has chronic arthritis, shoulder and back issues. She is breathing ok, no CP, SOB, HA's or N/V.    No prior history personal history of clots but she has a brother has clots.    She is former smoker - 53yr history    Retired realtor               ROS:   GEN: normal without any fever, night sweats or weight loss; chronic arthritis issues/right shoulder/back   HEENT: normal with no HA's, sore throat, stiff neck, changes in vision  CV: normal with no CP, SOB, PND, JARA or orthopnea  PULM: normal with no SOB, cough, hemoptysis, sputum or pleuritic pain  GI: normal with no abdominal pain, nausea, vomiting, constipation, diarrhea, melanotic stools, BRBPR, or  hematemesis  : normal with no hematuria, dysuria  BREAST: normal with no mass, discharge, pain  SKIN: normal with no rash, erythema, bruising, or swelling       Past Medical/Surgical History:  Past Medical History:   Diagnosis Date    Bilateral knee pain 2012    left worse, right knee painful even after partial replacement.    Bruises easily     Clot ?    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 Took Coumadin x 6 weeks    KNEE ARTHROSCOPY  2010    right    TONSILLECTOMY           Allergies:  Review of patient's allergies indicates:  No Known Allergies    Social/Family History:  Social History     Socioeconomic History    Marital status:    Occupational History    Occupation: RETIRED   Tobacco Use    Smoking status: Former     Current packs/day: 0.00     Types: Cigarettes     Quit date: 3/23/2012     Years since quittin.2    Smokeless tobacco: Never   Substance and Sexual Activity    Alcohol use: Yes     Comment: occasional    Drug use: No    Sexual activity: Not Currently     Social Determinants of Health     Financial Resource Strain: Low Risk  (2023)    Overall Financial Resource Strain (CARDIA)     Difficulty  of Paying Living Expenses: Not hard at all   Food Insecurity: No Food Insecurity (11/27/2023)    Hunger Vital Sign     Worried About Running Out of Food in the Last Year: Never true     Ran Out of Food in the Last Year: Never true   Transportation Needs: No Transportation Needs (11/27/2023)    PRAPARE - Transportation     Lack of Transportation (Medical): No     Lack of Transportation (Non-Medical): No   Physical Activity: Inactive (11/27/2023)    Exercise Vital Sign     Days of Exercise per Week: 0 days     Minutes of Exercise per Session: 0 min   Stress: No Stress Concern Present (11/27/2023)    [place] [employer] - Occupational Stress Questionnaire     Feeling of Stress : Not at all   Housing Stability: Low Risk  (11/27/2023)    Housing Stability Vital Sign     Unable to Pay for Housing in the Last Year: No     Number of Places Lived in the Last Year: 1     Unstable Housing in the Last Year: No     Family History   Problem Relation Name Age of Onset    Neuropathy Mother      Hypertension Mother      Stroke Mother      Neuropathy Father      Cancer Daughter      Diabetes Maternal Grandmother      Melanoma Neg Hx      Psoriasis Neg Hx      Lupus Neg Hx      Eczema Neg Hx           Medications:    Current Outpatient Medications:     amitriptyline (ELAVIL) 10 MG tablet, TAKE 1 TABLET EVERY NIGHT AS NEEDED FOR PAIN (Patient taking differently: Take 10 mg by mouth nightly as needed.), Disp: 90 tablet, Rfl: 1    apixaban (ELIQUIS) 5 mg Tab, Take 2 tablets (10 mg total) by mouth 2 (two) times daily for 7 days, THEN 1 tablet (5 mg total) 2 (two) times daily., Disp: 194 tablet, Rfl: 0    artificial tears,hypromellose,,GENTEAL/SUSTANE, (SYSTANE GEL) 0.3 % Gel, 1 drop as needed., Disp: , Rfl:     cholecalciferol, vitamin D3, 5,000 unit capsule, Take 5,000 Units by mouth once daily., Disp: , Rfl:     cyanocobalamin (VITAMIN B-12) 1000 MCG tablet, Take 5,000 mcg by mouth once daily., Disp: , Rfl:      DULoxetine (CYMBALTA) 60 MG capsule, Take 1 capsule (60 mg total) by mouth once daily., Disp: 90 capsule, Rfl: 1    famotidine (PEPCID) 20 MG tablet, TAKE 1 TABLET EVERY EVENING, Disp: 90 tablet, Rfl: 1    furosemide (LASIX) 40 MG tablet, Take 1 tablet (40 mg total) by mouth every Tues, Fri. Take daily, Disp: 90 tablet, Rfl: 1    gabapentin (NEURONTIN) 800 MG tablet, TAKE 1 TABLET THREE TIMES DAILY (Patient taking differently: Take 800 mg by mouth 2 (two) times daily.), Disp: 270 tablet, Rfl: 3    levothyroxine (SYNTHROID) 125 MCG tablet, TAKE 1 TABLET EVERY DAY BEFORE BREAKFAST (Patient taking differently: Take 125 mcg by mouth before breakfast. TAKE 1 TABLET EVERY DAY BEFORE BREAKFAST), Disp: 90 tablet, Rfl: 3    LORazepam (ATIVAN) 1 MG tablet, Take 1 tablet (1 mg total) by mouth every evening., Disp: 30 tablet, Rfl: 0    losartan (COZAAR) 100 MG tablet, TAKE 1/2 TABLET TWICE DAILY (Patient taking differently: Take 50 mg by mouth 2 (two) times daily. 50 Mg BID), Disp: 90 tablet, Rfl: 3    magnesium 30 mg Tab, Take 1 tablet by mouth once daily., Disp: , Rfl:     metoprolol succinate (TOPROL-XL) 50 MG 24 hr tablet, Take 0.5 tablets (25 mg total) by mouth every evening., Disp: 90 tablet, Rfl: 1    oxyCODONE-acetaminophen (PERCOCET)  mg per tablet, Take 1 tablet by mouth daily as needed for Pain., Disp: , Rfl:     potassium chloride SA (K-DUR,KLOR-CON) 20 MEQ tablet, Take 20 mEq by mouth once daily., Disp: , Rfl:     spironolactone (ALDACTONE) 25 MG tablet, TAKE 1 TABLET ONE TIME DAILY (Patient taking differently: Take 25 mg by mouth once daily.), Disp: 90 tablet, Rfl: 1    turmeric 400 mg Cap, Take 400 mg by mouth once daily., Disp: , Rfl:     vit C/E/Zn/coppr/lutein/zeaxan (PRESERVISION AREDS-2 ORAL), Take 1 capsule by mouth once daily., Disp: , Rfl:     alendronate (FOSAMAX) 70 MG tablet, TAKE 1 TABLET BY MOUTH WEEKLY (Patient not taking: Reported on 6/14/2024), Disp: 12 tablet, Rfl: 0    fluconazole  "(DIFLUCAN) 100 MG tablet, Take 1 tablet (100 mg total) by mouth once daily. (Patient not taking: Reported on 6/14/2024), Disp: 10 tablet, Rfl: 0    pantoprazole (PROTONIX) 40 MG tablet, Take 1 tablet (40 mg total) by mouth every morning. (Patient not taking: Reported on 6/14/2024), Disp: 90 tablet, Rfl: 1      Pathology:   Cancer Staging   No matching staging information was found for the patient.        Objective:   Vitals:  Blood pressure 118/75, pulse 72, temperature 97.4 °F (36.3 °C), resp. rate 16, height 5' 9" (1.753 m), weight 110.4 kg (243 lb 6.4 oz).    Physical Examination:   GEN: no apparent distress, comfortable; AAOx3; overweight  HEAD: atraumatic and normocephalic  EYES: no pallor, no icterus, PERRLA  ENT: OMM, no pharyngeal erythema, external ears WNL; no nasal discharge; no thrush  NECK: no masses, thyroid normal, trachea midline, no LAD/LN's, supple  CV: RRR with no murmur; normal pulse; normal S1 and S2; no pedal edema  CHEST: Normal respiratory effort; CTAB; normal breath sounds; no wheeze or crackles  ABDOM: nontender and nondistended; soft; normal bowel sounds; no rebound/guarding  MUSC/Skeletal: LROM of right shoulder; arthropathy  EXTREM: no clubbing, cyanosis, inflammation; chronic RLE swelling and hyperchromatic skin changes  SKIN: no rashes, lesions, ulcers, petechiae or subcutaneous nodules; chronic age related skin changes  : no lynn  NEURO: grossly intact; motor/sensory WNL; AAOx3; no tremors  PSYCH: normal mood, affect and behavior  LYMPH: normal cervical, supraclavicular, axillary and groin LN's      Labs:   Lab Results   Component Value Date    WBC 5.44 06/08/2024    HGB 12.0 06/08/2024    HCT 37.2 06/08/2024    MCV 85 06/08/2024     06/08/2024    CMP  Sodium   Date Value Ref Range Status   06/08/2024 140 136 - 145 mmol/L Final     Potassium   Date Value Ref Range Status   06/08/2024 3.6 3.5 - 5.1 mmol/L Final     Chloride   Date Value Ref Range Status   06/08/2024 105 95 - " 110 mmol/L Final     CO2   Date Value Ref Range Status   06/08/2024 30 (H) 23 - 29 mmol/L Final     Glucose   Date Value Ref Range Status   06/08/2024 97 70 - 110 mg/dL Final     BUN   Date Value Ref Range Status   06/08/2024 11 8 - 23 mg/dL Final     Creatinine   Date Value Ref Range Status   06/08/2024 0.6 0.5 - 1.4 mg/dL Final     Calcium   Date Value Ref Range Status   06/08/2024 8.8 8.7 - 10.5 mg/dL Final     Total Protein   Date Value Ref Range Status   06/08/2024 6.1 6.0 - 8.4 g/dL Final     Albumin   Date Value Ref Range Status   06/08/2024 3.8 3.5 - 5.2 g/dL Final   01/11/2019 4.1 3.1 - 4.7 g/dL      Total Bilirubin   Date Value Ref Range Status   06/08/2024 0.4 0.1 - 1.0 mg/dL Final     Comment:     For infants and newborns, interpretation of results should be based  on gestational age, weight and in agreement with clinical  observations.    Premature Infant recommended reference ranges:  Up to 24 hours.............<8.0 mg/dL  Up to 48 hours............<12.0 mg/dL  3-5 days..................<15.0 mg/dL  6-29 days.................<15.0 mg/dL       Alkaline Phosphatase   Date Value Ref Range Status   06/08/2024 33 (L) 55 - 135 U/L Final     AST   Date Value Ref Range Status   06/08/2024 14 10 - 40 U/L Final     ALT   Date Value Ref Range Status   06/08/2024 14 10 - 44 U/L Final     Anion Gap   Date Value Ref Range Status   06/08/2024 5 (L) 8 - 16 mmol/L Final     eGFR if    Date Value Ref Range Status   07/21/2022 >60.0 >60 mL/min/1.73 m^2 Final     eGFR if non    Date Value Ref Range Status   07/21/2022 >60.0 >60 mL/min/1.73 m^2 Final     Comment:     Calculation used to obtain the estimated glomerular filtration  rate (eGFR) is the CKD-EPI equation.            Radiology/Diagnostic Studies:    Doppler US  6/6/2024:    Impression:     Known pulmonary emboli on prior CTA chest.  Ultrasound demonstrates nonocclusive thrombus in the right popliteal vein.    CTA  6/6/2024:    Impression:     Known pulmonary emboli on prior CTA chest.  Ultrasound demonstrates nonocclusive thrombus in the right popliteal vein.      All lab results and imaging results have been reviewed and discussed with the patient    Assessment:   (1) 79 y.o. female with diagnosis of RLE DVT and pulmonary emboli who has been referred by Yumiko Watson MD for evaluation by medical hematology/oncology. She had presented to the ED at [facility] at the bequest of her cardiologist on 6/6/2024 for SOB, JARA, right thigh pain and left sided CP over the past couple of months. CTA showed bilateral pulmonary emboli and doppler US showed a nonocclusive thrombus in the right popliteal vein.     6/14/2024:  - she is on eliquis  - no excessive bleeding or bruising  - + family hx/of clots  - order CT abdom/pelvis and clot work-up      (2) HTN and hypercholesterolemia    (3) Hx/of an umbilical cot after having hysterectomy in the 90's    (4) GERD and diverticulitis; hx/of Hep C positive antibody; hx/of colon polyps    (5) CHF and chronic venous insuffiencey in legs Rght > Left - chronic diastolic HF    (6) Hypothyroidism; thyroid nodules    (7) Migraines, peripheral neuropathy    (8) DDD; bilateral knee issues, OA; shoulder bursa issues; s/p right knee arthroscope in Apr 2012  - rght knee x2 with last one in 2018-19    (9) Depression    (10) Former smoker (quit 2012)        VISIT DIAGNOSES:     Acute deep vein thrombosis (DVT) of popliteal vein of right lower extremity    Pulmonary embolism, unspecified chronicity, unspecified pulmonary embolism type, unspecified whether acute cor pulmonale present    Pulmonary emboli  -     Ambulatory referral/consult to Hematology / Oncology          Plan:     PLAN:  Order clot work-up  Continue eliquis oral anticoagulation  F/iu with cardiology and PCP  Check Ct abdom/pelvis     RTC in  4 weeks   Fax note to Yumiok Watson MD; IFRAH Badillo [place]; Ramón        Anticoagulation  Discussion:    Discussed with patient and any applicable family members about the benefit and/or need for anticoagulation. Communicated about the risks of bleeding while on any anticoagulation, which could be serious and/or life-threatening, and which can occur at any time, regardless of degree of the level of anticoagulation. Expressed the need for compliance with any anticoagulation regimen and that failure to do so could potential lead to excessive bleeding, and risk to health and/or life. In particular, with patients on coumadin therapy, compliance with requested blood work is absolutely essential, as coumadin levels can vary from time to time, and failure to do so could potentially place the patient at risk for bleeding and/or clotting events which could be fatal. Patients on coumadin are encouraged to call the day after they have their levels drawn, as to obtain the appropriate instructions from our staff. Patients are aware that self-regulating or self-dosing of their medications is strictly prohibited.       I have explained and the patient understands all of  the current recommendation(s). I have answered all of their questions to the best of my ability and to their complete satisfaction.           Thank you for allowing me to participate in this patient's care. Please call with any questions or concerns.    Electronically signed Rayray Green MD

## 2024-06-14 ENCOUNTER — OFFICE VISIT (OUTPATIENT)
Facility: CLINIC | Age: 79
End: 2024-06-14
Payer: MEDICARE

## 2024-06-14 ENCOUNTER — OFFICE VISIT (OUTPATIENT)
Dept: FAMILY MEDICINE | Facility: CLINIC | Age: 79
End: 2024-06-14
Payer: MEDICARE

## 2024-06-14 VITALS
SYSTOLIC BLOOD PRESSURE: 100 MMHG | TEMPERATURE: 98 F | HEIGHT: 69 IN | OXYGEN SATURATION: 100 % | RESPIRATION RATE: 18 BRPM | WEIGHT: 243.5 LBS | DIASTOLIC BLOOD PRESSURE: 62 MMHG | BODY MASS INDEX: 36.07 KG/M2 | HEART RATE: 68 BPM

## 2024-06-14 VITALS
TEMPERATURE: 97 F | RESPIRATION RATE: 16 BRPM | BODY MASS INDEX: 36.05 KG/M2 | HEIGHT: 69 IN | DIASTOLIC BLOOD PRESSURE: 75 MMHG | SYSTOLIC BLOOD PRESSURE: 118 MMHG | HEART RATE: 72 BPM | WEIGHT: 243.38 LBS

## 2024-06-14 DIAGNOSIS — K21.9 GASTROESOPHAGEAL REFLUX DISEASE, UNSPECIFIED WHETHER ESOPHAGITIS PRESENT: ICD-10-CM

## 2024-06-14 DIAGNOSIS — M54.9 DORSALGIA, UNSPECIFIED: ICD-10-CM

## 2024-06-14 DIAGNOSIS — I26.99 PULMONARY EMBOLISM, UNSPECIFIED CHRONICITY, UNSPECIFIED PULMONARY EMBOLISM TYPE, UNSPECIFIED WHETHER ACUTE COR PULMONALE PRESENT: ICD-10-CM

## 2024-06-14 DIAGNOSIS — R91.1 PULMONARY NODULE: ICD-10-CM

## 2024-06-14 DIAGNOSIS — E21.3 HYPERPARATHYROIDISM, UNSPECIFIED: ICD-10-CM

## 2024-06-14 DIAGNOSIS — G62.9 NEUROPATHY: ICD-10-CM

## 2024-06-14 DIAGNOSIS — I26.99 PULMONARY EMBOLI: ICD-10-CM

## 2024-06-14 DIAGNOSIS — I26.99 PULMONARY EMBOLISM, UNSPECIFIED CHRONICITY, UNSPECIFIED PULMONARY EMBOLISM TYPE, UNSPECIFIED WHETHER ACUTE COR PULMONALE PRESENT: Primary | ICD-10-CM

## 2024-06-14 DIAGNOSIS — I70.0 ATHEROSCLEROSIS OF AORTA: ICD-10-CM

## 2024-06-14 DIAGNOSIS — I82.431 ACUTE DEEP VEIN THROMBOSIS (DVT) OF POPLITEAL VEIN OF RIGHT LOWER EXTREMITY: Primary | ICD-10-CM

## 2024-06-14 DIAGNOSIS — I82.431 ACUTE DEEP VEIN THROMBOSIS (DVT) OF POPLITEAL VEIN OF RIGHT LOWER EXTREMITY: ICD-10-CM

## 2024-06-14 DIAGNOSIS — I10 HYPERTENSION, ESSENTIAL: ICD-10-CM

## 2024-06-14 DIAGNOSIS — E03.9 HYPOTHYROIDISM, UNSPECIFIED TYPE: ICD-10-CM

## 2024-06-14 DIAGNOSIS — E78.2 MIXED HYPERLIPIDEMIA: ICD-10-CM

## 2024-06-14 PROCEDURE — 1125F AMNT PAIN NOTED PAIN PRSNT: CPT | Mod: CPTII,S$GLB,,

## 2024-06-14 PROCEDURE — 1160F RVW MEDS BY RX/DR IN RCRD: CPT | Mod: CPTII,S$GLB,, | Performed by: INTERNAL MEDICINE

## 2024-06-14 PROCEDURE — 1111F DSCHRG MED/CURRENT MED MERGE: CPT | Mod: CPTII,S$GLB,, | Performed by: INTERNAL MEDICINE

## 2024-06-14 PROCEDURE — 1125F AMNT PAIN NOTED PAIN PRSNT: CPT | Mod: CPTII,S$GLB,, | Performed by: INTERNAL MEDICINE

## 2024-06-14 PROCEDURE — 1101F PT FALLS ASSESS-DOCD LE1/YR: CPT | Mod: CPTII,S$GLB,,

## 2024-06-14 PROCEDURE — 3078F DIAST BP <80 MM HG: CPT | Mod: CPTII,S$GLB,, | Performed by: INTERNAL MEDICINE

## 2024-06-14 PROCEDURE — 1157F ADVNC CARE PLAN IN RCRD: CPT | Mod: CPTII,S$GLB,,

## 2024-06-14 PROCEDURE — 1157F ADVNC CARE PLAN IN RCRD: CPT | Mod: CPTII,S$GLB,, | Performed by: INTERNAL MEDICINE

## 2024-06-14 PROCEDURE — 99495 TRANSJ CARE MGMT MOD F2F 14D: CPT | Mod: S$GLB,,,

## 2024-06-14 PROCEDURE — 3288F FALL RISK ASSESSMENT DOCD: CPT | Mod: CPTII,S$GLB,, | Performed by: INTERNAL MEDICINE

## 2024-06-14 PROCEDURE — 1159F MED LIST DOCD IN RCRD: CPT | Mod: CPTII,S$GLB,, | Performed by: INTERNAL MEDICINE

## 2024-06-14 PROCEDURE — G2211 COMPLEX E/M VISIT ADD ON: HCPCS | Mod: S$GLB,,, | Performed by: INTERNAL MEDICINE

## 2024-06-14 PROCEDURE — 3288F FALL RISK ASSESSMENT DOCD: CPT | Mod: CPTII,S$GLB,,

## 2024-06-14 PROCEDURE — 3078F DIAST BP <80 MM HG: CPT | Mod: CPTII,S$GLB,,

## 2024-06-14 PROCEDURE — 99999 PR PBB SHADOW E&M-EST. PATIENT-LVL IV: CPT | Mod: PBBFAC,,,

## 2024-06-14 PROCEDURE — 99999 PR PBB SHADOW E&M-EST. PATIENT-LVL V: CPT | Mod: PBBFAC,,, | Performed by: INTERNAL MEDICINE

## 2024-06-14 PROCEDURE — 3074F SYST BP LT 130 MM HG: CPT | Mod: CPTII,S$GLB,, | Performed by: INTERNAL MEDICINE

## 2024-06-14 PROCEDURE — 3074F SYST BP LT 130 MM HG: CPT | Mod: CPTII,S$GLB,,

## 2024-06-14 PROCEDURE — 99203 OFFICE O/P NEW LOW 30 MIN: CPT | Mod: S$GLB,,, | Performed by: INTERNAL MEDICINE

## 2024-06-14 PROCEDURE — 1101F PT FALLS ASSESS-DOCD LE1/YR: CPT | Mod: CPTII,S$GLB,, | Performed by: INTERNAL MEDICINE

## 2024-06-14 RX ORDER — DULOXETIN HYDROCHLORIDE 60 MG/1
60 CAPSULE, DELAYED RELEASE ORAL DAILY
Qty: 90 CAPSULE | Refills: 1 | Status: SHIPPED | OUTPATIENT
Start: 2024-06-14

## 2024-06-14 NOTE — PROGRESS NOTES
Subjective:       Patient ID: Marisela Eagle is a 79 y.o. female.    Admit Date: 06/06/2024  Discharge Date: 06/08/2024  Discharge Facility: Hospital    Family and/or Caretaker present at visit?  Yes.  Diagnostic tests reviewed/disposition: I have reviewed all completed as well as pending diagnostic tests at the time of discharge.  Disease/illness education: Pulmonary embolism. Anticoagulants.   Home health/community services discussion/referrals: Patient does not have home health established from hospital visit.  They do not need home health.  If needed, we will set up home health for the patient.   Establishment or re-establishment of referral orders for community resources: No other necessary community resources.   Discussion with other health care providers: No discussion with other health care providers necessary.     Chief Complaint: Follow-up (hospital)    Marisela Eagle is an 79-year-old female with medical history of anxiety, pulmonary nodule, hyperlipidemia, hypertension, ASCVD, hyperparathyroidism, hypothyroidism, and GERD who presents to clinic for hospital follow up.  Patient was experiencing shortness of breath and CTA was done by her Cardiologist.  CTA showed bilateral pulmonary emboli and she was sent to the ER.  She was also having bilateral lower extremity edema with pain in her right thigh.  Ultrasound was done and was positive for nonocclusive thrombus in the right popliteal vein.  She was hospitalized and started on lovenox.  Shortness of breath improved.  She was evaluated and did not qualify for home O2.  She was felt stable for discharge and was sent home on Eliquis.  She was also referred to Hematology and has appointment later today.  She states she is feeling better than she has in awhile.  Her shortness of breath has improved.  Labs on discharge (cbc, cmp, mg)  unremarkable.           Hospital Notes:  HPI:   79-year-old female presented to ED for eval. Patient sent to ED by her  "cardiologist 2/2 abn CTA chest with pulmonary emboli. Patient reported she has been having shortness of breath and dyspnea on exertion over the last several months, with associated L sided chest tightness, palpitations, and R thigh pain. She also reports BLE edema for the last 2-3 years, reports diagnosed with PVD and neuropathy. Patient does have remote history of blood clot, states it was "in her abdomen" about 50 years ago s/p , denies taking blood thinners. Denies hx AF. Denies hormone replacement therapy. CTA chest impression with bilateral pulmonary emboli, worse in the right main pulmonary artery, no right heart strain. RLE venous US impression with nonocclusive popliteal thrombus. Admit to hospital medicine for further eval.      * No surgery found *       Hospital Course:   Marisela Eagle is an 79 year old female with a past medical history of obesity, HTN, HLD, GERD, and hypothyroidism who presented with an unprovoked bilateral PE and R popliteal DVT. She is on Lovenox and stable on room air. There is no right heart strain. A home O2 evaluation was ordered and the patient did not qualify.  She was referred to Hematology on discharge for hypercoagulable workup.  She was discharged on Eliquis.  Three-month supply given on discharge but informed that duration of anticoagulant to be determined by PCP/Hematology.  Patient expressed understanding.  She was counseled avoiding falls while on Eliquis.  She will follow up with PCP and Hematology.  She expressed understanding of discharge plan.        Narrative & Impression  EXAMINATION:  US LOWER EXTREMITY VEINS BILATERAL     CLINICAL HISTORY:  Acute embolism and thrombosis of unspecified deep veins of unspecified lower extremity     TECHNIQUE:  Duplex and color flow Doppler and dynamic compression was performed of the bilateral lower extremity veins was performed.     COMPARISON:  CTA chest 2024.     FINDINGS:  Right thigh veins: The common femoral, " femoral, upper greater saphenous, and deep femoral veins are patent and free of thrombus. The veins are normally compressible and have normal phasic flow and augmentation response.  Nonocclusive thrombus right popliteal vein.     Right calf veins: The visualized calf veins are patent.     Left thigh veins: The common femoral, femoral, popliteal, upper greater saphenous, and deep femoral veins are patent and free of thrombus. The veins are normally compressible and have normal phasic flow and augmentation response.     Left calf veins: The visualized calf veins are patent.     Miscellaneous: None     Impression:     Known pulmonary emboli on prior CTA chest.  Ultrasound demonstrates nonocclusive thrombus in the right popliteal vein.     This report was flagged in Epic as abnormal.        Electronically signed by:Ajay Solano MD  Date:                                            06/06/2024  Time:                                           21:59        Exam Ended: 06/06/24 21:23 CDT Last Resulted: 06/06/24 21:59 CDT        Narrative & Impression     CMS MANDATED QUALITY DATA - CT RADIATION - 436     All CT scans at this facility utilize dose modulation, iterative reconstruction, and/or weight based dosing when appropriate to reduce radiation dose to as low as reasonably achievable.     EXAMINATION:  CTA CHEST NON CORONARY (PE STUDIES)     CLINICAL HISTORY:  Pulmonary embolism (PE) suspected, unknown D-dimer; Chest pain, unspecified     TECHNIQUE:  CT angiography of the chest with 100 mL Omnipaque 350. Maximum intensity projection coronal reformations were created at a separate workstation and stored in the patient's permanent medical record.     COMPARISON:  CTA chest 01/05/2022.     FINDINGS:  Filling defect consistent with thrombus is noted in the distal right main pulmonary artery extending into segmental pulmonary artery of the right lower lobe.  There are other smaller filling defects/emboli involving distal  right lower lobe subsegmental pulmonary arteries.  There also small filling defects/emboli of left subsegmental pulmonary arteries supplying the left lower lobe.  Heart size is normal.  No right heart strain observed.  The thoracic aorta is normal caliber with atherosclerosis.  No enlarged mediastinal lymph nodes or mass.     The central tracheobronchial tree is patent.  There is bilateral dependent subsegmental atelectasis.  There is biapical pleuroparenchymal thickening and scarring.  6 mm pulmonary nodule of the left lung base is unchanged (series 3, image 257).  There is atelectasis versus scarring of the lingula.     Hepatic cyst again noted.  The visualized abdominal viscera are unremarkable.  There are degenerative changes of the spine.  No acute osseous abnormality observed.     Impression:     1. Bilateral pulmonary emboli as described, worse in the right main pulmonary artery.  No right heart strain.  2. 6 mm pulmonary nodule in the left lung base is unchanged from 2022 and is considered benign.  Findings were reported to and acknowledged by nurse practitioner Ruth Cedeño at 16:29.        Electronically signed by:Milo Vogel  Date:                                            06/06/2024  Time:                                           16:29        Exam Ended: 06/06/24 16:02 CDT Last Resulted: 06/06/24 16:29 CDT      Order Details        View Encounter        Lab and Collection Details        Routing        Result History    View All Conversations on this Encounter                        Review of patient's allergies indicates:  No Known Allergies  Social Determinants of Health     Tobacco Use: Medium Risk (6/14/2024)    Patient History     Smoking Tobacco Use: Former     Smokeless Tobacco Use: Never     Passive Exposure: Not on file   Alcohol Use: Not At Risk (11/27/2023)    AUDIT-C     Frequency of Alcohol Consumption: Monthly or less     Average Number of Drinks: Patient does not drink      Frequency of Binge Drinking: Never   Financial Resource Strain: Low Risk  (11/27/2023)    Overall Financial Resource Strain (CARDIA)     Difficulty of Paying Living Expenses: Not hard at all   Food Insecurity: No Food Insecurity (11/27/2023)    Hunger Vital Sign     Worried About Running Out of Food in the Last Year: Never true     Ran Out of Food in the Last Year: Never true   Transportation Needs: No Transportation Needs (11/27/2023)    PRAPARE - Transportation     Lack of Transportation (Medical): No     Lack of Transportation (Non-Medical): No   Physical Activity: Inactive (11/27/2023)    Exercise Vital Sign     Days of Exercise per Week: 0 days     Minutes of Exercise per Session: 0 min   Stress: No Stress Concern Present (11/27/2023)    [employer] - Occupational Stress Questionnaire     Feeling of Stress : Not at all   Housing Stability: Low Risk  (11/27/2023)    Housing Stability Vital Sign     Unable to Pay for Housing in the Last Year: No     Number of Places Lived in the Last Year: 1     Unstable Housing in the Last Year: No   Depression: Low Risk  (6/14/2024)    Depression     Last PHQ-4: Flowsheet Data: 0   Utilities: Not on file   Health Literacy: Not on file   Social Isolation: Not on file      Past Medical History:   Diagnosis Date    Bilateral knee pain July 2012    left worse, right knee painful even after partial replacement.    Bruises easily     Clot 1990's?    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 Took Coumadin x 6 weeks    KNEE ARTHROSCOPY      right    TONSILLECTOMY        Social History     Socioeconomic History    Marital status:          Current Outpatient Medications:     amitriptyline (ELAVIL) 10 MG tablet, TAKE 1 TABLET EVERY NIGHT AS NEEDED FOR PAIN (Patient taking differently: Take 10 mg by mouth nightly as needed.), Disp: 90 tablet, Rfl: 1    apixaban (ELIQUIS) 5 mg Tab, Take 2 tablets (10 mg total) by mouth 2 (two) times daily for 7 days, THEN 1 tablet (5 mg total) 2 (two) times daily., Disp: 194 tablet, Rfl: 0    artificial tears,hypromellose,,GENTEAL/SUSTANE, (SYSTANE GEL) 0.3 % Gel, 1 drop as needed., Disp: , Rfl:     cholecalciferol, vitamin D3, 5,000 unit capsule, Take 5,000 Units by mouth once daily., Disp: , Rfl:     cyanocobalamin (VITAMIN B-12) 1000 MCG tablet, Take 5,000 mcg by mouth once daily., Disp: , Rfl:     famotidine (PEPCID) 20 MG tablet, TAKE 1 TABLET EVERY EVENING, Disp: 90 tablet, Rfl: 1    furosemide (LASIX) 40 MG tablet, Take 1 tablet (40 mg total) by mouth every Tues, Fri. Take daily, Disp: 90 tablet, Rfl: 1    gabapentin (NEURONTIN) 800 MG tablet, TAKE 1 TABLET THREE TIMES DAILY (Patient taking differently: Take 800 mg by mouth 2 (two) times daily.), Disp: 270 tablet, Rfl: 3    levothyroxine (SYNTHROID) 125 MCG tablet, TAKE 1 TABLET EVERY DAY BEFORE BREAKFAST (Patient taking differently: Take 125 mcg by mouth before breakfast. TAKE 1 TABLET EVERY DAY BEFORE BREAKFAST), Disp: 90 tablet, Rfl: 3    LORazepam (ATIVAN) 1 MG tablet, Take 1 tablet (1 mg total) by mouth every evening., Disp: 30 tablet, Rfl: 0    losartan (COZAAR) 100 MG tablet, TAKE 1/2  TABLET TWICE DAILY (Patient taking differently: Take 50 mg by mouth 2 (two) times daily. 50 Mg BID), Disp: 90 tablet, Rfl: 3    magnesium 30 mg Tab, Take 1 tablet by mouth once daily., Disp: , Rfl:     metoprolol succinate (TOPROL-XL) 50 MG 24 hr tablet, Take 0.5 tablets (25 mg total) by mouth every evening., Disp: 90 tablet, Rfl: 1    oxyCODONE-acetaminophen (PERCOCET)  mg per tablet, Take 1 tablet by mouth daily as needed for Pain., Disp: , Rfl:     potassium chloride SA (K-DUR,KLOR-CON) 20 MEQ tablet, Take 20 mEq by mouth once daily., Disp: , Rfl:     spironolactone (ALDACTONE) 25 MG tablet, TAKE 1 TABLET ONE TIME DAILY (Patient taking differently: Take 25 mg by mouth once daily.), Disp: 90 tablet, Rfl: 1    turmeric 400 mg Cap, Take 400 mg by mouth once daily., Disp: , Rfl:     vit C/E/Zn/coppr/lutein/zeaxan (PRESERVISION AREDS-2 ORAL), Take 1 capsule by mouth once daily., Disp: , Rfl:     alendronate (FOSAMAX) 70 MG tablet, TAKE 1 TABLET BY MOUTH WEEKLY (Patient not taking: Reported on 6/14/2024), Disp: 12 tablet, Rfl: 0    DULoxetine (CYMBALTA) 60 MG capsule, Take 1 capsule (60 mg total) by mouth once daily., Disp: 90 capsule, Rfl: 1    fluconazole (DIFLUCAN) 100 MG tablet, Take 1 tablet (100 mg total) by mouth once daily. (Patient not taking: Reported on 6/14/2024), Disp: 10 tablet, Rfl: 0    pantoprazole (PROTONIX) 40 MG tablet, Take 1 tablet (40 mg total) by mouth every morning. (Patient not taking: Reported on 6/14/2024), Disp: 90 tablet, Rfl: 1    Lab Results   Component Value Date    WBC 5.44 06/08/2024    HGB 12.0 06/08/2024    HCT 37.2 06/08/2024     06/08/2024    CHOL 222 (H) 10/30/2023    TRIG 102 10/30/2023    HDL 72 10/30/2023    ALT 14 06/08/2024    AST 14 06/08/2024     06/08/2024    K 3.6 06/08/2024     06/08/2024    CREATININE 0.6 06/08/2024    BUN 11 06/08/2024    CO2 30 (H) 06/08/2024    TSH 1.253 12/28/2023    TSH 1.253 12/28/2023    INR 0.9 06/06/2024    HGBA1C 5.3  06/23/2023       Review of Systems   Constitutional:  Negative for chills, fatigue and fever.   Respiratory:  Positive for shortness of breath. Negative for chest tightness and wheezing.    Cardiovascular:  Negative for chest pain, palpitations and leg swelling.   Genitourinary: Negative.    Musculoskeletal:  Positive for leg pain.   Neurological: Negative.    Psychiatric/Behavioral: Negative.         Objective:      Physical Exam  Vitals reviewed.   Constitutional:       Appearance: Normal appearance.   Cardiovascular:      Rate and Rhythm: Normal rate and regular rhythm.      Pulses: Normal pulses.      Heart sounds: Normal heart sounds.   Pulmonary:      Effort: Pulmonary effort is normal.      Breath sounds: Normal breath sounds.   Musculoskeletal:      Comments: Pain in right posterior thigh    Skin:     General: Skin is warm and dry.      Capillary Refill: Capillary refill takes less than 2 seconds.   Neurological:      General: No focal deficit present.      Mental Status: She is alert.   Psychiatric:         Mood and Affect: Mood normal.         Behavior: Behavior normal.         Thought Content: Thought content normal.         Judgment: Judgment normal.         Assessment:       1. Pulmonary embolism, unspecified chronicity, unspecified pulmonary embolism type, unspecified whether acute cor pulmonale present    2. Acute deep vein thrombosis (DVT) of popliteal vein of right lower extremity    3. Dorsalgia, unspecified    4. Neuropathy    5. Pulmonary nodule    6. Hypertension, essential    7. Mixed hyperlipidemia    8. Atherosclerosis of aorta    9. Hypothyroidism, unspecified type    10. Hyperparathyroidism, unspecified    11. Gastroesophageal reflux disease, unspecified whether esophagitis present        Plan:       Marisela was seen today for follow-up.    Diagnoses and all orders for this visit:    Pulmonary embolism, unspecified chronicity, unspecified pulmonary embolism type, unspecified whether acute  cor pulmonale present    Acute deep vein thrombosis (DVT) of popliteal vein of right lower extremity    Dorsalgia, unspecified    Neuropathy  -     DULoxetine (CYMBALTA) 60 MG capsule; Take 1 capsule (60 mg total) by mouth once daily.    Pulmonary nodule    Hypertension, essential    Mixed hyperlipidemia    Atherosclerosis of aorta    Hypothyroidism, unspecified type    Hyperparathyroidism, unspecified    Gastroesophageal reflux disease, unspecified whether esophagitis present    Was as prescribed.  Follow-up with hematologist as scheduled.  No medication changes today.  Seek emergency care if shortness a breath worsens or if you have chest pain.  Cymbalta refilled.  Follow-up in clinic in 3 months or sooner if needed.

## 2024-06-18 ENCOUNTER — LAB VISIT (OUTPATIENT)
Dept: LAB | Facility: HOSPITAL | Age: 79
End: 2024-06-18
Attending: INTERNAL MEDICINE
Payer: MEDICARE

## 2024-06-18 DIAGNOSIS — I26.99 PULMONARY EMBOLISM, UNSPECIFIED CHRONICITY, UNSPECIFIED PULMONARY EMBOLISM TYPE, UNSPECIFIED WHETHER ACUTE COR PULMONALE PRESENT: ICD-10-CM

## 2024-06-18 DIAGNOSIS — I26.99 PULMONARY EMBOLI: ICD-10-CM

## 2024-06-18 DIAGNOSIS — I82.431 ACUTE DEEP VEIN THROMBOSIS (DVT) OF POPLITEAL VEIN OF RIGHT LOWER EXTREMITY: ICD-10-CM

## 2024-06-18 LAB
ALBUMIN SERPL BCP-MCNC: 4.3 G/DL (ref 3.5–5.2)
ALP SERPL-CCNC: 36 U/L (ref 55–135)
ALT SERPL W/O P-5'-P-CCNC: 18 U/L (ref 10–44)
ANION GAP SERPL CALC-SCNC: 7 MMOL/L (ref 8–16)
AST SERPL-CCNC: 17 U/L (ref 10–40)
BASOPHILS # BLD AUTO: 0.05 K/UL (ref 0–0.2)
BASOPHILS NFR BLD: 0.7 % (ref 0–1.9)
BILIRUB SERPL-MCNC: 0.4 MG/DL (ref 0.1–1)
BUN SERPL-MCNC: 10 MG/DL (ref 8–23)
CALCIUM SERPL-MCNC: 9.4 MG/DL (ref 8.7–10.5)
CHLORIDE SERPL-SCNC: 100 MMOL/L (ref 95–110)
CO2 SERPL-SCNC: 28 MMOL/L (ref 23–29)
CREAT SERPL-MCNC: 0.7 MG/DL (ref 0.5–1.4)
DIFFERENTIAL METHOD BLD: ABNORMAL
EOSINOPHIL # BLD AUTO: 0.2 K/UL (ref 0–0.5)
EOSINOPHIL NFR BLD: 2.4 % (ref 0–8)
ERYTHROCYTE [DISTWIDTH] IN BLOOD BY AUTOMATED COUNT: 17 % (ref 11.5–14.5)
EST. GFR  (NO RACE VARIABLE): >60 ML/MIN/1.73 M^2
GLUCOSE SERPL-MCNC: 99 MG/DL (ref 70–110)
HCT VFR BLD AUTO: 40 % (ref 37–48.5)
HGB BLD-MCNC: 12.6 G/DL (ref 12–16)
IMM GRANULOCYTES # BLD AUTO: 0.02 K/UL (ref 0–0.04)
IMM GRANULOCYTES NFR BLD AUTO: 0.3 % (ref 0–0.5)
LYMPHOCYTES # BLD AUTO: 2.1 K/UL (ref 1–4.8)
LYMPHOCYTES NFR BLD: 29.8 % (ref 18–48)
MCH RBC QN AUTO: 27.4 PG (ref 27–31)
MCHC RBC AUTO-ENTMCNC: 31.5 G/DL (ref 32–36)
MCV RBC AUTO: 87 FL (ref 82–98)
MONOCYTES # BLD AUTO: 0.7 K/UL (ref 0.3–1)
MONOCYTES NFR BLD: 10.5 % (ref 4–15)
NEUTROPHILS # BLD AUTO: 4 K/UL (ref 1.8–7.7)
NEUTROPHILS NFR BLD: 56.3 % (ref 38–73)
NRBC BLD-RTO: 0 /100 WBC
PLATELET # BLD AUTO: 240 K/UL (ref 150–450)
PMV BLD AUTO: 11.9 FL (ref 9.2–12.9)
POTASSIUM SERPL-SCNC: 4.2 MMOL/L (ref 3.5–5.1)
PROT SERPL-MCNC: 7 G/DL (ref 6–8.4)
RBC # BLD AUTO: 4.6 M/UL (ref 4–5.4)
SODIUM SERPL-SCNC: 135 MMOL/L (ref 136–145)
WBC # BLD AUTO: 7.02 K/UL (ref 3.9–12.7)

## 2024-06-18 PROCEDURE — 85260 CLOT FACTOR X STUART-POWER: CPT | Performed by: INTERNAL MEDICINE

## 2024-06-18 PROCEDURE — 85305 CLOT INHIBIT PROT S TOTAL: CPT | Performed by: INTERNAL MEDICINE

## 2024-06-18 PROCEDURE — 83090 ASSAY OF HOMOCYSTEINE: CPT | Performed by: INTERNAL MEDICINE

## 2024-06-18 PROCEDURE — 36415 COLL VENOUS BLD VENIPUNCTURE: CPT | Performed by: INTERNAL MEDICINE

## 2024-06-18 PROCEDURE — 85240 CLOT FACTOR VIII AHG 1 STAGE: CPT | Performed by: INTERNAL MEDICINE

## 2024-06-18 PROCEDURE — 85300 ANTITHROMBIN III ACTIVITY: CPT | Performed by: INTERNAL MEDICINE

## 2024-06-18 PROCEDURE — 85303 CLOT INHIBIT PROT C ACTIVITY: CPT | Performed by: INTERNAL MEDICINE

## 2024-06-18 PROCEDURE — 85250 CLOT FACTOR IX PTC/CHRSTMAS: CPT | Performed by: INTERNAL MEDICINE

## 2024-06-18 PROCEDURE — 86148 ANTI-PHOSPHOLIPID ANTIBODY: CPT | Mod: 59 | Performed by: INTERNAL MEDICINE

## 2024-06-18 PROCEDURE — 85280 CLOT FACTOR XII HAGEMAN: CPT | Performed by: INTERNAL MEDICINE

## 2024-06-18 PROCEDURE — 85230 CLOT FACTOR VII PROCONVERTIN: CPT | Performed by: INTERNAL MEDICINE

## 2024-06-18 PROCEDURE — 85306 CLOT INHIBIT PROT S FREE: CPT | Performed by: INTERNAL MEDICINE

## 2024-06-18 PROCEDURE — 85025 COMPLETE CBC W/AUTO DIFF WBC: CPT | Performed by: INTERNAL MEDICINE

## 2024-06-18 PROCEDURE — 86147 CARDIOLIPIN ANTIBODY EA IG: CPT | Mod: 59 | Performed by: INTERNAL MEDICINE

## 2024-06-18 PROCEDURE — 85306 CLOT INHIBIT PROT S FREE: CPT | Mod: 91 | Performed by: INTERNAL MEDICINE

## 2024-06-18 PROCEDURE — 80053 COMPREHEN METABOLIC PANEL: CPT | Performed by: INTERNAL MEDICINE

## 2024-06-18 PROCEDURE — 85613 RUSSELL VIPER VENOM DILUTED: CPT | Performed by: INTERNAL MEDICINE

## 2024-06-18 PROCEDURE — 85302 CLOT INHIBIT PROT C ANTIGEN: CPT | Performed by: INTERNAL MEDICINE

## 2024-06-18 PROCEDURE — 85307 ASSAY ACTIVATED PROTEIN C: CPT | Performed by: INTERNAL MEDICINE

## 2024-06-19 DIAGNOSIS — I82.431 ACUTE DEEP VEIN THROMBOSIS (DVT) OF POPLITEAL VEIN OF RIGHT LOWER EXTREMITY: ICD-10-CM

## 2024-06-19 DIAGNOSIS — I26.99 PULMONARY EMBOLISM, UNSPECIFIED CHRONICITY, UNSPECIFIED PULMONARY EMBOLISM TYPE, UNSPECIFIED WHETHER ACUTE COR PULMONALE PRESENT: Primary | ICD-10-CM

## 2024-06-20 ENCOUNTER — HOSPITAL ENCOUNTER (OUTPATIENT)
Dept: RADIOLOGY | Facility: HOSPITAL | Age: 79
Discharge: HOME | End: 2024-06-20
Attending: INTERNAL MEDICINE
Payer: MEDICARE

## 2024-06-20 DIAGNOSIS — I26.99 PULMONARY EMBOLI: ICD-10-CM

## 2024-06-20 DIAGNOSIS — I82.431 ACUTE DEEP VEIN THROMBOSIS (DVT) OF POPLITEAL VEIN OF RIGHT LOWER EXTREMITY: ICD-10-CM

## 2024-06-20 DIAGNOSIS — I26.99 PULMONARY EMBOLISM, UNSPECIFIED CHRONICITY, UNSPECIFIED PULMONARY EMBOLISM TYPE, UNSPECIFIED WHETHER ACUTE COR PULMONALE PRESENT: ICD-10-CM

## 2024-06-20 LAB — HCYS SERPL-SCNC: 10.3 UMOL/L (ref 0–21.3)

## 2024-06-20 PROCEDURE — 25500020 PHARM REV CODE 255: Performed by: INTERNAL MEDICINE

## 2024-06-20 PROCEDURE — 74178 CT ABD&PLV WO CNTR FLWD CNTR: CPT | Mod: TC

## 2024-06-20 PROCEDURE — 74178 CT ABD&PLV WO CNTR FLWD CNTR: CPT | Mod: 26,,, | Performed by: RADIOLOGY

## 2024-06-20 RX ADMIN — IOHEXOL 100 ML: 350 INJECTION, SOLUTION INTRAVENOUS at 11:06

## 2024-06-21 LAB
APCR PPP: 2.9 RATIO (ref 2.2–3.5)
AT III ACT/NOR PPP CHRO: 118 % (ref 75–135)
FACT IX ACT/NOR PPP: 105 % (ref 60–177)
FACT VII ACT/NOR PPP: 121 % (ref 51–186)
FACT VIII ACT/NOR PPP: 130 % (ref 56–140)
FACT X ACT/NOR PPP: 112 % (ref 76–183)
FACT XII ACT/NOR PPP: 133 % (ref 50–150)
PROT C ACT/NOR PPP: 107 % (ref 73–180)
PROT C AG ACT/NOR PPP IA: 105 % (ref 60–150)
PROT S ACT/NOR PPP: 89 % (ref 63–140)
PROT S AG ACT/NOR PPP IA: 106 % (ref 60–150)
PROT S FREE AG ACT/NOR PPP IA: 93 % (ref 61–136)

## 2024-06-22 LAB
CARDIOLIPIN IGA SER IA-ACNC: <9 MPL U/ML (ref 0–12)
CARDIOLIPIN IGG SER IA-ACNC: <9 GPL U/ML (ref 0–14)
CARDIOLIPIN IGM SER IA-ACNC: <9 APL U/ML (ref 0–11)
PHOSPHATIDYLSERINE AB (IGA): <10 UNITS (ref 0–30)
PHOSPHATIDYLSERINE AB (IGG): <1 APS UNITS (ref 0–19)
PHOSPHATIDYLSERINE AB (IGM): 9 UNITS (ref 0–30)

## 2024-06-26 LAB
CONFIRM DRVVT: 44.4 SEC
SCREEN DRVVT: 47.7 SEC

## 2024-07-05 ENCOUNTER — TELEPHONE (OUTPATIENT)
Dept: CARDIOLOGY | Facility: HOSPITAL | Age: 79
End: 2024-07-05

## 2024-07-05 NOTE — TELEPHONE ENCOUNTER
Left message on voicemail.     Patient advised, test will be at [facility] ([address]   Will need to register on the first floor at the main entrance.   Patient advised that arrival time is 7:10am.  Patient advised that she may be here about 3.5-4 hours, and may want to bring something to occupy their time, as there will be periods of waiting.    Patient advised, may take her medications prior to testing if you need to.  Advised if she needs to eat to take her medications, please keep it light, like toast and juice.    Patient advised to avoid all caffeine 12 hours prior to testing.  This includes decaf tea and coffee.    Will provide peanut butter crackers for a snack after stress test.  If patient would prefer something else, please bring a snack from home.    Wear comfortable clothing.   No lotions, oils, or powders to the upper chest area. May wear deodorant.    No metal jewelry, buttons, or zippers to the upper body.  Advised to call the office if any questions.

## 2024-07-08 ENCOUNTER — HOSPITAL ENCOUNTER (OUTPATIENT)
Dept: RADIOLOGY | Facility: HOSPITAL | Age: 79
Discharge: HOME | End: 2024-07-08
Attending: NURSE PRACTITIONER
Payer: MEDICARE

## 2024-07-08 ENCOUNTER — HOSPITAL ENCOUNTER (OUTPATIENT)
Dept: CARDIOLOGY | Facility: HOSPITAL | Age: 79
Discharge: HOME | End: 2024-07-08
Attending: NURSE PRACTITIONER
Payer: MEDICARE

## 2024-07-08 DIAGNOSIS — R07.89 TIGHTNESS IN CHEST: ICD-10-CM

## 2024-07-08 DIAGNOSIS — R06.02 SOB (SHORTNESS OF BREATH) ON EXERTION: ICD-10-CM

## 2024-07-08 DIAGNOSIS — E78.2 MIXED HYPERLIPIDEMIA: ICD-10-CM

## 2024-07-08 PROCEDURE — 93016 CV STRESS TEST SUPVJ ONLY: CPT | Mod: ,,,

## 2024-07-08 PROCEDURE — 78452 HT MUSCLE IMAGE SPECT MULT: CPT | Mod: 26,,, | Performed by: INTERNAL MEDICINE

## 2024-07-08 PROCEDURE — 63600175 PHARM REV CODE 636 W HCPCS: Performed by: NURSE PRACTITIONER

## 2024-07-08 PROCEDURE — A9502 TC99M TETROFOSMIN: HCPCS | Performed by: NURSE PRACTITIONER

## 2024-07-08 PROCEDURE — 93018 CV STRESS TEST I&R ONLY: CPT | Mod: ,,, | Performed by: INTERNAL MEDICINE

## 2024-07-08 PROCEDURE — 78452 HT MUSCLE IMAGE SPECT MULT: CPT

## 2024-07-08 PROCEDURE — 93017 CV STRESS TEST TRACING ONLY: CPT

## 2024-07-08 RX ORDER — REGADENOSON 0.08 MG/ML
0.4 INJECTION, SOLUTION INTRAVENOUS
Status: COMPLETED | OUTPATIENT
Start: 2024-07-08 | End: 2024-07-08

## 2024-07-08 RX ADMIN — REGADENOSON 0.4 MG: 0.08 INJECTION, SOLUTION INTRAVENOUS at 08:07

## 2024-07-08 RX ADMIN — TETROFOSMIN 25.5 MILLICURIE: 1.38 INJECTION, POWDER, LYOPHILIZED, FOR SOLUTION INTRAVENOUS at 08:07

## 2024-07-08 RX ADMIN — TETROFOSMIN 12.2 MILLICURIE: 1.38 INJECTION, POWDER, LYOPHILIZED, FOR SOLUTION INTRAVENOUS at 07:07

## 2024-07-10 LAB
CV PHARM DOSE: 0.4 MG
CV STRESS BASE HR: 58 BPM
DIASTOLIC BLOOD PRESSURE: 76 MMHG
EJECTION FRACTION- HIGH: 65 %
END DIASTOLIC INDEX-HIGH: 153 ML/M2
END DIASTOLIC INDEX-LOW: 93 ML/M2
END SYSTOLIC INDEX-HIGH: 71 ML/M2
END SYSTOLIC INDEX-LOW: 31 ML/M2
NUC REST DIASTOLIC VOLUME INDEX: 120
NUC REST EJECTION FRACTION: 61
NUC REST SYSTOLIC VOLUME INDEX: 47
NUC STRESS DIASTOLIC VOLUME INDEX: 127
NUC STRESS EJECTION FRACTION: 54 %
NUC STRESS SYSTOLIC VOLUME INDEX: 59
OHS CV CPX 1 MINUTE RECOVERY HEART RATE: 77 BPM
OHS CV CPX 85 PERCENT MAX PREDICTED HEART RATE MALE: 118
OHS CV CPX MAX PREDICTED HEART RATE: 139
OHS CV CPX PATIENT IS FEMALE: 1
OHS CV CPX PATIENT IS MALE: 0
OHS CV CPX PEAK DIASTOLIC BLOOD PRESSURE: 50 MMHG
OHS CV CPX PEAK HEAR RATE: 77 BPM
OHS CV CPX PEAK RATE PRESSURE PRODUCT: NORMAL
OHS CV CPX PEAK SYSTOLIC BLOOD PRESSURE: 142 MMHG
OHS CV CPX PERCENT MAX PREDICTED HEART RATE ACHIEVED: 57
OHS CV CPX RATE PRESSURE PRODUCT PRESENTING: 8700
RETIRED EF AND QEF - SEE NOTES: 53 %
SYSTOLIC BLOOD PRESSURE: 150 MMHG

## 2024-07-15 ENCOUNTER — HOSPITAL ENCOUNTER (OUTPATIENT)
Dept: RADIOLOGY | Facility: HOSPITAL | Age: 79
Discharge: HOME | End: 2024-07-15
Attending: INTERNAL MEDICINE
Payer: MEDICARE

## 2024-07-15 ENCOUNTER — OFFICE VISIT (OUTPATIENT)
Facility: CLINIC | Age: 79
End: 2024-07-15
Payer: MEDICARE

## 2024-07-15 ENCOUNTER — TELEPHONE (OUTPATIENT)
Facility: CLINIC | Age: 79
End: 2024-07-15

## 2024-07-15 VITALS
TEMPERATURE: 97 F | RESPIRATION RATE: 16 BRPM | HEIGHT: 69 IN | HEART RATE: 78 BPM | SYSTOLIC BLOOD PRESSURE: 138 MMHG | BODY MASS INDEX: 35.86 KG/M2 | WEIGHT: 242.13 LBS | DIASTOLIC BLOOD PRESSURE: 70 MMHG

## 2024-07-15 DIAGNOSIS — I82.431 ACUTE DEEP VEIN THROMBOSIS (DVT) OF POPLITEAL VEIN OF RIGHT LOWER EXTREMITY: ICD-10-CM

## 2024-07-15 DIAGNOSIS — R76.8 POSITIVE ANA (ANTINUCLEAR ANTIBODY): Primary | ICD-10-CM

## 2024-07-15 DIAGNOSIS — I26.99 PULMONARY EMBOLISM, UNSPECIFIED CHRONICITY, UNSPECIFIED PULMONARY EMBOLISM TYPE, UNSPECIFIED WHETHER ACUTE COR PULMONALE PRESENT: ICD-10-CM

## 2024-07-15 LAB
CREAT SERPL-MCNC: 0.7 MG/DL (ref 0.5–1.4)
SAMPLE: NORMAL

## 2024-07-15 PROCEDURE — 1160F RVW MEDS BY RX/DR IN RCRD: CPT | Mod: HCNC,CPTII,S$GLB, | Performed by: INTERNAL MEDICINE

## 2024-07-15 PROCEDURE — 1101F PT FALLS ASSESS-DOCD LE1/YR: CPT | Mod: HCNC,CPTII,S$GLB, | Performed by: INTERNAL MEDICINE

## 2024-07-15 PROCEDURE — 1159F MED LIST DOCD IN RCRD: CPT | Mod: HCNC,CPTII,S$GLB, | Performed by: INTERNAL MEDICINE

## 2024-07-15 PROCEDURE — 25500020 PHARM REV CODE 255: Mod: PO | Performed by: INTERNAL MEDICINE

## 2024-07-15 PROCEDURE — 82565 ASSAY OF CREATININE: CPT | Mod: PO

## 2024-07-15 PROCEDURE — 71275 CT ANGIOGRAPHY CHEST: CPT | Mod: 26,,, | Performed by: RADIOLOGY

## 2024-07-15 PROCEDURE — 1157F ADVNC CARE PLAN IN RCRD: CPT | Mod: HCNC,CPTII,S$GLB, | Performed by: INTERNAL MEDICINE

## 2024-07-15 PROCEDURE — 3075F SYST BP GE 130 - 139MM HG: CPT | Mod: HCNC,CPTII,S$GLB, | Performed by: INTERNAL MEDICINE

## 2024-07-15 PROCEDURE — 3288F FALL RISK ASSESSMENT DOCD: CPT | Mod: HCNC,CPTII,S$GLB, | Performed by: INTERNAL MEDICINE

## 2024-07-15 PROCEDURE — 99999 PR PBB SHADOW E&M-EST. PATIENT-LVL V: CPT | Mod: PBBFAC,HCNC,, | Performed by: INTERNAL MEDICINE

## 2024-07-15 PROCEDURE — G2211 COMPLEX E/M VISIT ADD ON: HCPCS | Mod: HCNC,S$GLB,, | Performed by: INTERNAL MEDICINE

## 2024-07-15 PROCEDURE — 3078F DIAST BP <80 MM HG: CPT | Mod: HCNC,CPTII,S$GLB, | Performed by: INTERNAL MEDICINE

## 2024-07-15 PROCEDURE — 1125F AMNT PAIN NOTED PAIN PRSNT: CPT | Mod: HCNC,CPTII,S$GLB, | Performed by: INTERNAL MEDICINE

## 2024-07-15 PROCEDURE — 99214 OFFICE O/P EST MOD 30 MIN: CPT | Mod: HCNC,S$GLB,, | Performed by: INTERNAL MEDICINE

## 2024-07-15 RX ADMIN — IOHEXOL 100 ML: 350 INJECTION, SOLUTION INTRAVENOUS at 02:07

## 2024-07-15 NOTE — TELEPHONE ENCOUNTER
----- Message from Rayray Green MD sent at 7/15/2024  3:51 PM CDT -----  Resolution of the bilateral pulmonary emboli with no new pulmonary emboli

## 2024-07-15 NOTE — PROGRESS NOTES
[facility]  PROGRESS NOTE - 2nd Follow-up Visit      Subjective:       Patient ID:   NAME: Marisela Eagle : 1945     79 y.o. female    Referring Doc: Yumiko Watson MD  Other Physicians: Justino/Keren(PCP); IFRAH Patton/Ruth Cedeño NP (Card); Marcos Gusman (ortho); ALEXEI Martin; Melissa Kirkpatrick;         Chief Complaint: DVT/pulm emboli f/u    History of Present Illness:     Patient returns today for a 2nd regularly scheduled follow-up visit.  The patient is here today to go over the results of the recently ordered labs, tests and studies.     She has been on the oral blood thinners with eliquis. No excessive bleeding or bruising    She sees cardiology next Wednesday on     She had some intermittent chest aches and back discomfort    She has chronic arthritis, shoulder and back issues. She is breathing ok, no SOB, HA's or N/V.         ROS:   GEN: normal without any fever, night sweats or weight loss; chronic arthritis issues/right shoulder/back ; intermittent chest aches and back discomfort  HEENT: normal with no HA's, sore throat, stiff neck, changes in vision  CV: normal with no CP, SOB, PND, JARA or orthopnea  PULM: normal with no SOB, cough, hemoptysis, sputum or pleuritic pain  GI: normal with no abdominal pain, nausea, vomiting, constipation, diarrhea, melanotic stools, BRBPR, or hematemesis  : normal with no hematuria, dysuria  BREAST: normal with no mass, discharge, pain  SKIN: normal with no rash, erythema, bruising, or swelling    Pain Scale: 8 on avg legs/feet    Allergies:  Review of patient's allergies indicates:  No Known Allergies    Medications:    Current Outpatient Medications:     amitriptyline (ELAVIL) 10 MG tablet, TAKE 1 TABLET EVERY NIGHT AS NEEDED FOR PAIN (Patient taking differently: Take 10 mg by mouth nightly as needed.), Disp: 90 tablet, Rfl: 1    apixaban (ELIQUIS) 5 mg Tab, Take 2 tablets (10 mg total) by mouth 2 (two) times daily for 7 days, THEN 1 tablet (5  mg total) 2 (two) times daily., Disp: 194 tablet, Rfl: 0    artificial tears,hypromellose,,GENTEAL/SUSTANE, (SYSTANE GEL) 0.3 % Gel, 1 drop as needed., Disp: , Rfl:     cholecalciferol, vitamin D3, 5,000 unit capsule, Take 5,000 Units by mouth once daily., Disp: , Rfl:     cyanocobalamin (VITAMIN B-12) 1000 MCG tablet, Take 5,000 mcg by mouth once daily., Disp: , Rfl:     DULoxetine (CYMBALTA) 60 MG capsule, Take 1 capsule (60 mg total) by mouth once daily., Disp: 90 capsule, Rfl: 1    famotidine (PEPCID) 20 MG tablet, TAKE 1 TABLET EVERY EVENING, Disp: 90 tablet, Rfl: 1    furosemide (LASIX) 40 MG tablet, Take 1 tablet (40 mg total) by mouth every Tues, Fri. Take daily, Disp: 90 tablet, Rfl: 1    gabapentin (NEURONTIN) 800 MG tablet, TAKE 1 TABLET THREE TIMES DAILY (Patient taking differently: Take 800 mg by mouth 2 (two) times daily.), Disp: 270 tablet, Rfl: 3    levothyroxine (SYNTHROID) 125 MCG tablet, TAKE 1 TABLET EVERY DAY BEFORE BREAKFAST (Patient taking differently: Take 125 mcg by mouth before breakfast. TAKE 1 TABLET EVERY DAY BEFORE BREAKFAST), Disp: 90 tablet, Rfl: 3    LORazepam (ATIVAN) 1 MG tablet, Take 1 tablet (1 mg total) by mouth every evening., Disp: 30 tablet, Rfl: 0    losartan (COZAAR) 100 MG tablet, TAKE 1/2 TABLET TWICE DAILY (Patient taking differently: Take 50 mg by mouth 2 (two) times daily. 50 Mg BID), Disp: 90 tablet, Rfl: 3    magnesium 30 mg Tab, Take 1 tablet by mouth once daily., Disp: , Rfl:     metoprolol succinate (TOPROL-XL) 50 MG 24 hr tablet, Take 0.5 tablets (25 mg total) by mouth every evening., Disp: 90 tablet, Rfl: 1    oxyCODONE-acetaminophen (PERCOCET)  mg per tablet, Take 1 tablet by mouth daily as needed for Pain., Disp: , Rfl:     potassium chloride SA (K-DUR,KLOR-CON) 20 MEQ tablet, Take 20 mEq by mouth once daily., Disp: , Rfl:     spironolactone (ALDACTONE) 25 MG tablet, TAKE 1 TABLET ONE TIME DAILY (Patient taking differently: Take 25 mg by mouth once  "daily.), Disp: 90 tablet, Rfl: 1    turmeric 400 mg Cap, Take 400 mg by mouth once daily., Disp: , Rfl:     vit C/E/Zn/coppr/lutein/zeaxan (PRESERVISION AREDS-2 ORAL), Take 1 capsule by mouth once daily., Disp: , Rfl:     alendronate (FOSAMAX) 70 MG tablet, TAKE 1 TABLET BY MOUTH WEEKLY (Patient not taking: Reported on 6/14/2024), Disp: 12 tablet, Rfl: 0    fluconazole (DIFLUCAN) 100 MG tablet, Take 1 tablet (100 mg total) by mouth once daily. (Patient not taking: Reported on 6/14/2024), Disp: 10 tablet, Rfl: 0    pantoprazole (PROTONIX) 40 MG tablet, Take 1 tablet (40 mg total) by mouth every morning. (Patient not taking: Reported on 6/14/2024), Disp: 90 tablet, Rfl: 1    PMHx/PSHx Updates:  See patient's last visit with me on 6/14/2024.  See H&P on 6/14/2024        Pathology:   Cancer Staging   No matching staging information was found for the patient.            Objective:     Vitals:  Blood pressure 138/70, pulse 78, temperature 97 °F (36.1 °C), temperature source Temporal, resp. rate 16, height 5' 9" (1.753 m), weight 109.8 kg (242 lb 1.6 oz).    Physical Examination:   GEN: no apparent distress, comfortable; AAOx3; overweight  HEAD: atraumatic and normocephalic  EYES: no pallor, no icterus, PERRLA  ENT: OMM, no pharyngeal erythema, external ears WNL; no nasal discharge; no thrush  NECK: no masses, thyroid normal, trachea midline, no LAD/LN's, supple  CV: RRR with no murmur; normal pulse; normal S1 and S2; no pedal edema  CHEST: Normal respiratory effort; CTAB; normal breath sounds; no wheeze or crackles  ABDOM: nontender and nondistended; soft; normal bowel sounds; no rebound/guarding  MUSC/Skeletal: LROM of right shoulder; arthropathy  EXTREM: no clubbing, cyanosis, inflammation; chronic RLE swelling and hyperchromatic skin changes  SKIN: no rashes, lesions, ulcers, petechiae or subcutaneous nodules; chronic age related skin changes  : no lynn  NEURO: grossly intact; motor/sensory WNL; AAOx3; no " tremors  PSYCH: normal mood, affect and behavior  LYMPH: normal cervical, supraclavicular, axillary and groin LN's            Labs:     Lab Results   Component Value Date    WBC 7.02 06/18/2024    HGB 12.6 06/18/2024    HCT 40.0 06/18/2024    MCV 87 06/18/2024     06/18/2024       CMP  Sodium   Date Value Ref Range Status   06/18/2024 135 (L) 136 - 145 mmol/L Final     Potassium   Date Value Ref Range Status   06/18/2024 4.2 3.5 - 5.1 mmol/L Final     Chloride   Date Value Ref Range Status   06/18/2024 100 95 - 110 mmol/L Final     CO2   Date Value Ref Range Status   06/18/2024 28 23 - 29 mmol/L Final     Glucose   Date Value Ref Range Status   06/18/2024 99 70 - 110 mg/dL Final     BUN   Date Value Ref Range Status   06/18/2024 10 8 - 23 mg/dL Final     Creatinine   Date Value Ref Range Status   06/18/2024 0.7 0.5 - 1.4 mg/dL Final     Calcium   Date Value Ref Range Status   06/18/2024 9.4 8.7 - 10.5 mg/dL Final     Total Protein   Date Value Ref Range Status   06/18/2024 7.0 6.0 - 8.4 g/dL Final     Albumin   Date Value Ref Range Status   06/18/2024 4.3 3.5 - 5.2 g/dL Final   01/11/2019 4.1 3.1 - 4.7 g/dL      Total Bilirubin   Date Value Ref Range Status   06/18/2024 0.4 0.1 - 1.0 mg/dL Final     Comment:     For infants and newborns, interpretation of results should be based  on gestational age, weight and in agreement with clinical  observations.    Premature Infant recommended reference ranges:  Up to 24 hours.............<8.0 mg/dL  Up to 48 hours............<12.0 mg/dL  3-5 days..................<15.0 mg/dL  6-29 days.................<15.0 mg/dL       Alkaline Phosphatase   Date Value Ref Range Status   06/18/2024 36 (L) 55 - 135 U/L Final     AST   Date Value Ref Range Status   06/18/2024 17 10 - 40 U/L Final     ALT   Date Value Ref Range Status   06/18/2024 18 10 - 44 U/L Final     Anion Gap   Date Value Ref Range Status   06/18/2024 7 (L) 8 - 16 mmol/L Final     eGFR   Date Value Ref Range Status    06/18/2024 >60.0 >60 mL/min/1.73 m^2 Final     MTHFR Comment   Comment: Result:  c.665C>T (p. [id number]), legacy name:  [id number] - Not Detected c.1286A>C (p.  [id number]), legacy name: [id number] - Not  Detected Interpretation:     Phosphatidylserine Ab IgA 0 - 30 Units <10   Phosphatidylserine Ab ( IgM) 0 - 30 Units 9      Phosphatidylserine Ab (IgG) 0 - 19 APS Units <1     Anticardiolipin IgG 0 - 14 GPL U/mL <9   Comment: Negative:              <15     Anticardiolipin IgA 0 - 11 APL U/mL <9   Comment: Negative:              <12     Anticardiolipin IgM 0 - 12 MPL U/mL <9     Homocysteine 0.0 - 21.3 umol/L 10.3     Prothrombin Mutation Comment   Comment: Result: c.*97G>A - Not Detected     Factor V Leiden Comment   Comment: Result: c.1601G>A (p.[id number]) - Not Detected           Radiology/Diagnostic Studies:    Nuclear Stress - Cardiology Interpreted    Result Date: 7/10/2024    Abnormal myocardial perfusion scan.   There is a mild to moderate intensity, medium sized, mostly fixed perfusion abnormality with some reversibilty in the lateral, apical and septal apical wall(s).   There are no other significant perfusion abnormalities.   The gated perfusion images showed an ejection fraction of 61% at rest. The gated perfusion images showed an ejection fraction of 54% post stress. Normal ejection fraction is greater than 53%.   There is normal wall motion at rest and post stress.   LV cavity size is normal at rest and normal at stress.   The ECG portion of the study is negative for ischemia.   The patient reported chest pain during the stress test.   There were no arrhythmias during stress.     CT Abdomen Pelvis W Wo Contrast    Result Date: 6/20/2024  CMS Mandated Quality Data- CT Radiation- 436 All CT scans at this facility utilize dose modulation, iterative reconstruction, and/or weight based dosing when appropriate to reduce radiation dose to as low as reasonably achievable. EXAMINATION: CT ABDOMEN PELVIS W WO CONTRAST  CLINICAL HISTORY: rule out cancer with new onset clots; Acute embolism and thrombosis of right popliteal vein TECHNIQUE: CT abdomen and pelvis without and with intravenous contrast.  100 mL Omnipaque 350. COMPARISON: Abdominal ultrasound 12/07/2021. FINDINGS: There is subsegmental atelectasis of the lung bases.  Heart size is normal. There are small lobulated hypoattenuating and nonenhancing lesions in the left and right hepatic lobes in the region of the intrahepatic fissure measuring 1.9 cm each, and felt to reflect small hepatic cysts.  0.5 cm hypoattenuating structure in the inferior right hepatic lobe also noted, likely reflecting a small hepatic cyst.  No enhancing hepatic lesions identified.  The gallbladder and common bile duct within normal limits.  The pancreas, spleen and adrenal glands are unremarkable. The kidneys are normal size.  There is no hydronephrosis or nephrolithiasis.  There are no enhancing renal lesions.  Ureters are normal caliber without obstructions.  Evaluation of the pelvic viscera is limited due to beam hardening artifact from bilateral hip arthroplasty hardware.  Visualized portions of the bladder are unremarkable. There is mild diverticulosis of the colon.  There is oral contrast in the large and small bowel.  The appendix is not identified.  There is no bowel wall thickening or inflammatory changes observed.  The stomach is decompressed and grossly unremarkable. The abdominal aorta is normal caliber with moderate calcified plaque formation.  There are no large intra-abdominal lymph nodes.  There is no mesenteric fat stranding or free fluid observed. There are degenerative changes of the spine.  No acute osseous abnormality observed.     1. No acute intra-abdominal abnormality observed. 2. Small right and left hepatic cyst observed. 3. Colonic diverticulosis. 4. Additional incidental observations as described. Electronically signed by: Milo Vogel Date:    06/20/2024  Time:    11:51        Doppler US  6/6/2024:     Impression:     Known pulmonary emboli on prior CTA chest.  Ultrasound demonstrates nonocclusive thrombus in the right popliteal vein.     CTA 6/6/2024:     Impression:     Known pulmonary emboli on prior CTA chest.  Ultrasound demonstrates nonocclusive thrombus in the right popliteal vein.       I have reviewed all available lab results and radiology reports.    Assessment/Plan:   (1) 79 y.o. female with diagnosis of RLE DVT and pulmonary emboli who has been referred by Yumiko Watson MD for evaluation by medical hematology/oncology. She had presented to the ED at [facility] at the bequest of her cardiologist on 6/6/2024 for SOB, JARA, right thigh pain and left sided CP over the past couple of months. CTA showed bilateral pulmonary emboli and doppler US showed a nonocclusive thrombus in the right popliteal vein.      6/14/2024:  - she is on eliquis  - no excessive bleeding or bruising  - + family hx/of clots  - order CT abdom/pelvis and clot work-up    7/15/2024:  - continued on eliquis  - to see cardiology again next week  - f/u with Dr Melgar's group - she needs repeat EGD and also needs colonsocopy  - order repeat CTA of chest        (2) HTN and hypercholesterolemia     (3) Hx/of an umbilical cot after having hysterectomy in the 90's     (4) GERD and diverticulitis; hx/of Hep C positive antibody; hx/of colon polyps     (5) CHF and chronic venous insuffiencey in legs Rght > Left - chronic diastolic HF     (6) Hypothyroidism; thyroid nodules     (7) Migraines, peripheral neuropathy     (8) DDD; bilateral knee issues, OA; shoulder bursa issues; s/p right knee arthroscope in Apr 2012  - rght knee x2 with last one in 2018-19     (9) Depression     (10) Former smoker (quit 2012)               VISIT DIAGNOSES:      Positive ANEUDY (antinuclear antibody)    Pulmonary embolism, unspecified chronicity, unspecified pulmonary embolism type, unspecified whether acute cor pulmonale  present    Acute deep vein thrombosis (DVT) of popliteal vein of right lower extremity          PLAN:  Order repeat CTA to rule any progression of her pulm emboli  Continue eliquis oral anticoagulation  F/iu with cardiology and PCP  F/u with GI for EGd and colonoscopy  Check on LA results  Consider pulmonary evaluation - Dr Boogie       RTC in  4 weeks   Fax note to Yumiko Watson MD; IFRAH Badillo [place]; Ramón , Pawel Badillo III, MD,      Discussion:     Anticoagulation Discussion:     Discussed with patient and any applicable family members about the benefit and/or need for anticoagulation. Communicated about the risks of bleeding while on any anticoagulation, which could be serious and/or life-threatening, and which can occur at any time, regardless of degree of the level of anticoagulation. Expressed the need for compliance with any anticoagulation regimen and that failure to do so could potential lead to excessive bleeding, and risk to health and/or life. In particular, with patients on coumadin therapy, compliance with requested blood work is absolutely essential, as coumadin levels can vary from time to time, and failure to do so could potentially place the patient at risk for bleeding and/or clotting events which could be fatal. Patients on coumadin are encouraged to call the day after they have their levels drawn, as to obtain the appropriate instructions from our staff. Patients are aware that self-regulating or self-dosing of their medications is strictly prohibited.         I reviewed results of the recently ordered labs, tests and studies; made directives with regards to the results. I have explained all of the above in detail and the patient understands all of the current recommendation(s). I have answered all of their questions to the best of my ability and to their complete satisfaction. The patient is to continue with the current management plan.            Electronically signed by  Rayray Green MD

## 2024-07-19 ENCOUNTER — TELEPHONE (OUTPATIENT)
Facility: CLINIC | Age: 79
End: 2024-07-19
Payer: MEDICARE

## 2024-07-19 NOTE — TELEPHONE ENCOUNTER
----- Message from Lucero Saenz sent at 7/19/2024 10:49 AM CDT -----  Pt is requesting for her blood work to be faxed to Dr. Sawant office. Fax# [phone]    Pt's CB# [phone]

## 2024-07-23 ENCOUNTER — OFFICE VISIT (OUTPATIENT)
Dept: CARDIOLOGY | Facility: CLINIC | Age: 79
End: 2024-07-23
Payer: MEDICARE

## 2024-07-23 VITALS
SYSTOLIC BLOOD PRESSURE: 145 MMHG | DIASTOLIC BLOOD PRESSURE: 74 MMHG | BODY MASS INDEX: 35.79 KG/M2 | WEIGHT: 241.63 LBS | HEART RATE: 83 BPM | HEIGHT: 69 IN | OXYGEN SATURATION: 69 %

## 2024-07-23 DIAGNOSIS — I82.431 ACUTE DEEP VEIN THROMBOSIS (DVT) OF POPLITEAL VEIN OF RIGHT LOWER EXTREMITY: ICD-10-CM

## 2024-07-23 DIAGNOSIS — Z99.89 WALKER AS AMBULATION AID: ICD-10-CM

## 2024-07-23 DIAGNOSIS — R06.02 SHORTNESS OF BREATH: ICD-10-CM

## 2024-07-23 DIAGNOSIS — E66.01 SEVERE OBESITY (BMI 35.0-39.9) WITH COMORBIDITY: ICD-10-CM

## 2024-07-23 DIAGNOSIS — R76.8 POSITIVE ANA (ANTINUCLEAR ANTIBODY): ICD-10-CM

## 2024-07-23 DIAGNOSIS — G89.29 OTHER CHRONIC PAIN: ICD-10-CM

## 2024-07-23 DIAGNOSIS — I26.99 PULMONARY EMBOLISM, UNSPECIFIED CHRONICITY, UNSPECIFIED PULMONARY EMBOLISM TYPE, UNSPECIFIED WHETHER ACUTE COR PULMONALE PRESENT: Primary | ICD-10-CM

## 2024-07-23 DIAGNOSIS — K21.9 GASTROESOPHAGEAL REFLUX DISEASE, UNSPECIFIED WHETHER ESOPHAGITIS PRESENT: ICD-10-CM

## 2024-07-23 DIAGNOSIS — R94.39 ABNORMAL STRESS TEST: ICD-10-CM

## 2024-07-23 DIAGNOSIS — I10 HYPERTENSION, ESSENTIAL: ICD-10-CM

## 2024-07-23 DIAGNOSIS — R91.1 PULMONARY NODULE: ICD-10-CM

## 2024-07-23 PROCEDURE — 99999 PR PBB SHADOW E&M-EST. PATIENT-LVL V: CPT | Mod: PBBFAC,HCNC,, | Performed by: NURSE PRACTITIONER

## 2024-07-23 PROCEDURE — 1101F PT FALLS ASSESS-DOCD LE1/YR: CPT | Mod: HCNC,CPTII,S$GLB, | Performed by: NURSE PRACTITIONER

## 2024-07-23 PROCEDURE — 3077F SYST BP >= 140 MM HG: CPT | Mod: HCNC,CPTII,S$GLB, | Performed by: NURSE PRACTITIONER

## 2024-07-23 PROCEDURE — 1157F ADVNC CARE PLAN IN RCRD: CPT | Mod: HCNC,CPTII,S$GLB, | Performed by: NURSE PRACTITIONER

## 2024-07-23 PROCEDURE — 1125F AMNT PAIN NOTED PAIN PRSNT: CPT | Mod: HCNC,CPTII,S$GLB, | Performed by: NURSE PRACTITIONER

## 2024-07-23 PROCEDURE — 3078F DIAST BP <80 MM HG: CPT | Mod: HCNC,CPTII,S$GLB, | Performed by: NURSE PRACTITIONER

## 2024-07-23 PROCEDURE — 3288F FALL RISK ASSESSMENT DOCD: CPT | Mod: HCNC,CPTII,S$GLB, | Performed by: NURSE PRACTITIONER

## 2024-07-23 PROCEDURE — 99215 OFFICE O/P EST HI 40 MIN: CPT | Mod: HCNC,S$GLB,, | Performed by: NURSE PRACTITIONER

## 2024-07-23 NOTE — ASSESSMENT & PLAN NOTE
Nodule noted on CTA chest.    She has consultation appointment with Dr. Boogie pulmonologist tomorrow

## 2024-07-23 NOTE — PROGRESS NOTES
" Subjective:    Patient ID:  Marisela Eagle is a 79 y.o. female patient here for evaluation Results    History of Present Illness:    Patient here for follow-up.  She was recently diagnosed in  with bilateral PE and right lower extremity popliteal DVT  She denies recent surgeries, prolonged bed rest or immobility, no known cancer, does not smoke, no hormone medications  She was directed to the emergency room and hospitalized and sent home with Eliquis therapy.    Reportedly compliant Eliquis w/o bleeding complications, mild bruising to extremities  She has seen Dr. Green for Hematology workup, has another follow up next month scheduled  She had prior blood clot in distant past.  She also has a brother, living, 78 years old who has a history of blood clots as well  She did have a repeat CTA of chest on 07/15/2024 which showed resolution of PE  Breathing has improved overall but she does still get short of breath;   She is scheduled to see Dr. Boogie, pulmonology for the 1st time tomorrow  On review of her stress test, some reversibility was noted.  I have asked Dr. Canchola to review the images  Pt denies chest pain however she has daily left shoulder pain which radiates down the left arm; endorses history of OA to the left shoulder with prior steroid injections being given at both shoulders      **Hospital stay as below:**  Admission Date: 2024  Discharge Date and Time: 2024 12:30 PM    HPI:   79-year-old female presented to ED for eval. Patient sent to ED by her cardiologist 2/2 abn CTA chest with pulmonary emboli. Patient reported she has been having shortness of breath and dyspnea on exertion over the last several months, with associated L sided chest tightness, palpitations, and R thigh pain. She also reports BLE edema for the last 2-3 years, reports diagnosed with PVD and neuropathy. Patient does have remote history of blood clot, states it was "in her abdomen" about 50 years ago s/p , " denies taking blood thinners. Denies hx AF. Denies hormone replacement therapy. CTA chest impression with bilateral pulmonary emboli, worse in the right main pulmonary artery, no right heart strain. RLE venous US impression with nonocclusive popliteal thrombus. Admit to hospital medicine for further eval.        Hospital Course:   Marisela Eagle is an 79 year old female with a past medical history of obesity, HTN, HLD, GERD, and hypothyroidism who presented with an unprovoked bilateral PE and R popliteal DVT. She is on Lovenox and stable on room air. There is no right heart strain. A home O2 evaluation was ordered and the patient did not qualify.  She was referred to Hematology on discharge for hypercoagulable workup.  She was discharged on Eliquis.  Three-month supply given on discharge but informed that duration of anticoagulant to be determined by PCP/Hematology.  Patient expressed understanding.  She was counseled avoiding falls while on Eliquis.  She will follow up with PCP and Hematology.  She expressed understanding of discharge plan.           Most Recent Echocardiogram Results  Results for orders placed during the hospital encounter of 09/08/23    Echo    Interpretation Summary    Left Ventricle: The left ventricle is normal in size. Normal wall thickness. Normal wall motion. There is normal systolic function with a visually estimated ejection fraction of 60 - 65%. There is normal diastolic function.    Left Atrium: Left atrium is mildly dilated.    Right Ventricle: Mild right ventricular enlargement. Wall thickness is normal. Right ventricle wall motion  is normal. Systolic function is normal.    Mitral Valve: There is mild mitral annular calcification present.    IVC/SVC: Normal venous pressure at 3 mmHg.      Most Recent Nuclear Stress Test Results  Results for orders placed during the hospital encounter of 07/08/24    Nuclear Stress - Cardiology Interpreted    Interpretation Summary    Abnormal  myocardial perfusion scan.    There is a mild to moderate intensity, medium sized, mostly fixed perfusion abnormality with some reversibilty in the lateral, apical and septal apical wall(s).    There are no other significant perfusion abnormalities.    The gated perfusion images showed an ejection fraction of 61% at rest. The gated perfusion images showed an ejection fraction of 54% post stress. Normal ejection fraction is greater than 53%.    There is normal wall motion at rest and post stress.    LV cavity size is normal at rest and normal at stress.    The ECG portion of the study is negative for ischemia.    The patient reported chest pain during the stress test.    There were no arrhythmias during stress.      Most Recent Cardiac PET Stress Test Results  No results found for this or any previous visit.      Most Recent Cardiovascular Angiogram results  No results found for this or any previous visit.      Other Most Recent Cardiology Results  Results for orders placed during the hospital encounter of 06/06/24    Cardiac monitoring strips      REVIEW OF SYSTEMS: As noted in HPI       Past Medical History:   Diagnosis Date    Bilateral knee pain July 2012    left worse, right knee painful even after partial replacement.    Bruises easily     Clot 1990's?    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 Took Coumadin x 6 weeks    KNEE ARTHROSCOPY  2010    right    TONSILLECTOMY       Social History     Tobacco Use    Smoking status: Former     Current packs/day: 0.00     Types: Cigarettes     Quit date: 3/23/2012     Years since quittin.3    Smokeless tobacco: Never   Substance Use Topics    Alcohol use: Yes     Comment: occasional    Drug use: No         Objective      Vitals:    24 1510   BP: (!) 145/74   Pulse: 83       LAST EKG  Results for orders placed or performed during the hospital encounter of 24   EKG 12-lead    Collection Time: 24  6:17 PM   Result Value Ref Range    QRS Duration 104 ms    OHS QTC Calculation 469 ms    Narrative    Test Reason : I26.99,    Vent. Rate : 078 BPM     Atrial Rate : 078 BPM     P-R Int : 200 ms          QRS Dur : 104 ms      QT Int : 412 ms       P-R-T Axes : 071 -49 081 degrees     QTc Int : 469 ms    Normal sinus rhythm  Left axis deviation  Abnormal ECG  When compared with ECG of 2024 15:48,  No significant change was found  Confirmed by Skip Patton MD ([id number]) on 2024 1:22:51 PM    Referred By: [email or web address]   SELF           Confirmed By:Skip Patton MD     LIPIDS - LAST 2   Lab Results   Component Value Date    CHOL 222 (H) 10/30/2023    CHOL 241 (H) 01/10/2023    HDL 72 10/30/2023    HDL 77 (H) 01/10/2023    LDLCALC 129.6 10/30/2023    LDLCALC 147.8 01/10/2023    TRIG 102 10/30/2023    TRIG 81 01/10/2023    CHOLHDL 32.4 10/30/2023    CHOLHDL 32.0 01/10/2023     CARDIAC PROFILE - LAST 2  Lab Results   Component Value Date     (H) 2024     (H) 2024     08/10/2022     (H) 2022    CPKMB 6.9 (H) 2022    CPKMB 1.9 2019    TROPONINI <0.030 2022    TROPONINI 0.038 2021      CBC - LAST 2  Lab Results    Component Value Date    WBC 7.02 06/18/2024    WBC 5.44 06/08/2024    RBC 4.60 06/18/2024    RBC 4.39 06/08/2024    HGB 12.6 06/18/2024    HGB 12.0 06/08/2024    HCT 40.0 06/18/2024    HCT 37.2 06/08/2024     06/18/2024     06/08/2024     Lab Results   Component Value Date    LABPT 13.8 09/17/2019    INR 0.9 06/06/2024    INR 1.1 09/17/2019    APTT 25.6 06/06/2024    APTT 23.0 (L) 09/17/2019     CHEMISTRY - LAST 2  Lab Results   Component Value Date     (L) 06/18/2024     06/08/2024    K 4.2 06/18/2024    K 3.6 06/08/2024     06/18/2024     06/08/2024    CO2 28 06/18/2024    CO2 30 (H) 06/08/2024    ANIONGAP 7 (L) 06/18/2024    ANIONGAP 5 (L) 06/08/2024    BUN 10 06/18/2024    BUN 11 06/08/2024    CREATININE 0.7 06/18/2024    CREATININE 0.6 06/08/2024    GLU 99 06/18/2024    GLU 97 06/08/2024    CALCIUM 9.4 06/18/2024    CALCIUM 8.8 06/08/2024    MG 2.0 06/08/2024    MG 2.1 06/07/2024    ALBUMIN 4.3 06/18/2024    ALBUMIN 3.8 06/08/2024    PROT 7.0 06/18/2024    PROT 6.1 06/08/2024    ALKPHOS 36 (L) 06/18/2024    ALKPHOS 33 (L) 06/08/2024    ALT 18 06/18/2024    ALT 14 06/08/2024    AST 17 06/18/2024    AST 14 06/08/2024    BILITOT 0.4 06/18/2024    BILITOT 0.4 06/08/2024      ENDOCRINE - LAST 2  Lab Results   Component Value Date    HGBA1C 5.3 06/23/2023    HGBA1C 5.2 02/24/2021    TSH 1.253 12/28/2023    TSH 1.253 12/28/2023        PHYSICAL EXAM  CONSTITUTIONAL:  Pleasant elderly female breathing comfortably in no apparent distress  NECK: no carotid bruit, no JVD  LUNGS: CTA  CHEST WALL: no tenderness  HEART: regular rate and rhythm, S1, S2 normal, no murmur  ABDOMEN: soft, non-tender; bowel sounds normal; no masses  EXTREMITIES:  Discoloration to toes, bruising spots to upper extremities no calf tenderness noted  NEURO: AAO X 3, speech clear, memory clear    I HAVE REVIEWED :    The vital signs, most recent cardiac testing, and most recent pertinent non-cardiology provider  notes.    Current Outpatient Medications   Medication Instructions    alendronate (FOSAMAX) 70 MG tablet TAKE 1 TABLET BY MOUTH WEEKLY    amitriptyline (ELAVIL) 10 MG tablet TAKE 1 TABLET EVERY NIGHT AS NEEDED FOR PAIN    apixaban (ELIQUIS) 5 mg Tab Take 2 tablets (10 mg total) by mouth 2 (two) times daily for 7 days, THEN 1 tablet (5 mg total) 2 (two) times daily.    artificial tears,hypromellose,,GENTEAL/SUSTANE, (SYSTANE GEL) 0.3 % Gel 1 drop, As needed (PRN)    cholecalciferol (vitamin D3) 5,000 Units, Oral, Daily    cyanocobalamin (VITAMIN B-12) 5,000 mcg, Oral, Daily    DULoxetine (CYMBALTA) 60 mg, Oral, Daily    famotidine (PEPCID) 20 mg, Oral, Nightly    fluconazole (DIFLUCAN) 100 mg, Oral, Daily    furosemide (LASIX) 40 mg, Oral, Every Tues, Fri, Take daily    gabapentin (NEURONTIN) 800 mg, Oral, 3 times daily    levothyroxine (SYNTHROID) 125 MCG tablet TAKE 1 TABLET EVERY DAY BEFORE BREAKFAST    LORazepam (ATIVAN) 1 mg, Oral, Nightly    losartan (COZAAR) 50 mg, Oral, 2 times daily    magnesium 30 mg Tab 1 tablet, Oral, Daily    metoprolol succinate (TOPROL-XL) 25 mg, Oral, Nightly    oxyCODONE-acetaminophen (PERCOCET)  mg per tablet 1 tablet, Oral, Daily PRN    pantoprazole (PROTONIX) 40 mg, Oral, Every morning    potassium chloride SA (K-DUR,KLOR-CON) 20 MEQ tablet 20 mEq, Oral, Daily    spironolactone (ALDACTONE) 25 mg, Oral    turmeric 400 mg, Oral, Daily    vit C/E/Zn/coppr/lutein/zeaxan (PRESERVISION AREDS-2 ORAL) 1 capsule, Oral, Daily      Assessment & Plan     Acute deep vein thrombosis (DVT) of popliteal vein of right lower extremity  Continue Eliquis as directed  Continue follow up with Hematology as scheduled    Pulmonary embolism  Patient had bilateral pulmonary emboli, she was started on Eliquis therapy.  She had follow up CT scan of chest on 07/15/2024 which showed resolution of Pes    She was breathing more comfortably  She has consultation appointment with pulmonology  tomorrow    Severe obesity (BMI 35.0-39.9) with comorbidity  BMI 36.2    Hypertension, essential  BP is elevated at 145/74 however patient's pain level is 6/10  Continue Cozaar 50 mg b.i.d., Toprol-XL 25 mg q.p.m., spironolactone 25 mg daily  Adhere to a low-sodium diet    Other chronic pain  She is on Percocet for this    Pulmonary nodule  Nodule noted on CTA chest.    She has consultation appointment with Dr. Boogie pulmonologist tomorrow    Abnormal stress test  Patient had area of reversibility on stress test  Discussed with Dr. Canchola and he agreed to review images  Ultimately advised cardiac catheterization   Will reach out to Dr. Green to see if she can come off Eliquis for this    Discussed with patient the risks and benefits of the procedure including, but not limited to, the following 1:1000 risk of Heart attack, stroke and death with 3-5% Risk of bleeding, vessel damage, and the need for emergent CABG Surgery. All questions have been answered to patient's satisfaction.      Shortness of breath  Patient still c/o shortness breath though overall dyspnea is improved  Patient was diagnosed with bilateral PEs in June.  Follow up CT scan showed resolution of the PEs.  Patient has appointment w/ Dr. Boogie tomorrow pulmonology evaluation  Patient had abnormal stress test, she has atypical left shoulder radiating down left arm pain so we will obtain cardiac catheterization given stress test findings.      Addendum:  I contacted Dr Dustin Lynne who is covering in Dr Green's absence.   He stated OK to do the angiogram and hold Eliquis per usual prior to procedure and bridge patient with Lovenox.             BLUE Albert

## 2024-07-23 NOTE — ASSESSMENT & PLAN NOTE
BP is elevated at 145/74 however patient's pain level is 6/10  Continue Cozaar 50 mg b.i.d., Toprol-XL 25 mg q.p.m., spironolactone 25 mg daily  Adhere to a low-sodium diet

## 2024-07-23 NOTE — ASSESSMENT & PLAN NOTE
Patient had area of reversibility on stress test  Discussed with Dr. Canchola and he agreed to review images  Ultimately advised cardiac catheterization   Will reach out to Dr. Green to see if she can come off Eliquis for this    Discussed with patient the risks and benefits of the procedure including, but not limited to, the following 1:1000 risk of Heart attack, stroke and death with 3-5% Risk of bleeding, vessel damage, and the need for emergent CABG Surgery. All questions have been answered to patient's satisfaction.

## 2024-07-23 NOTE — ASSESSMENT & PLAN NOTE
Patient had bilateral pulmonary emboli, she was started on Eliquis therapy.  She had follow up CT scan of chest on 07/15/2024 which showed resolution of Pes    She was breathing more comfortably  She has consultation appointment with pulmonology tomorrow

## 2024-07-24 ENCOUNTER — LAB VISIT (OUTPATIENT)
Dept: LAB | Facility: HOSPITAL | Age: 79
End: 2024-07-24
Attending: PHYSICIAN ASSISTANT
Payer: MEDICARE

## 2024-07-24 ENCOUNTER — OFFICE VISIT (OUTPATIENT)
Dept: PULMONOLOGY | Facility: CLINIC | Age: 79
End: 2024-07-24
Payer: MEDICARE

## 2024-07-24 ENCOUNTER — PATIENT MESSAGE (OUTPATIENT)
Dept: CARDIOLOGY | Facility: CLINIC | Age: 79
End: 2024-07-24
Payer: MEDICARE

## 2024-07-24 VITALS
OXYGEN SATURATION: 95 % | BODY MASS INDEX: 35.7 KG/M2 | WEIGHT: 241 LBS | DIASTOLIC BLOOD PRESSURE: 70 MMHG | HEART RATE: 67 BPM | SYSTOLIC BLOOD PRESSURE: 110 MMHG | HEIGHT: 69 IN

## 2024-07-24 DIAGNOSIS — I26.99 PULMONARY EMBOLI: ICD-10-CM

## 2024-07-24 DIAGNOSIS — E03.9 HYPOTHYROIDISM, UNSPECIFIED TYPE: ICD-10-CM

## 2024-07-24 DIAGNOSIS — I26.99 PULMONARY EMBOLISM, UNSPECIFIED CHRONICITY, UNSPECIFIED PULMONARY EMBOLISM TYPE, UNSPECIFIED WHETHER ACUTE COR PULMONALE PRESENT: ICD-10-CM

## 2024-07-24 DIAGNOSIS — R91.1 SOLITARY PULMONARY NODULE: Primary | ICD-10-CM

## 2024-07-24 DIAGNOSIS — R73.9 HYPERGLYCEMIA: ICD-10-CM

## 2024-07-24 DIAGNOSIS — E21.3 HYPERPARATHYROIDISM: ICD-10-CM

## 2024-07-24 DIAGNOSIS — I82.431 ACUTE DEEP VEIN THROMBOSIS (DVT) OF POPLITEAL VEIN OF RIGHT LOWER EXTREMITY: ICD-10-CM

## 2024-07-24 DIAGNOSIS — E55.9 HYPOVITAMINOSIS D: ICD-10-CM

## 2024-07-24 DIAGNOSIS — I26.99 OTHER ACUTE PULMONARY EMBOLISM WITHOUT ACUTE COR PULMONALE: ICD-10-CM

## 2024-07-24 DIAGNOSIS — E66.9 OBESITY (BMI 30-39.9): ICD-10-CM

## 2024-07-24 DIAGNOSIS — R06.02 SOB (SHORTNESS OF BREATH): ICD-10-CM

## 2024-07-24 LAB
25(OH)D3+25(OH)D2 SERPL-MCNC: 24 NG/ML (ref 30–96)
ALBUMIN SERPL BCP-MCNC: 4 G/DL (ref 3.5–5.2)
ALP SERPL-CCNC: 38 U/L (ref 55–135)
ALT SERPL W/O P-5'-P-CCNC: 18 U/L (ref 10–44)
ANION GAP SERPL CALC-SCNC: 9 MMOL/L (ref 8–16)
AST SERPL-CCNC: 19 U/L (ref 10–40)
BILIRUB SERPL-MCNC: 0.3 MG/DL (ref 0.1–1)
BUN SERPL-MCNC: 16 MG/DL (ref 8–23)
CALCIUM SERPL-MCNC: 9.1 MG/DL (ref 8.7–10.5)
CHLORIDE SERPL-SCNC: 103 MMOL/L (ref 95–110)
CO2 SERPL-SCNC: 22 MMOL/L (ref 23–29)
CREAT SERPL-MCNC: 0.8 MG/DL (ref 0.5–1.4)
EST. GFR  (NO RACE VARIABLE): >60 ML/MIN/1.73 M^2
ESTIMATED AVG GLUCOSE: 108 MG/DL (ref 68–131)
GLUCOSE SERPL-MCNC: 71 MG/DL (ref 70–110)
HBA1C MFR BLD: 5.4 % (ref 4–5.6)
POTASSIUM SERPL-SCNC: 4.4 MMOL/L (ref 3.5–5.1)
PROT SERPL-MCNC: 6.9 G/DL (ref 6–8.4)
PTH-INTACT SERPL-MCNC: 81.1 PG/ML (ref 9–77)
SODIUM SERPL-SCNC: 134 MMOL/L (ref 136–145)
T4 FREE SERPL-MCNC: 1.1 NG/DL (ref 0.71–1.51)
TSH SERPL DL<=0.005 MIU/L-ACNC: 0.95 UIU/ML (ref 0.4–4)

## 2024-07-24 PROCEDURE — 84443 ASSAY THYROID STIM HORMONE: CPT | Mod: HCNC | Performed by: PHYSICIAN ASSISTANT

## 2024-07-24 PROCEDURE — 82306 VITAMIN D 25 HYDROXY: CPT | Mod: HCNC | Performed by: PHYSICIAN ASSISTANT

## 2024-07-24 PROCEDURE — 83036 HEMOGLOBIN GLYCOSYLATED A1C: CPT | Mod: HCNC | Performed by: PHYSICIAN ASSISTANT

## 2024-07-24 PROCEDURE — 85305 CLOT INHIBIT PROT S TOTAL: CPT | Mod: HCNC | Performed by: INTERNAL MEDICINE

## 2024-07-24 PROCEDURE — 83970 ASSAY OF PARATHORMONE: CPT | Mod: HCNC | Performed by: PHYSICIAN ASSISTANT

## 2024-07-24 PROCEDURE — 99999 PR PBB SHADOW E&M-EST. PATIENT-LVL IV: CPT | Mod: PBBFAC,HCNC,, | Performed by: INTERNAL MEDICINE

## 2024-07-24 PROCEDURE — 80053 COMPREHEN METABOLIC PANEL: CPT | Mod: HCNC | Performed by: PHYSICIAN ASSISTANT

## 2024-07-24 PROCEDURE — 84439 ASSAY OF FREE THYROXINE: CPT | Mod: HCNC | Performed by: PHYSICIAN ASSISTANT

## 2024-07-24 RX ORDER — SODIUM CHLORIDE 0.9 % (FLUSH) 0.9 %
2 SYRINGE (ML) INJECTION
Status: DISCONTINUED | OUTPATIENT
Start: 2024-07-24 | End: 2024-07-25 | Stop reason: ALTCHOICE

## 2024-07-24 RX ORDER — DIPHENHYDRAMINE HCL 50 MG
50 CAPSULE ORAL
OUTPATIENT
Start: 2024-07-24

## 2024-07-24 NOTE — ASSESSMENT & PLAN NOTE
Patient still c/o shortness breath though overall dyspnea is improved  Patient was diagnosed with bilateral PEs in June.  Follow up CT scan showed resolution of the PEs.  Patient has appointment w/ Dr. Boogie tomorrow pulmonology evaluation  Patient had abnormal stress test, she has atypical left shoulder radiating down left arm pain so we will obtain cardiac catheterization given stress test findings.

## 2024-07-24 NOTE — PROGRESS NOTES
SUBJECTIVE:    Patient ID: Marisela Eagle is a 79 y.o. female.    Chief Complaint: Establish Care    HPI The patinet is here with some shortness of breath. She had a PE and was still feeling short of breath and Dr. Green ordered another CT which showed resolution of the PE and a small 6mm nodule in the L lung base.She is an ex smoker having smoked for 50pkyrs having stopped 10 years ago.      She is on Eliquis for her PE for 3 months.  She states Dr. Calabresi drew 25 tubes of blood and she is waiting on those results.  Her PEs have resolved.    She gets short of breath when she walks.  She is going to have an angiogram .  Discussed the need for PFTs to evaluate whether not she has developed COPD with her 50 pack-year smoking history.  She would like to get through the angiogram and then reassess her dyspnea before doing this.  Past Medical History:   Diagnosis Date    Bilateral knee pain 2012    left worse, right knee painful even after partial replacement.    Bruises easily     Clot ?    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 Took Coumadin x 6 weeks    KNEE ARTHROSCOPY      right    TONSILLECTOMY        Family History   Problem Relation Name Age of Onset    Neuropathy Mother      Hypertension Mother      Stroke Mother      Neuropathy Father      Cancer Daughter      Diabetes Maternal Grandmother      Melanoma Neg Hx      Psoriasis Neg Hx      Lupus Neg Hx      Eczema Neg Hx          Social History:   Marital Status:   Occupation: Data Unavailable  Alcohol History:  reports current alcohol use.  Tobacco History:  reports that she quit smoking about 12 years ago. Her smoking use included cigarettes. She has never used smokeless tobacco.  Drug History:  reports no history of drug use.    Review of patient's allergies indicates:  No Known Allergies    Current Outpatient Medications   Medication Sig Dispense Refill    alendronate (FOSAMAX) 70 MG tablet TAKE 1 TABLET BY MOUTH WEEKLY 12 tablet 0    amitriptyline (ELAVIL) 10 MG tablet TAKE 1 TABLET EVERY NIGHT AS NEEDED FOR PAIN (Patient taking differently: Take 10 mg by mouth nightly as needed.) 90 tablet 1    apixaban (ELIQUIS) 5 mg Tab Take 2 tablets (10 mg total) by mouth 2 (two) times daily for 7 days, THEN 1 tablet (5 mg total) 2 (two) times daily. 194 tablet 0    artificial tears,hypromellose,,GENTEAL/SUSTANE, (SYSTANE GEL) 0.3 % Gel 1 drop as needed.      cholecalciferol, vitamin D3, 5,000 unit capsule Take 5,000 Units by mouth once daily.      cyanocobalamin (VITAMIN B-12) 1000 MCG tablet Take 5,000 mcg by mouth once daily.      DULoxetine (CYMBALTA) 60 MG capsule Take 1 capsule (60 mg total) by mouth once daily. 90 capsule 1    famotidine (PEPCID) 20 MG tablet TAKE 1 TABLET EVERY EVENING 90 tablet 1    fluconazole (DIFLUCAN) 100 MG tablet Take 1 tablet (100 mg total) by mouth once daily. 10 tablet 0    furosemide (LASIX) 40 MG tablet Take 1 tablet (40 mg total) by mouth every Tues, Fri. Take daily 90 tablet 1    gabapentin (NEURONTIN) 800 MG tablet TAKE 1 TABLET THREE TIMES DAILY (Patient taking differently: Take 800 mg by mouth 2 (two) times daily.)  270 tablet 3    levothyroxine (SYNTHROID) 125 MCG tablet TAKE 1 TABLET EVERY DAY BEFORE BREAKFAST (Patient taking differently: Take 125 mcg by mouth before breakfast. TAKE 1 TABLET EVERY DAY BEFORE BREAKFAST) 90 tablet 3    LORazepam (ATIVAN) 1 MG tablet Take 1 tablet (1 mg total) by mouth every evening. 30 tablet 0    losartan (COZAAR) 100 MG tablet TAKE 1/2 TABLET TWICE DAILY (Patient taking differently: Take 50 mg by mouth 2 (two) times daily. 50 Mg BID) 90 tablet 3    magnesium 30 mg Tab Take 1 tablet by mouth once daily.      metoprolol succinate (TOPROL-XL) 50 MG 24 hr tablet Take 0.5 tablets (25 mg total) by mouth every evening. 90 tablet 1    oxyCODONE-acetaminophen (PERCOCET)  mg per tablet Take 1 tablet by mouth daily as needed for Pain.      pantoprazole (PROTONIX) 40 MG tablet Take 1 tablet (40 mg total) by mouth every morning. 90 tablet 1    potassium chloride SA (K-DUR,KLOR-CON) 20 MEQ tablet Take 20 mEq by mouth once daily.      spironolactone (ALDACTONE) 25 MG tablet TAKE 1 TABLET ONE TIME DAILY (Patient taking differently: Take 25 mg by mouth once daily.) 90 tablet 1    turmeric 400 mg Cap Take 400 mg by mouth once daily.      vit C/E/Zn/coppr/lutein/zeaxan (PRESERVISION AREDS-2 ORAL) Take 1 capsule by mouth once daily.       Current Facility-Administered Medications   Medication Dose Route Frequency Provider Last Rate Last Admin    sodium chloride 0.9% flush 2 mL  2 mL Intravenous PRN Ruth Cedeño, NP           Alpha-1 Antitrypsin:  Last PFT:   Last CT:    Review of Systems  General: Feeling fatigued from her legs.  Eyes: Vision is good.  ENT:  No sinusitis or pharyngitis.   Heart:: had some L sided chest pain  Lungs:short of breath with exercise  GI: No Nausea, vomiting, constipation, diarrhea, or reflux.  : No dysuria, hesitancy, or nocturia.  Musculoskeletal: No joint pain or myalgias.  Skin: No lesions or rashes.  Neuro: Neuropathy in her legs  Lymph: Venous insufficiency.  Psych:  "Situational depression.  Endo: has gained weight    OBJECTIVE:      /70 (BP Location: Left arm, Patient Position: Sitting, BP Method: Medium (Manual))   Pulse 67   Ht 5' 8.5" (1.74 m)   Wt 109.3 kg (241 lb)   SpO2 95%   BMI 36.11 kg/m²     Physical Exam  GENERAL: Older patient in no distress.  HEENT: Pupils equal and reactive. Extraocular movements intact. Nose intact.      Pharynx moist.  NECK: Supple.   HEART: Regular rate and rhythm. No murmur or gallop auscultated.  LUNGS: Clear to auscultation and percussion. Lung excursion symmetrical. No change in fremitus. No adventitial noises.  ABDOMEN: Bowel sounds present. Non-tender, no masses palpated.  EXTREMITIES: Normal muscle tone and joint movement, no cyanosis or clubbing.   LYMPHATICS: No adenopathy palpated, no edema.  SKIN: Dry, intact, no lesions.   NEURO: Cranial nerves II-XII intact. Motor strength 5/5 bilaterally, upper and lower extremities.  PSYCH: Appropriate affect.    Assessment:       1. Solitary pulmonary nodule    2. Other acute pulmonary embolism without acute cor pulmonale    3. SOB (shortness of breath)        Will repeat her CT in 6 months to follow this 6 mm nodule in the base of the left lower lobe.  The patient will continue her Eliquis for 3 months unless she turns out to be hypercoagulable.  Dr. Green is following.  Her shortness of breath is being evaluated with an angiogram on the 1st.  If this is not the answer will proceed with PFTs.  Plan:       Solitary pulmonary nodule  -     CT Chest Without Contrast; Future; Expected date: 01/24/2025    Other acute pulmonary embolism without acute cor pulmonale    SOB (shortness of breath)         Follow up in about 1 month (around 8/24/2024).           "

## 2024-07-25 ENCOUNTER — TELEPHONE (OUTPATIENT)
Dept: CARDIOLOGY | Facility: CLINIC | Age: 79
End: 2024-07-25
Payer: MEDICARE

## 2024-07-25 RX ORDER — ENOXAPARIN SODIUM 100 MG/ML
1 INJECTION SUBCUTANEOUS EVERY 12 HOURS
Qty: 4 ML | Refills: 0 | Status: SHIPPED | OUTPATIENT
Start: 2024-07-26 | End: 2024-07-28

## 2024-07-25 RX ORDER — ENOXAPARIN SODIUM 100 MG/ML
1 INJECTION SUBCUTANEOUS EVERY 12 HOURS
Qty: 4.4 ML | Refills: 0 | Status: SHIPPED | OUTPATIENT
Start: 2024-07-26 | End: 2024-07-25 | Stop reason: SDUPTHER

## 2024-07-25 RX ORDER — ENOXAPARIN SODIUM 100 MG/ML
100 INJECTION SUBCUTANEOUS EVERY 12 HOURS
Qty: 4 ML | Refills: 0 | Status: CANCELLED | OUTPATIENT
Start: 2024-07-26

## 2024-07-25 NOTE — TELEPHONE ENCOUNTER
----- Message from Isidra Nelson sent at 7/25/2024  2:34 PM CDT -----  Contact: [facility]  Type:  Pharmacy Calling to Clarify an RX    Name of Caller: Leslie  Pharmacy Name:  [facility]  Prescription Name:  enoxaparin (LOVENOX) 100 mg/mL Syrg  What do they need to clarify?:  Stated with insurance not covering the meds how it was written on rx, stated she received the new one that is written exactly the same  Best Call Back Number:  [phone]  Additional Information:  Thank You

## 2024-07-25 NOTE — TELEPHONE ENCOUNTER
S/w [facility] pharmacy, they said they wouldn't cover the other quantity it would cost the pt $124

## 2024-07-29 ENCOUNTER — HOSPITAL ENCOUNTER (OUTPATIENT)
Facility: HOSPITAL | Age: 79
Discharge: HOME | End: 2024-07-29
Attending: INTERNAL MEDICINE | Admitting: INTERNAL MEDICINE
Payer: MEDICARE

## 2024-07-29 DIAGNOSIS — R94.39 ABNORMAL STRESS TEST: ICD-10-CM

## 2024-07-29 DIAGNOSIS — Z01.810 ENCOUNTER FOR PRE-OPERATIVE CARDIOVASCULAR CLEARANCE: ICD-10-CM

## 2024-07-29 DIAGNOSIS — R06.02 SOB (SHORTNESS OF BREATH): Primary | ICD-10-CM

## 2024-07-29 DIAGNOSIS — I25.10 CAD (CORONARY ARTERY DISEASE): ICD-10-CM

## 2024-07-29 LAB
BASOPHILS # BLD AUTO: 0.03 K/UL (ref 0–0.2)
BASOPHILS NFR BLD: 0.4 % (ref 0–1.9)
DIFFERENTIAL METHOD BLD: ABNORMAL
EOSINOPHIL # BLD AUTO: 0 K/UL (ref 0–0.5)
EOSINOPHIL NFR BLD: 0.6 % (ref 0–8)
ERYTHROCYTE [DISTWIDTH] IN BLOOD BY AUTOMATED COUNT: 16.1 % (ref 11.5–14.5)
HCT VFR BLD AUTO: 36.8 % (ref 37–48.5)
HGB BLD-MCNC: 11.7 G/DL (ref 12–16)
IMM GRANULOCYTES # BLD AUTO: 0.02 K/UL (ref 0–0.04)
IMM GRANULOCYTES NFR BLD AUTO: 0.3 % (ref 0–0.5)
LYMPHOCYTES # BLD AUTO: 2.2 K/UL (ref 1–4.8)
LYMPHOCYTES NFR BLD: 32.9 % (ref 18–48)
MCH RBC QN AUTO: 27.5 PG (ref 27–31)
MCHC RBC AUTO-ENTMCNC: 31.8 G/DL (ref 32–36)
MCV RBC AUTO: 86 FL (ref 82–98)
MONOCYTES # BLD AUTO: 0.8 K/UL (ref 0.3–1)
MONOCYTES NFR BLD: 12.3 % (ref 4–15)
NEUTROPHILS # BLD AUTO: 3.6 K/UL (ref 1.8–7.7)
NEUTROPHILS NFR BLD: 53.5 % (ref 38–73)
NRBC BLD-RTO: 0 /100 WBC
PLATELET # BLD AUTO: 248 K/UL (ref 150–450)
PMV BLD AUTO: 11.8 FL (ref 9.2–12.9)
PROT S AG ACT/NOR PPP IA: 87 % (ref 50–140)
RBC # BLD AUTO: 4.26 M/UL (ref 4–5.4)
WBC # BLD AUTO: 6.68 K/UL (ref 3.9–12.7)

## 2024-07-29 PROCEDURE — 99152 MOD SED SAME PHYS/QHP 5/>YRS: CPT | Performed by: INTERNAL MEDICINE

## 2024-07-29 PROCEDURE — 25000003 PHARM REV CODE 250: Performed by: INTERNAL MEDICINE

## 2024-07-29 PROCEDURE — 63600175 PHARM REV CODE 636 W HCPCS: Mod: JZ,JG | Performed by: INTERNAL MEDICINE

## 2024-07-29 PROCEDURE — 99152 MOD SED SAME PHYS/QHP 5/>YRS: CPT | Mod: ,,, | Performed by: INTERNAL MEDICINE

## 2024-07-29 PROCEDURE — 93458 L HRT ARTERY/VENTRICLE ANGIO: CPT | Mod: 26,,, | Performed by: INTERNAL MEDICINE

## 2024-07-29 PROCEDURE — 25000003 PHARM REV CODE 250

## 2024-07-29 PROCEDURE — 85025 COMPLETE CBC W/AUTO DIFF WBC: CPT | Performed by: NURSE PRACTITIONER

## 2024-07-29 PROCEDURE — C1894 INTRO/SHEATH, NON-LASER: HCPCS | Performed by: INTERNAL MEDICINE

## 2024-07-29 PROCEDURE — 93005 ELECTROCARDIOGRAM TRACING: CPT | Performed by: GENERAL PRACTICE

## 2024-07-29 PROCEDURE — 93458 L HRT ARTERY/VENTRICLE ANGIO: CPT | Performed by: INTERNAL MEDICINE

## 2024-07-29 PROCEDURE — 93010 ELECTROCARDIOGRAM REPORT: CPT | Mod: ,,, | Performed by: GENERAL PRACTICE

## 2024-07-29 PROCEDURE — C1887 CATHETER, GUIDING: HCPCS | Performed by: INTERNAL MEDICINE

## 2024-07-29 PROCEDURE — C1769 GUIDE WIRE: HCPCS | Performed by: INTERNAL MEDICINE

## 2024-07-29 PROCEDURE — 25500020 PHARM REV CODE 255: Performed by: INTERNAL MEDICINE

## 2024-07-29 RX ORDER — NITROGLYCERIN 5 MG/ML
INJECTION, SOLUTION INTRAVENOUS
Status: DISCONTINUED | OUTPATIENT
Start: 2024-07-29 | End: 2024-07-29 | Stop reason: HOSPADM

## 2024-07-29 RX ORDER — DIPHENHYDRAMINE HCL 25 MG
CAPSULE ORAL
Status: COMPLETED
Start: 2024-07-29 | End: 2024-07-29

## 2024-07-29 RX ORDER — SODIUM CHLORIDE 450 MG/100ML
INJECTION, SOLUTION INTRAVENOUS CONTINUOUS
Status: DISCONTINUED | OUTPATIENT
Start: 2024-07-29 | End: 2024-07-29 | Stop reason: HOSPADM

## 2024-07-29 RX ORDER — DIPHENHYDRAMINE HCL 25 MG
50 CAPSULE ORAL
Status: DISCONTINUED | OUTPATIENT
Start: 2024-07-29 | End: 2024-07-29 | Stop reason: HOSPADM

## 2024-07-29 RX ORDER — HEPARIN SODIUM 10000 [USP'U]/ML
INJECTION, SOLUTION INTRAVENOUS; SUBCUTANEOUS
Status: DISCONTINUED | OUTPATIENT
Start: 2024-07-29 | End: 2024-07-29 | Stop reason: HOSPADM

## 2024-07-29 RX ORDER — ONDANSETRON 4 MG/1
8 TABLET, ORALLY DISINTEGRATING ORAL EVERY 8 HOURS PRN
Status: DISCONTINUED | OUTPATIENT
Start: 2024-07-29 | End: 2024-07-29 | Stop reason: HOSPADM

## 2024-07-29 RX ORDER — LIDOCAINE HYDROCHLORIDE 10 MG/ML
INJECTION, SOLUTION EPIDURAL; INFILTRATION; INTRACAUDAL; PERINEURAL
Status: DISCONTINUED | OUTPATIENT
Start: 2024-07-29 | End: 2024-07-29 | Stop reason: HOSPADM

## 2024-07-29 RX ORDER — FENTANYL CITRATE 50 UG/ML
INJECTION, SOLUTION INTRAMUSCULAR; INTRAVENOUS
Status: DISCONTINUED | OUTPATIENT
Start: 2024-07-29 | End: 2024-07-29 | Stop reason: HOSPADM

## 2024-07-29 RX ORDER — MIDAZOLAM HYDROCHLORIDE 1 MG/ML
INJECTION INTRAMUSCULAR; INTRAVENOUS
Status: DISCONTINUED | OUTPATIENT
Start: 2024-07-29 | End: 2024-07-29 | Stop reason: HOSPADM

## 2024-07-29 RX ORDER — ACETAMINOPHEN 325 MG/1
650 TABLET ORAL EVERY 4 HOURS PRN
Status: DISCONTINUED | OUTPATIENT
Start: 2024-07-29 | End: 2024-07-29 | Stop reason: HOSPADM

## 2024-07-29 RX ADMIN — DIPHENHYDRAMINE HYDROCHLORIDE 50 MG: 25 CAPSULE ORAL at 09:07

## 2024-07-29 RX ADMIN — Medication 50 MG: at 09:07

## 2024-07-29 NOTE — PLAN OF CARE
Report received from LEAH Berg. Arrived back from cath lab via stretcher. No complaints of pain or distress noted. TR Band to right radial CDI with 12ml of pressure per cath lab report. Post angio instructions given. Resting in bed with call light in reach.

## 2024-07-29 NOTE — Clinical Note
Met with patient about diagnosis and discharge plan of care. Pt admit for intractable back pain. Pt has hx of subluxation of L5-S1. Pain control, iv fluids. Ortho spine consult. Pt was scheduled for OP procedure June 7-too much pain. Lives with her children in 2 story home. Bed and bath on 1st floor. Pt has wheeled walker, shower chair, grab bars. Declining needs for home. PCP is Dr Gamboa. Will follow-o   The catheter was inserted into the ostium   left main. An angiography was performed of the left coronary arteries. Multiple views were taken. The angiography was performed via power injection. The injected amount was 8 mL contrast at 4 mL/s. The PSI from the power injection was 400. The result was suboptimal..

## 2024-07-29 NOTE — PLAN OF CARE
Bedside hand off report given to LEAH Berg .  Chart given. Left to cath lab via stretcher for scheduled procedure.

## 2024-07-29 NOTE — PLAN OF CARE
Ambulated onto unit without difficulty. Complaints of pain but no distress noted. Undressed of personal clothing and gown on. Pre-op. Resting in bed with call light in reach.

## 2024-07-29 NOTE — DISCHARGE SUMMARY
[facility]  Cardiology  Discharge Summary      Patient Name: Marisela Eagle  MRN: [id number]  Admission Date: 7/29/2024  Hospital Length of Stay: 0 days  Discharge Date and Time:  07/29/2024 2:30 PM  Attending Physician: Cooper Canchola MD  Discharging Provider: Cooper Canchola MD  Primary Care Physician: Pawel Badillo III, MD    HPI: 78 yo female referred for angiogram for shortness of breath/ equivocal stress test.     Procedure(s) (LRB):  Left heart cath (Left)     Indwelling Lines/Drains at time of discharge:  Lines/Drains/Airways       None                   Hospital Course (synopsis of major diagnoses, care, treatment, and services provided during the course of the hospital stay): Underwent coronary angiogram via right radial access. Mild disease. Uneventful recovery in recovery area.     Consults: NA    Significant Diagnostic Studies: Angiography: as in hospital course. Please see cath report under card proc tab for complete details.     Pending Diagnostic Studies:       Procedure Component Value Units Date/Time    EKG 12-LEAD on arrival to floor [[id number]]     Order Status: Sent Lab Status: No result     EKG 12-lead [[id number]] Collected: 07/29/24 0925    Order Status: Sent Lab Status: In process Updated: 07/29/24 1040     QRS Duration 102 ms      [facility] QTC Calculation 432 ms     Narrative:      Test Reason : Z01.810,    Vent. Rate : 057 BPM     Atrial Rate : 057 BPM     P-R Int : 226 ms          QRS Dur : 102 ms      QT Int : 444 ms       P-R-T Axes : 060 -31 072 degrees     QTc Int : 432 ms    Sinus bradycardia with 1st degree A-V block  Left axis deviation  Abnormal ECG  When compared with ECG of 06-JUN-2024 18:17,  No significant change was found    Referred By: COOPER CANCHOLA           Confirmed By:             There are no hospital problems to display for this patient.      Discharged Condition: good    Follow Up:   Follow-up Information       Cooper Canchola MD. Schedule an  appointment as soon as possible for a visit in 2 week(s).    Specialties: Interventional Cardiology, Cardiology  Contact information:  [facility]  [address]  [facility] [address]  [phone]                           Patient Instructions:      Diet Cardiac     Lifting restrictions   Order Comments: No lifting greater than 10 lbs. for1 week     Remove dressing in 48 hours     Call MD for:  persistent nausea and vomiting     Call MD for:  severe uncontrolled pain     Call MD for:  difficulty breathing, headache or visual disturbances     Call MD for:  redness, tenderness, or signs of infection (pain, swelling, redness, odor or green/yellow discharge around incision site)     Call MD for:  hives     Call MD for:  persistent dizziness or light-headedness     Call MD for:   Order Comments: Temp greater than 101 degrees F     Medications:  Reconciled Home Medications:      Medication List        CHANGE how you take these medications      amitriptyline 10 MG tablet  Commonly known as: ELAVIL  TAKE 1 TABLET EVERY NIGHT AS NEEDED FOR PAIN  What changed: See the new instructions.     levothyroxine 125 MCG tablet  Commonly known as: SYNTHROID  TAKE 1 TABLET EVERY DAY BEFORE BREAKFAST  What changed:   how much to take  how to take this  when to take this            CONTINUE taking these medications      apixaban 5 mg Tab  Commonly known as: ELIQUIS  Take 2 tablets (10 mg total) by mouth 2 (two) times daily for 7 days, THEN 1 tablet (5 mg total) 2 (two) times daily.  Start taking on: June 8, 2024     cholecalciferol (vitamin D3) 125 mcg (5,000 unit) capsule  Take 5,000 Units by mouth once daily.     CRANBERRY CONCENTRATE ORAL  Take 1 tablet by mouth Daily.     cyanocobalamin 1000 MCG tablet  Commonly known as: VITAMIN B-12  Take 5,000 mcg by mouth once daily.     DULoxetine 60 MG capsule  Commonly known as: CYMBALTA  Take 1 capsule (60 mg total) by mouth once daily.     enoxaparin 100 mg/mL Syrg  Commonly known as:  LOVENOX  Inject 1.1 mLs (110 mg total) into the skin every 12 (twelve) hours. Start on Friday 7/26/24 and stop after AM dose on Sunday 7/25/24 for 2 days     famotidine 20 MG tablet  Commonly known as: PEPCID  TAKE 1 TABLET EVERY EVENING     fluconazole 100 MG tablet  Commonly known as: DIFLUCAN  Take 1 tablet (100 mg total) by mouth once daily.     furosemide 40 MG tablet  Commonly known as: LASIX  Take 1 tablet (40 mg total) by mouth every Tues, Fri. Take daily     gabapentin 800 MG tablet  Commonly known as: NEURONTIN  TAKE 1 TABLET THREE TIMES DAILY     LORazepam 1 MG tablet  Commonly known as: ATIVAN  Take 1 tablet (1 mg total) by mouth every evening.     losartan 100 MG tablet  Commonly known as: COZAAR  TAKE 1/2 TABLET TWICE DAILY     magnesium 30 mg Tab  Take 1 tablet by mouth once daily.     metoprolol succinate 50 MG 24 hr tablet  Commonly known as: TOPROL-XL  Take 0.5 tablets (25 mg total) by mouth every evening.     oxyCODONE-acetaminophen  mg per tablet  Commonly known as: PERCOCET  Take 1 tablet by mouth daily as needed for Pain.     pantoprazole 40 MG tablet  Commonly known as: PROTONIX  Take 1 tablet (40 mg total) by mouth every morning.     potassium chloride SA 20 MEQ tablet  Commonly known as: K-DUR,KLOR-CON  Take 20 mEq by mouth once daily.     PRESERVISION AREDS-2 ORAL  Take 1 capsule by mouth once daily.     spironolactone 25 MG tablet  Commonly known as: ALDACTONE  TAKE 1 TABLET ONE TIME DAILY     Systane GeL 0.3 % Gel  Generic drug: artificial tears(hypromellose)(GENTEAL/SUSTANE)  Place 1 drop into both eyes as needed.              Time spent on the discharge of patient: 15 minutes    Cooper Canchola MD  Cardiology  [facility]

## 2024-07-29 NOTE — DISCHARGE INSTRUCTIONS
Post Radial Cath Discharge Instructions  Keep puncture site covered with current bandage for 24 hours.  You may shower in 24 hours. Do not take a tub bath or submerge the puncture site in water for 72 hours.   You may cover the site with a Band-Aid. Keep Band-Aid clean and dry at all times.    No lifting over 5 pounds with the arm your puncture site is on for 72 hours.  No strenuous activity such as bowling, tennis, etc for 72 hours  Do not operate lawnmower, motorcycle, chainsaw, or all terrain vehicle for 72 hours.  No blood pressure or tourniquets on affected arm for 72 hours.   The puncture site may be slightly bruised and sore following your procedure.   Drink 6-8 glasses of clear liquids during the next 24 hours to flush the dye out.     If Bleeding Occurs, DO NOT PANIC  Place 1 or 2 fingers over the puncture site and hold firm pressure to stop bleeding. You may be able to feel your pulse as you hold pressure  Lift you fingers after 5 minutes to see if the bleeding stopped.  Once has stopped, gently wipe the wrist area clean and cover with a bandage.  If the bleeding does not stop after 30 minutes, or if there is a large amount of bleeding or spurting, call 911! Do not drive yourself to the hospital.     Should any of the following occur, Contact your Physician Immediately:  Redness, inflammation, swelling, chills or fever, or discolored drainage at procedure site   Coldness, discoloration, ongoing numbness, severe pain, or swelling. Expect mild tingling of hand and tenderness at the puncture site for up to 3 days. If this persists or other symptoms develop, notify your physician

## 2024-07-29 NOTE — PLAN OF CARE
Verbalizes understanding of discharge instructions, angio site care, and follow up care. Belongings gathered and dressed in personal clothing. Left via wheelchair to private auto accompanied by family.

## 2024-07-30 VITALS
OXYGEN SATURATION: 98 % | RESPIRATION RATE: 20 BRPM | WEIGHT: 241.63 LBS | BODY MASS INDEX: 35.79 KG/M2 | HEIGHT: 69 IN | HEART RATE: 61 BPM | TEMPERATURE: 99 F | SYSTOLIC BLOOD PRESSURE: 141 MMHG | DIASTOLIC BLOOD PRESSURE: 67 MMHG

## 2024-07-31 ENCOUNTER — TELEPHONE (OUTPATIENT)
Dept: CARDIOLOGY | Facility: CLINIC | Age: 79
End: 2024-07-31
Payer: MEDICARE

## 2024-07-31 NOTE — TELEPHONE ENCOUNTER
Dr Patton only saw the patient once in 2023.   She has been seeing Ruth Cedeño.     Dr Canchola just did the angiogram. Please ask him for his recommendations are for Ms. Antonio

## 2024-07-31 NOTE — TELEPHONE ENCOUNTER
Spoke to Nichole she stated Dr Canchola refused to answer the message. Thelma also agreed to send the message to Dr Avni aPtton.    Spoke to the Patients she has been putting heat and taking Motrin to help with the pain.     We discuss she's on Eliquis and she should not be taking Motrin.     She thinks they moved her wrong during the angiogram and is not having fever at this time.     She was asked to please to the ER if she get fever and the pain gets worst.

## 2024-07-31 NOTE — TELEPHONE ENCOUNTER
----- Message from Julia Hicks sent at 7/31/2024  9:04 AM CDT -----  Regarding: concerns  Name of who is calling:   Marisela      What is the request in detail: Pt is requesting a call back in ref to having severe neck pain since procedure / please call pt asap / feverish feeling      Can the clinic reply by [facility]:yes      What number to call back if not [facility]: [phone]

## 2024-08-01 ENCOUNTER — OFFICE VISIT (OUTPATIENT)
Dept: ENDOCRINOLOGY | Facility: CLINIC | Age: 79
End: 2024-08-01
Payer: MEDICARE

## 2024-08-01 VITALS
BODY MASS INDEX: 35.13 KG/M2 | DIASTOLIC BLOOD PRESSURE: 72 MMHG | TEMPERATURE: 98 F | SYSTOLIC BLOOD PRESSURE: 118 MMHG | HEIGHT: 69 IN | OXYGEN SATURATION: 99 % | WEIGHT: 237.19 LBS | HEART RATE: 62 BPM

## 2024-08-01 DIAGNOSIS — L74.9 SWEATING ABNORMALITY: ICD-10-CM

## 2024-08-01 DIAGNOSIS — E21.3 HYPERPARATHYROIDISM: ICD-10-CM

## 2024-08-01 DIAGNOSIS — E66.9 OBESITY (BMI 30-39.9): ICD-10-CM

## 2024-08-01 DIAGNOSIS — I10 HYPERTENSION, UNSPECIFIED TYPE: ICD-10-CM

## 2024-08-01 DIAGNOSIS — R73.9 HYPERGLYCEMIA: ICD-10-CM

## 2024-08-01 DIAGNOSIS — E78.5 HYPERLIPIDEMIA, UNSPECIFIED HYPERLIPIDEMIA TYPE: ICD-10-CM

## 2024-08-01 DIAGNOSIS — E04.1 NODULAR THYROID DISEASE: ICD-10-CM

## 2024-08-01 DIAGNOSIS — Z78.0 POSTMENOPAUSAL: ICD-10-CM

## 2024-08-01 DIAGNOSIS — E03.9 HYPOTHYROIDISM, UNSPECIFIED TYPE: Primary | ICD-10-CM

## 2024-08-01 DIAGNOSIS — M81.0 AGE-RELATED OSTEOPOROSIS WITHOUT CURRENT PATHOLOGICAL FRACTURE: ICD-10-CM

## 2024-08-01 DIAGNOSIS — G62.9 NEUROPATHY: ICD-10-CM

## 2024-08-01 DIAGNOSIS — E55.9 HYPOVITAMINOSIS D: ICD-10-CM

## 2024-08-01 PROCEDURE — 99213 OFFICE O/P EST LOW 20 MIN: CPT | Mod: HCNC,S$GLB,, | Performed by: PHYSICIAN ASSISTANT

## 2024-08-01 PROCEDURE — 3074F SYST BP LT 130 MM HG: CPT | Mod: HCNC,CPTII,S$GLB, | Performed by: PHYSICIAN ASSISTANT

## 2024-08-01 PROCEDURE — G2211 COMPLEX E/M VISIT ADD ON: HCPCS | Mod: HCNC,S$GLB,, | Performed by: PHYSICIAN ASSISTANT

## 2024-08-01 PROCEDURE — 3078F DIAST BP <80 MM HG: CPT | Mod: HCNC,CPTII,S$GLB, | Performed by: PHYSICIAN ASSISTANT

## 2024-08-01 PROCEDURE — 1101F PT FALLS ASSESS-DOCD LE1/YR: CPT | Mod: HCNC,CPTII,S$GLB, | Performed by: PHYSICIAN ASSISTANT

## 2024-08-01 PROCEDURE — 1157F ADVNC CARE PLAN IN RCRD: CPT | Mod: HCNC,CPTII,S$GLB, | Performed by: PHYSICIAN ASSISTANT

## 2024-08-01 PROCEDURE — 1125F AMNT PAIN NOTED PAIN PRSNT: CPT | Mod: HCNC,CPTII,S$GLB, | Performed by: PHYSICIAN ASSISTANT

## 2024-08-01 PROCEDURE — 3288F FALL RISK ASSESSMENT DOCD: CPT | Mod: HCNC,CPTII,S$GLB, | Performed by: PHYSICIAN ASSISTANT

## 2024-08-01 PROCEDURE — 1159F MED LIST DOCD IN RCRD: CPT | Mod: HCNC,CPTII,S$GLB, | Performed by: PHYSICIAN ASSISTANT

## 2024-08-01 PROCEDURE — 1160F RVW MEDS BY RX/DR IN RCRD: CPT | Mod: HCNC,CPTII,S$GLB, | Performed by: PHYSICIAN ASSISTANT

## 2024-08-01 PROCEDURE — 99999 PR PBB SHADOW E&M-EST. PATIENT-LVL V: CPT | Mod: PBBFAC,HCNC,, | Performed by: PHYSICIAN ASSISTANT

## 2024-08-01 NOTE — PROGRESS NOTES
CC: Hypothyroidism    HPI: Marisela Eagle is a 79 y.o. female here for hypothyroidism along with pending conditions listed in the Visit Diagnosis. Diagnosed several years ago. Her daughter had thyroid cancer. They think this was due to radiation on her face for acne. Her cousin and grandmother had T2DM. She is taking biotin. Reports taking ditropan 10 mg daily which helped w/ sweating but caused dry mouth.    Pt diagnosed w/ PE in 6/24 and now on eliquis. Cardiologist started her on Jardiance.     Taking LT4 125 mcg daily. She takes it ~6 am and waits an hour before eating.    + sweating (on left side, taking elavil--states this helps), fatigue, wt loss (fluid), constipation, heat intolerance.     No mental fog, d/c, palpitations, tremors, hair loss or changes in nails.    Thyroid u/s 1/23 showed 2 subcentimeter nodules that have not changed in size since 2012. Repeat in 3 years. Reports SOB (seeing cardiology). No dysphagia or voice changes.     DEXA 4/23: wnl. On reclast since 2/24. Prescribed fosamax in 10/22 but she kept forgetting to take. No falls or recent fractures. Taking 5000 IU daily. She had one fall in 9/21 when she fractured her pelvis. She recently found out she fractured her femur. She had two steroid injections. She is using a pedal exerciser. She gets 1 steriod injection in each shoulder q 3 mths.    Hyperparathyroidism  No recent renal stones, n/v, abdominal pain or fractures.    PMHx, PSHx: reviewed in epic.  Social Hx: Occasionally has mixed 2-3 drinks. She smoked 0.5 ppd for 53 years.     Wt Readings from Last 15 Encounters:   08/01/24 107.6 kg (237 lb 3.4 oz)   07/29/24 109.6 kg (241 lb 10 oz)   07/24/24 109.3 kg (241 lb)   07/23/24 109.6 kg (241 lb 9.6 oz)   07/15/24 109.8 kg (242 lb 1.6 oz)   06/14/24 110.4 kg (243 lb 6.4 oz)   06/14/24 110.5 kg (243 lb 8 oz)   06/07/24 106 kg (233 lb 11 oz)   06/05/24 111.8 kg (246 lb 6.4 oz)   05/01/24 108 kg (238 lb)   04/04/24 108 kg (238 lb)  "  02/08/24 107.5 kg (237 lb)   01/10/24 106.2 kg (234 lb 2.1 oz)   01/04/24 105.3 kg (232 lb 2.3 oz)   11/27/23 108.9 kg (240 lb 1.3 oz)      ROS:   Constitutional: feels tired, wt stable  Eyes: No recent visual changes  Cardiovascular: + occasional cp and palpitations  Respiratory: + SOB, no cough  Gastrointestinal: Denies recent bowel disturbances  GenitoUrinary - No dysuria  Skin: No new skin rash  Musc: + back pain, knee pain  Neurologic: + numbness and tingling in feet  Endocrine: no polyphagia, polydipsia or polyuria  Remainder ROS negative     /72 (BP Location: Right arm, Patient Position: Sitting, BP Method: Small (Manual))   Pulse 62   Temp 97.5 °F (36.4 °C) (Oral)   Ht 5' 8.5" (1.74 m)   Wt 107.6 kg (237 lb 3.4 oz)   SpO2 99%   BMI 35.54 kg/m²      Personally reviewed labs below:    Lab Results   Component Value Date    TSH 0.950 07/24/2024    W1CFSGP 99 07/03/2019    FREET4 1.10 07/24/2024        Chemistry        Component Value Date/Time     (L) 07/24/2024 1341    K 4.4 07/24/2024 1341     07/24/2024 1341    CO2 22 (L) 07/24/2024 1341    BUN 16 07/24/2024 1341    CREATININE 0.8 07/24/2024 1341    GLU 71 07/24/2024 1341        Component Value Date/Time    CALCIUM 9.1 07/24/2024 1341    ALKPHOS 38 (L) 07/24/2024 1341    AST 19 07/24/2024 1341    ALT 18 07/24/2024 1341    BILITOT 0.3 07/24/2024 1341    ESTGFRAFRICA >60.0 07/21/2022 1530    EGFRNONAA >60.0 07/21/2022 1530         Lab Results   Component Value Date    HGBA1C 5.4 07/24/2024    HGBA1C 5.3 06/23/2023    HGBA1C 5.2 02/24/2021      PE:  GENERAL: middle aged female, well developed, well nourished  NECK: Supple neck, normal thyroid. No bruit  MUSC: ambulates with a four pronged cane  PSYCH: no anxiety or depression    EXAMINATION:  US SOFT TISSUE HEAD NECK THYROID     CLINICAL HISTORY:  Nontoxic goiter, unspecified     TECHNIQUE:  Ultrasound of the thyroid and cervical lymph nodes was performed.     COMPARISON:  Thyroid " ultrasound of February 21, 2022     FINDINGS:  One the right thyroid measures 3.1 x 1.3 x 1.2 cm for a calculated volume of 2.4 cc.  On the left the thyroid measures 3.2 x 1 x 1.2 cm for a calculated volume of 2.1 cc and a total thyroid volume of 4.5 cc which is small.     On the right there is a 2 mm cyst and a 5 mm solid nodule unchanged.  On the left upper pole there is a 5 mm cyst and a 5 mm solid nodule unchanged.  A 3 mm nodule is seen in the isthmus.     Impression:     Small thyroid with small nodules not meeting criteria for fine needle aspiration.        Electronically signed by:Navarro Bryant MD  Date:                                            01/18/2023    Assessment/Plan:   1. Hypothyroidism, unspecified type  T4, Free    TSH    Comprehensive Metabolic Panel      2. Nodular thyroid disease  US Thyroid      3. Hypertension, unspecified type        4. Hyperlipidemia, unspecified hyperlipidemia type        5. Postmenopausal  DXA Bone Density Axial Skeleton 1 or more sites      6. Neuropathy        7. Age-related osteoporosis without current pathological fracture        8. Obesity (BMI 30-39.9)        9. Hypovitaminosis D  Vitamin D      10. Sweating abnormality        11. Hyperglycemia        12. Hyperparathyroidism  Comprehensive Metabolic Panel    PTH, Intact    Calcium, Ionized        Hypothyroidism-TFTs wnl. Continue LT4 dose.  Goiter-repeat thyroid u/s 2/25.  DQJ-iqvbcs-ohogqnvi meds.  DSI-iuwecy-sdujmsrg statin  Postmenopausal w/ hx of fragility fx-Continue reclast until 2/30 or fx. Discussed se.   -DEXA scan 4/25. Continue taking 2000 IU of vitamin D and 1500 mg of calcium. Also, participate in weight bearing and resistance exercises for 40 min 4x weekly to maintain your bone density.   Obesity-Body mass index is 35.54 kg/m². Encouraged to increase exercise. Declines MNT.  Hypovitaminosis d-low-normal-increase vitamin d intake by 2000 IU daily.   Sweating-monitor-may restart ditropan if  worsening.   Hyperglycemia-check a1c.  Hyperparathyroidism-ca is wnl. Not sx. Monitor.     FOLLOWUP  F/u in 4/25 w/ labs-PTH, ICA, CMP, TFTS, VD AND U/S & dexa

## 2024-08-03 LAB
OHS QRS DURATION: 102 MS
OHS QTC CALCULATION: 432 MS

## 2024-08-08 ENCOUNTER — OFFICE VISIT (OUTPATIENT)
Dept: CARDIOLOGY | Facility: CLINIC | Age: 79
End: 2024-08-08
Payer: MEDICARE

## 2024-08-08 VITALS
WEIGHT: 242 LBS | HEIGHT: 69 IN | DIASTOLIC BLOOD PRESSURE: 70 MMHG | SYSTOLIC BLOOD PRESSURE: 126 MMHG | RESPIRATION RATE: 16 BRPM | BODY MASS INDEX: 35.84 KG/M2 | OXYGEN SATURATION: 98 % | HEART RATE: 72 BPM

## 2024-08-08 DIAGNOSIS — G89.29 OTHER CHRONIC PAIN: ICD-10-CM

## 2024-08-08 DIAGNOSIS — I73.9 CLAUDICATION OF BOTH LOWER EXTREMITIES: ICD-10-CM

## 2024-08-08 DIAGNOSIS — R06.02 SOB (SHORTNESS OF BREATH): ICD-10-CM

## 2024-08-08 DIAGNOSIS — R91.1 PULMONARY NODULE: ICD-10-CM

## 2024-08-08 DIAGNOSIS — I87.2 VENOUS INSUFFICIENCY: ICD-10-CM

## 2024-08-08 DIAGNOSIS — I82.431 ACUTE DEEP VEIN THROMBOSIS (DVT) OF POPLITEAL VEIN OF RIGHT LOWER EXTREMITY: Primary | ICD-10-CM

## 2024-08-08 DIAGNOSIS — E78.2 MIXED HYPERLIPIDEMIA: ICD-10-CM

## 2024-08-08 DIAGNOSIS — I10 HYPERTENSION, ESSENTIAL: ICD-10-CM

## 2024-08-08 DIAGNOSIS — I70.0 ATHEROSCLEROSIS OF AORTA: ICD-10-CM

## 2024-08-08 DIAGNOSIS — B37.49 CANDIDAL URINARY TRACT INFECTION: ICD-10-CM

## 2024-08-08 PROCEDURE — 99999 PR PBB SHADOW E&M-EST. PATIENT-LVL V: CPT | Mod: PBBFAC,HCNC,, | Performed by: NURSE PRACTITIONER

## 2024-08-08 RX ORDER — TORSEMIDE 20 MG/1
20 TABLET ORAL DAILY
Qty: 30 TABLET | Refills: 11 | Status: SHIPPED | OUTPATIENT
Start: 2024-08-08 | End: 2025-08-08

## 2024-08-09 PROBLEM — B37.49 CANDIDAL URINARY TRACT INFECTION: Status: ACTIVE | Noted: 2019-09-18

## 2024-08-09 PROBLEM — I87.2 VENOUS INSUFFICIENCY: Status: ACTIVE | Noted: 2024-08-09

## 2024-08-09 PROBLEM — R94.39 ABNORMAL STRESS TEST: Status: RESOLVED | Noted: 2024-07-23 | Resolved: 2024-08-09

## 2024-08-09 PROBLEM — I73.9 CLAUDICATION OF BOTH LOWER EXTREMITIES: Status: ACTIVE | Noted: 2024-08-09

## 2024-08-16 ENCOUNTER — TELEPHONE (OUTPATIENT)
Dept: FAMILY MEDICINE | Facility: CLINIC | Age: 79
End: 2024-08-16
Payer: MEDICARE

## 2024-08-16 NOTE — TELEPHONE ENCOUNTER
----- Message from Makenna Leo sent at 8/16/2024  2:57 PM CDT -----  Contact: pt [phone]  Type:  Sooner Appointment Request    Caller is requesting a sooner appointment.  Caller declined first available appointment listed below.  Caller will not accept being placed on the waitlist and is requesting a message be sent to doctor.    Name of Caller:  Pt   When is the first available appointment?  Aug 27  Would the patient rather a call back or a response via [facility]? Call   Best Call Back Number:  [phone]        Additional Information:    Symptom: Neck Pain - Not From Injury  Outcome: Schedule an urgent appointment (within 4 hours) or talk to a nurse or provider within 30 minutes.  Reason: Getting worse    The caller rejected this outcome/ pt did not want 3:30pm appt today and refused to speak w/ on call nurse / She is requesting an appt on Monday if possible.

## 2024-08-16 NOTE — TELEPHONE ENCOUNTER
----- Message from Radha Queen sent at 8/16/2024  9:50 AM CDT -----  Type: Needs Medical Advice  Who Called:  pt     Best Call Back Number: [phone]    Additional Information: pt requesting an order for xray for the back of her head but needs to know if she would need to see dr light first or can she just get an order please advise

## 2024-08-19 ENCOUNTER — TELEPHONE (OUTPATIENT)
Facility: CLINIC | Age: 79
End: 2024-08-19

## 2024-08-19 ENCOUNTER — OFFICE VISIT (OUTPATIENT)
Facility: CLINIC | Age: 79
End: 2024-08-19
Payer: MEDICARE

## 2024-08-19 ENCOUNTER — OFFICE VISIT (OUTPATIENT)
Dept: FAMILY MEDICINE | Facility: CLINIC | Age: 79
End: 2024-08-19
Payer: MEDICARE

## 2024-08-19 VITALS
BODY MASS INDEX: 35.57 KG/M2 | WEIGHT: 240.13 LBS | DIASTOLIC BLOOD PRESSURE: 81 MMHG | HEIGHT: 69 IN | RESPIRATION RATE: 16 BRPM | TEMPERATURE: 97 F | SYSTOLIC BLOOD PRESSURE: 121 MMHG | HEART RATE: 81 BPM

## 2024-08-19 VITALS
RESPIRATION RATE: 18 BRPM | HEIGHT: 68 IN | HEART RATE: 82 BPM | SYSTOLIC BLOOD PRESSURE: 120 MMHG | BODY MASS INDEX: 36.53 KG/M2 | TEMPERATURE: 99 F | DIASTOLIC BLOOD PRESSURE: 68 MMHG | OXYGEN SATURATION: 95 % | WEIGHT: 241 LBS

## 2024-08-19 DIAGNOSIS — R41.89 COGNITIVE IMPAIRMENT: ICD-10-CM

## 2024-08-19 DIAGNOSIS — E78.2 MIXED HYPERLIPIDEMIA: ICD-10-CM

## 2024-08-19 DIAGNOSIS — I82.431 ACUTE DEEP VEIN THROMBOSIS (DVT) OF POPLITEAL VEIN OF RIGHT LOWER EXTREMITY: ICD-10-CM

## 2024-08-19 DIAGNOSIS — R76.8 POSITIVE ANA (ANTINUCLEAR ANTIBODY): Primary | ICD-10-CM

## 2024-08-19 DIAGNOSIS — Z79.01 ANTICOAGULATED: ICD-10-CM

## 2024-08-19 DIAGNOSIS — E03.9 HYPOTHYROIDISM, UNSPECIFIED TYPE: ICD-10-CM

## 2024-08-19 DIAGNOSIS — Z99.89 USE OF CANE AS AMBULATORY AID: ICD-10-CM

## 2024-08-19 DIAGNOSIS — M54.2 ARTHRALGIA OF CERVICAL SPINE: ICD-10-CM

## 2024-08-19 DIAGNOSIS — I26.99 PULMONARY EMBOLISM, UNSPECIFIED CHRONICITY, UNSPECIFIED PULMONARY EMBOLISM TYPE, UNSPECIFIED WHETHER ACUTE COR PULMONALE PRESENT: ICD-10-CM

## 2024-08-19 DIAGNOSIS — E66.01 SEVERE OBESITY (BMI 35.0-39.9) WITH COMORBIDITY: ICD-10-CM

## 2024-08-19 DIAGNOSIS — I10 HYPERTENSION, ESSENTIAL: Primary | ICD-10-CM

## 2024-08-19 DIAGNOSIS — K63.5 POLYP OF COLON, UNSPECIFIED PART OF COLON, UNSPECIFIED TYPE: ICD-10-CM

## 2024-08-19 PROCEDURE — 99999 PR PBB SHADOW E&M-EST. PATIENT-LVL V: CPT | Mod: PBBFAC,HCNC,, | Performed by: FAMILY MEDICINE

## 2024-08-19 PROCEDURE — 3079F DIAST BP 80-89 MM HG: CPT | Mod: HCNC,CPTII,S$GLB, | Performed by: INTERNAL MEDICINE

## 2024-08-19 PROCEDURE — 1159F MED LIST DOCD IN RCRD: CPT | Mod: HCNC,CPTII,S$GLB, | Performed by: INTERNAL MEDICINE

## 2024-08-19 PROCEDURE — 1160F RVW MEDS BY RX/DR IN RCRD: CPT | Mod: HCNC,CPTII,S$GLB, | Performed by: INTERNAL MEDICINE

## 2024-08-19 PROCEDURE — 1160F RVW MEDS BY RX/DR IN RCRD: CPT | Mod: HCNC,CPTII,S$GLB, | Performed by: FAMILY MEDICINE

## 2024-08-19 PROCEDURE — 1125F AMNT PAIN NOTED PAIN PRSNT: CPT | Mod: HCNC,CPTII,S$GLB, | Performed by: INTERNAL MEDICINE

## 2024-08-19 PROCEDURE — 1159F MED LIST DOCD IN RCRD: CPT | Mod: HCNC,CPTII,S$GLB, | Performed by: FAMILY MEDICINE

## 2024-08-19 PROCEDURE — 1101F PT FALLS ASSESS-DOCD LE1/YR: CPT | Mod: HCNC,CPTII,S$GLB, | Performed by: FAMILY MEDICINE

## 2024-08-19 PROCEDURE — 99214 OFFICE O/P EST MOD 30 MIN: CPT | Mod: HCNC,S$GLB,, | Performed by: FAMILY MEDICINE

## 2024-08-19 PROCEDURE — 3074F SYST BP LT 130 MM HG: CPT | Mod: HCNC,CPTII,S$GLB, | Performed by: INTERNAL MEDICINE

## 2024-08-19 PROCEDURE — 1157F ADVNC CARE PLAN IN RCRD: CPT | Mod: HCNC,CPTII,S$GLB, | Performed by: INTERNAL MEDICINE

## 2024-08-19 PROCEDURE — 99213 OFFICE O/P EST LOW 20 MIN: CPT | Mod: HCNC,S$GLB,, | Performed by: INTERNAL MEDICINE

## 2024-08-19 PROCEDURE — G2211 COMPLEX E/M VISIT ADD ON: HCPCS | Mod: HCNC,S$GLB,, | Performed by: INTERNAL MEDICINE

## 2024-08-19 PROCEDURE — 3078F DIAST BP <80 MM HG: CPT | Mod: HCNC,CPTII,S$GLB, | Performed by: FAMILY MEDICINE

## 2024-08-19 PROCEDURE — 3074F SYST BP LT 130 MM HG: CPT | Mod: HCNC,CPTII,S$GLB, | Performed by: FAMILY MEDICINE

## 2024-08-19 PROCEDURE — 1157F ADVNC CARE PLAN IN RCRD: CPT | Mod: HCNC,CPTII,S$GLB, | Performed by: FAMILY MEDICINE

## 2024-08-19 PROCEDURE — 3288F FALL RISK ASSESSMENT DOCD: CPT | Mod: HCNC,CPTII,S$GLB, | Performed by: FAMILY MEDICINE

## 2024-08-19 PROCEDURE — G2211 COMPLEX E/M VISIT ADD ON: HCPCS | Mod: HCNC,S$GLB,, | Performed by: FAMILY MEDICINE

## 2024-08-19 PROCEDURE — 99999 PR PBB SHADOW E&M-EST. PATIENT-LVL V: CPT | Mod: PBBFAC,HCNC,, | Performed by: INTERNAL MEDICINE

## 2024-08-19 PROCEDURE — 3288F FALL RISK ASSESSMENT DOCD: CPT | Mod: HCNC,CPTII,S$GLB, | Performed by: INTERNAL MEDICINE

## 2024-08-19 PROCEDURE — 1101F PT FALLS ASSESS-DOCD LE1/YR: CPT | Mod: HCNC,CPTII,S$GLB, | Performed by: INTERNAL MEDICINE

## 2024-08-19 RX ORDER — SULFAMETHOXAZOLE AND TRIMETHOPRIM 800; 160 MG/1; MG/1
1 TABLET ORAL 2 TIMES DAILY
COMMUNITY
Start: 2024-08-13

## 2024-08-19 RX ORDER — TRIAMCINOLONE ACETONIDE 40 MG/ML
60 INJECTION, SUSPENSION INTRA-ARTICULAR; INTRAMUSCULAR
COMMUNITY
Start: 2024-07-22

## 2024-08-19 NOTE — TELEPHONE ENCOUNTER
Spoke to Eun at [facility] lab to inquire as to if they have the final results of the lupus coag lab that was ordered in June as we do not have the interpretation of this lab. Verbalized understanding and states she will fax over now.

## 2024-08-19 NOTE — PROGRESS NOTES
[facility]  PROGRESS NOTE -  Follow-up Visit      Subjective:       Patient ID:   NAME: Marisela Eagle : 1945     79 y.o. female    Referring Doc: Yumiko Watson MD  Other Physicians: Justino/Keren(PCP); IFRAH Patton/Ruth Cedeño NP (Card); Marcos Gusman (ortho); ALEXEI Martin; Melissa Kirkpatrick;         Chief Complaint: DVT/pulm emboli f/u    History of Present Illness:     Patient returns today for a  regularly scheduled follow-up visit.  The patient is here today to go over the results of the recently ordered labs, tests and studies. She is here with her .     She has been on the oral blood thinners with eliquis. No excessive bleeding or bruising      She had angiogram with Dr Canchola  on 2024; she is on diuretics    She Ruth Cedeño NP with cardiology on 2024; Melissa Kirkpatrick NP with endocrine on        She has chronic arthritis, shoulder and back issues. She is breathing ok, no SOB, HA's or N/V.     She is seeing Dr Badillo later today; she is seeing pain management on Thursday        ROS:   GEN: normal without any fever, night sweats or weight loss; chronic arthritis issues/right shoulder/back ; intermittent chest aches and back discomfort  HEENT: normal with no HA's, sore throat, stiff neck, changes in vision  CV: normal with no CP, SOB, PND, JARA or orthopnea  PULM: normal with no SOB, cough, hemoptysis, sputum or pleuritic pain  GI: normal with no abdominal pain, nausea, vomiting, constipation, diarrhea, melanotic stools, BRBPR, or hematemesis  : normal with no hematuria, dysuria  BREAST: normal with no mass, discharge, pain  SKIN: normal with no rash, erythema, bruising, or swelling    Pain Scale: 4 on neck    Allergies:  Review of patient's allergies indicates:  No Known Allergies    Medications:    Current Outpatient Medications:     amitriptyline (ELAVIL) 10 MG tablet, TAKE 1 TABLET EVERY NIGHT AS NEEDED FOR PAIN (Patient taking differently: Take 10 mg by mouth  nightly as needed for Insomnia.), Disp: 90 tablet, Rfl: 1    apixaban (ELIQUIS) 5 mg Tab, Take 2 tablets (10 mg total) by mouth 2 (two) times daily for 7 days, THEN 1 tablet (5 mg total) 2 (two) times daily., Disp: 194 tablet, Rfl: 0    artificial tears,hypromellose,,GENTEAL/SUSTANE, (SYSTANE GEL) 0.3 % Gel, Place 1 drop into both eyes as needed., Disp: , Rfl:     cholecalciferol, vitamin D3, 5,000 unit capsule, Take 5,000 Units by mouth once daily., Disp: , Rfl:     cranberry fruit extract (CRANBERRY CONCENTRATE ORAL), Take 1 tablet by mouth Daily., Disp: , Rfl:     cyanocobalamin (VITAMIN B-12) 1000 MCG tablet, Take 5,000 mcg by mouth once daily., Disp: , Rfl:     DULoxetine (CYMBALTA) 60 MG capsule, Take 1 capsule (60 mg total) by mouth once daily., Disp: 90 capsule, Rfl: 1    famotidine (PEPCID) 20 MG tablet, TAKE 1 TABLET EVERY EVENING (Patient taking differently: Take 20 mg by mouth nightly as needed for Heartburn.), Disp: 90 tablet, Rfl: 1    fluconazole (DIFLUCAN) 100 MG tablet, Take 1 tablet (100 mg total) by mouth once daily. (Patient taking differently: Take 100 mg by mouth once daily. X 11 days - yeast in esophagus), Disp: 10 tablet, Rfl: 0    gabapentin (NEURONTIN) 800 MG tablet, TAKE 1 TABLET THREE TIMES DAILY (Patient taking differently: Take 800 mg by mouth 2 (two) times daily.), Disp: 270 tablet, Rfl: 3    levothyroxine (SYNTHROID) 125 MCG tablet, TAKE 1 TABLET EVERY DAY BEFORE BREAKFAST (Patient taking differently: Take 125 mcg by mouth before breakfast. TAKE 1 TABLET EVERY DAY BEFORE BREAKFAST), Disp: 90 tablet, Rfl: 3    LORazepam (ATIVAN) 1 MG tablet, Take 1 tablet (1 mg total) by mouth every evening., Disp: 30 tablet, Rfl: 0    losartan (COZAAR) 100 MG tablet, TAKE 1/2 TABLET TWICE DAILY (Patient taking differently: Take 50 mg by mouth 2 (two) times daily. 50 Mg BID), Disp: 90 tablet, Rfl: 3    magnesium 30 mg Tab, Take 1 tablet by mouth once daily., Disp: , Rfl:     metoprolol succinate  "(TOPROL-XL) 50 MG 24 hr tablet, Take 0.5 tablets (25 mg total) by mouth every evening., Disp: 90 tablet, Rfl: 1    oxyCODONE-acetaminophen (PERCOCET)  mg per tablet, Take 1 tablet by mouth daily as needed for Pain., Disp: , Rfl:     pantoprazole (PROTONIX) 40 MG tablet, Take 1 tablet (40 mg total) by mouth every morning., Disp: 90 tablet, Rfl: 1    potassium chloride SA (K-DUR,KLOR-CON) 20 MEQ tablet, Take 20 mEq by mouth once daily., Disp: , Rfl:     spironolactone (ALDACTONE) 25 MG tablet, TAKE 1 TABLET ONE TIME DAILY (Patient taking differently: Take 25 mg by mouth once daily.), Disp: 90 tablet, Rfl: 1    torsemide (DEMADEX) 20 MG Tab, Take 1 tablet (20 mg total) by mouth once daily., Disp: 30 tablet, Rfl: 11    vit C/E/Zn/coppr/lutein/zeaxan (PRESERVISION AREDS-2 ORAL), Take 1 capsule by mouth once daily., Disp: , Rfl:     PMHx/PSHx Updates:  See patient's last visit with me on 7/1/2024.  See H&P on 6/14/2024        Pathology:   Cancer Staging   No matching staging information was found for the patient.            Objective:     Vitals:  Blood pressure 121/81, pulse 81, temperature 96.5 °F (35.8 °C), resp. rate 16, height 5' 8.5" (1.74 m), weight 108.9 kg (240 lb 1.6 oz).    Physical Examination:   GEN: no apparent distress, comfortable; AAOx3; overweight  HEAD: atraumatic and normocephalic  EYES: no pallor, no icterus, PERRLA  ENT: OMM, no pharyngeal erythema, external ears WNL; no nasal discharge; no thrush  NECK: no masses, thyroid normal, trachea midline, no LAD/LN's, supple  CV: RRR with no murmur; normal pulse; normal S1 and S2; no pedal edema  CHEST: Normal respiratory effort; CTAB; normal breath sounds; no wheeze or crackles  ABDOM: nontender and nondistended; soft; normal bowel sounds; no rebound/guarding  MUSC/Skeletal: LROM of right shoulder; arthropathy  EXTREM: no clubbing, cyanosis, inflammation; chronic RLE swelling and hyperchromatic skin changes  SKIN: no rashes, lesions, ulcers, petechiae " or subcutaneous nodules; chronic age related skin changes  : no lynn  NEURO: grossly intact; motor/sensory WNL; AAOx3; no tremors  PSYCH: normal mood, affect and behavior  LYMPH: normal cervical, supraclavicular, axillary and groin LN's            Labs:     Lab Results   Component Value Date    WBC 6.68 07/29/2024    HGB 11.7 (L) 07/29/2024    HCT 36.8 (L) 07/29/2024    MCV 86 07/29/2024     07/29/2024       CMP  Sodium   Date Value Ref Range Status   07/24/2024 134 (L) 136 - 145 mmol/L Final     Potassium   Date Value Ref Range Status   07/24/2024 4.4 3.5 - 5.1 mmol/L Final     Chloride   Date Value Ref Range Status   07/24/2024 103 95 - 110 mmol/L Final     CO2   Date Value Ref Range Status   07/24/2024 22 (L) 23 - 29 mmol/L Final     Glucose   Date Value Ref Range Status   07/24/2024 71 70 - 110 mg/dL Final     BUN   Date Value Ref Range Status   07/24/2024 16 8 - 23 mg/dL Final     Creatinine   Date Value Ref Range Status   07/24/2024 0.8 0.5 - 1.4 mg/dL Final     Calcium   Date Value Ref Range Status   07/24/2024 9.1 8.7 - 10.5 mg/dL Final     Total Protein   Date Value Ref Range Status   07/24/2024 6.9 6.0 - 8.4 g/dL Final     Albumin   Date Value Ref Range Status   07/24/2024 4.0 3.5 - 5.2 g/dL Final   01/11/2019 4.1 3.1 - 4.7 g/dL      Total Bilirubin   Date Value Ref Range Status   07/24/2024 0.3 0.1 - 1.0 mg/dL Final     Comment:     For infants and newborns, interpretation of results should be based  on gestational age, weight and in agreement with clinical  observations.    Premature Infant recommended reference ranges:  Up to 24 hours.............<8.0 mg/dL  Up to 48 hours............<12.0 mg/dL  3-5 days..................<15.0 mg/dL  6-29 days.................<15.0 mg/dL       Alkaline Phosphatase   Date Value Ref Range Status   07/24/2024 38 (L) 55 - 135 U/L Final     AST   Date Value Ref Range Status   07/24/2024 19 10 - 40 U/L Final     ALT   Date Value Ref Range Status   07/24/2024 18 10  - 44 U/L Final     Anion Gap   Date Value Ref Range Status   07/24/2024 9 8 - 16 mmol/L Final     eGFR   Date Value Ref Range Status   07/24/2024 >60.0 >60 mL/min/1.73 m^2 Final     MTHFR Comment   Comment: Result:  c.665C>T (p. Ufm888Btl), legacy name:  C677T - Not Detected c.1286A>C (p.  Oye301Tcl), legacy name: [id number] - Not  Detected Interpretation:     Phosphatidylserine Ab IgA 0 - 30 Units <10   Phosphatidylserine Ab ( IgM) 0 - 30 Units 9      Phosphatidylserine Ab (IgG) 0 - 19 APS Units <1     Anticardiolipin IgG 0 - 14 GPL U/mL <9   Comment: Negative:              <15     Anticardiolipin IgA 0 - 11 APL U/mL <9   Comment: Negative:              <12     Anticardiolipin IgM 0 - 12 MPL U/mL <9     Homocysteine 0.0 - 21.3 umol/L 10.3     Prothrombin Mutation Comment   Comment: Result: c.*97G>A - Not Detected     Factor V Leiden Comment   Comment: Result: c.1601G>A (p.Fyq037Ibn) - Not Detected           Radiology/Diagnostic Studies:    Nuclear Stress - Cardiology Interpreted    Result Date: 7/10/2024    Abnormal myocardial perfusion scan.   There is a mild to moderate intensity, medium sized, mostly fixed perfusion abnormality with some reversibilty in the lateral, apical and septal apical wall(s).   There are no other significant perfusion abnormalities.   The gated perfusion images showed an ejection fraction of 61% at rest. The gated perfusion images showed an ejection fraction of 54% post stress. Normal ejection fraction is greater than 53%.   There is normal wall motion at rest and post stress.   LV cavity size is normal at rest and normal at stress.   The ECG portion of the study is negative for ischemia.   The patient reported chest pain during the stress test.   There were no arrhythmias during stress.     CT Abdomen Pelvis W Wo Contrast    Result Date: 6/20/2024  CMS Mandated Quality Data- CT Radiation- 436 All CT scans at this facility utilize dose modulation, iterative reconstruction, and/or weight based  dosing when appropriate to reduce radiation dose to as low as reasonably achievable. EXAMINATION: CT ABDOMEN PELVIS W WO CONTRAST CLINICAL HISTORY: rule out cancer with new onset clots; Acute embolism and thrombosis of right popliteal vein TECHNIQUE: CT abdomen and pelvis without and with intravenous contrast.  100 mL Omnipaque 350. COMPARISON: Abdominal ultrasound 12/07/2021. FINDINGS: There is subsegmental atelectasis of the lung bases.  Heart size is normal. There are small lobulated hypoattenuating and nonenhancing lesions in the left and right hepatic lobes in the region of the intrahepatic fissure measuring 1.9 cm each, and felt to reflect small hepatic cysts.  0.5 cm hypoattenuating structure in the inferior right hepatic lobe also noted, likely reflecting a small hepatic cyst.  No enhancing hepatic lesions identified.  The gallbladder and common bile duct within normal limits.  The pancreas, spleen and adrenal glands are unremarkable. The kidneys are normal size.  There is no hydronephrosis or nephrolithiasis.  There are no enhancing renal lesions.  Ureters are normal caliber without obstructions.  Evaluation of the pelvic viscera is limited due to beam hardening artifact from bilateral hip arthroplasty hardware.  Visualized portions of the bladder are unremarkable. There is mild diverticulosis of the colon.  There is oral contrast in the large and small bowel.  The appendix is not identified.  There is no bowel wall thickening or inflammatory changes observed.  The stomach is decompressed and grossly unremarkable. The abdominal aorta is normal caliber with moderate calcified plaque formation.  There are no large intra-abdominal lymph nodes.  There is no mesenteric fat stranding or free fluid observed. There are degenerative changes of the spine.  No acute osseous abnormality observed.     1. No acute intra-abdominal abnormality observed. 2. Small right and left hepatic cyst observed. 3. Colonic  diverticulosis. 4. Additional incidental observations as described. Electronically signed by: Milo Vogel Date:    06/20/2024 Time:    11:51        Doppler US  6/6/2024:     Impression:     Known pulmonary emboli on prior CTA chest.  Ultrasound demonstrates nonocclusive thrombus in the right popliteal vein.     CTA 6/6/2024:     Impression:     Known pulmonary emboli on prior CTA chest.  Ultrasound demonstrates nonocclusive thrombus in the right popliteal vein.       I have reviewed all available lab results and radiology reports.    Assessment/Plan:   (1) 79 y.o. female with diagnosis of RLE DVT and pulmonary emboli who has been referred by Yumiko Watson MD for evaluation by medical hematology/oncology. She had presented to the ED at [facility] at the bequest of her cardiologist on 6/6/2024 for SOB, JARA, right thigh pain and left sided CP over the past couple of months. CTA showed bilateral pulmonary emboli and doppler US showed a nonocclusive thrombus in the right popliteal vein.      6/14/2024:  - she is on eliquis  - no excessive bleeding or bruising  - + family hx/of clots  - order CT abdom/pelvis and clot work-up    7/15/2024:  - continued on eliquis  - to see cardiology again next week  - f/u with Dr Melgar's group - she needs repeat EGD and also needs colonsocopy  - order repeat CTA of chest    8/19/2024:  - continued on eliquis  - repeat CTA on 7/15 with resolution of the pulm eboli  - prothrombin II neg, MTHFR neg, gactor V leiden neg  - anticardiolipin neg  - protein C and S WNL        (2) HTN and hypercholesterolemia     (3) Hx/of an umbilical cot after having hysterectomy in the 90's     (4) GERD and diverticulitis; hx/of Hep C positive antibody; hx/of colon polyps     (5) CHF and chronic venous insuffiencey in legs Rght > Left - chronic diastolic HF     (6) Hypothyroidism; thyroid nodules     (7) Migraines, peripheral neuropathy     (8) DDD; bilateral knee issues, OA; shoulder bursa issues; s/p right  knee arthroscope in Apr 2012  - ht knee x2 with last one in 2018-19     (9) Depression     (10) Former smoker (quit 2012)               VISIT DIAGNOSES:      Positive ANEUDY (antinuclear antibody)    Acute deep vein thrombosis (DVT) of popliteal vein of right lower extremity    Pulmonary embolism, unspecified chronicity, unspecified pulmonary embolism type, unspecified whether acute cor pulmonale present          PLAN:  Continue eliquis oral anticoagulation for now for at least another 6 months  F/iu with cardiology and PCP -sees Dr Badillo today  Proceed with pain management visit this Thursday  F/u wt Dr Ching with  as directed  Consider f/u with Dr Boogie with pulmonary if needed  F/u with GI for EGD and colonoscopy - sees Dr García on 8/29           RTC in  6 months  Fax note to   IFRAH Badillo [place]; Justino García Clinton H. III, MD,  irina Ching    Discussion:     Anticoagulation Discussion:     Discussed with patient and any applicable family members about the benefit and/or need for anticoagulation. Communicated about the risks of bleeding while on any anticoagulation, which could be serious and/or life-threatening, and which can occur at any time, regardless of degree of the level of anticoagulation. Expressed the need for compliance with any anticoagulation regimen and that failure to do so could potential lead to excessive bleeding, and risk to health and/or life. In particular, with patients on coumadin therapy, compliance with requested blood work is absolutely essential, as coumadin levels can vary from time to time, and failure to do so could potentially place the patient at risk for bleeding and/or clotting events which could be fatal. Patients on coumadin are encouraged to call the day after they have their levels drawn, as to obtain the appropriate instructions from our staff. Patients are aware that self-regulating or self-dosing of their medications is strictly  prohibited.         I reviewed results of the recently ordered labs, tests and studies; made directives with regards to the results. I have explained all of the above in detail and the patient understands all of the current recommendation(s). I have answered all of their questions to the best of my ability and to their complete satisfaction. The patient is to continue with the current management plan.            Electronically signed by Rayray Green MD

## 2024-08-20 NOTE — PROGRESS NOTES
Using her cane.  Cervical arthralgia left lateral neck is tender.  Would like x-ray.  Does not radiate down the arm.  Hypertension is under control.  Cognitive impairment.  TSH 0.95 free T4 1.10.  CBC is normal.  Found to have pulmonary emboli recently in clot in the right leg.  Anticoagulated now with Eliquis coagulopathy workup in progress by Dr. Didi SUN.  Needs to stay on Eliquis at least 6 more months and then follow-up with him.  Also has a history of colon polyps needs to follow-up with GI.  She has hypothyroidism.  She has been using anti-inflammatories.  She says she was not told she could not take them with the Eliquis.  BMI of 36.      Physical examination.  Vital signs noted.  No acute distress neck is tender left lateral neck.  At the base.  It is increased greatly by rotation.  Very decreased rotation to the left.  Fair rotation to the right.  Symmetrical .  No bruit.  Chest clear.  Heart regular rate rhythm.  Extremities without edema.  Subjective:       Patient ID: Marisela Eagle is a 79 y.o. female.    Chief Complaint: No chief complaint on file.    HPI    Objective:        Assessment:       1. Hypertension, essential    2. Mixed hyperlipidemia    3. Hypothyroidism, unspecified type    4. Arthralgia of cervical spine    5. Cognitive impairment    6. Pulmonary embolism, unspecified chronicity, unspecified pulmonary embolism type, unspecified whether acute cor pulmonale present    7. Use of cane as ambulatory aid    8. Polyp of colon, unspecified part of colon, unspecified type    9. Severe obesity (BMI 35.0-39.9) with comorbidity    10. Anticoagulated        Plan:       Hypertension, essential    Mixed hyperlipidemia    Hypothyroidism, unspecified type    Arthralgia of cervical spine  -     X-Ray Cervical Spine 2 or 3 Views; Future; Expected date: 08/19/2024    Cognitive impairment    Pulmonary embolism, unspecified chronicity, unspecified pulmonary embolism type, unspecified whether acute  cor pulmonale present    Use of cane as ambulatory aid    Polyp of colon, unspecified part of colon, unspecified type    Severe obesity (BMI 35.0-39.9) with comorbidity    Anticoagulated    Referred her to Dr. Naranjo or Dr. Miller regarding the memory.  To Dr. Mulugeta escobar for upper endoscopy and possible colonoscopy.  Mammogram ordered.  No nonsteroidal anti-inflammatories of any kind.  Tylenol p.r.n..  X-ray her cervical spine.  May need MRI.

## 2024-08-21 ENCOUNTER — TELEPHONE (OUTPATIENT)
Dept: CARDIOLOGY | Facility: CLINIC | Age: 79
End: 2024-08-21
Payer: MEDICARE

## 2024-08-21 RX ORDER — FAMOTIDINE 20 MG/1
20 TABLET, FILM COATED ORAL NIGHTLY
Qty: 90 TABLET | Refills: 3 | Status: SHIPPED | OUTPATIENT
Start: 2024-08-21

## 2024-08-21 RX ORDER — SPIRONOLACTONE 25 MG/1
25 TABLET ORAL
Qty: 90 TABLET | Refills: 3 | Status: SHIPPED | OUTPATIENT
Start: 2024-08-21

## 2024-08-21 RX ORDER — METOPROLOL SUCCINATE 50 MG/1
TABLET, EXTENDED RELEASE ORAL
Qty: 45 TABLET | Refills: 3 | Status: SHIPPED | OUTPATIENT
Start: 2024-08-21

## 2024-08-21 NOTE — TELEPHONE ENCOUNTER
No care due was identified.  Health [facility] Embedded Care Due Messages. Reference number: [id number].   8/21/2024 1:11:09 AM CDT

## 2024-08-21 NOTE — TELEPHONE ENCOUNTER
Refill Decision Note   Marisela Eagle  is requesting a refill authorization.  Brief Assessment and Rationale for Refill:  Approve     Medication Therapy Plan:        Pharmacist review requested: Yes   Extended chart review required: Yes   Comments:     Note composed:2:40 PM 08/21/2024

## 2024-08-21 NOTE — TELEPHONE ENCOUNTER
Refill Routing Note   Medication(s) are not appropriate for processing by [facility] for the following reason(s):        Drug-drug interaction: spironolactone, torsemide   Drug-disease interaction: metoprolol succinate and Claudication of both lower extremities    [facility] action(s):  Approve  Defer             Pharmacist review requested: Yes     Appointments  past 12m or future 3m with PCP    Date Provider   Last Visit   8/19/2024 Pawel Badillo III, MD   Next Visit   11/1/2024 Pawel Badillo III, MD   ED visits in past 90 days: 0        Note composed:2:27 PM 08/21/2024

## 2024-08-21 NOTE — TELEPHONE ENCOUNTER
----- Message from Enoch Stinson sent at 8/21/2024  9:07 AM CDT -----  Regarding: return call  Contact: patient  Type:  Patient Returning Call    Who Called:patient  Who Left Message for Patient:office nurse  Does the patient know what this is regarding?:upcoming appointment on Monday 8/26/ patient do not know what this appointment is for/ patient states her  has a procedure not her  Would the patient rather a call back or a response via [facility]? Please call to advise  Best Call Back Number:[phone]  Additional Information:

## 2024-08-22 ENCOUNTER — TELEPHONE (OUTPATIENT)
Dept: FAMILY MEDICINE | Facility: CLINIC | Age: 79
End: 2024-08-22
Payer: MEDICARE

## 2024-08-22 NOTE — TELEPHONE ENCOUNTER
Pt callrd with home pos covid 19 , has fever runny nosecough. Dr light sending molnupiravir to [facility]. If any new or wose symp to er.

## 2024-08-22 NOTE — TELEPHONE ENCOUNTER
Ret call to pt said she has cold , fever since yesterday saw dr freire Monday for uti and is on bactrim . Told pt she needs to be ckd for covid possibly or seen and pt said she would get home test and call with res . Any problems to uc or er like sob cp. We will work her in if needed at our or other clinic.

## 2024-08-25 NOTE — TELEPHONE ENCOUNTER
No care due was identified.  Health [facility] Embedded Care Due Messages. Reference number: [id number].   8/25/2024 1:52:59 AM CDT

## 2024-08-25 NOTE — TELEPHONE ENCOUNTER
Refill Routing Note   Medication(s) are not appropriate for processing by [facility] for the following reason(s):        Outside of protocol  Elavil is prescribed for as needed use    [facility] action(s):  Route               Appointments  past 12m or future 3m with PCP    Date Provider   Last Visit   8/19/2024 Pawel Badillo III, MD   Next Visit   11/1/2024 Pawel Badillo III, MD   ED visits in past 90 days: 0        Note composed:9:47 AM 08/25/2024

## 2024-08-26 ENCOUNTER — TELEPHONE (OUTPATIENT)
Dept: CARDIOLOGY | Facility: CLINIC | Age: 79
End: 2024-08-26
Payer: MEDICARE

## 2024-08-26 ENCOUNTER — TELEPHONE (OUTPATIENT)
Dept: FAMILY MEDICINE | Facility: CLINIC | Age: 79
End: 2024-08-26
Payer: MEDICARE

## 2024-08-26 RX ORDER — AMITRIPTYLINE HYDROCHLORIDE 10 MG/1
TABLET, FILM COATED ORAL
Qty: 90 TABLET | Refills: 3 | OUTPATIENT
Start: 2024-08-26

## 2024-08-26 NOTE — TELEPHONE ENCOUNTER
----- Message from Gaye Zamorano sent at 8/26/2024  9:11 AM CDT -----  Regarding: Needs Medical Clearance  Contact: patient at [phone]  Type: Needs Medical Clearance  Who Called:  patient at [phone]    Additional Information: Calling to discuss when she could stop taking her apixaban (ELIQUIS) 5 mg Tab for her upcoming procedure on September 4th. Her doctor is faxing information regarding it. Please call and advise. Thank you

## 2024-08-26 NOTE — TELEPHONE ENCOUNTER
----- Message from June Ku sent at 8/26/2024 12:18 PM CDT -----  Contact: Patient  Type:  Needs Medical Advice    Who Called:   Patient    Would the patient rather a call back or a response via [facility]?   Call back  Best Call Back Number:   [phone]    Additional Information:  States she would like to speak with someone about coming in this Wednesday, 8/28 to get a COVID shot from Dr Badillo - please call - thank you

## 2024-08-28 ENCOUNTER — HOSPITAL ENCOUNTER (OUTPATIENT)
Dept: RADIOLOGY | Facility: HOSPITAL | Age: 79
Discharge: HOME | End: 2024-08-28
Attending: FAMILY MEDICINE
Payer: MEDICARE

## 2024-08-28 DIAGNOSIS — M54.2 ARTHRALGIA OF CERVICAL SPINE: ICD-10-CM

## 2024-08-28 PROCEDURE — 72040 X-RAY EXAM NECK SPINE 2-3 VW: CPT | Mod: 26,,, | Performed by: RADIOLOGY

## 2024-08-28 PROCEDURE — 72040 X-RAY EXAM NECK SPINE 2-3 VW: CPT | Mod: TC,PO

## 2024-08-29 ENCOUNTER — TELEPHONE (OUTPATIENT)
Dept: NEUROSURGERY | Facility: CLINIC | Age: 79
End: 2024-08-29
Payer: MEDICARE

## 2024-08-29 NOTE — TELEPHONE ENCOUNTER
----- Message from Stacey M Lefort sent at 8/29/2024  1:32 PM CDT -----  Pt is requesting a callback to schedule an appt w Dr Hodgson. Her callback is  [phone]

## 2024-08-30 ENCOUNTER — TELEPHONE (OUTPATIENT)
Dept: CARDIOLOGY | Facility: CLINIC | Age: 79
End: 2024-08-30
Payer: MEDICARE

## 2024-08-30 NOTE — TELEPHONE ENCOUNTER
Pt attempted to return call but this nurse on the phone with another pt but was able to call pt back and reach her successfully regarding making a new patient appt. Pt has MRI of Lumbar Spine, but she denied having an MRI of her Cervical Spine, which is her main area of concern. Pt denies any worker's compensation or litigation. This nurse scheduled pt successfully with Gwen Ward PA-C, on next available appt.

## 2024-08-30 NOTE — TELEPHONE ENCOUNTER
This nurse attempted to call pt without success, was calling to schedule pt as a new patient with Dr. Hodgson or Gwen Ward PA-C. Unable to reach pt, left voicemail with callback number.

## 2024-09-03 ENCOUNTER — OFFICE VISIT (OUTPATIENT)
Dept: FAMILY MEDICINE | Facility: CLINIC | Age: 79
End: 2024-09-03
Payer: MEDICARE

## 2024-09-03 ENCOUNTER — OFFICE VISIT (OUTPATIENT)
Dept: NEUROSURGERY | Facility: CLINIC | Age: 79
End: 2024-09-03
Payer: MEDICARE

## 2024-09-03 VITALS
HEART RATE: 71 BPM | OXYGEN SATURATION: 95 % | HEIGHT: 68 IN | RESPIRATION RATE: 18 BRPM | WEIGHT: 244.5 LBS | BODY MASS INDEX: 37.05 KG/M2 | SYSTOLIC BLOOD PRESSURE: 110 MMHG | TEMPERATURE: 98 F | DIASTOLIC BLOOD PRESSURE: 76 MMHG

## 2024-09-03 VITALS
DIASTOLIC BLOOD PRESSURE: 88 MMHG | HEART RATE: 89 BPM | BODY MASS INDEX: 36.98 KG/M2 | SYSTOLIC BLOOD PRESSURE: 140 MMHG | HEIGHT: 68 IN | WEIGHT: 244 LBS

## 2024-09-03 DIAGNOSIS — M50.30 DEGENERATIVE DISC DISEASE, CERVICAL: ICD-10-CM

## 2024-09-03 DIAGNOSIS — R20.2 PARESTHESIA OF BOTH HANDS: ICD-10-CM

## 2024-09-03 DIAGNOSIS — J30.2 SEASONAL ALLERGIES: ICD-10-CM

## 2024-09-03 DIAGNOSIS — R05.1 ACUTE COUGH: ICD-10-CM

## 2024-09-03 DIAGNOSIS — M54.2 CERVICALGIA: Primary | ICD-10-CM

## 2024-09-03 DIAGNOSIS — M43.12 ANTEROLISTHESIS OF CERVICAL SPINE: Primary | ICD-10-CM

## 2024-09-03 PROCEDURE — 1157F ADVNC CARE PLAN IN RCRD: CPT | Mod: HCNC,CPTII,S$GLB,

## 2024-09-03 PROCEDURE — 3079F DIAST BP 80-89 MM HG: CPT | Mod: HCNC,CPTII,S$GLB, | Performed by: HEALTH CARE PROVIDER

## 2024-09-03 PROCEDURE — 99213 OFFICE O/P EST LOW 20 MIN: CPT | Mod: HCNC,S$GLB,,

## 2024-09-03 PROCEDURE — 1101F PT FALLS ASSESS-DOCD LE1/YR: CPT | Mod: HCNC,CPTII,S$GLB, | Performed by: HEALTH CARE PROVIDER

## 2024-09-03 PROCEDURE — 99215 OFFICE O/P EST HI 40 MIN: CPT | Mod: HCNC,S$GLB,, | Performed by: HEALTH CARE PROVIDER

## 2024-09-03 PROCEDURE — 3077F SYST BP >= 140 MM HG: CPT | Mod: HCNC,CPTII,S$GLB, | Performed by: HEALTH CARE PROVIDER

## 2024-09-03 PROCEDURE — 1157F ADVNC CARE PLAN IN RCRD: CPT | Mod: HCNC,CPTII,S$GLB, | Performed by: HEALTH CARE PROVIDER

## 2024-09-03 PROCEDURE — 1159F MED LIST DOCD IN RCRD: CPT | Mod: HCNC,CPTII,S$GLB,

## 2024-09-03 PROCEDURE — 1125F AMNT PAIN NOTED PAIN PRSNT: CPT | Mod: HCNC,CPTII,S$GLB,

## 2024-09-03 PROCEDURE — 99999 PR PBB SHADOW E&M-EST. PATIENT-LVL IV: CPT | Mod: PBBFAC,HCNC,,

## 2024-09-03 PROCEDURE — 3288F FALL RISK ASSESSMENT DOCD: CPT | Mod: HCNC,CPTII,S$GLB, | Performed by: HEALTH CARE PROVIDER

## 2024-09-03 PROCEDURE — 1125F AMNT PAIN NOTED PAIN PRSNT: CPT | Mod: HCNC,CPTII,S$GLB, | Performed by: HEALTH CARE PROVIDER

## 2024-09-03 PROCEDURE — 1101F PT FALLS ASSESS-DOCD LE1/YR: CPT | Mod: HCNC,CPTII,S$GLB,

## 2024-09-03 PROCEDURE — 1159F MED LIST DOCD IN RCRD: CPT | Mod: HCNC,CPTII,S$GLB, | Performed by: HEALTH CARE PROVIDER

## 2024-09-03 PROCEDURE — 3074F SYST BP LT 130 MM HG: CPT | Mod: HCNC,CPTII,S$GLB,

## 2024-09-03 PROCEDURE — 99417 PROLNG OP E/M EACH 15 MIN: CPT | Mod: HCNC,S$GLB,, | Performed by: HEALTH CARE PROVIDER

## 2024-09-03 PROCEDURE — 3078F DIAST BP <80 MM HG: CPT | Mod: HCNC,CPTII,S$GLB,

## 2024-09-03 PROCEDURE — 3288F FALL RISK ASSESSMENT DOCD: CPT | Mod: HCNC,CPTII,S$GLB,

## 2024-09-03 RX ORDER — BENZONATATE 200 MG/1
200 CAPSULE ORAL 3 TIMES DAILY PRN
Qty: 30 CAPSULE | Refills: 0 | Status: SHIPPED | OUTPATIENT
Start: 2024-09-03 | End: 2024-09-13

## 2024-09-03 RX ORDER — LORATADINE 10 MG/1
10 TABLET ORAL DAILY
Qty: 30 TABLET | Refills: 1 | Status: SHIPPED | OUTPATIENT
Start: 2024-09-03 | End: 2025-09-03

## 2024-09-03 NOTE — PROGRESS NOTES
Subjective:       Patient ID: Marisela Eagle is a 79 y.o. female.    Chief Complaint: Results    Marisela Eagle is an 79-year-old female patient who presents to clinic today to discuss recent x-ray of her neck.  Patient saw Dr. Badillo and was complaining of tenderness on the left side of her neck.  X-ray of her neck was done showing 3 mm anteriorolisthesis of C7-T1.  Also advanced multilevel cervical facet arthropathy with degenerative changes.  Patient has appointment scheduled to see Dr. Hodgson today to review this.  Patient also states she feels like she is getting a cold again.  She was sick a couple of weeks ago and did a home combination COVID/flu test which was positive.  She did start to feel better but she has recently started sneezing, her nose is running and she has a scratchy throat.        Narrative & Impression  EXAMINATION:  XR CERVICAL SPINE 2 OR 3 VIEWS     CLINICAL HISTORY:  Cervicalgia     COMPARISON:  07/23/2021     FINDINGS:  3 mm anterolisthesis of C7 on T1.  No prevertebral soft tissue swelling.  Multilevel degenerative disc space narrowing and osteophyte formation, most pronounced at C5-6 and C6-7.  Advanced multilevel cervical facet arthropathy.  Open-mouth view shows normal atlantoaxial alignment.     Impression:     Multilevel cervical spondylosis as outlined above, similar to the reference exam.     Advanced multilevel cervical facet arthropathy with degenerative grade 1 anterolisthesis of C7 on T1.        Electronically signed by:Tim Neely  Date:                                            08/28/2024  Time:                                           13:11        Review of patient's allergies indicates:  No Known Allergies  Social Determinants of Health     Tobacco Use: Medium Risk (9/3/2024)    Patient History     Smoking Tobacco Use: Former     Smokeless Tobacco Use: Never     Passive Exposure: Not on file   Alcohol Use: Not At Risk (11/27/2023)    AUDIT-C     Frequency of Alcohol  Consumption: Monthly or less     Average Number of Drinks: Patient does not drink     Frequency of Binge Drinking: Never   Financial Resource Strain: Low Risk  (11/27/2023)    Overall Financial Resource Strain (CARDIA)     Difficulty of Paying Living Expenses: Not hard at all   Food Insecurity: No Food Insecurity (11/27/2023)    Hunger Vital Sign     Worried About Running Out of Food in the Last Year: Never true     Ran Out of Food in the Last Year: Never true   Transportation Needs: No Transportation Needs (11/27/2023)    PRAPARE - Transportation     Lack of Transportation (Medical): No     Lack of Transportation (Non-Medical): No   Physical Activity: Inactive (11/27/2023)    Exercise Vital Sign     Days of Exercise per Week: 0 days     Minutes of Exercise per Session: 0 min   Stress: No Stress Concern Present (11/27/2023)    [place] [employer] - Occupational Stress Questionnaire     Feeling of Stress : Not at all   Housing Stability: Low Risk  (11/27/2023)    Housing Stability Vital Sign     Unable to Pay for Housing in the Last Year: No     Number of Places Lived in the Last Year: 1     Unstable Housing in the Last Year: No   Depression: Low Risk  (8/19/2024)    Depression     Last PHQ-4: Flowsheet Data: 0   Utilities: Not on file   Health Literacy: Not on file   Social Isolation: Not on file      Past Medical History:   Diagnosis Date    Abnormal stress test 2024    shortness of breath    Bilateral knee pain 07/2012    left worse, right knee painful even after partial replacement.    Bruises easily     Clot 1990's?    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 Took Coumadin x 6 weeks    KNEE ARTHROPLASTY Bilateral     KNEE ARTHROSCOPY  2010    right    LEFT HEART CATHETERIZATION Left 2024    Procedure: Left heart cath;  Surgeon: Cooper Canchola MD;  Location: [facility] CATH/EP LAB;  Service: Cardiology;  Laterality: Left;    TONSILLECTOMY        Social History     Socioeconomic History    Marital status:          Current Outpatient Medications:     amitriptyline (ELAVIL) 10 MG tablet, TAKE 1 TABLET EVERY NIGHT AS NEEDED FOR PAIN (Patient taking differently: Take 10 mg by mouth nightly as needed for Insomnia.), Disp: 90 tablet, Rfl: 1    apixaban (ELIQUIS) 5 mg Tab, Take 1 tablet (5 mg total) by mouth 2 (two) times daily., Disp: 180 tablet, Rfl: 3    artificial tears,hypromellose,,GENTEAL/SUSTANE, (SYSTANE GEL) 0.3 % Gel, Place 1 drop into both eyes as needed., Disp: , Rfl:     cholecalciferol, vitamin D3, 5,000 unit capsule, Take 5,000 Units by mouth once daily., Disp: , Rfl:     cranberry fruit extract (CRANBERRY CONCENTRATE ORAL), Take 1 tablet by mouth Daily., Disp: , Rfl:     cyanocobalamin (VITAMIN B-12) 1000 MCG tablet, Take 5,000 mcg by mouth once daily., Disp: , Rfl:     famotidine (PEPCID) 20 MG tablet, TAKE 1 TABLET EVERY EVENING, Disp: 90 tablet, Rfl: 3    gabapentin (NEURONTIN) 800 MG tablet, TAKE 1 TABLET THREE TIMES DAILY (Patient taking differently: Take 800 mg by mouth 2 (two) times daily.), Disp: 270 tablet, Rfl: 3    levothyroxine  (SYNTHROID) 125 MCG tablet, TAKE 1 TABLET EVERY DAY BEFORE BREAKFAST (Patient taking differently: Take 125 mcg by mouth before breakfast. TAKE 1 TABLET EVERY DAY BEFORE BREAKFAST), Disp: 90 tablet, Rfl: 3    LORazepam (ATIVAN) 1 MG tablet, Take 1 tablet (1 mg total) by mouth every evening., Disp: 30 tablet, Rfl: 0    losartan (COZAAR) 100 MG tablet, TAKE 1/2 TABLET TWICE DAILY (Patient taking differently: Take 50 mg by mouth 2 (two) times daily. 50 Mg BID), Disp: 90 tablet, Rfl: 3    magnesium 30 mg Tab, Take 1 tablet by mouth once daily., Disp: , Rfl:     metoprolol succinate (TOPROL-XL) 50 MG 24 hr tablet, TAKE 1/2 TABLET EVERY EVENING, Disp: 45 tablet, Rfl: 3    pantoprazole (PROTONIX) 40 MG tablet, Take 1 tablet (40 mg total) by mouth every morning., Disp: 90 tablet, Rfl: 1    potassium chloride SA (K-DUR,KLOR-CON) 20 MEQ tablet, Take 20 mEq by mouth once daily., Disp: , Rfl:     spironolactone (ALDACTONE) 25 MG tablet, TAKE 1 TABLET EVERY DAY, Disp: 90 tablet, Rfl: 3    torsemide (DEMADEX) 20 MG Tab, Take 1 tablet (20 mg total) by mouth once daily., Disp: 30 tablet, Rfl: 11    vit C/E/Zn/coppr/lutein/zeaxan (PRESERVISION AREDS-2 ORAL), Take 1 capsule by mouth once daily., Disp: , Rfl:     benzonatate (TESSALON) 200 MG capsule, Take 1 capsule (200 mg total) by mouth 3 (three) times daily as needed for Cough., Disp: 30 capsule, Rfl: 0    DULoxetine (CYMBALTA) 60 MG capsule, Take 1 capsule (60 mg total) by mouth once daily. (Patient not taking: Reported on 9/3/2024), Disp: 90 capsule, Rfl: 1    fluconazole (DIFLUCAN) 100 MG tablet, Take 1 tablet (100 mg total) by mouth once daily. (Patient not taking: Reported on 8/19/2024), Disp: 10 tablet, Rfl: 0    loratadine (CLARITIN) 10 mg tablet, Take 1 tablet (10 mg total) by mouth once daily., Disp: 30 tablet, Rfl: 1    molnupiravir 200 mg capsule (EUA), Take 4 capsules (800 mg total) by mouth every 12 (twelve) hours., Disp: 40 capsule, Rfl: 0    oxyCODONE-acetaminophen  (PERCOCET)  mg per tablet, Take 1 tablet by mouth daily as needed for Pain. (Patient not taking: Reported on 9/3/2024), Disp: , Rfl:     sulfamethoxazole-trimethoprim 800-160mg (BACTRIM DS) 800-160 mg Tab, Take 1 tablet by mouth 2 (two) times daily., Disp: , Rfl:     triamcinolone acetonide (KENALOG-40) 40 mg/mL injection, Inject 60 mg into the articular space., Disp: , Rfl:     Lab Results   Component Value Date    WBC 6.68 07/29/2024    HGB 11.7 (L) 07/29/2024    HCT 36.8 (L) 07/29/2024     07/29/2024    CHOL 222 (H) 10/30/2023    TRIG 102 10/30/2023    HDL 72 10/30/2023    ALT 18 07/24/2024    AST 19 07/24/2024     (L) 07/24/2024    K 4.4 07/24/2024     07/24/2024    CREATININE 0.8 07/24/2024    BUN 16 07/24/2024    CO2 22 (L) 07/24/2024    TSH 0.950 07/24/2024    INR 0.9 06/06/2024    HGBA1C 5.4 07/24/2024       Review of Systems   Constitutional:  Negative for chills and fever.   HENT:  Positive for postnasal drip. Negative for nasal congestion, ear pain, sinus pressure/congestion and sore throat.         Throat not painful but does feel itchy.   Respiratory:  Positive for cough. Negative for shortness of breath and wheezing.         Occasional cough   Cardiovascular:  Negative for chest pain and palpitations.   Musculoskeletal:  Positive for neck pain.        Neck pain constant with left side worse than right.       Objective:      Physical Exam  Vitals reviewed.   Constitutional:       Appearance: Normal appearance.   HENT:      Right Ear: Tympanic membrane normal.      Left Ear: Tympanic membrane normal.      Nose: Nose normal.      Mouth/Throat:      Pharynx: Oropharynx is clear. Posterior oropharyngeal erythema present. No oropharyngeal exudate.   Eyes:      Conjunctiva/sclera: Conjunctivae normal.   Cardiovascular:      Rate and Rhythm: Normal rate and regular rhythm.      Pulses: Normal pulses.      Heart sounds: Normal heart sounds.   Pulmonary:      Effort: Pulmonary effort is  normal.      Breath sounds: Normal breath sounds.   Musculoskeletal:      Cervical back: Tenderness present.   Skin:     General: Skin is warm and dry.      Capillary Refill: Capillary refill takes less than 2 seconds.   Neurological:      Mental Status: She is alert.   Psychiatric:         Mood and Affect: Mood normal.         Behavior: Behavior normal.         Thought Content: Thought content normal.         Judgment: Judgment normal.         Assessment:       1. Anterolisthesis of cervical spine    2. Degenerative disc disease, cervical    3. Seasonal allergies    4. Acute cough        Plan:       Marisela was seen today for results.    Diagnoses and all orders for this visit:    Anterolisthesis of cervical spine    Degenerative disc disease, cervical    Seasonal allergies  -     loratadine (CLARITIN) 10 mg tablet; Take 1 tablet (10 mg total) by mouth once daily.    Acute cough  -     benzonatate (TESSALON) 200 MG capsule; Take 1 capsule (200 mg total) by mouth 3 (three) times daily as needed for Cough.    DDD cervical spine  - see Dr. Hodgson as scheduled    Claritin daily for post nasal drip.  Benzonatate for cough.  If symptoms worsen or do not improve return to clinic.

## 2024-09-03 NOTE — PROGRESS NOTES
Neurosurgery  History & Physical    SUBJECTIVE:     Chief Complaint:  Neck pain    History of Present Illness:  Marisela Eagle is a 79 y.o. female with past medical history of bilateral lower leg neuropathy, cervical degenerative disc disease, anxiety, chronic bilateral shoulder osteoarthritis, hypertension, hyperlipidemia, venous insufficiency, aorta arthrosclerosis, osteoporosis, recurrent urinary tract infections, hyperparathyroidism, gait instability, Raynaud's, status post L3-5 lumbar fusion with instrumentation bilateral hip arthroplasty and bilateral knee arthroplasty.  Self-referred.  Patient is present with her  who is an established patient.  She reports chronic constant posterior cervical pain that radiates to bilateral superior trapezius muscle which began approximately 1 year ago without an inciting event.  She also reports limited cervical range of motion with rotation which makes it difficult for her to see on coming traffic in her blind spots.  She is also experiencing bilateral hand clumsiness, paresthesias and imbalance.  She has been using a four-point cane for the last year to aid with ambulation.  She is currently established with Dr. Gusman, orthopedic surgery, for bilateral shoulder pathology and receives subacromial bursal injections in both shoulders every 3 months which does provide relief.  She denies falls, upper extremity radicular pain, upper extremity weakness, urinary/bowel incontinence or retention, or previous cervical surgery.    Of note, patient reports chronic right leg weakness since 2018 which requires her to lift right leg when getting in and out of a vehicle.    AP/AC:  Eliquis for right leg DVT and PE  Smoking status-: Former smoker, quit 12 years ago      Review of patient's allergies indicates:  No Known Allergies    Current Outpatient Medications   Medication Sig Dispense Refill    amitriptyline (ELAVIL) 10 MG tablet TAKE 1 TABLET EVERY NIGHT AS NEEDED FOR PAIN  (Patient taking differently: Take 10 mg by mouth nightly as needed for Insomnia.) 90 tablet 1    apixaban (ELIQUIS) 5 mg Tab Take 1 tablet (5 mg total) by mouth 2 (two) times daily. 180 tablet 3    artificial tears,hypromellose,,GENTEAL/SUSTANE, (SYSTANE GEL) 0.3 % Gel Place 1 drop into both eyes as needed.      benzonatate (TESSALON) 200 MG capsule Take 1 capsule (200 mg total) by mouth 3 (three) times daily as needed for Cough. 30 capsule 0    cholecalciferol, vitamin D3, 5,000 unit capsule Take 5,000 Units by mouth once daily.      cranberry fruit extract (CRANBERRY CONCENTRATE ORAL) Take 1 tablet by mouth Daily.      cyanocobalamin (VITAMIN B-12) 1000 MCG tablet Take 5,000 mcg by mouth once daily.      DULoxetine (CYMBALTA) 60 MG capsule Take 1 capsule (60 mg total) by mouth once daily. 90 capsule 1    famotidine (PEPCID) 20 MG tablet TAKE 1 TABLET EVERY EVENING 90 tablet 3    gabapentin (NEURONTIN) 800 MG tablet TAKE 1 TABLET THREE TIMES DAILY (Patient taking differently: Take 800 mg by mouth 2 (two) times daily.) 270 tablet 3    levothyroxine (SYNTHROID) 125 MCG tablet TAKE 1 TABLET EVERY DAY BEFORE BREAKFAST (Patient taking differently: Take 125 mcg by mouth before breakfast. TAKE 1 TABLET EVERY DAY BEFORE BREAKFAST) 90 tablet 3    loratadine (CLARITIN) 10 mg tablet Take 1 tablet (10 mg total) by mouth once daily. 30 tablet 1    LORazepam (ATIVAN) 1 MG tablet Take 1 tablet (1 mg total) by mouth every evening. 30 tablet 0    losartan (COZAAR) 100 MG tablet TAKE 1/2 TABLET TWICE DAILY (Patient taking differently: Take 50 mg by mouth 2 (two) times daily. 50 Mg BID) 90 tablet 3    magnesium 30 mg Tab Take 1 tablet by mouth once daily.      metoprolol succinate (TOPROL-XL) 50 MG 24 hr tablet TAKE 1/2 TABLET EVERY EVENING 45 tablet 3    oxyCODONE-acetaminophen (PERCOCET)  mg per tablet Take 1 tablet by mouth daily as needed for Pain.      pantoprazole (PROTONIX) 40 MG tablet Take 1 tablet (40 mg total) by  mouth every morning. 90 tablet 1    potassium chloride SA (K-DUR,KLOR-CON) 20 MEQ tablet Take 20 mEq by mouth once daily.      spironolactone (ALDACTONE) 25 MG tablet TAKE 1 TABLET EVERY DAY 90 tablet 3    torsemide (DEMADEX) 20 MG Tab Take 1 tablet (20 mg total) by mouth once daily. 30 tablet 11    vit C/E/Zn/coppr/lutein/zeaxan (PRESERVISION AREDS-2 ORAL) Take 1 capsule by mouth once daily.       No current facility-administered medications for this visit.       Family History       Problem Relation (Age of Onset)    Cancer Daughter    Diabetes Maternal Grandmother    Hypertension Mother    Neuropathy Mother, Father    Stroke Mother          Social History     Socioeconomic History    Marital status:    Occupational History    Occupation: RETIRED   Tobacco Use    Smoking status: Former     Current packs/day: 0.00     Types: Cigarettes     Quit date: 3/23/2012     Years since quittin.4    Smokeless tobacco: Never   Substance and Sexual Activity    Alcohol use: Not Currently     Comment: occasional    Drug use: No     Social Determinants of Health     Financial Resource Strain: Low Risk  (2023)    Overall Financial Resource Strain (CARDIA)     Difficulty of Paying Living Expenses: Not hard at all   Food Insecurity: No Food Insecurity (2023)    Hunger Vital Sign     Worried About Running Out of Food in the Last Year: Never true     Ran Out of Food in the Last Year: Never true   Transportation Needs: No Transportation Needs (2023)    PRAPARE - Transportation     Lack of Transportation (Medical): No     Lack of Transportation (Non-Medical): No   Physical Activity: Inactive (2023)    Exercise Vital Sign     Days of Exercise per Week: 0 days     Minutes of Exercise per Session: 0 min   Stress: No Stress Concern Present (2023)    [employer] [place] [employer] - Occupational Stress Questionnaire     Feeling of Stress : Not at all   Housing Stability: Low Risk   "(11/27/2023)    Housing Stability Vital Sign     Unable to Pay for Housing in the Last Year: No     Number of Places Lived in the Last Year: 1     Unstable Housing in the Last Year: No       OBJECTIVE:     Vital Signs  Pulse: 89  BP: (!) 140/88  Pain Score:   6  Height: 5' 8" (172.7 cm)  Weight: 110.7 kg (244 lb)  Body mass index is 37.1 kg/m².      Neurosurgery Physical Exam    General:  Very pleasant, no acute distress.  Neurologic: Alert and oriented. Thought content appropriate.  GCS: E4 V5 M6; Total: 15  Mental Status: Awake, Alert, Oriented   Pulmonary: normal respirations, no signs of respiratory distress    Sensory: intact to light touch throughout  Motor Strength: Moves all extremities spontaneously with good tone.  No abnormal movements seen.   Bilateral upper extremity deltoid/biceps/triceps/hand  5/5   Right lower extremity strength iliopsoas 3-/5, EHL 4/5, TA/gastrocnemius 5/5  Left lower extremity EHL 4/5, iliopsoas/TA/gastrocnemius 5/5,    Reflexes Right Left   Biceps 1+ 1+   Brachioradialis 2+ 2+   Triceps 2+ 2+   Patellar 0  0   Ericksno's Neg Neg       Gait:  Slow, cautious  Tandem Gait:  Difficulty  Unable to walk on heels & toes     Cervical:   Midline TTP:  Positive.  Muscle contraction noted along bilateral superior trapezius muscle and were tender to palpation.     Thoracic:  Midline TTP: Negative.     Lumbar:  Midline TTP: Negative.  Straight Leg Test: Negative.      Diagnostic Imaging:  Cervical x-ray (08/28/2024).  Reviewed.  Lumbar MRI (12/20/2023).  Reviewed  DEXA (04/03/2023).  Reviewed.    ASSESSMENT/PLAN:     1. Cervicalgia    2. Paresthesia of both hands        Patient reports chronic cervical pain for the last year with bilateral hand paresthesias and clumsiness.  Cervical MRI ordered for further evaluation.  Also bilateral upper extremity EMG ordered to assess for cervical radiculopathy versus peripheral neuropathy.    Patient will follow-up with Dr. Hodgson after imaging and " EMG.        Cervicalgia  -     MRI Cervical Spine Without Contrast; Future; Expected date: 09/03/2024  -     EMG W/ ULTRASOUND AND NERVE CONDUCTION TEST 2 Extremities; Future    Paresthesia of both hands  -     MRI Cervical Spine Without Contrast; Future; Expected date: 09/03/2024  -     EMG W/ ULTRASOUND AND NERVE CONDUCTION TEST 2 Extremities; Future        I spent a total of 79 minutes of non-face and face to face time preparing to see the patient (eg, review of tests), obtain and/or review separately obtained history, document clinical information in the electronic or other health record, interpret results and communicate results to the patient/family/caregiver, or care coordinator.      Gwen Ward PA-C  Neurosurgery  [facility]

## 2024-09-04 ENCOUNTER — OFFICE VISIT (OUTPATIENT)
Dept: PULMONOLOGY | Facility: CLINIC | Age: 79
End: 2024-09-04
Payer: MEDICARE

## 2024-09-04 ENCOUNTER — TELEPHONE (OUTPATIENT)
Dept: CARDIOLOGY | Facility: CLINIC | Age: 79
End: 2024-09-04
Payer: MEDICARE

## 2024-09-04 VITALS
SYSTOLIC BLOOD PRESSURE: 140 MMHG | OXYGEN SATURATION: 94 % | HEART RATE: 63 BPM | BODY MASS INDEX: 36.29 KG/M2 | DIASTOLIC BLOOD PRESSURE: 68 MMHG | HEIGHT: 69 IN | WEIGHT: 245 LBS

## 2024-09-04 DIAGNOSIS — R91.1 SOLITARY PULMONARY NODULE: ICD-10-CM

## 2024-09-04 DIAGNOSIS — R06.02 SOB (SHORTNESS OF BREATH): Primary | ICD-10-CM

## 2024-09-04 DIAGNOSIS — I26.99 OTHER ACUTE PULMONARY EMBOLISM WITHOUT ACUTE COR PULMONALE: ICD-10-CM

## 2024-09-04 PROCEDURE — 1159F MED LIST DOCD IN RCRD: CPT | Mod: HCNC,CPTII,S$GLB, | Performed by: INTERNAL MEDICINE

## 2024-09-04 PROCEDURE — 1125F AMNT PAIN NOTED PAIN PRSNT: CPT | Mod: HCNC,CPTII,S$GLB, | Performed by: INTERNAL MEDICINE

## 2024-09-04 PROCEDURE — 99999 PR PBB SHADOW E&M-EST. PATIENT-LVL IV: CPT | Mod: PBBFAC,HCNC,, | Performed by: INTERNAL MEDICINE

## 2024-09-04 PROCEDURE — 1157F ADVNC CARE PLAN IN RCRD: CPT | Mod: HCNC,CPTII,S$GLB, | Performed by: INTERNAL MEDICINE

## 2024-09-04 PROCEDURE — 3078F DIAST BP <80 MM HG: CPT | Mod: HCNC,CPTII,S$GLB, | Performed by: INTERNAL MEDICINE

## 2024-09-04 PROCEDURE — 99214 OFFICE O/P EST MOD 30 MIN: CPT | Mod: HCNC,S$GLB,, | Performed by: INTERNAL MEDICINE

## 2024-09-04 PROCEDURE — 3288F FALL RISK ASSESSMENT DOCD: CPT | Mod: HCNC,CPTII,S$GLB, | Performed by: INTERNAL MEDICINE

## 2024-09-04 PROCEDURE — 3077F SYST BP >= 140 MM HG: CPT | Mod: HCNC,CPTII,S$GLB, | Performed by: INTERNAL MEDICINE

## 2024-09-04 PROCEDURE — 1101F PT FALLS ASSESS-DOCD LE1/YR: CPT | Mod: HCNC,CPTII,S$GLB, | Performed by: INTERNAL MEDICINE

## 2024-09-04 NOTE — TELEPHONE ENCOUNTER
----- Message from Karlos Matthew sent at 9/4/2024  2:30 PM CDT -----  Regarding: reschedule  Contact: princess at [phone]  Type: Needs Medical Advice    Who Called:  Princess    Best Call Back Number: [phone]    Additional Information: pt needs to reschedule appt that is on 9/9/24 to a different day. No providers in list of providers in [employer]. Unable add providers to provider list at the call center.  
9/4/24 Sw the patient about her request to reschedule her f/u slot current , patient was schedule a new date 9/18/24 2:30 pm   
yes

## 2024-09-04 NOTE — PROGRESS NOTES
SUBJECTIVE:    Patient ID: Marisela Eagle is a 79 y.o. female.    Chief Complaint: Follow-up (1 month follow up solitary pulmonary nodule)    Follow-up       She is on Eliquis for her PE for at least another 6 months. Dr. Green's note doesn't explain why he wants to continue her anticoagulation.  Her hypercoagulable workup was negative.    She had her angiogram which was good.  She is still having some shortness of breath.  Since she has a 50 pack-year smoking history will go ahead and get PFTs and a 6 minute walk and see what these look like.    Past Medical History:   Diagnosis Date    Abnormal stress test     shortness of breath    Bilateral knee pain 2012    left worse, right knee painful even after partial replacement.    Bruises easily     Clot ?    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 Took Coumadin x 6 weeks    KNEE ARTHROPLASTY Bilateral     KNEE ARTHROSCOPY  2010    right    LEFT HEART CATHETERIZATION Left 2024    Procedure: Left heart cath;  Surgeon: Cooper Canchola MD;  Location: [facility] CATH/EP LAB;  Service: Cardiology;  Laterality:  Left;    TONSILLECTOMY       Family History   Problem Relation Name Age of Onset    Neuropathy Mother      Hypertension Mother      Stroke Mother      Neuropathy Father      Cancer Daughter      Diabetes Maternal Grandmother      Melanoma Neg Hx      Psoriasis Neg Hx      Lupus Neg Hx      Eczema Neg Hx          Social History:   Marital Status:   Occupation: Data Unavailable  Alcohol History:  reports that she does not currently use alcohol.  Tobacco History:  reports that she quit smoking about 12 years ago. Her smoking use included cigarettes. She has never used smokeless tobacco.  Drug History:  reports no history of drug use.    Review of patient's allergies indicates:  No Known Allergies    Current Outpatient Medications   Medication Sig Dispense Refill    amitriptyline (ELAVIL) 10 MG tablet TAKE 1 TABLET EVERY NIGHT AS NEEDED FOR PAIN (Patient taking differently: Take 10 mg by mouth nightly as needed for Insomnia.) 90 tablet 1    apixaban (ELIQUIS) 5 mg Tab Take 1 tablet (5 mg total) by mouth 2 (two) times daily. 180 tablet 3    artificial tears,hypromellose,,GENTEAL/SUSTANE, (SYSTANE GEL) 0.3 % Gel Place 1 drop into both eyes as needed.      cholecalciferol, vitamin D3, 5,000 unit capsule Take 5,000 Units by mouth once daily.      cranberry fruit extract (CRANBERRY CONCENTRATE ORAL) Take 1 tablet by mouth Daily.      cyanocobalamin (VITAMIN B-12) 1000 MCG tablet Take 5,000 mcg by mouth once daily.      DULoxetine (CYMBALTA) 60 MG capsule Take 1 capsule (60 mg total) by mouth once daily. 90 capsule 1    famotidine (PEPCID) 20 MG tablet TAKE 1 TABLET EVERY EVENING 90 tablet 3    gabapentin (NEURONTIN) 800 MG tablet TAKE 1 TABLET THREE TIMES DAILY (Patient taking differently: Take 800 mg by mouth 2 (two) times daily.) 270 tablet 3    levothyroxine (SYNTHROID) 125 MCG tablet TAKE 1 TABLET EVERY DAY BEFORE BREAKFAST (Patient taking differently: Take 125 mcg by mouth before breakfast. TAKE 1 TABLET EVERY  DAY BEFORE BREAKFAST) 90 tablet 3    LORazepam (ATIVAN) 1 MG tablet Take 1 tablet (1 mg total) by mouth every evening. 30 tablet 0    losartan (COZAAR) 100 MG tablet TAKE 1/2 TABLET TWICE DAILY (Patient taking differently: Take 50 mg by mouth 2 (two) times daily. 50 Mg BID) 90 tablet 3    magnesium 30 mg Tab Take 1 tablet by mouth once daily.      metoprolol succinate (TOPROL-XL) 50 MG 24 hr tablet TAKE 1/2 TABLET EVERY EVENING 45 tablet 3    potassium chloride SA (K-DUR,KLOR-CON) 20 MEQ tablet Take 20 mEq by mouth once daily.      spironolactone (ALDACTONE) 25 MG tablet TAKE 1 TABLET EVERY DAY 90 tablet 3    torsemide (DEMADEX) 20 MG Tab Take 1 tablet (20 mg total) by mouth once daily. 30 tablet 11    vit C/E/Zn/coppr/lutein/zeaxan (PRESERVISION AREDS-2 ORAL) Take 1 capsule by mouth once daily.      benzonatate (TESSALON) 200 MG capsule Take 1 capsule (200 mg total) by mouth 3 (three) times daily as needed for Cough. (Patient not taking: Reported on 9/4/2024) 30 capsule 0    loratadine (CLARITIN) 10 mg tablet Take 1 tablet (10 mg total) by mouth once daily. (Patient not taking: Reported on 9/4/2024) 30 tablet 1    oxyCODONE-acetaminophen (PERCOCET)  mg per tablet Take 1 tablet by mouth daily as needed for Pain. (Patient not taking: Reported on 9/4/2024)      pantoprazole (PROTONIX) 40 MG tablet Take 1 tablet (40 mg total) by mouth every morning. (Patient not taking: Reported on 9/4/2024) 90 tablet 1     No current facility-administered medications for this visit.       Alpha-1 Antitrypsin:  Last PFT:   Last CT:    Review of Systems  General: Feeling fatigued in her legs.  Eyes: Vision is good.  ENT:  No sinusitis or pharyngitis.   Heart:: had some L sided chest pain which comes and goes  Lungs:short of breath with exercise  GI: No Nausea, vomiting, constipation, diarrhea, or reflux.  : No dysuria, hesitancy, or nocturia.  Musculoskeletal: No joint pain or myalgias.  Skin: No lesions or rashes.  Neuro:  "Neuropathy in her legs  Lymph: Venous insufficiency.  Psych: Situational depression.  Endo: has gained more weight    OBJECTIVE:      BP (!) 140/68 (BP Location: Right arm, Patient Position: Sitting, BP Method: Medium (Manual))   Pulse 63   Ht 5' 9" (1.753 m)   Wt 111.1 kg (245 lb)   SpO2 (!) 94%   BMI 36.18 kg/m²     Physical Exam  GENERAL: Older patient in no distress.  HEENT: Pupils equal and reactive. Extraocular movements intact. Nose intact. Pharynx moist.  NECK: Supple.   HEART: Regular rate and rhythm. No murmur or gallop auscultated.  LUNGS: Clear to auscultation and percussion. Lung excursion symmetrical. No change in fremitus. No adventitial noises.  ABDOMEN: Bowel sounds present. Non-tender, no masses palpated.  EXTREMITIES: Normal muscle tone and joint movement, no cyanosis or clubbing.   LYMPHATICS: No adenopathy palpated, no edema.  SKIN: Dry, intact, no lesions.  Her feet and toes are purple  NEURO: Cranial nerves II-XII intact. Motor strength 5/5 bilaterally, upper and lower extremities.  PSYCH: Appropriate affect.    Assessment:       1. SOB (shortness of breath)    2. Solitary pulmonary nodule    3. Other acute pulmonary embolism without acute cor pulmonale          Will repeat her CT in January.  This is to re-evaluate the 6 mm nodule.  Dr. Green is handling the anticoagulation for her PE which has resolved and her hypercoagulable workup is negative.  She is still having dyspnea on exertion.  She has a 50 pack-year smoking history.  Will get PFTs and a 6 minute walk to see if she has COPD and/or needs oxygen.  She did have her angiogram which was normal.  Plan:       SOB (shortness of breath)  -     Complete PFT with bronchodilator; Future  -     Stress test, pulmonary; Future; Expected date: 09/04/2024    Solitary pulmonary nodule    Other acute pulmonary embolism without acute cor pulmonale        Will call patient results of her PFTs and 6 minute walk  Will remind her to make her " follow up appointment after her CT  Follow up in about 4 months (around 1/4/2025).

## 2024-09-09 PROBLEM — I26.99 PULMONARY EMBOLISM: Status: RESOLVED | Noted: 2024-06-06 | Resolved: 2024-09-09

## 2024-09-18 ENCOUNTER — HOSPITAL ENCOUNTER (OUTPATIENT)
Dept: RADIOLOGY | Facility: HOSPITAL | Age: 79
Discharge: HOME | End: 2024-09-18
Attending: HEALTH CARE PROVIDER
Payer: MEDICARE

## 2024-09-18 DIAGNOSIS — R20.2 PARESTHESIA OF BOTH HANDS: ICD-10-CM

## 2024-09-18 DIAGNOSIS — M54.2 CERVICALGIA: ICD-10-CM

## 2024-09-18 PROCEDURE — 72141 MRI NECK SPINE W/O DYE: CPT | Mod: 26,,, | Performed by: RADIOLOGY

## 2024-09-18 PROCEDURE — 72141 MRI NECK SPINE W/O DYE: CPT | Mod: TC

## 2024-09-18 NOTE — PROGRESS NOTES
Subjective:    Patient ID:  Marisela Eagle is a 79 y.o. female patient here for evaluation No chief complaint on file.    History of Present Illness:     Patient was here with  for follow-up  Patient recently found to have PE and DVT in June 2024  She is having cystoscopy with urology for recurrent UTI October 3rd   Has been out of Eliquis since 9/12/24  She did have follow up CT showing resolution of PE  Dr. Green though wanted her on anticoagulation for 6 months.    I discussed his recommendation and the guidelines of this with her as well  Had PFTs w/ Dr Boogie; states she was told no COPD;  Hx 50 yr smoking years; quit 2012;   Still having shortness of breath on exertion; sits and rests and symptoms alleviates  She denies orthopnea, has some fluid retention in the feet  No concurrent cough and wheezing;   Asking if she needs to stay on Demadex, fluid retention appears to be better    Had nonobstructive CAD on angiogram as below    Most Recent Echocardiogram Results  Results for orders placed during the hospital encounter of 09/08/23    Echo    Interpretation Summary    Left Ventricle: The left ventricle is normal in size. Normal wall thickness. Normal wall motion. There is normal systolic function with a visually estimated ejection fraction of 60 - 65%. There is normal diastolic function.    Left Atrium: Left atrium is mildly dilated.    Right Ventricle: Mild right ventricular enlargement. Wall thickness is normal. Right ventricle wall motion  is normal. Systolic function is normal.    Mitral Valve: There is mild mitral annular calcification present.    IVC/SVC: Normal venous pressure at 3 mmHg.      Most Recent Nuclear Stress Test Results  Results for orders placed during the hospital encounter of 07/08/24    Nuclear Stress - Cardiology Interpreted    Interpretation Summary    Abnormal myocardial perfusion scan.    There is a mild to moderate intensity, medium sized, mostly fixed perfusion  abnormality with some reversibilty in the lateral, apical and septal apical wall(s).    There are no other significant perfusion abnormalities.    The gated perfusion images showed an ejection fraction of 61% at rest. The gated perfusion images showed an ejection fraction of 54% post stress. Normal ejection fraction is greater than 53%.    There is normal wall motion at rest and post stress.    LV cavity size is normal at rest and normal at stress.    The ECG portion of the study is negative for ischemia.    The patient reported chest pain during the stress test.    There were no arrhythmias during stress.      Most Recent Cardiac PET Stress Test Results  No results found for this or any previous visit.      Most Recent Cardiovascular Angiogram results  Results for orders placed during the hospital encounter of 07/29/24    Cardiac catheterization    Conclusion    The estimated blood loss was <50 mL.    The pre-procedure left ventricular end diastolic pressure was 15.    Non obstructive coronaries with mild luminal irregularities    The procedure log was documented by Documenter: Tschume, Sarah, RN and verified by Cooper Canchola MD.    Date: 7/29/2024  Time: 2:21 PM      Other Most Recent Cardiology Results  Results for orders placed during the hospital encounter of 06/06/24    Cardiac monitoring strips      REVIEW OF SYSTEMS: As noted in HPI       Past Medical History:   Diagnosis Date    Abnormal stress test 2024    shortness of breath    Bilateral knee pain 07/2012    left worse, right knee painful even after partial replacement.    Bruises easily     Clot 1990's?    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 Took Coumadin x 6 weeks    KNEE ARTHROPLASTY Bilateral     KNEE ARTHROSCOPY  2010    right    LEFT HEART CATHETERIZATION Left 2024    Procedure: Left heart cath;  Surgeon: Cooper Canchola MD;  Location: [facility] CATH[facility]EP LAB;  Service: Cardiology;  Laterality: Left;    TONSILLECTOMY       Social History     Tobacco Use    Smoking status: Former     Current packs/day: 0.00     Types: Cigarettes     Quit date: 3/23/2012     Years since quittin.5    Smokeless tobacco: Never   Substance Use Topics    Alcohol use: Not Currently     Comment: occasional    Drug use: No         Objective      Vitals:    24 1320   BP: 116/79   Pulse: 74       LAST EKG  Results for orders placed or performed during the hospital encounter of 24   EKG 12-lead    Collection Time: 24  9:25 AM   Result Value Ref Range    QRS Duration 102 ms    [facility]HS QTC Calculation 432 ms    Narrative    Test Reason : Z01.810,    Vent. Rate : 057 BPM     Atrial Rate : 057 BPM     P-R Int : 226 ms          QRS Dur : 102 ms      QT Int : 444 ms       P-R-T Axes : 060 -31 072 degrees     QTc Int : 432 ms    Sinus bradycardia with 1st degree A-V block  Left axis deviation  Abnormal ECG  When compared with ECG of 2024 18:17,  No significant change was found  Confirmed by Angela VIVAS, Farrukh SU ([id number]) on 8/3/2024 3:23:03 PM    Referred By: COOPER CANCHOLA           Confirmed By:Farrukh Miller MD     LIPIDS -  LAST 2   Lab Results   Component Value Date    CHOL 222 (H) 10/30/2023    CHOL 241 (H) 01/10/2023    HDL 72 10/30/2023    HDL 77 (H) 01/10/2023    LDLCALC 129.6 10/30/2023    LDLCALC 147.8 01/10/2023    TRIG 102 10/30/2023    TRIG 81 01/10/2023    CHOLHDL 32.4 10/30/2023    CHOLHDL 32.0 01/10/2023     CARDIAC PROFILE - LAST 2  Lab Results   Component Value Date     (H) 06/06/2024     (H) 06/06/2024     08/10/2022     (H) 01/04/2022    CPKMB 6.9 (H) 01/04/2022    CPKMB 1.9 09/19/2019    TROPONINI <0.030 01/04/2022    TROPONINI 0.038 01/31/2021      CBC - LAST 2  Lab Results   Component Value Date    WBC 6.68 07/29/2024    WBC 7.02 06/18/2024    RBC 4.26 07/29/2024    RBC 4.60 06/18/2024    HGB 11.7 (L) 07/29/2024    HGB 12.6 06/18/2024    HCT 36.8 (L) 07/29/2024    HCT 40.0 06/18/2024     07/29/2024     06/18/2024     Lab Results   Component Value Date    LABPT 13.8 09/17/2019    INR 0.9 06/06/2024    INR 1.1 09/17/2019    APTT 25.6 06/06/2024    APTT 23.0 (L) 09/17/2019     CHEMISTRY - LAST 2  Lab Results   Component Value Date     (L) 07/24/2024     (L) 06/18/2024    K 4.4 07/24/2024    K 4.2 06/18/2024     07/24/2024     06/18/2024    CO2 22 (L) 07/24/2024    CO2 28 06/18/2024    ANIONGAP 9 07/24/2024    ANIONGAP 7 (L) 06/18/2024    BUN 16 07/24/2024    BUN 10 06/18/2024    CREATININE 0.8 07/24/2024    CREATININE 0.7 06/18/2024    GLU 71 07/24/2024    GLU 99 06/18/2024    CALCIUM 9.1 07/24/2024    CALCIUM 9.4 06/18/2024    MG 2.0 06/08/2024    MG 2.1 06/07/2024    ALBUMIN 4.0 07/24/2024    ALBUMIN 4.3 06/18/2024    PROT 6.9 07/24/2024    PROT 7.0 06/18/2024    ALKPHOS 38 (L) 07/24/2024    ALKPHOS 36 (L) 06/18/2024    ALT 18 07/24/2024    ALT 18 06/18/2024    AST 19 07/24/2024    AST 17 06/18/2024    BILITOT 0.3 07/24/2024    BILITOT 0.4 06/18/2024      ENDOCRINE - LAST 2  Lab Results   Component Value Date    HGBA1C 5.4 07/24/2024    HGBA1C 5.3  06/23/2023    TSH 0.950 07/24/2024    TSH 1.253 12/28/2023    TSH 1.253 12/28/2023        PHYSICAL EXAM  CONSTITUTIONAL: Well built, well nourished elderly female breathing comfortably in no apparent distress  NECK: no carotid bruit, no JVD  LUNGS: CTA, no cough or wheezing  CHEST WALL: no tenderness  HEART: regular rate and rhythm, S1, S2 normal, no murmur  ABDOMEN:  Deferred  EXTREMITIES:  Mild ankle and foot edema, appears better than previous visit, discoloration to the toes  NEURO: AAO X 3, speech clear, memory clear    I HAVE REVIEWED :    The vital signs, most recent cardiac testing, and most recent pertinent non-cardiology provider notes.    Current Outpatient Medications   Medication Instructions    amitriptyline (ELAVIL) 10 MG tablet TAKE 1 TABLET EVERY NIGHT AS NEEDED FOR PAIN    apixaban (ELIQUIS) 5 mg, Oral, 2 times daily    artificial tears,hypromellose,,GENTEAL/SUSTANE, (SYSTANE GEL) 0.3 % Gel 1 drop, Both Eyes, As needed (PRN)    cholecalciferol (vitamin D3) 5,000 Units, Oral, Daily    cranberry fruit extract (CRANBERRY CONCENTRATE ORAL) 1 tablet, Oral, Daily    cyanocobalamin (VITAMIN B-12) 5,000 mcg, Oral, Daily    DULoxetine (CYMBALTA) 60 mg, Oral, Daily    famotidine (PEPCID) 20 mg, Oral, Nightly    gabapentin (NEURONTIN) 800 mg, Oral, 3 times daily    levothyroxine (SYNTHROID) 125 MCG tablet TAKE 1 TABLET EVERY DAY BEFORE BREAKFAST    loratadine (CLARITIN) 10 mg, Oral, Daily    LORazepam (ATIVAN) 1 mg, Oral, Nightly    losartan (COZAAR) 50 mg, Oral, 2 times daily    magnesium 30 mg Tab 1 tablet, Oral, Daily    metoprolol succinate (TOPROL-XL) 50 MG 24 hr tablet TAKE 1/2 TABLET EVERY EVENING    oxyCODONE-acetaminophen (PERCOCET)  mg per tablet 1 tablet, Oral, Daily PRN    pantoprazole (PROTONIX) 40 mg, Oral, Every morning    potassium chloride SA (K-DUR,KLOR-CON) 20 MEQ tablet 20 mEq, Oral, Daily    spironolactone (ALDACTONE) 25 mg, Oral    torsemide (DEMADEX) 20 mg, Oral, Daily    vit  C/E/Zn/coppr/lutein/zeaxan (PRESERVISION AREDS-2 ORAL) 1 capsule, Oral, Daily      Assessment & Plan     Pulmonary nodule  This is being monitored by pulmonology Dr. Boogie    SOB (shortness of breath)  Nonobstructive CAD on cardiac catheterization  Elevated LVEDP was noted  Changed Lasix to torsemide 20 mg daily per Dr. Canchola's recommendation  She still reports shortness breath  Ordered echocardiogram for annual evaluation of LV and valve function    Followed by pulmonology  Dr. Boogie's notes read:   Patient's PFTs show moderate diffusion defect.  The 6 minute walk is fine.          Claudication of both lower extremities  She was referred to vascular surgery for claudication    Hypertension, essential  116/79 in the office today.  Continue losartan 50 mg g b.i.d., Toprol-XL 25 mg q.p.m., spironolactone 25 mg daily, and torsemide 20 mg daily     Venous insufficiency  See note from 08/09/2024 regarding this condition    Candidal urinary tract infection  We will avoid Jardiance to avoid worsening genital urinary infections    History of pulmonary embolism  She was to remain on Eliquis 5 mg b.i.d. for 6 months total post PE /DVT diagnosis.  Eliquis prescription called in.  She has been off of it for about a week.  She has seen Dr. Green in Hematology for this    Elevated left ventricular end-diastolic pressure  Continue Demadex 20 mg daily      Atherosclerosis of native coronary artery of native heart with unstable angina pectoris  Nonobstructive CAD noted on recent angiogram  Continue Eliquis         Discussed guidelines for PE/DVT treatment and she should be on Eliquis 5 mg b.i.d. until December at least then follow up with Dr. Green    Discussed Dr. Boogie's notes and plan.  She wants patient to have follow up CT in January.  Patient voiced understanding             BLUE Albert

## 2024-09-19 ENCOUNTER — HOSPITAL ENCOUNTER (OUTPATIENT)
Dept: PULMONOLOGY | Facility: HOSPITAL | Age: 79
Discharge: HOME | End: 2024-09-19
Attending: INTERNAL MEDICINE
Payer: MEDICARE

## 2024-09-19 ENCOUNTER — TELEPHONE (OUTPATIENT)
Dept: PULMONOLOGY | Facility: CLINIC | Age: 79
End: 2024-09-19

## 2024-09-19 DIAGNOSIS — R06.02 SOB (SHORTNESS OF BREATH): ICD-10-CM

## 2024-09-19 LAB
DLCO SINGLE BREATH LLN: 17.04
DLCO SINGLE BREATH PRE REF: 58.3 %
DLCO SINGLE BREATH REF: 22.78
DLCOC SBVA LLN: 2.74
DLCOC SBVA REF: 3.94
DLCOC SINGLE BREATH LLN: 17.04
DLCOC SINGLE BREATH REF: 22.78
DLCOVA LLN: 2.74
DLCOVA PRE REF: 72.1 %
DLCOVA PRE: 2.84 ML/(MIN*MMHG*L) (ref 2.74–5.14)
DLCOVA REF: 3.94
ERVN2 LLN: -16449.46
ERVN2 PRE REF: 64.3 %
ERVN2 PRE: 0.35 L (ref -16449.46–16450.54)
ERVN2 REF: 0.54
FEF 25 75 LLN: 0.96
FEF 25 75 PRE REF: 96.4 %
FEF 25 75 REF: 2.36
FEV1 FVC LLN: 62
FEV1 FVC PRE REF: 107.2 %
FEV1 FVC REF: 76
FEV1 LLN: 1.55
FEV1 PRE REF: 98.2 %
FEV1 REF: 2.23
FRCN2 LLN: 2.18
FRCN2 PRE REF: 82 %
FRCN2 REF: 3.01
FVC LLN: 2.07
FVC PRE REF: 90 %
FVC REF: 2.97
IVC PRE: 2.63 L (ref 2.07–3.92)
IVC SINGLE BREATH LLN: 2.07
IVC SINGLE BREATH PRE REF: 88.8 %
IVC SINGLE BREATH REF: 2.97
PEF LLN: 3.51
PEF PRE REF: 118.9 %
PEF REF: 5.5
PRE DLCO: 13.27 ML/(MIN*MMHG) (ref 17.04–28.51)
PRE FEF 25 75: 2.27 L/S (ref 0.96–3.75)
PRE FET 100: 6.29 SEC
PRE FEV1 FVC: 81.75 % (ref 61.52–89.03)
PRE FEV1: 2.19 L (ref 1.55–2.87)
PRE FRC N2: 2.47 L (ref 2.18–3.83)
PRE FVC: 2.67 L (ref 2.07–3.92)
PRE PEF: 6.54 L/S (ref 3.51–7.48)
RVN2 LLN: 1.89
RVN2 PRE REF: 85.9 %
RVN2 PRE: 2.12 L (ref 1.89–3.04)
RVN2 REF: 2.47
RVN2TLCN2 LLN: 36.91
RVN2TLCN2 PRE REF: 94.5 %
RVN2TLCN2 PRE: 43.95 % (ref 36.91–56.09)
RVN2TLCN2 REF: 46.5
TLCN2 LLN: 4.79
TLCN2 PRE REF: 83.5 %
TLCN2 PRE: 4.82 L (ref 4.79–6.76)
TLCN2 REF: 5.78
VA PRE: 4.67 L (ref 5.63–5.63)
VA SINGLE BREATH LLN: 5.63
VA SINGLE BREATH PRE REF: 83 %
VA SINGLE BREATH REF: 5.63
VCMAXN2 LLN: 2.07
VCMAXN2 PRE REF: 91 %
VCMAXN2 PRE: 2.7 L (ref 2.07–3.92)
VCMAXN2 REF: 2.97

## 2024-09-19 PROCEDURE — 94010 BREATHING CAPACITY TEST: CPT

## 2024-09-19 PROCEDURE — 94727 GAS DIL/WSHOT DETER LNG VOL: CPT | Mod: 26,,, | Performed by: INTERNAL MEDICINE

## 2024-09-19 PROCEDURE — 94729 DIFFUSING CAPACITY: CPT | Mod: 26,,, | Performed by: INTERNAL MEDICINE

## 2024-09-19 PROCEDURE — 94618 PULMONARY STRESS TESTING: CPT

## 2024-09-19 PROCEDURE — 94010 BREATHING CAPACITY TEST: CPT | Mod: 26,59,, | Performed by: INTERNAL MEDICINE

## 2024-09-19 PROCEDURE — 94618 PULMONARY STRESS TESTING: CPT | Mod: 26,,, | Performed by: INTERNAL MEDICINE

## 2024-09-19 PROCEDURE — 94727 GAS DIL/WSHOT DETER LNG VOL: CPT

## 2024-09-19 PROCEDURE — 94729 DIFFUSING CAPACITY: CPT

## 2024-09-20 ENCOUNTER — OFFICE VISIT (OUTPATIENT)
Dept: CARDIOLOGY | Facility: CLINIC | Age: 79
End: 2024-09-20
Payer: MEDICARE

## 2024-09-20 VITALS
SYSTOLIC BLOOD PRESSURE: 116 MMHG | DIASTOLIC BLOOD PRESSURE: 79 MMHG | HEART RATE: 74 BPM | WEIGHT: 246 LBS | BODY MASS INDEX: 36.43 KG/M2 | HEIGHT: 69 IN | OXYGEN SATURATION: 81 %

## 2024-09-20 DIAGNOSIS — R94.30 ELEVATED LEFT VENTRICULAR END-DIASTOLIC PRESSURE: ICD-10-CM

## 2024-09-20 DIAGNOSIS — I26.99 PULMONARY EMBOLISM, UNSPECIFIED CHRONICITY, UNSPECIFIED PULMONARY EMBOLISM TYPE, UNSPECIFIED WHETHER ACUTE COR PULMONALE PRESENT: ICD-10-CM

## 2024-09-20 DIAGNOSIS — I42.7 DILATED CARDIOMYOPATHY SECONDARY TO DRUG: Primary | ICD-10-CM

## 2024-09-20 DIAGNOSIS — R06.02 SOB (SHORTNESS OF BREATH): ICD-10-CM

## 2024-09-20 DIAGNOSIS — R76.8 POSITIVE ANA (ANTINUCLEAR ANTIBODY): ICD-10-CM

## 2024-09-20 DIAGNOSIS — I82.431 ACUTE DEEP VEIN THROMBOSIS (DVT) OF POPLITEAL VEIN OF RIGHT LOWER EXTREMITY: ICD-10-CM

## 2024-09-20 DIAGNOSIS — I25.110 ATHEROSCLEROSIS OF NATIVE CORONARY ARTERY OF NATIVE HEART WITH UNSTABLE ANGINA PECTORIS: ICD-10-CM

## 2024-09-20 DIAGNOSIS — R91.1 PULMONARY NODULE: ICD-10-CM

## 2024-09-20 DIAGNOSIS — E66.01 SEVERE OBESITY (BMI 35.0-39.9) WITH COMORBIDITY: ICD-10-CM

## 2024-09-20 DIAGNOSIS — Z99.89 USE OF CANE AS AMBULATORY AID: ICD-10-CM

## 2024-09-20 DIAGNOSIS — E03.9 HYPOTHYROIDISM, UNSPECIFIED TYPE: ICD-10-CM

## 2024-09-20 DIAGNOSIS — I10 HYPERTENSION, ESSENTIAL: ICD-10-CM

## 2024-09-20 DIAGNOSIS — I87.2 VENOUS INSUFFICIENCY: ICD-10-CM

## 2024-09-20 DIAGNOSIS — I73.9 CLAUDICATION OF BOTH LOWER EXTREMITIES: ICD-10-CM

## 2024-09-20 DIAGNOSIS — G89.29 OTHER CHRONIC PAIN: ICD-10-CM

## 2024-09-20 DIAGNOSIS — B37.49 CANDIDAL URINARY TRACT INFECTION: ICD-10-CM

## 2024-09-20 DIAGNOSIS — Z86.711 HISTORY OF PULMONARY EMBOLISM: ICD-10-CM

## 2024-09-20 PROCEDURE — 99999 PR PBB SHADOW E&M-EST. PATIENT-LVL IV: CPT | Mod: PBBFAC,HCNC,, | Performed by: NURSE PRACTITIONER

## 2024-09-20 RX ORDER — TORSEMIDE 20 MG/1
20 TABLET ORAL DAILY
Qty: 90 TABLET | Refills: 3 | Status: SHIPPED | OUTPATIENT
Start: 2024-09-20 | End: 2025-09-20

## 2024-09-20 NOTE — ASSESSMENT & PLAN NOTE
Nonobstructive CAD on cardiac catheterization  Elevated LVEDP was noted  Changed Lasix to torsemide 20 mg daily per Dr. Canchola's recommendation  She still reports shortness breath  Ordered echocardiogram for annual evaluation of LV and valve function    Followed by pulmonology  Dr. Boogie's notes read:   Patient's PFTs show moderate diffusion defect.  The 6 minute walk is fine.

## 2024-09-20 NOTE — ASSESSMENT & PLAN NOTE
116/79 in the office today.  Continue losartan 50 mg g b.i.d., Toprol-XL 25 mg q.p.m., spironolactone 25 mg daily, and torsemide 20 mg daily

## 2024-09-20 NOTE — ASSESSMENT & PLAN NOTE
She was to remain on Eliquis 5 mg b.i.d. for 6 months total post PE /DVT diagnosis.  Eliquis prescription called in.  She has been off of it for about a week.  She has seen Dr. Green in Hematology for this

## 2024-10-02 ENCOUNTER — TELEPHONE (OUTPATIENT)
Dept: NEUROSURGERY | Facility: CLINIC | Age: 79
End: 2024-10-02
Payer: MEDICARE

## 2024-10-02 NOTE — TELEPHONE ENCOUNTER
----- Message from Ivana sent at 10/2/2024  8:47 AM CDT -----  Pt is requesting a callback at  [phone]

## 2024-10-03 ENCOUNTER — TELEPHONE (OUTPATIENT)
Dept: NEUROSURGERY | Facility: CLINIC | Age: 79
End: 2024-10-03
Payer: MEDICARE

## 2024-10-03 NOTE — TELEPHONE ENCOUNTER
----- Message from Ivana sent at 10/3/2024  9:41 AM CDT -----  Pt has requested a callback at  [phone]

## 2024-10-03 NOTE — TELEPHONE ENCOUNTER
Returned call to pt and confirmed upcoming appt details with Dr. Hodgson. Pt voiced understanding.

## 2024-10-03 NOTE — TELEPHONE ENCOUNTER
Returned call to pt and scheduled follow up appt with Dr. Hodgson after EMG. Pt voiced understanding to appt details.

## 2024-10-15 ENCOUNTER — OFFICE VISIT (OUTPATIENT)
Dept: VASCULAR SURGERY | Facility: CLINIC | Age: 79
End: 2024-10-15
Payer: MEDICARE

## 2024-10-15 VITALS — SYSTOLIC BLOOD PRESSURE: 102 MMHG | HEART RATE: 72 BPM | DIASTOLIC BLOOD PRESSURE: 72 MMHG

## 2024-10-15 DIAGNOSIS — I87.2 VENOUS INSUFFICIENCY: Primary | ICD-10-CM

## 2024-10-15 PROCEDURE — 99999 PR PBB SHADOW E&M-EST. PATIENT-LVL III: CPT | Mod: PBBFAC,HCNC,, | Performed by: STUDENT IN AN ORGANIZED HEALTH CARE EDUCATION/TRAINING PROGRAM

## 2024-10-15 NOTE — PROGRESS NOTES
[facility] - Cardiovascular Surg  [facility]  History & Physical    SUBJECTIVE:     Chief Complaint: bilateral lower extremity pain      History of Present Illness:  Marisela Eagle is a 79 y.o. female presents with     Went to [facility] and was told she had venous insufficiency. She has also been told she has peripheral neuropathy. She take gabapentin and cymbalta. She has pain in bilateral legs in feet and up to calves that she describes as tingling and numbness. It is worse with walking but does not get better with rest. She use to smoke.     She has neck, shoulder and lower back issues. She has had bilateral hip and knees surgeries. She gets lumbar spine injections.     She has tried compression stockings but they continue to hurt with the stockings.     She has had a venaseal procedure done by Dr. Turner years ago and she thinks it was the right leg. She does not think there was any improvement after the procedure.       Review of patient's allergies indicates:  No Known Allergies    Past Medical History:   Diagnosis Date    Abnormal stress test 2024    shortness of breath    Bilateral knee pain 07/2012    left worse, right knee painful even after partial replacement.    Bruises easily     Clot 1990's?    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 Took Coumadin x 6 weeks    KNEE ARTHROPLASTY Bilateral     KNEE ARTHROSCOPY  2010    right    LEFT HEART CATHETERIZATION Left 2024    Procedure: Left heart cath;  Surgeon: Cooper Canchola MD;  [facility]: [facility] CATH[facility]EP LAB;  Service: Cardiology;  Laterality: Left;    TONSILLECTOMY       Family History   Problem Relation Name Age of Onset    Neuropathy Mother      Hypertension Mother      Stroke Mother      Neuropathy Father      Cancer Daughter      Diabetes Maternal Grandmother      Melanoma Neg Hx      Psoriasis Neg Hx      Lupus Neg Hx      Eczema Neg Hx       Social History     Tobacco Use    Smoking status: Former     Current packs/day: 0.00     Types: Cigarettes     Quit date: 3/23/2012     Years since quittin.5    Smokeless tobacco: Never   Substance Use Topics    Alcohol use: Not Currently     Comment: occasional    Drug use: No        Review of Systems:  Review of Systems   All other systems reviewed and are negative.      OBJECTIVE:     Vital Signs (Most Recent):  Pulse: 72 (10/15/24 1020)  BP: 102/72 (10/15/24 1020)    Physical Exam:  Physical Exam   Constitutional: She is oriented to person, place, and time.  Non-toxic appearance. No distress.   HENT:   Head: Normocephalic and atraumatic.   Cardiovascular: Normal pulses.   Palpable pedal pulse bilateral Pulmonary:      Effort: Pulmonary effort is normal. No respiratory distress.     Abdominal: Soft.   Musculoskeletal:      Right lower leg: Edema present.      Left lower leg: No edema.   Neurological: She is alert and oriented to person, place, and time.   Skin: Skin is warm and dry. Capillary refill takes less than 2 seconds.   Psychiatric: Her behavior is normal. Mood normal.   Vitals reviewed.      Laboratory:  Lab Results   Component Value Date     WBC 6.68 07/29/2024    HGB 11.7 (L) 07/29/2024    HCT 36.8 (L) 07/29/2024     07/29/2024    CHOL 222 (H) 10/30/2023    TRIG 102 10/30/2023    HDL 72 10/30/2023    ALT 18 07/24/2024    AST 19 07/24/2024     (L) 07/24/2024    K 4.4 07/24/2024     07/24/2024    CREATININE 0.8 07/24/2024    BUN 16 07/24/2024    CO2 22 (L) 07/24/2024    TSH 0.950 07/24/2024    INR 0.9 06/06/2024    HGBA1C 5.4 07/24/2024           ASSESSMENT/PLAN:     79-year-old female who presents with bilateral lower extremity pain.    She has a known history of venous insufficiency.  She was had previous intervention performed on her right lower extremity and she does not recall having any substantial improvement after the intervention.    We had an extensive discussion about moving forward with an additional ultrasound to evaluate for residual venous insufficiency which would be part of a workup to ultimately perform endovenous intervention of the veins if the ultrasound is positive.  Since since she was had prior endovenous interventions and has had minimal improvement with the was interventions she is apprehensive with moving forward with any additional interventions. She would like to think about whether or not she would like to move forward with an US. Will plan for f/u prn.            Da Louie M.D.   [facility]

## 2024-10-17 ENCOUNTER — CLINICAL SUPPORT (OUTPATIENT)
Dept: FAMILY MEDICINE | Facility: CLINIC | Age: 79
End: 2024-10-17
Payer: MEDICARE

## 2024-10-17 ENCOUNTER — HOSPITAL ENCOUNTER (OUTPATIENT)
Dept: CARDIOLOGY | Facility: HOSPITAL | Age: 79
Discharge: HOME | End: 2024-10-17
Attending: NURSE PRACTITIONER
Payer: MEDICARE

## 2024-10-17 VITALS — BODY MASS INDEX: 38.06 KG/M2 | HEIGHT: 69 IN | WEIGHT: 257 LBS

## 2024-10-17 DIAGNOSIS — Z23 NEED FOR VACCINATION: Primary | ICD-10-CM

## 2024-10-17 DIAGNOSIS — I42.7 DILATED CARDIOMYOPATHY SECONDARY TO DRUG: ICD-10-CM

## 2024-10-17 LAB
AORTIC ROOT ANNULUS: 3.3 CM
AORTIC VALVE CUSP SEPERATION: 2.4 CM
AV INDEX (PROSTH): 0.8
AV MEAN GRADIENT: 3 MMHG
AV PEAK GRADIENT: 4.8 MMHG
AV VALVE AREA BY VELOCITY RATIO: 3.4 CM²
AV VALVE AREA: 3.3 CM²
AV VELOCITY RATIO: 0.82
BSA FOR ECHO PROCEDURE: 2.38 M2
CV ECHO LV RWT: 0.73 CM
DOP CALC AO PEAK VEL: 1.1 M/S
DOP CALC AO VTI: 24.4 CM
DOP CALC LVOT AREA: 4.2 CM2
DOP CALC LVOT DIAMETER: 2.3 CM
DOP CALC LVOT PEAK VEL: 0.9 M/S
DOP CALC LVOT STROKE VOLUME: 80.6 CM3
DOP CALCLVOT PEAK VEL VTI: 19.4 CM
E WAVE DECELERATION TIME: 201 MSEC
E/A RATIO: 0.75
E/E' RATIO: 7.86 M/S
ECHO LV POSTERIOR WALL: 1.2 CM (ref 0.6–1.1)
FRACTIONAL SHORTENING: 33.3 % (ref 28–44)
INTERVENTRICULAR SEPTUM: 1.1 CM (ref 0.6–1.1)
IVRT: 134 MSEC
LEFT INTERNAL DIMENSION IN SYSTOLE: 2.2 CM (ref 2.1–4)
LEFT VENTRICLE DIASTOLIC VOLUME INDEX: 19.17 ML/M2
LEFT VENTRICLE DIASTOLIC VOLUME: 44.1 ML
LEFT VENTRICLE MASS INDEX: 50.8 G/M2
LEFT VENTRICLE SYSTOLIC VOLUME INDEX: 7 ML/M2
LEFT VENTRICLE SYSTOLIC VOLUME: 16.2 ML
LEFT VENTRICULAR INTERNAL DIMENSION IN DIASTOLE: 3.3 CM (ref 3.5–6)
LEFT VENTRICULAR MASS: 116.8 G
LV LATERAL E/E' RATIO: 6.88 M/S
LV SEPTAL E/E' RATIO: 9.17 M/S
LVED V (TEICH): 44.1 ML
LVES V (TEICH): 16.2 ML
LVOT MG: 2 MMHG
LVOT MV: 0.58 CM/S
MV PEAK A VEL: 0.73 M/S
MV PEAK E VEL: 0.55 M/S
MV STENOSIS PRESSURE HALF TIME: 85 MS
MV VALVE AREA P 1/2 METHOD: 2.59 CM2
PISA TR MAX VEL: 2.55 M/S
RA PRESSURE ESTIMATED: 3 MMHG
RIGHT VENTRICULAR END-DIASTOLIC DIMENSION: 2.4 CM
RV TB RVSP: 6 MMHG
TDI LATERAL: 0.08 M/S
TDI SEPTAL: 0.06 M/S
TDI: 0.07 M/S
TR MAX PG: 26 MMHG
TV REST PULMONARY ARTERY PRESSURE: 29 MMHG
Z-SCORE OF LEFT VENTRICULAR DIMENSION IN END DIASTOLE: -9.93
Z-SCORE OF LEFT VENTRICULAR DIMENSION IN END SYSTOLE: -7.07

## 2024-10-17 PROCEDURE — 90653 IIV ADJUVANT VACCINE IM: CPT | Mod: HCNC,S$GLB,, | Performed by: FAMILY MEDICINE

## 2024-10-17 PROCEDURE — 93306 TTE W/DOPPLER COMPLETE: CPT

## 2024-10-17 PROCEDURE — G0008 ADMIN INFLUENZA VIRUS VAC: HCPCS | Mod: HCNC,S$GLB,, | Performed by: FAMILY MEDICINE

## 2024-10-17 PROCEDURE — 93306 TTE W/DOPPLER COMPLETE: CPT | Mod: 26,,, | Performed by: INTERNAL MEDICINE

## 2024-10-18 ENCOUNTER — TELEPHONE (OUTPATIENT)
Dept: FAMILY MEDICINE | Facility: CLINIC | Age: 79
End: 2024-10-18
Payer: MEDICARE

## 2024-10-18 NOTE — TELEPHONE ENCOUNTER
No answer    ----- Message from Navarro sent at 10/18/2024 10:22 AM CDT -----  Regarding: Returning call  Type:  Patient Returning Call    Who Called:PT    Who Left Message for Patient:UNK    Does the patient know what this is regarding?:     Would the patient rather a call back or a response via [facility]? Call back    Best Call Back Number:[phone]      Additional Information:   Please advise -- Thank you

## 2024-10-18 NOTE — TELEPHONE ENCOUNTER
Pt spoke to jamie about mr rom imaging result     ----- Message from Med Assistant Gerber sent at 10/18/2024 11:20 AM CDT -----  Pt is returning your call    Pt # [phone]

## 2024-10-23 DIAGNOSIS — E03.9 HYPOTHYROIDISM, UNSPECIFIED TYPE: ICD-10-CM

## 2024-10-23 RX ORDER — LEVOTHYROXINE SODIUM 125 UG/1
TABLET ORAL
Qty: 90 TABLET | Refills: 3 | Status: SHIPPED | OUTPATIENT
Start: 2024-10-23

## 2024-10-27 DIAGNOSIS — J30.2 SEASONAL ALLERGIES: ICD-10-CM

## 2024-10-27 RX ORDER — LORATADINE 10 MG/1
10 TABLET ORAL
Qty: 30 TABLET | Refills: 0 | Status: SHIPPED | OUTPATIENT
Start: 2024-10-27

## 2024-10-29 DIAGNOSIS — F41.9 ANXIETY: Primary | ICD-10-CM

## 2024-10-29 RX ORDER — LORAZEPAM 1 MG/1
1 TABLET ORAL NIGHTLY
Qty: 30 TABLET | Refills: 0 | Status: SHIPPED | OUTPATIENT
Start: 2024-10-29

## 2024-10-30 ENCOUNTER — OFFICE VISIT (OUTPATIENT)
Dept: CARDIOLOGY | Facility: CLINIC | Age: 79
End: 2024-10-30
Payer: MEDICARE

## 2024-10-30 VITALS
BODY MASS INDEX: 35.22 KG/M2 | HEIGHT: 69 IN | SYSTOLIC BLOOD PRESSURE: 121 MMHG | DIASTOLIC BLOOD PRESSURE: 73 MMHG | HEART RATE: 86 BPM | OXYGEN SATURATION: 90 % | WEIGHT: 237.81 LBS

## 2024-10-30 DIAGNOSIS — I26.99 PULMONARY EMBOLISM, UNSPECIFIED CHRONICITY, UNSPECIFIED PULMONARY EMBOLISM TYPE, UNSPECIFIED WHETHER ACUTE COR PULMONALE PRESENT: ICD-10-CM

## 2024-10-30 DIAGNOSIS — R91.1 PULMONARY NODULE: ICD-10-CM

## 2024-10-30 DIAGNOSIS — R06.02 SOB (SHORTNESS OF BREATH): Primary | ICD-10-CM

## 2024-10-30 DIAGNOSIS — I82.431 ACUTE DEEP VEIN THROMBOSIS (DVT) OF POPLITEAL VEIN OF RIGHT LOWER EXTREMITY: ICD-10-CM

## 2024-10-30 DIAGNOSIS — I10 HYPERTENSION, ESSENTIAL: ICD-10-CM

## 2024-10-30 DIAGNOSIS — E03.9 ACQUIRED HYPOTHYROIDISM: ICD-10-CM

## 2024-10-30 DIAGNOSIS — I25.10 CAD IN NATIVE ARTERY: ICD-10-CM

## 2024-10-30 DIAGNOSIS — I50.32 CHRONIC DIASTOLIC HEART FAILURE: ICD-10-CM

## 2024-10-30 DIAGNOSIS — I87.2 VENOUS INSUFFICIENCY: ICD-10-CM

## 2024-10-30 PROCEDURE — 99999 PR PBB SHADOW E&M-EST. PATIENT-LVL IV: CPT | Mod: PBBFAC,HCNC,, | Performed by: INTERNAL MEDICINE

## 2024-10-30 PROCEDURE — 1157F ADVNC CARE PLAN IN RCRD: CPT | Mod: HCNC,CPTII,S$GLB, | Performed by: INTERNAL MEDICINE

## 2024-10-30 PROCEDURE — 3288F FALL RISK ASSESSMENT DOCD: CPT | Mod: HCNC,CPTII,S$GLB, | Performed by: INTERNAL MEDICINE

## 2024-10-30 PROCEDURE — 3074F SYST BP LT 130 MM HG: CPT | Mod: HCNC,CPTII,S$GLB, | Performed by: INTERNAL MEDICINE

## 2024-10-30 PROCEDURE — 1126F AMNT PAIN NOTED NONE PRSNT: CPT | Mod: HCNC,CPTII,S$GLB, | Performed by: INTERNAL MEDICINE

## 2024-10-30 PROCEDURE — 99214 OFFICE O/P EST MOD 30 MIN: CPT | Mod: HCNC,S$GLB,, | Performed by: INTERNAL MEDICINE

## 2024-10-30 PROCEDURE — 1160F RVW MEDS BY RX/DR IN RCRD: CPT | Mod: HCNC,CPTII,S$GLB, | Performed by: INTERNAL MEDICINE

## 2024-10-30 PROCEDURE — 1101F PT FALLS ASSESS-DOCD LE1/YR: CPT | Mod: HCNC,CPTII,S$GLB, | Performed by: INTERNAL MEDICINE

## 2024-10-30 PROCEDURE — 1159F MED LIST DOCD IN RCRD: CPT | Mod: HCNC,CPTII,S$GLB, | Performed by: INTERNAL MEDICINE

## 2024-10-30 PROCEDURE — 3078F DIAST BP <80 MM HG: CPT | Mod: HCNC,CPTII,S$GLB, | Performed by: INTERNAL MEDICINE

## 2024-10-31 ENCOUNTER — TELEPHONE (OUTPATIENT)
Dept: FAMILY MEDICINE | Facility: CLINIC | Age: 79
End: 2024-10-31
Payer: MEDICARE

## 2024-10-31 LAB
OHS QRS DURATION: 108 MS
OHS QTC CALCULATION: 464 MS

## 2024-11-01 ENCOUNTER — TELEPHONE (OUTPATIENT)
Dept: PHYSICAL MEDICINE AND REHAB | Facility: CLINIC | Age: 79
End: 2024-11-01
Payer: MEDICARE

## 2024-11-04 ENCOUNTER — OFFICE VISIT (OUTPATIENT)
Dept: PHYSICAL MEDICINE AND REHAB | Facility: CLINIC | Age: 79
End: 2024-11-04
Payer: MEDICARE

## 2024-11-04 VITALS — WEIGHT: 237 LBS | BODY MASS INDEX: 35.1 KG/M2 | HEIGHT: 69 IN

## 2024-11-04 DIAGNOSIS — G56.03 BILATERAL CARPAL TUNNEL SYNDROME: Primary | ICD-10-CM

## 2024-11-04 DIAGNOSIS — M54.2 CERVICALGIA: ICD-10-CM

## 2024-11-04 DIAGNOSIS — R20.2 PARESTHESIA OF BOTH HANDS: ICD-10-CM

## 2024-11-04 PROCEDURE — 99499 UNLISTED E&M SERVICE: CPT | Mod: HCNC,S$GLB,, | Performed by: STUDENT IN AN ORGANIZED HEALTH CARE EDUCATION/TRAINING PROGRAM

## 2024-11-04 NOTE — PROGRESS NOTES
[facility]  Physical Medicine and Rehabilitation   [address] [place] [address], [address]  [place] [address]      Full Name: Marisela Eagle Gender: Female  Patient ID: [id number] YOB: 1945      Visit Date: 11/4/2024 12:31  Age: 79 Years 6 Months Old  Examining Physician: Mario Pruitt MD  Referring Physician:  Gwen Ward PA-C  Height: 5 feet 9 inch  Weight: 237 lbs  Conclusion: Neck pain, numbness and tingling of the hands, also her hands stay cold and turn purple.        Sensory NCS      Nerve / Sites Rec. Site Onset Lat Peak Lat NP Amp PP Amp Segments Distance Velocity     ms ms µV µV  cm m/s   L Median - Digit II (Antidromic)      Wrist Dig II 2.50 4.01 22.0 19.7 Wrist - Dig II 13 52      Ref.   <=3.40 >=15.0 >=20.0 Ref.     R Median - Digit II (Antidromic)      Wrist Dig II 2.97 3.70 5.6 24.0 Wrist - Dig II 13 44      Ref.   <=3.40 >=15.0 >=20.0 Ref.     L Ulnar - Digit V (Antidromic)      Wrist Dig V 2.08 3.18 5.2 22.6 Wrist - Dig V 11 53      Ref.   <=3.20 >=5.0 >=15.0 Ref.     R Ulnar - Digit V (Antidromic)      Wrist Dig V 2.08 2.81 9.0 38.3 Wrist - Dig V 11 53      Ref.   <=3.20 >=5.0 >=15.0 Ref.     L Radial - Anatomical snuff box (Forearm)      Forearm Wrist 1.56 2.40 27.3 52.4 Forearm - Wrist 10 64      Ref.   <=2.80 >=10.0 >=15.0 Ref.     R Radial - Anatomical snuff box (Forearm)      Forearm Wrist 1.51 2.08 10.2 42.9 Forearm - Wrist 10 66      Ref.   <=2.80 >=10.0 >=15.0 Ref.         Motor NCS      Nerve / Sites Muscle Latency Ref. Amplitude Ref. Amp % Duration Segments Distance Lat Diff Velocity Ref.     ms ms mV mV % ms  cm ms m/s m/s   L Ulnar - ADM      Wrist ADM 3.13 <=3.60 8.0 >=5.0 100 7.19 Wrist - ADM 7         B.Elbow ADM 6.46  7.3  91.5 7.45 B.Elbow - Wrist 19 3.33 57 >=49      A.Elbow ADM 8.49  7.0  87.8 7.08 A.Elbow - B.Elbow 10 2.03 49 >=49   R Ulnar - ADM      Wrist ADM 2.55 <=3.60 8.0 >=5.0 100 6.25 Wrist - ADM 7         B.Elbow ADM 6.46  7.4  92.6 6.56  B.Elbow - Wrist 20 3.91 51 >=49      A.Elbow ADM 8.28  6.9  86.5 7.03 A.Elbow - B.Elbow 10 1.82 55 >=49   L Median - APB      Wrist APB 3.59 <=4.40 5.5 >=4.0 100 6.41 Wrist - APB 7         Elbow APB 7.29  5.1  92.6 7.45 Elbow - Wrist 19 3.70 51 >=49   R Median - APB      Wrist APB 4.11 <=4.40 4.0 >=4.0 100 5.78 Wrist - APB 7         Elbow APB 8.70  3.9  96.3 6.61 Elbow - Wrist 22 4.58 48 >=49       EMG Summary Table     Spontaneous MUAP Recruitment   Muscle IA Fib PSW Fasc Other Amp Dur. PPP Pattern   L. Deltoid N None None None . N N N N   R. Deltoid N None None None . N N N N   L. Biceps brachii N None None None . N N N N   R. Biceps brachii N None None None . N N N N   L. Triceps brachii N None None None . N N N N   R. Triceps brachii N None None None . N N N N   L. Pronator teres N None None None . N N N N   R. Pronator teres N None None None . N N N N   L. First dorsal interosseous N None None None . N N N N   R. First dorsal interosseous N None None None . N N N N   L. Abductor pollicis brevis N None None None . N N N N   R. Abductor pollicis brevis N None None None . N N N N       Summary    The motor conduction test was performed on 4 nerve(s). The results were normal in 3 nerve(s): L Ulnar - ADM, R Ulnar - ADM, L Median - APB. Results outside the specified normal range were found in 1 nerve(s), as follows:  In the R Median - APB study  the take off velocity result was reduced for Elbow - Wrist segment    The sensory conduction test was performed on 6 nerve(s). The results were normal in 4 nerve(s): L Ulnar - Digit V (Antidromic), R Ulnar - Digit V (Antidromic), L Radial - Anatomical snuff box (Forearm), R Radial - Anatomical snuff box (Forearm). Results outside the specified normal range were found in 2 nerve(s), as follows:  In the L Median - Digit II (Antidromic) study  the peak latency result was increased for Wrist stimulation  In the R Median - Digit II (Antidromic) study  the peak latency result was  increased for Wrist stimulation  the peak amplitude result was reduced for Wrist stimulation    The needle EMG study was normal in all 12 tested muscles: L. Deltoid, R. Deltoid, L. Biceps brachii, R. Biceps brachii, L. Triceps brachii, R. Triceps brachii, L. Pronator teres, R. Pronator teres, L. First dorsal interosseous, R. First dorsal interosseous, L. Abductor pollicis brevis, R. Abductor pollicis brevis.    Conclusion:     Abnormal study    EMG and nerve conduction study of both upper extremities revealed the following:     BILATERAL mild median mononeuropathy at the wrist (carpal tunnel syndrome).    There was no electrodiagnostic evidence of cervical radiculopathy, plexopathy, peripheral polyneuropathy or myopathy in either upper extremity.    PLAN: Recommended bracing of the involved wrists at night and f/u with referring provider for additional management.  ______________________  Mario Pruitt MD

## 2024-11-11 ENCOUNTER — OFFICE VISIT (OUTPATIENT)
Dept: NEUROSURGERY | Facility: CLINIC | Age: 79
End: 2024-11-11
Payer: MEDICARE

## 2024-11-11 VITALS
DIASTOLIC BLOOD PRESSURE: 83 MMHG | BODY MASS INDEX: 35.38 KG/M2 | WEIGHT: 238.88 LBS | HEART RATE: 71 BPM | HEIGHT: 69 IN | SYSTOLIC BLOOD PRESSURE: 167 MMHG

## 2024-11-11 DIAGNOSIS — M48.02 NEURAL FORAMINAL STENOSIS OF CERVICAL SPINE: Primary | ICD-10-CM

## 2024-11-11 DIAGNOSIS — M54.12 CERVICAL RADICULOPATHY: ICD-10-CM

## 2024-11-11 DIAGNOSIS — M48.02 CERVICAL STENOSIS OF SPINAL CANAL: ICD-10-CM

## 2024-11-11 DIAGNOSIS — M50.30 DDD (DEGENERATIVE DISC DISEASE), CERVICAL: ICD-10-CM

## 2024-11-11 PROCEDURE — 1157F ADVNC CARE PLAN IN RCRD: CPT | Mod: HCNC,CPTII,S$GLB, | Performed by: NEUROLOGICAL SURGERY

## 2024-11-11 PROCEDURE — 1101F PT FALLS ASSESS-DOCD LE1/YR: CPT | Mod: HCNC,CPTII,S$GLB, | Performed by: NEUROLOGICAL SURGERY

## 2024-11-11 PROCEDURE — 1125F AMNT PAIN NOTED PAIN PRSNT: CPT | Mod: HCNC,CPTII,S$GLB, | Performed by: NEUROLOGICAL SURGERY

## 2024-11-11 PROCEDURE — 3079F DIAST BP 80-89 MM HG: CPT | Mod: HCNC,CPTII,S$GLB, | Performed by: NEUROLOGICAL SURGERY

## 2024-11-11 PROCEDURE — 3077F SYST BP >= 140 MM HG: CPT | Mod: HCNC,CPTII,S$GLB, | Performed by: NEUROLOGICAL SURGERY

## 2024-11-11 PROCEDURE — 3288F FALL RISK ASSESSMENT DOCD: CPT | Mod: HCNC,CPTII,S$GLB, | Performed by: NEUROLOGICAL SURGERY

## 2024-11-11 PROCEDURE — 1159F MED LIST DOCD IN RCRD: CPT | Mod: HCNC,CPTII,S$GLB, | Performed by: NEUROLOGICAL SURGERY

## 2024-11-11 PROCEDURE — 99214 OFFICE O/P EST MOD 30 MIN: CPT | Mod: HCNC,S$GLB,, | Performed by: NEUROLOGICAL SURGERY

## 2024-11-11 NOTE — PROGRESS NOTES
History of Present Illness 09/03/2024:  Marisela Eagle is a 79 y.o. female with past medical history of bilateral lower leg neuropathy, cervical degenerative disc disease, anxiety, chronic bilateral shoulder osteoarthritis, hypertension, hyperlipidemia, venous insufficiency, aorta arthrosclerosis, osteoporosis, recurrent urinary tract infections, hyperparathyroidism, gait instability, Raynaud's, status post L3-5 lumbar fusion with instrumentation bilateral hip arthroplasty and bilateral knee arthroplasty.  Self-referred.  Patient is present with her  who is an established patient.  She reports chronic constant posterior cervical pain that radiates to bilateral superior trapezius muscle which began approximately 1 year ago without an inciting event.  She also reports limited cervical range of motion with rotation which makes it difficult for her to see on coming traffic in her blind spots.  She is also experiencing bilateral hand clumsiness, paresthesias and imbalance.  She has been using a four-point cane for the last year to aid with ambulation.  She is currently established with Dr. Gusman, orthopedic surgery, for bilateral shoulder pathology and receives subacromial bursal injections in both shoulders every 3 months which does provide relief.  She denies falls, upper extremity radicular pain, upper extremity weakness, urinary/bowel incontinence or retention, or previous cervical surgery.     Of note, patient reports chronic right leg weakness since 2018 which requires her to lift right leg when getting in and out of a vehicle.     AP/AC:  Eliquis for right leg DVT and PE  Smoking status-: Former smoker, quit 12 years ago    Patient reports chronic cervical pain for the last year with bilateral hand paresthesias and clumsiness.  Cervical MRI ordered for further evaluation.  Also bilateral upper extremity EMG ordered to assess for cervical radiculopathy versus peripheral neuropathy.     Patient will  follow-up with Dr. Hodgson after imaging and EMG.    Interval history 11/11/2024: She returns with EMG upper extremities and MRI cervical for review.  She reports decreased cervical range of motion with mild chronic neck pain.  She denies arm pain or paresthesias.  She does report dropping objects with both hands.  Her primary concern today is burning numbness involving both feet.  She reports history of neuropathy and takes gabapentin 800 mg t.i.d..  She also notes multiple joints with osteoarthritis.  She notes that she declined shoulder replacement, prefers injections.  She reports previous improvement of neck symptoms with medial branch blocks.    Exam:  Pleasant, no distress  Awake, alert, oriented to person place and time.    Cranial nerves 2-12 grossly intact.    Strength is graded 5/5 in all muscle groups.    Sensation is grossly intact throughout.    Gait and stance are normal  No long tract signs  Cervical range of motion limited in all planes  Cervical spine mildly tender to palpation      EMG nerve conduction study bilateral upper extremity shows mild bilateral median neuropathy without radiculopathy    MRI cervical spine was reviewed as well and shows disc degeneration with desiccation and broad-based disc osteophyte formation C5-6 C6-7.  Moderate canal stenosis and moderate to severe bilateral foraminal stenosis C5-6.  Severe left foraminal stenosis C6-7.  No cord signal change.    Analysis: She has multilevel cervical disc degeneration likely contributing to axial neck pain.  She has no critical cervical canal stenosis nor any overt myelopathy findings.  She has fairly severe multifocal foraminal stenosis which is likely contributing to radicular symptoms.  I offered ACDF C5-6 C7.  She is adamantly opposed to surgery.  She strongly favors continued nonoperative management with interventional pain management.  She requests referral to Dr. Caal.  We will be glad to see her back as needed.  She was counseled  regarding extremity weakness, worsened imbalance, urinary urgency or incontinence as symptoms possibly related to the cervical pathology.  She was advised to be promptly evaluated if she develops these or other neurologic symptoms.    Marisela was seen today for follow-up.    Diagnoses and all orders for this visit:    Neural foraminal stenosis of cervical spine  -     Ambulatory referral/consult to Pain Clinic; Future    DDD (degenerative disc disease), cervical    Cervical radiculopathy    Cervical stenosis of spinal canal      I spent a total of 30 minutes on the day of the visit.  This includes face to face time and non-face to face time preparing to see the patient (eg, review of tests), obtaining and/or reviewing separately obtained history, documenting clinical information in the electronic or other health record, independently interpreting results and communicating results to the patient/family/caregiver, or care coordinator.

## 2024-12-02 NOTE — TELEPHONE ENCOUNTER
No care due was identified.  [facility] Embedded Care Due Messages. Reference number: [id number].   12/02/2024 3:04:48 PM CST

## 2024-12-03 RX ORDER — PANTOPRAZOLE SODIUM 40 MG/1
40 TABLET, DELAYED RELEASE ORAL EVERY MORNING
Qty: 90 TABLET | Refills: 2 | Status: SHIPPED | OUTPATIENT
Start: 2024-12-03

## 2024-12-03 NOTE — TELEPHONE ENCOUNTER
Refill Decision Note   Marisela Eagle  is requesting a refill authorization.  Brief Assessment and Rationale for Refill:  Approve     Medication Therapy Plan:         Comments:     Note composed:10:39 AM 12/03/2024

## 2024-12-04 DIAGNOSIS — J30.2 SEASONAL ALLERGIES: ICD-10-CM

## 2024-12-04 DIAGNOSIS — F41.9 ANXIETY: ICD-10-CM

## 2024-12-04 NOTE — TELEPHONE ENCOUNTER
No care due was identified.  [facility] Embedded Care Due Messages. Reference number: [id number].   12/04/2024 3:12:29 PM CST

## 2024-12-05 RX ORDER — LORAZEPAM 1 MG/1
1 TABLET ORAL NIGHTLY
Qty: 30 TABLET | Refills: 0 | Status: SHIPPED | OUTPATIENT
Start: 2024-12-05

## 2024-12-05 RX ORDER — LORATADINE 10 MG/1
10 TABLET ORAL
Qty: 30 TABLET | Refills: 0 | Status: SHIPPED | OUTPATIENT
Start: 2024-12-05

## 2024-12-05 NOTE — TELEPHONE ENCOUNTER
Refill Routing Note   Medication(s) are not appropriate for processing by [facility] for the following reason(s):        Outside of protocol  New or recently adjusted medication    [facility] action(s):  Defer  Route               Appointments  past 12m or future 3m with PCP    Date Provider   Last Visit   8/19/2024 Pawel Badillo III, MD   Next Visit   1/6/2025 Pawel Badillo III, MD   ED visits in past 90 days: 0        Note composed:2:51 AM 12/05/2024

## 2024-12-16 ENCOUNTER — TELEPHONE (OUTPATIENT)
Dept: FAMILY MEDICINE | Facility: CLINIC | Age: 79
End: 2024-12-16
Payer: MEDICARE

## 2024-12-16 NOTE — TELEPHONE ENCOUNTER
----- Message from Poonam sent at 12/16/2024 10:12 AM CST -----  Regarding: advise  Contact: pt  Type: Needs Medical Advice  Who Called:  patient  Symptoms (please be specific):    How long has patient had these symptoms:    Pharmacy name and phone #:   Best Call Back Number: [phone]    Additional Information:   Seeking a call back regarding injection that helps you lose weight

## 2024-12-20 ENCOUNTER — TELEPHONE (OUTPATIENT)
Dept: FAMILY MEDICINE | Facility: CLINIC | Age: 79
End: 2024-12-20
Payer: MEDICARE

## 2024-12-20 ENCOUNTER — OFFICE VISIT (OUTPATIENT)
Dept: PAIN MEDICINE | Facility: CLINIC | Age: 79
End: 2024-12-20
Payer: MEDICARE

## 2024-12-20 ENCOUNTER — TELEPHONE (OUTPATIENT)
Dept: PAIN MEDICINE | Facility: CLINIC | Age: 79
End: 2024-12-20

## 2024-12-20 VITALS
RESPIRATION RATE: 18 BRPM | DIASTOLIC BLOOD PRESSURE: 73 MMHG | BODY MASS INDEX: 35.36 KG/M2 | WEIGHT: 238.75 LBS | HEIGHT: 69 IN | SYSTOLIC BLOOD PRESSURE: 147 MMHG | HEART RATE: 85 BPM

## 2024-12-20 DIAGNOSIS — M47.896 OTHER SPONDYLOSIS, LUMBAR REGION: Primary | ICD-10-CM

## 2024-12-20 DIAGNOSIS — M51.369 DEGENERATION OF INTERVERTEBRAL DISC OF LUMBAR REGION, UNSPECIFIED WHETHER PAIN PRESENT: ICD-10-CM

## 2024-12-20 DIAGNOSIS — M50.30 DDD (DEGENERATIVE DISC DISEASE), CERVICAL: ICD-10-CM

## 2024-12-20 DIAGNOSIS — M96.1 POSTLAMINECTOMY SYNDROME OF LUMBAR REGION: ICD-10-CM

## 2024-12-20 DIAGNOSIS — M47.892 OTHER SPONDYLOSIS, CERVICAL REGION: ICD-10-CM

## 2024-12-20 PROCEDURE — 99999 PR PBB SHADOW E&M-EST. PATIENT-LVL IV: CPT | Mod: PBBFAC,HCNC,, | Performed by: ANESTHESIOLOGY

## 2024-12-20 NOTE — PROGRESS NOTES
This note was completed with dictation software and grammatical errors may exist.    Referring Physician: Mikey Hodgson DO    PCP: Pawel Badillo III, MD      CC: neck pain, lower back pain    HPI:   Marisela Eagle is a 79 y.o. female referred to us for neck and lower back pain.  Neck pain is a constant aching, throbbing pain in her posterior neck.  He has been present for over 10 years.  Pain radiates to her bilateral shoulders.  She has history of shoulder osteoarthritis and continues to follow Orthopedics for shoulder injections every 3 months.  Neck pain does not radiate down upper extremities.  Pain worsens with extension lateral rotation of the neck.  She had MRI of the cervical spine in his has been seen by Neurosurgery.  She does not desire surgical intervention.  She has not had any cervical spine interventions.  Lower back pain is more bothersome.  He is a constant aching, throbbing pain in his lower back.  Pain radiates to her hips and buttocks.  Lower back pain is more bothersome and worsens standing, walking getting up.  Pain improves with rest.  She has history of spacers placed between her L4 and L5 disc spaces over 10 years ago.  No other surgical history.  She had MRI of the lumbar spine.  She states being worked up for spinal cord stimulation by outside physician.  She states having a successful SCS trial but SCS implant was aborted due to an infection in her lungs.  She has not decided to proceed with SCS implant currently.  She denies any worsening weakness.  No bowel bladder changes.    ROS:  CONSTITUTIONAL: No fevers, chills, night sweats, wt. loss, appetite changes  SKIN: no rashes or itching  ENT: No headaches, head trauma, vision changes, or eye pain  LYMPH NODES: None noticed   CV: No chest pain, palpitations.   RESP: No shortness of breath, dyspnea on exertion, cough, wheezing, or hemoptysis  GI: No nausea, emesis, diarrhea, constipation, melena, hematochezia, pain.    : No  dysuria, hematuria, urgency, or frequency   HEME: No easy bruising, bleeding problems  PSYCHIATRIC: No depression, anxiety, psychosis, hallucinations.  NEURO: No seizures, memory loss, dizziness or difficulty sleeping  MSK: +HPI      Past Medical History:   Diagnosis Date    Abnormal stress test     shortness of breath    Bilateral knee pain 2012    left worse, right knee painful even after partial replacement.    Bruises easily     Clot ?    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 Took Coumadin x 6 weeks    KNEE ARTHROPLASTY Bilateral     KNEE ARTHROSCOPY  2010    right    LEFT HEART CATHETERIZATION Left 2024    Procedure: Left heart cath;  Surgeon: Cooper Canchola MD;  Location: [facility] CATH/EP LAB;  Service: Cardiology;  Laterality: Left;    TONSILLECTOMY       Family History   Problem Relation Name Age of Onset    Neuropathy Mother      Hypertension Mother      Stroke Mother      Neuropathy Father      Cancer Daughter      Diabetes Maternal Grandmother      Melanoma Neg Hx      Psoriasis Neg Hx      Lupus Neg Hx      Eczema Neg Hx       Social History  "    Socioeconomic History    Marital status:    Occupational History    Occupation: RETIRED   Tobacco Use    Smoking status: Former     Current packs/day: 0.00     Types: Cigarettes     Quit date: 3/23/2012     Years since quittin.7    Smokeless tobacco: Never   Substance and Sexual Activity    Alcohol use: Not Currently     Comment: occasional    Drug use: No     Social Drivers of Health     Financial Resource Strain: Low Risk  (2023)    Overall Financial Resource Strain (CARDIA)     Difficulty of Paying Living Expenses: Not hard at all   Food Insecurity: No Food Insecurity (2023)    Hunger Vital Sign     Worried About Running Out of Food in the Last Year: Never true     Ran Out of Food in the Last Year: Never true   Transportation Needs: No Transportation Needs (2023)    PRAPARE - Transportation     Lack of Transportation (Medical): No     Lack of Transportation (Non-Medical): No   Physical Activity: Inactive (2023)    Exercise Vital Sign     Days of Exercise per Week: 0 days     Minutes of Exercise per Session: 0 min   Stress: No Stress Concern Present (2023)    [employer] [place] [employer] - Occupational Stress Questionnaire     Feeling of Stress : Not at all   Housing Stability: Low Risk  (2023)    Housing Stability Vital Sign     Unable to Pay for Housing in the Last Year: No     Number of Places Lived in the Last Year: 1     Unstable Housing in the Last Year: No         Medications/Allergies: See med card    Vitals:    24 1041   BP: (!) 147/73   Pulse: 85   Resp: 18   Weight: 108.3 kg (238 lb 12.1 oz)   Height: 5' 9" (1.753 m)   PainSc:   4   PainLoc: Neck         Physical exam:    GENERAL: A and O x3, the patient appears well groomed and is in no acute distress.  Skin: No rashes or obvious lesions  HEENT: normocephalic, atraumatic  CARDIOVASCULAR:  Palpable peripheral pulses  LUNGS: easy work of breathing  ABDOMEN: soft, nontender   UPPER " EXTREMITIES: Normal alignment, normal range of motion, no atrophy, no skin changes,  hair growth and nail growth normal and equal bilaterally. No swelling, no tenderness.    LOWER EXTREMITIES:  Normal alignment, normal range of motion, no atrophy, no skin changes,  hair growth and nail growth normal and equal bilaterally. No swelling, no tenderness.  CERVICAL SPINE:  Cervical spine: ROM is limited in flexion, extension and lateral rotation with mild to moderate increased pain.  Spurling's maneuver causes no neck pain to either side.  Myofascial exam: No Tenderness to palpation across cervical paraspinous region bilaterally.    LUMBAR SPINE  Lumbar spine: ROM is limited with flexion extension and oblique extension with mild to moderate increased pain.  +facet loading bilaterally  Wilbert's test causes no increased pain on either side.    Supine straight leg raise is negative bilaterally.    Internal and external rotation of the hip causes no increased pain on either side.  Myofascial exam: No tenderness to palpation across lumbar paraspinous muscles.      MENTAL STATUS: normal orientation, speech, language, and fund of knowledge for social situation.  Emotional state appropriate.    CRANIAL NERVES:  II:  PERRL bilaterally,   III,IV,VI: EOMI.    V:  Facial sensation equal bilaterally  VII:  Facial motor function normal.  VIII:  Hearing equal to finger rub bilaterally  IX/X: Gag normal, palate symmetric  XI:  Shoulder shrug equal, head turn equal  XII:  Tongue midline without fasciculations      MOTOR: Tone and bulk: normal bilateral upper and lower Strength: normal   Delt Bi Tri WE WF     R 5 5 5 5 5 5   L 5 5 5 5 5 5     IP ADD ABD Quad TA Gas HAM  R 5 5 5 5 5 5 5  L 5 5 5 5 5 5 5    SENSATION: Light touch and pinprick intact bilaterally  REFLEXES: normal, symmetric, nonbrisk.  Toes down, no clonus. No hoffmans.  GAIT: normal rise, base, steps, and arm swing.        Imaging:  MRI L-spine 12/2023  Susceptibility  artifact related to inter spinous fusion hardware at L3-4 and L4-5.     Alignment: Straightening of the lumbar lordosis with minimal stepwise retrolisthesis of L2 on L3, L3 on L4 and L4 on L5.     Vertebral column: No evidence of an acute fracture or aggressive marrow replacement process. Multilevel disc degeneration with intervertebral disc space narrowing, disc desiccation, and marginal osteophyte formation, most pronounced at L2-3 and L4-5 with severe disc space narrowing.  Chronic Schmorl's node formation along the superior and inferior endplates of L1 with associated mild right asymmetric superior endplate height loss.  Stable probable small hemangioma is within the T12, L1 and L2 vertebral bodies.     Cord: Normal.  Conus terminates at T12-L1.     Degenerative findings:     T12-L1: Mild-to-moderate disc space narrowing.  Mild diffuse disc bulge.  Mild bilateral facet arthropathy and ligamentum flavum thickening.  No neural foraminal or spinal canal stenosis.     L1-L2: Mild disc space narrowing.  Mild diffuse disc bulge.  Mild bilateral facet arthropathy and ligamentum flavum thickening.  No neural foraminal or spinal canal stenosis.     L2-L3: Marked disc space narrowing.  Diffuse disc bulge with superimposed small left central disc extrusion with superior extension to the L2 infra pedicular level.  Mild bilateral facet arthropathy and ligamentum flavum thickening.  No significant neural foraminal stenosis.  Mild spinal canal stenosis.     L3-L4: Moderate disc space narrowing.  Left asymmetric diffuse disc bulge with osteophytic ridging.  Moderate bilateral facet arthropathy and ligamentum flavum thickening.  Small bursal cyst along the right aspect of the spinous process.  Moderate left and mild right neural foraminal stenosis.  Moderate spinal canal stenosis.     L4-L5: Marked disc space narrowing.  Diffuse disc bulge and osteophytic ridging.  Severe left greater than right facet arthropathy and prominent  ligamentum flavum thickening.  Severe left and mild right neural foraminal stenosis.  Mild spinal canal stenosis.  Mild diffuse disc bulge.     L5-S1: Severe bilateral facet arthropathy.  Ligamentum flavum thickening.  Mild bilateral neural foraminal stenosis.  No significant spinal canal stenosis.      MRI C-spine 9/2024  Vertebral column: The vertebral bodies maintain normal height.  There is no fracture.  There is degenerative disc and facet disease which will be described by level.  All of the discs are desiccated.  The odontoid process is intact.  There is severe disc space narrowing at the C5-6 and C6-7 levels.  There is mild anterolisthesis of C7 on T1, T1 on T2.  There is trace anterolisthesis of C3 on C4.  There is also mild retrolisthesis of C5 on C6 and C6 on C7 which is exaggerated by osteophyte formation.  Large bulky anterior osteophytes are present at the C4 through C6 levels.  Baseline marrow signal is normal.     Spinal canal, cord, epidural space: The spinal canal is developmentally normal.  There is no abnormal epidural mass or fluid collection.  The cord is normal in caliber and signal intensity.     Findings by level:     C2-3: There is bilateral facet joint arthropathy.  There is no spinal stenosis.  There is mild right foraminal stenosis.     C3-4: There is subtle trace anterolisthesis of C3 on C4.  There is marked left and moderate right facet joint arthropathy.  There is bilateral uncovertebral spurring with a disc osteophyte complex.  There is no spinal stenosis.  There is severe left and at least moderate right foraminal stenosis.     C4-5: There is a broad disc protrusion/osteophyte eccentric to the left.  This narrows the subarachnoid space and contributes to mild spinal stenosis.  There is marked left facet joint arthropathy.  There is mild right greater than left uncovertebral spurring.  There is moderate right but only mild left foraminal stenosis.     C5-6: There is marked disc space  narrowing, bilateral uncovertebral spurring.  There is bilateral facet joint arthropathy.  There is a broad disc protrusion/osteophyte.  There is moderate spinal stenosis with severe bilateral foraminal stenosis.  There is no cord compression.     C6-7: There is severe disc space narrowing.  There is left greater than right facet joint arthropathy with bilateral uncovertebral spurring and a broad disc protrusion/osteophyte.  There is only borderline spinal stenosis without cord compression.  There is marked left and mild-to-moderate right foraminal stenosis.     C7-T1: There is mild anterolisthesis of C7 on T1.  There is mild disc space narrowing.  There is left greater than right facet joint arthropathy.  There is a disc bulge.  There is no spinal stenosis.  There is mild-to-moderate left but only mild right foraminal stenosis.    Assessment:  Patient presents with neck and lower back pain.  1. Other spondylosis, lumbar region    2. Degeneration of intervertebral disc of lumbar region, unspecified whether pain present    3. Postlaminectomy syndrome of lumbar region    4. DDD (degenerative disc disease), cervical    5. Other spondylosis, cervical region          Plan:  I have stressed the importance of physical activity and exercise to improve overall health  Reviewed pertinent imaging and records with patient  I believe her low back pain maybe due to facet arthropathy and have recommended lumbar medial branch blocks as a diagnostic procedure.  If successful, would proceed with radiofrequency ablation. We will target the bilateral L4/5 and L5/S1 facets.   May also consider cervical medial branch blocks to help with her neck pain.  This also avoids steroid administration as she continues to get steroid injections in her bilateral shoulder by orthopedics.  She did request resuming her opioid medication.  Discussed that we would we okay with resuming her opioid medication if she discontinues her benzodiazepine use.   Patient expressed understanding and we will start weaning off her benzodiazepine use.  Follow-up after procedure  Thank you for referring this interesting patient, and I look forward to continuing to collaborate in her care.

## 2024-12-20 NOTE — TELEPHONE ENCOUNTER
Types of orders made on 12/20/2024: Procedure Request      Order Date:12/20/2024   Ordering User:SONIA CAAL [[id number]]   Encounter Provider:Sonia Caal MD [[id number]]   Authorizing Provider: Sonia Caal MD [[id number]]   Department:[facility] PAIN MANAGEMENT[[id number]]      Common Order Information   Procedure -> Medial Branch Block (Specify level and laterality) Cmt: B/L L4/5             and L5/S1      Order Specific Information   Order: Procedure Order to Pain Management [Custom: [id number]]  Order #:          [id number]: 1 FUTURE     Priority: Routine  Class: Clinic Performed     Future Order Information       Expires on:12/20/2025            Expected by:12/20/2024                   Associated Diagnoses       M47.896 Other spondylosis, lumbar region       Facility Name: -> [facility]              Priority: Routine  Class: Clinic Performed     Future Order Information       Expires on:12/20/2025            Expected by:12/20/2024                   Associated Diagnoses       M47.896 Other spondylosis, lumbar region       Procedure -> Medial Branch Block (Specify level and laterality) Cmt: B/L                 L4/5 and L5/S1

## 2024-12-20 NOTE — H&P (VIEW-ONLY)
This note was completed with dictation software and grammatical errors may exist.    Referring Physician: Mikey Hodgson DO    PCP: Pawel Badillo III, MD      CC: neck pain, lower back pain    HPI:   Marisela Eagle is a 79 y.o. female referred to us for neck and lower back pain.  Neck pain is a constant aching, throbbing pain in her posterior neck.  He has been present for over 10 years.  Pain radiates to her bilateral shoulders.  She has history of shoulder osteoarthritis and continues to follow Orthopedics for shoulder injections every 3 months.  Neck pain does not radiate down upper extremities.  Pain worsens with extension lateral rotation of the neck.  She had MRI of the cervical spine in his has been seen by Neurosurgery.  She does not desire surgical intervention.  She has not had any cervical spine interventions.  Lower back pain is more bothersome.  He is a constant aching, throbbing pain in his lower back.  Pain radiates to her hips and buttocks.  Lower back pain is more bothersome and worsens standing, walking getting up.  Pain improves with rest.  She has history of spacers placed between her L4 and L5 disc spaces over 10 years ago.  No other surgical history.  She had MRI of the lumbar spine.  She states being worked up for spinal cord stimulation by outside physician.  She states having a successful SCS trial but SCS implant was aborted due to an infection in her lungs.  She has not decided to proceed with SCS implant currently.  She denies any worsening weakness.  No bowel bladder changes.    ROS:  CONSTITUTIONAL: No fevers, chills, night sweats, wt. loss, appetite changes  SKIN: no rashes or itching  ENT: No headaches, head trauma, vision changes, or eye pain  LYMPH NODES: None noticed   CV: No chest pain, palpitations.   RESP: No shortness of breath, dyspnea on exertion, cough, wheezing, or hemoptysis  GI: No nausea, emesis, diarrhea, constipation, melena, hematochezia, pain.    : No  dysuria, hematuria, urgency, or frequency   HEME: No easy bruising, bleeding problems  PSYCHIATRIC: No depression, anxiety, psychosis, hallucinations.  NEURO: No seizures, memory loss, dizziness or difficulty sleeping  MSK: +HPI      Past Medical History:   Diagnosis Date    Abnormal stress test     shortness of breath    Bilateral knee pain 2012    left worse, right knee painful even after partial replacement.    Bruises easily     Clot ?    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 Took Coumadin x 6 weeks    KNEE ARTHROPLASTY Bilateral     KNEE ARTHROSCOPY  2010    right    LEFT HEART CATHETERIZATION Left 2024    Procedure: Left heart cath;  Surgeon: Cooper Canchola MD;  Location: [facility] CATH/EP LAB;  Service: Cardiology;  Laterality: Left;    TONSILLECTOMY       Family History   Problem Relation Name Age of Onset    Neuropathy Mother      Hypertension Mother      Stroke Mother      Neuropathy Father      Cancer Daughter      Diabetes Maternal Grandmother      Melanoma Neg Hx      Psoriasis Neg Hx      Lupus Neg Hx      Eczema Neg Hx       Social History  "    Socioeconomic History    Marital status:    Occupational History    Occupation: RETIRED   Tobacco Use    Smoking status: Former     Current packs/day: 0.00     Types: Cigarettes     Quit date: 3/23/2012     Years since quittin.7    Smokeless tobacco: Never   Substance and Sexual Activity    Alcohol use: Not Currently     Comment: occasional    Drug use: No     Social Drivers of Health     Financial Resource Strain: Low Risk  (2023)    Overall Financial Resource Strain (CARDIA)     Difficulty of Paying Living Expenses: Not hard at all   Food Insecurity: No Food Insecurity (2023)    Hunger Vital Sign     Worried About Running Out of Food in the Last Year: Never true     Ran Out of Food in the Last Year: Never true   Transportation Needs: No Transportation Needs (2023)    PRAPARE - Transportation     Lack of Transportation (Medical): No     Lack of Transportation (Non-Medical): No   Physical Activity: Inactive (2023)    Exercise Vital Sign     Days of Exercise per Week: 0 days     Minutes of Exercise per Session: 0 min   Stress: No Stress Concern Present (2023)    [employer] [place] - Occupational Stress Questionnaire     Feeling of Stress : Not at all   Housing Stability: Low Risk  (2023)    Housing Stability Vital Sign     Unable to Pay for Housing in the Last Year: No     Number of Places Lived in the Last Year: 1     Unstable Housing in the Last Year: No         Medications/Allergies: See med card    Vitals:    24 1041   BP: (!) 147/73   Pulse: 85   Resp: 18   Weight: 108.3 kg (238 lb 12.1 oz)   Height: 5' 9" (1.753 m)   PainSc:   4   PainLoc: Neck         Physical exam:    GENERAL: A and O x3, the patient appears well groomed and is in no acute distress.  Skin: No rashes or obvious lesions  HEENT: normocephalic, atraumatic  CARDIOVASCULAR:  Palpable peripheral pulses  LUNGS: easy work of breathing  ABDOMEN: soft, nontender   UPPER " EXTREMITIES: Normal alignment, normal range of motion, no atrophy, no skin changes,  hair growth and nail growth normal and equal bilaterally. No swelling, no tenderness.    LOWER EXTREMITIES:  Normal alignment, normal range of motion, no atrophy, no skin changes,  hair growth and nail growth normal and equal bilaterally. No swelling, no tenderness.  CERVICAL SPINE:  Cervical spine: ROM is limited in flexion, extension and lateral rotation with mild to moderate increased pain.  Spurling's maneuver causes no neck pain to either side.  Myofascial exam: No Tenderness to palpation across cervical paraspinous region bilaterally.    LUMBAR SPINE  Lumbar spine: ROM is limited with flexion extension and oblique extension with mild to moderate increased pain.  +facet loading bilaterally  Wilbert's test causes no increased pain on either side.    Supine straight leg raise is negative bilaterally.    Internal and external rotation of the hip causes no increased pain on either side.  Myofascial exam: No tenderness to palpation across lumbar paraspinous muscles.      MENTAL STATUS: normal orientation, speech, language, and fund of knowledge for social situation.  Emotional state appropriate.    CRANIAL NERVES:  II:  PERRL bilaterally,   III,IV,VI: EOMI.    V:  Facial sensation equal bilaterally  VII:  Facial motor function normal.  VIII:  Hearing equal to finger rub bilaterally  IX/X: Gag normal, palate symmetric  XI:  Shoulder shrug equal, head turn equal  XII:  Tongue midline without fasciculations      MOTOR: Tone and bulk: normal bilateral upper and lower Strength: normal   Delt Bi Tri WE WF     R 5 5 5 5 5 5   L 5 5 5 5 5 5     IP ADD ABD Quad TA Gas HAM  R 5 5 5 5 5 5 5  L 5 5 5 5 5 5 5    SENSATION: Light touch and pinprick intact bilaterally  REFLEXES: normal, symmetric, nonbrisk.  Toes down, no clonus. No hoffmans.  GAIT: normal rise, base, steps, and arm swing.        Imaging:  MRI L-spine 12/2023  Susceptibility  artifact related to inter spinous fusion hardware at L3-4 and L4-5.     Alignment: Straightening of the lumbar lordosis with minimal stepwise retrolisthesis of L2 on L3, L3 on L4 and L4 on L5.     Vertebral column: No evidence of an acute fracture or aggressive marrow replacement process. Multilevel disc degeneration with intervertebral disc space narrowing, disc desiccation, and marginal osteophyte formation, most pronounced at L2-3 and L4-5 with severe disc space narrowing.  Chronic Schmorl's node formation along the superior and inferior endplates of L1 with associated mild right asymmetric superior endplate height loss.  Stable probable small hemangioma is within the T12, L1 and L2 vertebral bodies.     Cord: Normal.  Conus terminates at T12-L1.     Degenerative findings:     T12-L1: Mild-to-moderate disc space narrowing.  Mild diffuse disc bulge.  Mild bilateral facet arthropathy and ligamentum flavum thickening.  No neural foraminal or spinal canal stenosis.     L1-L2: Mild disc space narrowing.  Mild diffuse disc bulge.  Mild bilateral facet arthropathy and ligamentum flavum thickening.  No neural foraminal or spinal canal stenosis.     L2-L3: Marked disc space narrowing.  Diffuse disc bulge with superimposed small left central disc extrusion with superior extension to the L2 infra pedicular level.  Mild bilateral facet arthropathy and ligamentum flavum thickening.  No significant neural foraminal stenosis.  Mild spinal canal stenosis.     L3-L4: Moderate disc space narrowing.  Left asymmetric diffuse disc bulge with osteophytic ridging.  Moderate bilateral facet arthropathy and ligamentum flavum thickening.  Small bursal cyst along the right aspect of the spinous process.  Moderate left and mild right neural foraminal stenosis.  Moderate spinal canal stenosis.     L4-L5: Marked disc space narrowing.  Diffuse disc bulge and osteophytic ridging.  Severe left greater than right facet arthropathy and prominent  ligamentum flavum thickening.  Severe left and mild right neural foraminal stenosis.  Mild spinal canal stenosis.  Mild diffuse disc bulge.     L5-S1: Severe bilateral facet arthropathy.  Ligamentum flavum thickening.  Mild bilateral neural foraminal stenosis.  No significant spinal canal stenosis.      MRI C-spine 9/2024  Vertebral column: The vertebral bodies maintain normal height.  There is no fracture.  There is degenerative disc and facet disease which will be described by level.  All of the discs are desiccated.  The odontoid process is intact.  There is severe disc space narrowing at the C5-6 and C6-7 levels.  There is mild anterolisthesis of C7 on T1, T1 on T2.  There is trace anterolisthesis of C3 on C4.  There is also mild retrolisthesis of C5 on C6 and C6 on C7 which is exaggerated by osteophyte formation.  Large bulky anterior osteophytes are present at the C4 through C6 levels.  Baseline marrow signal is normal.     Spinal canal, cord, epidural space: The spinal canal is developmentally normal.  There is no abnormal epidural mass or fluid collection.  The cord is normal in caliber and signal intensity.     Findings by level:     C2-3: There is bilateral facet joint arthropathy.  There is no spinal stenosis.  There is mild right foraminal stenosis.     C3-4: There is subtle trace anterolisthesis of C3 on C4.  There is marked left and moderate right facet joint arthropathy.  There is bilateral uncovertebral spurring with a disc osteophyte complex.  There is no spinal stenosis.  There is severe left and at least moderate right foraminal stenosis.     C4-5: There is a broad disc protrusion/osteophyte eccentric to the left.  This narrows the subarachnoid space and contributes to mild spinal stenosis.  There is marked left facet joint arthropathy.  There is mild right greater than left uncovertebral spurring.  There is moderate right but only mild left foraminal stenosis.     C5-6: There is marked disc space  narrowing, bilateral uncovertebral spurring.  There is bilateral facet joint arthropathy.  There is a broad disc protrusion/osteophyte.  There is moderate spinal stenosis with severe bilateral foraminal stenosis.  There is no cord compression.     C6-7: There is severe disc space narrowing.  There is left greater than right facet joint arthropathy with bilateral uncovertebral spurring and a broad disc protrusion/osteophyte.  There is only borderline spinal stenosis without cord compression.  There is marked left and mild-to-moderate right foraminal stenosis.     C7-T1: There is mild anterolisthesis of C7 on T1.  There is mild disc space narrowing.  There is left greater than right facet joint arthropathy.  There is a disc bulge.  There is no spinal stenosis.  There is mild-to-moderate left but only mild right foraminal stenosis.    Assessment:  Patient presents with neck and lower back pain.  1. Other spondylosis, lumbar region    2. Degeneration of intervertebral disc of lumbar region, unspecified whether pain present    3. Postlaminectomy syndrome of lumbar region    4. DDD (degenerative disc disease), cervical    5. Other spondylosis, cervical region          Plan:  I have stressed the importance of physical activity and exercise to improve overall health  Reviewed pertinent imaging and records with patient  I believe her low back pain maybe due to facet arthropathy and have recommended lumbar medial branch blocks as a diagnostic procedure.  If successful, would proceed with radiofrequency ablation. We will target the bilateral L4/5 and L5/S1 facets.   May also consider cervical medial branch blocks to help with her neck pain.  This also avoids steroid administration as she continues to get steroid injections in her bilateral shoulder by orthopedics.  She did request resuming her opioid medication.  Discussed that we would we okay with resuming her opioid medication if she discontinues her benzodiazepine use.   Patient expressed understanding and we will start weaning off her benzodiazepine use.  Follow-up after procedure  Thank you for referring this interesting patient, and I look forward to continuing to collaborate in her care.

## 2024-12-20 NOTE — TELEPHONE ENCOUNTER
Ret call to pt , told her she can ask dr light about wt loss inj when she comes in at her [facility] in jan.

## 2024-12-30 ENCOUNTER — TELEPHONE (OUTPATIENT)
Dept: PAIN MEDICINE | Facility: CLINIC | Age: 79
End: 2024-12-30
Payer: MEDICARE

## 2024-12-30 NOTE — TELEPHONE ENCOUNTER
----- Message from Louie sent at 12/30/2024 11:32 AM CST -----  Type:  Appointment Request    Caller is requesting a  appointment.      Name of Caller:pt    When is the first available appointment?Returning [facility] message for scheduling    Symptoms:back pain    Would the patient rather a call back or a response via [facility]? Duane vazquez    Best Call Back Number:[phone]    Additional Information: needs procedure done and wants to get on the schedule/returning [facility] call.   Please call back to advise. Thanks!

## 2025-01-06 ENCOUNTER — OFFICE VISIT (OUTPATIENT)
Dept: FAMILY MEDICINE | Facility: CLINIC | Age: 80
End: 2025-01-06
Payer: MEDICARE

## 2025-01-06 VITALS
HEIGHT: 69 IN | DIASTOLIC BLOOD PRESSURE: 78 MMHG | BODY MASS INDEX: 35.36 KG/M2 | SYSTOLIC BLOOD PRESSURE: 136 MMHG | WEIGHT: 238.75 LBS | TEMPERATURE: 99 F

## 2025-01-06 DIAGNOSIS — E78.2 MIXED HYPERLIPIDEMIA: ICD-10-CM

## 2025-01-06 DIAGNOSIS — E66.01 SEVERE OBESITY (BMI 35.0-39.9) WITH COMORBIDITY: ICD-10-CM

## 2025-01-06 DIAGNOSIS — M35.1 CONNECTIVE TISSUE DISEASE OVERLAP SYNDROME: ICD-10-CM

## 2025-01-06 DIAGNOSIS — I50.32 CHRONIC DIASTOLIC HEART FAILURE: ICD-10-CM

## 2025-01-06 DIAGNOSIS — Z86.711 HISTORY OF PULMONARY EMBOLISM: ICD-10-CM

## 2025-01-06 DIAGNOSIS — E03.9 HYPOTHYROIDISM, UNSPECIFIED TYPE: ICD-10-CM

## 2025-01-06 DIAGNOSIS — Z79.01 ANTICOAGULATED: ICD-10-CM

## 2025-01-06 DIAGNOSIS — K21.9 GASTROESOPHAGEAL REFLUX DISEASE, UNSPECIFIED WHETHER ESOPHAGITIS PRESENT: ICD-10-CM

## 2025-01-06 DIAGNOSIS — G89.29 OTHER CHRONIC PAIN: ICD-10-CM

## 2025-01-06 DIAGNOSIS — I10 HYPERTENSION, ESSENTIAL: Primary | ICD-10-CM

## 2025-01-06 PROCEDURE — 99999 PR PBB SHADOW E&M-EST. PATIENT-LVL IV: CPT | Mod: PBBFAC,HCNC,, | Performed by: FAMILY MEDICINE

## 2025-01-06 RX ORDER — AMITRIPTYLINE HYDROCHLORIDE 25 MG/1
25 TABLET, FILM COATED ORAL NIGHTLY
Qty: 90 TABLET | Refills: 1 | Status: SHIPPED | OUTPATIENT
Start: 2025-01-06

## 2025-01-06 NOTE — PROGRESS NOTES
SCRIBE #1 NOTE: I, Neil Fuchs, am scribing for, and in the presence of,  Pawel Badillo III, MD. I have scribed the entire note.     Subjective:       Patient ID: Marisela Eagle is a 79 y.o. female.    Chief Complaint: Follow-up (6 month)    Ms. Eagle is here for a follow up after her last appointment on September 3, 2024. Use of cane. Accompanied by her . Chronic pain. Sees Dr. Caal for back injections, and he will not give her anymore Percocet because she takes Ativan. Takes Tylenol arthritis for pain as well. States her left 3rd toe has been causing her pain, and she goes to see a podiatrist soon. Reports Dr. Boogie has a PFT ordered. History of PE. BMI of 35.26, stable. Cardiovascular good. No chest pain. No palpitations. Blood pressure is 136/78. Hypertension controlled. Hyperlipidemia. Hypothyroidism. Anticoagulated with Eliquis. Connective tissue disorder. Gastroesophageal reflux disease. States Dr. Naranjo has discontinued her reflux medication, but she needs it due to yeast that Dr. Melgar found in her throat. On Gabapentin 2x a day and takes Cymbalta and Elavil nightly.    Review of Systems   Constitutional:  Negative for chills and fever.   HENT:  Negative for congestion and sore throat.    Eyes:  Negative for visual disturbance.   Respiratory:  Negative for chest tightness and shortness of breath.    Cardiovascular:  Negative for chest pain.   Gastrointestinal:  Negative for nausea.   Endocrine: Negative for polydipsia and polyuria.   Genitourinary:  Negative for dysuria and flank pain.   Musculoskeletal:  Negative for back pain, neck pain and neck stiffness.   Skin:  Negative for rash.   Neurological:  Negative for weakness.   Hematological:  Does not bruise/bleed easily.   Psychiatric/Behavioral:  Negative for behavioral problems.    All other systems reviewed and are negative.      Objective:      Physical examination: Vital signs noted. No acute distress. No carotid bruit. Regular  heart rate and rhythm. Lungs clear to auscultation bilaterally. Abdomen bowel sounds positive soft and nontender. Extremities without edema. 2+ pedal pulses.      Assessment:       1. Hypertension, essential    2. Anticoagulated    3. Connective tissue disease overlap syndrome    4. Mixed hyperlipidemia    5. BMI 35.0-35.9,adult    6. Hypothyroidism, unspecified type    7. History of pulmonary embolism    8. Gastroesophageal reflux disease, unspecified whether esophagitis present    9. Other chronic pain    10. Severe obesity (BMI 35.0-39.9) with comorbidity    11. Chronic diastolic heart failure        Plan:       Hypertension, essential  -     TSH; Future; Expected date: 01/06/2025  -     Comprehensive Metabolic Panel; Future; Expected date: 01/06/2025  -     Lipid Panel; Future; Expected date: 01/06/2025  -     CBC Auto Differential; Future; Expected date: 01/06/2025    Anticoagulated    Connective tissue disease overlap syndrome    Mixed hyperlipidemia  -     TSH; Future; Expected date: 01/06/2025  -     Comprehensive Metabolic Panel; Future; Expected date: 01/06/2025  -     Lipid Panel; Future; Expected date: 01/06/2025  -     CBC Auto Differential; Future; Expected date: 01/06/2025    BMI 35.0-35.9,adult    Hypothyroidism, unspecified type  -     TSH; Future; Expected date: 01/06/2025  -     Comprehensive Metabolic Panel; Future; Expected date: 01/06/2025  -     Lipid Panel; Future; Expected date: 01/06/2025  -     CBC Auto Differential; Future; Expected date: 01/06/2025    History of pulmonary embolism    Gastroesophageal reflux disease, unspecified whether esophagitis present    Other chronic pain    Severe obesity (BMI 35.0-39.9) with comorbidity    Chronic diastolic heart failure    Other orders  -     amitriptyline (ELAVIL) 25 MG tablet; Take 1 tablet (25 mg total) by mouth every evening.  Dispense: 90 tablet; Refill: 1    Discussed G LP ones.  She does not qualify for 1.  Discuss the use of Percocet with  Ativan.  She will go off the Percocet in ordered continue her Ativan.  Continue gabapentin.  Will increase her Elavil to 25 HS.  Tylenol 500 mg 2 b.i.d. p.r.n..  Check TSH CMP lipids and CBC.  Hypertension is under good control.  Continue her anticoagulation.  Discussed her Protonix her insurance company told her to stop it but she is having severe reflux symptoms without it needs to resume it.  All care gaps addressed or discussed.     I, Pawel Badillo III, MD, personally performed the services described in this documentation. All medical record entries made by the scribe were at my direction and in my presence. I have reviewed the chart and agree that the record reflects my personal performance and is accurate and complete.

## 2025-01-06 NOTE — PATIENT INSTRUCTIONS
Elavil 25 mg - All medications will be sent to pharm after 5:30 pm today       Blood test fasting 8 hr

## 2025-01-07 RX ORDER — SODIUM CHLORIDE, SODIUM LACTATE, POTASSIUM CHLORIDE, CALCIUM CHLORIDE 600; 310; 30; 20 MG/100ML; MG/100ML; MG/100ML; MG/100ML
INJECTION, SOLUTION INTRAVENOUS CONTINUOUS
Status: CANCELLED | OUTPATIENT
Start: 2025-01-07

## 2025-01-07 RX ORDER — LIDOCAINE HYDROCHLORIDE 10 MG/ML
1 INJECTION, SOLUTION EPIDURAL; INFILTRATION; INTRACAUDAL; PERINEURAL ONCE
Status: CANCELLED | OUTPATIENT
Start: 2025-01-07 | End: 2025-01-07

## 2025-01-08 ENCOUNTER — OFFICE VISIT (OUTPATIENT)
Dept: PODIATRY | Facility: CLINIC | Age: 80
End: 2025-01-08
Payer: MEDICARE

## 2025-01-08 VITALS — HEIGHT: 69 IN | WEIGHT: 243.81 LBS | BODY MASS INDEX: 36.11 KG/M2

## 2025-01-08 DIAGNOSIS — M21.6X2 EQUINUS DEFORMITY OF BOTH FEET: ICD-10-CM

## 2025-01-08 DIAGNOSIS — M54.10 RADICULAR PAIN OF LEFT LOWER EXTREMITY: ICD-10-CM

## 2025-01-08 DIAGNOSIS — M21.6X1 EQUINUS DEFORMITY OF BOTH FEET: ICD-10-CM

## 2025-01-08 DIAGNOSIS — M54.17 LUMBOSACRAL RADICULOPATHY: ICD-10-CM

## 2025-01-08 DIAGNOSIS — M79.672 CHRONIC FOOT PAIN, LEFT: ICD-10-CM

## 2025-01-08 DIAGNOSIS — G57.92 NEURITIS OF LEFT LOWER EXTREMITY: ICD-10-CM

## 2025-01-08 DIAGNOSIS — G57.62 NEUROMA OF SECOND INTERSPACE OF LEFT FOOT: Primary | ICD-10-CM

## 2025-01-08 DIAGNOSIS — G89.29 CHRONIC FOOT PAIN, LEFT: ICD-10-CM

## 2025-01-08 DIAGNOSIS — M77.42 METATARSALGIA, LEFT FOOT: ICD-10-CM

## 2025-01-08 PROCEDURE — 99214 OFFICE O/P EST MOD 30 MIN: CPT | Mod: HCNC,S$GLB,,

## 2025-01-08 PROCEDURE — 1159F MED LIST DOCD IN RCRD: CPT | Mod: HCNC,CPTII,S$GLB,

## 2025-01-08 PROCEDURE — 1125F AMNT PAIN NOTED PAIN PRSNT: CPT | Mod: HCNC,CPTII,S$GLB,

## 2025-01-08 PROCEDURE — 3288F FALL RISK ASSESSMENT DOCD: CPT | Mod: HCNC,CPTII,S$GLB,

## 2025-01-08 PROCEDURE — 99999 PR PBB SHADOW E&M-EST. PATIENT-LVL IV: CPT | Mod: PBBFAC,HCNC,,

## 2025-01-08 PROCEDURE — 1160F RVW MEDS BY RX/DR IN RCRD: CPT | Mod: HCNC,CPTII,S$GLB,

## 2025-01-08 PROCEDURE — 1157F ADVNC CARE PLAN IN RCRD: CPT | Mod: HCNC,CPTII,S$GLB,

## 2025-01-08 PROCEDURE — 1101F PT FALLS ASSESS-DOCD LE1/YR: CPT | Mod: HCNC,CPTII,S$GLB,

## 2025-01-08 NOTE — PROGRESS NOTES
"  [address] [facility] [address]  [place] [address]  Phone: [phone]   [phone]    Patient's PCP:Pawel Badillo III, MD  Referring Provider: No ref. provider found    Subjective:      Chief Complaint:: Foot Pain (Big toe and 3rd toe pain left foot)    Foot Pain  Pertinent negatives include no abdominal pain, arthralgias, chest pain, chills, coughing, fatigue, fever, headaches, joint swelling, myalgias, nausea, neck pain, numbness, rash or weakness.       Marisela Eagle is a 79 y.o. female who presents today with a complaint of Big toe and 3rd toe pain left foot. The current episode progressively worsened over a few weeks, but has been present for the last 12+ months.  The symptoms include aching and burning pain. Probable cause of complaint unknown, hx of neuropathy. The symptoms are aggravated by pressure. The problem has stayed the same. Treatment to date have included opened toe shoes which provided some relief.       Vitals:    25 1340   Weight: 110.6 kg (243 lb 13.3 oz)   Height: 5' 9" (1.753 m)   PainSc:   9      Shoe Size: 10.5-11    Past Surgical History:   Procedure Laterality Date    ADENOIDECTOMY      APPENDECTOMY      BILATERAL SALPINGOOPHORECTOMY       SECTION      COLONOSCOPY  2012    HIP ARTHROPLASTY      HYSTERECTOMY      with BSO    JOINT REPLACEMENT  2012    rt unilateral knee arthroplasty. Took Coumadin x 6 weeks    KNEE ARTHROPLASTY Bilateral     KNEE ARTHROSCOPY  2010    right    LEFT HEART CATHETERIZATION Left 2024    Procedure: Left heart cath;  Surgeon: Cooper Canchola MD;  Location: [facility] CATH/EP LAB;  Service: Cardiology;  Laterality: Left;    TONSILLECTOMY       Past Medical History:   Diagnosis Date    Abnormal stress test     shortness of breath    Bilateral knee pain 2012    left worse, right knee painful even after partial replacement.    Bruises easily     Clot ?    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)     no treatment needed    Hyperlipidemia     Hypertension     Migraines     Neuropathy     peripheral    Pulmonary embolus 2024    dvt knee    Thyroid disease     hypothyroid, has nodules    Varicose veins      Family History   Problem Relation Name Age of Onset    Neuropathy Mother      Hypertension Mother      Stroke Mother      Neuropathy Father      Cancer Daughter      Diabetes Maternal Grandmother      Melanoma Neg Hx      Psoriasis Neg Hx      Lupus Neg Hx      Eczema Neg Hx          Social History:   Marital Status:   Alcohol History:  reports that she does not currently use alcohol.  Tobacco History:  reports that she quit smoking about 12 years ago. Her smoking use included cigarettes. She has never used smokeless tobacco.  Drug History:  reports no history of drug use.    Review of patient's allergies indicates:  No Known Allergies    Current Outpatient Medications   Medication Sig Dispense Refill    ALLERGY RELIEF, LORATADINE, 10 mg tablet Take 1 tablet by mouth once daily 30 tablet 0    amitriptyline (ELAVIL) 25 MG tablet Take 1 tablet (25 mg total) by mouth every evening. 90 tablet 1    apixaban (ELIQUIS) 5 mg Tab Take 1 tablet (5 mg total) by mouth 2 (two) times daily. 180 tablet 3    artificial tears,hypromellose,,GENTEAL/SUSTANE, (SYSTANE GEL) 0.3 % Gel Place 1 drop into both eyes as needed.      cholecalciferol, vitamin D3, 5,000 unit capsule Take 5,000 Units by mouth once daily.      cranberry fruit extract (CRANBERRY CONCENTRATE ORAL) Take 1 tablet by mouth Daily.      cyanocobalamin (VITAMIN B-12) 1000 MCG tablet Take 5,000 mcg by mouth once daily.       DULoxetine (CYMBALTA) 60 MG capsule Take 1 capsule (60 mg total) by mouth once daily. 90 capsule 1    famotidine (PEPCID) 20 MG tablet TAKE 1 TABLET EVERY EVENING 90 tablet 3    gabapentin (NEURONTIN) 800 MG tablet TAKE 1 TABLET THREE TIMES DAILY 270 tablet 3    levothyroxine (SYNTHROID) 125 MCG tablet TAKE 1 TABLET EVERY DAY BEFORE BREAKFAST 90 tablet 3    LORazepam (ATIVAN) 1 MG tablet Take 1 tablet by mouth in the evening 30 tablet 0    losartan (COZAAR) 100 MG tablet TAKE 1/2 TABLET TWICE DAILY 90 tablet 3    magnesium 30 mg Tab Take 1 tablet by mouth once daily.      metoprolol succinate (TOPROL-XL) 50 MG 24 hr tablet TAKE 1/2 TABLET EVERY EVENING 45 tablet 3    oxyCODONE-acetaminophen (PERCOCET)  mg per tablet Take 1 tablet by mouth daily as needed for Pain.      pantoprazole (PROTONIX) 40 MG tablet TAKE 1 TABLET EVERY MORNING 90 tablet 2    potassium chloride SA (K-DUR,KLOR-CON) 20 MEQ tablet Take 20 mEq by mouth once daily.      spironolactone (ALDACTONE) 25 MG tablet TAKE 1 TABLET EVERY DAY 90 tablet 3    torsemide (DEMADEX) 20 MG Tab Take 1 tablet (20 mg total) by mouth once daily. 90 tablet 3    vit C/E/Zn/coppr/lutein/zeaxan (PRESERVISION AREDS-2 ORAL) Take 1 capsule by mouth once daily.       No current facility-administered medications for this visit.       Review of Systems   Constitutional:  Negative for chills, fatigue, fever and unexpected weight change.   HENT:  Negative for hearing loss and trouble swallowing.    Eyes:  Negative for photophobia and visual disturbance.   Respiratory:  Negative for cough, shortness of breath and wheezing.    Cardiovascular:  Negative for chest pain, palpitations and leg swelling.   Gastrointestinal:  Negative for abdominal pain and nausea.   Genitourinary:  Negative for dysuria and frequency.   Musculoskeletal:  Negative for arthralgias, back pain, gait problem, joint swelling, myalgias and neck pain.   Skin:  Negative for rash and wound.   Neurological:   Negative for tremors, seizures, weakness, numbness and headaches.   Hematological:  Does not bruise/bleed easily.         Objective:        Physical Exam:   Foot Exam    General  General Appearance: appears stated age and healthy   Orientation: alert and oriented to person, place, and time   Affect: appropriate   Gait: unimpaired   Assistance: cane use       Left Foot/Ankle      Inspection and Palpation  Ecchymosis: none  Tenderness: none   Swelling: none   Arch: normal  Hammertoes: absent  Claw toes: absent  Hallux valgus: yes  Hallux limitus: no  Skin Exam: skin intact; no callus, no drainage, no cellulitis, no ulcer and no erythema   Fungus Toenails: not present    Neurovascular  Dorsalis pedis: 2+  Posterior tibial: 2+  Capillary refill: 3+  Varicose veins: not present  Saphenous nerve sensation: diminished  Tibial nerve sensation: diminished  Superficial peroneal nerve sensation: diminished  Deep peroneal nerve sensation: diminished  Sural nerve sensation: diminished    Muscle Strength  Ankle dorsiflexion: 5  Ankle plantar flexion: 5  Ankle inversion: 5  Ankle eversion: 5  Great toe extension: 5  Great toe flexion: 5    Range of Motion    Passive  Ankle dorsiflexion: 0  Ankle eversion: 10  Ankle inversion: 20  1st MTP extension: 65      Tests  PT Tinel's sign: negative  Jimbo's sign: positive  Paresthesia: positive  Valleix sign: negative        Imaging: None          Assessment:       1. Neuroma of second interspace of left foot    2. Neuritis of left lower extremity    3. Equinus deformity of both feet    4. Metatarsalgia, left foot    5. Lumbosacral radiculopathy    6. Radicular pain of left lower extremity    7. Chronic foot pain, left      Plan:   Neuroma of second interspace of left foot  -     MRI Forefoot WO Contrast LT; Future; Expected date: 01/08/2025  -     Cancel: EMG W/ ULTRASOUND AND NERVE CONDUCTION TEST 1 Extremity; Future  -     EMG W/ ULTRASOUND AND NERVE CONDUCTION TEST 1 Extremity;  Future    Neuritis of left lower extremity  -     Cancel: EMG W/ ULTRASOUND AND NERVE CONDUCTION TEST 1 Extremity; Future  -     EMG W/ ULTRASOUND AND NERVE CONDUCTION TEST 1 Extremity; Future    Equinus deformity of both feet    Metatarsalgia, left foot    Lumbosacral radiculopathy    Radicular pain of left lower extremity    Chronic foot pain, left      Reviewed chart at length, extensive history of lumbar and sacral pathology     Reviewed Xray left foot L from 01/2024 with patient at length     Chart reviewed at length for underlying etiology to neuropathy patient suspected, none found or relayed from patient    Forefoot overload 2/2 tight posterior compartment with suspected neuroma L 2nd interspace (peace sign), appears like is 1 see me that on the ago    Discussed treatment options, surgical and conservative. Patient not interested in surgical management at this time.     Will revisit steroid injection pending MRI, nerve conduction study ordered to rule out nerve entrapment and confirm radiculopathy     Follow up after studies ordered performed     Counseling:     I provided patient education verbally regarding: Patient diagnosis, treatment options, as well as alternatives, risks, and benefits.     This note was created using Dragon voice recognition software that occasionally misinterpreted phrases or words.     Macho Hart DPM  Podiatry / Foot and Ankle Surgery   Secure chat preferred

## 2025-01-09 ENCOUNTER — HOSPITAL ENCOUNTER (OUTPATIENT)
Facility: HOSPITAL | Age: 80
Discharge: HOME | End: 2025-01-09
Attending: ANESTHESIOLOGY | Admitting: ANESTHESIOLOGY
Payer: MEDICARE

## 2025-01-09 DIAGNOSIS — M47.896 OTHER SPONDYLOSIS, LUMBAR REGION: ICD-10-CM

## 2025-01-09 PROCEDURE — 64493 INJ PARAVERT F JNT L/S 1 LEV: CPT | Mod: 50 | Performed by: ANESTHESIOLOGY

## 2025-01-09 PROCEDURE — 63600175 PHARM REV CODE 636 W HCPCS: Performed by: ANESTHESIOLOGY

## 2025-01-09 PROCEDURE — 64493 INJ PARAVERT F JNT L/S 1 LEV: CPT | Mod: 50,KX,, | Performed by: ANESTHESIOLOGY

## 2025-01-09 PROCEDURE — 64494 INJ PARAVERT F JNT L/S 2 LEV: CPT | Mod: 50,KX,, | Performed by: ANESTHESIOLOGY

## 2025-01-09 PROCEDURE — 64494 INJ PARAVERT F JNT L/S 2 LEV: CPT | Mod: 50 | Performed by: ANESTHESIOLOGY

## 2025-01-09 RX ORDER — BUPIVACAINE HYDROCHLORIDE 5 MG/ML
INJECTION, SOLUTION EPIDURAL; INTRACAUDAL
Status: DISCONTINUED | OUTPATIENT
Start: 2025-01-09 | End: 2025-01-09 | Stop reason: HOSPADM

## 2025-01-09 NOTE — PLAN OF CARE
Pre-op complete.  at bedside. Text notifications declined. Pt denies need for safe. Belongings on stretcher.

## 2025-01-09 NOTE — DISCHARGE SUMMARY
[facility] - Periop Services  Discharge Note  Short Stay    Procedure(s) (LRB):  Block-nerve-medial branch-lumbar l4/5 and l5/s1 MBB (Bilateral)      OUTCOME: Patient tolerated treatment/procedure well without complication and is now ready for discharge.    DISPOSITION: Home or Self Care    FINAL DIAGNOSIS:  <principal problem not specified>    FOLLOWUP: In clinic    DISCHARGE INSTRUCTIONS:    Discharge Procedure Orders   Notify your health care provider if you experience any of the following:  temperature >100.4     Notify your health care provider if you experience any of the following:  severe uncontrolled pain     Notify your health care provider if you experience any of the following:  redness, tenderness, or signs of infection (pain, swelling, redness, odor or green/yellow discharge around incision site)     Activity as tolerated        TIME SPENT ON DISCHARGE: 30 minutes

## 2025-01-09 NOTE — OP NOTE
PROCEDURE DATE: 1/9/2025    PROCEDURE:  Diagnostic medial branch nerve blocks to the bilateral L4/5 and L5/S1 facets under fluoroscopy     DIAGNOSIS:  Other spondylosis, lumbar region    Post Op diagnosis: Same    PHYSICIAN: Geoffrey Caal MD    MEDICATIONS INJECTED: 0.5% bupivicaine, 0.5 ml at each level    SEDATION MEDICATIONS:RN IV sedation     LOCAL ANESTHETIC USED: None    ESTIMATED BLOOD LOSS:  None    COMPLICATIONS:  None    TECHNIQUE: A time out was taken to identify the patient, procedure and side of the procedure. The patient was placed in a prone position, then prepped and draped in the usual sterile fashion using ChloraPrep and sterile towels.  The levels were determined under fluoroscopic guidance and then marked.  A 25-gauge 3.5 inch needle was introduced to the anatomic location of the medial branch nerves that innervate the bilateral L4/5 and L5/S1 facets. The above medication was then injected. The patient tolerated the procedure well.     The patient was monitored after the procedure. Patient was given pain diary to record pain levels at home.     If found to have greater than a 50% recovery and so will be scheduled for a radiofrequency ablation of the corresponding nerves.  Patient was given post procedure and discharge instructions to follow at home.  The patient was discharged in a stable condition.

## 2025-01-10 ENCOUNTER — TELEPHONE (OUTPATIENT)
Dept: PAIN MEDICINE | Facility: CLINIC | Age: 80
End: 2025-01-10
Payer: MEDICARE

## 2025-01-10 VITALS
WEIGHT: 243 LBS | TEMPERATURE: 97 F | OXYGEN SATURATION: 97 % | DIASTOLIC BLOOD PRESSURE: 67 MMHG | RESPIRATION RATE: 20 BRPM | HEART RATE: 71 BPM | BODY MASS INDEX: 35.99 KG/M2 | SYSTOLIC BLOOD PRESSURE: 136 MMHG | HEIGHT: 69 IN

## 2025-01-10 DIAGNOSIS — M47.896 OTHER SPONDYLOSIS, LUMBAR REGION: Primary | ICD-10-CM

## 2025-01-12 ENCOUNTER — TELEPHONE (OUTPATIENT)
Dept: RADIOLOGY | Facility: HOSPITAL | Age: 80
End: 2025-01-12

## 2025-01-13 ENCOUNTER — HOSPITAL ENCOUNTER (OUTPATIENT)
Dept: RADIOLOGY | Facility: HOSPITAL | Age: 80
Discharge: HOME | End: 2025-01-13
Payer: MEDICARE

## 2025-01-13 DIAGNOSIS — G57.62 NEUROMA OF SECOND INTERSPACE OF LEFT FOOT: ICD-10-CM

## 2025-01-13 PROCEDURE — 73718 MRI LOWER EXTREMITY W/O DYE: CPT | Mod: TC,LT

## 2025-01-13 PROCEDURE — 73718 MRI LOWER EXTREMITY W/O DYE: CPT | Mod: 26,LT,, | Performed by: RADIOLOGY

## 2025-01-14 ENCOUNTER — TELEPHONE (OUTPATIENT)
Dept: FAMILY MEDICINE | Facility: CLINIC | Age: 80
End: 2025-01-14
Payer: MEDICARE

## 2025-01-14 NOTE — TELEPHONE ENCOUNTER
----- Message from Pawel Badillo MD sent at 1/9/2025  9:51 PM CST -----  Cholesterol is slightly higher.  FOLLOW-UP AS RECOMMENDED

## 2025-01-14 NOTE — TELEPHONE ENCOUNTER
Spoke with pt and she states she had 80% x 2 hours her starting pain score was a 4 and after procedure was a 1. Her pain today is back to a 4.  Scheduled for 2nd block for 01/28/2025

## 2025-01-15 DIAGNOSIS — I10 HYPERTENSION, ESSENTIAL: ICD-10-CM

## 2025-01-15 RX ORDER — LOSARTAN POTASSIUM 100 MG/1
50 TABLET ORAL 2 TIMES DAILY
Qty: 90 TABLET | Refills: 3 | Status: SHIPPED | OUTPATIENT
Start: 2025-01-15

## 2025-01-16 ENCOUNTER — PATIENT MESSAGE (OUTPATIENT)
Dept: ENDOCRINOLOGY | Facility: CLINIC | Age: 80
End: 2025-01-16
Payer: MEDICARE

## 2025-01-17 ENCOUNTER — TELEPHONE (OUTPATIENT)
Dept: ENDOCRINOLOGY | Facility: CLINIC | Age: 80
End: 2025-01-17
Payer: MEDICARE

## 2025-01-17 NOTE — TELEPHONE ENCOUNTER
Received message from call center saying the pt said the [facility] did not receive the fax for the clearance.  Not able to find paperwork.  Explained that it was faxed on 01/16/25 and sent a patient a copy in portal.  Do you have the original I can fax again to that office?

## 2025-01-21 ENCOUNTER — TELEPHONE (OUTPATIENT)
Dept: ENDOCRINOLOGY | Facility: CLINIC | Age: 80
End: 2025-01-21
Payer: MEDICARE

## 2025-01-21 NOTE — TELEPHONE ENCOUNTER
----- Message from Aracely sent at 1/17/2025  3:45 PM CST -----   Type:  Needs Medical Advice    Who Called:  PT    Would the patient rather a call back or a response via [facility]?  Call  Best Call Back Number: [phone]        Additional Information: states waiting on text fax [phone]  Please call back to advise. Thank you!

## 2025-01-24 ENCOUNTER — HOSPITAL ENCOUNTER (OUTPATIENT)
Dept: RADIOLOGY | Facility: HOSPITAL | Age: 80
Discharge: HOME | End: 2025-01-24
Attending: INTERNAL MEDICINE
Payer: MEDICARE

## 2025-01-24 DIAGNOSIS — R91.1 SOLITARY PULMONARY NODULE: ICD-10-CM

## 2025-01-24 PROCEDURE — 71250 CT THORAX DX C-: CPT | Mod: 26,,, | Performed by: RADIOLOGY

## 2025-01-24 PROCEDURE — 71250 CT THORAX DX C-: CPT | Mod: TC,PO

## 2025-01-28 ENCOUNTER — HOSPITAL ENCOUNTER (OUTPATIENT)
Facility: HOSPITAL | Age: 80
Discharge: HOME | End: 2025-01-28
Attending: ANESTHESIOLOGY | Admitting: ANESTHESIOLOGY
Payer: MEDICARE

## 2025-01-28 VITALS
WEIGHT: 242.94 LBS | OXYGEN SATURATION: 100 % | BODY MASS INDEX: 35.88 KG/M2 | TEMPERATURE: 98 F | RESPIRATION RATE: 18 BRPM | DIASTOLIC BLOOD PRESSURE: 61 MMHG | SYSTOLIC BLOOD PRESSURE: 131 MMHG | HEART RATE: 75 BPM

## 2025-01-28 DIAGNOSIS — M51.9 LUMBAR DISC DISEASE: Primary | ICD-10-CM

## 2025-01-28 PROCEDURE — 64494 INJ PARAVERT F JNT L/S 2 LEV: CPT | Mod: 50 | Performed by: ANESTHESIOLOGY

## 2025-01-28 PROCEDURE — 64493 INJ PARAVERT F JNT L/S 1 LEV: CPT | Mod: 50 | Performed by: ANESTHESIOLOGY

## 2025-01-28 PROCEDURE — 71000015 HC POSTOP RECOV 1ST HR: Performed by: ANESTHESIOLOGY

## 2025-01-28 PROCEDURE — 63600175 PHARM REV CODE 636 W HCPCS: Performed by: ANESTHESIOLOGY

## 2025-01-28 PROCEDURE — 36000704 HC OR TIME LEV I 1ST 15 MIN: Performed by: ANESTHESIOLOGY

## 2025-01-28 PROCEDURE — 64494 INJ PARAVERT F JNT L/S 2 LEV: CPT | Mod: 50,,, | Performed by: ANESTHESIOLOGY

## 2025-01-28 PROCEDURE — 64493 INJ PARAVERT F JNT L/S 1 LEV: CPT | Mod: 50,,, | Performed by: ANESTHESIOLOGY

## 2025-01-28 RX ORDER — BUPIVACAINE HYDROCHLORIDE 5 MG/ML
INJECTION, SOLUTION EPIDURAL; INTRACAUDAL
Status: DISCONTINUED | OUTPATIENT
Start: 2025-01-28 | End: 2025-01-28 | Stop reason: HOSPADM

## 2025-01-28 NOTE — DISCHARGE SUMMARY
[facility]  Discharge Note  Short Stay    Procedure(s) (LRB):  Block-nerve-medial branch-lumbar l5/6 and l5/s1 MBB #2 (Bilateral)      OUTCOME: Patient tolerated treatment/procedure well without complication and is now ready for discharge.    DISPOSITION: Home or Self Care    FINAL DIAGNOSIS:  <principal problem not specified>    FOLLOWUP: In clinic    DISCHARGE INSTRUCTIONS:    Discharge Procedure Orders   Notify your health care provider if you experience any of the following:  temperature >100.4     Notify your health care provider if you experience any of the following:  severe uncontrolled pain     Notify your health care provider if you experience any of the following:  redness, tenderness, or signs of infection (pain, swelling, redness, odor or green/yellow discharge around incision site)     Activity as tolerated        TIME SPENT ON DISCHARGE: 30 minutes

## 2025-01-28 NOTE — OP NOTE
PROCEDURE DATE: 1/28/2025    PROCEDURE:  Diagnostic medial branch nerve blocks to the bilateral L4/5 and L5/S1 facets under fluoroscopy     DIAGNOSIS:  Other spondylosis, lumbar region    Post Op diagnosis: Same    PHYSICIAN: Geoffrey Caal MD    MEDICATIONS INJECTED: 0.5% bupivicaine, 0.5 ml at each level    SEDATION MEDICATIONS:RN IV sedation     LOCAL ANESTHETIC USED: None    ESTIMATED BLOOD LOSS:  None    COMPLICATIONS:  None    TECHNIQUE: A time out was taken to identify the patient, procedure and side of the procedure. The patient was placed in a prone position, then prepped and draped in the usual sterile fashion using ChloraPrep and sterile towels.  The levels were determined under fluoroscopic guidance and then marked.  A 25-gauge 3.5 inch needle was introduced to the anatomic location of the medial branch nerves that innervate the bilateral L4/5 and L5/S1 facets. The above medication was then injected. The patient tolerated the procedure well.     The patient was monitored after the procedure. Patient was given pain diary to record pain levels at home.     If found to have greater than a 50% recovery and so will be scheduled for a radiofrequency ablation of the corresponding nerves.  Patient was given post procedure and discharge instructions to follow at home.  The patient was discharged in a stable condition.

## 2025-01-28 NOTE — PLAN OF CARE
Tolerated procedure well. Denies pain at this time. Transferred out of facility via wheelchair to spouse without incident. Discharge instructions in hand upon departure.

## 2025-01-29 ENCOUNTER — TELEPHONE (OUTPATIENT)
Dept: PAIN MEDICINE | Facility: CLINIC | Age: 80
End: 2025-01-29
Payer: MEDICARE

## 2025-01-29 DIAGNOSIS — M47.896 OTHER SPONDYLOSIS, LUMBAR REGION: Primary | ICD-10-CM

## 2025-01-29 NOTE — TELEPHONE ENCOUNTER
Spoke with pt and she states she had 100% relief x several hours her starting pain score was 7 and ending pain score 0. Her current pain score is a 4 scheduled for RFA 02/12/2025

## 2025-02-03 ENCOUNTER — OFFICE VISIT (OUTPATIENT)
Dept: CARDIOLOGY | Facility: CLINIC | Age: 80
End: 2025-02-03
Payer: MEDICARE

## 2025-02-03 VITALS
HEIGHT: 69 IN | WEIGHT: 235.88 LBS | HEART RATE: 73 BPM | SYSTOLIC BLOOD PRESSURE: 124 MMHG | OXYGEN SATURATION: 95 % | DIASTOLIC BLOOD PRESSURE: 62 MMHG | BODY MASS INDEX: 34.94 KG/M2

## 2025-02-03 DIAGNOSIS — I10 HYPERTENSION, ESSENTIAL: ICD-10-CM

## 2025-02-03 DIAGNOSIS — E78.5 DYSLIPIDEMIA: ICD-10-CM

## 2025-02-03 DIAGNOSIS — I50.32 CHRONIC HEART FAILURE WITH PRESERVED EJECTION FRACTION: Primary | ICD-10-CM

## 2025-02-03 DIAGNOSIS — Z86.711 HISTORY OF PULMONARY EMBOLISM: ICD-10-CM

## 2025-02-03 DIAGNOSIS — I73.00 RAYNAUD'S PHENOMENON WITHOUT GANGRENE: ICD-10-CM

## 2025-02-03 PROBLEM — I25.110 ATHEROSCLEROSIS OF NATIVE CORONARY ARTERY OF NATIVE HEART WITH UNSTABLE ANGINA PECTORIS: Status: RESOLVED | Noted: 2024-09-20 | Resolved: 2025-02-03

## 2025-02-03 PROBLEM — I73.9 CLAUDICATION OF BOTH LOWER EXTREMITIES: Status: RESOLVED | Noted: 2024-08-09 | Resolved: 2025-02-03

## 2025-02-03 PROCEDURE — 99999 PR PBB SHADOW E&M-EST. PATIENT-LVL IV: CPT | Mod: PBBFAC,HCNC,, | Performed by: INTERNAL MEDICINE

## 2025-02-03 PROCEDURE — 1157F ADVNC CARE PLAN IN RCRD: CPT | Mod: CPTII,S$GLB,, | Performed by: INTERNAL MEDICINE

## 2025-02-03 PROCEDURE — 93000 ELECTROCARDIOGRAM COMPLETE: CPT | Mod: HCNC,S$GLB,, | Performed by: GENERAL PRACTICE

## 2025-02-03 PROCEDURE — 3078F DIAST BP <80 MM HG: CPT | Mod: CPTII,S$GLB,, | Performed by: INTERNAL MEDICINE

## 2025-02-03 PROCEDURE — 1126F AMNT PAIN NOTED NONE PRSNT: CPT | Mod: CPTII,S$GLB,, | Performed by: INTERNAL MEDICINE

## 2025-02-03 PROCEDURE — 99214 OFFICE O/P EST MOD 30 MIN: CPT | Mod: S$GLB,,, | Performed by: INTERNAL MEDICINE

## 2025-02-03 PROCEDURE — 3074F SYST BP LT 130 MM HG: CPT | Mod: CPTII,S$GLB,, | Performed by: INTERNAL MEDICINE

## 2025-02-03 PROCEDURE — 1159F MED LIST DOCD IN RCRD: CPT | Mod: CPTII,S$GLB,, | Performed by: INTERNAL MEDICINE

## 2025-02-03 RX ORDER — TORSEMIDE 10 MG/1
10 TABLET ORAL DAILY
Qty: 90 TABLET | Refills: 3 | Status: SHIPPED | OUTPATIENT
Start: 2025-02-03 | End: 2026-02-03

## 2025-02-03 RX ORDER — EZETIMIBE 10 MG/1
10 TABLET ORAL DAILY
Qty: 90 TABLET | Refills: 3 | Status: SHIPPED | OUTPATIENT
Start: 2025-02-03 | End: 2026-02-03

## 2025-02-03 NOTE — PROGRESS NOTES
[place] [facility] [place]    Subjective:     Patient ID:  Marisela Eagle is a 79 y.o. female patient here for evaluation Coronary Artery Disease (Annual check up )      HPI:  79-year-old female here for follow-up.  History of nonobstructive CAD by angiogram with elevated LVEDP and elevated BNP in the past.  Reports doing well, major limiting symptom is neuropathy and pain.  No significant shortness of breath or chest pain discomfort described.  Had lot of shortness of breath last year and was diagnosed with PEs and has been evaluated by Hematology for the same, she is currently on Eliquis.    Review of Systems   All other systems reviewed and are negative.       Past Medical History:   Diagnosis Date    Abnormal stress test     shortness of breath    Bilateral knee pain 2012    left worse, right knee painful even after partial replacement.    Bruises easily     Clot ?    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)     no treatment needed    Hyperlipidemia     Hypertension     Migraines     Neuropathy     peripheral    Pulmonary embolus     dvt knee    Thyroid disease     hypothyroid, has nodules    Varicose veins        Past Surgical History:   Procedure Laterality Date    ADENOIDECTOMY      APPENDECTOMY      BILATERAL SALPINGOOPHORECTOMY       SECTION      COLONOSCOPY  2012    HIP ARTHROPLASTY      HYSTERECTOMY      with BSO    INJECTION OF ANESTHETIC AGENT AROUND MEDIAL BRANCH NERVES INNERVATING LUMBAR FACET JOINT Bilateral 2025    Procedure: Block-nerve-medial branch-lumbar;  Surgeon: Geoffrey Caal  MD SANA;  [facility];  Service: Pain Management;  Laterality: Bilateral;    INJECTION OF ANESTHETIC AGENT AROUND MEDIAL BRANCH NERVES INNERVATING LUMBAR FACET JOINT Bilateral 2025    Procedure: Block-nerve-medial branch-lumbar level 1;  Surgeon: Geoffrey Caal MD;  Location: [facility] OR;  Service: Pain Management;  Laterality: Bilateral;    JOINT REPLACEMENT  2012    rt unilateral knee arthroplasty. Took Coumadin x 6 weeks    KNEE ARTHROPLASTY Bilateral     KNEE ARTHROSCOPY  2010    right    LEFT HEART CATHETERIZATION Left 2024    Procedure: Left heart cath;  Surgeon: Cooper Canchola MD;  Location: [facility] CATH/EP LAB;  Service: Cardiology;  Laterality: Left;    TONSILLECTOMY         Family History   Problem Relation Name Age of Onset    Neuropathy Mother      Hypertension Mother      Stroke Mother      Neuropathy Father      Cancer Daughter      Diabetes Maternal Grandmother      Melanoma Neg Hx      Psoriasis Neg Hx      Lupus Neg Hx      Eczema Neg Hx         Social History     Socioeconomic History    Marital status:    Occupational History    Occupation: RETIRED   Tobacco Use    Smoking status: Former     Current packs/day: 0.00     Types: Cigarettes     Quit date: 3/23/2012     Years since quittin.8    Smokeless tobacco: Never   Substance and Sexual Activity    Alcohol use: Not Currently     Comment: occasional    Drug use: No     Social Drivers of Health     Financial Resource Strain: Low Risk  (2023)    Overall Financial Resource Strain (CARDIA)     Difficulty of Paying Living Expenses: Not hard at all   Food Insecurity: No Food Insecurity (2023)    Hunger Vital Sign     Worried About Running Out of Food in the Last Year: Never true     Ran Out of Food in the Last Year: Never true   Transportation Needs: No Transportation Needs (2023)    PRAPARE - Transportation     Lack of Transportation (Medical): No     Lack of Transportation (Non-Medical): No    Physical Activity: Inactive (11/27/2023)    Exercise Vital Sign     Days of Exercise per Week: 0 days     Minutes of Exercise per Session: 0 min   Stress: No Stress Concern Present (11/27/2023)    [employer] [place] [employer] - Occupational Stress Questionnaire     Feeling of Stress : Not at all   Housing Stability: Low Risk  (11/27/2023)    Housing Stability Vital Sign     Unable to Pay for Housing in the Last Year: No     Number of Places Lived in the Last Year: 1     Unstable Housing in the Last Year: No       Current Outpatient Medications   Medication Sig Dispense Refill    amitriptyline (ELAVIL) 25 MG tablet Take 1 tablet (25 mg total) by mouth every evening. 90 tablet 1    apixaban (ELIQUIS) 5 mg Tab Take 1 tablet (5 mg total) by mouth 2 (two) times daily. 180 tablet 3    artificial tears,hypromellose,,GENTEAL/SUSTANE, (SYSTANE GEL) 0.3 % Gel Place 1 drop into both eyes as needed.      cholecalciferol, vitamin D3, 5,000 unit capsule Take 5,000 Units by mouth once daily.      cranberry fruit extract (CRANBERRY CONCENTRATE ORAL) Take 1 tablet by mouth Daily.      cyanocobalamin (VITAMIN B-12) 1000 MCG tablet Take 5,000 mcg by mouth once daily.      DULoxetine (CYMBALTA) 60 MG capsule Take 1 capsule (60 mg total) by mouth once daily. 90 capsule 1    famotidine (PEPCID) 20 MG tablet TAKE 1 TABLET EVERY EVENING 90 tablet 3    gabapentin (NEURONTIN) 800 MG tablet TAKE 1 TABLET THREE TIMES DAILY 270 tablet 3    levothyroxine (SYNTHROID) 125 MCG tablet TAKE 1 TABLET EVERY DAY BEFORE BREAKFAST 90 tablet 3    LORazepam (ATIVAN) 1 MG tablet Take 1 tablet by mouth in the evening 30 tablet 0    losartan (COZAAR) 100 MG tablet TAKE 1/2 TABLET TWICE DAILY 90 tablet 3    magnesium 30 mg Tab Take 1 tablet by mouth once daily.      metoprolol succinate (TOPROL-XL) 50 MG 24 hr tablet TAKE 1/2 TABLET EVERY EVENING 45 tablet 3    oxyCODONE-acetaminophen (PERCOCET)  mg per tablet Take 1 tablet by mouth daily as  needed for Pain.      pantoprazole (PROTONIX) 40 MG tablet TAKE 1 TABLET EVERY MORNING 90 tablet 2    potassium chloride SA (K-DUR,KLOR-CON) 20 MEQ tablet Take 20 mEq by mouth once daily.      spironolactone (ALDACTONE) 25 MG tablet TAKE 1 TABLET EVERY DAY 90 tablet 3    vit C/E/Zn/coppr/lutein/zeaxan (PRESERVISION AREDS-2 ORAL) Take 1 capsule by mouth once daily.      ezetimibe (ZETIA) 10 mg tablet Take 1 tablet (10 mg total) by mouth once daily. 90 tablet 3    torsemide (DEMADEX) 10 MG Tab Take 1 tablet (10 mg total) by mouth once daily. 90 tablet 3     No current facility-administered medications for this visit.       Review of patient's allergies indicates:  No Known Allergies      Objective:        Vitals:    02/03/25 1320   BP: 124/62   Pulse: 73       Physical Exam  Vitals reviewed.   Constitutional:       Appearance: Normal appearance.   HENT:      Mouth/Throat:      Mouth: Mucous membranes are moist.   Cardiovascular:      Rate and Rhythm: Normal rate and regular rhythm.      Pulses: Normal pulses.      Heart sounds: Normal heart sounds. No murmur heard.     No gallop.   Pulmonary:      Effort: Pulmonary effort is normal.      Breath sounds: Normal breath sounds.   Musculoskeletal:      Right lower leg: No edema.      Left lower leg: No edema.   Skin:     General: Skin is warm.   Neurological:      General: No focal deficit present.      Mental Status: She is alert and oriented to person, place, and time.         LIPIDS - LAST 2   Lab Results   Component Value Date    CHOL 224 (H) 01/09/2025    CHOL 222 (H) 10/30/2023    HDL 65 01/09/2025    HDL 72 10/30/2023    LDLCALC 141.4 01/09/2025    LDLCALC 129.6 10/30/2023    TRIG 88 01/09/2025    TRIG 102 10/30/2023    CHOLHDL 29.0 01/09/2025    CHOLHDL 32.4 10/30/2023       CBC - LAST 2  Lab Results   Component Value Date    WBC 6.00 01/09/2025    WBC 6.68 07/29/2024    RBC 4.62 01/09/2025    RBC 4.26 07/29/2024    HGB 12.3 01/09/2025    HGB 11.7 (L) 07/29/2024     HCT 39.4 01/09/2025    HCT 36.8 (L) 07/29/2024    MCV 85 01/09/2025    MCV 86 07/29/2024    MCH 26.6 (L) 01/09/2025    MCH 27.5 07/29/2024    MCHC 31.2 (L) 01/09/2025    MCHC 31.8 (L) 07/29/2024    RDW 17.3 (H) 01/09/2025    RDW 16.1 (H) 07/29/2024     01/09/2025     07/29/2024    MPV 11.3 01/09/2025    MPV 11.8 07/29/2024    GRAN 3.0 01/09/2025    GRAN 49.8 01/09/2025    LYMPH 2.2 01/09/2025    LYMPH 37.3 01/09/2025    MONO 0.6 01/09/2025    MONO 10.3 01/09/2025    BASO 0.04 01/09/2025    BASO 0.03 07/29/2024    NRBC 0 01/09/2025    NRBC 0 07/29/2024       CHEMISTRY & LIVER FUNCTION - LAST 2  Lab Results   Component Value Date     01/09/2025     (L) 07/24/2024    K 4.4 01/09/2025    K 4.4 07/24/2024     01/09/2025     07/24/2024    CO2 28 01/09/2025    CO2 22 (L) 07/24/2024    ANIONGAP 8 01/09/2025    ANIONGAP 9 07/24/2024    BUN 18 01/09/2025    BUN 16 07/24/2024    CREATININE 0.8 01/09/2025    CREATININE 0.8 07/24/2024    GLU 98 01/09/2025    GLU 71 07/24/2024    CALCIUM 9.5 01/09/2025    CALCIUM 9.1 07/24/2024    MG 2.0 06/08/2024    MG 2.1 06/07/2024    ALBUMIN 4.0 01/09/2025    ALBUMIN 4.0 07/24/2024    PROT 6.9 01/09/2025    PROT 6.9 07/24/2024    ALKPHOS 41 01/09/2025    ALKPHOS 38 (L) 07/24/2024    ALT 20 01/09/2025    ALT 18 07/24/2024    AST 19 01/09/2025    AST 19 07/24/2024    BILITOT 0.4 01/09/2025    BILITOT 0.3 07/24/2024        CARDIAC PROFILE - LAST 2  Lab Results   Component Value Date     (H) 06/06/2024     (H) 06/06/2024     08/10/2022     (H) 01/04/2022    CPKMB 6.9 (H) 01/04/2022    CPKMB 1.9 09/19/2019    TROPONINI <0.030 01/04/2022    TROPONINI 0.038 01/31/2021        COAGULATION - LAST 2  Lab Results   Component Value Date    LABPT 13.8 09/17/2019    INR 0.9 06/06/2024    INR 1.1 09/17/2019    APTT 25.6 06/06/2024    APTT 23.0 (L) 09/17/2019       ENDOCRINE & PSA - LAST 2  Lab Results   Component Value Date    HGBA1C 5.4  07/24/2024    HGBA1C 5.3 06/23/2023    TSH 2.228 01/09/2025    TSH 0.950 07/24/2024    PROCAL 0.05 01/31/2021        ECHOCARDIOGRAM RESULTS  Results for orders placed during the hospital encounter of 10/17/24    Echo    Interpretation Summary    Left Ventricle: The left ventricle is normal in size. Normal wall thickness. There is normal systolic function with a visually estimated ejection fraction of 60 - 65%. There is normal diastolic function.    Right Ventricle: Normal right ventricular cavity size. Wall thickness is normal. Systolic function is normal.    Left Atrium: Left atrium is moderately dilated.    IVC/SVC: Normal venous pressure at 3 mmHg.      CURRENT/PREVIOUS VISIT EKG  Results for orders placed or performed in visit on 10/30/24   IN OFFICE EKG 12-LEAD (to R2integrated)    Collection Time: 10/30/24  3:53 PM   Result Value Ref Range    QRS Duration 108 ms    [facility] QTC Calculation 464 ms    Narrative    Test Reason : R06.02,    Vent. Rate : 070 BPM     Atrial Rate : 070 BPM     P-R Int : 184 ms          QRS Dur : 108 ms      QT Int : 430 ms       P-R-T Axes : -15 -26 104 degrees     QTc Int : 464 ms    Normal sinus rhythm  Nonspecific T wave abnormality  Abnormal ECG  When compared with ECG of 29-JUL-2024 09:25,  WI interval has decreased  Nonspecific T wave abnormality now evident in Anterior leads  Confirmed by Avni Patton MD ([id number]) on 11/11/2024 10:25:25 AM    Referred By:             Confirmed By:Avni Patton MD     No valid procedures specified.   Results for orders placed during the hospital encounter of 07/08/24    Nuclear Stress - Cardiology Interpreted    Interpretation Summary    Abnormal myocardial perfusion scan.    There is a mild to moderate intensity, medium sized, mostly fixed perfusion abnormality with some reversibilty in the lateral, apical and septal apical wall(s).    There are no other significant perfusion abnormalities.    The gated perfusion images showed an ejection fraction of 61% at  rest. The gated perfusion images showed an ejection fraction of 54% post stress. Normal ejection fraction is greater than 53%.    There is normal wall motion at rest and post stress.    LV cavity size is normal at rest and normal at stress.    The ECG portion of the study is negative for ischemia.    The patient reported chest pain during the stress test.    There were no arrhythmias during stress.    No valid procedures specified.        Assessment:       1. Chronic heart failure with preserved ejection fraction    2. Dyslipidemia    3. Hypertension, essential    4. History of pulmonary embolism    5. Raynaud's phenomenon without gangrene           Plan:       Chronic heart failure with preserved ejection fraction  -     torsemide (DEMADEX) 10 MG Tab; Take 1 tablet (10 mg total) by mouth once daily.  Dispense: 90 tablet; Refill: 3    Dyslipidemia  -     IN OFFICE EKG 12-LEAD (to Muse)  -     ezetimibe (ZETIA) 10 mg tablet; Take 1 tablet (10 mg total) by mouth once daily.  Dispense: 90 tablet; Refill: 3    Hypertension, essential  -     IN OFFICE EKG 12-LEAD (to Muse)    History of pulmonary embolism  -     Ambulatory referral/consult to Rheumatology; Future; Expected date: 02/10/2025    Raynaud's phenomenon without gangrene  -     Ambulatory referral/consult to Rheumatology; Future; Expected date: 02/10/2025    Currently appears to be well compensated from a heart failure standpoint.  She takes torsemide 20 mg and spironolactone.  She is requesting to decrease her medications.  Her symptoms are not limited by shortness of breath.  Her BNP was elevated in the past.  She reports that she has lost some weight with some weight loss medication from an outside provider.  Will decrease torsemide to 10 mg and monitor.  Continue spironolactone.    Hypertension is well controlled, continue with metoprolol, losartan, lower dose of torsemide and spironolactone.    Try Zetia for dyslipidemia, reports being intolerant to statins  in the past.    Patient reports that she has Raynaud's disease, there is mention of connective tissue disorder in her chart, she did have PEs, will try to refer her to rheumatology for further evaluation.    Follow up in about 3 months (around 5/3/2025) for f/u HFpEF, dyslipidemia.          MD [facility] [place]  [place]

## 2025-02-05 ENCOUNTER — TELEPHONE (OUTPATIENT)
Dept: CARDIOLOGY | Facility: CLINIC | Age: 80
End: 2025-02-05
Payer: MEDICARE

## 2025-02-05 ENCOUNTER — OFFICE VISIT (OUTPATIENT)
Dept: RHEUMATOLOGY | Facility: CLINIC | Age: 80
End: 2025-02-05
Payer: MEDICARE

## 2025-02-05 VITALS
SYSTOLIC BLOOD PRESSURE: 109 MMHG | HEIGHT: 69 IN | HEART RATE: 80 BPM | BODY MASS INDEX: 35.1 KG/M2 | WEIGHT: 237 LBS | DIASTOLIC BLOOD PRESSURE: 71 MMHG

## 2025-02-05 DIAGNOSIS — Z86.711 HISTORY OF PULMONARY EMBOLISM: ICD-10-CM

## 2025-02-05 DIAGNOSIS — I73.00 RAYNAUD'S PHENOMENON WITHOUT GANGRENE: ICD-10-CM

## 2025-02-05 DIAGNOSIS — M35.01 SJOGREN'S SYNDROME WITH KERATOCONJUNCTIVITIS SICCA: Primary | ICD-10-CM

## 2025-02-05 PROCEDURE — 3078F DIAST BP <80 MM HG: CPT | Mod: HCNC,CPTII,S$GLB, | Performed by: INTERNAL MEDICINE

## 2025-02-05 PROCEDURE — 3074F SYST BP LT 130 MM HG: CPT | Mod: HCNC,CPTII,S$GLB, | Performed by: INTERNAL MEDICINE

## 2025-02-05 PROCEDURE — 99999 PR PBB SHADOW E&M-EST. PATIENT-LVL IV: CPT | Mod: PBBFAC,HCNC,, | Performed by: INTERNAL MEDICINE

## 2025-02-05 PROCEDURE — 1125F AMNT PAIN NOTED PAIN PRSNT: CPT | Mod: HCNC,CPTII,S$GLB, | Performed by: INTERNAL MEDICINE

## 2025-02-05 PROCEDURE — 1101F PT FALLS ASSESS-DOCD LE1/YR: CPT | Mod: HCNC,CPTII,S$GLB, | Performed by: INTERNAL MEDICINE

## 2025-02-05 PROCEDURE — 3288F FALL RISK ASSESSMENT DOCD: CPT | Mod: HCNC,CPTII,S$GLB, | Performed by: INTERNAL MEDICINE

## 2025-02-05 PROCEDURE — 1157F ADVNC CARE PLAN IN RCRD: CPT | Mod: HCNC,CPTII,S$GLB, | Performed by: INTERNAL MEDICINE

## 2025-02-05 PROCEDURE — 99215 OFFICE O/P EST HI 40 MIN: CPT | Mod: HCNC,S$GLB,, | Performed by: INTERNAL MEDICINE

## 2025-02-05 PROCEDURE — 1159F MED LIST DOCD IN RCRD: CPT | Mod: HCNC,CPTII,S$GLB, | Performed by: INTERNAL MEDICINE

## 2025-02-05 RX ORDER — CEVIMELINE HYDROCHLORIDE 30 MG/1
30 CAPSULE ORAL 3 TIMES DAILY
Qty: 270 CAPSULE | Refills: 3 | Status: SHIPPED | OUTPATIENT
Start: 2025-02-05 | End: 2026-02-05

## 2025-02-05 NOTE — TELEPHONE ENCOUNTER
----- Message from Lucero Thomson MD sent at 2/5/2025  3:30 PM CST -----   Dear Dr. Canchola    I am seeing your patient. I would like to put her  on CCB amlodipine for her Raynauds.  Today her BP is a little low but she reports she runs around 120.  Are you ok with me starting her on amlodipine 10mg a day or prefer lower dose?  Another option is to decrease other BP medicines but I will defer that to you.  Thanks.  Lucero

## 2025-02-05 NOTE — PROGRESS NOTES
Subjective:      Patient ID: Marisela Eagle is a 79 y.o. female.    Chief Complaint: Disease Management    HPI  79 year old F with PMH of diverticulitis, HCV antibody +, HL,  CAD, HTN, peripheral neuropathy, PE, hypothyroidism , CHF, PE/  DVT right leg on Eliquis( 2024), bilateral hip replacements, bilateral knee replacements  here to establish care.  Patient previously followed by Dr. Loera for  CREST/ Sjogrens overlap.She has had Raynauds for several years. She has has esophageal dilatation x2.  She ses GI outside of  [facility].  Reports dry mouth for years.       Past Medical History:   Diagnosis Date    Abnormal stress test 2024    shortness of breath    Bilateral knee pain 07/2012    left worse, right knee painful even after partial replacement.    Bruises easily     Clot 1990's?    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  Physical Exam   Constitutional: She is oriented to person, place, and time.   HENT:   Head: Normocephalic and atraumatic.   Right Ear: External ear normal.   Left Ear: External ear normal.   Nose: Nose normal.   Mouth/Throat: Oropharynx is clear and moist. No oropharyngeal exudate.   Eyes: Pupils are equal, round, and reactive to light. Conjunctivae are normal. Right eye exhibits no discharge. Left eye exhibits no discharge. No scleral icterus.   Neck:  No JVD present. No thyromegaly present.   Cardiovascular: Normal rate, regular rhythm and normal heart sounds. Exam reveals no gallop and no friction rub.   No murmur heard.  Pulmonary/Chest: Effort normal and breath sounds normal. No respiratory distress. She has no wheezes. She has no rales. She exhibits no tenderness.   Abdominal: Soft. Bowel sounds are normal. She exhibits no distension and no mass. There is no abdominal tenderness. There is no rebound and no guarding.   Musculoskeletal:         General: No swelling or tenderness.      Cervical back: Neck supple.   Lymphadenopathy:     She has no cervical adenopathy.   Neurological: She is alert and oriented to person, place, and time. No cranial nerve deficit. Gait normal. Coordination normal.   Skin: Skin is dry. No rash noted. No erythema. No pallor.   Psychiatric: Affect and judgment normal.       No data to display     Assessment:      79 year old F with PMH of diverticulitis, HCV antibody +, HL,  CAD, HTN, peripheral neuropathy, PE, hypothyroidism , CHF, PE/  DVT right leg on Eliquis( 2024), bilateral hip replacements, bilateral knee replacements  here to establish care.  Patient previously followed by Dr. Loera for  CREST/ Sjogrens overlap:    # CREST:she has history of  ANEUDY in centromere pattern,raynauds, esophageal dysmotility, and telangiectasis.  Start amlodipine 5 mg po qday     # Sjogren syndrome:she has history of sicca symptoms, +ANEUDY, and +SSA.   Disp Refills Start End MARISOL   cevimeline (EVOXAC) 30 mg capsule 270 capsule 3 2/5/2025 2/5/2026 No   Sig - Route: Take 1 capsule (30 mg total) by mouth 3 (three) times daily.     #Joint pain: suspect DJD.  Labs  Xrays      45* minutes of total time spent on the encounter, which includes face to face time and non-face to face time preparing to see the patient (eg, review of tests), Obtaining and/or reviewing separately obtained history, Documenting clinical information in the electronic or other health  record, Independently interpreting results (not separately reported) and communicating results to the patient/family/caregiver, or Care coordination (not separately reported).       Rtc in 3 months

## 2025-02-06 VITALS — WEIGHT: 237 LBS | BODY MASS INDEX: 35.1 KG/M2 | HEIGHT: 69 IN

## 2025-02-06 RX ORDER — SODIUM CHLORIDE, SODIUM LACTATE, POTASSIUM CHLORIDE, CALCIUM CHLORIDE 600; 310; 30; 20 MG/100ML; MG/100ML; MG/100ML; MG/100ML
INJECTION, SOLUTION INTRAVENOUS CONTINUOUS
Status: CANCELLED | OUTPATIENT
Start: 2025-02-06

## 2025-02-06 RX ORDER — LIDOCAINE HYDROCHLORIDE 10 MG/ML
1 INJECTION, SOLUTION EPIDURAL; INFILTRATION; INTRACAUDAL; PERINEURAL ONCE
Status: CANCELLED | OUTPATIENT
Start: 2025-02-06 | End: 2025-02-06

## 2025-02-07 RX ORDER — AMLODIPINE BESYLATE 5 MG/1
5 TABLET ORAL DAILY
Qty: 90 TABLET | Refills: 3 | Status: SHIPPED | OUTPATIENT
Start: 2025-02-07 | End: 2026-02-07

## 2025-02-11 ENCOUNTER — TELEPHONE (OUTPATIENT)
Dept: CARDIOLOGY | Facility: CLINIC | Age: 80
End: 2025-02-11
Payer: MEDICARE

## 2025-02-11 NOTE — TELEPHONE ENCOUNTER
----- Message from Enoch sent at 2/11/2025  2:22 PM CST -----  Regarding: return call  Contact: patient  Type:  Patient Returning Call    Who Called:patient  Who Left Message for Patient:nurse  Does the patient know what this is regarding?:medication that another MD ordered  Would the patient rather a call back or a response via [facility]?   Best Call Back Number:[phone]  Additional Information: please call

## 2025-02-12 ENCOUNTER — HOSPITAL ENCOUNTER (OUTPATIENT)
Facility: HOSPITAL | Age: 80
Discharge: HOME | End: 2025-02-12
Attending: ANESTHESIOLOGY | Admitting: ANESTHESIOLOGY
Payer: MEDICARE

## 2025-02-12 DIAGNOSIS — M47.896 OTHER SPONDYLOSIS, LUMBAR REGION: ICD-10-CM

## 2025-02-12 PROCEDURE — 63600175 PHARM REV CODE 636 W HCPCS: Performed by: ANESTHESIOLOGY

## 2025-02-12 RX ORDER — METHYLPREDNISOLONE ACETATE 80 MG/ML
INJECTION, SUSPENSION INTRA-ARTICULAR; INTRALESIONAL; INTRAMUSCULAR; SOFT TISSUE
Status: DISCONTINUED | OUTPATIENT
Start: 2025-02-12 | End: 2025-02-12 | Stop reason: HOSPADM

## 2025-02-12 RX ORDER — LIDOCAINE HYDROCHLORIDE 10 MG/ML
INJECTION, SOLUTION EPIDURAL; INFILTRATION; INTRACAUDAL; PERINEURAL
Status: DISCONTINUED | OUTPATIENT
Start: 2025-02-12 | End: 2025-02-12 | Stop reason: HOSPADM

## 2025-02-12 RX ORDER — ZOLEDRONIC ACID 5 MG/100ML
5 INJECTION, SOLUTION INTRAVENOUS
Status: CANCELLED | OUTPATIENT
Start: 2025-02-12

## 2025-02-12 RX ORDER — SODIUM CHLORIDE 0.9 % (FLUSH) 0.9 %
10 SYRINGE (ML) INJECTION
Status: CANCELLED | OUTPATIENT
Start: 2025-02-12

## 2025-02-12 RX ORDER — BUPIVACAINE HYDROCHLORIDE 2.5 MG/ML
INJECTION, SOLUTION EPIDURAL; INFILTRATION; INTRACAUDAL
Status: DISCONTINUED | OUTPATIENT
Start: 2025-02-12 | End: 2025-02-12 | Stop reason: HOSPADM

## 2025-02-12 RX ORDER — LIDOCAINE HYDROCHLORIDE 20 MG/ML
INJECTION, SOLUTION EPIDURAL; INFILTRATION; INTRACAUDAL; PERINEURAL
Status: DISCONTINUED | OUTPATIENT
Start: 2025-02-12 | End: 2025-02-12 | Stop reason: HOSPADM

## 2025-02-12 RX ORDER — HEPARIN 100 UNIT/ML
500 SYRINGE INTRAVENOUS
Status: CANCELLED | OUTPATIENT
Start: 2025-02-12

## 2025-02-12 NOTE — DISCHARGE SUMMARY
[facility] - Periop Services  Discharge Note  Short Stay    Procedure(s) (LRB):  Radiofrequency Ablation, Nerve, Spinal, Lumbar, Medial Branch, 1 Level l4/5 and l5/s1 RFA (Bilateral)      OUTCOME:  Rescheduled    DISPOSITION: Home or Self Care    FINAL DIAGNOSIS:  <principal problem not specified>    FOLLOWUP: In clinic    DISCHARGE INSTRUCTIONS:    Discharge Procedure Orders   Notify your health care provider if you experience any of the following:  temperature >100.4     Notify your health care provider if you experience any of the following:  severe uncontrolled pain     Notify your health care provider if you experience any of the following:  redness, tenderness, or signs of infection (pain, swelling, redness, odor or green/yellow discharge around incision site)     Activity as tolerated        TIME SPENT ON DISCHARGE:   30 minutes

## 2025-02-12 NOTE — OR NURSING
Patient stated she ate a granola bar at 9am and does not want to do this procedure without sedation. Vane notified to call patient.

## 2025-02-13 ENCOUNTER — TELEPHONE (OUTPATIENT)
Dept: PAIN MEDICINE | Facility: CLINIC | Age: 80
End: 2025-02-13
Payer: MEDICARE

## 2025-02-13 ENCOUNTER — TELEPHONE (OUTPATIENT)
Dept: CARDIOLOGY | Facility: CLINIC | Age: 80
End: 2025-02-13
Payer: MEDICARE

## 2025-02-13 ENCOUNTER — INFUSION (OUTPATIENT)
Dept: INFUSION THERAPY | Facility: HOSPITAL | Age: 80
End: 2025-02-13
Attending: PHYSICIAN ASSISTANT
Payer: MEDICARE

## 2025-02-13 VITALS
DIASTOLIC BLOOD PRESSURE: 80 MMHG | TEMPERATURE: 97 F | HEIGHT: 69 IN | RESPIRATION RATE: 15 BRPM | WEIGHT: 237.88 LBS | SYSTOLIC BLOOD PRESSURE: 122 MMHG | HEART RATE: 60 BPM | OXYGEN SATURATION: 97 % | BODY MASS INDEX: 35.23 KG/M2

## 2025-02-13 DIAGNOSIS — M81.0 AGE-RELATED OSTEOPOROSIS WITHOUT CURRENT PATHOLOGICAL FRACTURE: Primary | ICD-10-CM

## 2025-02-13 DIAGNOSIS — Z87.310 HISTORY OF HEALED FRAGILITY FRACTURE: ICD-10-CM

## 2025-02-13 DIAGNOSIS — M47.896 OTHER SPONDYLOSIS, LUMBAR REGION: Primary | ICD-10-CM

## 2025-02-13 PROCEDURE — 63600175 PHARM REV CODE 636 W HCPCS: Performed by: PHYSICIAN ASSISTANT

## 2025-02-13 PROCEDURE — 96365 THER/PROPH/DIAG IV INF INIT: CPT

## 2025-02-13 PROCEDURE — A4216 STERILE WATER/SALINE, 10 ML: HCPCS | Performed by: PHYSICIAN ASSISTANT

## 2025-02-13 PROCEDURE — 25000003 PHARM REV CODE 250: Performed by: PHYSICIAN ASSISTANT

## 2025-02-13 RX ORDER — ZOLEDRONIC ACID 5 MG/100ML
5 INJECTION, SOLUTION INTRAVENOUS
Status: COMPLETED | OUTPATIENT
Start: 2025-02-13 | End: 2025-02-13

## 2025-02-13 RX ORDER — ZOLEDRONIC ACID 5 MG/100ML
5 INJECTION, SOLUTION INTRAVENOUS
OUTPATIENT
Start: 2025-02-13

## 2025-02-13 RX ORDER — SODIUM CHLORIDE 0.9 % (FLUSH) 0.9 %
10 SYRINGE (ML) INJECTION
Status: DISCONTINUED | OUTPATIENT
Start: 2025-02-13 | End: 2025-02-13 | Stop reason: HOSPADM

## 2025-02-13 RX ORDER — HEPARIN 100 UNIT/ML
500 SYRINGE INTRAVENOUS
OUTPATIENT
Start: 2025-02-13

## 2025-02-13 RX ORDER — SODIUM CHLORIDE 0.9 % (FLUSH) 0.9 %
10 SYRINGE (ML) INJECTION
OUTPATIENT
Start: 2025-02-13

## 2025-02-13 RX ADMIN — SODIUM CHLORIDE, PRESERVATIVE FREE 10 ML: 5 INJECTION INTRAVENOUS at 02:02

## 2025-02-13 RX ADMIN — ZOLEDRONIC ACID 5 MG: 5 INJECTION, SOLUTION INTRAVENOUS at 02:02

## 2025-02-13 NOTE — TELEPHONE ENCOUNTER
----- Message from Leslie sent at 2/13/2025 10:23 AM CST -----  Type: Needs Medical Advice  Who Called:  pt   Symptoms (please be specific):  r/s  How long has patient had these symptoms:    Pharmacy name and phone #:    Best Call Back Number: [phone]   Additional Information: Needs to reschedule procedure  Please call back to advise. Thanks!

## 2025-02-13 NOTE — NURSING
Educated pt and family to stay hydrated at home and to take home medications for headache or body aches after infusion and to notify dentist if any dental work is needed. Pt stated understanding.   1 person assist

## 2025-02-13 NOTE — TELEPHONE ENCOUNTER
----- Message from Med Assistant Marilyn sent at 2/11/2025  4:27 PM CST -----  Hey this patient is asking you to contact her because  she just had a visit with the RHEUMATOLOGY  and that provider has added another BP medicine she stated that that provider wants cardiology  to decrease some of her medicine for her heart she is very confuse  she wants to talk to you to see what to do

## 2025-02-13 NOTE — TELEPHONE ENCOUNTER
Pt message noted    Called patient.  She states her rheumatologist advised her to reduce blood pressure medication but she was not sure why.  Upon review of Rheumatology appointment, amlodipine 5 mg was added for her condition Raynaud's.  I explained this will also lower her blood pressure.  Her blood pressure has been well controlled, today it was 122/80 however she has not started the Norvasc 5 mg.  Her ejection fraction is normal.  Advised her to start the amlodipine 5 mg as directed by rheumatology and hold the losartan 50 mg b.i.d..  Monitor blood pressure daily for the next week.  Informed her we may have to add back the losartan if her blood pressure isn't controlled but likely at lower doses.  She voiced understanding.             Ruth Cedeño, JOSÉ LUISP-C, CVNP-BC

## 2025-02-13 NOTE — PLAN OF CARE
Problem: Fall Injury Risk  Goal: Absence of Fall and Fall-Related Injury  Outcome: Progressing  Intervention: Identify and Manage Contributors  Flowsheets (Taken 2/13/2025 1420)  Self-Care Promotion:   independence encouraged   BADL personal objects within reach   adaptive equipment use encouraged  Medication Review/Management: medications reviewed  Intervention: Promote Injury-Free Environment  Flowsheets (Taken 2/13/2025 1421)  Safety Promotion/Fall Prevention: medications reviewed

## 2025-02-19 NOTE — H&P (VIEW-ONLY)
[facility]  PROGRESS NOTE -  Follow-up Visit      Subjective:       Patient ID:   NAME: Marisela Eagle : 1945     79 y.o. female    Referring Doc: Yumiko Watson MD  Other Physicians: Justino/Keren(PCP); IFRAH Patton/Ruth Cedeño NP (Card); Marcos Gusman (ortho); ALEXEI Martin; Melissa Kirkpatrick;         Chief Complaint: DVT/pulm emboli f/u    History of Present Illness:     Patient returns today for a  regularly scheduled follow-up visit.  The patient is here today to go over the results of the recently ordered labs, tests and studies. She is here with her .     She was on eliquis but self-discontinued last week when she ran out. No excessive bleeding but she had been having some bruising on the arms    She last saw Dr Boogie with pulmonary in 2024    Breathing ok, no SOB or JARA      She saw Dr Badillo (PCP) on 2025    She saw Dr Canchola with cardiology on 2/3/2025; Dr Thomson with rheumatology on 2025    She saw Dr Caal with pain management and is planning to get some injections in her back with nerve ablation the near future     She has chronic arthritis, shoulder and back issues. She is breathing ok, no SOB, HA's or N/V.             ROS:   GEN: normal without any fever, night sweats or weight loss; chronic arthritis issues/right shoulder/back ; intermittent chest aches and back discomfort  HEENT: normal with no HA's, sore throat, stiff neck, changes in vision  CV: normal with no CP, SOB, PND, JARA or orthopnea  PULM: normal with no SOB, cough, hemoptysis, sputum or pleuritic pain  GI: normal with no abdominal pain, nausea, vomiting, constipation, diarrhea, melanotic stools, BRBPR, or hematemesis  : normal with no hematuria, dysuria  BREAST: normal with no mass, discharge, pain  SKIN: normal with no rash, erythema, bruising, or swelling    Pain Scale: 4 on neck    Allergies:  Review of patient's allergies indicates:  No Known Allergies    Medications:    Current Outpatient  Medications:     amitriptyline (ELAVIL) 25 MG tablet, Take 1 tablet (25 mg total) by mouth every evening., Disp: 90 tablet, Rfl: 1    amLODIPine (NORVASC) 5 MG tablet, Take 1 tablet (5 mg total) by mouth once daily., Disp: 90 tablet, Rfl: 3    artificial tears,hypromellose,,GENTEAL/SUSTANE, (SYSTANE GEL) 0.3 % Gel, Place 1 drop into both eyes as needed., Disp: , Rfl:     cevimeline (EVOXAC) 30 mg capsule, Take 1 capsule (30 mg total) by mouth 3 (three) times daily., Disp: 270 capsule, Rfl: 3    cholecalciferol, vitamin D3, 5,000 unit capsule, Take 5,000 Units by mouth once daily., Disp: , Rfl:     cranberry fruit extract (CRANBERRY CONCENTRATE ORAL), Take 1 tablet by mouth Daily., Disp: , Rfl:     cyanocobalamin (VITAMIN B-12) 1000 MCG tablet, Take 5,000 mcg by mouth once daily., Disp: , Rfl:     DULoxetine (CYMBALTA) 60 MG capsule, Take 1 capsule (60 mg total) by mouth once daily., Disp: 90 capsule, Rfl: 1    ezetimibe (ZETIA) 10 mg tablet, Take 1 tablet (10 mg total) by mouth once daily., Disp: 90 tablet, Rfl: 3    famotidine (PEPCID) 20 MG tablet, TAKE 1 TABLET EVERY EVENING, Disp: 90 tablet, Rfl: 3    gabapentin (NEURONTIN) 800 MG tablet, TAKE 1 TABLET THREE TIMES DAILY, Disp: 270 tablet, Rfl: 3    levothyroxine (SYNTHROID) 125 MCG tablet, TAKE 1 TABLET EVERY DAY BEFORE BREAKFAST, Disp: 90 tablet, Rfl: 3    LORazepam (ATIVAN) 1 MG tablet, Take 1 tablet by mouth in the evening, Disp: 30 tablet, Rfl: 0    losartan (COZAAR) 100 MG tablet, TAKE 1/2 TABLET TWICE DAILY, Disp: 90 tablet, Rfl: 3    magnesium 30 mg Tab, Take 1 tablet by mouth once daily., Disp: , Rfl:     metoprolol succinate (TOPROL-XL) 50 MG 24 hr tablet, TAKE 1/2 TABLET EVERY EVENING, Disp: 45 tablet, Rfl: 3    oxyCODONE-acetaminophen (PERCOCET)  mg per tablet, Take 1 tablet by mouth daily as needed for Pain., Disp: , Rfl:     pantoprazole (PROTONIX) 40 MG tablet, TAKE 1 TABLET EVERY MORNING, Disp: 90 tablet, Rfl: 2    potassium chloride SA  "(K-DUR,KLOR-CON) 20 MEQ tablet, Take 20 mEq by mouth once daily., Disp: , Rfl:     spironolactone (ALDACTONE) 25 MG tablet, TAKE 1 TABLET EVERY DAY, Disp: 90 tablet, Rfl: 3    torsemide (DEMADEX) 10 MG Tab, Take 1 tablet (10 mg total) by mouth once daily., Disp: 90 tablet, Rfl: 3    vit C/E/Zn/coppr/lutein/zeaxan (PRESERVISION AREDS-2 ORAL), Take 1 capsule by mouth once daily., Disp: , Rfl:     apixaban (ELIQUIS) 5 mg Tab, Take 1 tablet (5 mg total) by mouth 2 (two) times daily., Disp: 180 tablet, Rfl: 3    PMHx/PSHx Updates:  See patient's last visit with me on 8/19/2024.  See H&P on 6/14/2024        Pathology:   Cancer Staging   No matching staging information was found for the patient.            Objective:     Vitals:  Blood pressure 133/81, pulse 73, temperature 97.2 °F (36.2 °C), temperature source Temporal, resp. rate 16, height 5' 9" (1.753 m), weight 108 kg (238 lb).    Physical Examination:   GEN: no apparent distress, comfortable; AAOx3; overweight  HEAD: atraumatic and normocephalic  EYES: no pallor, no icterus, PERRLA  ENT: OMM, no pharyngeal erythema, external ears WNL; no nasal discharge; no thrush  NECK: no masses, thyroid normal, trachea midline, no LAD/LN's, supple  CV: RRR with no murmur; normal pulse; normal S1 and S2; no pedal edema  CHEST: Normal respiratory effort; CTAB; normal breath sounds; no wheeze or crackles  ABDOM: nontender and nondistended; soft; normal bowel sounds; no rebound/guarding  MUSC/Skeletal: LROM of right shoulder; arthropathy  EXTREM: no clubbing, cyanosis, inflammation; chronic RLE swelling and hyperchromatic skin changes  SKIN: no rashes, lesions, ulcers, petechiae or subcutaneous nodules; chronic age related skin changes  : no lynn  NEURO: grossly intact; motor/sensory WNL; AAOx3; no tremors  PSYCH: normal mood, affect and behavior  LYMPH: normal cervical, supraclavicular, axillary and groin LN's            Labs:     Lab Results   Component Value Date    WBC 6.00 " 01/09/2025    HGB 12.3 01/09/2025    HCT 39.4 01/09/2025    MCV 85 01/09/2025     01/09/2025       CMP  Sodium   Date Value Ref Range Status   01/09/2025 140 136 - 145 mmol/L Final     Potassium   Date Value Ref Range Status   01/09/2025 4.4 3.5 - 5.1 mmol/L Final     Chloride   Date Value Ref Range Status   01/09/2025 104 95 - 110 mmol/L Final     CO2   Date Value Ref Range Status   01/09/2025 28 23 - 29 mmol/L Final     Glucose   Date Value Ref Range Status   01/09/2025 98 70 - 110 mg/dL Final     BUN   Date Value Ref Range Status   01/09/2025 18 8 - 23 mg/dL Final     Creatinine   Date Value Ref Range Status   01/09/2025 0.8 0.5 - 1.4 mg/dL Final     Calcium   Date Value Ref Range Status   01/09/2025 9.5 8.7 - 10.5 mg/dL Final     Total Protein   Date Value Ref Range Status   01/09/2025 6.9 6.0 - 8.4 g/dL Final     Albumin   Date Value Ref Range Status   01/09/2025 4.0 3.5 - 5.2 g/dL Final   01/11/2019 4.1 3.1 - 4.7 g/dL      Total Bilirubin   Date Value Ref Range Status   01/09/2025 0.4 0.1 - 1.0 mg/dL Final     Comment:     For infants and newborns, interpretation of results should be based  on gestational age, weight and in agreement with clinical  observations.    Premature Infant recommended reference ranges:  Up to 24 hours.............<8.0 mg/dL  Up to 48 hours............<12.0 mg/dL  3-5 days..................<15.0 mg/dL  6-29 days.................<15.0 mg/dL       Alkaline Phosphatase   Date Value Ref Range Status   01/09/2025 41 40 - 150 U/L Final     AST   Date Value Ref Range Status   01/09/2025 19 10 - 40 U/L Final     ALT   Date Value Ref Range Status   01/09/2025 20 10 - 44 U/L Final     Anion Gap   Date Value Ref Range Status   01/09/2025 8 8 - 16 mmol/L Final     eGFR   Date Value Ref Range Status   01/09/2025 >60 >60 mL/min/1.73 m^2 Final     MTHFR Comment   Comment: Result:  c.665C>T (p. [id number]), legacy name:  [id number] - Not Detected c.1286A>C (p.  [id number]), legacy name: [id number] -  Not  Detected Interpretation:     Phosphatidylserine Ab IgA 0 - 30 Units <10   Phosphatidylserine Ab ( IgM) 0 - 30 Units 9      Phosphatidylserine Ab (IgG) 0 - 19 APS Units <1     Anticardiolipin IgG 0 - 14 GPL U/mL <9   Comment: Negative:              <15     Anticardiolipin IgA 0 - 11 APL U/mL <9   Comment: Negative:              <12     Anticardiolipin IgM 0 - 12 MPL U/mL <9     Homocysteine 0.0 - 21.3 umol/L 10.3     Prothrombin Mutation Comment   Comment: Result: c.*97G>A - Not Detected     Factor V Leiden Comment   Comment: Result: c.1601G>A (p.[id number]) - Not Detected           Radiology/Diagnostic Studies:    Nuclear Stress - Cardiology Interpreted    Result Date: 7/10/2024    Abnormal myocardial perfusion scan.   There is a mild to moderate intensity, medium sized, mostly fixed perfusion abnormality with some reversibilty in the lateral, apical and septal apical wall(s).   There are no other significant perfusion abnormalities.   The gated perfusion images showed an ejection fraction of 61% at rest. The gated perfusion images showed an ejection fraction of 54% post stress. Normal ejection fraction is greater than 53%.   There is normal wall motion at rest and post stress.   LV cavity size is normal at rest and normal at stress.   The ECG portion of the study is negative for ischemia.   The patient reported chest pain during the stress test.   There were no arrhythmias during stress.     CT Abdomen Pelvis W Wo Contrast    Result Date: 6/20/2024  CMS Mandated Quality Data- CT Radiation- 436 All CT scans at this facility utilize dose modulation, iterative reconstruction, and/or weight based dosing when appropriate to reduce radiation dose to as low as reasonably achievable. EXAMINATION: CT ABDOMEN PELVIS W WO CONTRAST CLINICAL HISTORY: rule out cancer with new onset clots; Acute embolism and thrombosis of right popliteal vein TECHNIQUE: CT abdomen and pelvis without and with intravenous contrast.  100 mL  Omnipaque 350. COMPARISON: Abdominal ultrasound 12/07/2021. FINDINGS: There is subsegmental atelectasis of the lung bases.  Heart size is normal. There are small lobulated hypoattenuating and nonenhancing lesions in the left and right hepatic lobes in the region of the intrahepatic fissure measuring 1.9 cm each, and felt to reflect small hepatic cysts.  0.5 cm hypoattenuating structure in the inferior right hepatic lobe also noted, likely reflecting a small hepatic cyst.  No enhancing hepatic lesions identified.  The gallbladder and common bile duct within normal limits.  The pancreas, spleen and adrenal glands are unremarkable. The kidneys are normal size.  There is no hydronephrosis or nephrolithiasis.  There are no enhancing renal lesions.  Ureters are normal caliber without obstructions.  Evaluation of the pelvic viscera is limited due to beam hardening artifact from bilateral hip arthroplasty hardware.  Visualized portions of the bladder are unremarkable. There is mild diverticulosis of the colon.  There is oral contrast in the large and small bowel.  The appendix is not identified.  There is no bowel wall thickening or inflammatory changes observed.  The stomach is decompressed and grossly unremarkable. The abdominal aorta is normal caliber with moderate calcified plaque formation.  There are no large intra-abdominal lymph nodes.  There is no mesenteric fat stranding or free fluid observed. There are degenerative changes of the spine.  No acute osseous abnormality observed.     1. No acute intra-abdominal abnormality observed. 2. Small right and left hepatic cyst observed. 3. Colonic diverticulosis. 4. Additional incidental observations as described. Electronically signed by: Milo Vogel Date:    06/20/2024 Time:    11:51        Doppler US  6/6/2024:     Impression:     Known pulmonary emboli on prior CTA chest.  Ultrasound demonstrates nonocclusive thrombus in the right popliteal vein.     CTA 6/6/2024:      Impression:     Known pulmonary emboli on prior CTA chest.  Ultrasound demonstrates nonocclusive thrombus in the right popliteal vein.       I have reviewed all available lab results and radiology reports.    Assessment/Plan:   (1) 79 y.o. female with diagnosis of RLE DVT and pulmonary emboli who has been referred by Yumiko Watson MD for evaluation by medical hematology/oncology. She had presented to the ED at [facility] at the bequest of her cardiologist on 6/6/2024 for SOB, JARA, right thigh pain and left sided CP over the past couple of months. CTA showed bilateral pulmonary emboli and doppler US showed a nonocclusive thrombus in the right popliteal vein.      6/14/2024:  - she is on eliquis  - no excessive bleeding or bruising  - + family hx/of clots  - order CT abdom/pelvis and clot work-up    7/15/2024:  - continued on eliquis  - to see cardiology again next week  - f/u with Dr Melgar's group - she needs repeat EGD and also needs colonsocopy  - order repeat CTA of chest    8/19/2024:  - continued on eliquis  - repeat CTA on 7/15 with resolution of the pulm eboli  - prothrombin II neg, MTHFR neg, gactor V leiden neg  - anticardiolipin neg  - protein C and S WNL    2/20/2025:  - She was on eliquis but self-discontinued last week when she ran out.   - No excessive bleeding but she had been having some bruising on the arms  - She last saw Dr Boogie with pulmonary in Sept 2024  - workup essentially negative        (2) HTN and hypercholesterolemia     (3) Hx/of an umbilical cot after having hysterectomy in the 90's     (4) GERD and diverticulitis; hx/of Hep C positive antibody; hx/of colon polyps     (5) CHF and chronic venous insuffiencey in legs Rght > Left - chronic diastolic HF     (6) Hypothyroidism; thyroid nodules     (7) Migraines, peripheral neuropathy     (8) DDD; bilateral knee issues, OA; shoulder bursa issues; s/p right knee arthroscope in Apr 2012  - rght knee x2 with last one in 2018-19     (9)  Depression     (10) Former smoker (quit 2012)               VISIT DIAGNOSES:      Positive ANEUDY (antinuclear antibody)    Acute deep vein thrombosis (DVT) of popliteal vein of right lower extremity    Anticoagulated    History of pulmonary embolism          PLAN:  Discontinued eliquis  F/iu with cardiology and PCP   F/u with pain management for planned nerve ablation  F/u [facility] Dr Ching with  as directed  F/u with Dr Boogie with pulmonary as directed  F/u with GI  and rheum           RTC in  12 months  Fax note to   IFRAH Badillo [place]; Justino García Clinton H. III, MD,  Keagan Ching    Discussion:     Anticoagulation Discussion:     Discussed with patient and any applicable family members about the benefit and/or need for anticoagulation. Communicated about the risks of bleeding while on any anticoagulation, which could be serious and/or life-threatening, and which can occur at any time, regardless of degree of the level of anticoagulation. Expressed the need for compliance with any anticoagulation regimen and that failure to do so could potential lead to excessive bleeding, and risk to health and/or life. In particular, with patients on coumadin therapy, compliance with requested blood work is absolutely essential, as coumadin levels can vary from time to time, and failure to do so could potentially place the patient at risk for bleeding and/or clotting events which could be fatal. Patients on coumadin are encouraged to call the day after they have their levels drawn, as to obtain the appropriate instructions from our staff. Patients are aware that self-regulating or self-dosing of their medications is strictly prohibited.         I reviewed results of the recently ordered labs, tests and studies; made directives with regards to the results. I have explained all of the above in detail and the patient understands all of the current recommendation(s). I have answered all of their questions to  the best of my ability and to their complete satisfaction. The patient is to continue with the current management plan.            Electronically signed by Rayray Green MD

## 2025-02-19 NOTE — PROGRESS NOTES
[facility]  PROGRESS NOTE -  Follow-up Visit      Subjective:       Patient ID:   NAME: Marisela Eagle : 1945     79 y.o. female    Referring Doc: Yumiko Watson MD  Other Physicians: Justino/Keren(PCP); IFRAH Patton/Ruth Cedeño NP (Card); Marcos Gusman (ortho); ALEXEI Martin; Melissa Kirkpatrick;         Chief Complaint: DVT/pulm emboli f/u    History of Present Illness:     Patient returns today for a  regularly scheduled follow-up visit.  The patient is here today to go over the results of the recently ordered labs, tests and studies. She is here with her .     She was on eliquis but self-discontinued last week when she ran out. No excessive bleeding but she had been having some bruising on the arms    She last saw Dr Boogie with pulmonary in 2024    Breathing ok, no SOB or JARA      She saw Dr Badillo (PCP) on 2025    She saw Dr Canchola with cardiology on 2/3/2025; Dr Thomson with rheumatology on 2025    She saw Dr Caal with pain management and is planning to get some injections in her back with nerve ablation the near future     She has chronic arthritis, shoulder and back issues. She is breathing ok, no SOB, HA's or N/V.             ROS:   GEN: normal without any fever, night sweats or weight loss; chronic arthritis issues/right shoulder/back ; intermittent chest aches and back discomfort  HEENT: normal with no HA's, sore throat, stiff neck, changes in vision  CV: normal with no CP, SOB, PND, JARA or orthopnea  PULM: normal with no SOB, cough, hemoptysis, sputum or pleuritic pain  GI: normal with no abdominal pain, nausea, vomiting, constipation, diarrhea, melanotic stools, BRBPR, or hematemesis  : normal with no hematuria, dysuria  BREAST: normal with no mass, discharge, pain  SKIN: normal with no rash, erythema, bruising, or swelling    Pain Scale: 4 on neck    Allergies:  Review of patient's allergies indicates:  No Known Allergies    Medications:    Current Outpatient  Medications:     amitriptyline (ELAVIL) 25 MG tablet, Take 1 tablet (25 mg total) by mouth every evening., Disp: 90 tablet, Rfl: 1    amLODIPine (NORVASC) 5 MG tablet, Take 1 tablet (5 mg total) by mouth once daily., Disp: 90 tablet, Rfl: 3    artificial tears,hypromellose,,GENTEAL/SUSTANE, (SYSTANE GEL) 0.3 % Gel, Place 1 drop into both eyes as needed., Disp: , Rfl:     cevimeline (EVOXAC) 30 mg capsule, Take 1 capsule (30 mg total) by mouth 3 (three) times daily., Disp: 270 capsule, Rfl: 3    cholecalciferol, vitamin D3, 5,000 unit capsule, Take 5,000 Units by mouth once daily., Disp: , Rfl:     cranberry fruit extract (CRANBERRY CONCENTRATE ORAL), Take 1 tablet by mouth Daily., Disp: , Rfl:     cyanocobalamin (VITAMIN B-12) 1000 MCG tablet, Take 5,000 mcg by mouth once daily., Disp: , Rfl:     DULoxetine (CYMBALTA) 60 MG capsule, Take 1 capsule (60 mg total) by mouth once daily., Disp: 90 capsule, Rfl: 1    ezetimibe (ZETIA) 10 mg tablet, Take 1 tablet (10 mg total) by mouth once daily., Disp: 90 tablet, Rfl: 3    famotidine (PEPCID) 20 MG tablet, TAKE 1 TABLET EVERY EVENING, Disp: 90 tablet, Rfl: 3    gabapentin (NEURONTIN) 800 MG tablet, TAKE 1 TABLET THREE TIMES DAILY, Disp: 270 tablet, Rfl: 3    levothyroxine (SYNTHROID) 125 MCG tablet, TAKE 1 TABLET EVERY DAY BEFORE BREAKFAST, Disp: 90 tablet, Rfl: 3    LORazepam (ATIVAN) 1 MG tablet, Take 1 tablet by mouth in the evening, Disp: 30 tablet, Rfl: 0    losartan (COZAAR) 100 MG tablet, TAKE 1/2 TABLET TWICE DAILY, Disp: 90 tablet, Rfl: 3    magnesium 30 mg Tab, Take 1 tablet by mouth once daily., Disp: , Rfl:     metoprolol succinate (TOPROL-XL) 50 MG 24 hr tablet, TAKE 1/2 TABLET EVERY EVENING, Disp: 45 tablet, Rfl: 3    oxyCODONE-acetaminophen (PERCOCET)  mg per tablet, Take 1 tablet by mouth daily as needed for Pain., Disp: , Rfl:     pantoprazole (PROTONIX) 40 MG tablet, TAKE 1 TABLET EVERY MORNING, Disp: 90 tablet, Rfl: 2    potassium chloride SA  "(K-DUR,KLOR-CON) 20 MEQ tablet, Take 20 mEq by mouth once daily., Disp: , Rfl:     spironolactone (ALDACTONE) 25 MG tablet, TAKE 1 TABLET EVERY DAY, Disp: 90 tablet, Rfl: 3    torsemide (DEMADEX) 10 MG Tab, Take 1 tablet (10 mg total) by mouth once daily., Disp: 90 tablet, Rfl: 3    vit C/E/Zn/coppr/lutein/zeaxan (PRESERVISION AREDS-2 ORAL), Take 1 capsule by mouth once daily., Disp: , Rfl:     apixaban (ELIQUIS) 5 mg Tab, Take 1 tablet (5 mg total) by mouth 2 (two) times daily., Disp: 180 tablet, Rfl: 3    PMHx/PSHx Updates:  See patient's last visit with me on 8/19/2024.  See H&P on 6/14/2024        Pathology:   Cancer Staging   No matching staging information was found for the patient.            Objective:     Vitals:  Blood pressure 133/81, pulse 73, temperature 97.2 °F (36.2 °C), temperature source Temporal, resp. rate 16, height 5' 9" (1.753 m), weight 108 kg (238 lb).    Physical Examination:   GEN: no apparent distress, comfortable; AAOx3; overweight  HEAD: atraumatic and normocephalic  EYES: no pallor, no icterus, PERRLA  ENT: OMM, no pharyngeal erythema, external ears WNL; no nasal discharge; no thrush  NECK: no masses, thyroid normal, trachea midline, no LAD/LN's, supple  CV: RRR with no murmur; normal pulse; normal S1 and S2; no pedal edema  CHEST: Normal respiratory effort; CTAB; normal breath sounds; no wheeze or crackles  ABDOM: nontender and nondistended; soft; normal bowel sounds; no rebound/guarding  MUSC/Skeletal: LROM of right shoulder; arthropathy  EXTREM: no clubbing, cyanosis, inflammation; chronic RLE swelling and hyperchromatic skin changes  SKIN: no rashes, lesions, ulcers, petechiae or subcutaneous nodules; chronic age related skin changes  : no lynn  NEURO: grossly intact; motor/sensory WNL; AAOx3; no tremors  PSYCH: normal mood, affect and behavior  LYMPH: normal cervical, supraclavicular, axillary and groin LN's            Labs:     Lab Results   Component Value Date    WBC 6.00 " 01/09/2025    HGB 12.3 01/09/2025    HCT 39.4 01/09/2025    MCV 85 01/09/2025     01/09/2025       CMP  Sodium   Date Value Ref Range Status   01/09/2025 140 136 - 145 mmol/L Final     Potassium   Date Value Ref Range Status   01/09/2025 4.4 3.5 - 5.1 mmol/L Final     Chloride   Date Value Ref Range Status   01/09/2025 104 95 - 110 mmol/L Final     CO2   Date Value Ref Range Status   01/09/2025 28 23 - 29 mmol/L Final     Glucose   Date Value Ref Range Status   01/09/2025 98 70 - 110 mg/dL Final     BUN   Date Value Ref Range Status   01/09/2025 18 8 - 23 mg/dL Final     Creatinine   Date Value Ref Range Status   01/09/2025 0.8 0.5 - 1.4 mg/dL Final     Calcium   Date Value Ref Range Status   01/09/2025 9.5 8.7 - 10.5 mg/dL Final     Total Protein   Date Value Ref Range Status   01/09/2025 6.9 6.0 - 8.4 g/dL Final     Albumin   Date Value Ref Range Status   01/09/2025 4.0 3.5 - 5.2 g/dL Final   01/11/2019 4.1 3.1 - 4.7 g/dL      Total Bilirubin   Date Value Ref Range Status   01/09/2025 0.4 0.1 - 1.0 mg/dL Final     Comment:     For infants and newborns, interpretation of results should be based  on gestational age, weight and in agreement with clinical  observations.    Premature Infant recommended reference ranges:  Up to 24 hours.............<8.0 mg/dL  Up to 48 hours............<12.0 mg/dL  3-5 days..................<15.0 mg/dL  6-29 days.................<15.0 mg/dL       Alkaline Phosphatase   Date Value Ref Range Status   01/09/2025 41 40 - 150 U/L Final     AST   Date Value Ref Range Status   01/09/2025 19 10 - 40 U/L Final     ALT   Date Value Ref Range Status   01/09/2025 20 10 - 44 U/L Final     Anion Gap   Date Value Ref Range Status   01/09/2025 8 8 - 16 mmol/L Final     eGFR   Date Value Ref Range Status   01/09/2025 >60 >60 mL/min/1.73 m^2 Final     MTHFR Comment   Comment: Result:  c.665C>T (p. [id number]), legacy name:  C677T - Not Detected c.1286A>C (p.  [id number]), legacy name: [id number] -  Not  Detected Interpretation:     Phosphatidylserine Ab IgA 0 - 30 Units <10   Phosphatidylserine Ab ( IgM) 0 - 30 Units 9      Phosphatidylserine Ab (IgG) 0 - 19 APS Units <1     Anticardiolipin IgG 0 - 14 GPL U/mL <9   Comment: Negative:              <15     Anticardiolipin IgA 0 - 11 APL U/mL <9   Comment: Negative:              <12     Anticardiolipin IgM 0 - 12 MPL U/mL <9     Homocysteine 0.0 - 21.3 umol/L 10.3     Prothrombin Mutation Comment   Comment: Result: c.*97G>A - Not Detected     Factor V Leiden Comment   Comment: Result: c.1601G>A (p.[id number]) - Not Detected           Radiology/Diagnostic Studies:    Nuclear Stress - Cardiology Interpreted    Result Date: 7/10/2024    Abnormal myocardial perfusion scan.   There is a mild to moderate intensity, medium sized, mostly fixed perfusion abnormality with some reversibilty in the lateral, apical and septal apical wall(s).   There are no other significant perfusion abnormalities.   The gated perfusion images showed an ejection fraction of 61% at rest. The gated perfusion images showed an ejection fraction of 54% post stress. Normal ejection fraction is greater than 53%.   There is normal wall motion at rest and post stress.   LV cavity size is normal at rest and normal at stress.   The ECG portion of the study is negative for ischemia.   The patient reported chest pain during the stress test.   There were no arrhythmias during stress.     CT Abdomen Pelvis W Wo Contrast    Result Date: 6/20/2024  CMS Mandated Quality Data- CT Radiation- 436 All CT scans at this facility utilize dose modulation, iterative reconstruction, and/or weight based dosing when appropriate to reduce radiation dose to as low as reasonably achievable. EXAMINATION: CT ABDOMEN PELVIS W WO CONTRAST CLINICAL HISTORY: rule out cancer with new onset clots; Acute embolism and thrombosis of right popliteal vein TECHNIQUE: CT abdomen and pelvis without and with intravenous contrast.  100 mL  Omnipaque 350. COMPARISON: Abdominal ultrasound 12/07/2021. FINDINGS: There is subsegmental atelectasis of the lung bases.  Heart size is normal. There are small lobulated hypoattenuating and nonenhancing lesions in the left and right hepatic lobes in the region of the intrahepatic fissure measuring 1.9 cm each, and felt to reflect small hepatic cysts.  0.5 cm hypoattenuating structure in the inferior right hepatic lobe also noted, likely reflecting a small hepatic cyst.  No enhancing hepatic lesions identified.  The gallbladder and common bile duct within normal limits.  The pancreas, spleen and adrenal glands are unremarkable. The kidneys are normal size.  There is no hydronephrosis or nephrolithiasis.  There are no enhancing renal lesions.  Ureters are normal caliber without obstructions.  Evaluation of the pelvic viscera is limited due to beam hardening artifact from bilateral hip arthroplasty hardware.  Visualized portions of the bladder are unremarkable. There is mild diverticulosis of the colon.  There is oral contrast in the large and small bowel.  The appendix is not identified.  There is no bowel wall thickening or inflammatory changes observed.  The stomach is decompressed and grossly unremarkable. The abdominal aorta is normal caliber with moderate calcified plaque formation.  There are no large intra-abdominal lymph nodes.  There is no mesenteric fat stranding or free fluid observed. There are degenerative changes of the spine.  No acute osseous abnormality observed.     1. No acute intra-abdominal abnormality observed. 2. Small right and left hepatic cyst observed. 3. Colonic diverticulosis. 4. Additional incidental observations as described. Electronically signed by: Milo Vogel Date:    06/20/2024 Time:    11:51        Doppler US  6/6/2024:     Impression:     Known pulmonary emboli on prior CTA chest.  Ultrasound demonstrates nonocclusive thrombus in the right popliteal vein.     CTA 6/6/2024:      Impression:     Known pulmonary emboli on prior CTA chest.  Ultrasound demonstrates nonocclusive thrombus in the right popliteal vein.       I have reviewed all available lab results and radiology reports.    Assessment/Plan:   (1) 79 y.o. female with diagnosis of RLE DVT and pulmonary emboli who has been referred by Yumiko Watson MD for evaluation by medical hematology/oncology. She had presented to the ED at [facility] at the bequest of her cardiologist on 6/6/2024 for SOB, JARA, right thigh pain and left sided CP over the past couple of months. CTA showed bilateral pulmonary emboli and doppler US showed a nonocclusive thrombus in the right popliteal vein.      6/14/2024:  - she is on eliquis  - no excessive bleeding or bruising  - + family hx/of clots  - order CT abdom/pelvis and clot work-up    7/15/2024:  - continued on eliquis  - to see cardiology again next week  - f/u with Dr Melgar's group - she needs repeat EGD and also needs colonsocopy  - order repeat CTA of chest    8/19/2024:  - continued on eliquis  - repeat CTA on 7/15 with resolution of the pulm eboli  - prothrombin II neg, MTHFR neg, gactor V leiden neg  - anticardiolipin neg  - protein C and S WNL    2/20/2025:  - She was on eliquis but self-discontinued last week when she ran out.   - No excessive bleeding but she had been having some bruising on the arms  - She last saw Dr Boogie with pulmonary in Sept 2024  - workup essentially negative        (2) HTN and hypercholesterolemia     (3) Hx/of an umbilical cot after having hysterectomy in the 90's     (4) GERD and diverticulitis; hx/of Hep C positive antibody; hx/of colon polyps     (5) CHF and chronic venous insuffiencey in legs Rght > Left - chronic diastolic HF     (6) Hypothyroidism; thyroid nodules     (7) Migraines, peripheral neuropathy     (8) DDD; bilateral knee issues, OA; shoulder bursa issues; s/p right knee arthroscope in Apr 2012  - rght knee x2 with last one in 2018-19     (9)  Depression     (10) Former smoker (quit 2012)               VISIT DIAGNOSES:      Positive ANEUDY (antinuclear antibody)    Acute deep vein thrombosis (DVT) of popliteal vein of right lower extremity    Anticoagulated    History of pulmonary embolism          PLAN:  Discontinued eliquis  F/iu with cardiology and PCP   F/u with pain management for planned nerve ablation  F/u [facility] Ching with  as directed  F/u with Dr Boogie with pulmonary as directed  F/u with GI  and rheum           RTC in  12 months  Fax note to   IFRAH Badillo [place]; Justino García Clinton H. III, MD,  Keagan Ching    Discussion:     Anticoagulation Discussion:     Discussed with patient and any applicable family members about the benefit and/or need for anticoagulation. Communicated about the risks of bleeding while on any anticoagulation, which could be serious and/or life-threatening, and which can occur at any time, regardless of degree of the level of anticoagulation. Expressed the need for compliance with any anticoagulation regimen and that failure to do so could potential lead to excessive bleeding, and risk to health and/or life. In particular, with patients on coumadin therapy, compliance with requested blood work is absolutely essential, as coumadin levels can vary from time to time, and failure to do so could potentially place the patient at risk for bleeding and/or clotting events which could be fatal. Patients on coumadin are encouraged to call the day after they have their levels drawn, as to obtain the appropriate instructions from our staff. Patients are aware that self-regulating or self-dosing of their medications is strictly prohibited.         I reviewed results of the recently ordered labs, tests and studies; made directives with regards to the results. I have explained all of the above in detail and the patient understands all of the current recommendation(s). I have answered all of their questions to  the best of my ability and to their complete satisfaction. The patient is to continue with the current management plan.            Electronically signed by Rayray Green MD

## 2025-02-20 ENCOUNTER — OFFICE VISIT (OUTPATIENT)
Facility: CLINIC | Age: 80
End: 2025-02-20
Payer: MEDICARE

## 2025-02-20 VITALS
RESPIRATION RATE: 16 BRPM | HEIGHT: 69 IN | BODY MASS INDEX: 35.25 KG/M2 | HEART RATE: 73 BPM | SYSTOLIC BLOOD PRESSURE: 133 MMHG | DIASTOLIC BLOOD PRESSURE: 81 MMHG | TEMPERATURE: 97 F | WEIGHT: 238 LBS

## 2025-02-20 DIAGNOSIS — Z86.711 HISTORY OF PULMONARY EMBOLISM: ICD-10-CM

## 2025-02-20 DIAGNOSIS — R76.8 POSITIVE ANA (ANTINUCLEAR ANTIBODY): Primary | ICD-10-CM

## 2025-02-20 DIAGNOSIS — Z79.01 ANTICOAGULATED: ICD-10-CM

## 2025-02-20 DIAGNOSIS — I82.431 ACUTE DEEP VEIN THROMBOSIS (DVT) OF POPLITEAL VEIN OF RIGHT LOWER EXTREMITY: ICD-10-CM

## 2025-02-25 ENCOUNTER — TELEPHONE (OUTPATIENT)
Dept: RHEUMATOLOGY | Facility: CLINIC | Age: 80
End: 2025-02-25
Payer: MEDICARE

## 2025-02-25 ENCOUNTER — HOSPITAL ENCOUNTER (OUTPATIENT)
Facility: HOSPITAL | Age: 80
Discharge: HOME | End: 2025-02-25
Attending: ANESTHESIOLOGY | Admitting: ANESTHESIOLOGY
Payer: MEDICARE

## 2025-02-25 DIAGNOSIS — M47.896 OTHER SPONDYLOSIS, LUMBAR REGION: ICD-10-CM

## 2025-02-25 PROCEDURE — 64636 DESTROY L/S FACET JNT ADDL: CPT | Mod: 50 | Performed by: ANESTHESIOLOGY

## 2025-02-25 PROCEDURE — 64636 DESTROY L/S FACET JNT ADDL: CPT | Mod: 50,,, | Performed by: ANESTHESIOLOGY

## 2025-02-25 PROCEDURE — 63600175 PHARM REV CODE 636 W HCPCS: Performed by: ANESTHESIOLOGY

## 2025-02-25 PROCEDURE — 99152 MOD SED SAME PHYS/QHP 5/>YRS: CPT | Performed by: ANESTHESIOLOGY

## 2025-02-25 PROCEDURE — 64635 DESTROY LUMB/SAC FACET JNT: CPT | Mod: 50,,, | Performed by: ANESTHESIOLOGY

## 2025-02-25 PROCEDURE — 64635 DESTROY LUMB/SAC FACET JNT: CPT | Mod: 50 | Performed by: ANESTHESIOLOGY

## 2025-02-25 RX ORDER — METHYLPREDNISOLONE ACETATE 80 MG/ML
INJECTION, SUSPENSION INTRA-ARTICULAR; INTRALESIONAL; INTRAMUSCULAR; SOFT TISSUE
Status: DISCONTINUED | OUTPATIENT
Start: 2025-02-25 | End: 2025-02-25 | Stop reason: HOSPADM

## 2025-02-25 RX ORDER — LIDOCAINE HYDROCHLORIDE 20 MG/ML
INJECTION, SOLUTION EPIDURAL; INFILTRATION; INTRACAUDAL; PERINEURAL
Status: DISCONTINUED | OUTPATIENT
Start: 2025-02-25 | End: 2025-02-25 | Stop reason: HOSPADM

## 2025-02-25 RX ORDER — SODIUM CHLORIDE, SODIUM LACTATE, POTASSIUM CHLORIDE, CALCIUM CHLORIDE 600; 310; 30; 20 MG/100ML; MG/100ML; MG/100ML; MG/100ML
INJECTION, SOLUTION INTRAVENOUS CONTINUOUS
Status: DISCONTINUED | OUTPATIENT
Start: 2025-02-25 | End: 2025-02-25 | Stop reason: HOSPADM

## 2025-02-25 RX ORDER — LIDOCAINE HYDROCHLORIDE 10 MG/ML
1 INJECTION, SOLUTION EPIDURAL; INFILTRATION; INTRACAUDAL; PERINEURAL ONCE
Status: COMPLETED | OUTPATIENT
Start: 2025-02-25 | End: 2025-02-25

## 2025-02-25 RX ORDER — FENTANYL CITRATE 50 UG/ML
INJECTION, SOLUTION INTRAMUSCULAR; INTRAVENOUS
Status: DISCONTINUED | OUTPATIENT
Start: 2025-02-25 | End: 2025-02-25 | Stop reason: HOSPADM

## 2025-02-25 RX ORDER — BUPIVACAINE HYDROCHLORIDE 2.5 MG/ML
INJECTION, SOLUTION EPIDURAL; INFILTRATION; INTRACAUDAL
Status: DISCONTINUED | OUTPATIENT
Start: 2025-02-25 | End: 2025-02-25 | Stop reason: HOSPADM

## 2025-02-25 RX ORDER — MIDAZOLAM HYDROCHLORIDE 1 MG/ML
INJECTION INTRAMUSCULAR; INTRAVENOUS
Status: DISCONTINUED | OUTPATIENT
Start: 2025-02-25 | End: 2025-02-25 | Stop reason: HOSPADM

## 2025-02-25 RX ORDER — LIDOCAINE HYDROCHLORIDE 10 MG/ML
INJECTION, SOLUTION EPIDURAL; INFILTRATION; INTRACAUDAL; PERINEURAL
Status: DISCONTINUED | OUTPATIENT
Start: 2025-02-25 | End: 2025-02-25 | Stop reason: HOSPADM

## 2025-02-25 RX ADMIN — LIDOCAINE HYDROCHLORIDE 10 MG: 10 INJECTION, SOLUTION EPIDURAL; INFILTRATION; INTRACAUDAL at 09:02

## 2025-02-25 NOTE — PLAN OF CARE
Discharge instructions given to pt/, verbalized understanding.  Tolerating PO fluids.  IV removed.  Denies pain.  Wheeled out to sister in law  per RN in no distress.

## 2025-02-25 NOTE — DISCHARGE SUMMARY
[facility] - Periop Services  Discharge Note  Short Stay    Procedure(s) (LRB):  Radiofrequency Ablation, Nerve, Spinal, Lumbar, Medial Branch, 1 Level l4/5 and l5/s1 RFA (Bilateral)      OUTCOME: Patient tolerated treatment/procedure well without complication and is now ready for discharge.    DISPOSITION: Home or Self Care    FINAL DIAGNOSIS:  <principal problem not specified>    FOLLOWUP: In clinic    DISCHARGE INSTRUCTIONS:    Discharge Procedure Orders   Notify your health care provider if you experience any of the following:  temperature >100.4     Notify your health care provider if you experience any of the following:  severe uncontrolled pain     Notify your health care provider if you experience any of the following:  redness, tenderness, or signs of infection (pain, swelling, redness, odor or green/yellow discharge around incision site)     Activity as tolerated        TIME SPENT ON DISCHARGE: 30 minutes

## 2025-02-25 NOTE — TELEPHONE ENCOUNTER
Left voicemail asking patient to contact the office back.        ----- Message from Judie Weber sent at 2/25/2025 12:49 PM CST -----  Regarding: Advise  Contact: [phone]  Type:  AdviceWho Called: PatientWould the patient rather a call back or a response via [facility]? Call [facility] Call Back Number: [phone] Information: Patient is asking for a return call from HUSAM Villa in regards to a prescription PA per pt.

## 2025-02-25 NOTE — OP NOTE
PROCEDURE DATE: 2/25/2025    PROCEDURE:  Radiofrequency ablation of the medial branch nerves that innervate bilateral L4/5 and L5/S1 facets utilizing fluoroscopy    DIAGNOSIS:  Other spondylosis, lumbar region    Post op Diagnosis: Same    PHYSICIAN: Geoffrey Caal MD    MEDICATIONS INJECTED:  From a mixture of 6ml of 0.25% bupivicaine and 80mg of methylprednisone,  1ml of this solution was injected at each level.    LOCAL ANESTHETIC USED: Lidocaine 1%, 2 ml given at each site.    SEDATION MEDICATIONS: RN IV sedation    ESTIMATED BLOOD LOSS:  None    COMPLICATIONS:  None    TECHNIQUE:  A time out was taken to identify patient and procedure side prior to starting the procedure. Laying in a prone position, the patient was prepped and draped in the usual sterile fashion using ChloraPrep and sterile towels.  The levels were determined under fluoroscopic guidance and then marked.  Local anesthetic was given by raising a wheal at the skin over each site and then infiltrated approximately 2cm deeper.  A 20-gauge  100 mm RF needle was introduced to the anatomic location of the medial branch nerves that innervate bilateral L4/5 and L5/S1 facets.  Motor stimulation up to 2 Volts at each level confirmed no motor nerve involvement.  Impedance was less than 800 ohms at each level.  1ml of 2% lidocaine was instilled prior to lesioning.  Ablation was performed per level utilizing radiofrequency generator 80°C for 90 seconds.  The above noted medication was then injected slowly. The patient tolerated the procedure well.     The patient was monitored after the procedure.  Patient was given post procedure and discharge instructions to follow at home.  The patient was discharged in a stable condition

## 2025-02-26 VITALS
TEMPERATURE: 98 F | RESPIRATION RATE: 17 BRPM | HEIGHT: 69 IN | WEIGHT: 237.88 LBS | HEART RATE: 68 BPM | BODY MASS INDEX: 35.23 KG/M2 | DIASTOLIC BLOOD PRESSURE: 72 MMHG | OXYGEN SATURATION: 95 % | SYSTOLIC BLOOD PRESSURE: 160 MMHG

## 2025-03-03 NOTE — TELEPHONE ENCOUNTER
Done  ----- Message from Bailey Connelly sent at 4/25/2022  4:35 PM CDT -----  Contact: Patient  Type:  Patient Requesting Referral    Who Called: Patient     Does the patient already have the specialty appointment scheduled?: no    If yes, what is the date of that appointment?:     Referral to What Specialty  Physical Medicine     Reason for Referral: right leg pain     Does the patient want the referral with a specific physician?: Sandra Devi    [facility]    Patient Requesting a Response?: Yes    Would the patient rather a call back or a response via [facility]?  Call    Best Call Back Number: [phone] (home)     Additional Information            
marijuana

## 2025-03-04 LAB
OHS QRS DURATION: 104 MS
OHS QTC CALCULATION: 436 MS

## 2025-03-12 RX ORDER — GABAPENTIN 800 MG/1
800 TABLET ORAL 3 TIMES DAILY
Qty: 270 TABLET | Refills: 3 | Status: SHIPPED | OUTPATIENT
Start: 2025-03-12

## 2025-03-12 NOTE — TELEPHONE ENCOUNTER
No care due was identified.  [facility] Embedded Care Due Messages. Reference number: [id number].   3/12/2025 12:39:05 PM CDT

## 2025-03-12 NOTE — TELEPHONE ENCOUNTER
----- Message from Lucero sent at 3/12/2025 11:50 AM CDT -----  Regarding: Refill request  Contact: pt  Type:  RX Refill RequestWho Called: ptRefill or New Rx:refillRX Name and Strength:gabapentin (NEURONTIN) 800 MG tabletHow is the patient currently taking it? (ex. 1XDay):2/dayIs this a 30 day or 90 day RX:30Preferred Pharmacy with phone number:[facility] [address], [place] - [address] [place] [address]Xpvam: [phone] Fax: [phone] or Mail Order:localOrdering Provider:sharpWould the patient rather a call back or a response via [facility]? Call [facility] Call Back Number:[phone] Information:

## 2025-03-13 ENCOUNTER — HOSPITAL ENCOUNTER (OUTPATIENT)
Facility: HOSPITAL | Age: 80
Discharge: HOME | End: 2025-03-14
Attending: EMERGENCY MEDICINE | Admitting: INTERNAL MEDICINE
Payer: MEDICARE

## 2025-03-13 ENCOUNTER — TELEPHONE (OUTPATIENT)
Dept: FAMILY MEDICINE | Facility: CLINIC | Age: 80
End: 2025-03-13
Payer: MEDICARE

## 2025-03-13 ENCOUNTER — HOSPITAL ENCOUNTER (OUTPATIENT)
Dept: RADIOLOGY | Facility: HOSPITAL | Age: 80
Discharge: HOME | End: 2025-03-13
Attending: FAMILY MEDICINE
Payer: MEDICARE

## 2025-03-13 ENCOUNTER — OFFICE VISIT (OUTPATIENT)
Dept: FAMILY MEDICINE | Facility: CLINIC | Age: 80
End: 2025-03-13
Payer: MEDICARE

## 2025-03-13 VITALS
TEMPERATURE: 99 F | BODY MASS INDEX: 34.8 KG/M2 | HEIGHT: 69 IN | WEIGHT: 235 LBS | DIASTOLIC BLOOD PRESSURE: 66 MMHG | SYSTOLIC BLOOD PRESSURE: 118 MMHG

## 2025-03-13 DIAGNOSIS — M34.1 CREST SYNDROME: ICD-10-CM

## 2025-03-13 DIAGNOSIS — R61 NIGHT SWEATS: ICD-10-CM

## 2025-03-13 DIAGNOSIS — I50.32 CHRONIC DIASTOLIC HEART FAILURE: ICD-10-CM

## 2025-03-13 DIAGNOSIS — I26.99 ACUTE PULMONARY EMBOLISM WITHOUT ACUTE COR PULMONALE, UNSPECIFIED PULMONARY EMBOLISM TYPE: Primary | ICD-10-CM

## 2025-03-13 DIAGNOSIS — R07.9 CHEST PAIN, UNSPECIFIED TYPE: ICD-10-CM

## 2025-03-13 DIAGNOSIS — I82.409 DVT (DEEP VENOUS THROMBOSIS): ICD-10-CM

## 2025-03-13 DIAGNOSIS — R07.9 CHEST PAIN: ICD-10-CM

## 2025-03-13 DIAGNOSIS — E78.5 HYPERLIPIDEMIA, UNSPECIFIED HYPERLIPIDEMIA TYPE: ICD-10-CM

## 2025-03-13 DIAGNOSIS — R06.02 SOB (SHORTNESS OF BREATH): Primary | ICD-10-CM

## 2025-03-13 DIAGNOSIS — I10 HYPERTENSION, ESSENTIAL: ICD-10-CM

## 2025-03-13 DIAGNOSIS — F41.9 ANXIETY: ICD-10-CM

## 2025-03-13 DIAGNOSIS — M35.00 SJOGREN'S SYNDROME, WITH UNSPECIFIED ORGAN INVOLVEMENT: ICD-10-CM

## 2025-03-13 DIAGNOSIS — I73.00 RAYNAUD'S DISEASE WITHOUT GANGRENE: ICD-10-CM

## 2025-03-13 DIAGNOSIS — E03.9 HYPOTHYROIDISM, UNSPECIFIED TYPE: ICD-10-CM

## 2025-03-13 DIAGNOSIS — R25.2 MUSCLE CRAMPS: ICD-10-CM

## 2025-03-13 DIAGNOSIS — Z86.718 HISTORY OF DVT (DEEP VEIN THROMBOSIS): ICD-10-CM

## 2025-03-13 LAB
ALBUMIN SERPL BCP-MCNC: 4.3 G/DL (ref 3.5–5.2)
ALP SERPL-CCNC: 43 U/L (ref 55–135)
ALT SERPL W/O P-5'-P-CCNC: 19 U/L (ref 10–44)
ANION GAP SERPL CALC-SCNC: 9 MMOL/L (ref 8–16)
AST SERPL-CCNC: 20 U/L (ref 10–40)
BASOPHILS # BLD AUTO: 0.02 K/UL (ref 0–0.2)
BASOPHILS NFR BLD: 0.2 % (ref 0–1.9)
BILIRUB SERPL-MCNC: 0.3 MG/DL (ref 0.1–1)
BNP SERPL-MCNC: 72 PG/ML (ref 0–99)
BUN SERPL-MCNC: 13 MG/DL (ref 8–23)
CALCIUM SERPL-MCNC: 9.2 MG/DL (ref 8.7–10.5)
CHLORIDE SERPL-SCNC: 102 MMOL/L (ref 95–110)
CO2 SERPL-SCNC: 25 MMOL/L (ref 23–29)
CREAT SERPL-MCNC: 0.8 MG/DL (ref 0.5–1.4)
DIFFERENTIAL METHOD BLD: ABNORMAL
EOSINOPHIL # BLD AUTO: 0.1 K/UL (ref 0–0.5)
EOSINOPHIL NFR BLD: 0.9 % (ref 0–8)
ERYTHROCYTE [DISTWIDTH] IN BLOOD BY AUTOMATED COUNT: 17.8 % (ref 11.5–14.5)
EST. GFR  (NO RACE VARIABLE): >60 ML/MIN/1.73 M^2
GLUCOSE SERPL-MCNC: 104 MG/DL (ref 70–110)
HCT VFR BLD AUTO: 39.9 % (ref 37–48.5)
HGB BLD-MCNC: 12.9 G/DL (ref 12–16)
IMM GRANULOCYTES # BLD AUTO: 0.04 K/UL (ref 0–0.04)
IMM GRANULOCYTES NFR BLD AUTO: 0.5 % (ref 0–0.5)
LYMPHOCYTES # BLD AUTO: 2.2 K/UL (ref 1–4.8)
LYMPHOCYTES NFR BLD: 27.3 % (ref 18–48)
MAGNESIUM SERPL-MCNC: 2 MG/DL (ref 1.6–2.6)
MCH RBC QN AUTO: 27.7 PG (ref 27–31)
MCHC RBC AUTO-ENTMCNC: 32.3 G/DL (ref 32–36)
MCV RBC AUTO: 86 FL (ref 82–98)
MONOCYTES # BLD AUTO: 0.8 K/UL (ref 0.3–1)
MONOCYTES NFR BLD: 9.6 % (ref 4–15)
NEUTROPHILS # BLD AUTO: 5 K/UL (ref 1.8–7.7)
NEUTROPHILS NFR BLD: 61.5 % (ref 38–73)
NRBC BLD-RTO: 0 /100 WBC
OHS QRS DURATION: 106 MS
OHS QTC CALCULATION: 458 MS
PLATELET # BLD AUTO: 267 K/UL (ref 150–450)
PMV BLD AUTO: 11.3 FL (ref 9.2–12.9)
POTASSIUM SERPL-SCNC: 4.2 MMOL/L (ref 3.5–5.1)
PROT SERPL-MCNC: 7.1 G/DL (ref 6–8.4)
RBC # BLD AUTO: 4.66 M/UL (ref 4–5.4)
SODIUM SERPL-SCNC: 136 MMOL/L (ref 136–145)
TROPONIN I SERPL HS-MCNC: 8.7 PG/ML (ref 0–14.9)
TROPONIN I SERPL HS-MCNC: 9.2 PG/ML (ref 0–14.9)
WBC # BLD AUTO: 8.14 K/UL (ref 3.9–12.7)

## 2025-03-13 PROCEDURE — 1101F PT FALLS ASSESS-DOCD LE1/YR: CPT | Mod: HCNC,CPTII,S$GLB, | Performed by: FAMILY MEDICINE

## 2025-03-13 PROCEDURE — G2211 COMPLEX E/M VISIT ADD ON: HCPCS | Mod: HCNC,S$GLB,, | Performed by: FAMILY MEDICINE

## 2025-03-13 PROCEDURE — 3074F SYST BP LT 130 MM HG: CPT | Mod: HCNC,CPTII,S$GLB, | Performed by: FAMILY MEDICINE

## 2025-03-13 PROCEDURE — 80053 COMPREHEN METABOLIC PANEL: CPT | Performed by: NURSE PRACTITIONER

## 2025-03-13 PROCEDURE — 99285 EMERGENCY DEPT VISIT HI MDM: CPT | Mod: 25

## 2025-03-13 PROCEDURE — 84484 ASSAY OF TROPONIN QUANT: CPT | Performed by: NURSE PRACTITIONER

## 2025-03-13 PROCEDURE — 99214 OFFICE O/P EST MOD 30 MIN: CPT | Mod: HCNC,S$GLB,, | Performed by: FAMILY MEDICINE

## 2025-03-13 PROCEDURE — 99999 PR PBB SHADOW E&M-EST. PATIENT-LVL IV: CPT | Mod: PBBFAC,HCNC,, | Performed by: FAMILY MEDICINE

## 2025-03-13 PROCEDURE — 25500020 PHARM REV CODE 255: Performed by: FAMILY MEDICINE

## 2025-03-13 PROCEDURE — 25000003 PHARM REV CODE 250

## 2025-03-13 PROCEDURE — 85025 COMPLETE CBC W/AUTO DIFF WBC: CPT | Performed by: NURSE PRACTITIONER

## 2025-03-13 PROCEDURE — 96374 THER/PROPH/DIAG INJ IV PUSH: CPT

## 2025-03-13 PROCEDURE — 71275 CT ANGIOGRAPHY CHEST: CPT | Mod: 26,,, | Performed by: RADIOLOGY

## 2025-03-13 PROCEDURE — 3078F DIAST BP <80 MM HG: CPT | Mod: HCNC,CPTII,S$GLB, | Performed by: FAMILY MEDICINE

## 2025-03-13 PROCEDURE — G0378 HOSPITAL OBSERVATION PER HR: HCPCS

## 2025-03-13 PROCEDURE — 71275 CT ANGIOGRAPHY CHEST: CPT | Mod: TC

## 2025-03-13 PROCEDURE — 83735 ASSAY OF MAGNESIUM: CPT | Mod: 91 | Performed by: NURSE PRACTITIONER

## 2025-03-13 PROCEDURE — 3288F FALL RISK ASSESSMENT DOCD: CPT | Mod: HCNC,CPTII,S$GLB, | Performed by: FAMILY MEDICINE

## 2025-03-13 PROCEDURE — 1125F AMNT PAIN NOTED PAIN PRSNT: CPT | Mod: HCNC,CPTII,S$GLB, | Performed by: FAMILY MEDICINE

## 2025-03-13 PROCEDURE — 83880 ASSAY OF NATRIURETIC PEPTIDE: CPT | Performed by: NURSE PRACTITIONER

## 2025-03-13 PROCEDURE — 96372 THER/PROPH/DIAG INJ SC/IM: CPT | Performed by: EMERGENCY MEDICINE

## 2025-03-13 PROCEDURE — 63600175 PHARM REV CODE 636 W HCPCS: Performed by: EMERGENCY MEDICINE

## 2025-03-13 PROCEDURE — 1157F ADVNC CARE PLAN IN RCRD: CPT | Mod: HCNC,CPTII,S$GLB, | Performed by: FAMILY MEDICINE

## 2025-03-13 PROCEDURE — 63600175 PHARM REV CODE 636 W HCPCS

## 2025-03-13 RX ORDER — PANTOPRAZOLE SODIUM 40 MG/1
40 TABLET, DELAYED RELEASE ORAL EVERY MORNING
Status: DISCONTINUED | OUTPATIENT
Start: 2025-03-14 | End: 2025-03-14 | Stop reason: HOSPADM

## 2025-03-13 RX ORDER — LORAZEPAM 1 MG/1
1 TABLET ORAL NIGHTLY
Qty: 30 TABLET | Refills: 2 | Status: SHIPPED | OUTPATIENT
Start: 2025-03-13

## 2025-03-13 RX ORDER — LANOLIN ALCOHOL/MO/W.PET/CERES
800 CREAM (GRAM) TOPICAL
Status: DISCONTINUED | OUTPATIENT
Start: 2025-03-13 | End: 2025-03-14 | Stop reason: HOSPADM

## 2025-03-13 RX ORDER — AMLODIPINE BESYLATE 5 MG/1
5 TABLET ORAL DAILY
Status: DISCONTINUED | OUTPATIENT
Start: 2025-03-14 | End: 2025-03-13

## 2025-03-13 RX ORDER — DULOXETIN HYDROCHLORIDE 30 MG/1
60 CAPSULE, DELAYED RELEASE ORAL DAILY
Status: DISCONTINUED | OUTPATIENT
Start: 2025-03-14 | End: 2025-03-14 | Stop reason: HOSPADM

## 2025-03-13 RX ORDER — AMITRIPTYLINE HYDROCHLORIDE 25 MG/1
25 TABLET, FILM COATED ORAL NIGHTLY
Status: DISCONTINUED | OUTPATIENT
Start: 2025-03-13 | End: 2025-03-14 | Stop reason: HOSPADM

## 2025-03-13 RX ORDER — SODIUM,POTASSIUM PHOSPHATES 280-250MG
2 POWDER IN PACKET (EA) ORAL
Status: DISCONTINUED | OUTPATIENT
Start: 2025-03-13 | End: 2025-03-14 | Stop reason: HOSPADM

## 2025-03-13 RX ORDER — ONDANSETRON HYDROCHLORIDE 2 MG/ML
4 INJECTION, SOLUTION INTRAVENOUS EVERY 6 HOURS PRN
Status: DISCONTINUED | OUTPATIENT
Start: 2025-03-13 | End: 2025-03-14 | Stop reason: HOSPADM

## 2025-03-13 RX ORDER — HYDROCODONE BITARTRATE AND ACETAMINOPHEN 5; 325 MG/1; MG/1
1 TABLET ORAL EVERY 6 HOURS PRN
Refills: 0 | Status: DISCONTINUED | OUTPATIENT
Start: 2025-03-13 | End: 2025-03-14 | Stop reason: HOSPADM

## 2025-03-13 RX ORDER — ACETAMINOPHEN 325 MG/1
650 TABLET ORAL EVERY 4 HOURS PRN
Status: DISCONTINUED | OUTPATIENT
Start: 2025-03-13 | End: 2025-03-14 | Stop reason: HOSPADM

## 2025-03-13 RX ORDER — LORAZEPAM 2 MG/ML
1 INJECTION INTRAMUSCULAR ONCE
Status: COMPLETED | OUTPATIENT
Start: 2025-03-14 | End: 2025-03-13

## 2025-03-13 RX ORDER — ENOXAPARIN SODIUM 100 MG/ML
1 INJECTION SUBCUTANEOUS EVERY 12 HOURS
Status: DISCONTINUED | OUTPATIENT
Start: 2025-03-14 | End: 2025-03-14 | Stop reason: HOSPADM

## 2025-03-13 RX ORDER — METOPROLOL SUCCINATE 25 MG/1
25 TABLET, EXTENDED RELEASE ORAL 2 TIMES DAILY
Status: DISCONTINUED | OUTPATIENT
Start: 2025-03-13 | End: 2025-03-14 | Stop reason: HOSPADM

## 2025-03-13 RX ORDER — AMOXICILLIN 250 MG
1 CAPSULE ORAL DAILY PRN
Status: DISCONTINUED | OUTPATIENT
Start: 2025-03-13 | End: 2025-03-14 | Stop reason: HOSPADM

## 2025-03-13 RX ORDER — ADHESIVE BANDAGE
30 BANDAGE TOPICAL DAILY PRN
COMMUNITY

## 2025-03-13 RX ORDER — SPIRONOLACTONE 25 MG/1
25 TABLET ORAL DAILY
Status: DISCONTINUED | OUTPATIENT
Start: 2025-03-14 | End: 2025-03-13

## 2025-03-13 RX ORDER — TALC
6 POWDER (GRAM) TOPICAL NIGHTLY PRN
Status: DISCONTINUED | OUTPATIENT
Start: 2025-03-13 | End: 2025-03-14 | Stop reason: HOSPADM

## 2025-03-13 RX ORDER — ENOXAPARIN SODIUM 100 MG/ML
1 INJECTION SUBCUTANEOUS
Status: COMPLETED | OUTPATIENT
Start: 2025-03-13 | End: 2025-03-13

## 2025-03-13 RX ORDER — SODIUM CHLORIDE 0.9 % (FLUSH) 0.9 %
10 SYRINGE (ML) INJECTION EVERY 12 HOURS PRN
Status: DISCONTINUED | OUTPATIENT
Start: 2025-03-13 | End: 2025-03-14 | Stop reason: HOSPADM

## 2025-03-13 RX ORDER — LANOLIN ALCOHOL/MO/W.PET/CERES
5000 CREAM (GRAM) TOPICAL DAILY
Status: DISCONTINUED | OUTPATIENT
Start: 2025-03-14 | End: 2025-03-14 | Stop reason: HOSPADM

## 2025-03-13 RX ORDER — NALOXONE HCL 0.4 MG/ML
0.02 VIAL (ML) INJECTION
Status: DISCONTINUED | OUTPATIENT
Start: 2025-03-13 | End: 2025-03-14 | Stop reason: HOSPADM

## 2025-03-13 RX ORDER — ALUMINUM HYDROXIDE, MAGNESIUM HYDROXIDE, AND SIMETHICONE 1200; 120; 1200 MG/30ML; MG/30ML; MG/30ML
30 SUSPENSION ORAL 4 TIMES DAILY PRN
Status: DISCONTINUED | OUTPATIENT
Start: 2025-03-13 | End: 2025-03-14 | Stop reason: HOSPADM

## 2025-03-13 RX ADMIN — AMITRIPTYLINE HYDROCHLORIDE 25 MG: 25 TABLET, FILM COATED ORAL at 10:03

## 2025-03-13 RX ADMIN — ENOXAPARIN SODIUM 110 MG: 60 INJECTION SUBCUTANEOUS at 07:03

## 2025-03-13 RX ADMIN — IOHEXOL 100 ML: 350 INJECTION, SOLUTION INTRAVENOUS at 02:03

## 2025-03-13 RX ADMIN — METOPROLOL SUCCINATE 25 MG: 25 TABLET, EXTENDED RELEASE ORAL at 10:03

## 2025-03-13 RX ADMIN — LORAZEPAM 1 MG: 2 INJECTION INTRAMUSCULAR; INTRAVENOUS at 11:03

## 2025-03-13 NOTE — TELEPHONE ENCOUNTER
-got critical from [facility] lab. Waiting on dr light to come out of room with patient     ---- Message from Louie sent at 3/13/2025 12:11 PM CDT -----  Caller is requesting to schedule their Lab appointment prior to annual appointment.Name of Caller:Alysa carey/ [facility]-Main LabWould the patient rather a call back or a response via [facility]? [facility] Call Back Number:[phone] Additional Information: Critical value lab for the above patient. Tried reaching out on Teams, no response.  Please call back to advise. Thanks!

## 2025-03-13 NOTE — FIRST PROVIDER EVALUATION
Medical screening examination initiated.  I have conducted a focused provider triage encounter, findings are as follows:    Brief history of present illness:  presents from radiology.  CTA shows PE  pt has previous PE     There were no vitals filed for this visit.    Pertinent physical exam:  HR regular  Lungs clear to ascultation    Brief workup plan:  Cardiac work up     Preliminary workup initiated; this workup will be continued and followed by the physician or advanced practice provider that is assigned to the patient when roomed.

## 2025-03-13 NOTE — TELEPHONE ENCOUNTER
Explained to patient about ddimer result and stat cta. She stated she has an appointment at 2pm at her house and will go after. I advised er if feel different until test

## 2025-03-13 NOTE — PROGRESS NOTES
SCRIBE #1 NOTE: I, Neil Fuchs, am scribing for, and in the presence of,  Pawel Badillo III, MD. I have scribed the entire note.     Subjective:       Patient ID: Marisela Eagle is a 79 y.o. female.    Chief Complaint: Follow-up (Want CT of lungs having SOB)    Ms. Eagle is here for a follow up with her last appointment being here on January 6, 2025. Use of cane. Accompanied by . SOB. She has been having shortness of breath. She is concerned about blood clots. Sweats. Reports she has been sweating and wonders if it is related to her thyroid. Muscle cramps. States she is having spasms in her feet, legs, and hands. Reports they will lock up. States they mostly cramp in the bed. She drinks pickle juice and takes medication to help, but this still does not work. She is taking a weight loss injection from [facility], but she is not too sure which one. She thinks it is semaglutide. History of DVT. BMI of 34.7. Cardiovascular good. No chest pain. No palpitations. Blood pressure is 118/66. Hypertension controlled. Hyperlipidemia. States the statins make her legs hurt, so she stopped taking it. Cholesterol is 224. HDL is 65. LDL is 141. CDHF. Off Eliquis for 1 month as per Dr. Green. Sees cardiologist Dr. Canchola. Hypothyroidism. On Levothyroxine 125 mcg. TSH is 2.28. Sees. NP [place]. Sjogren's syndrome. On Norvasc 5mg. Anxiety. On Ativan 1mg nightly. CREST syndrome. Raynaud's disease. CMP is okay.      Review of Systems   Constitutional:  Positive for diaphoresis. Negative for chills and fever.   HENT:  Negative for congestion and sore throat.    Eyes:  Negative for visual disturbance.   Respiratory:  Positive for shortness of breath. Negative for chest tightness.    Cardiovascular:  Negative for chest pain.   Gastrointestinal:  Negative for nausea.   Endocrine: Negative for polydipsia and polyuria.   Genitourinary:  Negative for dysuria and flank pain.   Musculoskeletal:  Positive for myalgias. Negative  for back pain, neck pain and neck stiffness.   Skin:  Negative for rash.   Neurological:  Negative for weakness.   Hematological:  Does not bruise/bleed easily.   Psychiatric/Behavioral:  Negative for behavioral problems.    All other systems reviewed and are negative.      Objective:      Physical examination: Vital signs noted. No acute distress. No carotid bruit. Regular heart rate and rhythm. Lungs clear to auscultation bilaterally. Abdomen bowel sounds positive soft and nontender. Extremities without edema. 2+ pedal pulses.      Assessment:       1. SOB (shortness of breath)    2. Hypothyroidism, unspecified type    3. Chronic diastolic heart failure    4. History of DVT (deep vein thrombosis)    5. Night sweats    6. CREST syndrome    7. Raynaud's disease without gangrene    8. Hypertension, essential    9. Hyperlipidemia, unspecified hyperlipidemia type    10. Anxiety    11. Muscle cramps    12. BMI 34.0-34.9,adult    13. Sjogren's syndrome, with unspecified organ involvement        Plan:       SOB (shortness of breath)  -     Basic Metabolic Panel; Future; Expected date: 03/13/2025  -     D-Dimer, Quantitative; Future; Expected date: 03/13/2025    Hypothyroidism, unspecified type    Chronic diastolic heart failure    History of DVT (deep vein thrombosis)    Night sweats    CREST syndrome    Raynaud's disease without gangrene    Hypertension, essential    Hyperlipidemia, unspecified hyperlipidemia type    Anxiety    Muscle cramps    BMI 34.0-34.9,adult  -     Magnesium; Future; Expected date: 03/13/2025    Sjogren's syndrome, with unspecified organ involvement    Worry for new pulmonary embolus.  Check BMP magnesium D-dimer refill gabapentin.  D-dimer came back elevated CTA shows new pulmonary emboli.  Recommended admission.  All care gaps addressed or discussed.     Pawel SAEED III, MD, personally performed the services described in this documentation. All medical record entries [facility] by the [facility]  were at my direction and in my presence. I have reviewed the chart and agree that the record reflects my personal performance and is accurate and complete.

## 2025-03-13 NOTE — ED PROVIDER NOTES
Encounter Date: 3/13/2025       History     Chief Complaint   Patient presents with    ABNORMAL CT CHEST     SOB X 1 WEEK     79-year-old female with a past medical history DVT, pulmonary embolism, depression, hypertension, and hyperlipidemia presents for  shortness of breath.  The patient reports that she has been short of breath for approximately a week.  She reports that she ran out of her home Eliquis a few weeks ago.  She denies any associated chest pain, abdominal pain, nausea/vomiting, cough, congestion, fever/chills, or diarrhea.  There are no alleviating or aggravating factors.  The patient had an outpatient CT scan done today that does show multiple pulmonary emboli.      Review of patient's allergies indicates:  No Known Allergies  Past Medical History:   Diagnosis Date    Abnormal stress test     shortness of breath    Bilateral knee pain 2012    left worse, right knee painful even after partial replacement.    Bruises easily     Clot ?    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)     no treatment needed    Hyperlipidemia     Hypertension     Migraines     Neuropathy     peripheral    Pulmonary embolus     dvt knee    Thyroid disease     hypothyroid, has nodules    Varicose veins      Past Surgical History:   Procedure Laterality Date    ADENOIDECTOMY      APPENDECTOMY      BILATERAL SALPINGOOPHORECTOMY       SECTION      COLONOSCOPY  2012    HIP ARTHROPLASTY      HYSTERECTOMY      with BSO    INJECTION OF ANESTHETIC AGENT AROUND MEDIAL BRANCH NERVES INNERVATING LUMBAR FACET JOINT Bilateral 2025    Procedure: Block-nerve-medial  branch-lumbar;  Surgeon: Geoffrey Caal MD;  [facility];  Service: Pain Management;  Laterality: Bilateral;    INJECTION OF ANESTHETIC AGENT AROUND MEDIAL BRANCH NERVES INNERVATING LUMBAR FACET JOINT Bilateral 1/28/2025    Procedure: Block-nerve-medial branch-lumbar level 1;  Surgeon: Geoffrey Caal MD;  Location: [facility] OR;  Service: Pain Management;  Laterality: Bilateral;    JOINT REPLACEMENT  04/01/2012    rt unilateral knee arthroplasty. Took Coumadin x 6 weeks    KNEE ARTHROPLASTY Bilateral     KNEE ARTHROSCOPY  01/01/2010    right    LEFT HEART CATHETERIZATION Left 7/29/2024    Procedure: Left heart cath;  Surgeon: Cooper Canchola MD;  Location: [facility] CATH/EP LAB;  Service: Cardiology;  Laterality: Left;    RADIOFREQUENCY ABLATION OF LUMBAR MEDIAL BRANCH NERVE AT SINGLE LEVEL Bilateral 2/25/2025    Procedure: Radiofrequency Ablation, Nerve, Spinal, Lumbar, Medial Branch, 1 Level;  Surgeon: Geoffrey Caal MD;  [facility];  Service: Pain Management;  Laterality: Bilateral;    TONSILLECTOMY       Family History   Problem Relation Name Age of Onset    Neuropathy Mother      Hypertension Mother      Stroke Mother      Neuropathy Father      Cancer Daughter      Diabetes Maternal Grandmother      Melanoma Neg Hx      Psoriasis Neg Hx      Lupus Neg Hx      Eczema Neg Hx       Social History[1]  Review of Systems   Constitutional:  Negative for chills and fever.   HENT:  Negative for congestion.    Respiratory:  Positive for shortness of breath. Negative for cough.    Cardiovascular:  Negative for chest pain.   Gastrointestinal:  Negative for abdominal pain, nausea and vomiting.   Genitourinary:  Negative for dysuria.   Musculoskeletal:  Negative for gait problem.   Skin:  Negative for color change.   Neurological:  Negative for dizziness and numbness.   Psychiatric/Behavioral:  Negative for agitation.        Physical Exam     Initial Vitals [03/13/25 1524]   BP Pulse Resp Temp SpO2    131/79 95 20 98.5 °F (36.9 °C) 98 %      MAP       --         Physical Exam    Nursing note and vitals reviewed.  Constitutional: She appears well-developed and well-nourished.   HENT:   Head: Atraumatic.   Eyes: EOM are normal. Pupils are equal, round, and reactive to light.   Neck:   Normal range of motion.  Cardiovascular:  Normal rate and regular rhythm.           Murmur heard.  Pulmonary/Chest: Breath sounds normal.   Abdominal: Abdomen is soft. Bowel sounds are normal. She exhibits no distension. There is no abdominal tenderness. There is no rebound.   Musculoskeletal:         General: Normal range of motion.      Right shoulder: Normal.      Left shoulder: Normal.      Cervical back: Normal range of motion.     Neurological: She is alert and oriented to person, place, and time.   Skin: Skin is warm and dry.   Psychiatric: She has a normal mood and affect.         ED Course   Critical Care    Date/Time: 3/13/2025 6:11 PM    Performed by: Surinder Ferris MD  Authorized by: Surinder Ferris MD  Direct patient critical care time: 14 minutes  Ordering / reviewing critical care time: 11 minutes  Documentation critical care time: 11 minutes  Total critical care time (exclusive of procedural time) : 36 minutes  Critical care was time spent personally by me on the following activities: development of treatment plan with patient or surrogate, examination of patient, obtaining history from patient or surrogate, ordering and performing treatments and interventions, ordering and review of laboratory studies and ordering and review of radiographic studies.        Labs Reviewed   CBC W/ AUTO DIFFERENTIAL - Abnormal       Result Value    WBC 8.14      RBC 4.66      Hemoglobin 12.9      Hematocrit 39.9      MCV 86      MCH 27.7      MCHC 32.3      RDW 17.8 (*)     Platelets 267      MPV 11.3      Immature Granulocytes 0.5      Gran # (ANC) 5.0      Immature Grans (Abs) 0.04      Lymph # 2.2      Mono # 0.8      Eos #  0.1      Baso # 0.02      nRBC 0      Gran % 61.5      Lymph % 27.3      Mono % 9.6      Eosinophil % 0.9      Basophil % 0.2      Differential Method Automated     COMPREHENSIVE METABOLIC PANEL - Abnormal    Sodium 136      Potassium 4.2      Chloride 102      CO2 25      Glucose 104      BUN 13      Creatinine 0.8      Calcium 9.2      Total Protein 7.1      Albumin 4.3      Total Bilirubin 0.3      Alkaline Phosphatase 43 (*)     AST 20      ALT 19      eGFR >60.0      Anion Gap 9     MAGNESIUM    Magnesium 2.0     TROPONIN I HIGH SENSITIVITY    Troponin I High Sensitivity 8.7     B-TYPE NATRIURETIC PEPTIDE    BNP 72     TROPONIN I HIGH SENSITIVITY    Troponin I High Sensitivity 9.2       EKG Readings: (Independently Interpreted)   Initial Reading: No STEMI. Rhythm: Normal Sinus Rhythm. Heart Rate: 79. Ectopy: No Ectopy. Conduction: LAFB. ST Segments: Normal ST Segments. T Waves: Normal. Clinical Impression: Normal Sinus Rhythm     ECG Results              EKG 12-lead (In process)        Collection Time Result Time QRS Duration OHS QTC Calculation    03/13/25 15:20:05 03/13/25 15:54:16 106 458                     In process by Interface, Lab In [facility] (03/13/25 15:54:21)                   Narrative:    Test Reason : R07.9,    Vent. Rate :  79 BPM     Atrial Rate :  79 BPM     P-R Int : 214 ms          QRS Dur : 106 ms      QT Int : 400 ms       P-R-T Axes :  67 -60  93 degrees    QTcB Int : 458 ms    Sinus rhythm with 1st degree A-V block  Left anterior fascicular block  Cannot rule out Anterior infarct ,age undetermined  Abnormal ECG  No previous ECGs available    Referred By:            Confirmed By:                                   Imaging Results              US Lower Extremity Veins Bilateral (Final result)  Result time 03/13/25 16:44:07      Final result by Tim Neely MD (03/13/25 16:44:07)                   Impression:      Nonocclusive DVT involving the right popliteal vein, similar to the  reference exam.    Negative for left lower extremity DVT.      Electronically signed by: Tim Chin  Date:    03/13/2025  Time:    16:44               Narrative:    CLINICAL HISTORY:  Personal history of other venous thrombosis and embolism    TECHNIQUE:  Grayscale, color and spectral Doppler analysis of the bilateral lower extremity deep venous system was performed.    COMPARISON:  06/06/2024    FINDINGS:  There is normal compressibility, with normal flow by color and spectral Doppler analysis in the left lower extremity deep venous system, with no evidence of deep venous thrombosis.    Nonocclusive thrombus involving the right popliteal vein is redemonstrated and not appreciably changed compared to reference exam.  No evidence of thrombosis involving the right common femoral vein, greater saphenous vein, profundus femoris vein, femoral vein.  Right posterior tibial, peroneal, and anterior tibial veins are color Doppler patent.                                       Medications   enoxaparin injection 110 mg (110 mg Subcutaneous Given 3/13/25 1903)     Medical Decision Making  79-year-old female presented for shortness of breath.    Initial differential diagnosis included but not limited to pulmonary emboli, DVT, and coagulopathy.    Amount and/or Complexity of Data Reviewed  Labs: ordered.  Radiology: ordered.  ECG/medicine tests: ordered.    Risk  Prescription drug management.  Risk Details: The patient was emergently evaluated in the emergency department, her evaluation was significant for an elderly female with a normal lung exam.  The patient presents after an outpatient CT scan it does show multiple pulmonary emboli.  The patient had an ultrasound done here in the emergency department that does show a nonocclusive DVT involving the right popliteal vein.  Her labs showed no acute abnormalities.  The patient was started on subcu Lovenox here in the emergency department.  The patient will be admitted to the  hospitalist service for further care.  The case was discussed with the APC on call for the hospitalist service.  She has accepted the patient for admission.                                      Clinical Impression:  Final diagnoses:  [R07.9] Chest pain  [Z86.718] History of DVT (deep vein thrombosis)  [I26.99] Acute pulmonary embolism without acute cor pulmonale, unspecified pulmonary embolism type (Primary)          ED Disposition Condition    Observation Stable                    [1]   Social History  Tobacco Use    Smoking status: Former     Current packs/day: 0.00     Types: Cigarettes     Quit date: 3/23/2012     Years since quittin.9    Smokeless tobacco: Never   Substance Use Topics    Alcohol use: Not Currently     Comment: occasional    Drug use: No        Surinder Ferris MD  [phone]

## 2025-03-14 ENCOUNTER — CLINICAL SUPPORT (OUTPATIENT)
Dept: CARDIOLOGY | Facility: HOSPITAL | Age: 80
End: 2025-03-14
Attending: EMERGENCY MEDICINE
Payer: MEDICARE

## 2025-03-14 ENCOUNTER — TELEPHONE (OUTPATIENT)
Dept: FAMILY MEDICINE | Facility: CLINIC | Age: 80
End: 2025-03-14
Payer: MEDICARE

## 2025-03-14 VITALS
RESPIRATION RATE: 18 BRPM | OXYGEN SATURATION: 96 % | TEMPERATURE: 98 F | BODY MASS INDEX: 34.8 KG/M2 | WEIGHT: 235 LBS | SYSTOLIC BLOOD PRESSURE: 163 MMHG | HEART RATE: 66 BPM | HEIGHT: 69 IN | DIASTOLIC BLOOD PRESSURE: 72 MMHG

## 2025-03-14 LAB
ALBUMIN SERPL BCP-MCNC: 3.9 G/DL (ref 3.5–5.2)
ALP SERPL-CCNC: 35 U/L (ref 55–135)
ALT SERPL W/O P-5'-P-CCNC: 18 U/L (ref 10–44)
ANION GAP SERPL CALC-SCNC: 7 MMOL/L (ref 8–16)
AORTIC ROOT ANNULUS: 3.5 CM
AORTIC VALVE CUSP SEPERATION: 1.8 CM
APICAL FOUR CHAMBER EJECTION FRACTION: 63 %
APICAL TWO CHAMBER EJECTION FRACTION: 48 %
APTT PPP: 25.9 SEC (ref 21–32)
ASCENDING AORTA: 3.7 CM
AST SERPL-CCNC: 17 U/L (ref 10–40)
AV INDEX (PROSTH): 0.65
AV MEAN GRADIENT: 3 MMHG
AV PEAK GRADIENT: 6 MMHG
AV VALVE AREA BY VELOCITY RATIO: 1.9 CM²
AV VALVE AREA: 1.8 CM²
AV VELOCITY RATIO: 0.67
BASOPHILS # BLD AUTO: 0.02 K/UL (ref 0–0.2)
BASOPHILS NFR BLD: 0.3 % (ref 0–1.9)
BILIRUB SERPL-MCNC: 0.4 MG/DL (ref 0.1–1)
BSA FOR ECHO PROCEDURE: 2.28 M2
BUN SERPL-MCNC: 12 MG/DL (ref 8–23)
CALCIUM SERPL-MCNC: 8.9 MG/DL (ref 8.7–10.5)
CHLORIDE SERPL-SCNC: 108 MMOL/L (ref 95–110)
CO2 SERPL-SCNC: 26 MMOL/L (ref 23–29)
CREAT SERPL-MCNC: 0.7 MG/DL (ref 0.5–1.4)
CV ECHO LV RWT: 0.5 CM
DIFFERENTIAL METHOD BLD: ABNORMAL
DOP CALC AO PEAK VEL: 1.2 M/S
DOP CALC AO VTI: 28.8 CM
DOP CALC LVOT AREA: 2.8 CM2
DOP CALC LVOT DIAMETER: 1.9 CM
DOP CALC LVOT PEAK VEL: 0.8 M/S
DOP CALC LVOT STROKE VOLUME: 53 CM3
DOP CALC MV VTI: 28.3 CM
DOP CALCLVOT PEAK VEL VTI: 18.7 CM
E WAVE DECELERATION TIME: 217 MSEC
E/A RATIO: 0.87
E/E' RATIO: 10 M/S
ECHO LV POSTERIOR WALL: 1.1 CM (ref 0.6–1.1)
EOSINOPHIL # BLD AUTO: 0.1 K/UL (ref 0–0.5)
EOSINOPHIL NFR BLD: 1 % (ref 0–8)
ERYTHROCYTE [DISTWIDTH] IN BLOOD BY AUTOMATED COUNT: 17.7 % (ref 11.5–14.5)
EST. GFR  (NO RACE VARIABLE): >60 ML/MIN/1.73 M^2
FRACTIONAL SHORTENING: 34.1 % (ref 28–44)
GLUCOSE SERPL-MCNC: 84 MG/DL (ref 70–110)
HCT VFR BLD AUTO: 38 % (ref 37–48.5)
HGB BLD-MCNC: 12.2 G/DL (ref 12–16)
IMM GRANULOCYTES # BLD AUTO: 0.01 K/UL (ref 0–0.04)
IMM GRANULOCYTES NFR BLD AUTO: 0.1 % (ref 0–0.5)
INR PPP: 1 (ref 0.8–1.2)
INTERVENTRICULAR SEPTUM: 1.2 CM (ref 0.6–1.1)
IVC DIAMETER: 1.4 CM
LEFT ATRIUM AREA SYSTOLIC (APICAL 2 CHAMBER): 31 CM2
LEFT ATRIUM AREA SYSTOLIC (APICAL 4 CHAMBER): 15 CM2
LEFT INTERNAL DIMENSION IN SYSTOLE: 2.9 CM (ref 2.1–4)
LEFT VENTRICLE DIASTOLIC VOLUME INDEX: 39.82 ML/M2
LEFT VENTRICLE DIASTOLIC VOLUME: 88 ML
LEFT VENTRICLE END DIASTOLIC VOLUME APICAL 2 CHAMBER: 66.7 ML
LEFT VENTRICLE END DIASTOLIC VOLUME APICAL 4 CHAMBER: 108 ML
LEFT VENTRICLE END SYSTOLIC VOLUME APICAL 2 CHAMBER: 114 ML
LEFT VENTRICLE END SYSTOLIC VOLUME APICAL 4 CHAMBER: 34.2 ML
LEFT VENTRICLE MASS INDEX: 81.4 G/M2
LEFT VENTRICLE SYSTOLIC VOLUME INDEX: 14.5 ML/M2
LEFT VENTRICLE SYSTOLIC VOLUME: 32 ML
LEFT VENTRICULAR INTERNAL DIMENSION IN DIASTOLE: 4.4 CM (ref 3.5–6)
LEFT VENTRICULAR MASS: 180 G
LV LATERAL E/E' RATIO: 7.3 M/S
LV SEPTAL E/E' RATIO: 14.6 M/S
LVED V (TEICH): 87.7 ML
LVES V (TEICH): 32.2 ML
LVOT MG: 1 MMHG
LVOT MV: 0.52 CM/S
LYMPHOCYTES # BLD AUTO: 2.2 K/UL (ref 1–4.8)
LYMPHOCYTES NFR BLD: 33.2 % (ref 18–48)
MAGNESIUM SERPL-MCNC: 2.2 MG/DL (ref 1.6–2.6)
MCH RBC QN AUTO: 27.4 PG (ref 27–31)
MCHC RBC AUTO-ENTMCNC: 32.1 G/DL (ref 32–36)
MCV RBC AUTO: 85 FL (ref 82–98)
MONOCYTES # BLD AUTO: 0.7 K/UL (ref 0.3–1)
MONOCYTES NFR BLD: 9.9 % (ref 4–15)
MV MEAN GRADIENT: 2 MMHG
MV PEAK A VEL: 0.84 M/S
MV PEAK E VEL: 0.73 M/S
MV PEAK GRADIENT: 3 MMHG
MV STENOSIS PRESSURE HALF TIME: 110 MS
MV VALVE AREA BY CONTINUITY EQUATION: 1.87 CM2
MV VALVE AREA P 1/2 METHOD: 2 CM2
NEUTROPHILS # BLD AUTO: 3.7 K/UL (ref 1.8–7.7)
NEUTROPHILS NFR BLD: 55.5 % (ref 38–73)
NRBC BLD-RTO: 0 /100 WBC
OHS LV EJECTION FRACTION SIMPSONS BIPLANE MOD: 59 %
PLATELET # BLD AUTO: 241 K/UL (ref 150–450)
PMV BLD AUTO: 10.9 FL (ref 9.2–12.9)
POTASSIUM SERPL-SCNC: 4 MMOL/L (ref 3.5–5.1)
PROT SERPL-MCNC: 6.4 G/DL (ref 6–8.4)
PROTHROMBIN TIME: 11 SEC (ref 9–12.5)
PV MV: 0.6 M/S
PV PEAK GRADIENT: 3 MMHG
PV PEAK VELOCITY: 0.87 M/S
RA PRESSURE ESTIMATED: 3 MMHG
RBC # BLD AUTO: 4.45 M/UL (ref 4–5.4)
RIGHT ATRIUM VOLUME AREA LENGTH APICAL 4 CHAMBER: 30.8 ML
RV TISSUE DOPPLER FREE WALL SYSTOLIC VELOCITY 1 (APICAL 4 CHAMBER VIEW): 12.7 CM/S
SINUS: 3.4 CM
SODIUM SERPL-SCNC: 141 MMOL/L (ref 136–145)
TDI LATERAL: 0.1 M/S
TDI SEPTAL: 0.05 M/S
TDI: 0.08 M/S
TRICUSPID ANNULAR PLANE SYSTOLIC EXCURSION: 2.15 CM
TROPONIN I SERPL HS-MCNC: 8.8 PG/ML (ref 0–14.9)
WBC # BLD AUTO: 6.74 K/UL (ref 3.9–12.7)
Z-SCORE OF LEFT VENTRICULAR DIMENSION IN END DIASTOLE: -5.51
Z-SCORE OF LEFT VENTRICULAR DIMENSION IN END SYSTOLE: -3.7

## 2025-03-14 PROCEDURE — G0378 HOSPITAL OBSERVATION PER HR: HCPCS

## 2025-03-14 PROCEDURE — 94799 UNLISTED PULMONARY SVC/PX: CPT

## 2025-03-14 PROCEDURE — 85610 PROTHROMBIN TIME: CPT

## 2025-03-14 PROCEDURE — 93306 TTE W/DOPPLER COMPLETE: CPT

## 2025-03-14 PROCEDURE — 93306 TTE W/DOPPLER COMPLETE: CPT | Mod: 26,,, | Performed by: INTERNAL MEDICINE

## 2025-03-14 PROCEDURE — 84484 ASSAY OF TROPONIN QUANT: CPT

## 2025-03-14 PROCEDURE — 99900035 HC TECH TIME PER 15 MIN (STAT)

## 2025-03-14 PROCEDURE — 94761 N-INVAS EAR/PLS OXIMETRY MLT: CPT

## 2025-03-14 PROCEDURE — 85730 THROMBOPLASTIN TIME PARTIAL: CPT

## 2025-03-14 PROCEDURE — 83735 ASSAY OF MAGNESIUM: CPT

## 2025-03-14 PROCEDURE — 80053 COMPREHEN METABOLIC PANEL: CPT

## 2025-03-14 PROCEDURE — 85025 COMPLETE CBC W/AUTO DIFF WBC: CPT

## 2025-03-14 PROCEDURE — 25000003 PHARM REV CODE 250

## 2025-03-14 RX ORDER — ACETAMINOPHEN 500 MG
5000 TABLET ORAL DAILY
Status: DISCONTINUED | OUTPATIENT
Start: 2025-03-14 | End: 2025-03-14 | Stop reason: HOSPADM

## 2025-03-14 RX ADMIN — DULOXETINE HYDROCHLORIDE 60 MG: 30 CAPSULE, DELAYED RELEASE ORAL at 10:03

## 2025-03-14 RX ADMIN — METOPROLOL SUCCINATE 25 MG: 25 TABLET, EXTENDED RELEASE ORAL at 10:03

## 2025-03-14 RX ADMIN — CYANOCOBALAMIN TAB 1000 MCG 5000 MCG: 1000 TAB at 10:03

## 2025-03-14 NOTE — PLAN OF CARE
Pt cleared for DC to home with no needs.     Spoke with Leslie at [facility] pharmacy, pt's Eliquis does not require prior auth.     03/14/25 1252   Final Note   Assessment Type Final Discharge Note   Anticipated Discharge Disposition Home

## 2025-03-14 NOTE — SUBJECTIVE & OBJECTIVE
Past Medical History:   Diagnosis Date    Abnormal stress test     shortness of breath    Bilateral knee pain 2012    left worse, right knee painful even after partial replacement.    Bruises easily     Clot ?    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)     no treatment needed    Hyperlipidemia     Hypertension     Migraines     Neuropathy     peripheral    Pulmonary embolus     dvt knee    Thyroid disease     hypothyroid, has nodules    Varicose veins        Past Surgical History:   Procedure Laterality Date    ADENOIDECTOMY      APPENDECTOMY      BILATERAL SALPINGOOPHORECTOMY       SECTION      COLONOSCOPY  2012    HIP ARTHROPLASTY      HYSTERECTOMY      with BSO    INJECTION OF ANESTHETIC AGENT AROUND MEDIAL BRANCH NERVES INNERVATING LUMBAR FACET JOINT Bilateral 2025    Procedure: Block-nerve-medial branch-lumbar;  Surgeon: Geoffrey Caal MD;  Location: [facility] OR;  Service: Pain Management;  Laterality: Bilateral;    INJECTION OF ANESTHETIC AGENT AROUND MEDIAL BRANCH NERVES INNERVATING LUMBAR FACET JOINT Bilateral 2025    Procedure: Block-nerve-medial branch-lumbar level 1;  Surgeon: Geoffrey Caal MD;  Location: [facility] OR;  Service: Pain Management;  Laterality: Bilateral;    JOINT REPLACEMENT  2012    rt unilateral knee arthroplasty. Took Coumadin x 6 weeks    KNEE ARTHROPLASTY Bilateral     KNEE ARTHROSCOPY  2010    right    LEFT HEART CATHETERIZATION Left 2024    Procedure: Left heart cath;  Surgeon: Cooper Canchola MD;  Location: [facility] CATH/EP LAB;  Service: Cardiology;  Laterality: Left;    RADIOFREQUENCY  ABLATION OF LUMBAR MEDIAL BRANCH NERVE AT SINGLE LEVEL Bilateral 2/25/2025    Procedure: Radiofrequency Ablation, Nerve, Spinal, Lumbar, Medial Branch, 1 Level;  Surgeon: Geoffrey Caal MD;  Location: [facility] PAIN MANAGEMENT;  Service: Pain Management;  Laterality: Bilateral;    TONSILLECTOMY         Review of patient's allergies indicates:  No Known Allergies    Current Facility-Administered Medications on File Prior to Encounter   Medication    [COMPLETED] iohexoL (OMNIPAQUE 350) injection 100 mL     Current Outpatient Medications on File Prior to Encounter   Medication Sig    amitriptyline (ELAVIL) 25 MG tablet Take 1 tablet (25 mg total) by mouth every evening.    amLODIPine (NORVASC) 5 MG tablet Take 1 tablet (5 mg total) by mouth once daily.    artificial tears,hypromellose,,GENTEAL/SUSTANE, (SYSTANE GEL) 0.3 % Gel Place 1 drop into both eyes as needed.    cevimeline (EVOXAC) 30 mg capsule Take 1 capsule (30 mg total) by mouth 3 (three) times daily. (Patient not taking: Reported on 3/13/2025)    cholecalciferol, vitamin D3, 5,000 unit capsule Take 5,000 Units by mouth once daily.    cranberry fruit extract (CRANBERRY CONCENTRATE ORAL) Take 1 tablet by mouth Daily.    cyanocobalamin (VITAMIN B-12) 1000 MCG tablet Take 5,000 mcg by mouth once daily.    DULoxetine (CYMBALTA) 60 MG capsule Take 1 capsule (60 mg total) by mouth once daily.    ezetimibe (ZETIA) 10 mg tablet Take 1 tablet (10 mg total) by mouth once daily.    famotidine (PEPCID) 20 MG tablet TAKE 1 TABLET EVERY EVENING    gabapentin (NEURONTIN) 800 MG tablet TAKE 1 TABLET THREE TIMES DAILY    levothyroxine (SYNTHROID) 125 MCG tablet TAKE 1 TABLET EVERY DAY BEFORE BREAKFAST    LORazepam (ATIVAN) 1 MG tablet Take 1 tablet (1 mg total) by mouth every evening.    losartan (COZAAR) 100 MG tablet TAKE 1/2 TABLET TWICE DAILY (Patient not taking: Reported on 3/13/2025)    magnesium 30 mg Tab Take 1 tablet by mouth once daily.    metoprolol succinate  (TOPROL-XL) 50 MG 24 hr tablet TAKE 1/2 TABLET EVERY EVENING    oxyCODONE-acetaminophen (PERCOCET)  mg per tablet Take 1 tablet by mouth daily as needed for Pain. (Patient not taking: Reported on 3/13/2025)    pantoprazole (PROTONIX) 40 MG tablet TAKE 1 TABLET EVERY MORNING    potassium chloride SA (K-DUR,KLOR-CON) 20 MEQ tablet Take 20 mEq by mouth once daily.    spironolactone (ALDACTONE) 25 MG tablet TAKE 1 TABLET EVERY DAY    torsemide (DEMADEX) 10 MG Tab Take 1 tablet (10 mg total) by mouth once daily.    vit C/E/Zn/coppr/lutein/zeaxan (PRESERVISION AREDS-2 ORAL) Take 1 capsule by mouth once daily.    [DISCONTINUED] LORazepam (ATIVAN) 1 MG tablet Take 1 tablet by mouth in the evening     Family History       Problem Relation (Age of Onset)    Cancer Daughter    Diabetes Maternal Grandmother    Hypertension Mother    Neuropathy Mother, Father    Stroke Mother          Tobacco Use    Smoking status: Former     Current packs/day: 0.00     Types: Cigarettes     Quit date: 3/23/2012     Years since quittin.9    Smokeless tobacco: Never   Substance and Sexual Activity    Alcohol use: Not Currently     Comment: occasional    Drug use: No    Sexual activity: Not on file     Review of Systems   Constitutional:  Negative for chills and fever.   HENT:  Negative for congestion and sore throat.    Eyes:  Negative for visual disturbance.   Respiratory:  Positive for shortness of breath.    Cardiovascular:  Positive for leg swelling. Negative for chest pain.   Gastrointestinal:  Negative for abdominal pain, constipation, diarrhea, nausea and vomiting.   Genitourinary:  Negative for difficulty urinating and hematuria.   Musculoskeletal:  Positive for myalgias.   Skin:  Negative for wound.   Neurological:  Negative for syncope.   Psychiatric/Behavioral:  Negative for confusion.      Objective:     Vital Signs (Most Recent):  Temp: 98.5 °F (36.9 °C) (25 1524)  Pulse: 95 (25 1524)  Resp: 20 (25  "1524)  BP: 131/79 (03/13/25 1524)  SpO2: 98 % (03/13/25 1524) Vital Signs (24h Range):  Temp:  [98.5 °F (36.9 °C)-98.7 °F (37.1 °C)] 98.5 °F (36.9 °C)  Pulse:  [95] 95  Resp:  [20] 20  SpO2:  [98 %] 98 %  BP: (118-131)/(66-79) 131/79     Weight: 106.6 kg (235 lb)  Body mass index is 34.7 kg/m².     Physical Exam  Vitals reviewed.   Constitutional:       General: She is not in acute distress.  HENT:      Head: Normocephalic and atraumatic.      Nose: Nose normal.      Mouth/Throat:      Mouth: Mucous membranes are moist.   Eyes:      Conjunctiva/sclera: Conjunctivae normal.   Cardiovascular:      Rate and Rhythm: Normal rate and regular rhythm.      Pulses:           Dorsalis pedis pulses are 2+ on the right side and 2+ on the left side.   Pulmonary:      Effort: Pulmonary effort is normal. No respiratory distress.      Breath sounds: Normal breath sounds.   Abdominal:      General: Bowel sounds are normal.      Palpations: Abdomen is soft.      Tenderness: There is no abdominal tenderness.   Musculoskeletal:         General: Normal range of motion.      Cervical back: Normal range of motion.      Comments: BLE edema R>L   Skin:     General: Skin is warm and dry.      Coloration: Skin is mottled (BLE).   Neurological:      Mental Status: She is alert and oriented to person, place, and time.   Psychiatric:         Mood and Affect: Mood normal.                Significant Labs: All pertinent labs within the past 24 hours have been reviewed.  CBC:   Recent Labs   Lab 03/13/25  1536   WBC 8.14   HGB 12.9   HCT 39.9        CMP:   Recent Labs   Lab 03/13/25  1050 03/13/25  1536    136   K 4.5 4.2    102   CO2 28 25   GLU 82 104   BUN 15 13   CREATININE 0.7 0.8   CALCIUM 9.9 9.2   PROT  --  7.1   ALBUMIN  --  4.3   BILITOT  --  0.3   ALKPHOS  --  43*   AST  --  20   ALT  --  19   ANIONGAP 7* 9     Coagulation: No results for input(s): "PT", "INR", "APTT" in the last 48 hours.  Troponin:   Recent Labs   Lab " "03/13/25  1536 03/13/25  1731   TROPONINIHS 8.7 9.2     TSH:   Recent Labs   Lab 01/09/25  0855   TSH 2.228     Urine Studies: No results for input(s): "COLORU", "APPEARANCEUA", "PHUR", "SPECGRAV", "PROTEINUA", "GLUCUA", "KETONESU", "BILIRUBINUA", "OCCULTUA", "NITRITE", "UROBILINOGEN", "LEUKOCYTESUR", "RBCUA", "WBCUA", "BACTERIA", "SQUAMEPITHEL", "HYALINECASTS" in the last 48 hours.    Invalid input(s): "WRIGHTSUR"    Significant Imaging: I have reviewed all pertinent imaging results/findings within the past 24 hours.  "

## 2025-03-14 NOTE — SUBJECTIVE & OBJECTIVE
Interval History:     Review of Systems   Constitutional:  Negative for chills and fever.   HENT:  Negative for congestion and sore throat.    Eyes:  Negative for visual disturbance.   Respiratory:  Positive for shortness of breath.    Cardiovascular:  Positive for leg swelling. Negative for chest pain.   Gastrointestinal:  Negative for abdominal pain, constipation, diarrhea, nausea and vomiting.   Genitourinary:  Negative for difficulty urinating and hematuria.   Musculoskeletal:  Positive for myalgias.   Skin:  Negative for wound.   Neurological:  Negative for syncope.   Psychiatric/Behavioral:  Negative for confusion.      Objective:     Vital Signs (Most Recent):  Temp: 98.2 °F (36.8 °C) (03/14/25 0523)  Pulse: 63 (03/14/25 0723)  Resp: 18 (03/14/25 0723)  BP: (!) 167/72 (03/14/25 0523)  SpO2: 96 % (03/14/25 0723) Vital Signs (24h Range):  Temp:  [98.2 °F (36.8 °C)-98.7 °F (37.1 °C)] 98.2 °F (36.8 °C)  Pulse:  [60-95] 63  Resp:  [17-20] 18  SpO2:  [96 %-98 %] 96 %  BP: (118-167)/(66-79) 167/72     Weight: 106.6 kg (235 lb)  Body mass index is 34.7 kg/m².  No intake or output data in the 24 hours ending 03/14/25 0913      Physical Exam  Vitals reviewed.   Constitutional:       General: She is not in acute distress.  HENT:      Head: Normocephalic and atraumatic.      Nose: Nose normal.      Mouth/Throat:      Mouth: Mucous membranes are moist.   Eyes:      Conjunctiva/sclera: Conjunctivae normal.   Cardiovascular:      Rate and Rhythm: Normal rate and regular rhythm.      Pulses:           Dorsalis pedis pulses are 2+ on the right side and 2+ on the left side.   Pulmonary:      Effort: Pulmonary effort is normal. No respiratory distress.      Breath sounds: Normal breath sounds.   Abdominal:      General: Bowel sounds are normal.      Palpations: Abdomen is soft.      Tenderness: There is no abdominal tenderness.   Musculoskeletal:         General: Normal range of motion.      Cervical back: Normal range of  "motion.      Comments: BLE edema R>L   Skin:     General: Skin is warm and dry.      Coloration: Skin is mottled (BLE).   Neurological:      Mental Status: She is alert and oriented to person, place, and time.   Psychiatric:         Mood and Affect: Mood normal.               Significant Labs: All pertinent labs within the past 24 hours have been reviewed.  CBC:   Recent Labs   Lab 03/13/25  1536 03/14/25  0711   WBC 8.14 6.74   HGB 12.9 12.2   HCT 39.9 38.0    241     CMP:   Recent Labs   Lab 03/13/25  1050 03/13/25  1536    136   K 4.5 4.2    102   CO2 28 25   GLU 82 104   BUN 15 13   CREATININE 0.7 0.8   CALCIUM 9.9 9.2   PROT  --  7.1   ALBUMIN  --  4.3   BILITOT  --  0.3   ALKPHOS  --  43*   AST  --  20   ALT  --  19   ANIONGAP 7* 9     Lactic Acid: No results for input(s): "LACTATE" in the last 48 hours.  Troponin:   Recent Labs   Lab 03/13/25  1536 03/13/25  1731 03/14/25  0711   TROPONINIHS 8.7 9.2 8.8     TSH:   Recent Labs   Lab 01/09/25  0855   TSH 2.228     Urine Studies: No results for input(s): "COLORU", "APPEARANCEUA", "PHUR", "SPECGRAV", "PROTEINUA", "GLUCUA", "KETONESU", "BILIRUBINUA", "OCCULTUA", "NITRITE", "UROBILINOGEN", "LEUKOCYTESUR", "RBCUA", "WBCUA", "BACTERIA", "SQUAMEPITHEL", "HYALINECASTS" in the last 48 hours.    Invalid input(s): "WRIGHTSUR"  Microbiology Results (last 7 days)       ** No results found for the last 168 hours. **          Significant Imaging:   ECHO: Pending.    B/L Lower extremities venous doppler scan:  Nonocclusive DVT involving the right popliteal vein, similar to the reference exam.  Negative for left lower extremity DVT.    CTA Chest:  Positive for pulmonary emboli involving right middle lobe and right lower lobe as described above.  Attempts are actively being made to contact the ordering provider Dr. Badillo.  The patient should be sent to the ED for further evaluation and treatment, and the ED was notified at the time of this dictation.  Aortic " and coronary atherosclerosis.

## 2025-03-14 NOTE — PLAN OF CARE
Dr Green will have his office call the pt to schedule a follow up appt for next week.      03/14/25 1213   Post-Acute Status   Hospital Resources/Appts/Education Provided Appointment suggestion unavailable

## 2025-03-14 NOTE — TELEPHONE ENCOUNTER
----- Message from Aviasales Quintero sent at 3/14/2025  8:21 AM CDT -----  Type: Needs Medical AdviceWho Called:  Griffin Call Back Number: [phone] Information: princess states she has been in [facility] [address] since 2:00 yesterday because no rooms and wants sharp to call hospital and release her , please call to further assist thank you

## 2025-03-14 NOTE — CLINICAL REVIEW
[facility] Hematology/Oncology Physician Note:    Patient presented to ER at [facility] with SOB and found to have DVT and pulmonary emboli. She had recently discontinued her eliquis in early Feb 2025.       She has been boarded in ER for the past 24hrs and is breathing ok on RA. She is anxious to go home.    I spoke to Dr Mclean with the hospitalist service and they are going to send her home with loading dose eliquis. She will need eliquis lifelong at this point.     I will get her back in to hematology clinic within 2-3 weeks

## 2025-03-14 NOTE — DISCHARGE SUMMARY
[facility] - Emergency Dept  Hospital Medicine  Discharge Summary      Patient Name: Marisela Eagle  MRN: [id number]  RADHA: [id number]  Patient Class: OP- Observation  Admission Date: 3/13/2025  Hospital Length of Stay: 0 days  Discharge Date and Time:  03/14/2025 12:24 PM  Attending Physician: Vincenzo Mclean MD   Discharging Provider: Vincenzo Mclean MD  Primary Care Provider: Pawel Badillo III, MD    Primary Care Team: Networked reference to record PCT     HPI:   79-year-old female presented to ED for eval s/p abnormal CT chest.  Patient reported she has been short of breath for about a week.  Denies chest pain.  Denies abdominal pain, nausea, vomiting, changes in bowel habits.  Denies fever, chills, recent illness.  Denies syncope.  She does have some lower extremity edema.  Patient does have a history of DVT and PE, she was on Eliquis outpatient up until a few weeks ago when she took herself off of the Eliquis.  Patient follows with Hematology outpatient.  Patient was evaluated by her PCP earlier today, CTA chest was ordered, found to have pulmonary emboli involving right middle lobe and right lower lobe with no compelling evidence of right heart strain.  In ED, also found to have nonocclusive DVT involving the right popliteal vein.  Patient given Lovenox in ED. Admit to hospital medicine for further eval.    * No surgery found *      Hospital Course:     Patient admitted to Hospital Medicine.  Patient was initiated on Lovenox up cutaneous injection treatments.  Case discussed with patient's hematologist Dr. Green who recommended to resume Eliquis therapy as directed.  Patient is very keen on discharging home immediately.  Patient denies any chest pain or shortness of breath or dyspnea on exertion.  Patient feels ready to go home.  Dr. Green's office will call patient for appointment. Patient was deemed appropriate for discharge.  At the time of discharge, the plan of care was discussed  with patient/family, who were agreeable and amenable.   Bleeding risks related to oral anticoagulation therapy and fall precautions addressed.  Patient not requiring any supplemental oxygen.  All medications were verbally reviewed and discussed with the patient/family.  They endorsed understanding and compliance.  Informed them that these changes will be available on their discharge paperwork, as well.  Outpatient follow-ups were scheduled, or if unable to be scheduled ambulatory referrals were placed, and ER return precautions were given.  All questions were answered to the patient/family's satisfaction.  she was subsequently discharged in stable condition. Follow-up with Pawel Badillo III, MD next week. For worsening symptoms, chest pain, shortness of breath, increased abdominal pain, high grade fever, stroke or stroke like symptoms, immediately go to the nearest Emergency Room or call 911 as soon as possible.       Goals of Care Treatment Preferences:  Code Status: Full Code         Consults:   Consults (From admission, onward)          Status Ordering Provider     Inpatient consult to Hematology/Oncology  Once        Provider:  Dennis Alves MD Acknowledged DERBINS, PAMELA M            Assessment & Plan  Acute pulmonary embolism without acute cor pulmonale  Recently off outpatient anticoagulation, follows outpatient with hematology  Monitor coags, serial troponins, daily CBC  EKG PRN  Echo  FD lovenox  Tele monitoring  Monitor for s/s bleeding  Monitor O2 saturation  Consult hematology  Hypothyroid  Resume home levothyroxine    Acute deep vein thrombosis (DVT) of popliteal vein of right lower extremity  See above  No SCDs    Final Active Diagnoses:    Diagnosis Date Noted POA    PRINCIPAL PROBLEM:  Acute pulmonary embolism without acute cor pulmonale [I26.99] 03/13/2025 Yes    Acute deep vein thrombosis (DVT) of popliteal vein of right lower extremity [I82.431] 06/06/2024 Yes    Hypothyroid [E03.9]  03/27/2012 Yes      Problems Resolved During this Admission:       Discharged Condition: good    Disposition: Home or Self Care    Follow Up:   Follow-up Information       Pawel Badillo III, MD Follow up in 1 week(s).    Specialty: Family Medicine  Contact information:  [address]  [address]  [place] [address]  [phone]               Rayray Green MD Follow up in 1 week(s).    Specialties: Hematology, Hematology and Oncology, Oncology  Why: Dr. Hernandez office will get in touch with you  Contact information:  [address]  [address]  [place] [address]  [phone]                           Patient Instructions:      Diet Cardiac     Notify your health care provider if you experience any of the following:  temperature >100.4     Notify your health care provider if you experience any of the following:  persistent nausea and vomiting or diarrhea     Notify your health care provider if you experience any of the following:  severe uncontrolled pain     Notify your health care provider if you experience any of the following:  redness, tenderness, or signs of infection (pain, swelling, redness, odor or green/yellow discharge around incision site)     Notify your health care provider if you experience any of the following:  difficulty breathing or increased cough     Notify your health care provider if you experience any of the following:  severe persistent headache     Notify your health care provider if you experience any of the following:  worsening rash     Notify your health care provider if you experience any of the following:  persistent dizziness, light-headedness, or visual disturbances     Notify your health care provider if you experience any of the following:  increased confusion or weakness     Activity as tolerated   Order Comments: Fall precautions       Significant Diagnostic Studies: Labs: CMP   Recent Labs   Lab 03/13/25  1050 03/13/25  1536 03/14/25  0711    136 141   K 4.5  "4.2 4.0    102 108   CO2 28 25 26   GLU 82 104 84   BUN 15 13 12   CREATININE 0.7 0.8 0.7   CALCIUM 9.9 9.2 8.9   PROT  --  7.1 6.4   ALBUMIN  --  4.3 3.9   BILITOT  --  0.3 0.4   ALKPHOS  --  43* 35*   AST  --  20 17   ALT  --  19 18   ANIONGAP 7* 9 7*   , CBC   Recent Labs   Lab 03/13/25  1536 03/14/25  0711   WBC 8.14 6.74   HGB 12.9 12.2   HCT 39.9 38.0    241   , INR   Lab Results   Component Value Date    INR 1.0 03/14/2025    INR 0.9 06/06/2024    INR 1.1 09/17/2019   , Lipid Panel   Lab Results   Component Value Date    CHOL 224 (H) 01/09/2025    HDL 65 01/09/2025    LDLCALC 141.4 01/09/2025    TRIG 88 01/09/2025    CHOLHDL 29.0 01/09/2025   , and Troponin No results for input(s): "TROPONINI" in the last 168 hours.    Pending Diagnostic Studies:       Procedure Component Value Units Date/Time    Echo [[id number]]  (Abnormal) Resulted: 03/14/25 1008    Order Status: Sent Lab Status: In process Updated: 03/14/25 1008     IVC diameter 1.40 cm      TDI SEPTAL 0.05 m/s      MV valve area p 1/2 method 2.00 cm2      MV stenosis pressure 1/2 time 110.00 ms      Ascending aorta 3.70 cm      Sinus 3.40 cm      Ao root annulus 3.50 cm      Pulmonary Valve Mean Velocity 0.60 m/s      PV peak gradient 3 mmHg      PV PEAK VELOCITY 0.87 m/s      Left Ventricular Outflow Tract Mean Gradient 1.00 mmHg      Left Ventricular Outflow Tract Mean Velocity 0.52 cm/s      LVOT peak shakeel 0.8 m/s      MV VTI 28.3 cm      MV valve area by continuity eq 1.87 cm2      MV peak gradient 3 mmHg      MV mean gradient 2 mmHg      BERNARDO by Velocity Ratio 1.9 cm²      AV index (prosthetic) 0.65     AV Velocity Ratio 0.67     AV valve area 1.8 cm²      LVOT peak VTI 18.7 cm      Ao VTI 28.8 cm      Ao peak shakeel 1.2 m/s      AV peak gradient 6 mmHg      AV mean gradient 3 mmHg      RA area length vol 30.80 mL      TAPSE 2.15 cm      MV Peak A Shakeel 0.84 m/s      RV S' 12.70 cm/s      E/E' ratio 10 m/s      LV mass 180.0 g      LV LATERAL " E/E' RATIO 7.3 m/s      LV SEPTAL E/E' RATIO 14.6 m/s      E wave deceleration time 217 msec      E/A ratio 0.87     TDI LATERAL 0.10 m/s      MV Peak E Shakeel 0.73 m/s      LV Mass Index 81.4 g/m2      PW 1.1 cm      Left Ventricle Relative Wall Thickness 0.50 cm      FS 34.1 %      LVOT area 2.8 cm2      LVOT diameter 1.9 cm      IVS 1.2 cm      Left Ventricular End Diastolic Volume by Teichholz Method 87.69 mL      Left Ventricular End Systolic Volume by Teichholz Method 32.21 mL      LV EDV A4C 108.00 mL      LV EDV A2C [id number] mL      LV Diastolic Volume Index 39.82 mL/m2      LV ESV A4C 34.20 mL      LV Diastolic Volume 88 mL      LV ESV A2C 114.00 mL      LVIDs 2.9 cm      LV Systolic Volume Index 14.5 mL/m2      LV Systolic Volume 32 mL      LVIDd 4.4 cm      LVOT stroke volume 53.0 cm3      A4C EF 64 %      A2C EF 47 %      Long's Biplane MOD Ejection Fraction 59 %      Mean e' 0.08 m/s      ZLVIDS -3.70     ZLVIDD -5.51     LA area A4C 15.00 cm2      LA area A2C 31.00 cm2      AORTIC VALVE CUSP SEPERATION 1.80 cm      BSA 2.28 m2     Troponin I High Sensitivity [[id number]]     Order Status: Sent Lab Status: No result     Specimen: Blood            Medications:  Reconciled Home Medications:      Medication List        START taking these medications      * apixaban 5 mg Tab  Commonly known as: ELIQUIS  Take 2 tablets (10 mg total) by mouth 2 (two) times daily. for 7 days     * apixaban 5 mg Tab  Commonly known as: ELIQUIS  Take 1 tablet (5 mg total) by mouth 2 (two) times daily.  Start taking on: March 22, 2025           * This list has 2 medication(s) that are the same as other medications prescribed for you. Read the directions carefully, and ask your doctor or other care provider to review them with you.                CHANGE how you take these medications      levothyroxine 125 MCG tablet  Commonly known as: SYNTHROID  TAKE 1 TABLET EVERY DAY BEFORE BREAKFAST  What changed:   how to take  this  additional instructions     metoprolol succinate 50 MG 24 hr tablet  Commonly known as: TOPROL-XL  TAKE 1/2 TABLET EVERY EVENING  What changed: See the new instructions.            CONTINUE taking these medications      amitriptyline 25 MG tablet  Commonly known as: ELAVIL  Take 1 tablet (25 mg total) by mouth every evening.     amLODIPine 5 MG tablet  Commonly known as: NORVASC  Take 1 tablet (5 mg total) by mouth once daily.     cevimeline 30 mg capsule  Commonly known as: EVOXAC  Take 1 capsule (30 mg total) by mouth 3 (three) times daily.     cholecalciferol (vitamin D3) 125 mcg (5,000 unit) capsule  Take 5,000 Units by mouth once daily.     CRANBERRY CONCENTRATE ORAL  Take 1 tablet by mouth Daily.     cyanocobalamin 1000 MCG tablet  Commonly known as: VITAMIN B-12  Take 5,000 mcg by mouth once daily.     DULoxetine 60 MG capsule  Commonly known as: CYMBALTA  Take 1 capsule (60 mg total) by mouth once daily.     famotidine 20 MG tablet  Commonly known as: PEPCID  TAKE 1 TABLET EVERY EVENING     LORazepam 1 MG tablet  Commonly known as: ATIVAN  Take 1 tablet (1 mg total) by mouth every evening.     magnesium 30 mg Tab  Take 1 tablet by mouth once daily.     MILK OF MAGNESIA 400 mg/5 mL Susp  Generic drug: magnesium hydroxide 400 mg/5 ml  Take 30 mLs by mouth daily as needed.     pantoprazole 40 MG tablet  Commonly known as: PROTONIX  TAKE 1 TABLET EVERY MORNING     potassium chloride SA 20 MEQ tablet  Commonly known as: K-DUR,KLOR-CON  Take 20 mEq by mouth once daily.     PRESERVISION AREDS-2 ORAL  Take 1 capsule by mouth once daily.     spironolactone 25 MG tablet  Commonly known as: ALDACTONE  TAKE 1 TABLET EVERY DAY     Systane GeL 0.3 % Gel  Generic drug: artificial tears(hypromellose)(GENTEAL/SUSTANE)  Place 1 drop into both eyes as needed.     torsemide 10 MG Tab  Commonly known as: DEMADEX  Take 1 tablet (10 mg total) by mouth once daily.            STOP taking these medications       oxyCODONE-acetaminophen  mg per tablet  Commonly known as: PERCOCET            ASK your doctor about these medications      ezetimibe 10 mg tablet  Commonly known as: ZETIA  Take 1 tablet (10 mg total) by mouth once daily.     gabapentin 800 MG tablet  Commonly known as: NEURONTIN  TAKE 1 TABLET THREE TIMES DAILY     losartan 100 MG tablet  Commonly known as: COZAAR  TAKE 1/2 TABLET TWICE DAILY              Indwelling Lines/Drains at time of discharge:   Lines/Drains/Airways       None                   Time spent on the discharge of patient: 35 minutes         Vincenzo Mclean MD  Department of Hospital Medicine  [facility] - Emergency Dept

## 2025-03-14 NOTE — HPI
79-year-old female presented to ED for eval s/p abnormal CT chest.  Patient reported she has been short of breath for about a week.  Denies chest pain.  Denies abdominal pain, nausea, vomiting, changes in bowel habits.  Denies fever, chills, recent illness.  Denies syncope.  She does have some lower extremity edema.  Patient does have a history of DVT and PE, she was on Eliquis outpatient up until a few weeks ago when she took herself off of the Eliquis.  Patient follows with Hematology outpatient.  Patient was evaluated by her PCP earlier today, CTA chest was ordered, found to have pulmonary emboli involving right middle lobe and right lower lobe with no compelling evidence of right heart strain.  In ED, also found to have nonocclusive DVT involving the right popliteal vein.  Patient given Lovenox in ED. Admit to hospital medicine for further eval.

## 2025-03-14 NOTE — ASSESSMENT & PLAN NOTE
Recently off outpatient anticoagulation, follows outpatient with hematology  Monitor coags, serial troponins, daily CBC  EKG PRN  Echo  FD lovenox  Tele monitoring  Monitor for s/s bleeding  Monitor O2 saturation  Consult hematology

## 2025-03-14 NOTE — PLAN OF CARE
[facility] - Emergency Dept  Initial Discharge Assessment       Primary Care Provider: Pawel Badillo III, MD    Admission Diagnosis: Acute pulmonary embolism without acute cor pulmonale, unspecified pulmonary embolism type [I26.99]    Admission Date: 3/13/2025  Expected Discharge Date: 3/14/2025     completed discharge assessment with pt at bedside. Pt AAOx4s. Pt lives at home with her . Demographics, PCP, and insurance verified. No home health. No dialysis. Pt completes ADLs without assistance. Pt to discharge home via family transport. Pt has no other needs to be addressed at this time.     Transition of Care Barriers: None    Payor: [employer] MANAGED MEDICARE / Plan: [employer] MEDICARE HMO / Product Type: Capitation /     Extended Emergency Contact Information  Primary Emergency Contact: Ignacia Eagle  Home Phone: [phone]  Mobile Phone: [phone]  Relation: Daughter   needed? No  Secondary Emergency Contact: Judah Eagle  Home Phone: [phone]  Mobile Phone: [phone]  Relation: Son    Discharge Plan A: Home  Discharge Plan B: Home with [facility] - [facility] [address]  [address] [address]  Phone: [phone] Fax: [phone]    [address] [place] [address]  [address] [place] [address]  Phone: [phone] Fax: [phone]      Initial Assessment (most recent)       Adult Discharge Assessment - 03/14/25 1236          Discharge Assessment    Assessment Type Discharge Planning Assessment     Confirmed/corrected address, phone number and insurance Yes     Confirmed Demographics Correct on Facesheet     Source of Information patient     When was your last doctors appointment? 03/13/25     Does patient/caregiver understand observation status Yes     Communicated SANDOR with patient/caregiver Yes     Reason For Admission Acute  pulmonary embolism without acute cor pulmonale     People in Home spouse     Facility Arrived From: Home     Do you expect to return to your current living situation? Yes     Do you have help at home or someone to help you manage your care at home? Yes     Who are your caregiver(s) and their phone number(s)? Judah Eagle and Ignacia Eagle: daughter and son     Prior to hospitilization cognitive status: Alert/Oriented     Current cognitive status: Alert/Oriented     Walking or Climbing Stairs Difficulty yes     Walking or Climbing Stairs ambulation difficulty, requires equipment;stair climbing difficulty, requires equipment     Mobility Management Quad Cane     Dressing/Bathing Difficulty no     Home Accessibility wheelchair accessible     Equipment Currently Used at Home blood pressure machine;cane, quad;bath bench     Readmission within 30 days? No     Patient currently being followed by outpatient case management? No     Do you currently have service(s) that help you manage your care at home? No     Do you take prescription medications? Yes     Do you have prescription coverage? Yes     Coverage [employer] MANAGED MEDICARE     Do you have any problems affording any of your prescribed medications? No     Is the patient taking medications as prescribed? yes     Who is going to help you get home at discharge? Daughter in law     How do you get to doctors appointments? car, drives self     Are you on dialysis? No     Do you take coumadin? No     Discharge Plan A Home     Discharge Plan B Home with family     DME Needed Upon Discharge  none     Transition of Care Barriers None

## 2025-03-14 NOTE — PHARMACY MED REC
"              .        Admission Medication History     The home medication history was taken by Yareli Good.    You may go to "Admission" then "Reconcile Home Medications" tabs to review and/or act upon these items.     The home medication list has been updated by the Pharmacy department.   Please read ALL comments highlighted in yellow.   Please address this information as you see fit.    Feel free to contact us if you have any questions or require assistance.        Medications listed below were obtained from: Patient/family and Analytic software- [employer]  Medications Ordered Prior to Encounter[1]    Potential issues to be addressed PRIOR TO DISCHARGE  Patient reported not taking the following medications: (Zetia, Gabapentin, Losartan & Percocet). These medications remain on the home medication list. Please address accordingly.     Yareli Good  EXT 1923           [1]   Current Facility-Administered Medications on File Prior to Encounter   Medication Dose Route Frequency Provider Last Rate Last Admin    [COMPLETED] iohexoL (OMNIPAQUE 350) injection 100 mL  100 mL Intravenous ONCE PRN Pawel Badillo III, MD   100 mL at 03/13/25 0740     Current Outpatient Medications on File Prior to Encounter   Medication Sig Dispense Refill    amitriptyline (ELAVIL) 25 MG tablet Take 1 tablet (25 mg total) by mouth every evening. 90 tablet 1    amLODIPine (NORVASC) 5 MG tablet Take 1 tablet (5 mg total) by mouth once daily. 90 tablet 3    artificial tears,hypromellose,,GENTEAL/SUSTANE, (SYSTANE GEL) 0.3 % Gel Place 1 drop into both eyes as needed.      cholecalciferol, vitamin D3, 5,000 unit capsule Take 5,000 Units by mouth once daily.      cranberry fruit extract (CRANBERRY CONCENTRATE ORAL) Take 1 tablet by mouth Daily.      cyanocobalamin (VITAMIN B-12) 1000 MCG tablet Take 5,000 mcg by mouth once daily.      DULoxetine (CYMBALTA) 60 MG capsule Take 1 capsule (60 mg total) by mouth once daily. 90 capsule 1    " famotidine (PEPCID) 20 MG tablet TAKE 1 TABLET EVERY EVENING 90 tablet 3    levothyroxine (SYNTHROID) 125 MCG tablet TAKE 1 TABLET EVERY DAY BEFORE BREAKFAST (Patient taking differently: Take 125 mcg by mouth before breakfast. TAKE 1 TABLET EVERY DAY BEFORE BREAKFAST) 90 tablet 3    LORazepam (ATIVAN) 1 MG tablet Take 1 tablet (1 mg total) by mouth every evening. 30 tablet 2    magnesium 30 mg Tab Take 1 tablet by mouth once daily.      magnesium hydroxide 400 mg/5 ml (MILK OF MAGNESIA) 400 mg/5 mL Susp Take 30 mLs by mouth daily as needed.      metoprolol succinate (TOPROL-XL) 50 MG 24 hr tablet TAKE 1/2 TABLET EVERY EVENING (Patient taking differently: Take 25 mg by mouth 2 (two) times daily.) 45 tablet 3    pantoprazole (PROTONIX) 40 MG tablet TAKE 1 TABLET EVERY MORNING 90 tablet 2    spironolactone (ALDACTONE) 25 MG tablet TAKE 1 TABLET EVERY DAY (Patient taking differently: Take 25 mg by mouth once daily.) 90 tablet 3    vit C/E/Zn/coppr/lutein/zeaxan (PRESERVISION AREDS-2 ORAL) Take 1 capsule by mouth once daily.      cevimeline (EVOXAC) 30 mg capsule Take 1 capsule (30 mg total) by mouth 3 (three) times daily. 270 capsule 3    ezetimibe (ZETIA) 10 mg tablet Take 1 tablet (10 mg total) by mouth once daily. (Patient not taking: Reported on 3/13/2025) 90 tablet 3    gabapentin (NEURONTIN) 800 MG tablet TAKE 1 TABLET THREE TIMES DAILY (Patient not taking: Reported on 3/13/2025) 270 tablet 3    losartan (COZAAR) 100 MG tablet TAKE 1/2 TABLET TWICE DAILY (Patient not taking: Reported on 3/13/2025) 90 tablet 3    oxyCODONE-acetaminophen (PERCOCET)  mg per tablet Take 1 tablet by mouth daily as needed for Pain. (Patient not taking: Reported on 3/13/2025)      potassium chloride SA (K-DUR,KLOR-CON) 20 MEQ tablet Take 20 mEq by mouth once daily.      torsemide (DEMADEX) 10 MG Tab Take 1 tablet (10 mg total) by mouth once daily. 90 tablet 3    [DISCONTINUED] LORazepam (ATIVAN) 1 MG tablet Take 1 tablet by mouth  in the evening 30 tablet 0

## 2025-03-14 NOTE — PLAN OF CARE
Dr Mclean spoke with Dr Green who is in agreement to the pt discharging home and restarting blood thinners.

## 2025-03-14 NOTE — H&P
[facility] - Emergency Dept  Hospital Medicine  History & Physical    Patient Name: Marisela Eagle  MRN: [id number]  Patient Class: OP- Observation  Admission Date: 3/13/2025  Attending Physician: Cam Beck MD   Primary Care Provider: Pawel Badillo III, MD         Patient information was obtained from patient, past medical records, and ER records.     Subjective:     Principal Problem:Acute pulmonary embolism without acute cor pulmonale    Chief Complaint:   Chief Complaint   Patient presents with    ABNORMAL CT CHEST     SOB X 1 WEEK        HPI: 79-year-old female presented to ED for eval s/p abnormal CT chest.  Patient reported she has been short of breath for about a week.  Denies chest pain.  Denies abdominal pain, nausea, vomiting, changes in bowel habits.  Denies fever, chills, recent illness.  Denies syncope.  She does have some lower extremity edema.  Patient does have a history of DVT and PE, she was on Eliquis outpatient up until a few weeks ago when she took herself off of the Eliquis.  Patient follows with Hematology outpatient.  Patient was evaluated by her PCP earlier today, CTA chest was ordered, found to have pulmonary emboli involving right middle lobe and right lower lobe with no compelling evidence of right heart strain.  In ED, also found to have nonocclusive DVT involving the right popliteal vein.  Patient given Lovenox in ED. Admit to hospital medicine for further eval.    Past Medical History:   Diagnosis Date    Abnormal stress test 2024    shortness of breath    Bilateral knee pain 07/2012    left worse, right knee painful even after partial replacement.    Bruises easily     Clot 1990's?    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)     no treatment needed    Hyperlipidemia     Hypertension     Migraines     Neuropathy     peripheral    Pulmonary embolus     dvt knee    Thyroid disease     hypothyroid, has nodules    Varicose veins        Past Surgical History:   Procedure Laterality Date    ADENOIDECTOMY      APPENDECTOMY      BILATERAL SALPINGOOPHORECTOMY       SECTION      COLONOSCOPY  2012    HIP ARTHROPLASTY      HYSTERECTOMY      with BSO    INJECTION OF ANESTHETIC AGENT AROUND MEDIAL BRANCH NERVES INNERVATING LUMBAR FACET JOINT Bilateral 2025    Procedure: Block-nerve-medial branch-lumbar;  Surgeon: Geoffrey Caal MD;  Location: [facility] OR;  Service: Pain Management;  Laterality: Bilateral;    INJECTION OF ANESTHETIC AGENT AROUND MEDIAL BRANCH NERVES INNERVATING LUMBAR FACET JOINT Bilateral 2025    Procedure: Block-nerve-medial branch-lumbar level 1;  Surgeon: Geoffrey Caal MD;  Location: [facility] OR;  Service: Pain Management;  Laterality: Bilateral;    JOINT REPLACEMENT  2012    rt unilateral knee arthroplasty. Took Coumadin x 6 weeks    KNEE ARTHROPLASTY Bilateral     KNEE ARTHROSCOPY  2010    right    LEFT HEART CATHETERIZATION Left 2024    Procedure: Left heart cath;  Surgeon: Cooper Canchola MD;  Location: [facility] CATH/EP LAB;  Service: Cardiology;  Laterality: Left;    RADIOFREQUENCY ABLATION OF LUMBAR MEDIAL BRANCH NERVE AT SINGLE LEVEL Bilateral 2025    Procedure: Radiofrequency Ablation, Nerve, Spinal, Lumbar, Medial Branch, 1 Level;  Surgeon: Geoffrey Caal MD;  Location: [facility] PAIN MANAGEMENT;  Service: Pain Management;  Laterality: Bilateral;    TONSILLECTOMY         Review of patient's allergies indicates:  No Known Allergies    Current Facility-Administered Medications on File Prior to Encounter   Medication    [COMPLETED] iohexoL (OMNIPAQUE 350) injection 100 mL     Current  Outpatient Medications on File Prior to Encounter   Medication Sig    amitriptyline (ELAVIL) 25 MG tablet Take 1 tablet (25 mg total) by mouth every evening.    amLODIPine (NORVASC) 5 MG tablet Take 1 tablet (5 mg total) by mouth once daily.    artificial tears,hypromellose,,GENTEAL/SUSTANE, (SYSTANE GEL) 0.3 % Gel Place 1 drop into both eyes as needed.    cevimeline (EVOXAC) 30 mg capsule Take 1 capsule (30 mg total) by mouth 3 (three) times daily. (Patient not taking: Reported on 3/13/2025)    cholecalciferol, vitamin D3, 5,000 unit capsule Take 5,000 Units by mouth once daily.    cranberry fruit extract (CRANBERRY CONCENTRATE ORAL) Take 1 tablet by mouth Daily.    cyanocobalamin (VITAMIN B-12) 1000 MCG tablet Take 5,000 mcg by mouth once daily.    DULoxetine (CYMBALTA) 60 MG capsule Take 1 capsule (60 mg total) by mouth once daily.    ezetimibe (ZETIA) 10 mg tablet Take 1 tablet (10 mg total) by mouth once daily.    famotidine (PEPCID) 20 MG tablet TAKE 1 TABLET EVERY EVENING    gabapentin (NEURONTIN) 800 MG tablet TAKE 1 TABLET THREE TIMES DAILY    levothyroxine (SYNTHROID) 125 MCG tablet TAKE 1 TABLET EVERY DAY BEFORE BREAKFAST    LORazepam (ATIVAN) 1 MG tablet Take 1 tablet (1 mg total) by mouth every evening.    losartan (COZAAR) 100 MG tablet TAKE 1/2 TABLET TWICE DAILY (Patient not taking: Reported on 3/13/2025)    magnesium 30 mg Tab Take 1 tablet by mouth once daily.    metoprolol succinate (TOPROL-XL) 50 MG 24 hr tablet TAKE 1/2 TABLET EVERY EVENING    oxyCODONE-acetaminophen (PERCOCET)  mg per tablet Take 1 tablet by mouth daily as needed for Pain. (Patient not taking: Reported on 3/13/2025)    pantoprazole (PROTONIX) 40 MG tablet TAKE 1 TABLET EVERY MORNING    potassium chloride SA (K-DUR,KLOR-CON) 20 MEQ tablet Take 20 mEq by mouth once daily.    spironolactone (ALDACTONE) 25 MG tablet TAKE 1 TABLET EVERY DAY    torsemide (DEMADEX) 10 MG Tab Take 1 tablet (10 mg total) by mouth once daily.     vit C/E/Zn/coppr/lutein/zeaxan (PRESERVISION AREDS-2 ORAL) Take 1 capsule by mouth once daily.    [DISCONTINUED] LORazepam (ATIVAN) 1 MG tablet Take 1 tablet by mouth in the evening     Family History       Problem Relation (Age of Onset)    Cancer Daughter    Diabetes Maternal Grandmother    Hypertension Mother    Neuropathy Mother, Father    Stroke Mother          Tobacco Use    Smoking status: Former     Current packs/day: 0.00     Types: Cigarettes     Quit date: 3/23/2012     Years since quittin.9    Smokeless tobacco: Never   Substance and Sexual Activity    Alcohol use: Not Currently     Comment: occasional    Drug use: No    Sexual activity: Not on file     Review of Systems   Constitutional:  Negative for chills and fever.   HENT:  Negative for congestion and sore throat.    Eyes:  Negative for visual disturbance.   Respiratory:  Positive for shortness of breath.    Cardiovascular:  Positive for leg swelling. Negative for chest pain.   Gastrointestinal:  Negative for abdominal pain, constipation, diarrhea, nausea and vomiting.   Genitourinary:  Negative for difficulty urinating and hematuria.   Musculoskeletal:  Positive for myalgias.   Skin:  Negative for wound.   Neurological:  Negative for syncope.   Psychiatric/Behavioral:  Negative for confusion.      Objective:     Vital Signs (Most Recent):  Temp: 98.5 °F (36.9 °C) (25 1524)  Pulse: 95 (25 1524)  Resp: 20 (25 1524)  BP: 131/79 (25 1524)  SpO2: 98 % (25 1524) Vital Signs (24h Range):  Temp:  [98.5 °F (36.9 °C)-98.7 °F (37.1 °C)] 98.5 °F (36.9 °C)  Pulse:  [95] 95  Resp:  [20] 20  SpO2:  [98 %] 98 %  BP: (118-131)/(66-79) 131/79     Weight: 106.6 kg (235 lb)  Body mass index is 34.7 kg/m².     Physical Exam  Vitals reviewed.   Constitutional:       General: She is not in acute distress.  HENT:      Head: Normocephalic and atraumatic.      Nose: Nose normal.      Mouth/Throat:      Mouth: Mucous membranes are moist.  "  Eyes:      Conjunctiva/sclera: Conjunctivae normal.   Cardiovascular:      Rate and Rhythm: Normal rate and regular rhythm.      Pulses:           Dorsalis pedis pulses are 2+ on the right side and 2+ on the left side.   Pulmonary:      Effort: Pulmonary effort is normal. No respiratory distress.      Breath sounds: Normal breath sounds.   Abdominal:      General: Bowel sounds are normal.      Palpations: Abdomen is soft.      Tenderness: There is no abdominal tenderness.   Musculoskeletal:         General: Normal range of motion.      Cervical back: Normal range of motion.      Comments: BLE edema R>L   Skin:     General: Skin is warm and dry.      Coloration: Skin is mottled (BLE).   Neurological:      Mental Status: She is alert and oriented to person, place, and time.   Psychiatric:         Mood and Affect: Mood normal.                Significant Labs: All pertinent labs within the past 24 hours have been reviewed.  CBC:   Recent Labs   Lab 03/13/25  1536   WBC 8.14   HGB 12.9   HCT 39.9        CMP:   Recent Labs   Lab 03/13/25  1050 03/13/25  1536    136   K 4.5 4.2    102   CO2 28 25   GLU 82 104   BUN 15 13   CREATININE 0.7 0.8   CALCIUM 9.9 9.2   PROT  --  7.1   ALBUMIN  --  4.3   BILITOT  --  0.3   ALKPHOS  --  43*   AST  --  20   ALT  --  19   ANIONGAP 7* 9     Coagulation: No results for input(s): "PT", "INR", "APTT" in the last 48 hours.  Troponin:   Recent Labs   Lab 03/13/25  1536 03/13/25  1731   TROPONINIHS 8.7 9.2     TSH:   Recent Labs   Lab 01/09/25  0855   TSH 2.228     Urine Studies: No results for input(s): "COLORU", "APPEARANCEUA", "PHUR", "SPECGRAV", "PROTEINUA", "GLUCUA", "KETONESU", "BILIRUBINUA", "OCCULTUA", "NITRITE", "UROBILINOGEN", "LEUKOCYTESUR", "RBCUA", "WBCUA", "BACTERIA", "SQUAMEPITHEL", "HYALINECASTS" in the last 48 hours.    Invalid input(s): "WRIGHTSUR"    Significant Imaging: I have reviewed all pertinent imaging results/findings within the past 24 " hours.  Assessment/Plan:     * Acute pulmonary embolism without acute cor pulmonale  Recently off outpatient anticoagulation, follows outpatient with hematology  Monitor coags, serial troponins, daily CBC  EKG PRN  Echo  FD lovenox  Tele monitoring  Monitor for s/s bleeding  Monitor O2 saturation  Consult hematology    Acute deep vein thrombosis (DVT) of popliteal vein of right lower extremity  See above  No SCDs      Hypothyroid  Resume home levothyroxine        VTE Risk Mitigation (From admission, onward)           Ordered     enoxaparin injection 110 mg  Every 12 hours         03/13/25 2118     Reason for no Mechanical VTE Prophylaxis  Once        Question:  Reasons:  Answer:  Physician Provided (leave comment)  Comment:  DVT    03/13/25 2118     IP VTE HIGH RISK PATIENT  Once         03/13/25 2117                         On 03/13/2025, patient should be placed in hospital observation services under my care in collaboration with Dr. Beck.           Urmila Bailey, NP  Department of Hospital Medicine  [facility] - Emergency Dept

## 2025-03-14 NOTE — PLAN OF CARE
KATHERYN explained ZALDIVAR to pt. Pt verbalized understanding and signed the ZALDIVAR.  KATHERYN scanned ZALDIVAR into pt's .     03/14/25 1240   ZALDIVAR Message   Medicare Outpatient and Observation Notification regarding financial responsibility Given to patient/caregiver;Explained to patient/caregiver;Signed/date by patient/caregiver   Date ZALDIVAR was signed 03/14/25   Time ZALDIVAR was signed 3310

## 2025-03-15 PROBLEM — M35.00 SJOGREN'S SYNDROME: Status: ACTIVE | Noted: 2025-03-15

## 2025-03-15 PROBLEM — I73.00 RAYNAUD'S DISEASE WITHOUT GANGRENE: Status: ACTIVE | Noted: 2025-03-15

## 2025-03-15 PROBLEM — M34.1 CREST SYNDROME: Status: ACTIVE | Noted: 2025-03-15

## 2025-03-15 PROBLEM — Z86.718 HISTORY OF DVT (DEEP VEIN THROMBOSIS): Status: ACTIVE | Noted: 2025-03-15

## 2025-03-17 ENCOUNTER — TELEPHONE (OUTPATIENT)
Dept: FAMILY MEDICINE | Facility: CLINIC | Age: 80
End: 2025-03-17
Payer: MEDICARE

## 2025-03-17 NOTE — TELEPHONE ENCOUNTER
Copied from CRM #[id number]. Topic: General Inquiry - Patient Advice  >> Mar 12, 2025  3:15 PM Aracely wrote:  Pt asking for CT lungs put on chart    Viridiana left voicemail for patient to return call

## 2025-03-21 ENCOUNTER — TELEPHONE (OUTPATIENT)
Dept: CARDIOLOGY | Facility: CLINIC | Age: 80
End: 2025-03-21
Payer: MEDICARE

## 2025-03-21 NOTE — TELEPHONE ENCOUNTER
----- Message from Mona sent at 3/21/2025 11:12 AM CDT -----  Regarding: appt  Type:  Sooner Apoointment RequestName of Caller:ptWhen is the first available appointment?dept booked Symptoms:6m fu Best Call Back Number:[phone] Information: pt and her  rom both needs to be schedule for may. They want to be seen on the same day.  please call to discuss.

## 2025-03-28 ENCOUNTER — OFFICE VISIT (OUTPATIENT)
Dept: FAMILY MEDICINE | Facility: CLINIC | Age: 80
End: 2025-03-28
Payer: MEDICARE

## 2025-03-28 ENCOUNTER — LAB VISIT (OUTPATIENT)
Dept: LAB | Facility: HOSPITAL | Age: 80
End: 2025-03-28
Attending: INTERNAL MEDICINE
Payer: MEDICARE

## 2025-03-28 VITALS
WEIGHT: 239.75 LBS | DIASTOLIC BLOOD PRESSURE: 68 MMHG | BODY MASS INDEX: 35.51 KG/M2 | TEMPERATURE: 98 F | SYSTOLIC BLOOD PRESSURE: 112 MMHG | HEIGHT: 69 IN

## 2025-03-28 DIAGNOSIS — Z86.718 HISTORY OF DVT (DEEP VEIN THROMBOSIS): ICD-10-CM

## 2025-03-28 DIAGNOSIS — I26.99 PULMONARY EMBOLI: ICD-10-CM

## 2025-03-28 DIAGNOSIS — Z79.01 ANTICOAGULATED: ICD-10-CM

## 2025-03-28 DIAGNOSIS — I26.99 PULMONARY EMBOLISM, UNSPECIFIED CHRONICITY, UNSPECIFIED PULMONARY EMBOLISM TYPE, UNSPECIFIED WHETHER ACUTE COR PULMONALE PRESENT: ICD-10-CM

## 2025-03-28 DIAGNOSIS — M34.1 CREST SYNDROME: ICD-10-CM

## 2025-03-28 DIAGNOSIS — E78.5 HYPERLIPIDEMIA, UNSPECIFIED HYPERLIPIDEMIA TYPE: ICD-10-CM

## 2025-03-28 DIAGNOSIS — I82.431 ACUTE DEEP VEIN THROMBOSIS (DVT) OF POPLITEAL VEIN OF RIGHT LOWER EXTREMITY: ICD-10-CM

## 2025-03-28 DIAGNOSIS — I50.32 CHRONIC DIASTOLIC HEART FAILURE: ICD-10-CM

## 2025-03-28 DIAGNOSIS — I10 HYPERTENSION, ESSENTIAL: Primary | ICD-10-CM

## 2025-03-28 DIAGNOSIS — E03.9 HYPOTHYROIDISM, UNSPECIFIED TYPE: ICD-10-CM

## 2025-03-28 LAB
ABSOLUTE EOSINOPHIL (SMH): 0.13 K/UL
ABSOLUTE MONOCYTE (SMH): 0.88 K/UL (ref 0.3–1)
ABSOLUTE NEUTROPHIL COUNT (SMH): 4.2 K/UL (ref 1.8–7.7)
ALBUMIN SERPL-MCNC: 4.1 G/DL (ref 3.5–5.2)
ALP SERPL-CCNC: 36 UNIT/L (ref 55–135)
ALT SERPL-CCNC: 17 UNIT/L (ref 10–44)
ANION GAP (SMH): 7 MMOL/L (ref 8–16)
AST SERPL-CCNC: 18 UNIT/L (ref 10–40)
BASOPHILS # BLD AUTO: 0.03 K/UL
BASOPHILS NFR BLD AUTO: 0.4 %
BILIRUB SERPL-MCNC: 0.4 MG/DL (ref 0.1–1)
BUN SERPL-MCNC: 17 MG/DL (ref 8–23)
CALCIUM SERPL-MCNC: 9.4 MG/DL (ref 8.7–10.5)
CHLORIDE SERPL-SCNC: 103 MMOL/L (ref 95–110)
CO2 SERPL-SCNC: 27 MMOL/L (ref 23–29)
CREAT SERPL-MCNC: 0.7 MG/DL (ref 0.5–1.4)
ERYTHROCYTE [DISTWIDTH] IN BLOOD BY AUTOMATED COUNT: 18.3 % (ref 11.5–14.5)
GFR SERPLBLD CREATININE-BSD FMLA CKD-EPI: >60 ML/MIN/1.73/M2
GLUCOSE SERPL-MCNC: 79 MG/DL (ref 70–110)
HCT VFR BLD AUTO: 38.7 % (ref 37–48.5)
HGB BLD-MCNC: 12.4 GM/DL (ref 12–16)
IMM GRANULOCYTES # BLD AUTO: 0.03 K/UL (ref 0–0.04)
IMM GRANULOCYTES NFR BLD AUTO: 0.4 % (ref 0–0.5)
LYMPHOCYTES # BLD AUTO: 1.97 K/UL (ref 1–4.8)
MCH RBC QN AUTO: 27.8 PG (ref 27–31)
MCHC RBC AUTO-ENTMCNC: 32 G/DL (ref 32–36)
MCV RBC AUTO: 87 FL (ref 82–98)
NUCLEATED RBC (/100WBC) (SMH): 0 /100 WBC
PLATELET # BLD AUTO: 245 K/UL (ref 150–450)
PMV BLD AUTO: 11.6 FL (ref 9.2–12.9)
POTASSIUM SERPL-SCNC: 4.6 MMOL/L (ref 3.5–5.1)
PROT SERPL-MCNC: 6.9 GM/DL (ref 6–8.4)
RBC # BLD AUTO: 4.46 M/UL (ref 4–5.4)
RELATIVE EOSINOPHIL (SMH): 1.8 % (ref 0–8)
RELATIVE LYMPHOCYTE (SMH): 27.1 % (ref 18–48)
RELATIVE MONOCYTE (SMH): 12.1 % (ref 4–15)
RELATIVE NEUTROPHIL (SMH): 58.2 % (ref 38–73)
SODIUM SERPL-SCNC: 137 MMOL/L (ref 136–145)
WBC # BLD AUTO: 7.26 K/UL (ref 3.9–12.7)

## 2025-03-28 PROCEDURE — 82040 ASSAY OF SERUM ALBUMIN: CPT

## 2025-03-28 PROCEDURE — 36415 COLL VENOUS BLD VENIPUNCTURE: CPT

## 2025-03-28 PROCEDURE — 99999 PR PBB SHADOW E&M-EST. PATIENT-LVL V: CPT | Mod: PBBFAC,HCNC,, | Performed by: FAMILY MEDICINE

## 2025-03-28 PROCEDURE — 85025 COMPLETE CBC W/AUTO DIFF WBC: CPT

## 2025-03-28 NOTE — PROGRESS NOTES
SCRIBE #1 NOTE: I, Moisés Chacon, am scribing for, and in the presence of, Pawel Badillo III, MD. I have scribed the entire note.     Subjective:       Patient ID: Marisela Eagle is a 79 y.o. female.    Chief Complaint: Hospital Follow Up (Discharged 3/14)    Ms. Marisela Eagle is here for a hospital follow up after her last appointment on 3/13/2025. Use of cane as ambulatory aid. Hx DVT. The patient was admitted to the hospital on 3/13/25 for evaluation S/P abnormal CT chest and discharged on 3/14/25. A nonocclusive DVT involving the right popliteal vein was noted. BMI of 35.40. Cardiovascular good. No chest pain. No palpitations. Blood pressure is 112/68. Hypertension controlled. Hyperlipidemia. Chronic diastolic heart failure. PE, slightly better. RML and RCL. Anticoagulated. On Eliquis. The patient has an appointment with Dr. Green on 4/4/25.Sjogren's syndrome. Hypothyroidism. CREST syndrome. Raynaud's disease. CBC ok.    Review of Systems   Constitutional:  Negative for chills and fever.   HENT:  Negative for congestion and sore throat.    Eyes:  Negative for visual disturbance.   Respiratory:  Negative for chest tightness and shortness of breath.    Cardiovascular:  Negative for chest pain.   Gastrointestinal:  Negative for nausea.   Endocrine: Negative for polydipsia and polyuria.   Genitourinary:  Negative for dysuria and flank pain.   Musculoskeletal:  Negative for back pain, neck pain and neck stiffness.   Skin:  Negative for rash.   Neurological:  Negative for weakness.   Hematological:  Does not bruise/bleed easily.   Psychiatric/Behavioral:  Negative for behavioral problems.    All other systems reviewed and are negative.      Objective:      Physical examination: Vital signs noted. No acute distress. No carotid bruit. Regular heart rate and rhythm. Lungs clear to auscultation bilaterally. Abdomen bowel sounds positive soft and nontender. Extremities without edema. 2+ pedal pulses.       Assessment:       1. Hypertension, essential    2. Chronic diastolic heart failure    3. Anticoagulated    4. Hypothyroidism, unspecified type    5. History of DVT (deep vein thrombosis)    6. Hyperlipidemia, unspecified hyperlipidemia type    7. CREST syndrome    8. BMI 35.0-35.9,adult    9. Pulmonary embolism, unspecified chronicity, unspecified pulmonary embolism type, unspecified whether acute cor pulmonale present        Plan:       Hypertension, essential  -     CBC Auto Differential; Future; Expected date: 04/17/2025    Chronic diastolic heart failure  -     CBC Auto Differential; Future; Expected date: 04/17/2025    Anticoagulated    Hypothyroidism, unspecified type    History of DVT (deep vein thrombosis)    Hyperlipidemia, unspecified hyperlipidemia type    CREST syndrome    BMI 35.0-35.9,adult    Pulmonary embolism, unspecified chronicity, unspecified pulmonary embolism type, unspecified whether acute cor pulmonale present    Recurrent pulmonary embolism.  See Hematology as scheduled.  Continue her Eliquis permanently.  Follow-up in one-month with a CBC.  No anti-inflammatories.  Hypertension is under good control.  All care gaps addressed or discussed.     I, Pawel Badillo III, MD, personally performed the services described in this documentation. All medical record entries made by the scribe were at my direction and in my presence. I have reviewed the chart and agree that the record reflects my personal performance and is accurate and complete.

## 2025-03-29 NOTE — TELEPHONE ENCOUNTER
No care due was identified.  [facility] Embedded Care Due Messages. Reference number: [id number].   3/29/2025 1:46:56 AM CDT

## 2025-03-30 RX ORDER — AMITRIPTYLINE HYDROCHLORIDE 25 MG/1
25 TABLET, FILM COATED ORAL NIGHTLY
Qty: 90 TABLET | Refills: 0 | Status: SHIPPED | OUTPATIENT
Start: 2025-03-30

## 2025-03-30 NOTE — TELEPHONE ENCOUNTER
Refill Routing Note   Medication(s) are not appropriate for processing by [facility] for the following reason(s):        New or recently adjusted medication    [facility] action(s):  Defer               Appointments  past 12m or future 3m with PCP    Date Provider   Last Visit   3/28/2025 Pawel Badillo III, MD   Next Visit   4/24/2025 Pawel Badillo III, MD   ED visits in past 90 days: 0        Note composed:8:29 AM 03/30/2025

## 2025-03-31 ENCOUNTER — TELEPHONE (OUTPATIENT)
Dept: FAMILY MEDICINE | Facility: CLINIC | Age: 80
End: 2025-03-31
Payer: MEDICARE

## 2025-03-31 NOTE — TELEPHONE ENCOUNTER
Spoke to Sheri in scheduling made aware of orders placed for CTA and that Dr. Green would like this scheduled ASAP. Per Sheri there is an opening at imaging center at 3pm today, patient accepted appt and instructed that she will need to remain fasting, she states she only had coffee today, sheri states this is fine. [facility] dept made aware of need for auth .   
Home

## 2025-03-31 NOTE — TELEPHONE ENCOUNTER
----- Message from Reyna sent at 3/31/2025  9:19 AM CDT -----  Contact: pt  Type:  Needs Medical AdviceWho Called: pt Symptoms (please be specific):  How long has patient had these symptoms:  Pharmacy name and phone #:  Would the patient rather a call back or a response via [facility]? callBest Call Back Number: [phone] Information: pt states did all the test and would like to know what's the next steps.  Please call back to advise. Thanks!

## 2025-03-31 NOTE — TELEPHONE ENCOUNTER
She called about , done     ----- Message from Reyna sent at 3/31/2025  9:19 AM CDT -----  Contact: pt  Type:  Needs Medical AdviceWho Called: pt Symptoms (please be specific):  How long has patient had these symptoms:  Pharmacy name and phone #:  Would the patient rather a call back or a response via [facility]? callBest Call Back Number: [phone] Information: pt states did all the test and would like to know what's the next steps.  Please call back to advise. Thanks!

## 2025-04-02 ENCOUNTER — HOSPITAL ENCOUNTER (OUTPATIENT)
Dept: RADIOLOGY | Facility: HOSPITAL | Age: 80
Discharge: HOME | End: 2025-04-02
Attending: PHYSICIAN ASSISTANT
Payer: MEDICARE

## 2025-04-02 ENCOUNTER — HOSPITAL ENCOUNTER (OUTPATIENT)
Dept: RADIOLOGY | Facility: CLINIC | Age: 80
Discharge: HOME | End: 2025-04-02
Attending: PHYSICIAN ASSISTANT
Payer: MEDICARE

## 2025-04-02 DIAGNOSIS — Z78.0 POSTMENOPAUSAL: ICD-10-CM

## 2025-04-02 DIAGNOSIS — E04.1 NODULAR THYROID DISEASE: ICD-10-CM

## 2025-04-02 PROCEDURE — 77081 DXA BONE DENSITY APPENDICULR: CPT | Mod: 26,HCNC,, | Performed by: RADIOLOGY

## 2025-04-02 PROCEDURE — 76536 US EXAM OF HEAD AND NECK: CPT | Mod: 26,,, | Performed by: RADIOLOGY

## 2025-04-02 PROCEDURE — 77081 DXA BONE DENSITY APPENDICULR: CPT | Mod: TC,HCNC,PO

## 2025-04-02 PROCEDURE — 76536 US EXAM OF HEAD AND NECK: CPT | Mod: TC

## 2025-04-03 ENCOUNTER — TELEPHONE (OUTPATIENT)
Facility: CLINIC | Age: 80
End: 2025-04-03
Payer: MEDICARE

## 2025-04-03 ENCOUNTER — RESULTS FOLLOW-UP (OUTPATIENT)
Dept: HEMATOLOGY/ONCOLOGY | Facility: HOSPITAL | Age: 80
End: 2025-04-03

## 2025-04-03 NOTE — TELEPHONE ENCOUNTER
----- Message from Rayray Green MD sent at 4/3/2025  9:36 AM CDT -----  Please let patient know her glucose was a little low and also notify PCP    CC: Justino  ----- Message -----  From: Lab, Background User  Sent: 4/2/2025   9:51 PM CDT  To: Rayray Geren MD

## 2025-04-03 NOTE — PROGRESS NOTES
[facility]  PROGRESS NOTE -  Follow-up Visit      Subjective:       Patient ID:   NAME: Marisela Eagle : 1945     79 y.o. female    Referring Doc: Yumiko Watson MD  Other Physicians: Justino/Keren(PCP); IFRAH Patton/Ruth Cedeño NP (Card); Marcos Gusman (ortho); ALEXEI Martin; Melissa Kirkpatrick;         Chief Complaint: DVT/pulm emboli f/u    History of Present Illness:     Patient returns today for a  regularly scheduled follow-up visit.  The patient is here today to go over the results of the recently ordered labs, tests and studies. She is here with her .     She was on eliquis but self-discontinued in February. At the time she did not have excessive bleeding but she had been having some bruising on the arms    She then went to the ER on 3/13/2025 and was found to have a PE and a DVT. She was restarted on Eloiquis.     Breathing ok, no SOB or JARA    She saw Dr Badillo (PCP) on 3/28/2025    She saw Dr Canchola with cardiology on 2/3/2025; Dr Thomson with rheumatology on 2025    She saw Dr Caal with pain management and is planning to get some injections in her back with nerve ablation the near future     She has chronic arthritis, shoulder and back issues. She is breathing ok, no SOB, HA's or N/V.     ROS:   GEN: normal without any fever, night sweats or weight loss; chronic arthritis issues/right shoulder/back ; intermittent chest aches and back discomfort  HEENT: normal with no HA's, sore throat, stiff neck, changes in vision  CV: normal with no CP, SOB, PND, JARA or orthopnea  PULM: normal with no SOB, cough, hemoptysis, sputum or pleuritic pain  GI: normal with no abdominal pain, nausea, vomiting, constipation, diarrhea, melanotic stools, BRBPR, or hematemesis  : normal with no hematuria, dysuria  BREAST: normal with no mass, discharge, pain  SKIN: normal with no rash, erythema, bruising, or swelling    Pain Scale: 4 on neck    Allergies:  Review of patient's allergies indicates:  No  Known Allergies    Medications:    Current Outpatient Medications:     amitriptyline (ELAVIL) 25 MG tablet, TAKE 1 TABLET EVERY EVENING, Disp: 90 tablet, Rfl: 0    amLODIPine (NORVASC) 5 MG tablet, Take 1 tablet (5 mg total) by mouth once daily., Disp: 90 tablet, Rfl: 3    artificial tears,hypromellose,,GENTEAL/SUSTANE, (SYSTANE GEL) 0.3 % Gel, Place 1 drop into both eyes as needed., Disp: , Rfl:     cholecalciferol, vitamin D3, 5,000 unit capsule, Take 5,000 Units by mouth once daily., Disp: , Rfl:     cranberry fruit extract (CRANBERRY CONCENTRATE ORAL), Take 1 tablet by mouth Daily., Disp: , Rfl:     cyanocobalamin (VITAMIN B-12) 1000 MCG tablet, Take 5,000 mcg by mouth once daily., Disp: , Rfl:     DULoxetine (CYMBALTA) 60 MG capsule, Take 1 capsule (60 mg total) by mouth once daily., Disp: 90 capsule, Rfl: 1    ezetimibe (ZETIA) 10 mg tablet, Take 1 tablet (10 mg total) by mouth once daily., Disp: 90 tablet, Rfl: 3    famotidine (PEPCID) 20 MG tablet, TAKE 1 TABLET EVERY EVENING, Disp: 90 tablet, Rfl: 3    gabapentin (NEURONTIN) 800 MG tablet, TAKE 1 TABLET THREE TIMES DAILY, Disp: 270 tablet, Rfl: 3    levothyroxine (SYNTHROID) 125 MCG tablet, TAKE 1 TABLET EVERY DAY BEFORE BREAKFAST, Disp: 90 tablet, Rfl: 3    LORazepam (ATIVAN) 1 MG tablet, Take 1 tablet (1 mg total) by mouth every evening., Disp: 30 tablet, Rfl: 2    losartan (COZAAR) 100 MG tablet, TAKE 1/2 TABLET TWICE DAILY, Disp: 90 tablet, Rfl: 3    magnesium 30 mg Tab, Take 1 tablet by mouth once daily., Disp: , Rfl:     metoprolol succinate (TOPROL-XL) 50 MG 24 hr tablet, TAKE 1/2 TABLET EVERY EVENING, Disp: 45 tablet, Rfl: 3    pantoprazole (PROTONIX) 40 MG tablet, TAKE 1 TABLET EVERY MORNING, Disp: 90 tablet, Rfl: 2    potassium chloride SA (K-DUR,KLOR-CON) 20 MEQ tablet, Take 20 mEq by mouth once daily., Disp: , Rfl:     spironolactone (ALDACTONE) 25 MG tablet, TAKE 1 TABLET EVERY DAY, Disp: 90 tablet, Rfl: 3    torsemide (DEMADEX) 10 MG Tab, Take  "1 tablet (10 mg total) by mouth once daily., Disp: 90 tablet, Rfl: 3    vit C/E/Zn/coppr/lutein/zeaxan (PRESERVISION AREDS-2 ORAL), Take 1 capsule by mouth once daily., Disp: , Rfl:     apixaban (ELIQUIS) 5 mg Tab, Take 1 tablet (5 mg total) by mouth 2 (two) times daily., Disp: 180 tablet, Rfl: 3    cevimeline (EVOXAC) 30 mg capsule, Take 1 capsule (30 mg total) by mouth 3 (three) times daily. (Patient not taking: Reported on 4/4/2025), Disp: 270 capsule, Rfl: 3    PMHx/PSHx Updates:  See patient's last visit with Dr. Green on 2/20/2025  See H&P on 6/14/2024        Pathology:   Cancer Staging   No matching staging information was found for the patient.            Objective:     Vitals:  Blood pressure 138/85, pulse 90, temperature 97.1 °F (36.2 °C), temperature source Temporal, resp. rate 16, height 5' 9" (1.753 m), weight 108 kg (238 lb).    Physical Examination:   GEN: no apparent distress, comfortable; AAOx3; overweight  HEAD: atraumatic and normocephalic  EYES: no pallor, no icterus, PERRLA  ENT: OMM, no pharyngeal erythema, external ears WNL; no nasal discharge; no thrush  NECK: no masses, thyroid normal, trachea midline, no LAD/LN's, supple  CV: RRR with no murmur; normal pulse; normal S1 and S2; no pedal edema  CHEST: Normal respiratory effort; CTAB; normal breath sounds; no wheeze or crackles  ABDOM: nontender and nondistended; soft; normal bowel sounds; no rebound/guarding  MUSC/Skeletal: LROM of right shoulder; arthropathy  EXTREM: no clubbing, cyanosis, inflammation; chronic RLE swelling and hyperchromatic skin changes  SKIN: no rashes, lesions, ulcers, petechiae or subcutaneous nodules; chronic age related skin changes  : no lynn  NEURO: grossly intact; motor/sensory WNL; AAOx3; no tremors  PSYCH: normal mood, affect and behavior  LYMPH: normal cervical, supraclavicular, axillary and groin LN's      Labs:     Lab Results   Component Value Date    WBC 6.74 04/02/2025    HGB 12.2 04/02/2025    HCT " 40.4 04/02/2025    MCV 89 04/02/2025     04/02/2025       CMP  Sodium   Date Value Ref Range Status   04/02/2025 135 (L) 136 - 145 mmol/L Final   03/28/2025 137 136 - 145 mmol/L Final     Potassium   Date Value Ref Range Status   04/02/2025 4.1 3.5 - 5.1 mmol/L Final   03/28/2025 4.6 3.5 - 5.1 mmol/L Final     Chloride   Date Value Ref Range Status   04/02/2025 102 95 - 110 mmol/L Final   03/14/2025 108 95 - 110 mmol/L Final     CO2   Date Value Ref Range Status   04/02/2025 25 23 - 29 mmol/L Final   03/28/2025 27 23 - 29 mmol/L Final     Glucose   Date Value Ref Range Status   03/14/2025 84 70 - 110 mg/dL Final     BUN   Date Value Ref Range Status   04/02/2025 18 8 - 23 mg/dL Final     Creatinine   Date Value Ref Range Status   04/02/2025 0.7 0.5 - 1.4 mg/dL Final     Calcium   Date Value Ref Range Status   04/02/2025 9.0 8.7 - 10.5 mg/dL Final   03/28/2025 9.4 8.7 - 10.5 mg/dL Final     Total Protein   Date Value Ref Range Status   03/14/2025 6.4 6.0 - 8.4 g/dL Final     Albumin   Date Value Ref Range Status   04/02/2025 3.8 3.5 - 5.2 g/dL Final   03/28/2025 4.1 3.5 - 5.2 g/dL Final   01/11/2019 4.1 3.1 - 4.7 g/dL      Bilirubin Total   Date Value Ref Range Status   04/02/2025 0.3 0.1 - 1.0 mg/dL Final     Comment:     For infants and newborns, interpretation of results should be based   on gestational age, weight and in agreement with clinical   observations.    Premature Infant recommended reference ranges:   0-24 hours:  <8.0 mg/dL   24-48 hours: <12.0 mg/dL   3-5 days:    <15.0 mg/dL   6-29 days:   <15.0 mg/dL   03/28/2025 0.4 0.1 - 1.0 mg/dL Final     Comment:     For infants and newborns, interpretation of results should be based   on gestational age, weight and in agreement with clinical   observations.    Premature Infant recommended reference ranges:   0-24 hours:  <8.0 mg/dL   24-48 hours: <12.0 mg/dL   3-5 days:    <15.0 mg/dL   6-29 days:   <15.0 mg/dL     ALP   Date Value Ref Range Status    04/02/2025 42 40 - 150 unit/L Final   03/28/2025 36 (L) 55 - 135 unit/L Final     AST   Date Value Ref Range Status   04/02/2025 19 11 - 45 unit/L Final   03/28/2025 18 10 - 40 unit/L Final     ALT   Date Value Ref Range Status   04/02/2025 19 10 - 44 unit/L Final   03/28/2025 17 10 - 44 unit/L Final     Anion Gap   Date Value Ref Range Status   04/02/2025 8 8 - 16 mmol/L Final     Comment:     UNABLE TO CALCULATE     eGFR   Date Value Ref Range Status   04/02/2025 >60 >60 mL/min/1.73/m2 Final     Comment:     Estimated GFR calculated using the CKD-EPI creatinine (2021) equation.   03/14/2025 >60.0 >60 mL/min/1.73 m^2 Final     MTHFR Comment   Comment: Result:  c.665C>T (p. [id number]), legacy name:  C677T - Not Detected c.1286A>C (p.  [id number]), legacy name: [id number] - Not  Detected Interpretation:     Phosphatidylserine Ab IgA 0 - 30 Units <10   Phosphatidylserine Ab ( IgM) 0 - 30 Units 9      Phosphatidylserine Ab (IgG) 0 - 19 APS Units <1     Anticardiolipin IgG 0 - 14 GPL U/mL <9   Comment: Negative:              <15     Anticardiolipin IgA 0 - 11 APL U/mL <9   Comment: Negative:              <12     Anticardiolipin IgM 0 - 12 MPL U/mL <9     Homocysteine 0.0 - 21.3 umol/L 10.3     Prothrombin Mutation Comment   Comment: Result: c.*97G>A - Not Detected     Factor V Leiden Comment   Comment: Result: c.1601G>A (p.[id number]) - Not Detected           Radiology/Diagnostic Studies:     CTA chest 3/13/2025:   Impression:     Positive for pulmonary emboli involving right middle lobe and right lower lobe as described above.     Attempts are actively being made to contact the ordering provider Dr. Badillo.  The patient should be sent to the ED for further evaluation and treatment, and the ED was notified at the time of this dictation.     Aortic and coronary atherosclerosis.        Electronically signed by:Tim Neely  Date:                                            03/13/2025  Time:                                            15:12    US 3/13/2025:      Impression:     Nonocclusive DVT involving the right popliteal vein, similar to the reference exam.     Negative for left lower extremity DVT.        Electronically signed by:Tim Neely  Date:                                            03/13/2025  Time:                                           16:44    DXA Bone Density Appendicular Skeleton  Narrative: EXAMINATION:  DEXA BONE DENSITY APPENDICULAR SKELETON    CLINICAL HISTORY:  Spine and Hip; Asymptomatic menopausal state    TECHNIQUE:  DXA scanning was performed over the left forearm.  Review of the images confirms satisfactory positioning and technique.    COMPARISON:  DEXA 02/24/2021.    FINDINGS:  The left wrist bone mineral density is equal to 0.710 g/cm squared with a Z score of 3.6.  There has been  no significant change relative to the prior study.  The T-score is 0.3.    There is a  % risk of a major osteoporotic fracture and a  % risk of hip fracture in the next 10 years (FRAX).  Impression: Z score is within the expected range for age.  Normal bone mineral density.    A Z score less than -2 is considered below the expected range for age.  Osteoporosis can not be diagnosed in men under the age of 50 or premenopausal women on the basis of BMD alone.    Electronically signed by: Adam Chase  Date:    04/02/2025  Time:    16:27  US Thyroid  Narrative: CLINICAL HISTORY:  ([id number] y/o  (1945) F    Nontoxic single thyroid nodule    TECHNIQUE:  (A#[id number], exam time 4/2/2025 13:55)    US THYROID [id number]    A complete ultrasound examination of the thyroid was done with grayscale and color flow Doppler imaging where appropriate.    COMPARISON:  None available.    FINDINGS:  RIGHT lobe: The right lobe of the thyroid is small in size and heterogeneous in background echogenicity measuring 2.1 x 0.8 x 0.7 cm. The background vascularity of the thyroid gland is less than normal.    LEFT lobe: The left lobe of the thyroid is  small in size and heterogeneous in background echogenicity measuring 2.1 x 0.7 x 0.7 cm. The background vascularity of the thyroid gland is less than normal.    ISTHMUS: The thyroid isthmus measures 2 mm.    LYMPH NODES: No gross lymphadenopathy is seen in the central neck.    THYROID NODULES:    Nodule #: 1    Maximum size: 4 x 3 x 3 mm, previously 5 x 4 x 3 mm    Location: Right lobe    Composition:Solid/Almost completely solid (2)    Echogenicity:Hypoechoic (2)    Shape:Wider-than-tall    Margins: Smooth (0)    Echogenic foci: None (0)    ACR TI-RADS total points: 4    ACR TI-RADS risk category: TR4 (4-6 points)(FNA if ? 1.5 cm, follow-up if 1-1.4 cm)    Nodule #: 2    Maximum size: 3 x 3 x 2 mm, previously 5 x 3 x 4 mm    Location:Left lobe    Composition:Solid/Almost completely solid (2)    Echogenicity:Hypoechoic (2)    Shape:Wider-than-tall    Margins: Smooth (0)    Echogenic foci: None (0)    ACR TI-RADS total points: 4    ACR TI-RADS risk category: TR4 (4-6 points)(FNA if ? 1.5 cm, follow-up if 1-1.4 cm)  Impression: 1.  Small heterogeneous appearing thyroid gland with decreased vascularity suggestive of chronic/burnt-out thyroiditis.    2.  Tiny nodule/pseudo nodules in both lobes, without adverse interval change (not meeting the ultrasound criteria for follow-up or FNA).    ACR TI-RADS RECOMMENDATIONS:    TR1 (0 points) - No FNA or follow-up    TR2 (2 points) - No FNA or follow-up    TR3 (3 points) - FNA if ? 2.5 cm, follow-up if 1.5-2.4 cm in 1, 3 and 5 years    TR4 (4-6 points) - FNA if ? 1.5 cm, follow-up if 1-1.4 cm in 1, 2, 3 and 5 years.    TR5 (?7 points) - FNA if ? 1 cm, follow-up if 0.5-0.9 cm every year for 5 years.    ACR Thyroid Imaging, Reporting and Data System (TI-RADS): White Paper of the ACR TI-RADS Committee Anthony Rashid et al, JACR, Volume 14, Issue 5, 937 - 212    Electronically signed by: Hubert Tristan  Date:    04/02/2025  Time:    13:58        Nuclear Stress - Cardiology  Interpreted    Result Date: 7/10/2024    Abnormal myocardial perfusion scan.   There is a mild to moderate intensity, medium sized, mostly fixed perfusion abnormality with some reversibilty in the lateral, apical and septal apical wall(s).   There are no other significant perfusion abnormalities.   The gated perfusion images showed an ejection fraction of 61% at rest. The gated perfusion images showed an ejection fraction of 54% post stress. Normal ejection fraction is greater than 53%.   There is normal wall motion at rest and post stress.   LV cavity size is normal at rest and normal at stress.   The ECG portion of the study is negative for ischemia.   The patient reported chest pain during the stress test.   There were no arrhythmias during stress.     CT Abdomen Pelvis W Wo Contrast    Result Date: 6/20/2024  CMS Mandated Quality Data- CT Radiation- 436 All CT scans at this facility utilize dose modulation, iterative reconstruction, and/or weight based dosing when appropriate to reduce radiation dose to as low as reasonably achievable. EXAMINATION: CT ABDOMEN PELVIS W WO CONTRAST CLINICAL HISTORY: rule out cancer with new onset clots; Acute embolism and thrombosis of right popliteal vein TECHNIQUE: CT abdomen and pelvis without and with intravenous contrast.  100 mL Omnipaque 350. COMPARISON: Abdominal ultrasound 12/07/2021. FINDINGS: There is subsegmental atelectasis of the lung bases.  Heart size is normal. There are small lobulated hypoattenuating and nonenhancing lesions in the left and right hepatic lobes in the region of the intrahepatic fissure measuring 1.9 cm each, and felt to reflect small hepatic cysts.  0.5 cm hypoattenuating structure in the inferior right hepatic lobe also noted, likely reflecting a small hepatic cyst.  No enhancing hepatic lesions identified.  The gallbladder and common bile duct within normal limits.  The pancreas, spleen and adrenal glands are unremarkable. The kidneys are  normal size.  There is no hydronephrosis or nephrolithiasis.  There are no enhancing renal lesions.  Ureters are normal caliber without obstructions.  Evaluation of the pelvic viscera is limited due to beam hardening artifact from bilateral hip arthroplasty hardware.  Visualized portions of the bladder are unremarkable. There is mild diverticulosis of the colon.  There is oral contrast in the large and small bowel.  The appendix is not identified.  There is no bowel wall thickening or inflammatory changes observed.  The stomach is decompressed and grossly unremarkable. The abdominal aorta is normal caliber with moderate calcified plaque formation.  There are no large intra-abdominal lymph nodes.  There is no mesenteric fat stranding or free fluid observed. There are degenerative changes of the spine.  No acute osseous abnormality observed.     1. No acute intra-abdominal abnormality observed. 2. Small right and left hepatic cyst observed. 3. Colonic diverticulosis. 4. Additional incidental observations as described. Electronically signed by: Milo Vogel Date:    06/20/2024 Time:    11:51        Doppler US  6/6/2024:     Impression:     Known pulmonary emboli on prior CTA chest.  Ultrasound demonstrates nonocclusive thrombus in the right popliteal vein.     CTA 6/6/2024:     Impression:     Known pulmonary emboli on prior CTA chest.  Ultrasound demonstrates nonocclusive thrombus in the right popliteal vein.       I have reviewed all available lab results and radiology reports.    Assessment/Plan:   (1) 79 y.o. female with diagnosis of RLE DVT and pulmonary emboli who has been referred by Yumiko Watson MD for evaluation by medical hematology/oncology. She had presented to the ED at [facility] at the bequest of her cardiologist on 6/6/2024 for SOB, JARA, right thigh pain and left sided CP over the past couple of months. CTA showed bilateral pulmonary emboli and doppler US showed a nonocclusive thrombus in the right  popliteal vein.      6/14/2024:  - she is on eliquis  - no excessive bleeding or bruising  - + family hx/of clots  - order CT abdom/pelvis and clot work-up    7/15/2024:  - continued on eliquis  - to see cardiology again next week  - f/u with Dr Melgar's group - she needs repeat EGD and also needs colonsocopy  - order repeat CTA of chest    8/19/2024:  - continued on eliquis  - repeat CTA on 7/15 with resolution of the pulm eboli  - prothrombin II neg, MTHFR neg, gactor V leiden neg  - anticardiolipin neg  - protein C and S WNL    2/20/2025:  - She was on eliquis but self-discontinued last week when she ran out.   - No excessive bleeding but she had been having some bruising on the arms  - She last saw Dr Boogie with pulmonary in Sept 2024  - workup essentially negative     4/4/2025:   - post pulmonary emboli and new dvt under right popliteal vein   - continue on Eliquis 5mg BID - new rx sent in for pharmacy     (2) HTN and hypercholesterolemia     (3) Hx/of an umbilical cot after having hysterectomy in the 90's     (4) GERD and diverticulitis; hx/of Hep C positive antibody; hx/of colon polyps     (5) CHF and chronic venous insuffiencey in legs Rght > Left - chronic diastolic HF     (6) Hypothyroidism; thyroid nodules     (7) Migraines, peripheral neuropathy     (8) DDD; bilateral knee issues, OA; shoulder bursa issues; s/p right knee arthroscope in Apr 2012  - rght knee x2 with last one in 2018-19     (9) Depression     (10) Former smoker (quit 2012)               VISIT DIAGNOSES:      Acute deep vein thrombosis (DVT) of popliteal vein of right lower extremity  -     apixaban (ELIQUIS) 5 mg Tab; Take 1 tablet (5 mg total) by mouth 2 (two) times daily.  Dispense: 180 tablet; Refill: 3    Acute pulmonary embolism without acute cor pulmonale, unspecified pulmonary embolism type  -     apixaban (ELIQUIS) 5 mg Tab; Take 1 tablet (5 mg total) by mouth 2 (two) times daily.  Dispense: 180 tablet; Refill:  3            PLAN:  Continue back on Eliquis 5 mg BID   Patient will need to be on lifelong anticoagulants due to recurrent blood clots off of blood thinners   She had a very extensive work up with Rheumatology - labs are still pending   F/iu with cardiology and PCP   F/u with pain management for planned nerve ablation  F/u [facility] Dr Ching with  as directed  F/u with Dr Boogie with pulmonary as directed  F/u with GI  and rheum      RTC 3 months with Dr. Green       Discussion:     Anticoagulation Discussion:     Discussed with patient and any applicable family members about the benefit and/or need for anticoagulation. Communicated about the risks of bleeding while on any anticoagulation, which could be serious and/or life-threatening, and which can occur at any time, regardless of degree of the level of anticoagulation. Expressed the need for compliance with any anticoagulation regimen and that failure to do so could potential lead to excessive bleeding, and risk to health and/or life. In particular, with patients on coumadin therapy, compliance with requested blood work is absolutely essential, as coumadin levels can vary from time to time, and failure to do so could potentially place the patient at risk for bleeding and/or clotting events which could be fatal. Patients on coumadin are encouraged to call the day after they have their levels drawn, as to obtain the appropriate instructions from our staff. Patients are aware that self-regulating or self-dosing of their medications is strictly prohibited.         I reviewed results of the recently ordered labs, tests and studies; made directives with regards to the results. I have explained all of the above in detail and the patient understands all of the current recommendation(s). I have answered all of their questions to the best of my ability and to their complete satisfaction. The patient is to continue with the current management  plan.            Electronically signed by Ana Maria Omalley NP

## 2025-04-03 NOTE — TELEPHONE ENCOUNTER
Attempted to call patient with low blood sugar, no answer. Voicemail left with instructions to call back. Results forwarded to Dr Badillo.

## 2025-04-04 ENCOUNTER — RESULTS FOLLOW-UP (OUTPATIENT)
Dept: RHEUMATOLOGY | Facility: CLINIC | Age: 80
End: 2025-04-04

## 2025-04-04 ENCOUNTER — OFFICE VISIT (OUTPATIENT)
Facility: CLINIC | Age: 80
End: 2025-04-04
Payer: MEDICARE

## 2025-04-04 VITALS
SYSTOLIC BLOOD PRESSURE: 138 MMHG | HEART RATE: 90 BPM | BODY MASS INDEX: 35.25 KG/M2 | TEMPERATURE: 97 F | RESPIRATION RATE: 16 BRPM | WEIGHT: 238 LBS | HEIGHT: 69 IN | DIASTOLIC BLOOD PRESSURE: 85 MMHG

## 2025-04-04 DIAGNOSIS — I26.99 ACUTE PULMONARY EMBOLISM WITHOUT ACUTE COR PULMONALE, UNSPECIFIED PULMONARY EMBOLISM TYPE: ICD-10-CM

## 2025-04-04 DIAGNOSIS — I82.431 ACUTE DEEP VEIN THROMBOSIS (DVT) OF POPLITEAL VEIN OF RIGHT LOWER EXTREMITY: Primary | ICD-10-CM

## 2025-04-04 PROCEDURE — 99999 PR PBB SHADOW E&M-EST. PATIENT-LVL IV: CPT | Mod: PBBFAC,HCNC,, | Performed by: NURSE PRACTITIONER

## 2025-04-07 ENCOUNTER — OFFICE VISIT (OUTPATIENT)
Dept: ENDOCRINOLOGY | Facility: CLINIC | Age: 80
End: 2025-04-07
Payer: MEDICARE

## 2025-04-07 VITALS
BODY MASS INDEX: 35.2 KG/M2 | TEMPERATURE: 98 F | WEIGHT: 237.63 LBS | SYSTOLIC BLOOD PRESSURE: 122 MMHG | DIASTOLIC BLOOD PRESSURE: 70 MMHG | HEIGHT: 69 IN

## 2025-04-07 DIAGNOSIS — E21.3 HYPERPARATHYROIDISM: ICD-10-CM

## 2025-04-07 DIAGNOSIS — E78.5 HYPERLIPIDEMIA, UNSPECIFIED HYPERLIPIDEMIA TYPE: ICD-10-CM

## 2025-04-07 DIAGNOSIS — E55.9 HYPOVITAMINOSIS D: ICD-10-CM

## 2025-04-07 DIAGNOSIS — R73.9 HYPERGLYCEMIA: ICD-10-CM

## 2025-04-07 DIAGNOSIS — Z78.0 POSTMENOPAUSAL: ICD-10-CM

## 2025-04-07 DIAGNOSIS — E04.9 GOITER: ICD-10-CM

## 2025-04-07 DIAGNOSIS — L74.9 SWEATING ABNORMALITY: ICD-10-CM

## 2025-04-07 DIAGNOSIS — E66.9 OBESITY (BMI 30-39.9): ICD-10-CM

## 2025-04-07 DIAGNOSIS — I10 HYPERTENSION, UNSPECIFIED TYPE: ICD-10-CM

## 2025-04-07 DIAGNOSIS — E03.9 HYPOTHYROIDISM, UNSPECIFIED TYPE: Primary | ICD-10-CM

## 2025-04-07 PROCEDURE — 99214 OFFICE O/P EST MOD 30 MIN: CPT | Mod: HCNC,S$GLB,, | Performed by: PHYSICIAN ASSISTANT

## 2025-04-07 PROCEDURE — 1157F ADVNC CARE PLAN IN RCRD: CPT | Mod: HCNC,CPTII,S$GLB, | Performed by: PHYSICIAN ASSISTANT

## 2025-04-07 PROCEDURE — 99999 PR PBB SHADOW E&M-EST. PATIENT-LVL IV: CPT | Mod: PBBFAC,HCNC,, | Performed by: PHYSICIAN ASSISTANT

## 2025-04-07 PROCEDURE — 3288F FALL RISK ASSESSMENT DOCD: CPT | Mod: HCNC,CPTII,S$GLB, | Performed by: PHYSICIAN ASSISTANT

## 2025-04-07 PROCEDURE — 3074F SYST BP LT 130 MM HG: CPT | Mod: HCNC,CPTII,S$GLB, | Performed by: PHYSICIAN ASSISTANT

## 2025-04-07 PROCEDURE — 1160F RVW MEDS BY RX/DR IN RCRD: CPT | Mod: HCNC,CPTII,S$GLB, | Performed by: PHYSICIAN ASSISTANT

## 2025-04-07 PROCEDURE — 1159F MED LIST DOCD IN RCRD: CPT | Mod: HCNC,CPTII,S$GLB, | Performed by: PHYSICIAN ASSISTANT

## 2025-04-07 PROCEDURE — 3078F DIAST BP <80 MM HG: CPT | Mod: HCNC,CPTII,S$GLB, | Performed by: PHYSICIAN ASSISTANT

## 2025-04-07 PROCEDURE — 1125F AMNT PAIN NOTED PAIN PRSNT: CPT | Mod: HCNC,CPTII,S$GLB, | Performed by: PHYSICIAN ASSISTANT

## 2025-04-07 PROCEDURE — 1101F PT FALLS ASSESS-DOCD LE1/YR: CPT | Mod: HCNC,CPTII,S$GLB, | Performed by: PHYSICIAN ASSISTANT

## 2025-04-07 PROCEDURE — G2211 COMPLEX E/M VISIT ADD ON: HCPCS | Mod: HCNC,S$GLB,, | Performed by: PHYSICIAN ASSISTANT

## 2025-04-07 NOTE — PROGRESS NOTES
CC: Hypothyroidism    HPI: Marisela Eagle is a 79 y.o. female here for hypothyroidism along with pending conditions listed in the Visit Diagnosis. Diagnosed several years ago. Her daughter had thyroid cancer. They think this was due to radiation on her face for acne. Her cousin and grandmother had T2DM.     Pt diagnosed w/ PE in 6/24 and now on eliquis. Cardiologist started her on Jardiance.     Taking LT4 125 mcg daily. She takes it ~6 am and waits an hour before eating.  Reports sweating decreased when skipping  her thyroid medication.    + sweating (on left side, taking elavil--states this helps), fatigue, constipation, heat intolerance.     No mental fog, d/c, tremors, wt changes, hair loss or changes in nails.    Thyroid u/s 4/25 showed 2 subcentimeter nodules that have not changed in size since 2012. Repeat in 2 years. Reports SOB (seeing cardiology). No dysphagia or voice changes.     DEXA 4/25: wnl.   LFa: 3.6    On reclast since 2/24. Prescribed fosamax in 10/22 but she kept forgetting to take. No falls or recent fractures. Taking 5000 IU daily. She had one fall in 9/21 when she fractured her pelvis. She recently found out she fractured her femur. She had two steroid injections. She is using a pedal exerciser. She gets 1 steriod injection in each shoulder q 3 mths.    Hyperparathyroidism  No recent renal stones, n/v, abdominal pain or fractures.    PMHx, PSHx: reviewed in epic.  Social Hx: Occasionally has mixed 2-3 drinks. She smoked 0.5 ppd for 53 years.     Wt Readings from Last 15 Encounters:   04/07/25 107.8 kg (237 lb 10.5 oz)   04/04/25 108 kg (238 lb)   03/28/25 108.7 kg (239 lb 12 oz)   03/13/25 106.6 kg (235 lb)   03/13/25 106.6 kg (235 lb 0.2 oz)   02/19/25 107.9 kg (237 lb 14 oz)   02/20/25 108 kg (238 lb)   02/13/25 107.9 kg (237 lb 14 oz)   02/06/25 107.5 kg (236 lb 15.9 oz)   02/05/25 107.5 kg (236 lb 15.9 oz)   02/03/25 107 kg (235 lb 14.3 oz)   01/24/25 110.2 kg (242 lb 15.2 oz)  "  01/09/25 110.2 kg (243 lb)   01/08/25 110.6 kg (243 lb 13.3 oz)   01/06/25 108.3 kg (238 lb 12.1 oz)      ROS:   Constitutional: feels tired, wt stable  Eyes: No recent visual changes  Cardiovascular: + occasional cp and palpitations  Respiratory: + SOB, no cough  Gastrointestinal: Denies recent bowel disturbances  GenitoUrinary - No dysuria  Skin: No new skin rash  Musc: + back pain, knee pain  Neurologic: + numbness and tingling in feet  Endocrine: no polyphagia, polydipsia or polyuria  Remainder ROS negative     /70 (BP Location: Right arm, Patient Position: Sitting)   Temp 98.4 °F (36.9 °C) (Oral)   Ht 5' 9" (1.753 m)   Wt 107.8 kg (237 lb 10.5 oz)   BMI 35.10 kg/m²      Personally reviewed labs below:    Lab Results   Component Value Date    TSH 1.774 04/02/2025    A1ZTSHY 99 07/03/2019    FREET4 1.27 04/02/2025        Chemistry        Component Value Date/Time     (L) 04/02/2025 1242     03/28/2025 1109    K 4.1 04/02/2025 1242    K 4.6 03/28/2025 1109     04/02/2025 1242     03/14/2025 0711    CO2 25 04/02/2025 1242    CO2 27 03/28/2025 1109    BUN 18 04/02/2025 1242    CREATININE 0.7 04/02/2025 1242    GLU 84 03/14/2025 0711        Component Value Date/Time    CALCIUM 9.0 04/02/2025 1242    CALCIUM 9.4 03/28/2025 1109    ALKPHOS 42 04/02/2025 1242    ALKPHOS 36 (L) 03/28/2025 1109    AST 19 04/02/2025 1242    AST 18 03/28/2025 1109    ALT 19 04/02/2025 1242    ALT 17 03/28/2025 1109    BILITOT 0.3 04/02/2025 1242    BILITOT 0.4 03/28/2025 1109    ESTGFRAFRICA >60.0 07/21/2022 1530    EGFRNONAA >60.0 07/21/2022 1530         Lab Results   Component Value Date    HGBA1C 5.4 07/24/2024    HGBA1C 5.3 06/23/2023    HGBA1C 5.2 02/24/2021      PE:  GENERAL: middle aged female, well developed, well nourished  NECK: Supple neck, normal thyroid. No bruit  MUSC: ambulates with a four pronged cane  PSYCH: no anxiety or depression    4/25 Thyroid U/s  FINDINGS:  RIGHT lobe: The right " lobe of the thyroid is small in size and heterogeneous in background echogenicity measuring 2.1 x 0.8 x 0.7 cm. The background vascularity of the thyroid gland is less than normal.     LEFT lobe: The left lobe of the thyroid is small in size and heterogeneous in background echogenicity measuring 2.1 x 0.7 x 0.7 cm. The background vascularity of the thyroid gland is less than normal.     ISTHMUS: The thyroid isthmus measures 2 mm.     LYMPH NODES: No gross lymphadenopathy is seen in the central neck.     THYROID NODULES:     Nodule #: 1     Maximum size: 4 x 3 x 3 mm, previously 5 x 4 x 3 mm     Location: Right lobe     Composition:Solid/Almost completely solid (2)     Echogenicity:Hypoechoic (2)     Shape:Wider-than-tall     Margins: Smooth (0)     Echogenic foci: None (0)     ACR TI-RADS total points: 4     ACR TI-RADS risk category: TR4 (4-6 points)(FNA if ? 1.5 cm, follow-up if 1-1.4 cm)     Nodule #: 2     Maximum size: 3 x 3 x 2 mm, previously 5 x 3 x 4 mm     Location:Left lobe     Composition:Solid/Almost completely solid (2)     Echogenicity:Hypoechoic (2)     Shape:Wider-than-tall     Margins: Smooth (0)     Echogenic foci: None (0)     ACR TI-RADS total points: 4     ACR TI-RADS risk category: TR4 (4-6 points)(FNA if ? 1.5 cm, follow-up if 1-1.4 cm)     Impression:     1.  Small heterogeneous appearing thyroid gland with decreased vascularity suggestive of chronic/burnt-out thyroiditis.     2.  Tiny nodule/pseudo nodules in both lobes, without adverse interval change (not meeting the ultrasound criteria for follow-up or FNA).       Assessment/Plan:   1. Hypothyroidism, unspecified type  TSH    T4, Free    TSH    T4, Free    Comprehensive Metabolic Panel      2. Goiter        3. Hypertension, unspecified type        4. Hyperlipidemia, unspecified hyperlipidemia type        5. Postmenopausal        6. Obesity (BMI 30-39.9)        7. Hypovitaminosis D  Vitamin D      8. Sweating abnormality        9.  Hyperglycemia        10. Hyperparathyroidism  PTH, Intact    Calcium, Ionized    Calcium, Timed Urine [facility]; 24 Hours    Creatinine, urine, timed 24 Hours        Hypothyroidism-Decrease Levothyroxine to 125 mcg for six days of the week. Trial of six days to see if this decreases sweating.  Fghvoe-wuxzgt-hqpotg thyroid u/s 2/26.  IEN-srghlc-llbmvyte meds.  CHA-tlvdrp-itbdtgvb statin  Postmenopausal w/ hx of fragility fx-Continue reclast until 2/30 or fx. Discussed se.   -DEXA scan 4/25. Continue taking 2000 IU of vitamin D and 1500 mg of calcium. Also, participate in weight bearing and resistance exercises for 40 min 4x weekly to maintain your bone density.   Obesity-Body mass index is 35.1 kg/m². Encouraged to increase exercise. Declines MNT.  Hypovitaminosis d-low-normal-increase vitamin d intake by 2000 IU daily.   Sweating-monitor-may restart ditropan if worsening.   Hyperglycemia-check a1c.  Hyperparathyroidism-ca is wnl. Not sx. Monitor.  Will check 24 hour urine.    FOLLOWUP  TFTs in six weeks  24 hour urine ca/cr  F/u in 1 year-pth, ica, tfts, cmp, vd

## 2025-04-08 ENCOUNTER — TELEPHONE (OUTPATIENT)
Dept: RHEUMATOLOGY | Facility: CLINIC | Age: 80
End: 2025-04-08
Payer: MEDICARE

## 2025-04-08 NOTE — TELEPHONE ENCOUNTER
Left voicemail asking patient to contact the office back.        ----- Message from Med Assistant Dubois sent at 4/8/2025 10:50 AM CDT -----  Type:  Appointment Request  Name of Caller:pt When is the first available appointment?No accessSymptoms:3 month f/u Would the patient rather a call back or a response via [facility]? Call Best Call Back Number:[phone] Information: Pt was seen 3 months ago and is trying to schedule her 3 month follow up. Please give pt a call to schedule.  
c/w simvastatin

## 2025-04-11 ENCOUNTER — TELEPHONE (OUTPATIENT)
Dept: PAIN MEDICINE | Facility: CLINIC | Age: 80
End: 2025-04-11
Payer: MEDICARE

## 2025-04-11 NOTE — TELEPHONE ENCOUNTER
Spoke with this patient about her missed appointment and she informed me that she was waiting on someone to call her for her appointment and she does not know how to do a virtual visit. I offered an in person appointment and she stated she can not come in she can only do phone calls and virtual visits and she will call back to make an appointment for when it is convenient for her.

## 2025-04-14 ENCOUNTER — TELEPHONE (OUTPATIENT)
Dept: PAIN MEDICINE | Facility: CLINIC | Age: 80
End: 2025-04-14
Payer: MEDICARE

## 2025-04-14 NOTE — TELEPHONE ENCOUNTER
Made pt an appt in office since pt is still having pain from  Her procedure   ----- Message from Med Assistant Monroe sent at 4/11/2025  4:57 PM CDT -----  Contact: self  I called her and offered her an appointment she said she can not come in she can only do virtual appointments  ----- Message -----  From: SusanlisbetMaria Luisa  Sent: 4/11/2025   4:51 PM CDT  To: Elisa Clements Staff    Type:  Sooner Appointment RequestCaller is requesting a sooner appointment.  Caller declined first available appointment listed below.  Caller will not accept being placed on the waitlist and is requesting a message be sent to doctor.Name of Caller:  pt When is the first available appointment?  She missed her virtual appt niki for yesterday but no one called her and she was not told anything about a virtual appt and she just waited for a phone call instead. And she just rec'd a msg that she missed an apptSymptoms:  she has not feeling well now for a couple of days she feels like she is catching a cold so she will call back to jennifer as soon as she is feeling better Would the patient rather a call back or a response via [facility]? Call back Best Call Back Number:  [phone] Additional Information:  she is so sorry but she thought a virtual appt was a phone call and the navigator did not explain it to her.

## 2025-04-16 ENCOUNTER — OFFICE VISIT (OUTPATIENT)
Dept: PAIN MEDICINE | Facility: CLINIC | Age: 80
End: 2025-04-16
Payer: MEDICARE

## 2025-04-16 VITALS
BODY MASS INDEX: 35.2 KG/M2 | SYSTOLIC BLOOD PRESSURE: 118 MMHG | HEIGHT: 69 IN | HEART RATE: 77 BPM | DIASTOLIC BLOOD PRESSURE: 66 MMHG | WEIGHT: 237.63 LBS

## 2025-04-16 DIAGNOSIS — M46.1 SACROILIITIS: ICD-10-CM

## 2025-04-16 DIAGNOSIS — M51.369 DEGENERATION OF INTERVERTEBRAL DISC OF LUMBAR REGION, UNSPECIFIED WHETHER PAIN PRESENT: ICD-10-CM

## 2025-04-16 DIAGNOSIS — M47.896 OTHER SPONDYLOSIS, LUMBAR REGION: Primary | ICD-10-CM

## 2025-04-16 DIAGNOSIS — M47.892 OTHER SPONDYLOSIS, CERVICAL REGION: ICD-10-CM

## 2025-04-16 DIAGNOSIS — M50.30 DDD (DEGENERATIVE DISC DISEASE), CERVICAL: ICD-10-CM

## 2025-04-16 DIAGNOSIS — M96.1 POSTLAMINECTOMY SYNDROME OF LUMBAR REGION: ICD-10-CM

## 2025-04-16 DIAGNOSIS — M79.18 MYOFASCIAL PAIN: ICD-10-CM

## 2025-04-16 PROCEDURE — 1125F AMNT PAIN NOTED PAIN PRSNT: CPT | Mod: HCNC,CPTII,S$GLB, | Performed by: PHYSICIAN ASSISTANT

## 2025-04-16 PROCEDURE — 1157F ADVNC CARE PLAN IN RCRD: CPT | Mod: HCNC,CPTII,S$GLB, | Performed by: PHYSICIAN ASSISTANT

## 2025-04-16 PROCEDURE — 1159F MED LIST DOCD IN RCRD: CPT | Mod: HCNC,CPTII,S$GLB, | Performed by: PHYSICIAN ASSISTANT

## 2025-04-16 PROCEDURE — 99999 PR PBB SHADOW E&M-EST. PATIENT-LVL III: CPT | Mod: PBBFAC,HCNC,, | Performed by: PHYSICIAN ASSISTANT

## 2025-04-16 PROCEDURE — 99214 OFFICE O/P EST MOD 30 MIN: CPT | Mod: HCNC,S$GLB,, | Performed by: PHYSICIAN ASSISTANT

## 2025-04-16 PROCEDURE — 3074F SYST BP LT 130 MM HG: CPT | Mod: HCNC,CPTII,S$GLB, | Performed by: PHYSICIAN ASSISTANT

## 2025-04-16 PROCEDURE — 3078F DIAST BP <80 MM HG: CPT | Mod: HCNC,CPTII,S$GLB, | Performed by: PHYSICIAN ASSISTANT

## 2025-04-16 RX ORDER — METHOCARBAMOL 500 MG/1
500 TABLET, FILM COATED ORAL 3 TIMES DAILY PRN
Qty: 45 TABLET | Refills: 0 | Status: SHIPPED | OUTPATIENT
Start: 2025-04-16 | End: 2025-05-01

## 2025-04-16 NOTE — PROGRESS NOTES
Referring Physician: No ref. provider found    PCP: Pawel Badillo III, MD      CC: neck pain, lower back pain    Interval History:  Marisela Eagle is a 79 y.o. female with chronic neck and lower back pain who presents today for f/u s/p lumbar MB RFA. Reports minimal improvement however she mainly points to lumbar paraspinous muscles. Neck pain is unchanged since LCV. She wishes to have MBB for cervical spine however she was placed on Eliquis 1 month ago after DVT and PE, She receives shoulder injections q 3 months. Admits she cannot do any exercise or rehab 2/2 pain. Denies any b/b changes or worsening weakness. Pain today is rated 6/10.  Pt has been seen in the clinic before, however pt is new to me.     History below per Dr. Caal    HPI:   Marisela Eagle is a 79 y.o. female referred to us for neck and lower back pain.  Neck pain is a constant aching, throbbing pain in her posterior neck.  He has been present for over 10 years.  Pain radiates to her bilateral shoulders.  She has history of shoulder osteoarthritis and continues to follow Orthopedics for shoulder injections every 3 months.  Neck pain does not radiate down upper extremities.  Pain worsens with extension lateral rotation of the neck.  She had MRI of the cervical spine in his has been seen by Neurosurgery.  She does not desire surgical intervention.  She has not had any cervical spine interventions.  Lower back pain is more bothersome.  He is a constant aching, throbbing pain in his lower back.  Pain radiates to her hips and buttocks.  Lower back pain is more bothersome and worsens standing, walking getting up.  Pain improves with rest.  She has history of spacers placed between her L4 and L5 disc spaces over 10 years ago.  No other surgical history.  She had MRI of the lumbar spine.  She states being worked up for spinal cord stimulation by outside physician.  She states having a successful SCS trial but SCS implant was aborted due to an  infection in her lungs.  She has not decided to proceed with SCS implant currently.  She denies any worsening weakness.  No bowel bladder changes.    ROS:  CONSTITUTIONAL: No fevers, chills, night sweats, wt. loss, appetite changes  SKIN: no rashes or itching  ENT: No headaches, head trauma, vision changes, or eye pain  LYMPH NODES: None noticed   CV: No chest pain, palpitations.   RESP: No shortness of breath, dyspnea on exertion, cough, wheezing, or hemoptysis  GI: No nausea, emesis, diarrhea, constipation, melena, hematochezia, pain.    : No dysuria, hematuria, urgency, or frequency   HEME: No easy bruising, bleeding problems  PSYCHIATRIC: No depression, anxiety, psychosis, hallucinations.  NEURO: No seizures, memory loss, dizziness or difficulty sleeping  MSK: +HPI      Past Medical History:   Diagnosis Date    Abnormal stress test     shortness of breath    Bilateral knee pain 2012    left worse, right knee painful even after partial replacement.    Bruises easily     Clot ?    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)     no treatment needed    Hyperlipidemia     Hypertension     Migraines     Neuropathy     peripheral    Pulmonary embolus     dvt knee    Thyroid disease     hypothyroid, has nodules    Varicose veins      Past Surgical History:   Procedure Laterality Date    ADENOIDECTOMY      APPENDECTOMY      BILATERAL SALPINGOOPHORECTOMY       SECTION      COLONOSCOPY  2012    HIP ARTHROPLASTY      HYSTERECTOMY      with BSO    INJECTION OF ANESTHETIC AGENT AROUND MEDIAL BRANCH NERVES INNERVATING LUMBAR FACET JOINT Bilateral 2025     Procedure: Block-nerve-medial branch-lumbar;  Surgeon: Geoffrey Caal MD;  Location: [facility] OR;  Service: Pain Management;  Laterality: Bilateral;    INJECTION OF ANESTHETIC AGENT AROUND MEDIAL BRANCH NERVES INNERVATING LUMBAR FACET JOINT Bilateral 2025    Procedure: Block-nerve-medial branch-lumbar level 1;  Surgeon: Geoffrey Caal MD;  Location: [facility] OR;  Service: Pain Management;  Laterality: Bilateral;    JOINT REPLACEMENT  2012    rt unilateral knee arthroplasty. Took Coumadin x 6 weeks    KNEE ARTHROPLASTY Bilateral     KNEE ARTHROSCOPY  2010    right    LEFT HEART CATHETERIZATION Left 2024    Procedure: Left heart cath;  Surgeon: Cooper Canchola MD;  Location: [facility] CATH/EP LAB;  Service: Cardiology;  Laterality: Left;    RADIOFREQUENCY ABLATION OF LUMBAR MEDIAL BRANCH NERVE AT SINGLE LEVEL Bilateral 2025    Procedure: Radiofrequency Ablation, Nerve, Spinal, Lumbar, Medial Branch, 1 Level;  Surgeon: Geoffrey Caal MD;  [facility];  Service: Pain Management;  Laterality: Bilateral;    TONSILLECTOMY       Family History   Problem Relation Name Age of Onset    Neuropathy Mother      Hypertension Mother      Stroke Mother      Neuropathy Father      Cancer Daughter      Diabetes Maternal Grandmother      Melanoma Neg Hx      Psoriasis Neg Hx      Lupus Neg Hx      Eczema Neg Hx       Social History     Socioeconomic History    Marital status:    Occupational History    Occupation: RETIRED   Tobacco Use    Smoking status: Former     Current packs/day: 0.00     Types: Cigarettes     Quit date: 3/23/2012     Years since quittin.0    Smokeless tobacco: Never   Substance and Sexual Activity    Alcohol use: Not Currently     Comment: occasional    Drug use: No     Social Drivers of Health     Financial Resource Strain: Low Risk  (2023)    Overall Financial Resource Strain (CARDIA)     Difficulty of Paying Living Expenses: Not hard at all   Food  "Insecurity: No Food Insecurity (11/27/2023)    Hunger Vital Sign     Worried About Running Out of Food in the Last Year: Never true     Ran Out of Food in the Last Year: Never true   Transportation Needs: No Transportation Needs (11/27/2023)    PRAPARE - Transportation     Lack of Transportation (Medical): No     Lack of Transportation (Non-Medical): No   Physical Activity: Inactive (11/27/2023)    Exercise Vital Sign     Days of Exercise per Week: 0 days     Minutes of Exercise per Session: 0 min   Stress: No Stress Concern Present (11/27/2023)    [employer] - Occupational Stress Questionnaire     Feeling of Stress : Not at all   Housing Stability: Low Risk  (11/27/2023)    Housing Stability Vital Sign     Unable to Pay for Housing in the Last Year: No     Number of Places Lived in the Last Year: 1     Unstable Housing in the Last Year: No         Medications/Allergies: See med card    Vitals:    04/16/25 1259   BP: 118/66   Pulse: 77   Weight: 107.8 kg (237 lb 10.5 oz)   Height: 5' 9" (1.753 m)   PainSc:   6   PainLoc: Back         Physical exam:    GENERAL: A and O x3, the patient appears well groomed and is in no acute distress.  Skin: No rashes or obvious lesions  HEENT: normocephalic, atraumatic  CARDIOVASCULAR:  RRR  LUNGS: non labored breathing  ABDOMEN: soft, nontender   UPPER EXTREMITIES: Normal alignment, normal range of motion, no atrophy, no skin changes,  hair growth and nail growth normal and equal bilaterally. No swelling, no tenderness.    LOWER EXTREMITIES:  Normal alignment, normal range of motion, no atrophy, no skin changes,  hair growth and nail growth normal and equal bilaterally. No swelling, no tenderness.  CERVICAL SPINE:  Cervical spine: ROM is limited in flexion, extension and lateral rotation with mild to moderate increased pain.  Spurling's maneuver causes no neck pain to either side.  Myofascial exam: moderate Tenderness to palpation across cervical " paraspinous region bilaterally.    LUMBAR SPINE  Lumbar spine: ROM is limited with flexion extension and oblique extension with mild to moderate increased pain.    Wilbert's test causes no increased pain on either side.    Supine straight leg raise is negative bilaterally.    Internal and external rotation of the hip causes no increased pain on either side.  Myofascial exam: moderate tenderness to palpation across lumbar paraspinous muscles.  B/L SIJ TTP     MENTAL STATUS: normal orientation, speech, language, and fund of knowledge for social situation.  Emotional state appropriate.    CRANIAL NERVES:  II:  PERRL bilaterally,   III,IV,VI: EOMI.    V:  Facial sensation equal bilaterally  VII:  Facial motor function normal.  VIII:  Hearing equal to finger rub bilaterally  IX/X: Gag normal, palate symmetric  XI:  Shoulder shrug equal, head turn equal  XII:  Tongue midline without fasciculations      MOTOR: Tone and bulk: normal bilateral upper and lower Strength: normal   Delt Bi Tri WE WF     R 5 5 5 5 5 5   L 5 5 5 5 5 5     IP ADD ABD Quad TA Gas HAM  R 5 5 5 5 5 5 5  L 5 5 5 5 5 5 5    SENSATION: Light touch and pinprick intact bilaterally  REFLEXES: normal, symmetric, nonbrisk.  Toes down, no clonus. No hoffmans.  GAIT: normal rise, base, steps, and arm swing.        Imaging:  MRI L-spine 12/2023  Susceptibility artifact related to inter spinous fusion hardware at L3-4 and L4-5.     Alignment: Straightening of the lumbar lordosis with minimal stepwise retrolisthesis of L2 on L3, L3 on L4 and L4 on L5.     Vertebral column: No evidence of an acute fracture or aggressive marrow replacement process. Multilevel disc degeneration with intervertebral disc space narrowing, disc desiccation, and marginal osteophyte formation, most pronounced at L2-3 and L4-5 with severe disc space narrowing.  Chronic Schmorl's node formation along the superior and inferior endplates of L1 with associated mild right asymmetric superior  endplate height loss.  Stable probable small hemangioma is within the T12, L1 and L2 vertebral bodies.     Cord: Normal.  Conus terminates at T12-L1.     Degenerative findings:     T12-L1: Mild-to-moderate disc space narrowing.  Mild diffuse disc bulge.  Mild bilateral facet arthropathy and ligamentum flavum thickening.  No neural foraminal or spinal canal stenosis.     L1-L2: Mild disc space narrowing.  Mild diffuse disc bulge.  Mild bilateral facet arthropathy and ligamentum flavum thickening.  No neural foraminal or spinal canal stenosis.     L2-L3: Marked disc space narrowing.  Diffuse disc bulge with superimposed small left central disc extrusion with superior extension to the L2 infra pedicular level.  Mild bilateral facet arthropathy and ligamentum flavum thickening.  No significant neural foraminal stenosis.  Mild spinal canal stenosis.     L3-L4: Moderate disc space narrowing.  Left asymmetric diffuse disc bulge with osteophytic ridging.  Moderate bilateral facet arthropathy and ligamentum flavum thickening.  Small bursal cyst along the right aspect of the spinous process.  Moderate left and mild right neural foraminal stenosis.  Moderate spinal canal stenosis.     L4-L5: Marked disc space narrowing.  Diffuse disc bulge and osteophytic ridging.  Severe left greater than right facet arthropathy and prominent ligamentum flavum thickening.  Severe left and mild right neural foraminal stenosis.  Mild spinal canal stenosis.  Mild diffuse disc bulge.     L5-S1: Severe bilateral facet arthropathy.  Ligamentum flavum thickening.  Mild bilateral neural foraminal stenosis.  No significant spinal canal stenosis.      MRI C-spine 9/2024  Vertebral column: The vertebral bodies maintain normal height.  There is no fracture.  There is degenerative disc and facet disease which will be described by level.  All of the discs are desiccated.  The odontoid process is intact.  There is severe disc space narrowing at the C5-6 and  C6-7 levels.  There is mild anterolisthesis of C7 on T1, T1 on T2.  There is trace anterolisthesis of C3 on C4.  There is also mild retrolisthesis of C5 on C6 and C6 on C7 which is exaggerated by osteophyte formation.  Large bulky anterior osteophytes are present at the C4 through C6 levels.  Baseline marrow signal is normal.     Spinal canal, cord, epidural space: The spinal canal is developmentally normal.  There is no abnormal epidural mass or fluid collection.  The cord is normal in caliber and signal intensity.     Findings by level:     C2-3: There is bilateral facet joint arthropathy.  There is no spinal stenosis.  There is mild right foraminal stenosis.     C3-4: There is subtle trace anterolisthesis of C3 on C4.  There is marked left and moderate right facet joint arthropathy.  There is bilateral uncovertebral spurring with a disc osteophyte complex.  There is no spinal stenosis.  There is severe left and at least moderate right foraminal stenosis.     C4-5: There is a broad disc protrusion/osteophyte eccentric to the left.  This narrows the subarachnoid space and contributes to mild spinal stenosis.  There is marked left facet joint arthropathy.  There is mild right greater than left uncovertebral spurring.  There is moderate right but only mild left foraminal stenosis.     C5-6: There is marked disc space narrowing, bilateral uncovertebral spurring.  There is bilateral facet joint arthropathy.  There is a broad disc protrusion/osteophyte.  There is moderate spinal stenosis with severe bilateral foraminal stenosis.  There is no cord compression.     C6-7: There is severe disc space narrowing.  There is left greater than right facet joint arthropathy with bilateral uncovertebral spurring and a broad disc protrusion/osteophyte.  There is only borderline spinal stenosis without cord compression.  There is marked left and mild-to-moderate right foraminal stenosis.     C7-T1: There is mild anterolisthesis of C7  on T1.  There is mild disc space narrowing.  There is left greater than right facet joint arthropathy.  There is a disc bulge.  There is no spinal stenosis.  There is mild-to-moderate left but only mild right foraminal stenosis.    Assessment:  Marisela Eagle is a 79 y.o. female with neck and lower back pain.  1. Other spondylosis, lumbar region    2. Degeneration of intervertebral disc of lumbar region, unspecified whether pain present    3. Postlaminectomy syndrome of lumbar region    4. DDD (degenerative disc disease), cervical    5. Other spondylosis, cervical region    6. Myofascial pain    7. Sacroiliitis            Plan:  I have stressed the importance of physical activity and exercise to improve overall health  Reviewed pertinent imaging and records with patient  She does have b/l SIJ pain unfortunately she gets b/l shoulder injections 4 x annually.   Consider cervical medial branch blocks to help with her neck pain. Will need to hold Eliquis for RFA.  This also avoids steroid administration as she continues to get steroid injections in her bilateral shoulder by orthopedics.  She did request resuming her opioid medication.  Discussed that we would we okay with resuming her opioid medication if she discontinues her benzodiazepine use.  Patient expressed understanding and we will start weaning off her benzodiazepine use.  Start Robaxin 500 mg q 8 h prn  F/u prn

## 2025-04-21 ENCOUNTER — TELEPHONE (OUTPATIENT)
Dept: FAMILY MEDICINE | Facility: CLINIC | Age: 80
End: 2025-04-21
Payer: MEDICARE

## 2025-04-21 NOTE — TELEPHONE ENCOUNTER
----- Message from Vivi sent at 4/21/2025 10:56 AM CDT -----  Type:  Reschedule Appointment Request Caller is requesting to reschedule appointment.   Name of Caller:pt  When is the first available appointment?na Symptoms:Enhanced Wellness Visit  Would the patient rather a call back or a response via [facility]? Call sekou Best Call Back Number: [phone] Additional Information: pt and  Deon Eagle MRN [id number] need to reschedule their enhanced wellness visit. They are currently scheduled for tomorrow 4/22 at 1 and 1:30. Those appt times will no longer work.     Please call Back to advise. Thanks!

## 2025-04-24 ENCOUNTER — OFFICE VISIT (OUTPATIENT)
Dept: FAMILY MEDICINE | Facility: CLINIC | Age: 80
End: 2025-04-24
Payer: MEDICARE

## 2025-04-24 VITALS
HEIGHT: 69 IN | TEMPERATURE: 98 F | BODY MASS INDEX: 35 KG/M2 | SYSTOLIC BLOOD PRESSURE: 112 MMHG | DIASTOLIC BLOOD PRESSURE: 68 MMHG | WEIGHT: 236.31 LBS

## 2025-04-24 DIAGNOSIS — E21.3 HYPERPARATHYROIDISM, UNSPECIFIED: ICD-10-CM

## 2025-04-24 DIAGNOSIS — I50.32 CHRONIC DIASTOLIC HEART FAILURE: ICD-10-CM

## 2025-04-24 DIAGNOSIS — E78.5 HYPERLIPIDEMIA, UNSPECIFIED HYPERLIPIDEMIA TYPE: ICD-10-CM

## 2025-04-24 DIAGNOSIS — M34.1 CREST SYNDROME: ICD-10-CM

## 2025-04-24 DIAGNOSIS — R76.8 POSITIVE ANA (ANTINUCLEAR ANTIBODY): ICD-10-CM

## 2025-04-24 DIAGNOSIS — I10 HYPERTENSION, ESSENTIAL: ICD-10-CM

## 2025-04-24 DIAGNOSIS — Z86.711 HISTORY OF PULMONARY EMBOLISM: ICD-10-CM

## 2025-04-24 DIAGNOSIS — I73.00 RAYNAUD'S DISEASE WITHOUT GANGRENE: Primary | ICD-10-CM

## 2025-04-24 DIAGNOSIS — E03.9 HYPOTHYROIDISM, UNSPECIFIED TYPE: ICD-10-CM

## 2025-04-24 DIAGNOSIS — I10 ESSENTIAL HYPERTENSION: ICD-10-CM

## 2025-04-24 DIAGNOSIS — Z79.01 ANTICOAGULATED: ICD-10-CM

## 2025-04-24 PROCEDURE — 3078F DIAST BP <80 MM HG: CPT | Mod: HCNC,CPTII,S$GLB, | Performed by: FAMILY MEDICINE

## 2025-04-24 PROCEDURE — 1101F PT FALLS ASSESS-DOCD LE1/YR: CPT | Mod: HCNC,CPTII,S$GLB, | Performed by: FAMILY MEDICINE

## 2025-04-24 PROCEDURE — 1160F RVW MEDS BY RX/DR IN RCRD: CPT | Mod: HCNC,CPTII,S$GLB, | Performed by: FAMILY MEDICINE

## 2025-04-24 PROCEDURE — 1125F AMNT PAIN NOTED PAIN PRSNT: CPT | Mod: HCNC,CPTII,S$GLB, | Performed by: FAMILY MEDICINE

## 2025-04-24 PROCEDURE — 99214 OFFICE O/P EST MOD 30 MIN: CPT | Mod: HCNC,S$GLB,, | Performed by: FAMILY MEDICINE

## 2025-04-24 PROCEDURE — 1159F MED LIST DOCD IN RCRD: CPT | Mod: HCNC,CPTII,S$GLB, | Performed by: FAMILY MEDICINE

## 2025-04-24 PROCEDURE — 99999 PR PBB SHADOW E&M-EST. PATIENT-LVL IV: CPT | Mod: PBBFAC,HCNC,, | Performed by: FAMILY MEDICINE

## 2025-04-24 PROCEDURE — 3074F SYST BP LT 130 MM HG: CPT | Mod: HCNC,CPTII,S$GLB, | Performed by: FAMILY MEDICINE

## 2025-04-24 PROCEDURE — 3288F FALL RISK ASSESSMENT DOCD: CPT | Mod: HCNC,CPTII,S$GLB, | Performed by: FAMILY MEDICINE

## 2025-04-24 PROCEDURE — 1157F ADVNC CARE PLAN IN RCRD: CPT | Mod: HCNC,CPTII,S$GLB, | Performed by: FAMILY MEDICINE

## 2025-04-24 NOTE — PROGRESS NOTES
SCRIBE #1 NOTE: I, Neil Fuchs, am scribing for, and in the presence of,  Pawel Badillo III, MD. I have scribed the entire note.     Subjective:       Patient ID: Marisela Eagle is a 79 y.o. female.    Chief Complaint: Follow-up (1 month)    Ms. Eagle is here for a follow up with her last appointment being here on March 28, 2025. Accompanied by . Use of cane. States she gets a cortisone shot in her shoulders every 3 months. History of PE. BMI of 34.90. Cardiovascular good. No chest pain. No palpitations. Blood pressure is 112/68. Hypertension controlled. Hyperlipidemia. Chronic diastolic heart failure. Anticoagulated with Eliquis secondary to PE. She has been back on it for 3 weeks. CREST syndrome. No dysphagia. Hypothyroidism. Check is due in 6 months. TSH was 1.77. Hyperparathyroidism. She was given a urinate test to check for calcium, but she forgot when she is supposed to do it. Positive RF. Rheumatoid factor is 22.0. Positive ANEUDY. Titer of 1:2560. Saw Dr. Thomson. Reports Evoxac was too expensive. On Amlodipine 5mg. Raynaud's disease. She states this does not bother her much.     Review of Systems   Constitutional:  Negative for chills and fever.   HENT:  Negative for congestion, sore throat and trouble swallowing.    Eyes:  Negative for visual disturbance.   Respiratory:  Negative for chest tightness and shortness of breath.    Cardiovascular:  Negative for chest pain.   Gastrointestinal:  Negative for nausea.   Endocrine: Negative for polydipsia and polyuria.   Genitourinary:  Negative for dysuria and flank pain.   Musculoskeletal:  Negative for back pain, neck pain and neck stiffness.   Skin:  Negative for rash.   Neurological:  Negative for weakness.   Hematological:  Does not bruise/bleed easily.   Psychiatric/Behavioral:  Negative for behavioral problems.    All other systems reviewed and are negative.      Objective:      Physical examination: Vital signs noted. No acute distress. No  carotid bruit. Regular heart rate and rhythm. Lungs clear to auscultation bilaterally. Abdomen bowel sounds positive soft and nontender. Extremities without edema. 2+ pedal pulses.      Assessment:       1. Raynaud's disease without gangrene    2. Hypertension, essential    3. Hypothyroidism, unspecified type    4. Hyperparathyroidism, unspecified    5. Positive ANEUDY (antinuclear antibody)    6. CREST syndrome    7. BMI 34.0-34.9,adult    8. Anticoagulated    9. Essential hypertension    10. Hyperlipidemia, unspecified hyperlipidemia type    11. Chronic diastolic heart failure    12. History of pulmonary embolism        Plan:       Raynaud's disease without gangrene    Hypertension, essential    Hypothyroidism, unspecified type    Hyperparathyroidism, unspecified    Positive ANEUDY (antinuclear antibody)    CREST syndrome    BMI 34.0-34.9,adult    Anticoagulated    Essential hypertension    Hyperlipidemia, unspecified hyperlipidemia type    Chronic diastolic heart failure    History of pulmonary embolism    Recurrent pulmonary emboli.  So will be on lifetime anticoagulation.  Continue her amlodipine for Raynaud's.  Follow-up with Rheumatology in May.  Probably has a appointment but she is not aware of.  Thyroid under good control.  Do the urinary calcium as ordered.  Evoxac was too expensive.  Hypertension under good control.  Three-month follow-up here.  All care gaps addressed or discussed.     I, Pawel Badillo III, MD, personally performed the services described in this documentation. All medical record entries made by the scribe were at my direction and in my presence. I have reviewed the chart and agree that the record reflects my personal performance and is accurate and complete.

## 2025-04-25 NOTE — TELEPHONE ENCOUNTER
----- Message from Makenna Leo sent at 6/10/2024 11:09 AM CDT -----  Contact: pt [phone]  Type: Needs Medical Advice  Who Called:  Pt     Best Call Back Number: [phone]    Additional Information:     Pt requesting to schedule a NP appt. Pt has referral on file. Pls call back and advise  
patient refused eliquis

## 2025-04-28 ENCOUNTER — TELEPHONE (OUTPATIENT)
Dept: RHEUMATOLOGY | Facility: CLINIC | Age: 80
End: 2025-04-28
Payer: MEDICARE

## 2025-04-28 NOTE — TELEPHONE ENCOUNTER
Left voicemail asking patient to contact the office back to confirm new appointment offered for May 22 @ 3:00. Original appointment that was offered for May 15 @ 11:30 was already taken.

## 2025-05-11 DIAGNOSIS — G62.9 NEUROPATHY: ICD-10-CM

## 2025-05-11 RX ORDER — DULOXETIN HYDROCHLORIDE 60 MG/1
60 CAPSULE, DELAYED RELEASE ORAL
Qty: 90 CAPSULE | Refills: 3 | Status: SHIPPED | OUTPATIENT
Start: 2025-05-11

## 2025-05-15 ENCOUNTER — TELEPHONE (OUTPATIENT)
Dept: RHEUMATOLOGY | Facility: CLINIC | Age: 80
End: 2025-05-15
Payer: MEDICARE

## 2025-05-15 ENCOUNTER — PATIENT MESSAGE (OUTPATIENT)
Dept: RHEUMATOLOGY | Facility: CLINIC | Age: 80
End: 2025-05-15
Payer: MEDICARE

## 2025-05-15 NOTE — TELEPHONE ENCOUNTER
Left voicemail asking patient to contact the office back.        ----- Message -----  From: lAayna Tijerina  Sent: 5/15/2025   9:49 AM CDT  To: [facility] Staff  Subject: advise                                           Marisela Eagle calling regarding a call from Daisy about an appt that was scheduled for 5/15/25 but it was taken by someone else.  Pt was not aware of the change.  The appt that was offered to pt for 5/22/25 @ 3pm, she stated she need to speak with the office about this due to [facility], that was scheduled before the rheum appt.  Please call pt to offer her another day for her appt.   [phone]

## 2025-05-16 ENCOUNTER — OFFICE VISIT (OUTPATIENT)
Dept: CARDIOLOGY | Facility: CLINIC | Age: 80
End: 2025-05-16
Payer: MEDICARE

## 2025-05-16 ENCOUNTER — TELEPHONE (OUTPATIENT)
Dept: RHEUMATOLOGY | Facility: CLINIC | Age: 80
End: 2025-05-16
Payer: MEDICARE

## 2025-05-16 VITALS
SYSTOLIC BLOOD PRESSURE: 130 MMHG | BODY MASS INDEX: 35.27 KG/M2 | OXYGEN SATURATION: 98 % | HEIGHT: 69 IN | WEIGHT: 238.13 LBS | HEART RATE: 75 BPM | DIASTOLIC BLOOD PRESSURE: 80 MMHG

## 2025-05-16 DIAGNOSIS — E78.5 DYSLIPIDEMIA: ICD-10-CM

## 2025-05-16 DIAGNOSIS — Z86.711 HISTORY OF PULMONARY EMBOLISM: ICD-10-CM

## 2025-05-16 DIAGNOSIS — R06.02 SOB (SHORTNESS OF BREATH): ICD-10-CM

## 2025-05-16 DIAGNOSIS — I10 HYPERTENSION, ESSENTIAL: ICD-10-CM

## 2025-05-16 DIAGNOSIS — I50.32 CHRONIC HEART FAILURE WITH PRESERVED EJECTION FRACTION: Primary | ICD-10-CM

## 2025-05-16 LAB
OHS QRS DURATION: 100 MS
OHS QTC CALCULATION: 434 MS

## 2025-05-16 PROCEDURE — 99999 PR PBB SHADOW E&M-EST. PATIENT-LVL III: CPT | Mod: PBBFAC,HCNC,, | Performed by: INTERNAL MEDICINE

## 2025-05-16 NOTE — PROGRESS NOTES
[facility]    Subjective:     Patient ID:  Marisela Eagle is a 80 y.o. female patient here for evaluation Coronary Artery Disease (Follow 3 months , still sob )      History of Present Illness    CHIEF COMPLAINT:  Patient presents today for three-month follow-up.  History of elevated LVEDP with nonobstructive coronaries on an angiogram, last BNP in March was normal.  History of recurrent PE.  Preserved ejection fraction.    DEEP VEIN THROMBOSIS:  She experienced a second occurrence of DVT in February 2023, now prescribed Eliquis. She reports LLE swelling due to a DVT behind the knee.    VENOUS INSUFFICIENCY:  She has a history of venous insufficiency previously treated with vein gluing. She reports leg pain when walking, discoloration including black and blue coloration on her ankle, and LLE remains consistently red and swollen.    CARDIOVASCULAR:  Angiogram 3 months ago showed no significant stenosis. She has a history of fluid retention with elevated LVDP and BNP.    RESPIRATORY:  She experiences daily dyspnea.    SOCIAL HISTORY:  She has a 53-year history of daily smoking with substantial consumption, though not necessarily multiple packs per day.    OTHER MEDICAL HISTORY:  She has a history of neuropathy.    CURRENT MEDICATIONS:  She takes torsemide for fluid issues and spironolactone.      ROS:  ROS is negative unless otherwise indicated in HPI.           ROS     Past Medical History:   Diagnosis Date    Abnormal stress test 2024    shortness of breath    Bilateral knee pain 07/2012    left worse, right knee painful even after partial replacement.    Bruises easily     Clot 1990's?    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)     no treatment needed    Hyperlipidemia     Hypertension     Migraines     Neuropathy     peripheral    Pulmonary embolus     dvt knee    Thyroid disease     hypothyroid, has nodules    Varicose veins        Past Surgical History:   Procedure Laterality Date    ADENOIDECTOMY      APPENDECTOMY      BILATERAL SALPINGOOPHORECTOMY       SECTION      COLONOSCOPY  2012    HIP ARTHROPLASTY      HYSTERECTOMY      with BSO    INJECTION OF ANESTHETIC AGENT AROUND MEDIAL BRANCH NERVES INNERVATING LUMBAR FACET JOINT Bilateral 2025    Procedure: Block-nerve-medial branch-lumbar;  Surgeon: Geoffrey Caal MD;  Location: [facility] OR;  Service: Pain Management;  Laterality: Bilateral;    INJECTION OF ANESTHETIC AGENT AROUND MEDIAL BRANCH NERVES INNERVATING LUMBAR FACET JOINT Bilateral 2025    Procedure: Block-nerve-medial branch-lumbar level 1;  Surgeon: Geoffrey Caal MD;  Location: [facility] OR;  Service: Pain Management;  Laterality: Bilateral;    JOINT REPLACEMENT  2012    rt unilateral knee arthroplasty. Took Coumadin x 6 weeks    KNEE ARTHROPLASTY Bilateral     KNEE ARTHROSCOPY  2010    right    LEFT HEART CATHETERIZATION Left 2024    Procedure: Left heart cath;  Surgeon: Cooper Canchola MD;  Location: [facility] CATH/EP LAB;  Service: Cardiology;  Laterality: Left;    RADIOFREQUENCY ABLATION OF LUMBAR MEDIAL BRANCH NERVE AT SINGLE LEVEL Bilateral 2025    Procedure: Radiofrequency Ablation, Nerve, Spinal, Lumbar, Medial Branch, 1 Level;  Surgeon: Geoffrey Caal MD;  Location: [facility] PAIN MANAGEMENT;  Service: Pain Management;  Laterality: Bilateral;    TONSILLECTOMY         Family History   Problem Relation Name Age of Onset    Neuropathy Mother      Hypertension Mother      Stroke Mother      Neuropathy Father      Cancer Daughter      Diabetes Maternal Grandmother       Melanoma Neg Hx      Psoriasis Neg Hx      Lupus Neg Hx      Eczema Neg Hx         Social History     Socioeconomic History    Marital status:    Occupational History    Occupation: RETIRED   Tobacco Use    Smoking status: Former     Current packs/day: 0.00     Types: Cigarettes     Quit date: 3/23/2012     Years since quittin.1    Smokeless tobacco: Never   Substance and Sexual Activity    Alcohol use: Not Currently     Comment: occasional    Drug use: No     Social Drivers of Health     Financial Resource Strain: Low Risk  (2023)    Overall Financial Resource Strain (CARDIA)     Difficulty of Paying Living Expenses: Not hard at all   Food Insecurity: No Food Insecurity (2023)    Hunger Vital Sign     Worried About Running Out of Food in the Last Year: Never true     Ran Out of Food in the Last Year: Never true   Transportation Needs: No Transportation Needs (2023)    PRAPARE - Transportation     Lack of Transportation (Medical): No     Lack of Transportation (Non-Medical): No   Physical Activity: Inactive (2023)    Exercise Vital Sign     Days of Exercise per Week: 0 days     Minutes of Exercise per Session: 0 min   Stress: No Stress Concern Present (2023)    [employer] [place] [employer] - Occupational Stress Questionnaire     Feeling of Stress : Not at all   Housing Stability: Low Risk  (2023)    Housing Stability Vital Sign     Unable to Pay for Housing in the Last Year: No     Number of Places Lived in the Last Year: 1     Unstable Housing in the Last Year: No       Current Medications[1]    Review of patient's allergies indicates:  No Known Allergies      Objective:        Vitals:    25 0956   BP: 130/80   Pulse: 75       Physical Exam  Vitals reviewed.   Constitutional:       Appearance: Normal appearance.   Cardiovascular:      Rate and Rhythm: Normal rate and regular rhythm.      Pulses: Normal pulses.      Heart sounds: Normal heart  sounds. No murmur heard.     No gallop.   Pulmonary:      Effort: Pulmonary effort is normal.   Skin:     General: Skin is warm.   Neurological:      General: No focal deficit present.      Mental Status: She is alert and oriented to person, place, and time.         LIPIDS - LAST 2   Lab Results   Component Value Date    CHOL 224 (H) 01/09/2025    CHOL 222 (H) 10/30/2023    HDL 65 01/09/2025    HDL 72 10/30/2023    LDLCALC 141.4 01/09/2025    LDLCALC 129.6 10/30/2023    TRIG 88 01/09/2025    TRIG 102 10/30/2023    CHOLHDL 29.0 01/09/2025    CHOLHDL 32.4 10/30/2023       CBC - LAST 2  Lab Results   Component Value Date    WBC 6.74 04/02/2025    WBC 7.26 03/28/2025    RBC 4.56 04/02/2025    RBC 4.46 03/28/2025    HGB 12.2 04/02/2025    HGB 12.4 03/28/2025    HCT 40.4 04/02/2025    HCT 38.7 03/28/2025    MCV 89 04/02/2025    MCV 87 03/28/2025    MCH 26.8 (L) 04/02/2025    MCH 27.8 03/28/2025    MCHC 30.2 (L) 04/02/2025    MCHC 32.0 03/28/2025    RDW 18.0 (H) 04/02/2025    RDW 18.3 (H) 03/28/2025     04/02/2025     03/28/2025    MPV 12.3 04/02/2025    MPV 11.6 03/28/2025    GRAN 3.7 03/14/2025    GRAN 55.5 03/14/2025    LYMPH 24.8 04/02/2025    LYMPH 1.67 04/02/2025    MONO 10.7 04/02/2025    MONO 0.72 04/02/2025    BASO 0.02 03/14/2025    BASO 0.02 03/13/2025    NRBC 0 04/02/2025    NRBC 0 03/28/2025       CHEMISTRY & LIVER FUNCTION - LAST 2  Lab Results   Component Value Date     (L) 04/02/2025     03/28/2025    K 4.1 04/02/2025    K 4.6 03/28/2025     04/02/2025     03/28/2025    CO2 25 04/02/2025    CO2 27 03/28/2025    ANIONGAP 8 04/02/2025    ANIONGAP 7 (L) 03/28/2025    BUN 18 04/02/2025    BUN 17 03/28/2025    CREATININE 0.7 04/02/2025    CREATININE 0.7 03/28/2025    GLU 66 (L) 04/02/2025    GLU 79 03/28/2025    CALCIUM 9.0 04/02/2025    CALCIUM 9.4 03/28/2025    MG 2.2 03/14/2025    MG 2.0 03/13/2025    ALBUMIN 3.8 04/02/2025    ALBUMIN 4.1 03/28/2025    PROT 7.0 04/02/2025     PROT 6.9 03/28/2025    ALKPHOS 42 04/02/2025    ALKPHOS 36 (L) 03/28/2025    ALT 19 04/02/2025    ALT 17 03/28/2025    AST 19 04/02/2025    AST 18 03/28/2025    BILITOT 0.3 04/02/2025    BILITOT 0.4 03/28/2025        CARDIAC PROFILE - LAST 2  Lab Results   Component Value Date    BNP 72 03/13/2025     (H) 06/06/2024     08/10/2022     (H) 01/04/2022    CPKMB 6.9 (H) 01/04/2022    CPKMB 1.9 09/19/2019    TROPONINI <0.030 01/04/2022    TROPONINI 0.038 01/31/2021        COAGULATION - LAST 2  Lab Results   Component Value Date    LABPT 13.8 09/17/2019    INR 1.0 03/14/2025    INR 0.9 06/06/2024    APTT 25.9 03/14/2025    APTT 25.6 06/06/2024       ENDOCRINE & PSA - LAST 2  Lab Results   Component Value Date    HGBA1C 5.4 07/24/2024    HGBA1C 5.3 06/23/2023    TSH 1.774 04/02/2025    TSH 2.228 01/09/2025    PROCAL 0.05 01/31/2021        ECHOCARDIOGRAM RESULTS  Results for orders placed during the hospital encounter of 03/13/25    Echo    Interpretation Summary    Left Ventricle: The left ventricle is normal in size. Mildly increased wall thickness. Unable to assess wall motion.  Grossly appears to be normal There is normal systolic function with a visually estimated ejection fraction of 55 - 60%.    Right Ventricle: The right ventricle is normal in size. Systolic function is normal.    Left Atrium: Moderately dilated    IVC/SVC: Normal venous pressure at 3 mmHg.      CURRENT/PREVIOUS VISIT EKG  Results for orders placed or performed during the hospital encounter of 03/13/25   EKG 12-lead    Collection Time: 03/13/25  3:20 PM   Result Value Ref Range    QRS Duration 106 ms    OHS QTC Calculation 458 ms    Narrative    Test Reason : R07.9,    Vent. Rate :  79 BPM     Atrial Rate :  79 BPM     P-R Int : 214 ms          QRS Dur : 106 ms      QT Int : 400 ms       P-R-T Axes :  67 -60  93 degrees    QTcB Int : 458 ms    Sinus rhythm with 1st degree A-V block  Left anterior fascicular block  Cannot rule  out Anterior infarct ,age undetermined  Abnormal ECG  No previous ECGs available  Confirmed by Farrukh Miller ([id number]) on 3/30/2025 9:20:44 PM    Referred By:            Confirmed By: Farrukh Miller     No valid procedures specified.   Results for orders placed during the hospital encounter of 07/08/24    Nuclear Stress - Cardiology Interpreted    Interpretation Summary    Abnormal myocardial perfusion scan.    There is a mild to moderate intensity, medium sized, mostly fixed perfusion abnormality with some reversibilty in the lateral, apical and septal apical wall(s).    There are no other significant perfusion abnormalities.    The gated perfusion images showed an ejection fraction of 61% at rest. The gated perfusion images showed an ejection fraction of 54% post stress. Normal ejection fraction is greater than 53%.    There is normal wall motion at rest and post stress.    LV cavity size is normal at rest and normal at stress.    The ECG portion of the study is negative for ischemia.    The patient reported chest pain during the stress test.    There were no arrhythmias during stress.    No valid procedures specified.        Assessment:        Assessment & Plan    80-year-old female with normal EF, elevated LVEDP in the past and BNP in the past which has normalized now.  Continues to be significantly short of breath.  Has extensive history of tobacco use and recurrent PEs.  Symptoms probably related to both of those conditions rather than fluid overload or other cardiac issues.    PLAN SUMMARY:  - Continue torsemide and spironolactone for fluid management  - Continue Eliquis as prescribed for long-term blood clot prevention  - follow-up with pulmonologist (Dr. Jones) for evaluation and management of shortness of breath  - Follow up in 1 year for cardiac evaluation    VENOUS THROMBOEMBOLISM:  - Recurrence of blood clots noted after discontinuing Eliquis.  - Continued Eliquis as prescribed for long-term blood clot  prevention.    DYSPNEA:  - Shortness of breath may be due to permanent lung damage from blood clots, fluid retention, or possible COPD.  - Discussed the possibility of COPD contributing to breathing difficulties given extensive smoking history.  - follow-up with pulmonologist (Dr. Jones) for evaluation and management of shortness of breath.    CARDIAC STATUS:  - March BNP results have returned to normal.  - Cardiac status is stable based on previous thorough evaluations.    FLUID MANAGEMENT:  - Continued torsemide and spironolactone for fluid management.    FOLLOW-UP:  - Follow up in 1 year for cardiac evaluation.        1. Chronic heart failure with preserved ejection fraction    2. SOB (shortness of breath)    3. History of pulmonary embolism    4. Hypertension, essential    5. Dyslipidemia           Plan:       Chronic heart failure with preserved ejection fraction    SOB (shortness of breath)  -     IN OFFICE EKG 12-LEAD (to [place])    History of pulmonary embolism  -     IN OFFICE EKG 12-LEAD (to [place])    Hypertension, essential    Dyslipidemia      Follow up in about 1 year (around 5/16/2026) for f/u HFpEF.        This note was generated with the assistance of ambient listening technology. Verbal consent was obtained by the patient and accompanying visitor(s) for the recording of patient appointment to facilitate this note. I attest to having reviewed and edited the generated note for accuracy, though some syntax or spelling errors may persist. Please contact the author of this note for any clarification.      MD Miguel [facility] [place] [facility]         [1]   Current Outpatient Medications   Medication Sig Dispense Refill    amitriptyline (ELAVIL) 25 MG tablet TAKE 1 TABLET EVERY EVENING 90 tablet 0    amLODIPine (NORVASC) 5 MG tablet Take 1 tablet (5 mg total) by mouth once daily. 90 tablet 3    apixaban (ELIQUIS) 5 mg Tab Take 1 tablet (5 mg total) by mouth  2 (two) times daily. 180 tablet 3    artificial tears,hypromellose,,GENTEAL/SUSTANE, (SYSTANE GEL) 0.3 % Gel Place 1 drop into both eyes as needed.      cholecalciferol, vitamin D3, 5,000 unit capsule Take 5,000 Units by mouth once daily.      cranberry fruit extract (CRANBERRY CONCENTRATE ORAL) Take 1 tablet by mouth Daily.      cyanocobalamin (VITAMIN B-12) 1000 MCG tablet Take 5,000 mcg by mouth once daily.      DULoxetine (CYMBALTA) 60 MG capsule TAKE 1 CAPSULE EVERY DAY 90 capsule 3    ezetimibe (ZETIA) 10 mg tablet Take 1 tablet (10 mg total) by mouth once daily. 90 tablet 3    famotidine (PEPCID) 20 MG tablet TAKE 1 TABLET EVERY EVENING 90 tablet 3    gabapentin (NEURONTIN) 800 MG tablet TAKE 1 TABLET THREE TIMES DAILY 270 tablet 3    levothyroxine (SYNTHROID) 125 MCG tablet TAKE 1 TABLET EVERY DAY BEFORE BREAKFAST 90 tablet 3    LORazepam (ATIVAN) 1 MG tablet Take 1 tablet (1 mg total) by mouth every evening. 30 tablet 2    losartan (COZAAR) 100 MG tablet TAKE 1/2 TABLET TWICE DAILY 90 tablet 3    magnesium 30 mg Tab Take 1 tablet by mouth once daily.      metoprolol succinate (TOPROL-XL) 50 MG 24 hr tablet TAKE 1/2 TABLET EVERY EVENING 45 tablet 3    pantoprazole (PROTONIX) 40 MG tablet TAKE 1 TABLET EVERY MORNING 90 tablet 2    potassium chloride SA (K-DUR,KLOR-CON) 20 MEQ tablet Take 20 mEq by mouth once daily.      spironolactone (ALDACTONE) 25 MG tablet TAKE 1 TABLET EVERY DAY 90 tablet 3    torsemide (DEMADEX) 10 MG Tab Take 1 tablet (10 mg total) by mouth once daily. 90 tablet 3    vit C/E/Zn/coppr/lutein/zeaxan (PRESERVISION AREDS-2 ORAL) Take 1 capsule by mouth once daily.      cevimeline (EVOXAC) 30 mg capsule Take 1 capsule (30 mg total) by mouth 3 (three) times daily. (Patient not taking: Reported on 5/16/2025) 270 capsule 3     No current facility-administered medications for this visit.

## 2025-05-16 NOTE — TELEPHONE ENCOUNTER
Left voicemail asking patient to contact the office back.      ----- Message from Med Assistant Khushbu sent at 5/16/2025  1:46 PM CDT -----  Regarding: FW: Returning a call  Contact: :[phone]    ----- Message -----  From: Lucero Thomson MD  Sent: 5/16/2025   1:28 PM CDT  To: Khushbu Esquivel MA  Subject: FW: Returning a call                             [facility].Not sure if you sent this by mistake.  ----- Message -----  From: Khushbu Esquivel MA  Sent: 5/16/2025   1:16 PM CDT  To: Lucero Thomson MD  Subject: FW: Returning a call                               ----- Message -----  From: Marija Martin  Sent: 5/16/2025  12:42 PM CDT  To: Berto Barker Staff  Subject: Returning a call                                 Type:  Patient Returning CallWho Called:Marisela Eagle Who Left Message for Patient:Does the patient know what this is regarding?:Scheduling Would the patient rather a call back or a response via [facility]? Call back Best Call Back Number:[phone] Information:

## 2025-05-20 ENCOUNTER — TELEPHONE (OUTPATIENT)
Dept: RHEUMATOLOGY | Facility: CLINIC | Age: 80
End: 2025-05-20
Payer: MEDICARE

## 2025-05-20 NOTE — TELEPHONE ENCOUNTER
Left voicemail asking patient to contact the office back.        ----- Message from Judie Weber sent at 5/20/2025 12:17 PM CDT -----  Regarding: Appt Reschedule  Contact: [phone]  Reschedule Appt:Current Appt Date: 5/22/25 Type of Appt: Follow UpPhysician: ToribioReason for Rescheduling: Date does not work for patient. Pt has two appts right before this appt at [facility] in [place]. Pt wants a call from office to reschedule appt to a date that works for her.Caller: Patient Contact Preference: [phone]

## 2025-05-22 ENCOUNTER — LAB VISIT (OUTPATIENT)
Dept: LAB | Facility: HOSPITAL | Age: 80
End: 2025-05-22
Attending: PHYSICIAN ASSISTANT
Payer: MEDICARE

## 2025-05-22 ENCOUNTER — OFFICE VISIT (OUTPATIENT)
Dept: FAMILY MEDICINE | Facility: CLINIC | Age: 80
End: 2025-05-22
Payer: MEDICARE

## 2025-05-22 VITALS
HEIGHT: 69 IN | SYSTOLIC BLOOD PRESSURE: 128 MMHG | HEART RATE: 68 BPM | TEMPERATURE: 98 F | DIASTOLIC BLOOD PRESSURE: 74 MMHG | WEIGHT: 236.31 LBS | BODY MASS INDEX: 35 KG/M2

## 2025-05-22 DIAGNOSIS — Z99.89 DEPENDENCE ON OTHER ENABLING MACHINES AND DEVICES: ICD-10-CM

## 2025-05-22 DIAGNOSIS — Z00.00 ENCOUNTER FOR MEDICARE ANNUAL WELLNESS EXAM: Primary | ICD-10-CM

## 2025-05-22 DIAGNOSIS — M34.1 CREST SYNDROME: ICD-10-CM

## 2025-05-22 DIAGNOSIS — I70.0 ATHEROSCLEROSIS OF AORTA: ICD-10-CM

## 2025-05-22 DIAGNOSIS — R26.9 ABNORMALITY OF GAIT AND MOBILITY: ICD-10-CM

## 2025-05-22 DIAGNOSIS — E03.9 HYPOTHYROIDISM, UNSPECIFIED TYPE: ICD-10-CM

## 2025-05-22 DIAGNOSIS — I10 ESSENTIAL HYPERTENSION: ICD-10-CM

## 2025-05-22 LAB
T4 FREE SERPL-MCNC: 1.02 NG/DL (ref 0.71–1.51)
TSH SERPL-ACNC: 2.45 UIU/ML (ref 0.4–4)

## 2025-05-22 PROCEDURE — 84443 ASSAY THYROID STIM HORMONE: CPT | Mod: HCNC

## 2025-05-22 PROCEDURE — 99999 PR PBB SHADOW E&M-EST. PATIENT-LVL IV: CPT | Mod: PBBFAC,HCNC,, | Performed by: NURSE PRACTITIONER

## 2025-05-22 PROCEDURE — 36415 COLL VENOUS BLD VENIPUNCTURE: CPT | Mod: HCNC,PO

## 2025-05-22 PROCEDURE — 84439 ASSAY OF FREE THYROXINE: CPT | Mod: HCNC

## 2025-05-22 RX ORDER — NITROFURANTOIN 25; 75 MG/1; MG/1
100 CAPSULE ORAL
COMMUNITY
Start: 2025-05-02

## 2025-05-22 NOTE — PATIENT INSTRUCTIONS
Counseling and Referral of Other Preventative  (Italic type indicates deductible and co-insurance are waived)    Patient Name: Marisela Eagle  Today's Date: 5/22/2025    Health Maintenance       Date Due Completion Date    TETANUS VACCINE 09/14/2015 9/14/2005    RSV Vaccine (Age 60+ and Pregnant patients) (1 - 1-dose 75+ series) Never done ---    COVID-19 Vaccine (4 - 2024-25 season) 09/01/2024 12/27/2021    DEXA Scan 04/02/2028 4/2/2025    Lipid Panel 01/09/2030 1/9/2025        No orders of the defined types were placed in this encounter.    The following information is provided to all patients.  This information is to help you find resources for any of the problems found today that may be affecting your health:                  Living healthy guide: www.[facility].[place].gov      Understanding Diabetes: www.diabetes.org      Eating healthy: www.cdc.gov/healthyweight      [facility] home safety checklist: www.cdc.gov/steadi/patient.html      Agency on Aging: www.goea.[place].[facility]      Alcoholics anonymous (AA): www.aa.org      Physical Activity: www.elizabeth.nih.gov/mt0ioml      Tobacco use: www.quitwithusla.org

## 2025-05-22 NOTE — PROGRESS NOTES
"  Marisela Eagle presented for a  Medicare AWV and comprehensive Health Risk Assessment today. The following components were reviewed and updated:    Medical history  Family History  Social history  Allergies and Current Medications  Health Risk Assessment  Health Maintenance  Care Team         ** See Completed Assessments for Annual Wellness Visit within the encounter summary.**         The following assessments were completed:  Living Situation  CAGE  Depression Screening  Timed Get Up and Go  Whisper Test  Cognitive Function Screening  Nutrition Screening  ADL Screening  PAQ Screening    Clock in media   Opioid documentation:      Patient does not have a current opioid prescription.        Vitals:    05/22/25 1433   BP: 128/74   Pulse: 68   Temp: 98 °F (36.7 °C)   TempSrc: Oral   Weight: 107.2 kg (236 lb 5.3 oz)   Height: 5' 9" (1.753 m)     Body mass index is 34.9 kg/m².  Physical Exam  Constitutional:       Appearance: She is well-developed.   HENT:      Head: Normocephalic and atraumatic.      Nose: Nose normal.   Eyes:      General: Lids are normal.      Conjunctiva/sclera: Conjunctivae normal.   Cardiovascular:      Rate and Rhythm: Normal rate.   Pulmonary:      Effort: Pulmonary effort is normal.   Neurological:      Mental Status: She is alert and oriented to person, place, and time.   Psychiatric:         Speech: Speech normal.         Behavior: Behavior normal.               Diagnoses and health risks identified today and associated recommendations/orders:    1. Encounter for Medicare annual wellness exam  Discussed health maintenance guidelines appropriate for age.        2. CREST syndrome  Stable, continue care and follow up per rhuem    3. Atherosclerosis of aorta  Stable, continue to monitor  Followed by pcp     4. Essential hypertension  Controlled, continue current medication regimen  Low salt diet  Increase physical activity  Followed by pcp      5. Dependence on other enabling machines and " devices  Stable, continue use of cane    6. Abnormality of gait and mobility  Stable, continue use of cane       Provided Marisela with a 5-10 year written screening schedule and personal prevention plan. Recommendations were developed using the USPSTF age appropriate recommendations. Education, counseling, and referrals were provided as needed. After Visit Summary printed and given to patient which includes a list of additional screenings\tests needed.    Follow up for One year for Annual Wellness Visit.    Vivi Zee, NP      I offered to discuss advanced care planning, including how to pick a person who would make decisions for you if you were unable to make them for yourself, called a health care power of , and what kind of decisions you might make such as use of life sustaining treatments such as ventilators and tube feeding when faced with a life limiting illness recorded on a living will that they will need to know. (How you want to be cared for as you near the end of your natural life)     X  Patient has advanced directives on file, which we reviewed, and they do not wish to make changes.

## 2025-05-27 ENCOUNTER — RESULTS FOLLOW-UP (OUTPATIENT)
Dept: ENDOCRINOLOGY | Facility: CLINIC | Age: 80
End: 2025-05-27

## 2025-06-11 ENCOUNTER — TELEPHONE (OUTPATIENT)
Facility: CLINIC | Age: 80
End: 2025-06-11
Payer: MEDICARE

## 2025-06-11 NOTE — TELEPHONE ENCOUNTER
Clearance request received from Dr García concerning upcoming EGD and recs concerning Eliquis. Reviewed with Dr Green who does not provide clearances of any kind. Spoke to patient made aware that Dr Green does not provide clearances of any kind and provided her with his recs. Verbalized understanding. Recs faxed to Dr García and scanned to chart.

## 2025-06-11 NOTE — TELEPHONE ENCOUNTER
----- Message from Jessica sent at 6/11/2025 11:04 AM CDT -----  Pt would like a cb, she is asking for update on recommendations for Eliquis being sent to Dr. García's office for procedure. Request was meir 06/09, scanned in to [facility] [phone]

## 2025-06-12 ENCOUNTER — PATIENT MESSAGE (OUTPATIENT)
Dept: RHEUMATOLOGY | Facility: CLINIC | Age: 80
End: 2025-06-12
Payer: MEDICARE

## 2025-06-12 ENCOUNTER — TELEPHONE (OUTPATIENT)
Dept: RHEUMATOLOGY | Facility: CLINIC | Age: 80
End: 2025-06-12
Payer: MEDICARE

## 2025-06-12 RX ORDER — METOPROLOL SUCCINATE 50 MG/1
TABLET, EXTENDED RELEASE ORAL
Qty: 45 TABLET | Refills: 3 | Status: SHIPPED | OUTPATIENT
Start: 2025-06-12

## 2025-06-12 RX ORDER — AMITRIPTYLINE HYDROCHLORIDE 25 MG/1
25 TABLET, FILM COATED ORAL NIGHTLY
Qty: 90 TABLET | Refills: 3 | Status: SHIPPED | OUTPATIENT
Start: 2025-06-12

## 2025-06-12 RX ORDER — SPIRONOLACTONE 25 MG/1
25 TABLET ORAL
Qty: 90 TABLET | Refills: 3 | Status: SHIPPED | OUTPATIENT
Start: 2025-06-12

## 2025-06-12 NOTE — TELEPHONE ENCOUNTER
Left voicemail asking patient to contact the office back.      ----- Message from Noe Villa sent at 6/11/2025  3:00 PM CDT -----    ----- Message -----  From: Sam Neely  Sent: 6/11/2025  10:34 AM CDT  To: Berto Barker Staff    Type:  Needs Medical AdviceWho Called: PatientSymptoms (please be specific): na How long has patient had these symptoms:  naPharmacy name and phone #:  naWould the patient rather a call back or a response via [facility]? Call [facility] Call Back Number: [phone] Information: Pt is requesting a call back asap regarding scheduling a f/u appt

## 2025-06-12 NOTE — TELEPHONE ENCOUNTER
Refill Decision Note   Marisela Eagle  is requesting a refill authorization.  Brief Assessment and Rationale for Refill:  Approve     Medication Therapy Plan:         Pharmacist review requested: Yes   Extended chart review required: Yes   Comments:     Note composed:9:59 AM 06/12/2025

## 2025-06-12 NOTE — TELEPHONE ENCOUNTER
[facility] Hospitalist Progress Note      Subjective  Seen while working with PT  Very weak  confused    Physical Exam    Vitals with min/max:    Vital Last Value 24 Hour Range   Temperature 97.5 °F (36.4 °C) (02/14/23 1046) Temp  Min: 97.5 °F (36.4 °C)  Max: 98.4 °F (36.9 °C)   Pulse 85 (02/14/23 1046) Pulse  Min: 63  Max: 85   Respiratory 18 (02/14/23 1046) Resp  Min: 16  Max: 19   Non-Invasive  Blood Pressure 121/76 (02/14/23 1046) BP  Min: 93/62  Max: 132/73   Pulse Oximetry 97 % (02/14/23 1046) SpO2  Min: 94 %  Max: 97 %   Arterial   Blood Pressure   No data recorded      Body mass index is 17.93 kg/m².      I/O's:    Intake/Output Summary (Last 24 hours) at 2/14/2023 1317  Last data filed at 2/14/2023 0631  Gross per 24 hour   Intake --   Output 1025 ml   Net -1025 ml       General: patient not in acute distress.  Awake alert thin cachectic  HEENT: Normocephalic. Normal conjunctiva.  Dry mucous membranes  Respiratory: chest expansion wnl. Lung clear to auscultation bilaterally.  Cardiovascular: Regular rate and rhythm.  No peripheral edema.  Gastrointestinal: Abdomen soft, non tender, non distended. Bowel sound present in all 4 quadrants.  Genitourinary: No suprapubic tenderness.  Musculoskeletal: Moves all 4 extremities, weak  Neurology: alert follows simple commands  Skin: Warm. No concerning rash.        Current Medications:  Current Facility-Administered Medications   Medication Dose Route Frequency Provider Last Rate Last Admin   • ceFAZolin (ANCEF) syringe 1,000 mg  1,000 mg Intravenous 3 times per day Opal White MD   1,000 mg at 02/14/23 0534   • lidocaine (LIDOCARE) 4 % patch 1 patch  1 patch Transdermal Daily Fatuma Molina DO   1 patch at 02/13/23 0921   • sodium chloride (PF) 0.9 % injection 2 mL  2 mL Intracatheter 2 times per day Lorrie Kerns CNP   2 mL at 02/14/23 0940   • carbidopa-levodopa (SINEMET CR)  MG per CR tablet 2 tablet  2 tablet Oral QHS Lorrie Kerns CNP   2 tablet at  No care due was identified.  [facility] Embedded Care Due Messages. Reference number: [id number].   6/12/2025 2:02:42 AM CDT   02/13/23 2200   • carbidopa-levodopa (SINEMET)  MG per tablet 2 tablet  2 tablet Oral 5x Daily Lorrie Kerns CNP   2 tablet at 02/14/23 0940      Current Facility-Administered Medications   Medication Dose Route Frequency Provider Last Rate Last Admin      Current Facility-Administered Medications   Medication Dose Route Frequency Provider Last Rate Last Admin   • bisacodyl (DULCOLAX) suppository 10 mg  10 mg Rectal Daily PRN Lise Cao MD   10 mg at 02/14/23 0938   • LORazepam (ATIVAN) injection 1 mg  1 mg Intravenous Q6H PRN Gogo Kauffman MD   1 mg at 02/11/23 0333   • acetaminophen (TYLENOL) tablet 650 mg  650 mg Oral Q4H PRN Gogo Kauffman MD       • ondansetron (ZOFRAN) injection 4 mg  4 mg Intravenous Q6H PRN Gogo Kauffman MD       • midodrine (PROAMATINE) tablet 2.5 mg  2.5 mg Oral TID PRN Fatuma Molina DO   2.5 mg at 02/11/23 1628   • simethicone (MYLICON) tablet 125 mg  125 mg Oral 4x Daily PRN Fatuma Molina DO       • sodium chloride 0.9 % flush bag 25 mL  25 mL Intravenous PRN Lorrie Kerns CNP       • sodium chloride (NORMAL SALINE) 0.9 % bolus 500 mL  500 mL Intravenous PRN Lorrie Kerns CNP   Completed at 02/11/23 0839          Labs     Recent Labs     02/12/23  1131 02/13/23  0619   WBC 4.7 5.1   RBC 4.03 3.66*   HGB 12.7 11.5*   HCT 38.3 34.6*    172   MCV 95.0 94.5   MCH 31.5 31.4   MCHC 33.2 33.2   NRBCRE 0 0         Recent Labs     02/12/23  1131 02/13/23  0618   SODIUM 139 140   POTASSIUM 3.6 4.0   MG 1.9  --    CO2 21 23   ANIONGAP 12 11   GLUCOSE 121* 105*   BUN 13 24*   CREATININE 0.82 0.83   BCRAT 16 29*   CALCIUM 8.6 8.6        No results found     No results for input(s): INR, PT, PTT in the last 72 hours.    No results available in last 24 hours    Imaging    LAST X-RAY:    CT ABDOMEN PELVIS W CONTRAST   Final Result   1.  Severe hydronephrosis at right kidney with severe cortical thinning and   relative diminished enhancement of severely  thinned cortical tissue.    Severe cortical thinning cortical hypoenhancement are new from prior exam.   2.  Moderate nonspecific gaseous distention of bowel, slightly more   pronounced when compared to prior study.   3.  Mild wall thickening and enhancement at rectum again noted suggesting   nonspecific proctitis change.   4.  Atelectasis versus consolidation/pneumonia right lower lobe.   5.  New superior endplate depression at L1 which is of inexact chronicity   but new from 11/28/2019 exam.      Electronically Signed by: DEE DEE SÁNCHEZ M.D.    Signed on: 2/10/2023 3:17 PM          XR CHEST PA OR AP 1 VIEW   Final Result   1.    Small left basilar opacity likely atelectasis without acute   cardiopulmonary findings.      Electronically Signed by: NAILA PINA M.D.    Signed on: 2/10/2023 12:02 PM                 LAST U/S:  === 01/29/21 ===    US KIDNEY BILATERAL    - Narrative -  EXAM: US KIDNEY BILATERAL    CLINICAL INDICATION:  Incomplete bladder emptying.  Neurogenic bladder.    COMPARISON:  01/28/2020    FINDINGS:      There is severe right-sided hydronephrosis and thinning of the right renal  parenchyma, without significant interval change.  Absence of right ureteral  jet again noted.  Left ureteral jet is present.    The left kidney is unremarkable, without hydronephrosis or renal masses.    The right kidney measures 11.7 x 7.8 x 6.4 cm and the left kidney measures  10.7 x 4.4 x 5.3 cm.    Limited assessment of bladder is unremarkable.    - Impression -  1.  Stable severe right-sided hydronephrosis and nonvisualization of the  right ureteral jet, concerning for chronic obstructive uropathy.  2.  Unremarkable left kidney    Electronically Signed by: NE MACEDO M.D.  Signed on: 1/29/2021 9:16 PM      Cardiac studies:       Encounter Date: 02/10/23   Electrocardiogram 12-Lead   Result Value    Ventricular Rate EKG/Min (BPM) 62    Atrial Rate (BPM) 62    TN-Interval (MSEC) 158    QRS-Interval (MSEC) 144     QT-Interval (MSEC) 468    QTc 475    P Axis (Degrees) 38    R Axis (Degrees) -27    T Axis (Degrees) 103    REPORT TEXT      Normal sinus rhythm  Left ventricular hypertrophy  with QRS widening and repolarization abnormality  Cannot rule out  Septal infarct  , age undetermined  Abnormal ECG  When compared with ECG of  10-FEB-2023 11:26,  T wave inversion more evident in  Anterolateral leads  Confirmed by DELIA COSTELLO MD ([id number]) on 2/11/2023 8:22:18 AM         LAST ECHO/ECHO STRESS:  No valid procedures specified.        Assessment and Plan  Elevated troponin level,  Corie NSTEMI Type II  -cardiology signed off  -s/p ICD interrogation , no acute findings  per cardiology  -echo EF 45%     Labile blood pressure with no history of hypertension  -BP stable  -monitor not on cardiac meds    E.Coli Uti   -on Ancef  - sensitivities  noted    Hypokalemia, resolved  -replaced     Hypertrophic cardiomyopathy  -s/p ICD at [facility]  -interrogation as above       Urinary retention due to neurogenic bladder with hx of chronic right upj obstruction  -appreciate urology recommendations, continue straight cath, no further treatment recc       L1 compression fracture  -start lidocaine patch  -continue prn tylenol  - vitamin d level wnl     Chronic anemia  -hgb 12  -no bleeding  -monitor     Parkinson's disease  -continue carbidopa-levodopa     Dementia  -monitor     Severe protein calorie malnutrition  -nutrition consulted   -monitor     Chronic diarrhea and gas, with history of small bowel obstruction and colon resection x 2  -start prn simethicone     DVT Ppx: scds and lovenox    Dispo:  PT recommending daily PT however,  wants to bring patient home. Will need 24 hour care, will try to ambulate more with PT/OT prior to DC, needs PCP as HHC can not be set up     Code status: full code    Primary Care Physician  No Pcp        Time spend 35  min ,greater than 50% of the time spent reviewing the patient records,  coordinating patient care plan and discussing the above care plan  with nurse.     Lise Cao MD  02/14/23

## 2025-06-12 NOTE — TELEPHONE ENCOUNTER
Refill Routing Note   Medication(s) are not appropriate for processing by [facility] for the following reason(s):        Drug-disease interactionmetoprolol succinate and Raynaud's disease without gangrene     [facility] action(s):  Defer  Approve             Pharmacist review requested: Yes     Appointments  past 12m or future 3m with PCP    Date Provider   Last Visit   4/24/2025 Pawel Badillo III, MD   Next Visit   7/7/2025 Pawel Badillo III, MD   ED visits in past 90 days: 0        Note composed:7:58 AM 06/12/2025

## 2025-06-23 ENCOUNTER — TELEPHONE (OUTPATIENT)
Dept: RHEUMATOLOGY | Facility: CLINIC | Age: 80
End: 2025-06-23
Payer: MEDICARE

## 2025-06-23 NOTE — TELEPHONE ENCOUNTER
Left voicemail asking patient to contact the office back.        Copied from CRM #[id number]. Topic: General Inquiry - Patient Advice  >> Jun 23, 2025 11:31 AM Alayna wrote:  Pt Returning Calls:    Name of Caller: Pt    Who left message for pt: jesu on June     Do you know what the call was regarding: yes scheduling appt for pt     Call back or response via [facility]: call    Call back #[phone]    Additional Info: (optional): Please give pt a call

## 2025-06-24 ENCOUNTER — TELEPHONE (OUTPATIENT)
Dept: PAIN MEDICINE | Facility: CLINIC | Age: 80
End: 2025-06-24

## 2025-06-24 ENCOUNTER — OFFICE VISIT (OUTPATIENT)
Dept: PAIN MEDICINE | Facility: CLINIC | Age: 80
End: 2025-06-24
Payer: MEDICARE

## 2025-06-24 VITALS
DIASTOLIC BLOOD PRESSURE: 79 MMHG | HEART RATE: 79 BPM | HEIGHT: 69 IN | BODY MASS INDEX: 35 KG/M2 | RESPIRATION RATE: 17 BRPM | SYSTOLIC BLOOD PRESSURE: 127 MMHG | WEIGHT: 236.31 LBS

## 2025-06-24 DIAGNOSIS — M96.1 POSTLAMINECTOMY SYNDROME OF LUMBAR REGION: ICD-10-CM

## 2025-06-24 DIAGNOSIS — M47.896 OTHER SPONDYLOSIS, LUMBAR REGION: Primary | ICD-10-CM

## 2025-06-24 DIAGNOSIS — M47.892 OTHER SPONDYLOSIS, CERVICAL REGION: ICD-10-CM

## 2025-06-24 DIAGNOSIS — M51.369 DEGENERATION OF INTERVERTEBRAL DISC OF LUMBAR REGION, UNSPECIFIED WHETHER PAIN PRESENT: ICD-10-CM

## 2025-06-24 DIAGNOSIS — M50.30 DDD (DEGENERATIVE DISC DISEASE), CERVICAL: ICD-10-CM

## 2025-06-24 PROCEDURE — 3288F FALL RISK ASSESSMENT DOCD: CPT | Mod: CPTII,HCNC,S$GLB, | Performed by: PHYSICIAN ASSISTANT

## 2025-06-24 PROCEDURE — 1159F MED LIST DOCD IN RCRD: CPT | Mod: CPTII,HCNC,S$GLB, | Performed by: PHYSICIAN ASSISTANT

## 2025-06-24 PROCEDURE — 1101F PT FALLS ASSESS-DOCD LE1/YR: CPT | Mod: CPTII,HCNC,S$GLB, | Performed by: PHYSICIAN ASSISTANT

## 2025-06-24 PROCEDURE — 3078F DIAST BP <80 MM HG: CPT | Mod: CPTII,HCNC,S$GLB, | Performed by: PHYSICIAN ASSISTANT

## 2025-06-24 PROCEDURE — 99214 OFFICE O/P EST MOD 30 MIN: CPT | Mod: HCNC,S$GLB,, | Performed by: PHYSICIAN ASSISTANT

## 2025-06-24 PROCEDURE — 99999 PR PBB SHADOW E&M-EST. PATIENT-LVL IV: CPT | Mod: PBBFAC,HCNC,, | Performed by: PHYSICIAN ASSISTANT

## 2025-06-24 PROCEDURE — 1157F ADVNC CARE PLAN IN RCRD: CPT | Mod: CPTII,HCNC,S$GLB, | Performed by: PHYSICIAN ASSISTANT

## 2025-06-24 PROCEDURE — 3074F SYST BP LT 130 MM HG: CPT | Mod: CPTII,HCNC,S$GLB, | Performed by: PHYSICIAN ASSISTANT

## 2025-06-24 PROCEDURE — 1125F AMNT PAIN NOTED PAIN PRSNT: CPT | Mod: CPTII,HCNC,S$GLB, | Performed by: PHYSICIAN ASSISTANT

## 2025-06-24 NOTE — TELEPHONE ENCOUNTER
Type:  Patient Returning Call    Who Called:pt  Who Left Message for Patient:Marianna  Does the patient know what this is regarding?:yes  Would the patient rather a call back or a response via [facility]? call  Best Call Back Number:[phone]    Additional Information:

## 2025-06-24 NOTE — TELEPHONE ENCOUNTER
LVM for patient to contact the office to address any concerns. Meera spoke with patient earlier today in depth in reference to medication concerns.

## 2025-06-24 NOTE — PROGRESS NOTES
Referring Physician: No ref. provider found    PCP: Pawel Badillo III, MD      CC: neck pain, lower back pain    Interval History:  Marisela Eagle is a 80 y.o. female with chronic neck and lower back pain who presents today for f/u s/p lumbar MB RFA. Reports minimal improvement however she mainly points to lumbar paraspinous muscles and SIJ injection. Neck pain is unchanged since LCV. She wishes to have MBB for cervical spine however she was placed on Eliquis 4 months ago after DVT and PE, She receives shoulder injections q 3 months. Admits she cannot do any exercise or rehab 2/2 pain. Denies any b/b changes or worsening weakness. Pain today is rated 8/10.    HPI:   Marisela Eagle is a 80 y.o. female referred to us for neck and lower back pain.  Neck pain is a constant aching, throbbing pain in her posterior neck.  He has been present for over 10 years.  Pain radiates to her bilateral shoulders.  She has history of shoulder osteoarthritis and continues to follow Orthopedics for shoulder injections every 3 months.  Neck pain does not radiate down upper extremities.  Pain worsens with extension lateral rotation of the neck.  She had MRI of the cervical spine in his has been seen by Neurosurgery.  She does not desire surgical intervention.  She has not had any cervical spine interventions.  Lower back pain is more bothersome.  He is a constant aching, throbbing pain in his lower back.  Pain radiates to her hips and buttocks.  Lower back pain is more bothersome and worsens standing, walking getting up.  Pain improves with rest.  She has history of spacers placed between her L4 and L5 disc spaces over 10 years ago.  No other surgical history.  She had MRI of the lumbar spine.  She states being worked up for spinal cord stimulation by outside physician.  She states having a successful SCS trial but SCS implant was aborted due to an infection in her lungs.  She has not decided to proceed with SCS implant  currently.  She denies any worsening weakness.  No bowel bladder changes.    ROS:  CONSTITUTIONAL: No fevers, chills, night sweats, wt. loss, appetite changes  SKIN: no rashes or itching  ENT: No headaches, head trauma, vision changes, or eye pain  LYMPH NODES: None noticed   CV: No chest pain, palpitations.   RESP: No shortness of breath, dyspnea on exertion, cough, wheezing, or hemoptysis  GI: No nausea, emesis, diarrhea, constipation, melena, hematochezia, pain.    : No dysuria, hematuria, urgency, or frequency   HEME: No easy bruising, bleeding problems  PSYCHIATRIC: No depression, anxiety, psychosis, hallucinations.  NEURO: No seizures, memory loss, dizziness or difficulty sleeping  MSK: +HPI      Past Medical History:   Diagnosis Date    Abnormal stress test     shortness of breath    Bilateral knee pain 2012    left worse, right knee painful even after partial replacement.    Bruises easily     Clot ?    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)     no treatment needed    Hyperlipidemia     Hypertension     Migraines     Neuropathy     peripheral    Pulmonary embolus     dvt knee    Thyroid disease     hypothyroid, has nodules    Varicose veins      Past Surgical History:   Procedure Laterality Date    ADENOIDECTOMY      APPENDECTOMY      BILATERAL SALPINGOOPHORECTOMY       SECTION      COLONOSCOPY  2012    HIP ARTHROPLASTY      HYSTERECTOMY      with BSO    INJECTION OF ANESTHETIC AGENT AROUND MEDIAL BRANCH NERVES INNERVATING LUMBAR FACET JOINT Bilateral 2025    Procedure: Block-nerve-medial branch-lumbar;  Surgeon: Geoffrey Caal MD;   Location: [facility] OR;  Service: Pain Management;  Laterality: Bilateral;    INJECTION OF ANESTHETIC AGENT AROUND MEDIAL BRANCH NERVES INNERVATING LUMBAR FACET JOINT Bilateral 2025    Procedure: Block-nerve-medial branch-lumbar level 1;  Surgeon: Geoffrey Cala MD;  [facility];  Service: Pain Management;  Laterality: Bilateral;    JOINT REPLACEMENT  2012    rt unilateral knee arthroplasty. Took Coumadin x 6 weeks    KNEE ARTHROPLASTY Bilateral     KNEE ARTHROSCOPY  2010    right    LEFT HEART CATHETERIZATION Left 2024    Procedure: Left heart cath;  Surgeon: Cooper Canchola MD;  Location: [facility] CATH/EP LAB;  Service: Cardiology;  Laterality: Left;    RADIOFREQUENCY ABLATION OF LUMBAR MEDIAL BRANCH NERVE AT SINGLE LEVEL Bilateral 2025    Procedure: Radiofrequency Ablation, Nerve, Spinal, Lumbar, Medial Branch, 1 Level;  Surgeon: Geoffrey Caal MD;  Location: [facility] PAIN MANAGEMENT;  Service: Pain Management;  Laterality: Bilateral;    TONSILLECTOMY       Family History   Problem Relation Name Age of Onset    Neuropathy Mother      Hypertension Mother      Stroke Mother      Neuropathy Father      Cancer Daughter      Diabetes Maternal Grandmother      Melanoma Neg Hx      Psoriasis Neg Hx      Lupus Neg Hx      Eczema Neg Hx       Social History     Socioeconomic History    Marital status:    Occupational History    Occupation: RETIRED   Tobacco Use    Smoking status: Former     Current packs/day: 0.00     Types: Cigarettes     Quit date: 3/23/2012     Years since quittin.2    Smokeless tobacco: Never   Substance and Sexual Activity    Alcohol use: Not Currently    Drug use: No     Social Drivers of Health     Financial Resource Strain: Patient Declined (2025)    Overall Financial Resource Strain (CARDIA)     Difficulty of Paying Living Expenses: Patient declined   Food Insecurity: Unknown (2025)    Hunger Vital Sign     Worried About Running Out of Food in the  "Last Year: Never true     Ran Out of Food in the Last Year: Patient declined   Transportation Needs: No Transportation Needs (5/22/2025)    PRAPARE - Transportation     Lack of Transportation (Medical): No     Lack of Transportation (Non-Medical): No   Physical Activity: Inactive (5/22/2025)    Exercise Vital Sign     Days of Exercise per Week: 0 days     Minutes of Exercise per Session: 0 min   Stress: Stress Concern Present (5/22/2025)    [employer] - Occupational Stress Questionnaire     Feeling of Stress : To some extent   Housing Stability: Unknown (5/22/2025)    Housing Stability Vital Sign     Unable to Pay for Housing in the Last Year: Patient declined     Number of Times Moved in the Last Year: 0     Homeless in the Last Year: No         Medications/Allergies: See med card    Vitals:    06/24/25 1300   BP: 127/79   Pulse: 79   Resp: 17   Weight: 107.2 kg (236 lb 5.3 oz)   Height: 5' 9" (1.753 m)   PainSc:   8   PainLoc: Back         Physical exam:    GENERAL: A and O x3, the patient appears well groomed and is in no acute distress.  Skin: No rashes or obvious lesions  HEENT: normocephalic, atraumatic  CARDIOVASCULAR:  RRR  LUNGS: non labored breathing  ABDOMEN: soft, nontender   UPPER EXTREMITIES: Normal alignment, normal range of motion, no atrophy, no skin changes,  hair growth and nail growth normal and equal bilaterally. No swelling, no tenderness.    LOWER EXTREMITIES:  Normal alignment, normal range of motion, no atrophy, no skin changes,  hair growth and nail growth normal and equal bilaterally. No swelling, no tenderness.  CERVICAL SPINE:  Cervical spine: ROM is limited in flexion, extension and lateral rotation with mild to moderate increased pain.  Spurling's maneuver causes no neck pain to either side.  Myofascial exam: moderate Tenderness to palpation across cervical paraspinous region bilaterally.    LUMBAR SPINE  Lumbar spine: ROM is limited with flexion extension " and oblique extension with mild to moderate increased pain.    Wilbert's test causes no increased pain on either side.    Supine straight leg raise is negative bilaterally.    Internal and external rotation of the hip causes no increased pain on either side.  Myofascial exam: moderate tenderness to palpation across lumbar paraspinous muscles.  B/L SIJ TTP, L>R    MENTAL STATUS: normal orientation, speech, language, and fund of knowledge for social situation.  Emotional state appropriate.    CRANIAL NERVES:  II:  PERRL bilaterally,   III,IV,VI: EOMI.    V:  Facial sensation equal bilaterally  VII:  Facial motor function normal.  VIII:  Hearing equal to finger rub bilaterally  IX/X: Gag normal, palate symmetric  XI:  Shoulder shrug equal, head turn equal  XII:  Tongue midline without fasciculations      MOTOR: Tone and bulk: normal bilateral upper and lower Strength: normal   Delt Bi Tri WE WF     R 5 5 5 5 5 5   L 5 5 5 5 5 5     IP ADD ABD Quad TA Gas HAM  R 5 5 5 5 5 5 5  L 5 5 5 5 5 5 5    SENSATION: Light touch and pinprick intact bilaterally  REFLEXES: normal, symmetric, nonbrisk.  Toes down, no clonus. No hoffmans.  GAIT: normal rise, base, steps, and arm swing.        Imaging:  MRI L-spine 12/2023  Susceptibility artifact related to inter spinous fusion hardware at L3-4 and L4-5.     Alignment: Straightening of the lumbar lordosis with minimal stepwise retrolisthesis of L2 on L3, L3 on L4 and L4 on L5.     Vertebral column: No evidence of an acute fracture or aggressive marrow replacement process. Multilevel disc degeneration with intervertebral disc space narrowing, disc desiccation, and marginal osteophyte formation, most pronounced at L2-3 and L4-5 with severe disc space narrowing.  Chronic Schmorl's node formation along the superior and inferior endplates of L1 with associated mild right asymmetric superior endplate height loss.  Stable probable small hemangioma is within the T12, L1 and L2 vertebral  bodies.     Cord: Normal.  Conus terminates at T12-L1.     Degenerative findings:     T12-L1: Mild-to-moderate disc space narrowing.  Mild diffuse disc bulge.  Mild bilateral facet arthropathy and ligamentum flavum thickening.  No neural foraminal or spinal canal stenosis.     L1-L2: Mild disc space narrowing.  Mild diffuse disc bulge.  Mild bilateral facet arthropathy and ligamentum flavum thickening.  No neural foraminal or spinal canal stenosis.     L2-L3: Marked disc space narrowing.  Diffuse disc bulge with superimposed small left central disc extrusion with superior extension to the L2 infra pedicular level.  Mild bilateral facet arthropathy and ligamentum flavum thickening.  No significant neural foraminal stenosis.  Mild spinal canal stenosis.     L3-L4: Moderate disc space narrowing.  Left asymmetric diffuse disc bulge with osteophytic ridging.  Moderate bilateral facet arthropathy and ligamentum flavum thickening.  Small bursal cyst along the right aspect of the spinous process.  Moderate left and mild right neural foraminal stenosis.  Moderate spinal canal stenosis.     L4-L5: Marked disc space narrowing.  Diffuse disc bulge and osteophytic ridging.  Severe left greater than right facet arthropathy and prominent ligamentum flavum thickening.  Severe left and mild right neural foraminal stenosis.  Mild spinal canal stenosis.  Mild diffuse disc bulge.     L5-S1: Severe bilateral facet arthropathy.  Ligamentum flavum thickening.  Mild bilateral neural foraminal stenosis.  No significant spinal canal stenosis.      MRI C-spine 9/2024  Vertebral column: The vertebral bodies maintain normal height.  There is no fracture.  There is degenerative disc and facet disease which will be described by level.  All of the discs are desiccated.  The odontoid process is intact.  There is severe disc space narrowing at the C5-6 and C6-7 levels.  There is mild anterolisthesis of C7 on T1, T1 on T2.  There is trace  anterolisthesis of C3 on C4.  There is also mild retrolisthesis of C5 on C6 and C6 on C7 which is exaggerated by osteophyte formation.  Large bulky anterior osteophytes are present at the C4 through C6 levels.  Baseline marrow signal is normal.     Spinal canal, cord, epidural space: The spinal canal is developmentally normal.  There is no abnormal epidural mass or fluid collection.  The cord is normal in caliber and signal intensity.     Findings by level:     C2-3: There is bilateral facet joint arthropathy.  There is no spinal stenosis.  There is mild right foraminal stenosis.     C3-4: There is subtle trace anterolisthesis of C3 on C4.  There is marked left and moderate right facet joint arthropathy.  There is bilateral uncovertebral spurring with a disc osteophyte complex.  There is no spinal stenosis.  There is severe left and at least moderate right foraminal stenosis.     C4-5: There is a broad disc protrusion/osteophyte eccentric to the left.  This narrows the subarachnoid space and contributes to mild spinal stenosis.  There is marked left facet joint arthropathy.  There is mild right greater than left uncovertebral spurring.  There is moderate right but only mild left foraminal stenosis.     C5-6: There is marked disc space narrowing, bilateral uncovertebral spurring.  There is bilateral facet joint arthropathy.  There is a broad disc protrusion/osteophyte.  There is moderate spinal stenosis with severe bilateral foraminal stenosis.  There is no cord compression.     C6-7: There is severe disc space narrowing.  There is left greater than right facet joint arthropathy with bilateral uncovertebral spurring and a broad disc protrusion/osteophyte.  There is only borderline spinal stenosis without cord compression.  There is marked left and mild-to-moderate right foraminal stenosis.     C7-T1: There is mild anterolisthesis of C7 on T1.  There is mild disc space narrowing.  There is left greater than right  facet joint arthropathy.  There is a disc bulge.  There is no spinal stenosis.  There is mild-to-moderate left but only mild right foraminal stenosis.    Assessment:  Marisela Eagle is a 80 y.o. female with neck and lower back pain.  1. Other spondylosis, lumbar region    2. Degeneration of intervertebral disc of lumbar region, unspecified whether pain present    3. Postlaminectomy syndrome of lumbar region    4. DDD (degenerative disc disease), cervical    5. Other spondylosis, cervical region        Plan:  I have stressed the importance of physical activity and exercise to improve overall health  Reviewed pertinent imaging and records with patient  She does have L SIJ pain unfortunately she gets b/l shoulder injections 4 x annually.  Consider cervical medial branch blocks to help with her neck pain. Will need to hold Eliquis for RFA.  This also avoids steroid administration as she continues to get steroid injections in her bilateral shoulder by orthopedics.  She did request resuming her opioid medication.  Discussed that we would we okay with resuming her opioid medication if she discontinues her benzodiazepine use.  Patient expressed understanding and we will start weaning off her benzodiazepine use.  Robaxin 500 mg q 8 h prn  F/u prn, Consider TPIs with [facility] only

## 2025-06-25 ENCOUNTER — TELEPHONE (OUTPATIENT)
Dept: PAIN MEDICINE | Facility: CLINIC | Age: 80
End: 2025-06-25
Payer: MEDICARE

## 2025-06-25 RX ORDER — FAMOTIDINE 20 MG/1
20 TABLET, FILM COATED ORAL NIGHTLY
Qty: 90 TABLET | Refills: 3 | Status: SHIPPED | OUTPATIENT
Start: 2025-06-25

## 2025-06-25 NOTE — TELEPHONE ENCOUNTER
No care due was identified.  Health [facility] Embedded Care Due Messages. Reference number: [id number].   6/25/2025 2:07:53 AM CDT

## 2025-06-25 NOTE — TELEPHONE ENCOUNTER
General Information   Communication   Type: Needs Medical Advice      Who Called:  pt      Symptoms (please be specific):  na      How long has patient had these symptoms:  na      Pharmacy name and phone #:        [facility]  [place] [address]      [address] [facility]      [place] [address]      Phone: [phone] Fax: [phone]            Best Call Back Number: [phone]            Additional Information: Pt is asking to speak with Meera MCFADDEN in regards to her appt yesterday 6/24 and the procedure they talked about.       Called pt Meera is not a regular pain management nurse,         Spoke with pt and scheduled  for office visit with Dr Caal , for TPI

## 2025-06-25 NOTE — TELEPHONE ENCOUNTER
Refill Decision Note   Marisela Eagle  is requesting a refill authorization.  Brief Assessment and Rationale for Refill:  Approve     Medication Therapy Plan:         Alert overridden per protocol: Yes   Comments:     Note composed:6:40 AM 06/25/2025

## 2025-06-26 ENCOUNTER — PATIENT MESSAGE (OUTPATIENT)
Dept: RHEUMATOLOGY | Facility: CLINIC | Age: 80
End: 2025-06-26

## 2025-07-02 ENCOUNTER — TELEPHONE (OUTPATIENT)
Dept: BARIATRICS | Facility: CLINIC | Age: 80
End: 2025-07-02
Payer: MEDICARE

## 2025-07-02 ENCOUNTER — TELEPHONE (OUTPATIENT)
Dept: PAIN MEDICINE | Facility: CLINIC | Age: 80
End: 2025-07-02

## 2025-07-02 ENCOUNTER — OFFICE VISIT (OUTPATIENT)
Dept: PAIN MEDICINE | Facility: CLINIC | Age: 80
End: 2025-07-02
Payer: MEDICARE

## 2025-07-02 VITALS
WEIGHT: 236.31 LBS | HEIGHT: 69 IN | DIASTOLIC BLOOD PRESSURE: 75 MMHG | HEART RATE: 83 BPM | BODY MASS INDEX: 35 KG/M2 | SYSTOLIC BLOOD PRESSURE: 116 MMHG

## 2025-07-02 DIAGNOSIS — M47.896 OTHER SPONDYLOSIS, LUMBAR REGION: ICD-10-CM

## 2025-07-02 DIAGNOSIS — M47.892 OTHER SPONDYLOSIS, CERVICAL REGION: Primary | ICD-10-CM

## 2025-07-02 DIAGNOSIS — M79.18 MYOFASCIAL PAIN: ICD-10-CM

## 2025-07-02 DIAGNOSIS — M96.1 POSTLAMINECTOMY SYNDROME OF LUMBAR REGION: ICD-10-CM

## 2025-07-02 DIAGNOSIS — M50.30 DDD (DEGENERATIVE DISC DISEASE), CERVICAL: ICD-10-CM

## 2025-07-02 PROCEDURE — 99999 PR PBB SHADOW E&M-EST. PATIENT-LVL II: CPT | Mod: PBBFAC,HCNC,, | Performed by: ANESTHESIOLOGY

## 2025-07-02 RX ORDER — BUPIVACAINE HYDROCHLORIDE 5 MG/ML
10 INJECTION, SOLUTION PERINEURAL
Status: DISCONTINUED | OUTPATIENT
Start: 2025-07-02 | End: 2025-07-02 | Stop reason: HOSPADM

## 2025-07-02 RX ADMIN — BUPIVACAINE HYDROCHLORIDE 10 ML: 5 INJECTION, SOLUTION PERINEURAL at 09:07

## 2025-07-02 NOTE — PROCEDURES
Trigger Point Injection    Performed by: Geoffrey Caal MD  Authorized by: Geoffrey Caal MD    Lumbar Paraspinal:  Bilateral    Consent Done?:  Yes (Verbal)    Pre-Procedure:   Indications:  Pain relief  Site marked: the procedure site was marked     Timeout: prior to procedure the correct patient, procedure, and site was verified    Medications: 10 mL BUPivacaine 0.5 % (5 mg/mL)  Lumbosacral:  Bilateral

## 2025-07-02 NOTE — TELEPHONE ENCOUNTER
Types of orders made on 07/02/2025: Procedure Request      Order Date:7/2/2025   Ordering User:SONIA CAAL [[id number]   Encounter Provider:Sonia Caal MD [[id number]]   Authorizing Provider: Sonia Caal MD [[id number]]   Department:[facility] PAIN MANAGEMENT[[id number]]      Common Order Information   Procedure -> Medial Branch Block (Specify level and laterality) Cmt: C5/6 and             C6/7      Order Specific Information   Order: Procedure Request Order for Pain Management [Custom: [id number]]  Order #:          [id number]: 1 FUTURE     Priority: Routine  Class: Clinic Performed     Future Order Information       Expires on:07/02/2026            Expected by:07/02/2025                   Associated Diagnoses       M47.892 Other spondylosis, cervical region       Facility Name: -> [place]              Priority: Routine  Class: Clinic Performed     Future Order Information       Expires on:07/02/2026            Expected by:07/02/2025                   Associated Diagnoses       M47.892 Other spondylosis, cervical region       Procedure -> Medial Branch Block (Specify level and laterality) Cmt: C5/6                 and C6/7

## 2025-07-02 NOTE — PROGRESS NOTES
Referring Physician: No ref. provider found    PCP: Pawel Badillo III, MD      CC: neck pain, lower back pain    Interval History:  Marisela Eagle is a 80 y.o. female with chronic neck and lower back pain who presents today for f/u s/p lumbar MB RFA. Reports minimal improvement however she mainly points to lumbar paraspinous muscles and SIJ injection. She is here for TPIs to her lower back.  Neck pain is unchanged since LCV. She wishes to have MBB for cervical spine however she was placed on Eliquis 4 months ago after DVT and PE, She receives shoulder injections q 3 months. Admits she cannot do any exercise or rehab 2/2 pain. Denies any b/b changes or worsening weakness. Pain today is rated 8/10.    Prior HPI:   Marisela Eagle is a 80 y.o. female referred to us for neck and lower back pain.  Neck pain is a constant aching, throbbing pain in her posterior neck.  He has been present for over 10 years.  Pain radiates to her bilateral shoulders.  She has history of shoulder osteoarthritis and continues to follow Orthopedics for shoulder injections every 3 months.  Neck pain does not radiate down upper extremities.  Pain worsens with extension lateral rotation of the neck.  She had MRI of the cervical spine in his has been seen by Neurosurgery.  She does not desire surgical intervention.  She has not had any cervical spine interventions.  Lower back pain is more bothersome.  He is a constant aching, throbbing pain in his lower back.  Pain radiates to her hips and buttocks.  Lower back pain is more bothersome and worsens standing, walking getting up.  Pain improves with rest.  She has history of spacers placed between her L4 and L5 disc spaces over 10 years ago.  No other surgical history.  She had MRI of the lumbar spine.  She states being worked up for spinal cord stimulation by outside physician.  She states having a successful SCS trial but SCS implant was aborted due to an infection in her lungs.  She has  not decided to proceed with SCS implant currently.  She denies any worsening weakness.  No bowel bladder changes.    ROS:  CONSTITUTIONAL: No fevers, chills, night sweats, wt. loss, appetite changes  SKIN: no rashes or itching  ENT: No headaches, head trauma, vision changes, or eye pain  LYMPH NODES: None noticed   CV: No chest pain, palpitations.   RESP: No shortness of breath, dyspnea on exertion, cough, wheezing, or hemoptysis  GI: No nausea, emesis, diarrhea, constipation, melena, hematochezia, pain.    : No dysuria, hematuria, urgency, or frequency   HEME: No easy bruising, bleeding problems  PSYCHIATRIC: No depression, anxiety, psychosis, hallucinations.  NEURO: No seizures, memory loss, dizziness or difficulty sleeping  MSK: +HPI      Past Medical History:   Diagnosis Date    Abnormal stress test     shortness of breath    Bilateral knee pain 2012    left worse, right knee painful even after partial replacement.    Bruises easily     Clot ?    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)     no treatment needed    Hyperlipidemia     Hypertension     Migraines     Neuropathy     peripheral    Pulmonary embolus     dvt knee    Thyroid disease     hypothyroid, has nodules    Varicose veins      Past Surgical History:   Procedure Laterality Date    ADENOIDECTOMY      APPENDECTOMY      BILATERAL SALPINGOOPHORECTOMY       SECTION      COLONOSCOPY  2012    HIP ARTHROPLASTY      HYSTERECTOMY      with BSO    INJECTION OF ANESTHETIC AGENT AROUND MEDIAL BRANCH NERVES INNERVATING LUMBAR FACET JOINT Bilateral 2025    Procedure: Block-nerve-medial  branch-lumbar;  Surgeon: Geoffrey Caal MD;  Location: [facility] OR;  Service: Pain Management;  Laterality: Bilateral;    INJECTION OF ANESTHETIC AGENT AROUND MEDIAL BRANCH NERVES INNERVATING LUMBAR FACET JOINT Bilateral 2025    Procedure: Block-nerve-medial branch-lumbar level 1;  Surgeon: Geoffrey Caal MD;  Location: [facility] OR;  Service: Pain Management;  Laterality: Bilateral;    JOINT REPLACEMENT  2012    rt unilateral knee arthroplasty. Took Coumadin x 6 weeks    KNEE ARTHROPLASTY Bilateral     KNEE ARTHROSCOPY  2010    right    LEFT HEART CATHETERIZATION Left 2024    Procedure: Left heart cath;  Surgeon: Cooper Canchola MD;  Location: [facility] CATH/EP LAB;  Service: Cardiology;  Laterality: Left;    RADIOFREQUENCY ABLATION OF LUMBAR MEDIAL BRANCH NERVE AT SINGLE LEVEL Bilateral 2025    Procedure: Radiofrequency Ablation, Nerve, Spinal, Lumbar, Medial Branch, 1 Level;  Surgeon: Geoffrey Caal MD;  Location: [facility] PAIN MANAGEMENT;  Service: Pain Management;  Laterality: Bilateral;    TONSILLECTOMY       Family History   Problem Relation Name Age of Onset    Neuropathy Mother      Hypertension Mother      Stroke Mother      Neuropathy Father      Cancer Daughter      Diabetes Maternal Grandmother      Melanoma Neg Hx      Psoriasis Neg Hx      Lupus Neg Hx      Eczema Neg Hx       Social History     Socioeconomic History    Marital status:    Occupational History    Occupation: RETIRED   Tobacco Use    Smoking status: Former     Current packs/day: 0.00     Types: Cigarettes     Quit date: 3/23/2012     Years since quittin.2    Smokeless tobacco: Never   Substance and Sexual Activity    Alcohol use: Not Currently    Drug use: No     Social Drivers of Health     Financial Resource Strain: Patient Declined (2025)    Overall Financial Resource Strain (CARDIA)     Difficulty of Paying Living Expenses: Patient declined   Food Insecurity: Unknown (2025)    Hunger Vital Sign  "    Worried About Running Out of Food in the Last Year: Never true     Ran Out of Food in the Last Year: Patient declined   Transportation Needs: No Transportation Needs (5/22/2025)    PRAPARE - Transportation     Lack of Transportation (Medical): No     Lack of Transportation (Non-Medical): No   Physical Activity: Inactive (5/22/2025)    Exercise Vital Sign     Days of Exercise per Week: 0 days     Minutes of Exercise per Session: 0 min   Stress: Stress Concern Present (5/22/2025)    [employer] - Occupational Stress Questionnaire     Feeling of Stress : To some extent   Housing Stability: Unknown (5/22/2025)    Housing Stability Vital Sign     Unable to Pay for Housing in the Last Year: Patient declined     Number of Times Moved in the Last Year: 0     Homeless in the Last Year: No         Medications/Allergies: See med card    Vitals:    07/02/25 0910   BP: 116/75   Pulse: 83   Weight: 107.2 kg (236 lb 5.3 oz)   Height: 5' 9" (1.753 m)   PainSc:   5   PainLoc: Generalized         Physical exam:    GENERAL: A and O x3, the patient appears well groomed and is in no acute distress.  Skin: No rashes or obvious lesions  HEENT: normocephalic, atraumatic  CARDIOVASCULAR:  RRR  LUNGS: non labored breathing  ABDOMEN: soft, nontender   UPPER EXTREMITIES: Normal alignment, normal range of motion, no atrophy, no skin changes,  hair growth and nail growth normal and equal bilaterally. No swelling, no tenderness.    LOWER EXTREMITIES:  Normal alignment, normal range of motion, no atrophy, no skin changes,  hair growth and nail growth normal and equal bilaterally. No swelling, no tenderness.  CERVICAL SPINE:  Cervical spine: ROM is limited in flexion, extension and lateral rotation with mild to moderate increased pain.  Spurling's maneuver causes no neck pain to either side.  Myofascial exam: moderate Tenderness to palpation across cervical paraspinous region bilaterally.    LUMBAR SPINE  Lumbar " spine: ROM is limited with flexion extension and oblique extension with mild to moderate increased pain.    Wilbert's test causes no increased pain on either side.    Supine straight leg raise is negative bilaterally.    Internal and external rotation of the hip causes no increased pain on either side.  Myofascial exam: moderate tenderness to palpation across lumbar paraspinous muscles.  B/L SIJ TTP, L>R    MENTAL STATUS: normal orientation, speech, language, and fund of knowledge for social situation.  Emotional state appropriate.    CRANIAL NERVES:  II:  PERRL bilaterally,   III,IV,VI: EOMI.    V:  Facial sensation equal bilaterally  VII:  Facial motor function normal.  VIII:  Hearing equal to finger rub bilaterally  IX/X: Gag normal, palate symmetric  XI:  Shoulder shrug equal, head turn equal  XII:  Tongue midline without fasciculations      MOTOR: Tone and bulk: normal bilateral upper and lower Strength: normal   Delt Bi Tri WE WF     R 5 5 5 5 5 5   L 5 5 5 5 5 5     IP ADD ABD Quad TA Gas HAM  R 5 5 5 5 5 5 5  L 5 5 5 5 5 5 5    SENSATION: Light touch and pinprick intact bilaterally  REFLEXES: normal, symmetric, nonbrisk.  Toes down, no clonus. No hoffmans.  GAIT: normal rise, base, steps, and arm swing.        Imaging:  MRI L-spine 12/2023  Susceptibility artifact related to inter spinous fusion hardware at L3-4 and L4-5.     Alignment: Straightening of the lumbar lordosis with minimal stepwise retrolisthesis of L2 on L3, L3 on L4 and L4 on L5.     Vertebral column: No evidence of an acute fracture or aggressive marrow replacement process. Multilevel disc degeneration with intervertebral disc space narrowing, disc desiccation, and marginal osteophyte formation, most pronounced at L2-3 and L4-5 with severe disc space narrowing.  Chronic Schmorl's node formation along the superior and inferior endplates of L1 with associated mild right asymmetric superior endplate height loss.  Stable probable small  hemangioma is within the T12, L1 and L2 vertebral bodies.     Cord: Normal.  Conus terminates at T12-L1.     Degenerative findings:     T12-L1: Mild-to-moderate disc space narrowing.  Mild diffuse disc bulge.  Mild bilateral facet arthropathy and ligamentum flavum thickening.  No neural foraminal or spinal canal stenosis.     L1-L2: Mild disc space narrowing.  Mild diffuse disc bulge.  Mild bilateral facet arthropathy and ligamentum flavum thickening.  No neural foraminal or spinal canal stenosis.     L2-L3: Marked disc space narrowing.  Diffuse disc bulge with superimposed small left central disc extrusion with superior extension to the L2 infra pedicular level.  Mild bilateral facet arthropathy and ligamentum flavum thickening.  No significant neural foraminal stenosis.  Mild spinal canal stenosis.     L3-L4: Moderate disc space narrowing.  Left asymmetric diffuse disc bulge with osteophytic ridging.  Moderate bilateral facet arthropathy and ligamentum flavum thickening.  Small bursal cyst along the right aspect of the spinous process.  Moderate left and mild right neural foraminal stenosis.  Moderate spinal canal stenosis.     L4-L5: Marked disc space narrowing.  Diffuse disc bulge and osteophytic ridging.  Severe left greater than right facet arthropathy and prominent ligamentum flavum thickening.  Severe left and mild right neural foraminal stenosis.  Mild spinal canal stenosis.  Mild diffuse disc bulge.     L5-S1: Severe bilateral facet arthropathy.  Ligamentum flavum thickening.  Mild bilateral neural foraminal stenosis.  No significant spinal canal stenosis.      MRI C-spine 9/2024  Vertebral column: The vertebral bodies maintain normal height.  There is no fracture.  There is degenerative disc and facet disease which will be described by level.  All of the discs are desiccated.  The odontoid process is intact.  There is severe disc space narrowing at the C5-6 and C6-7 levels.  There is mild anterolisthesis  of C7 on T1, T1 on T2.  There is trace anterolisthesis of C3 on C4.  There is also mild retrolisthesis of C5 on C6 and C6 on C7 which is exaggerated by osteophyte formation.  Large bulky anterior osteophytes are present at the C4 through C6 levels.  Baseline marrow signal is normal.     Spinal canal, cord, epidural space: The spinal canal is developmentally normal.  There is no abnormal epidural mass or fluid collection.  The cord is normal in caliber and signal intensity.     Findings by level:     C2-3: There is bilateral facet joint arthropathy.  There is no spinal stenosis.  There is mild right foraminal stenosis.     C3-4: There is subtle trace anterolisthesis of C3 on C4.  There is marked left and moderate right facet joint arthropathy.  There is bilateral uncovertebral spurring with a disc osteophyte complex.  There is no spinal stenosis.  There is severe left and at least moderate right foraminal stenosis.     C4-5: There is a broad disc protrusion/osteophyte eccentric to the left.  This narrows the subarachnoid space and contributes to mild spinal stenosis.  There is marked left facet joint arthropathy.  There is mild right greater than left uncovertebral spurring.  There is moderate right but only mild left foraminal stenosis.     C5-6: There is marked disc space narrowing, bilateral uncovertebral spurring.  There is bilateral facet joint arthropathy.  There is a broad disc protrusion/osteophyte.  There is moderate spinal stenosis with severe bilateral foraminal stenosis.  There is no cord compression.     C6-7: There is severe disc space narrowing.  There is left greater than right facet joint arthropathy with bilateral uncovertebral spurring and a broad disc protrusion/osteophyte.  There is only borderline spinal stenosis without cord compression.  There is marked left and mild-to-moderate right foraminal stenosis.     C7-T1: There is mild anterolisthesis of C7 on T1.  There is mild disc space narrowing.   There is left greater than right facet joint arthropathy.  There is a disc bulge.  There is no spinal stenosis.  There is mild-to-moderate left but only mild right foraminal stenosis.    Assessment:  Marisela Eagle is a 80 y.o. female with neck and lower back pain.  1. Other spondylosis, cervical region    2. DDD (degenerative disc disease), cervical    3. Myofascial pain    4. Other spondylosis, lumbar region    5. Postlaminectomy syndrome of lumbar region        Plan:  I have stressed the importance of physical activity and exercise to improve overall health  Reviewed pertinent imaging and records with patient  She does have L SIJ pain unfortunately she gets b/l shoulder injections 4 x annually.  Consider cervical medial branch blocks to help with her neck pain. Will need to hold Eliquis for RFA.  This also avoids steroid administration as she continues to get steroid injections in her bilateral shoulder by orthopedics. She wishes to schedule MBB to bilateral C5/6 and C6/7  She did request resuming her opioid medication.  Discussed that we would we okay with resuming her opioid medication if she discontinues her benzodiazepine use.  Patient expressed understanding and we will start weaning off her benzodiazepine use.  Robaxin 500 mg q 8 h prn  Lumbar paraspinal TPis today  Follow up after procedure

## 2025-07-03 ENCOUNTER — TELEPHONE (OUTPATIENT)
Facility: CLINIC | Age: 80
End: 2025-07-03
Payer: MEDICARE

## 2025-07-03 DIAGNOSIS — Z86.711 HISTORY OF PULMONARY EMBOLISM: Primary | ICD-10-CM

## 2025-07-07 ENCOUNTER — LAB VISIT (OUTPATIENT)
Dept: LAB | Facility: HOSPITAL | Age: 80
End: 2025-07-07
Attending: INTERNAL MEDICINE
Payer: MEDICARE

## 2025-07-07 ENCOUNTER — OFFICE VISIT (OUTPATIENT)
Dept: FAMILY MEDICINE | Facility: CLINIC | Age: 80
End: 2025-07-07
Payer: MEDICARE

## 2025-07-07 VITALS
BODY MASS INDEX: 36.51 KG/M2 | WEIGHT: 246.5 LBS | HEIGHT: 69 IN | DIASTOLIC BLOOD PRESSURE: 78 MMHG | TEMPERATURE: 98 F | SYSTOLIC BLOOD PRESSURE: 122 MMHG

## 2025-07-07 DIAGNOSIS — I73.00 RAYNAUD'S DISEASE WITHOUT GANGRENE: Primary | ICD-10-CM

## 2025-07-07 DIAGNOSIS — E78.5 HYPERLIPIDEMIA, UNSPECIFIED HYPERLIPIDEMIA TYPE: ICD-10-CM

## 2025-07-07 DIAGNOSIS — E03.9 HYPOTHYROIDISM, UNSPECIFIED TYPE: ICD-10-CM

## 2025-07-07 DIAGNOSIS — Z99.89 USE OF CANE AS AMBULATORY AID: ICD-10-CM

## 2025-07-07 DIAGNOSIS — R20.2 TINGLING: ICD-10-CM

## 2025-07-07 DIAGNOSIS — G47.00 INSOMNIA, UNSPECIFIED TYPE: ICD-10-CM

## 2025-07-07 DIAGNOSIS — K21.9 GASTROESOPHAGEAL REFLUX DISEASE, UNSPECIFIED WHETHER ESOPHAGITIS PRESENT: ICD-10-CM

## 2025-07-07 DIAGNOSIS — Z86.711 HISTORY OF PULMONARY EMBOLISM: ICD-10-CM

## 2025-07-07 DIAGNOSIS — E21.3 HYPERPARATHYROIDISM, UNSPECIFIED: ICD-10-CM

## 2025-07-07 DIAGNOSIS — I10 HYPERTENSION, ESSENTIAL: ICD-10-CM

## 2025-07-07 DIAGNOSIS — Z79.01 ANTICOAGULATED: ICD-10-CM

## 2025-07-07 DIAGNOSIS — M35.1 CONNECTIVE TISSUE DISEASE OVERLAP SYNDROME: ICD-10-CM

## 2025-07-07 DIAGNOSIS — I50.32 CHRONIC DIASTOLIC HEART FAILURE: ICD-10-CM

## 2025-07-07 LAB
ABSOLUTE EOSINOPHIL (SMH): 0.18 K/UL
ABSOLUTE MONOCYTE (SMH): 0.81 K/UL (ref 0.3–1)
ABSOLUTE NEUTROPHIL COUNT (SMH): 2.5 K/UL (ref 1.8–7.7)
ALBUMIN SERPL-MCNC: 4.2 G/DL (ref 3.5–5.2)
ALP SERPL-CCNC: 36 UNIT/L (ref 55–135)
ALT SERPL-CCNC: 20 UNIT/L (ref 10–44)
ANION GAP (SMH): 8 MMOL/L (ref 8–16)
AST SERPL-CCNC: 19 UNIT/L (ref 10–40)
BASOPHILS # BLD AUTO: 0.03 K/UL
BASOPHILS NFR BLD AUTO: 0.5 %
BILIRUB SERPL-MCNC: 0.4 MG/DL (ref 0.1–1)
BUN SERPL-MCNC: 9 MG/DL (ref 8–23)
CALCIUM SERPL-MCNC: 9.3 MG/DL (ref 8.7–10.5)
CHLORIDE SERPL-SCNC: 106 MMOL/L (ref 95–110)
CO2 SERPL-SCNC: 27 MMOL/L (ref 23–29)
CREAT SERPL-MCNC: 0.6 MG/DL (ref 0.5–1.4)
ERYTHROCYTE [DISTWIDTH] IN BLOOD BY AUTOMATED COUNT: 17.2 % (ref 11.5–14.5)
GFR SERPLBLD CREATININE-BSD FMLA CKD-EPI: >60 ML/MIN/1.73/M2
GLUCOSE SERPL-MCNC: 89 MG/DL (ref 70–110)
HCT VFR BLD AUTO: 37.7 % (ref 37–48.5)
HGB BLD-MCNC: 12 GM/DL (ref 12–16)
IMM GRANULOCYTES # BLD AUTO: 0.02 K/UL (ref 0–0.04)
IMM GRANULOCYTES NFR BLD AUTO: 0.4 % (ref 0–0.5)
LYMPHOCYTES # BLD AUTO: 2.1 K/UL (ref 1–4.8)
MCH RBC QN AUTO: 27 PG (ref 27–31)
MCHC RBC AUTO-ENTMCNC: 31.8 G/DL (ref 32–36)
MCV RBC AUTO: 85 FL (ref 82–98)
NUCLEATED RBC (/100WBC) (SMH): 0 /100 WBC
PLATELET # BLD AUTO: 238 K/UL (ref 150–450)
PMV BLD AUTO: 11.7 FL (ref 9.2–12.9)
POTASSIUM SERPL-SCNC: 4.5 MMOL/L (ref 3.5–5.1)
PROT SERPL-MCNC: 6.9 GM/DL (ref 6–8.4)
RBC # BLD AUTO: 4.44 M/UL (ref 4–5.4)
RELATIVE EOSINOPHIL (SMH): 3.2 % (ref 0–8)
RELATIVE LYMPHOCYTE (SMH): 37.6 % (ref 18–48)
RELATIVE MONOCYTE (SMH): 14.5 % (ref 4–15)
RELATIVE NEUTROPHIL (SMH): 43.8 % (ref 38–73)
SODIUM SERPL-SCNC: 141 MMOL/L (ref 136–145)
WBC # BLD AUTO: 5.59 K/UL (ref 3.9–12.7)

## 2025-07-07 PROCEDURE — G2211 COMPLEX E/M VISIT ADD ON: HCPCS | Mod: HCNC,S$GLB,, | Performed by: FAMILY MEDICINE

## 2025-07-07 PROCEDURE — 1101F PT FALLS ASSESS-DOCD LE1/YR: CPT | Mod: CPTII,HCNC,S$GLB, | Performed by: FAMILY MEDICINE

## 2025-07-07 PROCEDURE — 85025 COMPLETE CBC W/AUTO DIFF WBC: CPT

## 2025-07-07 PROCEDURE — 36415 COLL VENOUS BLD VENIPUNCTURE: CPT

## 2025-07-07 PROCEDURE — 3078F DIAST BP <80 MM HG: CPT | Mod: CPTII,HCNC,S$GLB, | Performed by: FAMILY MEDICINE

## 2025-07-07 PROCEDURE — 1157F ADVNC CARE PLAN IN RCRD: CPT | Mod: CPTII,HCNC,S$GLB, | Performed by: FAMILY MEDICINE

## 2025-07-07 PROCEDURE — 82040 ASSAY OF SERUM ALBUMIN: CPT

## 2025-07-07 PROCEDURE — 99214 OFFICE O/P EST MOD 30 MIN: CPT | Mod: HCNC,S$GLB,, | Performed by: FAMILY MEDICINE

## 2025-07-07 PROCEDURE — 3288F FALL RISK ASSESSMENT DOCD: CPT | Mod: CPTII,HCNC,S$GLB, | Performed by: FAMILY MEDICINE

## 2025-07-07 PROCEDURE — 1126F AMNT PAIN NOTED NONE PRSNT: CPT | Mod: CPTII,HCNC,S$GLB, | Performed by: FAMILY MEDICINE

## 2025-07-07 PROCEDURE — 3074F SYST BP LT 130 MM HG: CPT | Mod: CPTII,HCNC,S$GLB, | Performed by: FAMILY MEDICINE

## 2025-07-07 PROCEDURE — 1160F RVW MEDS BY RX/DR IN RCRD: CPT | Mod: CPTII,HCNC,S$GLB, | Performed by: FAMILY MEDICINE

## 2025-07-07 PROCEDURE — 1159F MED LIST DOCD IN RCRD: CPT | Mod: CPTII,HCNC,S$GLB, | Performed by: FAMILY MEDICINE

## 2025-07-07 PROCEDURE — 99999 PR PBB SHADOW E&M-EST. PATIENT-LVL V: CPT | Mod: PBBFAC,HCNC,, | Performed by: FAMILY MEDICINE

## 2025-07-07 RX ORDER — AMITRIPTYLINE HYDROCHLORIDE 50 MG/1
50 TABLET, FILM COATED ORAL NIGHTLY
Qty: 90 TABLET | Refills: 3 | Status: SHIPPED | OUTPATIENT
Start: 2025-07-07

## 2025-07-07 NOTE — PROGRESS NOTES
SCRIBE #1 NOTE: I, Neil Fuchs, am scribing for, and in the presence of,  Pawel Badillo III, MD. I have scribed the entire note.     Subjective:       Patient ID: Marisela Eagle is a 80 y.o. female.    Chief Complaint: Follow-up (6 month )    Ms. Eagle is here for a follow up with her last appointment being here on April 24, 2025. Accompanied by . Use of cane. Reports her left ear tingles and sounds like there is a mosquito in it. She also has left TMJ pain when chewing. She will have a nerve block on July 24, 2025 with Dr. Caal. BMI of 36.40. Cardiovascular good. No chest pain. No palpitations. Blood pressure is 122/78. Hypertension controlled. Hyperlipidemia. CDHF. She does have some shortness of breath. Anticoagulated. Hypothyroidism. TSH was 2.452 abut 6 weeks ago. Hyperparathyroidism. Calcium pending. PTH is 136.6. Connective tissue disease overlap syndrome. Sees Dr. Thomson in September 2025. Raynaud's disease. On Amlodipine. Insomnia. Uses Ativan and wants to increase Elavil dose. Gastroesophageal reflux disease. She is having a scope done on July 11, 2025 with Dr. García. CBC and CMP pending. Mammogram was declined.     Review of Systems   Constitutional:  Negative for chills and fever.   HENT:  Negative for congestion and sore throat.         Tingling left ear   Eyes:  Negative for visual disturbance.   Respiratory:  Positive for shortness of breath. Negative for chest tightness.    Cardiovascular:  Negative for chest pain.   Gastrointestinal:  Negative for nausea.   Endocrine: Negative for polydipsia and polyuria.   Genitourinary:  Negative for dysuria and flank pain.   Musculoskeletal:  Positive for arthralgias. Negative for back pain, neck pain and neck stiffness.   Skin:  Negative for rash.   Neurological:  Negative for weakness.   Hematological:  Does not bruise/bleed easily.   Psychiatric/Behavioral:  Negative for behavioral problems.    All other systems reviewed and are negative.       Objective:      Physical examination: Vital signs noted. No acute distress. Tympanic membranes are okay. No wax. TMJ is okay. No carotid bruit. Regular heart rate and rhythm. Lungs clear to auscultation bilaterally. Abdomen bowel sounds positive soft and nontender. Extremities without edema. 2+ pedal pulses.      Assessment:       1. Raynaud's disease without gangrene    2. Chronic diastolic heart failure    3. Connective tissue disease overlap syndrome    4. Anticoagulated    5. Use of cane as ambulatory aid    6. Hyperlipidemia, unspecified hyperlipidemia type    7. Hypertension, essential    8. Hypothyroidism, unspecified type    9. Hyperparathyroidism, unspecified    10. Insomnia, unspecified type    11. Gastroesophageal reflux disease, unspecified whether esophagitis present    12. Tingling        Plan:       Raynaud's disease without gangrene    Chronic diastolic heart failure    Connective tissue disease overlap syndrome    Anticoagulated    Use of cane as ambulatory aid    Hyperlipidemia, unspecified hyperlipidemia type    Hypertension, essential    Hypothyroidism, unspecified type    Hyperparathyroidism, unspecified    Insomnia, unspecified type    Gastroesophageal reflux disease, unspecified whether esophagitis present    Tingling    Other orders  -     amitriptyline (ELAVIL) 50 MG tablet; Take 1 tablet (50 mg total) by mouth every evening.  Dispense: 90 tablet; Refill: 3    Increase her Elavil to 50 mg HS.  See if this improves her sleep.  Declines mammogram.  EGD as planned.  Gave her the names of several pain clinic doctors.  Continue her anticoagulation.  Hypertension is controlled.  Heart failure is stable.  Continue follow-up with Rheumatology.  All care gaps addressed or discussed.     IPawel III, MD, personally performed the services described in this documentation. All medical record entries made by the shellyibluan were at my direction and in my presence. I have reviewed the chart and  agree that the record reflects my personal performance and is accurate and complete.

## 2025-07-09 ENCOUNTER — OFFICE VISIT (OUTPATIENT)
Facility: CLINIC | Age: 80
End: 2025-07-09
Payer: MEDICARE

## 2025-07-09 VITALS
DIASTOLIC BLOOD PRESSURE: 81 MMHG | BODY MASS INDEX: 35.84 KG/M2 | RESPIRATION RATE: 16 BRPM | HEIGHT: 69 IN | HEART RATE: 86 BPM | WEIGHT: 242 LBS | TEMPERATURE: 98 F | SYSTOLIC BLOOD PRESSURE: 137 MMHG

## 2025-07-09 DIAGNOSIS — Z79.01 ANTICOAGULATED: ICD-10-CM

## 2025-07-09 DIAGNOSIS — Z86.711 HISTORY OF PULMONARY EMBOLISM: ICD-10-CM

## 2025-07-09 DIAGNOSIS — M25.511 CHRONIC RIGHT SHOULDER PAIN: ICD-10-CM

## 2025-07-09 DIAGNOSIS — G89.29 CHRONIC RIGHT SHOULDER PAIN: ICD-10-CM

## 2025-07-09 DIAGNOSIS — I26.99 PULMONARY EMBOLISM, UNSPECIFIED CHRONICITY, UNSPECIFIED PULMONARY EMBOLISM TYPE, UNSPECIFIED WHETHER ACUTE COR PULMONALE PRESENT: ICD-10-CM

## 2025-07-09 DIAGNOSIS — Z86.718 HISTORY OF DVT (DEEP VEIN THROMBOSIS): Primary | ICD-10-CM

## 2025-07-09 PROCEDURE — 99999 PR PBB SHADOW E&M-EST. PATIENT-LVL IV: CPT | Mod: PBBFAC,HCNC,, | Performed by: INTERNAL MEDICINE

## 2025-07-09 RX ORDER — LIDOCAINE 50 MG/G
1 PATCH TOPICAL DAILY
Qty: 20 PATCH | Refills: 0 | Status: SHIPPED | OUTPATIENT
Start: 2025-07-09 | End: 2025-07-29

## 2025-07-09 NOTE — PROGRESS NOTES
[facility]  PROGRESS NOTE -  Follow-up Visit      Subjective:       Patient ID:   NAME: Marisela Eagle : 1945     80 y.o. female    Referring Doc: Yumiko Watson MD  Other Physicians: Justino/Keren(PCP); IFRAH Patton/Ruth Cedeño NP (Card); Marcos Gusman (ortho); ALEXEI Martin; Melissa Kirkpatrick;         Chief Complaint: DVT/pulm emboli f/u    History of Present Illness:     Patient returns today for a  regularly scheduled follow-up visit.  The patient is here today to go over the results of the recently ordered labs, tests and studies. She is here with her .     She has chronic shoulder issues and is getting steroid injection in right shoulder on  with Dr Gusman; she is also followed by dr Caal and they are planning nerve block in her neck    She has since been back on the eliquis. No excessive bleeding but she had been having some bruising on the arms    She is having an esophageal dilation with DR García on Friday and is on hold with eliquis in preparation.          She has chronic arthritis, shoulder and back issues. She is breathing ok, no SOB, HA's or N/V.        She saw Dr Badillo (PCP) yesterday on 2025    She saw Dr Canchola with cardiology in may 2025 and sees him again next year    She saw Ana Maria Carney NP on 2025                  ROS:   GEN: normal without any fever, night sweats or weight loss; chronic arthritis issues/right shoulder/back ; intermittent chest aches and back discomfort  HEENT: normal with no HA's, sore throat, stiff neck, changes in vision  CV: normal with no CP, SOB, PND, JARA or orthopnea  PULM: normal with no SOB, cough, hemoptysis, sputum or pleuritic pain  GI: normal with no abdominal pain, nausea, vomiting, constipation, diarrhea, melanotic stools, BRBPR, or hematemesis  : normal with no hematuria, dysuria  BREAST: normal with no mass, discharge, pain  SKIN: normal with no rash, erythema, bruising, or swelling    Pain Scale: 4 on  neck    Allergies:  Review of patient's allergies indicates:  No Known Allergies    Medications:    Current Outpatient Medications:     amitriptyline (ELAVIL) 50 MG tablet, Take 1 tablet (50 mg total) by mouth every evening., Disp: 90 tablet, Rfl: 3    amLODIPine (NORVASC) 5 MG tablet, Take 1 tablet (5 mg total) by mouth once daily., Disp: 90 tablet, Rfl: 3    apixaban (ELIQUIS) 5 mg Tab, Take 1 tablet (5 mg total) by mouth 2 (two) times daily., Disp: 180 tablet, Rfl: 3    artificial tears,hypromellose,,GENTEAL/SUSTANE, (SYSTANE GEL) 0.3 % Gel, Place 1 drop into both eyes as needed., Disp: , Rfl:     cholecalciferol, vitamin D3, 5,000 unit capsule, Take 5,000 Units by mouth once daily., Disp: , Rfl:     cranberry fruit extract (CRANBERRY CONCENTRATE ORAL), Take 1 tablet by mouth Daily., Disp: , Rfl:     cyanocobalamin (VITAMIN B-12) 1000 MCG tablet, Take 5,000 mcg by mouth once daily., Disp: , Rfl:     DULoxetine (CYMBALTA) 60 MG capsule, TAKE 1 CAPSULE EVERY DAY, Disp: 90 capsule, Rfl: 3    ezetimibe (ZETIA) 10 mg tablet, Take 1 tablet (10 mg total) by mouth once daily., Disp: 90 tablet, Rfl: 3    famotidine (PEPCID) 20 MG tablet, TAKE 1 TABLET EVERY EVENING, Disp: 90 tablet, Rfl: 3    gabapentin (NEURONTIN) 800 MG tablet, TAKE 1 TABLET THREE TIMES DAILY, Disp: 270 tablet, Rfl: 3    levothyroxine (SYNTHROID) 125 MCG tablet, TAKE 1 TABLET EVERY DAY BEFORE BREAKFAST, Disp: 90 tablet, Rfl: 3    LORazepam (ATIVAN) 1 MG tablet, Take 1 tablet (1 mg total) by mouth every evening., Disp: 30 tablet, Rfl: 2    losartan (COZAAR) 100 MG tablet, TAKE 1/2 TABLET TWICE DAILY, Disp: 90 tablet, Rfl: 3    magnesium 30 mg Tab, Take 1 tablet by mouth once daily., Disp: , Rfl:     metoprolol succinate (TOPROL-XL) 50 MG 24 hr tablet, TAKE 1/2 TABLET EVERY EVENING, Disp: 45 tablet, Rfl: 3    nitrofurantoin, macrocrystal-monohydrate, (MACROBID) 100 MG capsule, Take 100 mg by mouth., Disp: , Rfl:     pantoprazole (PROTONIX) 40 MG tablet,  "TAKE 1 TABLET EVERY MORNING, Disp: 90 tablet, Rfl: 2    potassium chloride SA (K-DUR,KLOR-CON) 20 MEQ tablet, Take 20 mEq by mouth once daily., Disp: , Rfl:     spironolactone (ALDACTONE) 25 MG tablet, TAKE 1 TABLET EVERY DAY, Disp: 90 tablet, Rfl: 3    torsemide (DEMADEX) 10 MG Tab, Take 1 tablet (10 mg total) by mouth once daily., Disp: 90 tablet, Rfl: 3    vit C/E/Zn/coppr/lutein/zeaxan (PRESERVISION AREDS-2 ORAL), Take 1 capsule by mouth once daily., Disp: , Rfl:     cevimeline (EVOXAC) 30 mg capsule, Take 1 capsule (30 mg total) by mouth 3 (three) times daily. (Patient not taking: Reported on 7/9/2025), Disp: 270 capsule, Rfl: 3    PMHx/PSHx Updates:  See patient's last visit with me on 4/4/2025.  See H&P on 6/14/2024        Pathology:   Cancer Staging   No matching staging information was found for the patient.            Objective:     Vitals:  Blood pressure 137/81, pulse 86, temperature 97.9 °F (36.6 °C), temperature source Temporal, resp. rate 16, height 5' 9" (1.753 m), weight 109.8 kg (242 lb).    Physical Examination:   GEN: no apparent distress, comfortable; AAOx3; overweight  HEAD: atraumatic and normocephalic  EYES: no pallor, no icterus, PERRLA  ENT: OMM, no pharyngeal erythema, external ears WNL; no nasal discharge; no thrush  NECK: no masses, thyroid normal, trachea midline, no LAD/LN's, supple  CV: RRR with no murmur; normal pulse; normal S1 and S2; no pedal edema  CHEST: Normal respiratory effort; CTAB; normal breath sounds; no wheeze or crackles  ABDOM: nontender and nondistended; soft; normal bowel sounds; no rebound/guarding  MUSC/Skeletal: LROM of right shoulder; arthropathy  EXTREM: no clubbing, cyanosis, inflammation; chronic RLE swelling and hyperchromatic skin changes  SKIN: no rashes, lesions, ulcers, petechiae or subcutaneous nodules; chronic age related skin changes  : no lynn  NEURO: grossly intact; motor/sensory WNL; AAOx3; no tremors  PSYCH: normal mood, affect and " behavior  LYMPH: normal cervical, supraclavicular, axillary and groin LN's            Labs:     Lab Results   Component Value Date    WBC 5.59 07/07/2025    HGB 12.0 07/07/2025    HCT 37.7 07/07/2025    MCV 85 07/07/2025     07/07/2025       CMP  Sodium   Date Value Ref Range Status   07/07/2025 141 136 - 145 mmol/L Final     Potassium   Date Value Ref Range Status   07/07/2025 4.5 3.5 - 5.1 mmol/L Final     Chloride   Date Value Ref Range Status   07/07/2025 106 95 - 110 mmol/L Final     CO2   Date Value Ref Range Status   07/07/2025 27 23 - 29 mmol/L Final     Glucose   Date Value Ref Range Status   07/07/2025 89 70 - 110 mg/dL Final     BUN   Date Value Ref Range Status   07/07/2025 9 8 - 23 mg/dL Final   04/02/2025 18 8 - 23 mg/dL Final     Creatinine   Date Value Ref Range Status   07/07/2025 0.6 0.5 - 1.4 mg/dL Final   04/02/2025 0.7 0.5 - 1.4 mg/dL Final     Calcium   Date Value Ref Range Status   07/07/2025 9.3 8.7 - 10.5 mg/dL Final     Protein Total   Date Value Ref Range Status   07/07/2025 6.9 6.0 - 8.4 gm/dL Final     Albumin   Date Value Ref Range Status   07/07/2025 4.2 3.5 - 5.2 g/dL Final   01/11/2019 4.1 3.1 - 4.7 g/dL      Bilirubin Total   Date Value Ref Range Status   07/07/2025 0.4 0.1 - 1.0 mg/dL Final     Comment:     For infants and newborns, interpretation of results should be based   on gestational age, weight and in agreement with clinical   observations.    Premature Infant recommended reference ranges:   0-24 hours:  <8.0 mg/dL   24-48 hours: <12.0 mg/dL   3-5 days:    <15.0 mg/dL   6-29 days:   <15.0 mg/dL     ALP   Date Value Ref Range Status   07/07/2025 36 (L) 55 - 135 unit/L Final     AST   Date Value Ref Range Status   07/07/2025 19 10 - 40 unit/L Final     ALT   Date Value Ref Range Status   07/07/2025 20 10 - 44 unit/L Final     Anion Gap   Date Value Ref Range Status   07/07/2025 8 8 - 16 mmol/L Final   04/02/2025 8 8 - 16 mmol/L Final     Comment:     UNABLE TO  CALCULATE     eGFR   Date Value Ref Range Status   07/07/2025 >60 >60 mL/min/1.73/m2 Final   04/02/2025 >60 >60 mL/min/1.73/m2 Final     Comment:     Estimated GFR calculated using the CKD-EPI creatinine (2021) equation.   03/14/2025 >60.0 >60 mL/min/1.73 m^2 Final     MTHFR Comment   Comment: Result:  c.665C>T (p. [id number]), legacy name:  [id number] - Not Detected c.1286A>C (p.  [id number]), legacy name: [id number] - Not  Detected Interpretation:     Phosphatidylserine Ab IgA 0 - 30 Units <10   Phosphatidylserine Ab ( IgM) 0 - 30 Units 9      Phosphatidylserine Ab (IgG) 0 - 19 APS Units <1     Anticardiolipin IgG 0 - 14 GPL U/mL <9   Comment: Negative:              <15     Anticardiolipin IgA 0 - 11 APL U/mL <9   Comment: Negative:              <12     Anticardiolipin IgM 0 - 12 MPL U/mL <9     Homocysteine 0.0 - 21.3 umol/L 10.3     Prothrombin Mutation Comment   Comment: Result: c.*97G>A - Not Detected     Factor V Leiden Comment   Comment: Result: c.1601G>A (p.[id number]) - Not Detected           Radiology/Diagnostic Studies:    Nuclear Stress - Cardiology Interpreted    Result Date: 7/10/2024    Abnormal myocardial perfusion scan.   There is a mild to moderate intensity, medium sized, mostly fixed perfusion abnormality with some reversibilty in the lateral, apical and septal apical wall(s).   There are no other significant perfusion abnormalities.   The gated perfusion images showed an ejection fraction of 61% at rest. The gated perfusion images showed an ejection fraction of 54% post stress. Normal ejection fraction is greater than 53%.   There is normal wall motion at rest and post stress.   LV cavity size is normal at rest and normal at stress.   The ECG portion of the study is negative for ischemia.   The patient reported chest pain during the stress test.   There were no arrhythmias during stress.     CT Abdomen Pelvis W Wo Contrast    Result Date: 6/20/2024  CMS Mandated Quality Data- CT Radiation- 436 All CT scans  at this facility utilize dose modulation, iterative reconstruction, and/or weight based dosing when appropriate to reduce radiation dose to as low as reasonably achievable. EXAMINATION: CT ABDOMEN PELVIS W WO CONTRAST CLINICAL HISTORY: rule out cancer with new onset clots; Acute embolism and thrombosis of right popliteal vein TECHNIQUE: CT abdomen and pelvis without and with intravenous contrast.  100 mL Omnipaque 350. COMPARISON: Abdominal ultrasound 12/07/2021. FINDINGS: There is subsegmental atelectasis of the lung bases.  Heart size is normal. There are small lobulated hypoattenuating and nonenhancing lesions in the left and right hepatic lobes in the region of the intrahepatic fissure measuring 1.9 cm each, and felt to reflect small hepatic cysts.  0.5 cm hypoattenuating structure in the inferior right hepatic lobe also noted, likely reflecting a small hepatic cyst.  No enhancing hepatic lesions identified.  The gallbladder and common bile duct within normal limits.  The pancreas, spleen and adrenal glands are unremarkable. The kidneys are normal size.  There is no hydronephrosis or nephrolithiasis.  There are no enhancing renal lesions.  Ureters are normal caliber without obstructions.  Evaluation of the pelvic viscera is limited due to beam hardening artifact from bilateral hip arthroplasty hardware.  Visualized portions of the bladder are unremarkable. There is mild diverticulosis of the colon.  There is oral contrast in the large and small bowel.  The appendix is not identified.  There is no bowel wall thickening or inflammatory changes observed.  The stomach is decompressed and grossly unremarkable. The abdominal aorta is normal caliber with moderate calcified plaque formation.  There are no large intra-abdominal lymph nodes.  There is no mesenteric fat stranding or free fluid observed. There are degenerative changes of the spine.  No acute osseous abnormality observed.     1. No acute intra-abdominal  abnormality observed. 2. Small right and left hepatic cyst observed. 3. Colonic diverticulosis. 4. Additional incidental observations as described. Electronically signed by: Milo Vogel Date:    06/20/2024 Time:    11:51        Doppler US  6/6/2024:     Impression:     Known pulmonary emboli on prior CTA chest.  Ultrasound demonstrates nonocclusive thrombus in the right popliteal vein.     CTA 6/6/2024:     Impression:     Known pulmonary emboli on prior CTA chest.  Ultrasound demonstrates nonocclusive thrombus in the right popliteal vein.       I have reviewed all available lab results and radiology reports.    Assessment/Plan:   (1) 80 y.o. female with diagnosis of RLE DVT and pulmonary emboli who has been referred by Yumiko Watson MD for evaluation by medical hematology/oncology. She had presented to the ED at [facility] at the bequest of her cardiologist on 6/6/2024 for SOB, JARA, right thigh pain and left sided CP over the past couple of months. CTA showed bilateral pulmonary emboli and doppler US showed a nonocclusive thrombus in the right popliteal vein.      6/14/2024:  - she is on eliquis  - no excessive bleeding or bruising  - + family hx/of clots  - order CT abdom/pelvis and clot work-up    7/15/2024:  - continued on eliquis  - to see cardiology again next week  - f/u with Dr Melgar's group - she needs repeat EGD and also needs colonsocopy  - order repeat CTA of chest    8/19/2024:  - continued on eliquis  - repeat CTA on 7/15 with resolution of the pulm eboli  - prothrombin II neg, MTHFR neg, gactor V leiden neg  - anticardiolipin neg  - protein C and S WNL    2/20/2025:  - She was on eliquis but self-discontinued last week when she ran out.   - No excessive bleeding but she had been having some bruising on the arms  - She last saw Dr Boogie with pulmonary in Sept 2024  - workup essentially negative    7/9/2025:  - continued on elqiuis  - no excessive bleeding or bruising  - CBc adequate  - no current  anemia        (2) HTN and hypercholesterolemia     (3) Hx/of an umbilical cot after having hysterectomy in the 90's     (4) GERD and diverticulitis; hx/of Hep C positive antibody; hx/of colon polyps     (5) CHF and chronic venous insuffiencey in legs Rght > Left - chronic diastolic HF     (6) Hypothyroidism; thyroid nodules     (7) Migraines, peripheral neuropathy     (8) DDD; bilateral knee issues, OA; shoulder bursa issues; s/p right knee arthroscope in Apr 2012  - rght knee x2 with last one in 2018-19     (9) Depression     (10) Former smoker (quit 2012)               VISIT DIAGNOSES:      History of DVT (deep vein thrombosis)    History of pulmonary embolism    Pulmonary embolism, unspecified chronicity, unspecified pulmonary embolism type, unspecified whether acute cor pulmonale present    Anticoagulated          PLAN:  Continued on eliquis  F/iu with cardiology and PCP   F/u with pain management for planned nerve ablation  F/u wt Dr Ching with  as directed  F/u with Dr Boogie with pulmonary as directed  F/u with GI  and rheum           RTC in  12 months  Fax note to   Sushant Badillo [place]; Justino García Clinton H. III, MD,  Keagan Ching    Discussion:     Anticoagulation Discussion:     Discussed with patient and any applicable family members about the benefit and/or need for anticoagulation. Communicated about the risks of bleeding while on any anticoagulation, which could be serious and/or life-threatening, and which can occur at any time, regardless of degree of the level of anticoagulation. Expressed the need for compliance with any anticoagulation regimen and that failure to do so could potential lead to excessive bleeding, and risk to health and/or life. In particular, with patients on coumadin therapy, compliance with requested blood work is absolutely essential, as coumadin levels can vary from time to time, and failure to do so could potentially place the patient at risk for  bleeding and/or clotting events which could be fatal. Patients on coumadin are encouraged to call the day after they have their levels drawn, as to obtain the appropriate instructions from our staff. Patients are aware that self-regulating or self-dosing of their medications is strictly prohibited.         I reviewed results of the recently ordered labs, tests and studies; made directives with regards to the results. I have explained all of the above in detail and the patient understands all of the current recommendation(s). I have answered all of their questions to the best of my ability and to their complete satisfaction. The patient is to continue with the current management plan.            Electronically signed by Rayray Green MD

## 2025-07-13 DIAGNOSIS — F41.9 ANXIETY: ICD-10-CM

## 2025-07-13 NOTE — TELEPHONE ENCOUNTER
No care due was identified.  [facility] Embedded Care Due Messages. Reference number: [id number].   7/13/2025 10:52:01 AM CDT

## 2025-07-14 RX ORDER — LORAZEPAM 1 MG/1
1 TABLET ORAL NIGHTLY
Qty: 30 TABLET | Refills: 0 | Status: SHIPPED | OUTPATIENT
Start: 2025-07-14

## 2025-07-16 ENCOUNTER — PATIENT MESSAGE (OUTPATIENT)
Dept: BARIATRICS | Facility: CLINIC | Age: 80
End: 2025-07-16
Payer: MEDICARE

## 2025-07-18 ENCOUNTER — PATIENT MESSAGE (OUTPATIENT)
Dept: BARIATRICS | Facility: CLINIC | Age: 80
End: 2025-07-18
Payer: MEDICARE

## 2025-07-21 ENCOUNTER — OFFICE VISIT (OUTPATIENT)
Dept: FAMILY MEDICINE | Facility: CLINIC | Age: 80
End: 2025-07-21
Payer: MEDICARE

## 2025-07-21 VITALS
HEART RATE: 92 BPM | TEMPERATURE: 98 F | SYSTOLIC BLOOD PRESSURE: 108 MMHG | DIASTOLIC BLOOD PRESSURE: 62 MMHG | HEIGHT: 69 IN | OXYGEN SATURATION: 93 % | WEIGHT: 248.56 LBS | BODY MASS INDEX: 36.81 KG/M2

## 2025-07-21 DIAGNOSIS — Z99.89 USE OF CANE AS AMBULATORY AID: ICD-10-CM

## 2025-07-21 DIAGNOSIS — Z79.01 ANTICOAGULATED: ICD-10-CM

## 2025-07-21 DIAGNOSIS — R06.02 SOB (SHORTNESS OF BREATH): ICD-10-CM

## 2025-07-21 DIAGNOSIS — L03.115 CELLULITIS OF RIGHT LOWER EXTREMITY: ICD-10-CM

## 2025-07-21 DIAGNOSIS — I50.32 CHRONIC HEART FAILURE WITH PRESERVED EJECTION FRACTION: ICD-10-CM

## 2025-07-21 DIAGNOSIS — E66.01 SEVERE OBESITY (BMI 35.0-39.9) WITH COMORBIDITY: ICD-10-CM

## 2025-07-21 DIAGNOSIS — L03.116 CELLULITIS OF LEFT LOWER EXTREMITY: Primary | ICD-10-CM

## 2025-07-21 PROCEDURE — G2211 COMPLEX E/M VISIT ADD ON: HCPCS | Mod: HCNC,S$GLB,, | Performed by: FAMILY MEDICINE

## 2025-07-21 PROCEDURE — 1159F MED LIST DOCD IN RCRD: CPT | Mod: CPTII,HCNC,S$GLB, | Performed by: FAMILY MEDICINE

## 2025-07-21 PROCEDURE — 1125F AMNT PAIN NOTED PAIN PRSNT: CPT | Mod: CPTII,HCNC,S$GLB, | Performed by: FAMILY MEDICINE

## 2025-07-21 PROCEDURE — 1160F RVW MEDS BY RX/DR IN RCRD: CPT | Mod: CPTII,HCNC,S$GLB, | Performed by: FAMILY MEDICINE

## 2025-07-21 PROCEDURE — 99214 OFFICE O/P EST MOD 30 MIN: CPT | Mod: HCNC,S$GLB,, | Performed by: FAMILY MEDICINE

## 2025-07-21 PROCEDURE — 1101F PT FALLS ASSESS-DOCD LE1/YR: CPT | Mod: CPTII,HCNC,S$GLB, | Performed by: FAMILY MEDICINE

## 2025-07-21 PROCEDURE — 1157F ADVNC CARE PLAN IN RCRD: CPT | Mod: CPTII,HCNC,S$GLB, | Performed by: FAMILY MEDICINE

## 2025-07-21 PROCEDURE — 3078F DIAST BP <80 MM HG: CPT | Mod: CPTII,HCNC,S$GLB, | Performed by: FAMILY MEDICINE

## 2025-07-21 PROCEDURE — 3288F FALL RISK ASSESSMENT DOCD: CPT | Mod: CPTII,HCNC,S$GLB, | Performed by: FAMILY MEDICINE

## 2025-07-21 PROCEDURE — 3074F SYST BP LT 130 MM HG: CPT | Mod: CPTII,HCNC,S$GLB, | Performed by: FAMILY MEDICINE

## 2025-07-21 PROCEDURE — 99999 PR PBB SHADOW E&M-EST. PATIENT-LVL V: CPT | Mod: PBBFAC,HCNC,, | Performed by: FAMILY MEDICINE

## 2025-07-21 RX ORDER — CLINDAMYCIN HYDROCHLORIDE 300 MG/1
300 CAPSULE ORAL 4 TIMES DAILY
COMMUNITY
End: 2025-07-21 | Stop reason: SDUPTHER

## 2025-07-21 RX ORDER — CLINDAMYCIN HYDROCHLORIDE 300 MG/1
300 CAPSULE ORAL 4 TIMES DAILY
Qty: 40 CAPSULE | Refills: 0 | Status: SHIPPED | OUTPATIENT
Start: 2025-07-21

## 2025-07-21 NOTE — PROGRESS NOTES
SCRIBE #1 NOTE: I, Neil Fuchs, am scribing for, and in the presence of,  Pawel Badillo III, MD. I have scribed the entire note.     Subjective:       Patient ID: Marisela Eagle is a 80 y.o. female.    Chief Complaint: Leg Swelling (And pain x 1 week)    Ms. Eagle is here for leg swelling with her last appointment being here on July 7, 2025. Accompanied by . Use of cane. Cellulitis of lower extremities. She is having some leg swelling along with redness. This has been flaring up, but today is the worst day. Report her legs are hot. They did have a long car trip, but they did make a stop. She has been having some shin pain. SOB. She is short of breath. Reports this got worse over the weekend. No fever. Nerve block this weekend. BMI of 36.71, up 6 pounds. Cardiovascular good. No chest pain. No palpitations. Blood pressure is 108/62. On half a Torsemide 10mg but took a whole pill yesterday. Given Jardiance by cardiology. Anticoagulated.     Review of Systems   Constitutional:  Negative for chills and fever.   HENT:  Negative for congestion and sore throat.    Eyes:  Negative for visual disturbance.   Respiratory:  Positive for shortness of breath. Negative for chest tightness.    Cardiovascular:  Positive for leg swelling. Negative for chest pain.   Gastrointestinal:  Negative for nausea.   Endocrine: Negative for polydipsia and polyuria.   Genitourinary:  Negative for dysuria and flank pain.   Musculoskeletal:  Negative for back pain, neck pain and neck stiffness.   Skin:  Positive for color change. Negative for rash.   Neurological:  Negative for weakness.   Hematological:  Does not bruise/bleed easily.   Psychiatric/Behavioral:  Negative for behavioral problems.    All other systems reviewed and are negative.      Objective:      Physical examination: Vital signs noted. No acute distress. No carotid bruit. Regular heart rate and rhythm. Lungs clear to auscultation bilaterally. Abdomen bowel sounds  positive soft and nontender. 2+ pedal edema the right and 1+ on the left. 2+ pedal pulses. Lower 2/3 of the right leg is red and ankle area of left leg is red. There is pitting in these areas and are hot to the touch.       Assessment:       1. Cellulitis of left lower extremity    2. Cellulitis of right lower extremity    3. Use of cane as ambulatory aid    4. Anticoagulated    5. SOB (shortness of breath)    6. Severe obesity (BMI 35.0-39.9) with comorbidity    7. Chronic heart failure with preserved ejection fraction        Plan:       Cellulitis of left lower extremity    Cellulitis of right lower extremity    Use of cane as ambulatory aid    Anticoagulated    SOB (shortness of breath)    Severe obesity (BMI 35.0-39.9) with comorbidity    Chronic heart failure with preserved ejection fraction    Other orders  -     clindamycin (CLEOCIN) 300 MG capsule; Take 1 capsule (300 mg total) by mouth 4 (four) times daily.  Dispense: 40 capsule; Refill: 0    Continue her anticoagulants.  Cleocin 300 mg q.i.d. for 10 days.  Elevate legs.  Up her torsemide dose to 10 mg b.i.d. for 3 days then go back to her regular dose of 1/2 daily.  Continue her Aldactone 25 daily.  Check daily weights.  To the emergency room she develops any fever.  Will not get venous ultrasound.  Since she is already anticoagulated.  All care gaps addressed or discussed.     I, Pawel Badillo III, MD, personally performed the services described in this documentation. All medical record entries made by the scribe were at my direction and in my presence. I have reviewed the chart and agree that the record reflects my personal performance and is accurate and complete.

## 2025-07-30 ENCOUNTER — OFFICE VISIT (OUTPATIENT)
Dept: FAMILY MEDICINE | Facility: CLINIC | Age: 80
End: 2025-07-30
Payer: MEDICARE

## 2025-07-30 VITALS
WEIGHT: 245.56 LBS | TEMPERATURE: 98 F | HEIGHT: 69 IN | BODY MASS INDEX: 36.37 KG/M2 | DIASTOLIC BLOOD PRESSURE: 62 MMHG | SYSTOLIC BLOOD PRESSURE: 116 MMHG

## 2025-07-30 DIAGNOSIS — L03.119 CELLULITIS OF LOWER EXTREMITY, UNSPECIFIED LATERALITY: ICD-10-CM

## 2025-07-30 DIAGNOSIS — E66.01 SEVERE OBESITY (BMI 35.0-39.9) WITH COMORBIDITY: ICD-10-CM

## 2025-07-30 DIAGNOSIS — R11.0 NAUSEA: Primary | ICD-10-CM

## 2025-07-30 PROCEDURE — 99213 OFFICE O/P EST LOW 20 MIN: CPT | Mod: HCNC,S$GLB,, | Performed by: FAMILY MEDICINE

## 2025-07-30 PROCEDURE — 99999 PR PBB SHADOW E&M-EST. PATIENT-LVL V: CPT | Mod: PBBFAC,HCNC,, | Performed by: FAMILY MEDICINE

## 2025-07-30 PROCEDURE — 1126F AMNT PAIN NOTED NONE PRSNT: CPT | Mod: CPTII,HCNC,S$GLB, | Performed by: FAMILY MEDICINE

## 2025-07-30 PROCEDURE — 1160F RVW MEDS BY RX/DR IN RCRD: CPT | Mod: CPTII,HCNC,S$GLB, | Performed by: FAMILY MEDICINE

## 2025-07-30 PROCEDURE — 3288F FALL RISK ASSESSMENT DOCD: CPT | Mod: CPTII,HCNC,S$GLB, | Performed by: FAMILY MEDICINE

## 2025-07-30 PROCEDURE — 1101F PT FALLS ASSESS-DOCD LE1/YR: CPT | Mod: CPTII,HCNC,S$GLB, | Performed by: FAMILY MEDICINE

## 2025-07-30 PROCEDURE — G2211 COMPLEX E/M VISIT ADD ON: HCPCS | Mod: HCNC,S$GLB,, | Performed by: FAMILY MEDICINE

## 2025-07-30 PROCEDURE — 1157F ADVNC CARE PLAN IN RCRD: CPT | Mod: CPTII,HCNC,S$GLB, | Performed by: FAMILY MEDICINE

## 2025-07-30 PROCEDURE — 3078F DIAST BP <80 MM HG: CPT | Mod: CPTII,HCNC,S$GLB, | Performed by: FAMILY MEDICINE

## 2025-07-30 PROCEDURE — 3074F SYST BP LT 130 MM HG: CPT | Mod: CPTII,HCNC,S$GLB, | Performed by: FAMILY MEDICINE

## 2025-07-30 PROCEDURE — 1159F MED LIST DOCD IN RCRD: CPT | Mod: CPTII,HCNC,S$GLB, | Performed by: FAMILY MEDICINE

## 2025-07-30 RX ORDER — DOXYCYCLINE 100 MG/1
100 CAPSULE ORAL 2 TIMES DAILY
Qty: 20 CAPSULE | Refills: 0 | Status: SHIPPED | OUTPATIENT
Start: 2025-07-30

## 2025-07-30 NOTE — PROGRESS NOTES
SCRIBE #1 NOTE: I, Moisés Chacon, am scribing for, and in the presence of, Pawel Badillo III, MD. I have scribed the entire note.     Subjective:       Patient ID: Marisela Eagle is a 80 y.o. female.    Chief Complaint: Follow-up (Cellulitis)    Ms. Marisela Eagle is here for a follow up after her last appointment on 7/21/2025. Accompanied by her . Use of cane as ambulatory aid. The patient reports that she discontinued her antibiotics due to experiencing side effects. She states that it upset her GI, and she was experiencing heart burns. The patient took her Cleocin for about 6 days before discontinuing it. No fever. She states that she is elevating her legs. BMI of 36.27. Cardiovascular good. No chest pain. No palpitations. Blood pressure is 116/62.    Review of Systems   Constitutional:  Negative for chills and fever.   HENT:  Negative for congestion and sore throat.    Eyes:  Negative for visual disturbance.   Respiratory:  Negative for chest tightness and shortness of breath.    Cardiovascular:  Negative for chest pain.   Gastrointestinal:  Negative for nausea.        Upset GI with antibiotic.   Endocrine: Negative for polydipsia and polyuria.   Genitourinary:  Negative for dysuria and flank pain.   Musculoskeletal:  Negative for back pain, neck pain and neck stiffness.   Skin:  Negative for rash.   Neurological:  Negative for weakness.   Hematological:  Does not bruise/bleed easily.   Psychiatric/Behavioral:  Negative for behavioral problems.    All other systems reviewed and are negative.      Objective:      Physical examination: Vital signs noted. No acute distress. No carotid bruit. Regular heart rate and rhythm. Lungs clear to auscultation bilaterally. Abdomen bowel sounds positive soft and nontender. Extremities without edema. 2+ pedal pulses. Lower 3/4 of right leg is slightly tender. Left lower leg is red.      Assessment:       1. Nausea    2. Severe obesity (BMI 35.0-39.9) with comorbidity         Plan:       Nausea    Severe obesity (BMI 35.0-39.9) with comorbidity    The cellulitis is not quite as warm.  But is still reddened 3/4 of the right lower leg and just the distal left lower leg.  Will place her on Vibramycin 100 mg b.i.d. for 10 days.  Call if she has side effects from this do not weight until the next follow-up.  All care gaps addressed or discussed.     I, Pawel Badillo III, MD, personally performed the services described in this documentation. All medical record entries made by the scribe were at my direction and in my presence. I have reviewed the chart and agree that the record reflects my personal performance and is accurate and complete.

## 2025-07-31 ENCOUNTER — TELEPHONE (OUTPATIENT)
Dept: FAMILY MEDICINE | Facility: CLINIC | Age: 80
End: 2025-07-31
Payer: MEDICARE

## 2025-08-03 ENCOUNTER — HOSPITAL ENCOUNTER (EMERGENCY)
Facility: HOSPITAL | Age: 80
Discharge: HOME | End: 2025-08-03
Attending: STUDENT IN AN ORGANIZED HEALTH CARE EDUCATION/TRAINING PROGRAM
Payer: MEDICARE

## 2025-08-03 VITALS
SYSTOLIC BLOOD PRESSURE: 153 MMHG | RESPIRATION RATE: 16 BRPM | TEMPERATURE: 98 F | DIASTOLIC BLOOD PRESSURE: 86 MMHG | WEIGHT: 240 LBS | HEART RATE: 80 BPM | HEIGHT: 69 IN | OXYGEN SATURATION: 100 % | BODY MASS INDEX: 35.55 KG/M2

## 2025-08-03 DIAGNOSIS — I82.409 DVT (DEEP VENOUS THROMBOSIS): ICD-10-CM

## 2025-08-03 DIAGNOSIS — M79.661 PAIN IN RIGHT LOWER LEG: ICD-10-CM

## 2025-08-03 DIAGNOSIS — Z13.6 SCREENING FOR CARDIOVASCULAR CONDITION: Primary | ICD-10-CM

## 2025-08-03 DIAGNOSIS — I87.8 VENOUS STASIS: ICD-10-CM

## 2025-08-03 LAB
ABSOLUTE EOSINOPHIL (SMH): 0.07 K/UL
ABSOLUTE MONOCYTE (SMH): 1.04 K/UL (ref 0.3–1)
ABSOLUTE NEUTROPHIL COUNT (SMH): 5.1 K/UL (ref 1.8–7.7)
ALBUMIN SERPL-MCNC: 4.5 G/DL (ref 3.5–5.2)
ALP SERPL-CCNC: 37 UNIT/L (ref 55–135)
ALT SERPL-CCNC: 14 UNIT/L (ref 10–44)
ANION GAP (SMH): 8 MMOL/L (ref 8–16)
AST SERPL-CCNC: 13 UNIT/L (ref 10–40)
BASOPHILS # BLD AUTO: 0.04 K/UL
BASOPHILS NFR BLD AUTO: 0.5 %
BILIRUB SERPL-MCNC: 0.4 MG/DL (ref 0.1–1)
BILIRUB UR QL STRIP.AUTO: NEGATIVE
BUN SERPL-MCNC: 19 MG/DL (ref 8–23)
CALCIUM SERPL-MCNC: 9 MG/DL (ref 8.7–10.5)
CHLORIDE SERPL-SCNC: 103 MMOL/L (ref 95–110)
CLARITY UR: CLEAR
CO2 SERPL-SCNC: 25 MMOL/L (ref 23–29)
COLOR UR AUTO: YELLOW
CREAT SERPL-MCNC: 0.8 MG/DL (ref 0.5–1.4)
ERYTHROCYTE [DISTWIDTH] IN BLOOD BY AUTOMATED COUNT: 17.4 % (ref 11.5–14.5)
GFR SERPLBLD CREATININE-BSD FMLA CKD-EPI: >60 ML/MIN/1.73/M2
GLUCOSE SERPL-MCNC: 90 MG/DL (ref 70–110)
GLUCOSE UR QL STRIP: NEGATIVE
HCT VFR BLD AUTO: 39.4 % (ref 37–48.5)
HGB BLD-MCNC: 12.4 GM/DL (ref 12–16)
HGB UR QL STRIP: NEGATIVE
IMM GRANULOCYTES # BLD AUTO: 0.06 K/UL (ref 0–0.04)
IMM GRANULOCYTES NFR BLD AUTO: 0.7 % (ref 0–0.5)
KETONES UR QL STRIP: NEGATIVE
LDH SERPL L TO P-CCNC: 1.22 MMOL/L (ref 0.5–2.2)
LEUKOCYTE ESTERASE UR QL STRIP: NEGATIVE
LYMPHOCYTES # BLD AUTO: 2.52 K/UL (ref 1–4.8)
MCH RBC QN AUTO: 26.5 PG (ref 27–31)
MCHC RBC AUTO-ENTMCNC: 31.5 G/DL (ref 32–36)
MCV RBC AUTO: 84 FL (ref 82–98)
NITRITE UR QL STRIP: NEGATIVE
NUCLEATED RBC (/100WBC) (SMH): 0 /100 WBC
PH UR STRIP: 6 [PH]
PLATELET # BLD AUTO: 314 K/UL (ref 150–450)
PMV BLD AUTO: 11.9 FL (ref 9.2–12.9)
POTASSIUM SERPL-SCNC: 3.9 MMOL/L (ref 3.5–5.1)
PROT SERPL-MCNC: 7 GM/DL (ref 6–8.4)
PROT UR QL STRIP: NEGATIVE
RBC # BLD AUTO: 4.68 M/UL (ref 4–5.4)
RELATIVE EOSINOPHIL (SMH): 0.8 % (ref 0–8)
RELATIVE LYMPHOCYTE (SMH): 28.4 % (ref 18–48)
RELATIVE MONOCYTE (SMH): 11.7 % (ref 4–15)
RELATIVE NEUTROPHIL (SMH): 57.9 % (ref 38–73)
SAMPLE: NORMAL
SODIUM SERPL-SCNC: 136 MMOL/L (ref 136–145)
SP GR UR STRIP: 1
UROBILINOGEN UR STRIP-ACNC: NEGATIVE EU/DL
WBC # BLD AUTO: 8.86 K/UL (ref 3.9–12.7)

## 2025-08-03 PROCEDURE — 25500020 PHARM REV CODE 255: Performed by: STUDENT IN AN ORGANIZED HEALTH CARE EDUCATION/TRAINING PROGRAM

## 2025-08-03 PROCEDURE — 93005 ELECTROCARDIOGRAM TRACING: CPT | Performed by: GENERAL PRACTICE

## 2025-08-03 PROCEDURE — 85025 COMPLETE CBC W/AUTO DIFF WBC: CPT | Performed by: EMERGENCY MEDICINE

## 2025-08-03 PROCEDURE — 99285 EMERGENCY DEPT VISIT HI MDM: CPT | Mod: 25

## 2025-08-03 PROCEDURE — 93010 ELECTROCARDIOGRAM REPORT: CPT | Mod: ,,, | Performed by: GENERAL PRACTICE

## 2025-08-03 PROCEDURE — 87040 BLOOD CULTURE FOR BACTERIA: CPT | Performed by: EMERGENCY MEDICINE

## 2025-08-03 PROCEDURE — 36415 COLL VENOUS BLD VENIPUNCTURE: CPT | Performed by: EMERGENCY MEDICINE

## 2025-08-03 PROCEDURE — 80053 COMPREHEN METABOLIC PANEL: CPT | Performed by: EMERGENCY MEDICINE

## 2025-08-03 PROCEDURE — 81003 URINALYSIS AUTO W/O SCOPE: CPT | Performed by: EMERGENCY MEDICINE

## 2025-08-03 RX ADMIN — IOHEXOL 100 ML: 350 INJECTION, SOLUTION INTRAVENOUS at 03:08

## 2025-08-03 NOTE — ED PROVIDER NOTES
Encounter Date: 8/3/2025       History     Chief Complaint   Patient presents with    Leg Swelling     Reports she was placed on antibiotics on Thursday by Dr Badillo for cellulitis but told to come to ER if not improving     Shortness of Breath     HPI    Marisela Eagle 80-year-old female with a past medical history of PE/DVT (on eliques), hypertension, depression, who presents to the emergency department for evaluation at the request of her primary care provider for swelling to the right lower extremity with redness for the last month.  Patient reports she has been given antibiotics x2 without relief in when she called her primary care provider he advised her to be seen in the emergency department today for further evaluation of cellulitis in the setting of failed outpatient antibiotic therapy.  The patient at this time is awake alert and oriented with a GCS of 15 no clinical signs of intoxication.  States that right leg has been persistently swollen and erythematous for at least a month, states darker in the morning with lighter colored persistent throughout the day, reported that she is supposed to be wearing compression socks but unable to utilize them because it is too difficult to prolonged, states there is some pain to the right lower extremity with the appears to be chronic in nature, with no changes in ambulation. Patient denies stated symptoms to include fever, night sweats, chills, nausea, vomiting, diarrhea, chest pain, shortness for breath, or focal neurologic deficits.      Review of patient's allergies indicates:  No Known Allergies  Past Medical History:   Diagnosis Date    Abnormal stress test 2024    shortness of breath    Bilateral knee pain 07/2012    left worse, right knee painful even after partial replacement.    Bruises easily     Clot 1990's?    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)     no treatment needed    Hyperlipidemia     Hypertension     Migraines     Neuropathy     peripheral    Pulmonary embolus     dvt knee    Thyroid disease     hypothyroid, has nodules    Varicose veins      Past Surgical History:   Procedure Laterality Date    ADENOIDECTOMY      APPENDECTOMY      BILATERAL SALPINGOOPHORECTOMY       SECTION      COLONOSCOPY  2012    HIP ARTHROPLASTY      HYSTERECTOMY      with BSO    INJECTION OF ANESTHETIC AGENT AROUND MEDIAL BRANCH NERVES INNERVATING LUMBAR FACET JOINT Bilateral 2025    Procedure: Block-nerve-medial branch-lumbar;  Surgeon: Geoffrey Caal MD;  Location: [facility] OR;  Service: Pain Management;  Laterality: Bilateral;    INJECTION OF ANESTHETIC AGENT AROUND MEDIAL BRANCH NERVES INNERVATING LUMBAR FACET JOINT Bilateral 2025    Procedure: Block-nerve-medial branch-lumbar level 1;  Surgeon: Geoffrey Caal MD;  Location: [facility] OR;  Service: Pain Management;  Laterality: Bilateral;    JOINT REPLACEMENT  2012    rt unilateral knee arthroplasty. Took Coumadin x 6 weeks    KNEE ARTHROPLASTY Bilateral     KNEE ARTHROSCOPY  2010    right    LEFT HEART CATHETERIZATION Left 2024    Procedure: Left heart cath;  Surgeon: Cooper Canchola MD;  Location: [facility] CATH/EP LAB;  Service: Cardiology;  Laterality: Left;    RADIOFREQUENCY ABLATION OF LUMBAR MEDIAL BRANCH NERVE AT SINGLE LEVEL Bilateral 2025    Procedure: Radiofrequency Ablation, Nerve, Spinal, Lumbar, Medial Branch, 1 Level;  Surgeon: Geoffrey Caal MD;  [facility];  Service: Pain Management;  Laterality: Bilateral;    TONSILLECTOMY       Family History   Problem Relation Name Age of Onset    Neuropathy Mother       Hypertension Mother      Stroke Mother      Neuropathy Father      Cancer Daughter      Diabetes Maternal Grandmother      Melanoma Neg Hx      Psoriasis Neg Hx      Lupus Neg Hx      Eczema Neg Hx       Social History[1]  Review of Systems   Constitutional:  Negative for activity change, appetite change, diaphoresis, fatigue and fever.   HENT:  Negative for congestion.    Respiratory:  Negative for cough, chest tightness, shortness of breath and wheezing.    Cardiovascular:  Positive for leg swelling. Negative for chest pain and palpitations.   Gastrointestinal:  Negative for abdominal distention, constipation, diarrhea, nausea and vomiting.   Genitourinary:  Negative for dysuria and urgency.   Musculoskeletal:  Negative for back pain, joint swelling and neck pain.   Skin:  Negative for color change.   Neurological:  Negative for weakness and light-headedness.       Physical Exam     Initial Vitals [08/03/25 1246]   BP Pulse Resp Temp SpO2   131/76 86 20 98 °F (36.7 °C) 99 %      MAP       --         Physical Exam    Nursing note and vitals reviewed.  Constitutional: She appears well-developed.   HENT:   Head: Normocephalic.   Right Ear: External ear normal.   Left Ear: External ear normal. Mouth/Throat: Oropharynx is clear and moist.   Eyes: EOM are normal. Pupils are equal, round, and reactive to light. Right eye exhibits no discharge. Left eye exhibits no discharge.   Neck: No JVD present.   Cardiovascular:  Normal rate and regular rhythm.           No murmur heard.  Pulmonary/Chest: Breath sounds normal. No respiratory distress. She has no wheezes. She has no rhonchi. She has no rales.   Abdominal: Abdomen is soft. She exhibits no distension. There is no abdominal tenderness. There is no rebound and no guarding.   Musculoskeletal:         General: No tenderness or edema. Normal range of motion.      Comments: Skin discoloration consistent with venous stasis to bilateral lower exteremities      Neurological: She  is alert. No cranial nerve deficit.   Skin: Skin is warm. Capillary refill takes less than 2 seconds. No erythema.       ED Course   Procedures  Labs Reviewed   COMPREHENSIVE METABOLIC PANEL - Abnormal       Result Value    Sodium 136      Potassium 3.9      Chloride 103      CO2 25      Glucose 90      BUN 19      Creatinine 0.8      Calcium 9.0      Protein Total 7.0      Albumin 4.5      Bilirubin Total 0.4      ALP 37 (*)     AST 13      ALT 14      Anion Gap 8      eGFR >60     CBC WITH DIFFERENTIAL - Abnormal    WBC 8.86      RBC 4.68      Hgb 12.4      Hct 39.4      MCV 84      MCH 26.5 (*)     MCHC 31.5 (*)     RDW 17.4 (*)     Platelet Count 314      MPV 11.9      Nucleated RBC 0      Neut % 57.9      Lymph % 28.4      Mono % 11.7      Eos % 0.8      Basophil % 0.5      Imm Grans % 0.7 (*)     Neut # 5.1      Lymph # 2.52      Mono # 1.04 (*)     Eos # 0.07      Baso # 0.04      Imm Grans # 0.06 (*)    CULTURE, BLOOD - Normal    CULTURE, BLOOD ([facility]) No Growth After 24 Hours     CULTURE, BLOOD - Normal    CULTURE, BLOOD ([facility]) No Growth After 24 Hours     URINALYSIS, REFLEX TO URINE CULTURE - Normal    Color, UA Yellow      Appearance, UA Clear      Spec Grav UA 1.005      pH, UA 6.0      Protein, UA Negative      Glucose, UA Negative      Ketones, UA Negative      Blood, UA Negative      Bilirubin, UA Negative      Urobilinogen, UA Negative      Nitrites, UA Negative      Leukocyte Esterase, UA Negative     CBC W/ AUTO DIFFERENTIAL    Narrative:     The following orders were created for panel order CBC auto differential.  Procedure                               Abnormality         Status                     ---------                               -----------         ------                     CBC with Differential[[id number]]       Abnormal            Final result                 Please view results for these tests on the individual orders.   EXTRA TUBES    Narrative:     The following orders were created  for panel order EXTRA TUBES.  Procedure                               Abnormality         Status                     ---------                               -----------         ------                     Light Blue Top Hold[[id number]]                             In process                 Light Green Top Hold[[id number]]                            In process                 Lavender Top Hold[[id number]]                               In process                 Gold Top Hold[[id number]]                                   In process                 Gold Top Hold[[id number]]                                   In process                   Please view results for these tests on the individual orders.   LIGHT BLUE TOP HOLD   LIGHT GREEN TOP HOLD   LAVENDER TOP HOLD   GOLD TOP HOLD   GOLD TOP HOLD   ISTAT LACTATE    POC Lactate 1.22      Sample VENOUS          ECG Results              EKG 12-lead (In process)        Collection Time Result Time QRS Duration OHS QTC Calculation    08/03/25 13:18:53 08/03/25 13:54:28 102 455                     In process by Interface, Lab In [facility] (08/03/25 13:54:38)                   Narrative:    Test Reason : Z13.6,    Vent. Rate :  81 BPM     Atrial Rate :  81 BPM     P-R Int : 204 ms          QRS Dur : 102 ms      QT Int : 392 ms       P-R-T Axes :  68 -38  82 degrees    QTcB Int : 455 ms    Normal sinus rhythm  Left axis deviation  Incomplete right bundle branch block  Abnormal ECG  No previous ECGs available    Referred By: AAAREFERRAL SELF           Confirmed By:                                   Imaging Results              CTA Chest Non-Coronary (PE Studies) (Final result)  Result time 08/03/25 15:16:32      Final result by Pamela Marr MD (08/03/25 15:16:32)                   Impression:      1. No evidence of pulmonary embolism to the segmental arterial level. If there is continued clinical concern, consider further evaluation with lower extremity doppler.    2. Stable  atelectasis in lung bases and biapical pleuroparenchymal scarring    Mild cardiomegaly with coronary artery calcification      Electronically signed by: Pamela Marr  Date:    08/03/2025  Time:    15:16               Narrative:      CMS MANDATED QUALITY DATA - CT RADIATION - 436    All CT scans at this facility utilize dose modulation, iterative reconstruction, and/or weight based dosing when appropriate to reduce radiation dose to as low as reasonably achievable.    CLINICAL HISTORY:  ([id number])81 y/o  (1945) F    Pulmonary embolism (PE) suspected, high prob;    TECHNIQUE:  (A#[id number], exam time 8/3/2025 15:01)    CTA CHEST NON CORONARY (PE STUDIES) [id number]    Axial CT examination of the chest with attention to the pulmonary arteries was performed using contiguous axial images from the diaphragms to the lung apices following the intravenous administration of 100 cc Omnipaque 350 non-ionic contrast material. Images were reviewed using lung, mediastinal, and bone windows. The study was performed with thin sections with subsequent 3-D reformats/MIP/reconstructions in multiple planes.    COMPARISON:  03/13/2025    FINDINGS:  CTA PE evaluation: This is a high quality study for the evaluation of pulmonary embolism . The pulmonary arteries are normal in appearance without pulmonary emboli noted up to the subsegmental level, noting the limitations of CT technique for identifying small or isolated subsegmental emboli.    CT Chest:    Visualized neck: normal    Lungs: Stable apical pleuroparenchymal scarring.  There are no infiltrates or pleural effusions.  There is atelectasis in the lung bases.    Airway: The trachea and central bronchial tree appear normal.    Cardiovascular: The heart is top normal in size There are no findings of right heart failure. The pulmonary artery is normal in size. There is a trace pericardial effusion  likely physiologic. The aorta and great vessels are normal in course and caliber   noting atherosclerotic calcifications in the aorta and coronary arteries.    Mediastinum: There is no supraclavicular  axillary  mediastinal  or hilar lymphadenopathy.    Soft tissues: The peripheral soft tissues appear normal.    Musculoskeletal: There are moderate degenerative changes of the thoracic spine.  There are no suspicious osseous lesions.    Esophagus: The esophagus appears grossly normal.    Upper Abdomen: The visualized upper abdominal organs appear normal.                                       US Lower Extremity Veins Bilateral (Final result)  Result time 08/03/25 14:44:32      Final result by Pamela Marr MD (08/03/25 14:44:32)                   Impression:      No evidence of bilateral lower extremity DVT      Electronically signed by: Pamela Marr  Date:    08/03/2025  Time:    14:44               Narrative:    EXAMINATION:  US LOWER EXTREMITY VEINS BILATERAL    CLINICAL HISTORY:  Acute embolism and thrombosis of unspecified deep veins of unspecified lower extremity    COMPARISON:  None    FINDINGS:  Grayscale, color Doppler, and spectral Doppler analysis was performed.    Bilateral common femoral, femoral, popliteal, and proximal great saphenous veins show normal compressibility, augmentation, and color Doppler flow. Bilateral posterior tibial, anterior tibial, and peroneal veins are unremarkable.                                       X-Ray Chest AP Portable (Final result)  Result time 08/03/25 13:30:41      Final result by Pamela Marr MD (08/03/25 13:30:41)                   Impression:      No acute cardiopulmonary abnormality.      Electronically signed by: Pamela Marr  Date:    08/03/2025  Time:    13:30               Narrative:    EXAMINATION:  XR CHEST AP PORTABLE    CLINICAL HISTORY:  Sepsis;    FINDINGS:  Portable chest at 13:12 hours is compared to 04/12/2024 shows normal cardiomediastinal silhouette.    Lungs are clear. Pulmonary vasculature is normal.  Degenerative changes of both shoulders.                                       Medications   iohexoL (OMNIPAQUE 350) injection 100 mL (100 mLs Intravenous Given 8/3/25 1500)     Medical Decision Making  Amount and/or Complexity of Data Reviewed  Radiology: ordered. Decision-making details documented in ED Course.    Risk  Prescription drug management.    Patient is an 80 year old female who presented to the ED for evaluation of reported persistent cellulitis of the right lower extremity for the last month. Patient states she has taken multiple antibiotics without relief and wanted to be evaluated in the ED. Patient noted to have slight swelling in bilateral lower extremities with a little more on the right. With chat check this seems to be consistent with other office visits. Patient has history of DVT an PE on eliques therefore, DVT studies and PE study at the patient request was preformed and are negative. Patient when sitting on the edge of the bed demonstrated increased redness to the right lower extremity that improved when legs were moved to the bed. At this time there is littler concern for DVT, Cellulitis, abscess formation (bedside ultrasound without findings of cellulitis or abscess formation), osteomyelitis, strain, sprain, or other acute injury/infectious state. I believe the patient to be experiencing skin discoloration in the setting of venous statis. We discussed compression stockings which she stated she was supposed to be wearing but has not been. Advised that should monitor off antibiotics and follow up with PCP for continued monitoring and evaluation to which the patient and her  agreed as the risk of antibiotic side effects and resistance outweighed concern of infectious state. Patient was given strict return precautions to which she voiced understanding, was able to ambulate in the ED with a steady gait, was able to tolerate PO, and was discharged.              ED Course as of 08/04/25     Sun Aug 03, 2025   1346 X-Ray Chest AP Portable  No acute cardiopulmonary abnormality.      [CH]   1538 CTA Chest Non-Coronary (PE Studies)  TA PE evaluation: This is a high quality study for the evaluation of pulmonary embolism . The pulmonary arteries are normal in appearance without pulmonary emboli noted up to the subsegmental level, noting the limitations of CT technique for identifying small or isolated subsegmental emboli. [CH]   1539 US Lower Extremity Veins Bilateral  No evidence of bilateral lower extremity DVT [CH]      ED Course User Index  [CH] Joseph Webber MD                               Clinical Impression:  Final diagnoses:  [Z13.6] Screening for cardiovascular condition (Primary)  [I82.409] DVT (deep venous thrombosis)  [I87.8] Venous stasis  [M79.661] Pain in right lower leg          ED Disposition Condition    Discharge Stable          ED Prescriptions    None       Follow-up Information       Follow up With Specialties Details Why Contact Info Additional Information    Pawel Badillo III, MD Family Medicine In 1 day  [address]  [address]  [place] [address]  [phone]       [facility] - Emergency Dept Emergency Medicine  As needed, If symptoms worsen [address] [address]  [phone] 1st floor                   [1]   Social History  Tobacco Use    Smoking status: Former     Current packs/day: 0.00     Types: Cigarettes     Quit date: 3/23/2012     Years since quittin.3    Smokeless tobacco: Never   Substance Use Topics    Alcohol use: Not Currently    Drug use: No        Joseph Webber MD  25

## 2025-08-03 NOTE — DISCHARGE INSTRUCTIONS
You were seen in the emergency department for evaluation of right lower extremity redness for the last month despite antibiotic therapy.  At this time you DVT no longer present and your CT PE study demonstrates no findings of pulmonary embolism.  I believe the swelling to the right lower extremity is in the setting of venous stasis from likely fluid retention as discoloration into noted to both lower extremities.  Strongly recommend follow up advice of primary care provider and others to utilize compression stockings.  It is possible that some of the swelling is in relation to amlodipine and could discussed with the primary care provider of the addition of medications to aid with swelling as a side effect.  Please return to the emergency department next 24-48 hours if you have worsening pain, redness, fever, night sweats, chills, or any other concerns you believe should be evaluated by emergency room provider.

## 2025-08-04 ENCOUNTER — TELEPHONE (OUTPATIENT)
Facility: OTHER | Age: 80
End: 2025-08-04
Payer: MEDICARE

## 2025-08-04 ENCOUNTER — TELEPHONE (OUTPATIENT)
Dept: FAMILY MEDICINE | Facility: CLINIC | Age: 80
End: 2025-08-04
Payer: MEDICARE

## 2025-08-04 NOTE — TELEPHONE ENCOUNTER
Copied from CRM #[id number]. Topic: Appointments - Appointment Scheduling  >> Aug 4, 2025  9:51 AM Eileen wrote:  Type:  Sooner Apoointment Request    Caller is requesting a sooner appointment.  Caller declined first available appointment listed below.  Caller will not accept being placed on the waitlist and is requesting a message be sent to doctor.  Name of Caller:pt    When is the first available appointment? 12 aug    Symptoms:hosp follow up    Would the patient rather a call back or a response via [facility]?  Call back    Best Call Back Number: [phone]    Additional Information: pt wants sooner time with Dr Badillo only    Please call to advise    Thanks

## 2025-08-05 ENCOUNTER — OFFICE VISIT (OUTPATIENT)
Dept: FAMILY MEDICINE | Facility: CLINIC | Age: 80
End: 2025-08-05
Payer: MEDICARE

## 2025-08-05 VITALS
WEIGHT: 242 LBS | BODY MASS INDEX: 35.84 KG/M2 | HEART RATE: 84 BPM | HEIGHT: 69 IN | TEMPERATURE: 98 F | DIASTOLIC BLOOD PRESSURE: 74 MMHG | SYSTOLIC BLOOD PRESSURE: 120 MMHG

## 2025-08-05 DIAGNOSIS — I50.32 CHRONIC HEART FAILURE WITH PRESERVED EJECTION FRACTION: ICD-10-CM

## 2025-08-05 DIAGNOSIS — L03.119 CELLULITIS OF LOWER EXTREMITY, UNSPECIFIED LATERALITY: ICD-10-CM

## 2025-08-05 DIAGNOSIS — I10 HYPERTENSION, ESSENTIAL: Primary | ICD-10-CM

## 2025-08-05 DIAGNOSIS — M34.1 CREST SYNDROME: ICD-10-CM

## 2025-08-05 DIAGNOSIS — E66.01 SEVERE OBESITY (BMI 35.0-39.9) WITH COMORBIDITY: ICD-10-CM

## 2025-08-05 DIAGNOSIS — I73.00 RAYNAUD'S DISEASE WITHOUT GANGRENE: ICD-10-CM

## 2025-08-05 PROCEDURE — 1159F MED LIST DOCD IN RCRD: CPT | Mod: CPTII,HCNC,S$GLB, | Performed by: FAMILY MEDICINE

## 2025-08-05 PROCEDURE — 3288F FALL RISK ASSESSMENT DOCD: CPT | Mod: CPTII,HCNC,S$GLB, | Performed by: FAMILY MEDICINE

## 2025-08-05 PROCEDURE — 99999 PR PBB SHADOW E&M-EST. PATIENT-LVL IV: CPT | Mod: PBBFAC,HCNC,, | Performed by: FAMILY MEDICINE

## 2025-08-05 PROCEDURE — G2211 COMPLEX E/M VISIT ADD ON: HCPCS | Mod: HCNC,S$GLB,, | Performed by: FAMILY MEDICINE

## 2025-08-05 PROCEDURE — 1157F ADVNC CARE PLAN IN RCRD: CPT | Mod: CPTII,HCNC,S$GLB, | Performed by: FAMILY MEDICINE

## 2025-08-05 PROCEDURE — 1101F PT FALLS ASSESS-DOCD LE1/YR: CPT | Mod: CPTII,HCNC,S$GLB, | Performed by: FAMILY MEDICINE

## 2025-08-05 PROCEDURE — 3074F SYST BP LT 130 MM HG: CPT | Mod: CPTII,HCNC,S$GLB, | Performed by: FAMILY MEDICINE

## 2025-08-05 PROCEDURE — 3078F DIAST BP <80 MM HG: CPT | Mod: CPTII,HCNC,S$GLB, | Performed by: FAMILY MEDICINE

## 2025-08-05 PROCEDURE — 1160F RVW MEDS BY RX/DR IN RCRD: CPT | Mod: CPTII,HCNC,S$GLB, | Performed by: FAMILY MEDICINE

## 2025-08-05 PROCEDURE — 99213 OFFICE O/P EST LOW 20 MIN: CPT | Mod: HCNC,S$GLB,, | Performed by: FAMILY MEDICINE

## 2025-08-05 NOTE — PROGRESS NOTES
SCRIBE #1 NOTE: I, Neil Fuchs, am scribing for, and in the presence of,  Pawel Badillo III, MD. I have scribed the entire note.     Subjective:       Patient ID: Marisela Eagle is a 80 y.o. female.    Chief Complaint: Hospital Follow Up    Ms. Eagle is here for a hospital follow up with her last appointment being here on July 30, 2025. Accompanied by . Use of cane. Cellulitis. She went to the ER on August 3, 2025 due to leg swelling. Her lower legs are discolored. She was told she had venous stasis and not cellulitis, so she was told to stop her antibiotics. BMI of 35.74. Cardiovascular good. No chest pain. No palpitations. Blood pressure is 120/74. Hypertension controlled. On Amlodipine 5mg by rheumatology in February 2025. CDHF. On Jardiance by cardiologist. Raynaud's disease. Sees Dr. Thomson. CREST.     Review of Systems   Constitutional:  Negative for chills and fever.   HENT:  Negative for congestion and sore throat.    Eyes:  Negative for visual disturbance.   Respiratory:  Negative for chest tightness and shortness of breath.    Cardiovascular:  Negative for chest pain.   Gastrointestinal:  Negative for nausea.   Endocrine: Negative for polydipsia and polyuria.   Genitourinary:  Negative for dysuria and flank pain.   Musculoskeletal:  Negative for back pain, neck pain and neck stiffness.   Skin:  Negative for rash.   Neurological:  Negative for weakness.   Hematological:  Does not bruise/bleed easily.   Psychiatric/Behavioral:  Negative for behavioral problems.    All other systems reviewed and are negative.      Objective:      Physical examination: Vital signs noted. No acute distress. No carotid bruit. Regular heart rate and rhythm. Lungs clear to auscultation bilaterally. Abdomen bowel sounds positive soft and nontender. Trace pedal edema on left and 1+ on right. 2+ pedal pulses. Lower legs not warm. Cool, dusky discoloration on hands and feet.  Legs are no longer warm to the touch.       Assessment:       1. Hypertension, essential    2. CREST syndrome    3. Cellulitis of lower extremity, unspecified laterality    4. Chronic heart failure with preserved ejection fraction    5. Raynaud's disease without gangrene    6. Severe obesity (BMI 35.0-39.9) with comorbidity        Plan:       Hypertension, essential    CREST syndrome    Cellulitis of lower extremity, unspecified laterality    Chronic heart failure with preserved ejection fraction    Raynaud's disease without gangrene    Severe obesity (BMI 35.0-39.9) with comorbidity    Stay off the antibiotics.  Remaining changes are stasis related not cellulitis at this point.  Resume antibiotic if fever or worsening redness.  Will increase warmth.  Decrease the amlodipine from 5 mg to 2.5 per day to see if this will minimize edema.  Reviewed her Jardiance.  This was started by Cardiology for her heart.  Recommend she continue it.  Follow-up here in 3 months.  She may need to increase the dose of amlodipine again during the winter months due to her Raynaud's.  All care gaps addressed or discussed.     I, Pawel Badillo III, MD, personally performed the services described in this documentation. All medical record entries made by the scribe were at my direction and in my presence. I have reviewed the chart and agree that the record reflects my personal performance and is accurate and complete.

## 2025-08-06 LAB
OHS QRS DURATION: 102 MS
OHS QTC CALCULATION: 455 MS

## 2025-08-08 ENCOUNTER — TELEPHONE (OUTPATIENT)
Dept: PAIN MEDICINE | Facility: CLINIC | Age: 80
End: 2025-08-08
Payer: MEDICARE

## 2025-08-08 DIAGNOSIS — M47.892 OTHER SPONDYLOSIS, CERVICAL REGION: Primary | ICD-10-CM

## 2025-08-08 LAB
BACTERIA BLD CULT: NORMAL
BACTERIA BLD CULT: NORMAL

## 2025-08-08 NOTE — TELEPHONE ENCOUNTER
Caller is requesting an appointment:            Name of Caller:pt            Symptoms:pt ready to schedule procedure for nerve block in neck            Would the patient rather a call back or a response via [facility]? Call Back            Best Call Back Number:[phone]            Additional Information:  please call to advise, Thank You.     Kamari from 07/24 is still authorized and ready to schedule called pt and scheduled for 08/20.. instructions given

## 2025-08-11 ENCOUNTER — TELEPHONE (OUTPATIENT)
Dept: PAIN MEDICINE | Facility: CLINIC | Age: 80
End: 2025-08-11
Payer: MEDICARE

## 2025-08-19 ENCOUNTER — HOSPITAL ENCOUNTER (OUTPATIENT)
Facility: HOSPITAL | Age: 80
Discharge: HOME | End: 2025-08-19
Attending: ANESTHESIOLOGY | Admitting: ANESTHESIOLOGY
Payer: MEDICARE

## 2025-08-19 VITALS
SYSTOLIC BLOOD PRESSURE: 152 MMHG | OXYGEN SATURATION: 100 % | TEMPERATURE: 98 F | WEIGHT: 242.06 LBS | BODY MASS INDEX: 35.85 KG/M2 | HEIGHT: 69 IN | RESPIRATION RATE: 16 BRPM | DIASTOLIC BLOOD PRESSURE: 73 MMHG | HEART RATE: 76 BPM

## 2025-08-19 DIAGNOSIS — M47.892 OTHER SPONDYLOSIS, CERVICAL REGION: ICD-10-CM

## 2025-08-19 PROCEDURE — 64490 INJ PARAVERT F JNT C/T 1 LEV: CPT | Mod: 50,KX,, | Performed by: ANESTHESIOLOGY

## 2025-08-19 PROCEDURE — 64490 INJ PARAVERT F JNT C/T 1 LEV: CPT | Mod: 50 | Performed by: ANESTHESIOLOGY

## 2025-08-19 PROCEDURE — 64491 INJ PARAVERT F JNT C/T 2 LEV: CPT | Mod: 50,KX,, | Performed by: ANESTHESIOLOGY

## 2025-08-19 PROCEDURE — 63600175 PHARM REV CODE 636 W HCPCS: Performed by: ANESTHESIOLOGY

## 2025-08-19 PROCEDURE — 64491 INJ PARAVERT F JNT C/T 2 LEV: CPT | Mod: 50 | Performed by: ANESTHESIOLOGY

## 2025-08-19 RX ORDER — SODIUM CHLORIDE, SODIUM LACTATE, POTASSIUM CHLORIDE, CALCIUM CHLORIDE 600; 310; 30; 20 MG/100ML; MG/100ML; MG/100ML; MG/100ML
INJECTION, SOLUTION INTRAVENOUS CONTINUOUS
Status: DISCONTINUED | OUTPATIENT
Start: 2025-08-19 | End: 2025-08-19 | Stop reason: HOSPADM

## 2025-08-19 RX ORDER — MIDAZOLAM HYDROCHLORIDE 1 MG/ML
INJECTION INTRAMUSCULAR; INTRAVENOUS
Status: DISCONTINUED | OUTPATIENT
Start: 2025-08-19 | End: 2025-08-19 | Stop reason: HOSPADM

## 2025-08-19 RX ORDER — LIDOCAINE HYDROCHLORIDE 10 MG/ML
1 INJECTION, SOLUTION EPIDURAL; INFILTRATION; INTRACAUDAL; PERINEURAL ONCE
Status: DISCONTINUED | OUTPATIENT
Start: 2025-08-19 | End: 2025-08-19 | Stop reason: HOSPADM

## 2025-08-19 RX ORDER — BUPIVACAINE HYDROCHLORIDE 5 MG/ML
INJECTION, SOLUTION EPIDURAL; INTRACAUDAL; PERINEURAL
Status: DISCONTINUED | OUTPATIENT
Start: 2025-08-19 | End: 2025-08-19 | Stop reason: HOSPADM

## 2025-08-20 ENCOUNTER — TELEPHONE (OUTPATIENT)
Dept: PAIN MEDICINE | Facility: CLINIC | Age: 80
End: 2025-08-20
Payer: MEDICARE

## 2025-08-20 DIAGNOSIS — F41.9 ANXIETY: ICD-10-CM

## 2025-08-20 DIAGNOSIS — M47.892 OTHER SPONDYLOSIS, CERVICAL REGION: Primary | ICD-10-CM

## 2025-08-20 RX ORDER — LORAZEPAM 1 MG/1
1 TABLET ORAL NIGHTLY
Qty: 30 TABLET | Refills: 0 | Status: SHIPPED | OUTPATIENT
Start: 2025-08-20

## 2025-08-21 ENCOUNTER — OFFICE VISIT (OUTPATIENT)
Dept: CARDIOLOGY | Facility: CLINIC | Age: 80
End: 2025-08-21
Payer: MEDICARE

## 2025-08-21 ENCOUNTER — TELEPHONE (OUTPATIENT)
Dept: CARDIOLOGY | Facility: CLINIC | Age: 80
End: 2025-08-21

## 2025-08-21 VITALS
WEIGHT: 244 LBS | HEART RATE: 92 BPM | SYSTOLIC BLOOD PRESSURE: 118 MMHG | BODY MASS INDEX: 36.14 KG/M2 | HEIGHT: 69 IN | DIASTOLIC BLOOD PRESSURE: 60 MMHG

## 2025-08-21 DIAGNOSIS — I87.2 VENOUS INSUFFICIENCY: ICD-10-CM

## 2025-08-21 DIAGNOSIS — I73.9 CLAUDICATION: ICD-10-CM

## 2025-08-21 DIAGNOSIS — B37.49 CANDIDAL URINARY TRACT INFECTION: ICD-10-CM

## 2025-08-21 DIAGNOSIS — Z86.711 HISTORY OF PULMONARY EMBOLISM: ICD-10-CM

## 2025-08-21 DIAGNOSIS — I50.32 CHRONIC HEART FAILURE WITH PRESERVED EJECTION FRACTION: ICD-10-CM

## 2025-08-21 DIAGNOSIS — R94.30 ELEVATED LEFT VENTRICULAR END-DIASTOLIC PRESSURE: ICD-10-CM

## 2025-08-21 DIAGNOSIS — I10 HYPERTENSION, ESSENTIAL: ICD-10-CM

## 2025-08-21 DIAGNOSIS — R06.02 SOB (SHORTNESS OF BREATH): Primary | ICD-10-CM

## 2025-08-21 DIAGNOSIS — I73.00 RAYNAUD'S DISEASE WITHOUT GANGRENE: ICD-10-CM

## 2025-08-21 DIAGNOSIS — I73.9 CLAUDICATION OF BOTH LOWER EXTREMITIES: ICD-10-CM

## 2025-08-21 PROCEDURE — 99999 PR PBB SHADOW E&M-EST. PATIENT-LVL V: CPT | Mod: PBBFAC,HCNC,, | Performed by: NURSE PRACTITIONER

## 2025-08-22 ENCOUNTER — TELEPHONE (OUTPATIENT)
Dept: RHEUMATOLOGY | Facility: CLINIC | Age: 80
End: 2025-08-22
Payer: MEDICARE

## 2025-08-22 ENCOUNTER — TELEPHONE (OUTPATIENT)
Facility: CLINIC | Age: 80
End: 2025-08-22
Payer: MEDICARE

## 2025-08-22 ENCOUNTER — TELEPHONE (OUTPATIENT)
Dept: VASCULAR SURGERY | Facility: CLINIC | Age: 80
End: 2025-08-22
Payer: MEDICARE

## 2025-08-27 ENCOUNTER — TELEPHONE (OUTPATIENT)
Dept: CARDIOLOGY | Facility: CLINIC | Age: 80
End: 2025-08-27
Payer: MEDICARE

## 2025-08-27 RX ORDER — LIDOCAINE HYDROCHLORIDE 10 MG/ML
1 INJECTION, SOLUTION EPIDURAL; INFILTRATION; INTRACAUDAL; PERINEURAL ONCE
OUTPATIENT
Start: 2025-08-27 | End: 2025-08-27

## 2025-08-27 RX ORDER — SODIUM CHLORIDE, SODIUM LACTATE, POTASSIUM CHLORIDE, CALCIUM CHLORIDE 600; 310; 30; 20 MG/100ML; MG/100ML; MG/100ML; MG/100ML
INJECTION, SOLUTION INTRAVENOUS CONTINUOUS
OUTPATIENT
Start: 2025-08-27

## 2025-08-28 ENCOUNTER — TELEPHONE (OUTPATIENT)
Dept: CARDIOLOGY | Facility: CLINIC | Age: 80
End: 2025-08-28
Payer: MEDICARE

## 2025-09-01 DIAGNOSIS — E03.9 HYPOTHYROIDISM, UNSPECIFIED TYPE: ICD-10-CM

## 2025-09-02 ENCOUNTER — HOSPITAL ENCOUNTER (OUTPATIENT)
Facility: HOSPITAL | Age: 80
Discharge: HOME | End: 2025-09-02
Attending: ANESTHESIOLOGY | Admitting: ANESTHESIOLOGY
Payer: MEDICARE

## 2025-09-02 DIAGNOSIS — G62.9 NEUROPATHY: ICD-10-CM

## 2025-09-02 DIAGNOSIS — M47.892 OTHER SPONDYLOSIS, CERVICAL REGION: ICD-10-CM

## 2025-09-02 PROCEDURE — 64491 INJ PARAVERT F JNT C/T 2 LEV: CPT | Mod: 50 | Performed by: ANESTHESIOLOGY

## 2025-09-02 PROCEDURE — 64490 INJ PARAVERT F JNT C/T 1 LEV: CPT | Mod: 50 | Performed by: ANESTHESIOLOGY

## 2025-09-02 PROCEDURE — 63600175 PHARM REV CODE 636 W HCPCS: Performed by: ANESTHESIOLOGY

## 2025-09-02 PROCEDURE — 64491 INJ PARAVERT F JNT C/T 2 LEV: CPT | Mod: 50,KX,, | Performed by: ANESTHESIOLOGY

## 2025-09-02 PROCEDURE — 64490 INJ PARAVERT F JNT C/T 1 LEV: CPT | Mod: 50,KX,, | Performed by: ANESTHESIOLOGY

## 2025-09-02 RX ORDER — MIDAZOLAM HYDROCHLORIDE 1 MG/ML
INJECTION INTRAMUSCULAR; INTRAVENOUS
Status: DISCONTINUED | OUTPATIENT
Start: 2025-09-02 | End: 2025-09-02 | Stop reason: HOSPADM

## 2025-09-02 RX ORDER — BUPIVACAINE HYDROCHLORIDE 5 MG/ML
INJECTION, SOLUTION EPIDURAL; INTRACAUDAL; PERINEURAL
Status: DISCONTINUED | OUTPATIENT
Start: 2025-09-02 | End: 2025-09-02 | Stop reason: HOSPADM

## 2025-09-02 RX ORDER — LEVOTHYROXINE SODIUM 125 UG/1
TABLET ORAL
Qty: 90 TABLET | Refills: 3 | Status: SHIPPED | OUTPATIENT
Start: 2025-09-02

## 2025-09-02 RX ORDER — LIDOCAINE HYDROCHLORIDE 10 MG/ML
INJECTION, SOLUTION EPIDURAL; INFILTRATION; INTRACAUDAL; PERINEURAL
Status: DISCONTINUED | OUTPATIENT
Start: 2025-09-02 | End: 2025-09-02 | Stop reason: HOSPADM

## 2025-09-03 ENCOUNTER — TELEPHONE (OUTPATIENT)
Dept: PAIN MEDICINE | Facility: CLINIC | Age: 80
End: 2025-09-03
Payer: MEDICARE

## 2025-09-03 VITALS
HEART RATE: 83 BPM | WEIGHT: 244.06 LBS | OXYGEN SATURATION: 96 % | DIASTOLIC BLOOD PRESSURE: 84 MMHG | HEIGHT: 69 IN | BODY MASS INDEX: 36.15 KG/M2 | TEMPERATURE: 98 F | SYSTOLIC BLOOD PRESSURE: 142 MMHG | RESPIRATION RATE: 17 BRPM

## (undated) DEVICE — GUIDEWIRE INQWIRE SE 3MM JTIP

## (undated) DEVICE — GLOVE SENSICARE PI GRN 7.5

## (undated) DEVICE — CANNULA RADIOPAQUE 20G CURVED

## (undated) DEVICE — NDL SPINAL 25GX3.5 SPINOCAN

## (undated) DEVICE — CHLORAPREP 10.5 ML APPLICATOR

## (undated) DEVICE — SYS LABEL CORRECT MED

## (undated) DEVICE — PAD GROUNDING DISPER ELECTRODE

## (undated) DEVICE — DRAPE MEDIUM SHEET 40X70IN

## (undated) DEVICE — KIT GLIDESHEATH SLEND 6FR 10CM

## (undated) DEVICE — CATH DXTERITY JR40 100CM 5FR

## (undated) DEVICE — OMNIPAQUE 350MG 150ML VIAL

## (undated) DEVICE — CATH DXTERITY JL40 100CM 5FR

## (undated) DEVICE — CATH DXTERITY JL35 100CM 5FR

## (undated) DEVICE — HEMOSTAT VASC BAND REG 24CM